# Patient Record
Sex: FEMALE | Race: WHITE | NOT HISPANIC OR LATINO | Employment: OTHER | URBAN - METROPOLITAN AREA
[De-identification: names, ages, dates, MRNs, and addresses within clinical notes are randomized per-mention and may not be internally consistent; named-entity substitution may affect disease eponyms.]

---

## 2017-01-12 ENCOUNTER — ALLSCRIPTS OFFICE VISIT (OUTPATIENT)
Dept: OTHER | Facility: OTHER | Age: 57
End: 2017-01-12

## 2017-02-22 ENCOUNTER — ALLSCRIPTS OFFICE VISIT (OUTPATIENT)
Dept: OTHER | Facility: OTHER | Age: 57
End: 2017-02-22

## 2017-03-05 ENCOUNTER — APPOINTMENT (EMERGENCY)
Dept: RADIOLOGY | Facility: HOSPITAL | Age: 57
DRG: 192 | End: 2017-03-05
Payer: COMMERCIAL

## 2017-03-05 ENCOUNTER — HOSPITAL ENCOUNTER (INPATIENT)
Facility: HOSPITAL | Age: 57
LOS: 1 days | Discharge: HOME | DRG: 192 | End: 2017-03-08
Attending: EMERGENCY MEDICINE | Admitting: INTERNAL MEDICINE
Payer: COMMERCIAL

## 2017-03-05 DIAGNOSIS — R94.39 ABNORMAL STRESS TEST: ICD-10-CM

## 2017-03-05 DIAGNOSIS — R07.9 CHEST PAIN: Primary | ICD-10-CM

## 2017-03-05 PROBLEM — F41.9 ANXIETY: Status: ACTIVE | Noted: 2017-03-05

## 2017-03-05 PROBLEM — M54.9 CHRONIC BACK PAIN: Status: ACTIVE | Noted: 2017-03-05

## 2017-03-05 PROBLEM — G89.29 CHRONIC BACK PAIN: Status: ACTIVE | Noted: 2017-03-05

## 2017-03-05 PROBLEM — J44.1 COPD WITH EXACERBATION (HCC): Status: ACTIVE | Noted: 2017-03-05

## 2017-03-05 PROBLEM — F32.A DEPRESSION: Chronic | Status: ACTIVE | Noted: 2017-03-05

## 2017-03-05 PROBLEM — G62.9 NEUROPATHY: Chronic | Status: ACTIVE | Noted: 2017-03-05

## 2017-03-05 PROBLEM — Z72.0 TOBACCO ABUSE DISORDER: Status: ACTIVE | Noted: 2017-03-05

## 2017-03-05 LAB
ALBUMIN SERPL BCP-MCNC: 3.5 G/DL (ref 3.5–5)
ALP SERPL-CCNC: 101 U/L (ref 46–116)
ALT SERPL W P-5'-P-CCNC: 24 U/L (ref 12–78)
AMYLASE SERPL-CCNC: 38 IU/L (ref 25–115)
ANION GAP SERPL CALCULATED.3IONS-SCNC: 10 MMOL/L (ref 4–13)
AST SERPL W P-5'-P-CCNC: 19 U/L (ref 5–45)
BASOPHILS # BLD AUTO: 0.04 THOUSANDS/ΜL (ref 0–0.1)
BASOPHILS NFR BLD AUTO: 1 % (ref 0–1)
BILIRUB DIRECT SERPL-MCNC: 0.05 MG/DL (ref 0–0.2)
BILIRUB SERPL-MCNC: 0.2 MG/DL (ref 0.2–1)
BUN SERPL-MCNC: 17 MG/DL (ref 5–25)
CALCIUM SERPL-MCNC: 9.2 MG/DL (ref 8.3–10.1)
CHLORIDE SERPL-SCNC: 106 MMOL/L (ref 100–108)
CO2 SERPL-SCNC: 25 MMOL/L (ref 21–32)
CREAT SERPL-MCNC: 0.83 MG/DL (ref 0.6–1.3)
EOSINOPHIL # BLD AUTO: 0.38 THOUSAND/ΜL (ref 0–0.61)
EOSINOPHIL NFR BLD AUTO: 6 % (ref 0–6)
ERYTHROCYTE [DISTWIDTH] IN BLOOD BY AUTOMATED COUNT: 13 % (ref 11.6–15.1)
GFR SERPL CREATININE-BSD FRML MDRD: >60 ML/MIN/1.73SQ M
GLUCOSE SERPL-MCNC: 87 MG/DL (ref 65–140)
HCT VFR BLD AUTO: 40.4 % (ref 34.8–46.1)
HGB BLD-MCNC: 13.7 G/DL (ref 11.5–15.4)
LYMPHOCYTES # BLD AUTO: 1.96 THOUSANDS/ΜL (ref 0.6–4.47)
LYMPHOCYTES NFR BLD AUTO: 33 % (ref 14–44)
MCH RBC QN AUTO: 30.6 PG (ref 26.8–34.3)
MCHC RBC AUTO-ENTMCNC: 33.9 G/DL (ref 31.4–37.4)
MCV RBC AUTO: 90 FL (ref 82–98)
MONOCYTES # BLD AUTO: 0.32 THOUSAND/ΜL (ref 0.17–1.22)
MONOCYTES NFR BLD AUTO: 5 % (ref 4–12)
NEUTROPHILS # BLD AUTO: 3.19 THOUSANDS/ΜL (ref 1.85–7.62)
NEUTS SEG NFR BLD AUTO: 54 % (ref 43–75)
NRBC BLD AUTO-RTO: 0 /100 WBCS
PLATELET # BLD AUTO: 193 THOUSANDS/UL (ref 149–390)
PMV BLD AUTO: 9.4 FL (ref 8.9–12.7)
POTASSIUM SERPL-SCNC: 3.6 MMOL/L (ref 3.5–5.3)
PROT SERPL-MCNC: 7 G/DL (ref 6.4–8.2)
RBC # BLD AUTO: 4.47 MILLION/UL (ref 3.81–5.12)
SODIUM SERPL-SCNC: 141 MMOL/L (ref 136–145)
TROPONIN I SERPL-MCNC: <0.02 NG/ML
WBC # BLD AUTO: 5.91 THOUSAND/UL (ref 4.31–10.16)

## 2017-03-05 PROCEDURE — 80048 BASIC METABOLIC PNL TOTAL CA: CPT

## 2017-03-05 PROCEDURE — 36415 COLL VENOUS BLD VENIPUNCTURE: CPT | Performed by: EMERGENCY MEDICINE

## 2017-03-05 PROCEDURE — 71020 HB CHEST X-RAY 2VW FRONTAL&LATL: CPT

## 2017-03-05 PROCEDURE — 84484 ASSAY OF TROPONIN QUANT: CPT | Performed by: FAMILY MEDICINE

## 2017-03-05 PROCEDURE — 85025 COMPLETE CBC W/AUTO DIFF WBC: CPT

## 2017-03-05 PROCEDURE — 84484 ASSAY OF TROPONIN QUANT: CPT

## 2017-03-05 PROCEDURE — 96375 TX/PRO/DX INJ NEW DRUG ADDON: CPT

## 2017-03-05 PROCEDURE — 93005 ELECTROCARDIOGRAM TRACING: CPT

## 2017-03-05 PROCEDURE — 82150 ASSAY OF AMYLASE: CPT | Performed by: EMERGENCY MEDICINE

## 2017-03-05 PROCEDURE — 80076 HEPATIC FUNCTION PANEL: CPT | Performed by: EMERGENCY MEDICINE

## 2017-03-05 PROCEDURE — 96374 THER/PROPH/DIAG INJ IV PUSH: CPT

## 2017-03-05 PROCEDURE — 93005 ELECTROCARDIOGRAM TRACING: CPT | Performed by: EMERGENCY MEDICINE

## 2017-03-05 RX ORDER — IPRATROPIUM BROMIDE AND ALBUTEROL SULFATE 2.5; .5 MG/3ML; MG/3ML
3 SOLUTION RESPIRATORY (INHALATION)
Status: DISCONTINUED | OUTPATIENT
Start: 2017-03-05 | End: 2017-03-07

## 2017-03-05 RX ORDER — MIRTAZAPINE 15 MG/1
30 TABLET, FILM COATED ORAL
Status: DISCONTINUED | OUTPATIENT
Start: 2017-03-05 | End: 2017-03-08 | Stop reason: HOSPADM

## 2017-03-05 RX ORDER — GABAPENTIN 400 MG/1
800 CAPSULE ORAL 3 TIMES DAILY
Status: DISCONTINUED | OUTPATIENT
Start: 2017-03-05 | End: 2017-03-08 | Stop reason: HOSPADM

## 2017-03-05 RX ORDER — NITROGLYCERIN 0.4 MG/1
0.4 TABLET SUBLINGUAL ONCE
Status: COMPLETED | OUTPATIENT
Start: 2017-03-05 | End: 2017-03-05

## 2017-03-05 RX ORDER — MORPHINE SULFATE 4 MG/ML
4 INJECTION, SOLUTION INTRAMUSCULAR; INTRAVENOUS ONCE
Status: COMPLETED | OUTPATIENT
Start: 2017-03-05 | End: 2017-03-05

## 2017-03-05 RX ORDER — ONDANSETRON 2 MG/ML
4 INJECTION INTRAMUSCULAR; INTRAVENOUS ONCE
Status: COMPLETED | OUTPATIENT
Start: 2017-03-05 | End: 2017-03-05

## 2017-03-05 RX ORDER — CITALOPRAM 20 MG/1
40 TABLET ORAL DAILY
Status: DISCONTINUED | OUTPATIENT
Start: 2017-03-06 | End: 2017-03-08 | Stop reason: HOSPADM

## 2017-03-05 RX ORDER — NITROGLYCERIN 0.4 MG/1
0.4 TABLET SUBLINGUAL
Status: DISCONTINUED | OUTPATIENT
Start: 2017-03-05 | End: 2017-03-08 | Stop reason: HOSPADM

## 2017-03-05 RX ORDER — MORPHINE SULFATE 2 MG/ML
1 INJECTION, SOLUTION INTRAMUSCULAR; INTRAVENOUS EVERY 6 HOURS PRN
Status: DISCONTINUED | OUTPATIENT
Start: 2017-03-05 | End: 2017-03-06

## 2017-03-05 RX ORDER — CITALOPRAM 20 MG/1
20 TABLET ORAL DAILY
Status: DISCONTINUED | OUTPATIENT
Start: 2017-03-06 | End: 2017-03-05 | Stop reason: ALTCHOICE

## 2017-03-05 RX ORDER — METHYLPREDNISOLONE SODIUM SUCCINATE 40 MG/ML
20 INJECTION, POWDER, LYOPHILIZED, FOR SOLUTION INTRAMUSCULAR; INTRAVENOUS 3 TIMES DAILY
Status: DISCONTINUED | OUTPATIENT
Start: 2017-03-05 | End: 2017-03-06

## 2017-03-05 RX ORDER — HEPARIN SODIUM 5000 [USP'U]/ML
5000 INJECTION, SOLUTION INTRAVENOUS; SUBCUTANEOUS EVERY 8 HOURS SCHEDULED
Status: DISCONTINUED | OUTPATIENT
Start: 2017-03-05 | End: 2017-03-08 | Stop reason: HOSPADM

## 2017-03-05 RX ORDER — CLONAZEPAM 0.5 MG/1
0.5 TABLET ORAL 2 TIMES DAILY PRN
COMMUNITY
End: 2017-11-21

## 2017-03-05 RX ADMIN — IPRATROPIUM BROMIDE AND ALBUTEROL SULFATE 3 ML: .5; 3 SOLUTION RESPIRATORY (INHALATION) at 21:33

## 2017-03-05 RX ADMIN — HEPARIN SODIUM 5000 UNITS: 5000 INJECTION, SOLUTION INTRAVENOUS; SUBCUTANEOUS at 21:10

## 2017-03-05 RX ADMIN — MIRTAZAPINE 30 MG: 15 TABLET, FILM COATED ORAL at 21:12

## 2017-03-05 RX ADMIN — NITROGLYCERIN 0.4 MG: 0.4 TABLET SUBLINGUAL at 14:08

## 2017-03-05 RX ADMIN — MORPHINE SULFATE 4 MG: 4 INJECTION, SOLUTION INTRAMUSCULAR; INTRAVENOUS at 14:08

## 2017-03-05 RX ADMIN — GABAPENTIN 800 MG: 400 CAPSULE ORAL at 20:33

## 2017-03-05 RX ADMIN — ONDANSETRON 4 MG: 2 INJECTION INTRAMUSCULAR; INTRAVENOUS at 14:08

## 2017-03-05 RX ADMIN — AZITHROMYCIN MONOHYDRATE 500 MG: 500 INJECTION, POWDER, LYOPHILIZED, FOR SOLUTION INTRAVENOUS at 20:04

## 2017-03-05 RX ADMIN — MORPHINE SULFATE 1 MG: 2 INJECTION, SOLUTION INTRAMUSCULAR; INTRAVENOUS at 19:12

## 2017-03-06 ENCOUNTER — APPOINTMENT (OUTPATIENT)
Dept: NON INVASIVE DIAGNOSTICS | Facility: HOSPITAL | Age: 57
DRG: 192 | End: 2017-03-06
Payer: COMMERCIAL

## 2017-03-06 ENCOUNTER — APPOINTMENT (OUTPATIENT)
Dept: CT IMAGING | Facility: HOSPITAL | Age: 57
DRG: 192 | End: 2017-03-06
Payer: COMMERCIAL

## 2017-03-06 ENCOUNTER — APPOINTMENT (OUTPATIENT)
Dept: NUCLEAR MEDICINE | Facility: HOSPITAL | Age: 57
DRG: 192 | End: 2017-03-06
Payer: COMMERCIAL

## 2017-03-06 LAB
ALBUMIN SERPL BCP-MCNC: 3.3 G/DL (ref 3.5–5)
ALP SERPL-CCNC: 93 U/L (ref 46–116)
ALT SERPL W P-5'-P-CCNC: 23 U/L (ref 12–78)
ANION GAP SERPL CALCULATED.3IONS-SCNC: 9 MMOL/L (ref 4–13)
AST SERPL W P-5'-P-CCNC: 13 U/L (ref 5–45)
ATRIAL RATE: 78 BPM
ATRIAL RATE: 86 BPM
BILIRUB SERPL-MCNC: 0.2 MG/DL (ref 0.2–1)
BUN SERPL-MCNC: 14 MG/DL (ref 5–25)
CALCIUM SERPL-MCNC: 9 MG/DL (ref 8.3–10.1)
CHLORIDE SERPL-SCNC: 104 MMOL/L (ref 100–108)
CO2 SERPL-SCNC: 23 MMOL/L (ref 21–32)
CREAT SERPL-MCNC: 0.68 MG/DL (ref 0.6–1.3)
GFR SERPL CREATININE-BSD FRML MDRD: >60 ML/MIN/1.73SQ M
GLUCOSE SERPL-MCNC: 114 MG/DL (ref 65–140)
P AXIS: 62 DEGREES
P AXIS: 70 DEGREES
POTASSIUM SERPL-SCNC: 4.6 MMOL/L (ref 3.5–5.3)
PR INTERVAL: 104 MS
PR INTERVAL: 108 MS
PROT SERPL-MCNC: 6.7 G/DL (ref 6.4–8.2)
QRS AXIS: 57 DEGREES
QRS AXIS: 65 DEGREES
QRSD INTERVAL: 74 MS
QRSD INTERVAL: 78 MS
QT INTERVAL: 394 MS
QT INTERVAL: 416 MS
QTC INTERVAL: 471 MS
QTC INTERVAL: 474 MS
SODIUM SERPL-SCNC: 136 MMOL/L (ref 136–145)
T WAVE AXIS: 39 DEGREES
T WAVE AXIS: 46 DEGREES
VENTRICULAR RATE: 78 BPM
VENTRICULAR RATE: 86 BPM

## 2017-03-06 PROCEDURE — 99285 EMERGENCY DEPT VISIT HI MDM: CPT

## 2017-03-06 PROCEDURE — 78452 HT MUSCLE IMAGE SPECT MULT: CPT

## 2017-03-06 PROCEDURE — 80053 COMPREHEN METABOLIC PANEL: CPT | Performed by: FAMILY MEDICINE

## 2017-03-06 PROCEDURE — A9502 TC99M TETROFOSMIN: HCPCS

## 2017-03-06 PROCEDURE — 71275 CT ANGIOGRAPHY CHEST: CPT

## 2017-03-06 PROCEDURE — 94760 N-INVAS EAR/PLS OXIMETRY 1: CPT

## 2017-03-06 PROCEDURE — 93017 CV STRESS TEST TRACING ONLY: CPT

## 2017-03-06 PROCEDURE — 94640 AIRWAY INHALATION TREATMENT: CPT

## 2017-03-06 RX ORDER — ATORVASTATIN CALCIUM 40 MG/1
40 TABLET, FILM COATED ORAL
Status: DISCONTINUED | OUTPATIENT
Start: 2017-03-06 | End: 2017-03-08 | Stop reason: HOSPADM

## 2017-03-06 RX ORDER — MORPHINE SULFATE 2 MG/ML
1 INJECTION, SOLUTION INTRAMUSCULAR; INTRAVENOUS
Status: DISCONTINUED | OUTPATIENT
Start: 2017-03-06 | End: 2017-03-08 | Stop reason: HOSPADM

## 2017-03-06 RX ORDER — CLOPIDOGREL BISULFATE 75 MG/1
75 TABLET ORAL DAILY
Status: DISCONTINUED | OUTPATIENT
Start: 2017-03-06 | End: 2017-03-08 | Stop reason: HOSPADM

## 2017-03-06 RX ORDER — METHYLPREDNISOLONE SODIUM SUCCINATE 40 MG/ML
20 INJECTION, POWDER, LYOPHILIZED, FOR SOLUTION INTRAMUSCULAR; INTRAVENOUS EVERY 12 HOURS SCHEDULED
Status: COMPLETED | OUTPATIENT
Start: 2017-03-06 | End: 2017-03-07

## 2017-03-06 RX ADMIN — MORPHINE SULFATE 1 MG: 2 INJECTION, SOLUTION INTRAMUSCULAR; INTRAVENOUS at 03:04

## 2017-03-06 RX ADMIN — METHYLPREDNISOLONE SODIUM SUCCINATE 20 MG: 40 INJECTION, POWDER, FOR SOLUTION INTRAMUSCULAR; INTRAVENOUS at 22:53

## 2017-03-06 RX ADMIN — MIRTAZAPINE 30 MG: 15 TABLET, FILM COATED ORAL at 22:52

## 2017-03-06 RX ADMIN — MORPHINE SULFATE 1 MG: 2 INJECTION, SOLUTION INTRAMUSCULAR; INTRAVENOUS at 18:21

## 2017-03-06 RX ADMIN — GABAPENTIN 800 MG: 400 CAPSULE ORAL at 09:46

## 2017-03-06 RX ADMIN — NICOTINE 1 PATCH: 7 PATCH TRANSDERMAL at 08:28

## 2017-03-06 RX ADMIN — NITROGLYCERIN 0.4 MG: 0.4 TABLET SUBLINGUAL at 14:43

## 2017-03-06 RX ADMIN — CLOPIDOGREL 75 MG: 75 TABLET, FILM COATED ORAL at 17:34

## 2017-03-06 RX ADMIN — METOPROLOL TARTRATE 25 MG: 25 TABLET ORAL at 17:34

## 2017-03-06 RX ADMIN — HEPARIN SODIUM 5000 UNITS: 5000 INJECTION, SOLUTION INTRAVENOUS; SUBCUTANEOUS at 06:58

## 2017-03-06 RX ADMIN — MORPHINE SULFATE 1 MG: 2 INJECTION, SOLUTION INTRAMUSCULAR; INTRAVENOUS at 12:51

## 2017-03-06 RX ADMIN — NITROGLYCERIN 0.4 MG: 0.4 TABLET SUBLINGUAL at 14:38

## 2017-03-06 RX ADMIN — NITROGLYCERIN 0.4 MG: 0.4 TABLET SUBLINGUAL at 08:29

## 2017-03-06 RX ADMIN — MORPHINE SULFATE 1 MG: 2 INJECTION, SOLUTION INTRAMUSCULAR; INTRAVENOUS at 08:46

## 2017-03-06 RX ADMIN — MORPHINE SULFATE 1 MG: 2 INJECTION, SOLUTION INTRAMUSCULAR; INTRAVENOUS at 14:58

## 2017-03-06 RX ADMIN — IPRATROPIUM BROMIDE AND ALBUTEROL SULFATE 3 ML: .5; 3 SOLUTION RESPIRATORY (INHALATION) at 08:28

## 2017-03-06 RX ADMIN — ATORVASTATIN CALCIUM 40 MG: 40 TABLET, FILM COATED ORAL at 17:34

## 2017-03-06 RX ADMIN — IOHEXOL 85 ML: 350 INJECTION, SOLUTION INTRAVENOUS at 15:53

## 2017-03-06 RX ADMIN — METHYLPREDNISOLONE SODIUM SUCCINATE 20 MG: 40 INJECTION, POWDER, FOR SOLUTION INTRAMUSCULAR; INTRAVENOUS at 08:28

## 2017-03-06 RX ADMIN — REGADENOSON 0.4 MG: 0.08 INJECTION, SOLUTION INTRAVENOUS at 14:30

## 2017-03-06 RX ADMIN — METHYLPREDNISOLONE SODIUM SUCCINATE 20 MG: 40 INJECTION, POWDER, FOR SOLUTION INTRAMUSCULAR; INTRAVENOUS at 01:08

## 2017-03-06 RX ADMIN — IPRATROPIUM BROMIDE AND ALBUTEROL SULFATE 3 ML: .5; 3 SOLUTION RESPIRATORY (INHALATION) at 20:06

## 2017-03-06 RX ADMIN — GABAPENTIN 800 MG: 400 CAPSULE ORAL at 22:52

## 2017-03-06 RX ADMIN — CITALOPRAM HYDROBROMIDE 40 MG: 20 TABLET ORAL at 09:46

## 2017-03-06 RX ADMIN — AZITHROMYCIN MONOHYDRATE 500 MG: 500 INJECTION, POWDER, LYOPHILIZED, FOR SOLUTION INTRAVENOUS at 17:47

## 2017-03-06 RX ADMIN — GABAPENTIN 800 MG: 400 CAPSULE ORAL at 16:46

## 2017-03-06 RX ADMIN — HEPARIN SODIUM 5000 UNITS: 5000 INJECTION, SOLUTION INTRAVENOUS; SUBCUTANEOUS at 22:53

## 2017-03-06 RX ADMIN — MORPHINE SULFATE 1 MG: 2 INJECTION, SOLUTION INTRAMUSCULAR; INTRAVENOUS at 23:22

## 2017-03-07 PROBLEM — R94.39 POSITIVE CARDIAC STRESS TEST: Status: ACTIVE | Noted: 2017-03-07

## 2017-03-07 LAB
MAX DIASTOLIC BP: 82 MMHG
MAX HEART RATE: 120 BPM
MAX PREDICTED HEART RATE: 162 BPM
MAX. SYSTOLIC BP: 142 MMHG
PROTOCOL NAME: NORMAL
REASON FOR TERMINATION: NORMAL
TARGET HR FORMULA: NORMAL
TEST INDICATION: NORMAL
TIME IN EXERCISE PHASE: 180 S

## 2017-03-07 PROCEDURE — 94760 N-INVAS EAR/PLS OXIMETRY 1: CPT

## 2017-03-07 PROCEDURE — 94640 AIRWAY INHALATION TREATMENT: CPT

## 2017-03-07 RX ORDER — PREDNISONE 20 MG/1
40 TABLET ORAL DAILY
Status: DISCONTINUED | OUTPATIENT
Start: 2017-03-07 | End: 2017-03-08 | Stop reason: HOSPADM

## 2017-03-07 RX ORDER — IPRATROPIUM BROMIDE AND ALBUTEROL SULFATE 2.5; .5 MG/3ML; MG/3ML
3 SOLUTION RESPIRATORY (INHALATION)
Status: DISCONTINUED | OUTPATIENT
Start: 2017-03-07 | End: 2017-03-08 | Stop reason: HOSPADM

## 2017-03-07 RX ORDER — IPRATROPIUM BROMIDE AND ALBUTEROL SULFATE 2.5; .5 MG/3ML; MG/3ML
SOLUTION RESPIRATORY (INHALATION)
Status: COMPLETED
Start: 2017-03-07 | End: 2017-03-07

## 2017-03-07 RX ADMIN — MORPHINE SULFATE 1 MG: 2 INJECTION, SOLUTION INTRAMUSCULAR; INTRAVENOUS at 18:31

## 2017-03-07 RX ADMIN — AZITHROMYCIN MONOHYDRATE 500 MG: 500 INJECTION, POWDER, LYOPHILIZED, FOR SOLUTION INTRAVENOUS at 20:24

## 2017-03-07 RX ADMIN — IPRATROPIUM BROMIDE AND ALBUTEROL SULFATE 3 ML: .5; 3 SOLUTION RESPIRATORY (INHALATION) at 01:30

## 2017-03-07 RX ADMIN — MORPHINE SULFATE 1 MG: 2 INJECTION, SOLUTION INTRAMUSCULAR; INTRAVENOUS at 12:32

## 2017-03-07 RX ADMIN — IPRATROPIUM BROMIDE AND ALBUTEROL SULFATE 3 ML: .5; 3 SOLUTION RESPIRATORY (INHALATION) at 09:35

## 2017-03-07 RX ADMIN — CITALOPRAM HYDROBROMIDE 40 MG: 20 TABLET ORAL at 08:44

## 2017-03-07 RX ADMIN — CLOPIDOGREL 75 MG: 75 TABLET, FILM COATED ORAL at 08:44

## 2017-03-07 RX ADMIN — HEPARIN SODIUM 5000 UNITS: 5000 INJECTION, SOLUTION INTRAVENOUS; SUBCUTANEOUS at 06:52

## 2017-03-07 RX ADMIN — PREDNISONE 40 MG: 20 TABLET ORAL at 12:48

## 2017-03-07 RX ADMIN — NICOTINE 1 PATCH: 7 PATCH TRANSDERMAL at 08:50

## 2017-03-07 RX ADMIN — GABAPENTIN 800 MG: 400 CAPSULE ORAL at 15:43

## 2017-03-07 RX ADMIN — METOPROLOL TARTRATE 25 MG: 25 TABLET ORAL at 21:54

## 2017-03-07 RX ADMIN — GABAPENTIN 800 MG: 400 CAPSULE ORAL at 21:53

## 2017-03-07 RX ADMIN — METOPROLOL TARTRATE 25 MG: 25 TABLET ORAL at 08:43

## 2017-03-07 RX ADMIN — HEPARIN SODIUM 5000 UNITS: 5000 INJECTION, SOLUTION INTRAVENOUS; SUBCUTANEOUS at 12:34

## 2017-03-07 RX ADMIN — MORPHINE SULFATE 1 MG: 2 INJECTION, SOLUTION INTRAMUSCULAR; INTRAVENOUS at 08:44

## 2017-03-07 RX ADMIN — MORPHINE SULFATE 1 MG: 2 INJECTION, SOLUTION INTRAMUSCULAR; INTRAVENOUS at 03:27

## 2017-03-07 RX ADMIN — IPRATROPIUM BROMIDE AND ALBUTEROL SULFATE 3 ML: .5; 3 SOLUTION RESPIRATORY (INHALATION) at 13:34

## 2017-03-07 RX ADMIN — IPRATROPIUM BROMIDE AND ALBUTEROL SULFATE 3 ML: .5; 3 SOLUTION RESPIRATORY (INHALATION) at 19:21

## 2017-03-07 RX ADMIN — MIRTAZAPINE 30 MG: 15 TABLET, FILM COATED ORAL at 22:16

## 2017-03-07 RX ADMIN — HEPARIN SODIUM 5000 UNITS: 5000 INJECTION, SOLUTION INTRAVENOUS; SUBCUTANEOUS at 22:17

## 2017-03-07 RX ADMIN — METHYLPREDNISOLONE SODIUM SUCCINATE 20 MG: 40 INJECTION, POWDER, FOR SOLUTION INTRAMUSCULAR; INTRAVENOUS at 08:43

## 2017-03-07 RX ADMIN — MORPHINE SULFATE 1 MG: 2 INJECTION, SOLUTION INTRAMUSCULAR; INTRAVENOUS at 22:18

## 2017-03-07 RX ADMIN — GABAPENTIN 800 MG: 400 CAPSULE ORAL at 08:44

## 2017-03-07 RX ADMIN — MORPHINE SULFATE 1 MG: 2 INJECTION, SOLUTION INTRAMUSCULAR; INTRAVENOUS at 15:43

## 2017-03-07 RX ADMIN — ATORVASTATIN CALCIUM 40 MG: 40 TABLET, FILM COATED ORAL at 15:43

## 2017-03-08 ENCOUNTER — APPOINTMENT (INPATIENT)
Dept: INTERVENTIONAL RADIOLOGY/VASCULAR | Facility: HOSPITAL | Age: 57
DRG: 192 | End: 2017-03-08
Attending: INTERNAL MEDICINE
Payer: COMMERCIAL

## 2017-03-08 VITALS
TEMPERATURE: 98.8 F | HEART RATE: 70 BPM | OXYGEN SATURATION: 95 % | DIASTOLIC BLOOD PRESSURE: 70 MMHG | SYSTOLIC BLOOD PRESSURE: 118 MMHG | RESPIRATION RATE: 18 BRPM

## 2017-03-08 PROBLEM — I20.1 CORONARY ARTERY SPASM (HCC): Status: ACTIVE | Noted: 2017-03-08

## 2017-03-08 PROBLEM — J20.9 ACUTE BRONCHITIS: Status: ACTIVE | Noted: 2017-03-08

## 2017-03-08 LAB
ANION GAP SERPL CALCULATED.3IONS-SCNC: 9 MMOL/L (ref 4–13)
APTT PPP: 27 SECONDS (ref 24–36)
BUN SERPL-MCNC: 10 MG/DL (ref 5–25)
CALCIUM SERPL-MCNC: 8.5 MG/DL (ref 8.3–10.1)
CHLORIDE SERPL-SCNC: 109 MMOL/L (ref 100–108)
CO2 SERPL-SCNC: 26 MMOL/L (ref 21–32)
CREAT SERPL-MCNC: 0.57 MG/DL (ref 0.6–1.3)
ERYTHROCYTE [DISTWIDTH] IN BLOOD BY AUTOMATED COUNT: 13 % (ref 11.6–15.1)
GFR SERPL CREATININE-BSD FRML MDRD: >60 ML/MIN/1.73SQ M
GLUCOSE SERPL-MCNC: 89 MG/DL (ref 65–140)
HCT VFR BLD AUTO: 39.7 % (ref 34.8–46.1)
HGB BLD-MCNC: 13.4 G/DL (ref 11.5–15.4)
INR PPP: 1.02 (ref 0.86–1.16)
MCH RBC QN AUTO: 30.7 PG (ref 26.8–34.3)
MCHC RBC AUTO-ENTMCNC: 33.8 G/DL (ref 31.4–37.4)
MCV RBC AUTO: 91 FL (ref 82–98)
PLATELET # BLD AUTO: 181 THOUSANDS/UL (ref 149–390)
PMV BLD AUTO: 10 FL (ref 8.9–12.7)
POTASSIUM SERPL-SCNC: 3.3 MMOL/L (ref 3.5–5.3)
PROTHROMBIN TIME: 13.3 SECONDS (ref 12–14.3)
RBC # BLD AUTO: 4.36 MILLION/UL (ref 3.81–5.12)
SODIUM SERPL-SCNC: 144 MMOL/L (ref 136–145)
WBC # BLD AUTO: 7.9 THOUSAND/UL (ref 4.31–10.16)

## 2017-03-08 PROCEDURE — 99153 MOD SED SAME PHYS/QHP EA: CPT | Performed by: PHYSICIAN ASSISTANT

## 2017-03-08 PROCEDURE — C1894 INTRO/SHEATH, NON-LASER: HCPCS | Performed by: PHYSICIAN ASSISTANT

## 2017-03-08 PROCEDURE — 85730 THROMBOPLASTIN TIME PARTIAL: CPT | Performed by: PHYSICIAN ASSISTANT

## 2017-03-08 PROCEDURE — 93458 L HRT ARTERY/VENTRICLE ANGIO: CPT | Performed by: PHYSICIAN ASSISTANT

## 2017-03-08 PROCEDURE — 85610 PROTHROMBIN TIME: CPT | Performed by: PHYSICIAN ASSISTANT

## 2017-03-08 PROCEDURE — 80048 BASIC METABOLIC PNL TOTAL CA: CPT | Performed by: PHYSICIAN ASSISTANT

## 2017-03-08 PROCEDURE — B2151ZZ FLUOROSCOPY OF LEFT HEART USING LOW OSMOLAR CONTRAST: ICD-10-PCS | Performed by: INTERNAL MEDICINE

## 2017-03-08 PROCEDURE — C1769 GUIDE WIRE: HCPCS | Performed by: PHYSICIAN ASSISTANT

## 2017-03-08 PROCEDURE — 99152 MOD SED SAME PHYS/QHP 5/>YRS: CPT | Performed by: PHYSICIAN ASSISTANT

## 2017-03-08 PROCEDURE — 94640 AIRWAY INHALATION TREATMENT: CPT

## 2017-03-08 PROCEDURE — C1760 CLOSURE DEV, VASC: HCPCS | Performed by: PHYSICIAN ASSISTANT

## 2017-03-08 PROCEDURE — 94760 N-INVAS EAR/PLS OXIMETRY 1: CPT

## 2017-03-08 PROCEDURE — 85027 COMPLETE CBC AUTOMATED: CPT | Performed by: PHYSICIAN ASSISTANT

## 2017-03-08 PROCEDURE — 4A023N7 MEASUREMENT OF CARDIAC SAMPLING AND PRESSURE, LEFT HEART, PERCUTANEOUS APPROACH: ICD-10-PCS | Performed by: INTERNAL MEDICINE

## 2017-03-08 PROCEDURE — B2111ZZ FLUOROSCOPY OF MULTIPLE CORONARY ARTERIES USING LOW OSMOLAR CONTRAST: ICD-10-PCS | Performed by: INTERNAL MEDICINE

## 2017-03-08 RX ORDER — CLOPIDOGREL BISULFATE 75 MG/1
75 TABLET ORAL DAILY
Qty: 30 TABLET | Refills: 0 | Status: SHIPPED | OUTPATIENT
Start: 2017-03-08 | End: 2017-07-25

## 2017-03-08 RX ORDER — ALBUTEROL SULFATE 90 UG/1
2 AEROSOL, METERED RESPIRATORY (INHALATION) EVERY 4 HOURS PRN
Status: DISCONTINUED | OUTPATIENT
Start: 2017-03-08 | End: 2017-03-08 | Stop reason: HOSPADM

## 2017-03-08 RX ORDER — ALBUTEROL SULFATE 90 UG/1
2 AEROSOL, METERED RESPIRATORY (INHALATION) EVERY 4 HOURS PRN
Qty: 1 INHALER | Refills: 0 | Status: SHIPPED | OUTPATIENT
Start: 2017-03-08 | End: 2017-04-07

## 2017-03-08 RX ORDER — BACLOFEN 10 MG/1
10 TABLET ORAL 3 TIMES DAILY
Qty: 21 TABLET | Refills: 0 | Status: SHIPPED | OUTPATIENT
Start: 2017-03-08 | End: 2017-04-12

## 2017-03-08 RX ORDER — AZITHROMYCIN 250 MG/1
250 TABLET, FILM COATED ORAL DAILY
Qty: 3 TABLET | Refills: 0 | Status: SHIPPED | OUTPATIENT
Start: 2017-03-08 | End: 2017-03-11

## 2017-03-08 RX ORDER — MIDAZOLAM HYDROCHLORIDE 1 MG/ML
INJECTION INTRAMUSCULAR; INTRAVENOUS CODE/TRAUMA/SEDATION MEDICATION
Status: COMPLETED | OUTPATIENT
Start: 2017-03-08 | End: 2017-03-08

## 2017-03-08 RX ORDER — FENTANYL CITRATE 50 UG/ML
INJECTION, SOLUTION INTRAMUSCULAR; INTRAVENOUS CODE/TRAUMA/SEDATION MEDICATION
Status: COMPLETED | OUTPATIENT
Start: 2017-03-08 | End: 2017-03-08

## 2017-03-08 RX ORDER — OXYCODONE HYDROCHLORIDE AND ACETAMINOPHEN 5; 325 MG/1; MG/1
1 TABLET ORAL EVERY 4 HOURS PRN
Qty: 30 TABLET | Refills: 0 | Status: SHIPPED | OUTPATIENT
Start: 2017-03-08 | End: 2017-03-18

## 2017-03-08 RX ORDER — ATORVASTATIN CALCIUM 40 MG/1
40 TABLET, FILM COATED ORAL
Qty: 30 TABLET | Refills: 0 | Status: SHIPPED | OUTPATIENT
Start: 2017-03-08 | End: 2017-07-25

## 2017-03-08 RX ORDER — SODIUM CHLORIDE 9 MG/ML
75 INJECTION, SOLUTION INTRAVENOUS CONTINUOUS
Status: DISPENSED | OUTPATIENT
Start: 2017-03-08 | End: 2017-03-08

## 2017-03-08 RX ORDER — NITROGLYCERIN 0.4 MG/1
0.4 TABLET SUBLINGUAL
Qty: 90 TABLET | Refills: 0 | Status: SHIPPED | OUTPATIENT
Start: 2017-03-08 | End: 2017-07-25

## 2017-03-08 RX ORDER — LIDOCAINE HYDROCHLORIDE 10 MG/ML
INJECTION, SOLUTION INFILTRATION; PERINEURAL CODE/TRAUMA/SEDATION MEDICATION
Status: COMPLETED | OUTPATIENT
Start: 2017-03-08 | End: 2017-03-08

## 2017-03-08 RX ORDER — PREDNISONE 20 MG/1
20 TABLET ORAL DAILY
Qty: 5 TABLET | Refills: 0 | Status: SHIPPED | OUTPATIENT
Start: 2017-03-08 | End: 2017-03-13

## 2017-03-08 RX ADMIN — ATORVASTATIN CALCIUM 40 MG: 40 TABLET, FILM COATED ORAL at 15:10

## 2017-03-08 RX ADMIN — LIDOCAINE HYDROCHLORIDE 3 ML: 10 INJECTION, SOLUTION INFILTRATION; PERINEURAL at 09:30

## 2017-03-08 RX ADMIN — METOPROLOL TARTRATE 25 MG: 25 TABLET ORAL at 07:40

## 2017-03-08 RX ADMIN — MORPHINE SULFATE 1 MG: 2 INJECTION, SOLUTION INTRAMUSCULAR; INTRAVENOUS at 12:04

## 2017-03-08 RX ADMIN — FENTANYL CITRATE 25 MCG: 50 INJECTION, SOLUTION INTRAMUSCULAR; INTRAVENOUS at 09:29

## 2017-03-08 RX ADMIN — PREDNISONE 40 MG: 20 TABLET ORAL at 07:42

## 2017-03-08 RX ADMIN — FENTANYL CITRATE 25 MCG: 50 INJECTION, SOLUTION INTRAMUSCULAR; INTRAVENOUS at 09:27

## 2017-03-08 RX ADMIN — FENTANYL CITRATE 50 MCG: 50 INJECTION, SOLUTION INTRAMUSCULAR; INTRAVENOUS at 09:51

## 2017-03-08 RX ADMIN — MORPHINE SULFATE 1 MG: 2 INJECTION, SOLUTION INTRAMUSCULAR; INTRAVENOUS at 04:17

## 2017-03-08 RX ADMIN — MIDAZOLAM HYDROCHLORIDE 1 MG: 1 INJECTION, SOLUTION INTRAMUSCULAR; INTRAVENOUS at 09:27

## 2017-03-08 RX ADMIN — GABAPENTIN 800 MG: 400 CAPSULE ORAL at 07:39

## 2017-03-08 RX ADMIN — IOHEXOL 120 ML: 350 INJECTION, SOLUTION INTRAVENOUS at 09:49

## 2017-03-08 RX ADMIN — FENTANYL CITRATE 25 MCG: 50 INJECTION, SOLUTION INTRAMUSCULAR; INTRAVENOUS at 09:30

## 2017-03-08 RX ADMIN — IPRATROPIUM BROMIDE AND ALBUTEROL SULFATE 3 ML: .5; 3 SOLUTION RESPIRATORY (INHALATION) at 14:39

## 2017-03-08 RX ADMIN — MORPHINE SULFATE 1 MG: 2 INJECTION, SOLUTION INTRAMUSCULAR; INTRAVENOUS at 15:10

## 2017-03-08 RX ADMIN — FENTANYL CITRATE 25 MCG: 50 INJECTION, SOLUTION INTRAMUSCULAR; INTRAVENOUS at 09:35

## 2017-03-08 RX ADMIN — HEPARIN SODIUM 5000 UNITS: 5000 INJECTION, SOLUTION INTRAVENOUS; SUBCUTANEOUS at 11:58

## 2017-03-08 RX ADMIN — GABAPENTIN 800 MG: 400 CAPSULE ORAL at 15:10

## 2017-03-08 RX ADMIN — CITALOPRAM HYDROBROMIDE 40 MG: 20 TABLET ORAL at 07:39

## 2017-03-08 RX ADMIN — MIDAZOLAM HYDROCHLORIDE 1 MG: 1 INJECTION, SOLUTION INTRAMUSCULAR; INTRAVENOUS at 09:30

## 2017-03-08 RX ADMIN — SODIUM CHLORIDE 75 ML/HR: 0.9 INJECTION, SOLUTION INTRAVENOUS at 10:23

## 2017-03-09 ENCOUNTER — GENERIC CONVERSION - ENCOUNTER (OUTPATIENT)
Dept: OTHER | Facility: OTHER | Age: 57
End: 2017-03-09

## 2017-03-23 ENCOUNTER — GENERIC CONVERSION - ENCOUNTER (OUTPATIENT)
Dept: OTHER | Facility: OTHER | Age: 57
End: 2017-03-23

## 2017-04-04 ENCOUNTER — ALLSCRIPTS OFFICE VISIT (OUTPATIENT)
Dept: OTHER | Facility: OTHER | Age: 57
End: 2017-04-04

## 2017-04-04 ENCOUNTER — APPOINTMENT (OUTPATIENT)
Dept: LAB | Facility: CLINIC | Age: 57
End: 2017-04-04
Payer: COMMERCIAL

## 2017-04-04 DIAGNOSIS — I10 ESSENTIAL (PRIMARY) HYPERTENSION: ICD-10-CM

## 2017-04-04 LAB
ANION GAP SERPL CALCULATED.3IONS-SCNC: 8 MMOL/L (ref 4–13)
BUN SERPL-MCNC: 12 MG/DL (ref 5–25)
CALCIUM SERPL-MCNC: 8.6 MG/DL (ref 8.3–10.1)
CHLORIDE SERPL-SCNC: 108 MMOL/L (ref 100–108)
CO2 SERPL-SCNC: 25 MMOL/L (ref 21–32)
CREAT SERPL-MCNC: 0.72 MG/DL (ref 0.6–1.3)
GFR SERPL CREATININE-BSD FRML MDRD: >60 ML/MIN/1.73SQ M
GLUCOSE P FAST SERPL-MCNC: 92 MG/DL (ref 65–99)
POTASSIUM SERPL-SCNC: 4.2 MMOL/L (ref 3.5–5.3)
SODIUM SERPL-SCNC: 141 MMOL/L (ref 136–145)

## 2017-04-04 PROCEDURE — 36415 COLL VENOUS BLD VENIPUNCTURE: CPT

## 2017-04-04 PROCEDURE — 80048 BASIC METABOLIC PNL TOTAL CA: CPT

## 2017-04-12 ENCOUNTER — HOSPITAL ENCOUNTER (EMERGENCY)
Facility: HOSPITAL | Age: 57
Discharge: HOME | End: 2017-04-12
Attending: EMERGENCY MEDICINE
Payer: COMMERCIAL

## 2017-04-12 ENCOUNTER — APPOINTMENT (EMERGENCY)
Dept: CT IMAGING | Facility: HOSPITAL | Age: 57
End: 2017-04-12
Payer: COMMERCIAL

## 2017-04-12 VITALS
HEART RATE: 86 BPM | TEMPERATURE: 97.8 F | BODY MASS INDEX: 24.45 KG/M2 | OXYGEN SATURATION: 98 % | WEIGHT: 138 LBS | RESPIRATION RATE: 18 BRPM | DIASTOLIC BLOOD PRESSURE: 57 MMHG | SYSTOLIC BLOOD PRESSURE: 103 MMHG

## 2017-04-12 DIAGNOSIS — M54.50 ACUTE EXACERBATION OF CHRONIC LOW BACK PAIN: Primary | ICD-10-CM

## 2017-04-12 DIAGNOSIS — G89.29 ACUTE EXACERBATION OF CHRONIC LOW BACK PAIN: Primary | ICD-10-CM

## 2017-04-12 LAB
ANION GAP SERPL CALCULATED.3IONS-SCNC: 9 MMOL/L (ref 4–13)
BASOPHILS # BLD AUTO: 0.05 THOUSANDS/ΜL (ref 0–0.1)
BASOPHILS NFR BLD AUTO: 1 % (ref 0–1)
BUN SERPL-MCNC: 17 MG/DL (ref 5–25)
CALCIUM SERPL-MCNC: 9.3 MG/DL (ref 8.3–10.1)
CHLORIDE SERPL-SCNC: 106 MMOL/L (ref 100–108)
CO2 SERPL-SCNC: 27 MMOL/L (ref 21–32)
CREAT SERPL-MCNC: 0.65 MG/DL (ref 0.6–1.3)
EOSINOPHIL # BLD AUTO: 0.21 THOUSAND/ΜL (ref 0–0.61)
EOSINOPHIL NFR BLD AUTO: 4 % (ref 0–6)
ERYTHROCYTE [DISTWIDTH] IN BLOOD BY AUTOMATED COUNT: 13.5 % (ref 11.6–15.1)
GFR SERPL CREATININE-BSD FRML MDRD: >60 ML/MIN/1.73SQ M
GLUCOSE SERPL-MCNC: 90 MG/DL (ref 65–140)
HCT VFR BLD AUTO: 42.3 % (ref 34.8–46.1)
HGB BLD-MCNC: 14.1 G/DL (ref 11.5–15.4)
LYMPHOCYTES # BLD AUTO: 1.72 THOUSANDS/ΜL (ref 0.6–4.47)
LYMPHOCYTES NFR BLD AUTO: 35 % (ref 14–44)
MCH RBC QN AUTO: 30.8 PG (ref 26.8–34.3)
MCHC RBC AUTO-ENTMCNC: 33.3 G/DL (ref 31.4–37.4)
MCV RBC AUTO: 92 FL (ref 82–98)
MONOCYTES # BLD AUTO: 0.34 THOUSAND/ΜL (ref 0.17–1.22)
MONOCYTES NFR BLD AUTO: 7 % (ref 4–12)
NEUTROPHILS # BLD AUTO: 2.59 THOUSANDS/ΜL (ref 1.85–7.62)
NEUTS SEG NFR BLD AUTO: 52 % (ref 43–75)
NRBC BLD AUTO-RTO: 0 /100 WBCS
PLATELET # BLD AUTO: 171 THOUSANDS/UL (ref 149–390)
PMV BLD AUTO: 10 FL (ref 8.9–12.7)
POTASSIUM SERPL-SCNC: 4.9 MMOL/L (ref 3.5–5.3)
RBC # BLD AUTO: 4.58 MILLION/UL (ref 3.81–5.12)
SODIUM SERPL-SCNC: 142 MMOL/L (ref 136–145)
WBC # BLD AUTO: 4.95 THOUSAND/UL (ref 4.31–10.16)

## 2017-04-12 PROCEDURE — 99284 EMERGENCY DEPT VISIT MOD MDM: CPT

## 2017-04-12 PROCEDURE — 72192 CT PELVIS W/O DYE: CPT

## 2017-04-12 PROCEDURE — 36415 COLL VENOUS BLD VENIPUNCTURE: CPT | Performed by: NURSE PRACTITIONER

## 2017-04-12 PROCEDURE — 80048 BASIC METABOLIC PNL TOTAL CA: CPT | Performed by: NURSE PRACTITIONER

## 2017-04-12 PROCEDURE — 85025 COMPLETE CBC W/AUTO DIFF WBC: CPT | Performed by: NURSE PRACTITIONER

## 2017-04-12 PROCEDURE — 96374 THER/PROPH/DIAG INJ IV PUSH: CPT

## 2017-04-12 PROCEDURE — 72131 CT LUMBAR SPINE W/O DYE: CPT

## 2017-04-12 RX ORDER — METHYLPREDNISOLONE 4 MG/1
TABLET ORAL
Qty: 21 TABLET | Refills: 0 | Status: SHIPPED | OUTPATIENT
Start: 2017-04-12 | End: 2017-07-25

## 2017-04-12 RX ORDER — HYDROCODONE BITARTRATE AND ACETAMINOPHEN 5; 325 MG/1; MG/1
1 TABLET ORAL EVERY 6 HOURS PRN
Qty: 10 TABLET | Refills: 0 | Status: SHIPPED | OUTPATIENT
Start: 2017-04-12 | End: 2017-07-25

## 2017-04-12 RX ORDER — MORPHINE SULFATE 4 MG/ML
4 INJECTION, SOLUTION INTRAMUSCULAR; INTRAVENOUS ONCE
Status: COMPLETED | OUTPATIENT
Start: 2017-04-12 | End: 2017-04-12

## 2017-04-12 RX ADMIN — MORPHINE SULFATE 4 MG: 4 INJECTION, SOLUTION INTRAMUSCULAR; INTRAVENOUS at 15:09

## 2017-04-14 ENCOUNTER — ALLSCRIPTS OFFICE VISIT (OUTPATIENT)
Dept: OTHER | Facility: OTHER | Age: 57
End: 2017-04-14

## 2017-04-30 ENCOUNTER — APPOINTMENT (EMERGENCY)
Dept: CT IMAGING | Facility: HOSPITAL | Age: 57
End: 2017-04-30
Payer: COMMERCIAL

## 2017-04-30 ENCOUNTER — HOSPITAL ENCOUNTER (EMERGENCY)
Facility: HOSPITAL | Age: 57
Discharge: HOME | End: 2017-04-30
Attending: EMERGENCY MEDICINE | Admitting: EMERGENCY MEDICINE
Payer: COMMERCIAL

## 2017-04-30 VITALS
RESPIRATION RATE: 18 BRPM | TEMPERATURE: 97.9 F | HEART RATE: 66 BPM | BODY MASS INDEX: 26.52 KG/M2 | SYSTOLIC BLOOD PRESSURE: 170 MMHG | WEIGHT: 149.69 LBS | OXYGEN SATURATION: 100 % | DIASTOLIC BLOOD PRESSURE: 82 MMHG

## 2017-04-30 DIAGNOSIS — M54.9 ACUTE BACK PAIN: Primary | ICD-10-CM

## 2017-04-30 LAB
ALBUMIN SERPL BCP-MCNC: 3.5 G/DL (ref 3.5–5)
ALP SERPL-CCNC: 103 U/L (ref 46–116)
ALT SERPL W P-5'-P-CCNC: 33 U/L (ref 12–78)
ANION GAP SERPL CALCULATED.3IONS-SCNC: 8 MMOL/L (ref 4–13)
AST SERPL W P-5'-P-CCNC: 26 U/L (ref 5–45)
BACTERIA UR QL AUTO: ABNORMAL /HPF
BASOPHILS # BLD AUTO: 0.04 THOUSANDS/ΜL (ref 0–0.1)
BASOPHILS NFR BLD AUTO: 1 % (ref 0–1)
BILIRUB SERPL-MCNC: 0.3 MG/DL (ref 0.2–1)
BILIRUB UR QL STRIP: NEGATIVE
BUN SERPL-MCNC: 13 MG/DL (ref 5–25)
CALCIUM SERPL-MCNC: 9.3 MG/DL (ref 8.3–10.1)
CHLORIDE SERPL-SCNC: 105 MMOL/L (ref 100–108)
CLARITY UR: CLEAR
CLARITY, POC: CLEAR
CO2 SERPL-SCNC: 27 MMOL/L (ref 21–32)
COLOR UR: YELLOW
COLOR, POC: YELLOW
CREAT SERPL-MCNC: 0.63 MG/DL (ref 0.6–1.3)
EOSINOPHIL # BLD AUTO: 0.38 THOUSAND/ΜL (ref 0–0.61)
EOSINOPHIL NFR BLD AUTO: 5 % (ref 0–6)
ERYTHROCYTE [DISTWIDTH] IN BLOOD BY AUTOMATED COUNT: 12.9 % (ref 11.6–15.1)
EXT BILIRUBIN, UA: NEGATIVE
EXT BLOOD URINE: ABNORMAL
EXT GLUCOSE, UA: NEGATIVE
EXT KETONES: NEGATIVE
EXT NITRITE, UA: NEGATIVE
EXT PH, UA: 6
EXT PROTEIN, UA: NEGATIVE
EXT SPECIFIC GRAVITY, UA: 1.01
EXT UROBILINOGEN: 0.2
GFR SERPL CREATININE-BSD FRML MDRD: >60 ML/MIN/1.73SQ M
GLUCOSE SERPL-MCNC: 92 MG/DL (ref 65–140)
GLUCOSE UR STRIP-MCNC: NEGATIVE MG/DL
HCT VFR BLD AUTO: 44.9 % (ref 34.8–46.1)
HGB BLD-MCNC: 15.3 G/DL (ref 11.5–15.4)
HGB UR QL STRIP.AUTO: ABNORMAL
KETONES UR STRIP-MCNC: NEGATIVE MG/DL
LEUKOCYTE ESTERASE UR QL STRIP: NEGATIVE
LIPASE SERPL-CCNC: 70 U/L (ref 73–393)
LYMPHOCYTES # BLD AUTO: 2.01 THOUSANDS/ΜL (ref 0.6–4.47)
LYMPHOCYTES NFR BLD AUTO: 29 % (ref 14–44)
MCH RBC QN AUTO: 30.6 PG (ref 26.8–34.3)
MCHC RBC AUTO-ENTMCNC: 34.1 G/DL (ref 31.4–37.4)
MCV RBC AUTO: 90 FL (ref 82–98)
MONOCYTES # BLD AUTO: 0.39 THOUSAND/ΜL (ref 0.17–1.22)
MONOCYTES NFR BLD AUTO: 6 % (ref 4–12)
NEUTROPHILS # BLD AUTO: 4.2 THOUSANDS/ΜL (ref 1.85–7.62)
NEUTS SEG NFR BLD AUTO: 60 % (ref 43–75)
NITRITE UR QL STRIP: NEGATIVE
NON-SQ EPI CELLS URNS QL MICRO: ABNORMAL /HPF
NRBC BLD AUTO-RTO: 0 /100 WBCS
PH UR STRIP.AUTO: 6 [PH] (ref 4.5–8)
PLATELET # BLD AUTO: 212 THOUSANDS/UL (ref 149–390)
PMV BLD AUTO: 9.7 FL (ref 8.9–12.7)
POTASSIUM SERPL-SCNC: 4.5 MMOL/L (ref 3.5–5.3)
PROT SERPL-MCNC: 7 G/DL (ref 6.4–8.2)
PROT UR STRIP-MCNC: NEGATIVE MG/DL
RBC # BLD AUTO: 5 MILLION/UL (ref 3.81–5.12)
RBC #/AREA URNS AUTO: ABNORMAL /HPF
SODIUM SERPL-SCNC: 140 MMOL/L (ref 136–145)
SP GR UR STRIP.AUTO: 1.01 (ref 1–1.03)
UROBILINOGEN UR QL STRIP.AUTO: 0.2 E.U./DL
WBC # BLD AUTO: 7.03 THOUSAND/UL (ref 4.31–10.16)
WBC # BLD EST: NEGATIVE 10*3/UL
WBC #/AREA URNS AUTO: ABNORMAL /HPF

## 2017-04-30 PROCEDURE — 99284 EMERGENCY DEPT VISIT MOD MDM: CPT

## 2017-04-30 PROCEDURE — 96376 TX/PRO/DX INJ SAME DRUG ADON: CPT

## 2017-04-30 PROCEDURE — 74176 CT ABD & PELVIS W/O CONTRAST: CPT

## 2017-04-30 PROCEDURE — 85025 COMPLETE CBC W/AUTO DIFF WBC: CPT | Performed by: EMERGENCY MEDICINE

## 2017-04-30 PROCEDURE — 83690 ASSAY OF LIPASE: CPT | Performed by: EMERGENCY MEDICINE

## 2017-04-30 PROCEDURE — 96374 THER/PROPH/DIAG INJ IV PUSH: CPT

## 2017-04-30 PROCEDURE — 80053 COMPREHEN METABOLIC PANEL: CPT | Performed by: EMERGENCY MEDICINE

## 2017-04-30 PROCEDURE — 96361 HYDRATE IV INFUSION ADD-ON: CPT

## 2017-04-30 PROCEDURE — 87086 URINE CULTURE/COLONY COUNT: CPT | Performed by: EMERGENCY MEDICINE

## 2017-04-30 PROCEDURE — 81002 URINALYSIS NONAUTO W/O SCOPE: CPT | Performed by: EMERGENCY MEDICINE

## 2017-04-30 PROCEDURE — 36415 COLL VENOUS BLD VENIPUNCTURE: CPT | Performed by: EMERGENCY MEDICINE

## 2017-04-30 PROCEDURE — 81001 URINALYSIS AUTO W/SCOPE: CPT | Performed by: EMERGENCY MEDICINE

## 2017-04-30 PROCEDURE — 96375 TX/PRO/DX INJ NEW DRUG ADDON: CPT

## 2017-04-30 RX ORDER — DIAZEPAM 5 MG/ML
5 INJECTION, SOLUTION INTRAMUSCULAR; INTRAVENOUS ONCE
Status: COMPLETED | OUTPATIENT
Start: 2017-04-30 | End: 2017-04-30

## 2017-04-30 RX ORDER — DIAZEPAM 5 MG/1
5 TABLET ORAL EVERY 8 HOURS PRN
Qty: 15 TABLET | Refills: 0 | Status: SHIPPED | OUTPATIENT
Start: 2017-04-30 | End: 2017-05-30

## 2017-04-30 RX ADMIN — HYDROMORPHONE HYDROCHLORIDE 0.5 MG: 1 INJECTION, SOLUTION INTRAMUSCULAR; INTRAVENOUS; SUBCUTANEOUS at 07:58

## 2017-04-30 RX ADMIN — SODIUM CHLORIDE 1000 ML: 0.9 INJECTION, SOLUTION INTRAVENOUS at 07:59

## 2017-04-30 RX ADMIN — DIAZEPAM 5 MG: 5 INJECTION, SOLUTION INTRAMUSCULAR; INTRAVENOUS at 09:33

## 2017-04-30 RX ADMIN — HYDROMORPHONE HYDROCHLORIDE 0.5 MG: 1 INJECTION, SOLUTION INTRAMUSCULAR; INTRAVENOUS; SUBCUTANEOUS at 09:33

## 2017-05-02 LAB — BACTERIA UR CULT: NORMAL

## 2017-05-10 ENCOUNTER — APPOINTMENT (EMERGENCY)
Dept: RADIOLOGY | Facility: HOSPITAL | Age: 57
End: 2017-05-10
Payer: COMMERCIAL

## 2017-05-10 ENCOUNTER — APPOINTMENT (EMERGENCY)
Dept: CT IMAGING | Facility: HOSPITAL | Age: 57
End: 2017-05-10
Payer: COMMERCIAL

## 2017-05-10 ENCOUNTER — HOSPITAL ENCOUNTER (EMERGENCY)
Facility: HOSPITAL | Age: 57
Discharge: HOME | End: 2017-05-10
Payer: COMMERCIAL

## 2017-05-10 VITALS
HEART RATE: 72 BPM | OXYGEN SATURATION: 99 % | SYSTOLIC BLOOD PRESSURE: 135 MMHG | TEMPERATURE: 98.3 F | RESPIRATION RATE: 16 BRPM | HEIGHT: 63 IN | WEIGHT: 147.93 LBS | BODY MASS INDEX: 26.21 KG/M2 | DIASTOLIC BLOOD PRESSURE: 67 MMHG

## 2017-05-10 DIAGNOSIS — M54.50 LOW BACK PAIN: Primary | ICD-10-CM

## 2017-05-10 PROCEDURE — 72100 X-RAY EXAM L-S SPINE 2/3 VWS: CPT

## 2017-05-10 PROCEDURE — 96375 TX/PRO/DX INJ NEW DRUG ADDON: CPT

## 2017-05-10 PROCEDURE — 96376 TX/PRO/DX INJ SAME DRUG ADON: CPT

## 2017-05-10 PROCEDURE — 99284 EMERGENCY DEPT VISIT MOD MDM: CPT

## 2017-05-10 PROCEDURE — 96374 THER/PROPH/DIAG INJ IV PUSH: CPT

## 2017-05-10 RX ORDER — DIAZEPAM 5 MG/1
5 TABLET ORAL
Qty: 10 TABLET | Refills: 0 | Status: SHIPPED | OUTPATIENT
Start: 2017-05-10 | End: 2017-05-30

## 2017-05-10 RX ORDER — PREDNISONE 1 MG/1
TABLET ORAL
Qty: 21 TABLET | Refills: 0 | Status: SHIPPED | OUTPATIENT
Start: 2017-05-10 | End: 2017-07-25

## 2017-05-10 RX ORDER — DIPHENHYDRAMINE HYDROCHLORIDE 50 MG/ML
25 INJECTION INTRAMUSCULAR; INTRAVENOUS ONCE
Status: COMPLETED | OUTPATIENT
Start: 2017-05-10 | End: 2017-05-10

## 2017-05-10 RX ADMIN — HYDROMORPHONE HYDROCHLORIDE 0.2 MG: 1 INJECTION, SOLUTION INTRAMUSCULAR; INTRAVENOUS; SUBCUTANEOUS at 16:28

## 2017-05-10 RX ADMIN — DIPHENHYDRAMINE HYDROCHLORIDE 25 MG: 50 INJECTION, SOLUTION INTRAMUSCULAR; INTRAVENOUS at 15:09

## 2017-05-10 RX ADMIN — HYDROMORPHONE HYDROCHLORIDE 0.2 MG: 1 INJECTION, SOLUTION INTRAMUSCULAR; INTRAVENOUS; SUBCUTANEOUS at 15:09

## 2017-05-30 ENCOUNTER — HOSPITAL ENCOUNTER (EMERGENCY)
Facility: HOSPITAL | Age: 57
Discharge: HOME | End: 2017-05-30
Attending: EMERGENCY MEDICINE
Payer: COMMERCIAL

## 2017-05-30 ENCOUNTER — APPOINTMENT (EMERGENCY)
Dept: RADIOLOGY | Facility: HOSPITAL | Age: 57
End: 2017-05-30
Payer: COMMERCIAL

## 2017-05-30 VITALS
DIASTOLIC BLOOD PRESSURE: 72 MMHG | HEART RATE: 65 BPM | SYSTOLIC BLOOD PRESSURE: 127 MMHG | HEIGHT: 63 IN | BODY MASS INDEX: 26.25 KG/M2 | WEIGHT: 148.15 LBS | RESPIRATION RATE: 16 BRPM | TEMPERATURE: 97.5 F | OXYGEN SATURATION: 99 %

## 2017-05-30 DIAGNOSIS — S93.609A FOOT SPRAIN: Primary | ICD-10-CM

## 2017-05-30 PROCEDURE — 73610 X-RAY EXAM OF ANKLE: CPT

## 2017-05-30 PROCEDURE — 73630 X-RAY EXAM OF FOOT: CPT

## 2017-05-30 PROCEDURE — 99283 EMERGENCY DEPT VISIT LOW MDM: CPT

## 2017-05-30 RX ORDER — HYDROCODONE BITARTRATE AND ACETAMINOPHEN 5; 325 MG/1; MG/1
1 TABLET ORAL EVERY 8 HOURS PRN
Qty: 10 TABLET | Refills: 0 | Status: SHIPPED | OUTPATIENT
Start: 2017-05-30 | End: 2017-07-25

## 2017-05-30 RX ORDER — HYDROCODONE BITARTRATE AND ACETAMINOPHEN 5; 325 MG/1; MG/1
1 TABLET ORAL ONCE
Status: COMPLETED | OUTPATIENT
Start: 2017-05-30 | End: 2017-05-30

## 2017-05-30 RX ADMIN — HYDROCODONE BITARTRATE AND ACETAMINOPHEN 1 TABLET: 5; 325 TABLET ORAL at 11:00

## 2017-06-08 ENCOUNTER — HOSPITAL ENCOUNTER (EMERGENCY)
Facility: HOSPITAL | Age: 57
Discharge: HOME | End: 2017-06-08
Attending: EMERGENCY MEDICINE | Admitting: EMERGENCY MEDICINE
Payer: COMMERCIAL

## 2017-06-08 ENCOUNTER — APPOINTMENT (EMERGENCY)
Dept: RADIOLOGY | Facility: HOSPITAL | Age: 57
End: 2017-06-08
Payer: COMMERCIAL

## 2017-06-08 VITALS
SYSTOLIC BLOOD PRESSURE: 130 MMHG | RESPIRATION RATE: 16 BRPM | OXYGEN SATURATION: 93 % | BODY MASS INDEX: 29.21 KG/M2 | TEMPERATURE: 98 F | DIASTOLIC BLOOD PRESSURE: 67 MMHG | HEART RATE: 81 BPM | WEIGHT: 164.9 LBS

## 2017-06-08 DIAGNOSIS — R07.9 CHEST PAIN: ICD-10-CM

## 2017-06-08 DIAGNOSIS — G89.29 CHRONIC PAIN: Primary | ICD-10-CM

## 2017-06-08 LAB
ANION GAP SERPL CALCULATED.3IONS-SCNC: 10 MMOL/L (ref 4–13)
APTT PPP: 27 SECONDS (ref 23–35)
ATRIAL RATE: 93 BPM
BASOPHILS # BLD AUTO: 0.03 THOUSANDS/ΜL (ref 0–0.1)
BASOPHILS NFR BLD AUTO: 1 % (ref 0–1)
BUN SERPL-MCNC: 19 MG/DL (ref 5–25)
CALCIUM SERPL-MCNC: 9.2 MG/DL (ref 8.3–10.1)
CHLORIDE SERPL-SCNC: 105 MMOL/L (ref 100–108)
CO2 SERPL-SCNC: 28 MMOL/L (ref 21–32)
CREAT SERPL-MCNC: 0.73 MG/DL (ref 0.6–1.3)
EOSINOPHIL # BLD AUTO: 0.28 THOUSAND/ΜL (ref 0–0.61)
EOSINOPHIL NFR BLD AUTO: 5 % (ref 0–6)
ERYTHROCYTE [DISTWIDTH] IN BLOOD BY AUTOMATED COUNT: 13 % (ref 11.6–15.1)
GFR SERPL CREATININE-BSD FRML MDRD: >60 ML/MIN/1.73SQ M
GLUCOSE SERPL-MCNC: 93 MG/DL (ref 65–140)
HCT VFR BLD AUTO: 43.4 % (ref 34.8–46.1)
HGB BLD-MCNC: 14.4 G/DL (ref 11.5–15.4)
INR PPP: 0.97 (ref 0.86–1.16)
LYMPHOCYTES # BLD AUTO: 1.46 THOUSANDS/ΜL (ref 0.6–4.47)
LYMPHOCYTES NFR BLD AUTO: 28 % (ref 14–44)
MCH RBC QN AUTO: 30.6 PG (ref 26.8–34.3)
MCHC RBC AUTO-ENTMCNC: 33.2 G/DL (ref 31.4–37.4)
MCV RBC AUTO: 92 FL (ref 82–98)
MONOCYTES # BLD AUTO: 0.18 THOUSAND/ΜL (ref 0.17–1.22)
MONOCYTES NFR BLD AUTO: 4 % (ref 4–12)
NEUTROPHILS # BLD AUTO: 3.19 THOUSANDS/ΜL (ref 1.85–7.62)
NEUTS SEG NFR BLD AUTO: 62 % (ref 43–75)
NRBC BLD AUTO-RTO: 0 /100 WBCS
P AXIS: 68 DEGREES
PLATELET # BLD AUTO: 222 THOUSANDS/UL (ref 149–390)
PMV BLD AUTO: 9.4 FL (ref 8.9–12.7)
POTASSIUM SERPL-SCNC: 3.9 MMOL/L (ref 3.5–5.3)
PR INTERVAL: 106 MS
PROTHROMBIN TIME: 13.1 SECONDS (ref 12.1–14.4)
QRS AXIS: 60 DEGREES
QRSD INTERVAL: 80 MS
QT INTERVAL: 378 MS
QTC INTERVAL: 469 MS
RBC # BLD AUTO: 4.71 MILLION/UL (ref 3.81–5.12)
SODIUM SERPL-SCNC: 143 MMOL/L (ref 136–145)
SPECIMEN SOURCE: NORMAL
T WAVE AXIS: 49 DEGREES
TROPONIN I BLD-MCNC: 0 NG/ML (ref 0–0.08)
TROPONIN I SERPL-MCNC: <0.02 NG/ML
VENTRICULAR RATE: 93 BPM
WBC # BLD AUTO: 5.16 THOUSAND/UL (ref 4.31–10.16)

## 2017-06-08 PROCEDURE — 71020 HB CHEST X-RAY 2VW FRONTAL&LATL: CPT

## 2017-06-08 PROCEDURE — 36415 COLL VENOUS BLD VENIPUNCTURE: CPT

## 2017-06-08 PROCEDURE — 85610 PROTHROMBIN TIME: CPT | Performed by: EMERGENCY MEDICINE

## 2017-06-08 PROCEDURE — 84484 ASSAY OF TROPONIN QUANT: CPT | Performed by: EMERGENCY MEDICINE

## 2017-06-08 PROCEDURE — 84484 ASSAY OF TROPONIN QUANT: CPT

## 2017-06-08 PROCEDURE — 80048 BASIC METABOLIC PNL TOTAL CA: CPT | Performed by: EMERGENCY MEDICINE

## 2017-06-08 PROCEDURE — 99285 EMERGENCY DEPT VISIT HI MDM: CPT

## 2017-06-08 PROCEDURE — 85025 COMPLETE CBC W/AUTO DIFF WBC: CPT | Performed by: EMERGENCY MEDICINE

## 2017-06-08 PROCEDURE — 96372 THER/PROPH/DIAG INJ SC/IM: CPT

## 2017-06-08 PROCEDURE — 85730 THROMBOPLASTIN TIME PARTIAL: CPT | Performed by: EMERGENCY MEDICINE

## 2017-06-08 PROCEDURE — 93005 ELECTROCARDIOGRAM TRACING: CPT | Performed by: EMERGENCY MEDICINE

## 2017-06-08 RX ORDER — OLANZAPINE 10 MG/1
10 INJECTION, POWDER, LYOPHILIZED, FOR SOLUTION INTRAMUSCULAR ONCE
Status: COMPLETED | OUTPATIENT
Start: 2017-06-08 | End: 2017-06-08

## 2017-06-08 RX ORDER — ACETAMINOPHEN 325 MG/1
650 TABLET ORAL ONCE
Status: DISCONTINUED | OUTPATIENT
Start: 2017-06-08 | End: 2017-06-08 | Stop reason: HOSPADM

## 2017-06-08 RX ADMIN — OLANZAPINE 10 MG: 10 INJECTION, POWDER, LYOPHILIZED, FOR SOLUTION INTRAMUSCULAR at 13:51

## 2017-06-08 RX ADMIN — WATER: 1 INJECTION INTRAMUSCULAR; INTRAVENOUS; SUBCUTANEOUS at 13:56

## 2017-07-25 ENCOUNTER — APPOINTMENT (EMERGENCY)
Dept: RADIOLOGY | Facility: HOSPITAL | Age: 57
End: 2017-07-25
Payer: COMMERCIAL

## 2017-07-25 ENCOUNTER — HOSPITAL ENCOUNTER (EMERGENCY)
Facility: HOSPITAL | Age: 57
Discharge: HOME | End: 2017-07-25
Attending: EMERGENCY MEDICINE | Admitting: EMERGENCY MEDICINE
Payer: COMMERCIAL

## 2017-07-25 VITALS
OXYGEN SATURATION: 99 % | WEIGHT: 152.34 LBS | BODY MASS INDEX: 26.99 KG/M2 | HEART RATE: 72 BPM | TEMPERATURE: 97.9 F | RESPIRATION RATE: 18 BRPM | SYSTOLIC BLOOD PRESSURE: 141 MMHG | DIASTOLIC BLOOD PRESSURE: 79 MMHG | HEIGHT: 63 IN

## 2017-07-25 DIAGNOSIS — S93.402A LEFT ANKLE SPRAIN: ICD-10-CM

## 2017-07-25 DIAGNOSIS — S80.02XA CONTUSION OF KNEE, LEFT: ICD-10-CM

## 2017-07-25 DIAGNOSIS — S79.919A HIP INJURY: Primary | ICD-10-CM

## 2017-07-25 PROCEDURE — 90715 TDAP VACCINE 7 YRS/> IM: CPT | Performed by: EMERGENCY MEDICINE

## 2017-07-25 PROCEDURE — 73564 X-RAY EXAM KNEE 4 OR MORE: CPT

## 2017-07-25 PROCEDURE — 99284 EMERGENCY DEPT VISIT MOD MDM: CPT

## 2017-07-25 PROCEDURE — 73610 X-RAY EXAM OF ANKLE: CPT

## 2017-07-25 PROCEDURE — 73502 X-RAY EXAM HIP UNI 2-3 VIEWS: CPT

## 2017-07-25 PROCEDURE — 90471 IMMUNIZATION ADMIN: CPT

## 2017-07-25 RX ORDER — HYDROCODONE BITARTRATE AND ACETAMINOPHEN 5; 325 MG/1; MG/1
1 TABLET ORAL ONCE
Status: COMPLETED | OUTPATIENT
Start: 2017-07-25 | End: 2017-07-25

## 2017-07-25 RX ORDER — HYDROCODONE BITARTRATE AND ACETAMINOPHEN 5; 325 MG/1; MG/1
1 TABLET ORAL EVERY 6 HOURS PRN
Qty: 10 TABLET | Refills: 0 | Status: ON HOLD | OUTPATIENT
Start: 2017-07-25 | End: 2017-10-03

## 2017-07-25 RX ADMIN — HYDROCODONE BITARTRATE AND ACETAMINOPHEN 1 TABLET: 5; 325 TABLET ORAL at 13:49

## 2017-07-25 RX ADMIN — TETANUS TOXOID, REDUCED DIPHTHERIA TOXOID AND ACELLULAR PERTUSSIS VACCINE, ADSORBED 0.5 ML: 5; 2.5; 8; 8; 2.5 SUSPENSION INTRAMUSCULAR at 13:50

## 2017-08-24 ENCOUNTER — ALLSCRIPTS OFFICE VISIT (OUTPATIENT)
Dept: OTHER | Facility: OTHER | Age: 57
End: 2017-08-24

## 2017-08-25 ENCOUNTER — HOSPITAL ENCOUNTER (EMERGENCY)
Facility: HOSPITAL | Age: 57
Discharge: HOME | End: 2017-08-25
Attending: EMERGENCY MEDICINE
Payer: COMMERCIAL

## 2017-08-25 VITALS
WEIGHT: 148 LBS | TEMPERATURE: 98.1 F | HEART RATE: 73 BPM | SYSTOLIC BLOOD PRESSURE: 173 MMHG | RESPIRATION RATE: 20 BRPM | DIASTOLIC BLOOD PRESSURE: 94 MMHG | BODY MASS INDEX: 26.22 KG/M2 | OXYGEN SATURATION: 100 %

## 2017-08-25 DIAGNOSIS — H60.91 EXTERNAL OTITIS OF RIGHT EAR: Primary | ICD-10-CM

## 2017-08-25 PROCEDURE — 99282 EMERGENCY DEPT VISIT SF MDM: CPT

## 2017-08-25 RX ORDER — INDOMETHACIN 25 MG/1
25 CAPSULE ORAL 2 TIMES DAILY WITH MEALS
Qty: 2 CAPSULE | Refills: 0 | Status: SHIPPED | OUTPATIENT
Start: 2017-08-25 | End: 2017-10-03 | Stop reason: HOSPADM

## 2017-08-25 RX ORDER — DICLOFENAC POTASSIUM 25 MG/1
1 CAPSULE, LIQUID FILLED ORAL 3 TIMES DAILY PRN
Qty: 6 CAPSULE | Refills: 0 | Status: SHIPPED | OUTPATIENT
Start: 2017-08-25 | End: 2017-08-25 | Stop reason: RX

## 2017-08-25 RX ORDER — TRAMADOL HYDROCHLORIDE 50 MG/1
50 TABLET ORAL EVERY 6 HOURS PRN
Qty: 6 TABLET | Refills: 0 | Status: SHIPPED | OUTPATIENT
Start: 2017-08-25 | End: 2017-08-25

## 2017-08-25 RX ORDER — CEPHALEXIN 500 MG/1
500 CAPSULE ORAL 3 TIMES DAILY
Qty: 30 CAPSULE | Refills: 0 | Status: SHIPPED | OUTPATIENT
Start: 2017-08-25 | End: 2017-09-04

## 2017-09-25 ENCOUNTER — GENERIC CONVERSION - ENCOUNTER (OUTPATIENT)
Dept: OTHER | Facility: OTHER | Age: 57
End: 2017-09-25

## 2017-09-29 ENCOUNTER — APPOINTMENT (EMERGENCY)
Dept: CT IMAGING | Facility: HOSPITAL | Age: 57
DRG: 248 | End: 2017-09-29
Payer: COMMERCIAL

## 2017-09-29 ENCOUNTER — HOSPITAL ENCOUNTER (INPATIENT)
Facility: HOSPITAL | Age: 57
LOS: 3 days | Discharge: HOME | DRG: 248 | End: 2017-10-03
Attending: EMERGENCY MEDICINE | Admitting: INTERNAL MEDICINE
Payer: COMMERCIAL

## 2017-09-29 DIAGNOSIS — J44.9 COPD (CHRONIC OBSTRUCTIVE PULMONARY DISEASE) (HCC): ICD-10-CM

## 2017-09-29 DIAGNOSIS — R10.9 ABDOMINAL PAIN: Primary | ICD-10-CM

## 2017-09-29 DIAGNOSIS — R79.89 ELEVATED LFTS: ICD-10-CM

## 2017-09-29 DIAGNOSIS — A04.72 C. DIFFICILE DIARRHEA: ICD-10-CM

## 2017-09-29 DIAGNOSIS — R09.02 HYPOXIA: ICD-10-CM

## 2017-09-29 DIAGNOSIS — R19.7 DIARRHEA: ICD-10-CM

## 2017-09-29 LAB
ALBUMIN SERPL BCP-MCNC: 3.2 G/DL (ref 3.5–5)
ALP SERPL-CCNC: 186 U/L (ref 46–116)
ALT SERPL W P-5'-P-CCNC: 109 U/L (ref 12–78)
ANION GAP SERPL CALCULATED.3IONS-SCNC: 12 MMOL/L (ref 4–13)
APTT PPP: 28 SECONDS (ref 23–35)
AST SERPL W P-5'-P-CCNC: 63 U/L (ref 5–45)
BASOPHILS # BLD AUTO: 0.04 THOUSANDS/ΜL (ref 0–0.1)
BASOPHILS NFR BLD AUTO: 1 % (ref 0–1)
BILIRUB SERPL-MCNC: 0.3 MG/DL (ref 0.2–1)
BUN SERPL-MCNC: 12 MG/DL (ref 5–25)
CALCIUM SERPL-MCNC: 9 MG/DL (ref 8.3–10.1)
CHLORIDE SERPL-SCNC: 110 MMOL/L (ref 100–108)
CO2 SERPL-SCNC: 21 MMOL/L (ref 21–32)
CREAT SERPL-MCNC: 0.74 MG/DL (ref 0.6–1.3)
EOSINOPHIL # BLD AUTO: 0.39 THOUSAND/ΜL (ref 0–0.61)
EOSINOPHIL NFR BLD AUTO: 6 % (ref 0–6)
ERYTHROCYTE [DISTWIDTH] IN BLOOD BY AUTOMATED COUNT: 13.8 % (ref 11.6–15.1)
GFR SERPL CREATININE-BSD FRML MDRD: 89 ML/MIN/1.73SQ M
GLUCOSE SERPL-MCNC: 122 MG/DL (ref 65–140)
HCT VFR BLD AUTO: 37.9 % (ref 34.8–46.1)
HGB BLD-MCNC: 12.9 G/DL (ref 11.5–15.4)
INR PPP: 0.94 (ref 0.86–1.16)
LACTATE SERPL-SCNC: 1.1 MMOL/L (ref 0.5–2)
LIPASE SERPL-CCNC: 53 U/L (ref 73–393)
LYMPHOCYTES # BLD AUTO: 1.72 THOUSANDS/ΜL (ref 0.6–4.47)
LYMPHOCYTES NFR BLD AUTO: 27 % (ref 14–44)
MCH RBC QN AUTO: 30.1 PG (ref 26.8–34.3)
MCHC RBC AUTO-ENTMCNC: 34 G/DL (ref 31.4–37.4)
MCV RBC AUTO: 88 FL (ref 82–98)
MONOCYTES # BLD AUTO: 0.35 THOUSAND/ΜL (ref 0.17–1.22)
MONOCYTES NFR BLD AUTO: 6 % (ref 4–12)
NEUTROPHILS # BLD AUTO: 3.77 THOUSANDS/ΜL (ref 1.85–7.62)
NEUTS SEG NFR BLD AUTO: 60 % (ref 43–75)
NRBC BLD AUTO-RTO: 0 /100 WBCS
PLATELET # BLD AUTO: 195 THOUSANDS/UL (ref 149–390)
PMV BLD AUTO: 10.3 FL (ref 8.9–12.7)
POTASSIUM SERPL-SCNC: 3.8 MMOL/L (ref 3.5–5.3)
PROT SERPL-MCNC: 6.7 G/DL (ref 6.4–8.2)
PROTHROMBIN TIME: 12.7 SECONDS (ref 12.1–14.4)
RBC # BLD AUTO: 4.29 MILLION/UL (ref 3.81–5.12)
SODIUM SERPL-SCNC: 143 MMOL/L (ref 136–145)
WBC # BLD AUTO: 6.29 THOUSAND/UL (ref 4.31–10.16)

## 2017-09-29 PROCEDURE — 83690 ASSAY OF LIPASE: CPT | Performed by: EMERGENCY MEDICINE

## 2017-09-29 PROCEDURE — 74177 CT ABD & PELVIS W/CONTRAST: CPT

## 2017-09-29 PROCEDURE — 87493 C DIFF AMPLIFIED PROBE: CPT | Performed by: EMERGENCY MEDICINE

## 2017-09-29 PROCEDURE — 85730 THROMBOPLASTIN TIME PARTIAL: CPT | Performed by: EMERGENCY MEDICINE

## 2017-09-29 PROCEDURE — 36415 COLL VENOUS BLD VENIPUNCTURE: CPT | Performed by: EMERGENCY MEDICINE

## 2017-09-29 PROCEDURE — 87505 NFCT AGENT DETECTION GI: CPT | Performed by: EMERGENCY MEDICINE

## 2017-09-29 PROCEDURE — 85025 COMPLETE CBC W/AUTO DIFF WBC: CPT | Performed by: EMERGENCY MEDICINE

## 2017-09-29 PROCEDURE — 80053 COMPREHEN METABOLIC PANEL: CPT | Performed by: EMERGENCY MEDICINE

## 2017-09-29 PROCEDURE — 99285 EMERGENCY DEPT VISIT HI MDM: CPT

## 2017-09-29 PROCEDURE — 83605 ASSAY OF LACTIC ACID: CPT | Performed by: EMERGENCY MEDICINE

## 2017-09-29 PROCEDURE — 96374 THER/PROPH/DIAG INJ IV PUSH: CPT

## 2017-09-29 PROCEDURE — 85610 PROTHROMBIN TIME: CPT | Performed by: EMERGENCY MEDICINE

## 2017-09-29 RX ORDER — CLONAZEPAM 0.5 MG/1
0.5 TABLET ORAL 2 TIMES DAILY PRN
Status: DISCONTINUED | OUTPATIENT
Start: 2017-09-29 | End: 2017-10-03 | Stop reason: HOSPADM

## 2017-09-29 RX ORDER — SODIUM CHLORIDE 9 MG/ML
125 INJECTION, SOLUTION INTRAVENOUS CONTINUOUS
Status: DISCONTINUED | OUTPATIENT
Start: 2017-09-29 | End: 2017-09-29 | Stop reason: SDUPTHER

## 2017-09-29 RX ORDER — ONDANSETRON 2 MG/ML
4 INJECTION INTRAMUSCULAR; INTRAVENOUS EVERY 6 HOURS PRN
Status: DISCONTINUED | OUTPATIENT
Start: 2017-09-29 | End: 2017-09-30

## 2017-09-29 RX ORDER — GABAPENTIN 400 MG/1
800 CAPSULE ORAL 3 TIMES DAILY
Status: DISCONTINUED | OUTPATIENT
Start: 2017-09-29 | End: 2017-10-03 | Stop reason: HOSPADM

## 2017-09-29 RX ORDER — SODIUM CHLORIDE 9 MG/ML
150 INJECTION, SOLUTION INTRAVENOUS CONTINUOUS
Status: DISCONTINUED | OUTPATIENT
Start: 2017-09-29 | End: 2017-10-01

## 2017-09-29 RX ORDER — ACETAMINOPHEN 325 MG/1
650 TABLET ORAL EVERY 6 HOURS PRN
Status: DISCONTINUED | OUTPATIENT
Start: 2017-09-29 | End: 2017-10-03 | Stop reason: HOSPADM

## 2017-09-29 RX ORDER — MORPHINE SULFATE 2 MG/ML
1 INJECTION, SOLUTION INTRAMUSCULAR; INTRAVENOUS
Status: DISCONTINUED | OUTPATIENT
Start: 2017-09-29 | End: 2017-09-30

## 2017-09-29 RX ORDER — CALCIUM CARBONATE 200(500)MG
1000 TABLET,CHEWABLE ORAL DAILY PRN
Status: DISCONTINUED | OUTPATIENT
Start: 2017-09-29 | End: 2017-10-03 | Stop reason: HOSPADM

## 2017-09-29 RX ORDER — CITALOPRAM 20 MG/1
40 TABLET ORAL DAILY
Status: DISCONTINUED | OUTPATIENT
Start: 2017-09-30 | End: 2017-10-03 | Stop reason: HOSPADM

## 2017-09-29 RX ADMIN — SODIUM CHLORIDE 1000 ML: 0.9 INJECTION, SOLUTION INTRAVENOUS at 18:57

## 2017-09-29 RX ADMIN — SODIUM CHLORIDE 125 ML/HR: 0.9 INJECTION, SOLUTION INTRAVENOUS at 22:25

## 2017-09-29 RX ADMIN — HYDROMORPHONE HYDROCHLORIDE 0.5 MG: 1 INJECTION, SOLUTION INTRAMUSCULAR; INTRAVENOUS; SUBCUTANEOUS at 20:13

## 2017-09-29 RX ADMIN — HYDROMORPHONE HYDROCHLORIDE 1 MG: 1 INJECTION, SOLUTION INTRAMUSCULAR; INTRAVENOUS; SUBCUTANEOUS at 18:36

## 2017-09-29 RX ADMIN — IOHEXOL 100 ML: 350 INJECTION, SOLUTION INTRAVENOUS at 20:44

## 2017-09-29 RX ADMIN — HYDROMORPHONE HYDROCHLORIDE 1 MG: 1 INJECTION, SOLUTION INTRAMUSCULAR; INTRAVENOUS; SUBCUTANEOUS at 22:23

## 2017-09-29 NOTE — ED PROVIDER NOTES
History  Chief Complaint   Patient presents with    Abdominal Pain     Patient brought in by EMS for diarrhea over the past few days  Patient reports sharp abdominal pain and nausea starting today     This is a 49-year-old female who presents emergency department complaining of abdominal pain  The patient has been having diarrhea for the past 3 days  Today with severe abdominal pain  Diffuse in nature  The patient states that last week she has begun a [address] and they opened up her bile duct   It sounds as though she had a endoscopy and possible ERCP  Patient denies any fevers or chills  No nausea vomiting  Denies any black or bloody stool  Pain is diffuse in the abdomen  No known sick contacts  No recent antibiotics per patient  No chest pain or shortness of breath  No radiation of symptoms  No dysuria frequency urgency  Pain is severe in nature  Differential diagnosis includes pancreatitis, cholecystitis, gastritis, bowel obstruction, colitis  Prior to Admission Medications   Prescriptions Last Dose Informant Patient Reported? Taking?    HYDROcodone-acetaminophen (NORCO) 5-325 mg per tablet   No No   Sig: Take 1 tablet by mouth every 6 (six) hours as needed for pain for up to 10 doses Max Daily Amount: 4 tablets   ciprofloxacin-hydrocortisone (CIPRO HC OTIC) otic suspension   No No   Sig: Administer 3 drops to the right ear 2 (two) times a day for 5 days   citalopram (CELEXA) 40 mg tablet  Self Yes No   Sig: Take 40 mg by mouth daily     clonazePAM (KlonoPIN) 0 5 mg tablet   Yes No   Sig: Take 0 5 mg by mouth 2 (two) times a day as needed for seizures   gabapentin (NEURONTIN) 800 mg tablet   Yes No   Sig: Take 800 mg by mouth 3 (three) times a day   indomethacin (INDOCIN) 25 mg capsule   No No   Sig: Take 1 capsule by mouth 2 (two) times a day with meals for 2 days   metoprolol tartrate (LOPRESSOR) 25 mg tablet   Yes No   Sig: Take 25 mg by mouth every 12 (twelve) hours Facility-Administered Medications: None       Past Medical History:   Diagnosis Date    Anxiety     Bipolar 1 disorder     Chronic back pain     Frequent headaches     Glaucoma     Hepatitis B     Hypertension     Psychiatric disorder     bipolar    Rheumatoid arthritis        Past Surgical History:   Procedure Laterality Date    APPENDECTOMY      CATARACT EXTRACTION Left     CHOLECYSTECTOMY      HYSTERECTOMY      INTRAOCULAR LENS INSERTION      AK TEMPORAL ARTERY LIGATN OR BX Right 12/16/2016    Procedure: BIOPSY ARTERY TEMPORAL;  Surgeon: Zehra Roldan MD;  Location: [facility];  Service: General    TONSILLECTOMY         Family History   Problem Relation Age of Onset    Heart disease Mother      I have reviewed and agree with the history as documented  Social History   Substance Use Topics    Smoking status: Current Every Day Smoker     Packs/day: 0 20     Types: Cigarettes    Smokeless tobacco: Never Used    Alcohol use No        Review of Systems   Constitutional: Negative for activity change, appetite change and fever  HENT: Negative for congestion, ear pain, rhinorrhea and sore throat  Eyes: Negative for pain, discharge and redness  Respiratory: Negative for cough, shortness of breath and wheezing  Cardiovascular: Negative for chest pain and palpitations  Gastrointestinal: Positive for abdominal distention, abdominal pain and diarrhea  Negative for nausea and vomiting  Endocrine: Negative for polyuria  Genitourinary: Negative for difficulty urinating, dysuria, frequency and urgency  Musculoskeletal: Negative for arthralgias and myalgias  Skin: Negative for color change and rash  Allergic/Immunologic: Negative for immunocompromised state  Neurological: Negative for dizziness, syncope and light-headedness  Hematological: Does not bruise/bleed easily  Psychiatric/Behavioral: Negative for confusion  All other systems reviewed and are negative        Physical Exam  ED Triage Vitals   Temperature Pulse Respirations Blood Pressure SpO2   09/30/17 0012 09/29/17 1653 09/29/17 1653 09/29/17 1653 09/29/17 1653   98 3 °F (36 8 °C) 97 20 111/68 98 %      Temp Source Heart Rate Source Patient Position - Orthostatic VS BP Location FiO2 (%)   09/30/17 0012 09/29/17 1653 09/29/17 1653 09/29/17 1653 --   Oral Monitor Lying Right arm       Pain Score       09/29/17 1653       Worst Possible Pain           Physical Exam   Constitutional: She is oriented to person, place, and time  She appears well-developed and well-nourished  She appears distressed (Complaining of abdominal pain )  HENT:   Head: Normocephalic and atraumatic  Nose: Nose normal    Eyes: Conjunctivae are normal  No scleral icterus  Neck: Normal range of motion  Neck supple  Cardiovascular: Normal rate, regular rhythm, normal heart sounds and intact distal pulses  Pulmonary/Chest: Effort normal and breath sounds normal  No stridor  No respiratory distress  She has no wheezes  Abdominal: Soft  She exhibits no distension and no mass  There is tenderness  There is rebound and guarding  No hernia  Genitourinary: Rectal exam shows guaiac negative stool (negative)  Musculoskeletal: She exhibits no edema or deformity  Neurological: She is alert and oriented to person, place, and time  Skin: Skin is warm and dry  No rash noted  Psychiatric: She has a normal mood and affect  Thought content normal    Nursing note and vitals reviewed        ED Medications  Medications   citalopram (CeleXA) tablet 40 mg (40 mg Oral Given 9/30/17 1015)   clonazePAM (KlonoPIN) tablet 0 5 mg (not administered)   gabapentin (NEURONTIN) capsule 800 mg (800 mg Oral Given 9/30/17 1010)   metoprolol tartrate (LOPRESSOR) tablet 25 mg (25 mg Oral Given 9/30/17 1010)   sodium chloride 0 9 % infusion (150 mL/hr Intravenous Rate/Dose Change 9/30/17 1011)   calcium carbonate (TUMS) chewable tablet 1,000 mg (not administered)   enoxaparin (LOVENOX) subcutaneous injection 40 mg (40 mg Subcutaneous Given 9/30/17 1009)   acetaminophen (TYLENOL) tablet 650 mg (not administered)   metroNIDAZOLE (FLAGYL) IVPB (premix) 500 mg (500 mg Intravenous New Bag 9/30/17 1009)   vancomycin (VANCOCIN) oral solution 250 mg (250 mg Oral Given 9/30/17 1426)   famotidine (PEPCID) injection 20 mg (20 mg Intravenous Given 9/30/17 1009)   HYDROmorphone (DILAUDID) 1 mg/mL injection 1 mg (1 mg Intravenous Given 9/30/17 1426)   ondansetron (ZOFRAN) injection 4 mg (not administered)   lactobacillus acidophilus-bulgaricus (FLORANEX) packet 1 packet (1 packet Oral Given 9/30/17 1426)   ipratropium-albuterol (DUO-NEB) 0 5-2 5 mg/mL inhalation solution 3 mL (3 mL Nebulization Given 9/30/17 1433)   sodium chloride 0 9 % bolus 1,000 mL (0 mL Intravenous Stopped 9/29/17 1912)   HYDROmorphone (DILAUDID) 1 mg/mL injection 1 mg (1 mg Intravenous Given 9/29/17 1836)   HYDROmorphone (DILAUDID) 1 mg/mL injection 0 5 mg (0 5 mg Intravenous Given 9/29/17 2013)   iohexol (OMNIPAQUE) 350 MG/ML injection (MULTI-DOSE) 100 mL (100 mL Intravenous Given 9/29/17 2044)   HYDROmorphone (DILAUDID) 1 mg/mL injection 1 mg (1 mg Intravenous Given 9/29/17 2223)       Diagnostic Studies  Labs Reviewed   COMPREHENSIVE METABOLIC PANEL - Abnormal        Result Value Ref Range Status    Chloride 110 (*) 100 - 108 mmol/L Final    AST 63 (*) 5 - 45 U/L Final    Comment:   Specimen collection should occur prior to Sulfasalazine administration due to the potential for falsely depressed results   (*) 12 - 78 U/L Final    Comment:   Specimen collection should occur prior to Sulfasalazine administration due to the potential for falsely depressed results       Alkaline Phosphatase 186 (*) 46 - 116 U/L Final    Albumin 3 2 (*) 3 5 - 5 0 g/dL Final    Sodium 143  136 - 145 mmol/L Final    Potassium 3 8  3 5 - 5 3 mmol/L Final    CO2 21  21 - 32 mmol/L Final    Anion Gap 12  4 - 13 mmol/L Final    BUN 12  5 - 25 mg/dL Final    Creatinine 0 74  0 60 - 1 30 mg/dL Final    Comment: Standardized to IDMS reference method    Glucose 122  65 - 140 mg/dL Final    Comment:   If the patient is fasting, the ADA then defines impaired fasting glucose as > 100 mg/dL and diabetes as > or equal to 123 mg/dL  Specimen collection should occur prior to Sulfasalazine administration due to the potential for falsely depressed results  Specimen collection should occur prior to Sulfapyridine administration due to the potential for falsely elevated results  Calcium 9 0  8 3 - 10 1 mg/dL Final    Total Protein 6 7  6 4 - 8 2 g/dL Final    Total Bilirubin 0 30  0 20 - 1 00 mg/dL Final    eGFR 89  ml/min/1 73sq m Final    Narrative:     National Kidney Disease Education Program recommendations are as follows:  GFR calculation is accurate only with a steady state creatinine  Chronic Kidney disease less than 60 ml/min/1 73 sq  meters  Kidney failure less than 15 ml/min/1 73 sq  meters     LIPASE - Abnormal     Lipase 53 (*) 73 - 393 u/L Final   CBC AND DIFFERENTIAL - Normal    WBC 6 29  4 31 - 10 16 Thousand/uL Final    RBC 4 29  3 81 - 5 12 Million/uL Final    Hemoglobin 12 9  11 5 - 15 4 g/dL Final    Hematocrit 37 9  34 8 - 46 1 % Final    MCV 88  82 - 98 fL Final    MCH 30 1  26 8 - 34 3 pg Final    MCHC 34 0  31 4 - 37 4 g/dL Final    RDW 13 8  11 6 - 15 1 % Final    MPV 10 3  8 9 - 12 7 fL Final    Platelets 997  459 - 390 Thousands/uL Final    nRBC 0  /100 WBCs Final    Neutrophils Relative 60  43 - 75 % Final    Lymphocytes Relative 27  14 - 44 % Final    Monocytes Relative 6  4 - 12 % Final    Eosinophils Relative 6  0 - 6 % Final    Basophils Relative 1  0 - 1 % Final    Neutrophils Absolute 3 77  1 85 - 7 62 Thousands/µL Final    Lymphocytes Absolute 1 72  0 60 - 4 47 Thousands/µL Final    Monocytes Absolute 0 35  0 17 - 1 22 Thousand/µL Final    Eosinophils Absolute 0 39  0 00 - 0 61 Thousand/µL Final    Basophils Absolute 0 04  0 00 - 0 10 Thousands/µL Final   PROTIME-INR - Normal    Protime 12 7  12 1 - 14 4 seconds Final    INR 0 94  0 86 - 1 16 Final   APTT - Normal    PTT 28  23 - 35 seconds Final    Narrative: Therapeutic Heparin Range = 60-90 seconds   LACTIC ACID, PLASMA - Normal    LACTIC ACID 1 1  0 5 - 2 0 mmol/L Final    Narrative:     Result may be elevated if tourniquet was used during collection  CT abdomen pelvis with contrast   Final Result      Fluid-filled colon in keeping with the submitted history of diarrhea  No definite areas of bowel wall thickening  No other acute findings  Workstation performed: [id number]             Procedures  Procedures      Phone Contacts  ED Phone Contact    ED Course  ED Course as of Sep 30 1555   Fri Sep 29, 2017   [facility] IV placed by me under ultrasound guidance  20 gauge IV placed with sterile technique  2151 Still c/o severe pain  MDM  CritCare Time    Disposition  Final diagnoses:   Diarrhea   Elevated LFTs     ED Disposition     ED Disposition Condition Comment    Admit  Case was discussed with Kait Rice and the patient's admission status was agreed to be Admission Status: observation status to the service of Dr Mindi Rinne           Follow-up Information    None       Current Discharge Medication List      CONTINUE these medications which have NOT CHANGED    Details   ciprofloxacin-hydrocortisone (CIPRO HC OTIC) otic suspension Administer 3 drops to the right ear 2 (two) times a day for 5 days  Qty: 2 5 mL, Refills: 0      citalopram (CELEXA) 40 mg tablet Take 40 mg by mouth daily        clonazePAM (KlonoPIN) 0 5 mg tablet Take 0 5 mg by mouth 2 (two) times a day as needed for seizures      gabapentin (NEURONTIN) 800 mg tablet Take 800 mg by mouth 3 (three) times a day      HYDROcodone-acetaminophen (NORCO) 5-325 mg per tablet Take 1 tablet by mouth every 6 (six) hours as needed for pain for up to 10 doses Max Daily Amount: 4 tablets  Qty: 10 tablet, Refills: 0      indomethacin (INDOCIN) 25 mg capsule Take 1 capsule by mouth 2 (two) times a day with meals for 2 days  Qty: 2 capsule, Refills: 0      metoprolol tartrate (LOPRESSOR) 25 mg tablet Take 25 mg by mouth every 12 (twelve) hours           No discharge procedures on file      ED Provider  Electronically Signed by       Negro Farris MD  09/30/17 8764

## 2017-09-29 NOTE — ED NOTES
Unable to site IV after 2 attempt via ultrasound  Provider made aware        Jason Sosa RN  09/29/17 4064

## 2017-09-30 PROBLEM — R74.8 ABNORMAL TRANSAMINASES: Status: ACTIVE | Noted: 2017-09-30

## 2017-09-30 PROBLEM — J44.9 COPD (CHRONIC OBSTRUCTIVE PULMONARY DISEASE) (HCC): Status: ACTIVE | Noted: 2017-09-30

## 2017-09-30 PROBLEM — A04.72 C. DIFFICILE DIARRHEA: Status: ACTIVE | Noted: 2017-09-29

## 2017-09-30 LAB
ANION GAP SERPL CALCULATED.3IONS-SCNC: 11 MMOL/L (ref 4–13)
BASE EX.OXY STD BLDV CALC-SCNC: 89.4 % (ref 60–80)
BASE EXCESS BLDV CALC-SCNC: -8.4 MMOL/L
BUN SERPL-MCNC: 10 MG/DL (ref 5–25)
C DIFF TOX GENS STL QL NAA+PROBE: ABNORMAL
CALCIUM SERPL-MCNC: 8.5 MG/DL (ref 8.3–10.1)
CAMPYLOBACTER DNA SPEC NAA+PROBE: NORMAL
CHLORIDE SERPL-SCNC: 109 MMOL/L (ref 100–108)
CO2 SERPL-SCNC: 21 MMOL/L (ref 21–32)
CREAT SERPL-MCNC: 0.62 MG/DL (ref 0.6–1.3)
ERYTHROCYTE [DISTWIDTH] IN BLOOD BY AUTOMATED COUNT: 14 % (ref 11.6–15.1)
GFR SERPL CREATININE-BSD FRML MDRD: 99 ML/MIN/1.73SQ M
GLUCOSE P FAST SERPL-MCNC: 108 MG/DL (ref 65–99)
GLUCOSE SERPL-MCNC: 108 MG/DL (ref 65–140)
HCO3 BLDV-SCNC: 20.1 MMOL/L (ref 24–30)
HCT VFR BLD AUTO: 39.3 % (ref 34.8–46.1)
HGB BLD-MCNC: 12.9 G/DL (ref 11.5–15.4)
MCH RBC QN AUTO: 29.7 PG (ref 26.8–34.3)
MCHC RBC AUTO-ENTMCNC: 32.8 G/DL (ref 31.4–37.4)
MCV RBC AUTO: 90 FL (ref 82–98)
O2 CT BLDV-SCNC: 16.9 ML/DL
PCO2 BLDV: 53.7 MM HG (ref 42–50)
PH BLDV: 7.19 [PH] (ref 7.3–7.4)
PLATELET # BLD AUTO: 175 THOUSANDS/UL (ref 149–390)
PMV BLD AUTO: 10 FL (ref 8.9–12.7)
PO2 BLDV: 67.6 MM HG (ref 35–45)
POTASSIUM SERPL-SCNC: 4.1 MMOL/L (ref 3.5–5.3)
RBC # BLD AUTO: 4.35 MILLION/UL (ref 3.81–5.12)
SALMONELLA DNA SPEC QL NAA+PROBE: NORMAL
SHIGA TOXIN STX GENE SPEC NAA+PROBE: NORMAL
SHIGELLA DNA SPEC QL NAA+PROBE: NORMAL
SODIUM SERPL-SCNC: 141 MMOL/L (ref 136–145)
WBC # BLD AUTO: 6.23 THOUSAND/UL (ref 4.31–10.16)

## 2017-09-30 PROCEDURE — 94762 N-INVAS EAR/PLS OXIMTRY CONT: CPT

## 2017-09-30 PROCEDURE — 80074 ACUTE HEPATITIS PANEL: CPT | Performed by: INTERNAL MEDICINE

## 2017-09-30 PROCEDURE — 94640 AIRWAY INHALATION TREATMENT: CPT

## 2017-09-30 PROCEDURE — 94760 N-INVAS EAR/PLS OXIMETRY 1: CPT

## 2017-09-30 PROCEDURE — 87340 HEPATITIS B SURFACE AG IA: CPT | Performed by: INTERNAL MEDICINE

## 2017-09-30 PROCEDURE — 80048 BASIC METABOLIC PNL TOTAL CA: CPT | Performed by: PHYSICIAN ASSISTANT

## 2017-09-30 PROCEDURE — 85027 COMPLETE CBC AUTOMATED: CPT | Performed by: PHYSICIAN ASSISTANT

## 2017-09-30 PROCEDURE — 86803 HEPATITIS C AB TEST: CPT | Performed by: INTERNAL MEDICINE

## 2017-09-30 PROCEDURE — 86704 HEP B CORE ANTIBODY TOTAL: CPT | Performed by: INTERNAL MEDICINE

## 2017-09-30 PROCEDURE — 82805 BLOOD GASES W/O2 SATURATION: CPT | Performed by: PHYSICIAN ASSISTANT

## 2017-09-30 PROCEDURE — 86705 HEP B CORE ANTIBODY IGM: CPT | Performed by: INTERNAL MEDICINE

## 2017-09-30 RX ORDER — LACTOBACILLUS ACIDOPHILUS / LACTOBACILLUS BULGARICUS 100 MILLION CFU STRENGTH
1 GRANULES ORAL
Status: DISCONTINUED | OUTPATIENT
Start: 2017-09-30 | End: 2017-10-03 | Stop reason: HOSPADM

## 2017-09-30 RX ORDER — ONDANSETRON 2 MG/ML
4 INJECTION INTRAMUSCULAR; INTRAVENOUS EVERY 4 HOURS PRN
Status: DISCONTINUED | OUTPATIENT
Start: 2017-09-30 | End: 2017-10-03 | Stop reason: HOSPADM

## 2017-09-30 RX ORDER — IPRATROPIUM BROMIDE AND ALBUTEROL SULFATE 2.5; .5 MG/3ML; MG/3ML
3 SOLUTION RESPIRATORY (INHALATION)
Status: DISCONTINUED | OUTPATIENT
Start: 2017-09-30 | End: 2017-09-30

## 2017-09-30 RX ORDER — IPRATROPIUM BROMIDE AND ALBUTEROL SULFATE 2.5; .5 MG/3ML; MG/3ML
3 SOLUTION RESPIRATORY (INHALATION) EVERY 2 HOUR PRN
Status: DISCONTINUED | OUTPATIENT
Start: 2017-09-30 | End: 2017-10-03 | Stop reason: HOSPADM

## 2017-09-30 RX ORDER — IPRATROPIUM BROMIDE AND ALBUTEROL SULFATE 2.5; .5 MG/3ML; MG/3ML
3 SOLUTION RESPIRATORY (INHALATION)
Status: DISCONTINUED | OUTPATIENT
Start: 2017-09-30 | End: 2017-10-01

## 2017-09-30 RX ORDER — NALOXONE HYDROCHLORIDE 0.4 MG/ML
0.4 INJECTION, SOLUTION INTRAMUSCULAR; INTRAVENOUS; SUBCUTANEOUS ONCE
Status: DISCONTINUED | OUTPATIENT
Start: 2017-09-30 | End: 2017-10-03 | Stop reason: HOSPADM

## 2017-09-30 RX ADMIN — VANCOMYCIN 250 MG: KIT at 18:21

## 2017-09-30 RX ADMIN — HYDROMORPHONE HYDROCHLORIDE 0.2 MG: 1 INJECTION, SOLUTION INTRAMUSCULAR; INTRAVENOUS; SUBCUTANEOUS at 23:17

## 2017-09-30 RX ADMIN — LACTOBACILLUS ACIDOPHILUS / LACTOBACILLUS BULGARICUS 1 PACKET: 100 MILLION CFU STRENGTH GRANULES at 10:09

## 2017-09-30 RX ADMIN — LACTOBACILLUS ACIDOPHILUS / LACTOBACILLUS BULGARICUS 1 PACKET: 100 MILLION CFU STRENGTH GRANULES at 14:26

## 2017-09-30 RX ADMIN — METRONIDAZOLE 500 MG: 500 INJECTION, SOLUTION INTRAVENOUS at 10:09

## 2017-09-30 RX ADMIN — FAMOTIDINE 20 MG: 10 INJECTION, SOLUTION INTRAVENOUS at 10:09

## 2017-09-30 RX ADMIN — METOPROLOL TARTRATE 25 MG: 25 TABLET, FILM COATED ORAL at 01:00

## 2017-09-30 RX ADMIN — Medication 0.04 MG: at 16:41

## 2017-09-30 RX ADMIN — IPRATROPIUM BROMIDE AND ALBUTEROL SULFATE 3 ML: .5; 3 SOLUTION RESPIRATORY (INHALATION) at 19:29

## 2017-09-30 RX ADMIN — HYDROMORPHONE HYDROCHLORIDE 1 MG: 1 INJECTION, SOLUTION INTRAMUSCULAR; INTRAVENOUS; SUBCUTANEOUS at 10:10

## 2017-09-30 RX ADMIN — MORPHINE SULFATE 1 MG: 2 INJECTION, SOLUTION INTRAMUSCULAR; INTRAVENOUS at 00:00

## 2017-09-30 RX ADMIN — FAMOTIDINE 20 MG: 10 INJECTION, SOLUTION INTRAVENOUS at 18:21

## 2017-09-30 RX ADMIN — SODIUM CHLORIDE 125 ML/HR: 0.9 INJECTION, SOLUTION INTRAVENOUS at 03:24

## 2017-09-30 RX ADMIN — ENOXAPARIN SODIUM 40 MG: 40 INJECTION SUBCUTANEOUS at 10:09

## 2017-09-30 RX ADMIN — CITALOPRAM HYDROBROMIDE 40 MG: 20 TABLET ORAL at 10:15

## 2017-09-30 RX ADMIN — GABAPENTIN 800 MG: 400 CAPSULE ORAL at 01:00

## 2017-09-30 RX ADMIN — MORPHINE SULFATE 1 MG: 2 INJECTION, SOLUTION INTRAMUSCULAR; INTRAVENOUS at 03:21

## 2017-09-30 RX ADMIN — GABAPENTIN 800 MG: 400 CAPSULE ORAL at 10:10

## 2017-09-30 RX ADMIN — MORPHINE SULFATE 1 MG: 2 INJECTION, SOLUTION INTRAMUSCULAR; INTRAVENOUS at 06:16

## 2017-09-30 RX ADMIN — METRONIDAZOLE 500 MG: 500 INJECTION, SOLUTION INTRAVENOUS at 18:21

## 2017-09-30 RX ADMIN — METOPROLOL TARTRATE 25 MG: 25 TABLET, FILM COATED ORAL at 10:10

## 2017-09-30 RX ADMIN — LACTOBACILLUS ACIDOPHILUS / LACTOBACILLUS BULGARICUS 1 PACKET: 100 MILLION CFU STRENGTH GRANULES at 18:21

## 2017-09-30 RX ADMIN — VANCOMYCIN 250 MG: KIT at 14:26

## 2017-09-30 RX ADMIN — GABAPENTIN 800 MG: 400 CAPSULE ORAL at 18:21

## 2017-09-30 RX ADMIN — SODIUM CHLORIDE 125 ML/HR: 0.9 INJECTION, SOLUTION INTRAVENOUS at 06:17

## 2017-09-30 RX ADMIN — IPRATROPIUM BROMIDE AND ALBUTEROL SULFATE 3 ML: .5; 3 SOLUTION RESPIRATORY (INHALATION) at 14:33

## 2017-09-30 RX ADMIN — HYDROMORPHONE HYDROCHLORIDE 1 MG: 1 INJECTION, SOLUTION INTRAMUSCULAR; INTRAVENOUS; SUBCUTANEOUS at 14:26

## 2017-09-30 NOTE — PLAN OF CARE
GASTROINTESTINAL - ADULT     Minimal or absence of nausea and/or vomiting Progressing     Maintains or returns to baseline bowel function Progressing     Maintains adequate nutritional intake Progressing        Potential for Falls     Patient will remain free of falls Progressing

## 2017-09-30 NOTE — CASE MANAGEMENT
[facility] in the [facility] by Sage Tate for 2017  Network Utilization Review Department  Phone: [phone]; Fax [phone]  ATTENTION: The Network Utilization Review Department is now centralized for our 7 Facilities  Make a note that we have a new phone and fax numbers for our Department  Please call with any questions or concerns to [phone] and carefully follow the prompts so that you are directed to the right person  All voicemails are confidential  Fax any determinations, approvals, denials, and requests for initial or continue stay review clinical to [phone]  Due to HIGH CALL volume, it would be easier if you could please send faxed requests to expedite your requests and in part, help us provide discharge notifications faster  Initial Clinical Review    Admission: Date/Time/Statement:      IP order entered  9/30  0858  Admitting Physician Ramon Montoya    Level of Care Med Surg    Estimated length of stay More than 2 Midnights    Certification I certify that inpatient services are medically necessary for this patient for a duration of greater than two midnights  See H&P and MD Progress Notes for additional information about the patient's course of treatment          OBS order 9/29  2203    Orders Placed This Encounter   Procedures    Place in Observation     Standing Status:   Standing     Number of Occurrences:   1     Order Specific Question:   Admitting Physician     Answer:   Hussain Palma [[id number]]     Order Specific Question:   Level of Care     Answer:   Med Surg [16]         ED: Date/Time/Mode of Arrival:   ED Arrival Information     Expected Arrival Acuity Means of Arrival Escorted By Service Admission Type    - 9/29/2017 16:49 [facility] Urgent    Arrival Complaint    ABDOMINAL PAIN          Chief Complaint:   Chief Complaint   Patient presents with    Abdominal Pain Patient brought in by EMS for diarrhea over the past few days  Patient reports sharp abdominal pain and nausea starting today       History of Illness: Yesi Covington is a 59 y o  female who presents with complaints of abdominal pain that begain this morning  Pt states that she has had several bouts of diarrhea as well as nausea and vomiting  Pt denies fever, chills  Pt states that she was recently admitted to [facility] due to similar pain  Pt state sthat she had a gallstone and required surgery  Pt denies chest pain, SOB, fever, chills  Pt states that she does have diarrhea, nausea, vomiting  ED Vital Signs:   ED Triage Vitals   Temperature Pulse Respirations Blood Pressure SpO2   09/30/17 0012 09/29/17 1653 09/29/17 1653 09/29/17 1653 09/29/17 1653   98 3 °F (36 8 °C) 97 20 111/68 98 %      Temp Source Heart Rate Source Patient Position - Orthostatic VS BP Location FiO2 (%)   09/30/17 0012 09/29/17 1653 09/29/17 1653 09/29/17 1653 --   Oral Monitor Lying Right arm       Pain Score       09/29/17 1653       Worst Possible Pain        Wt Readings from Last 1 Encounters:   09/30/17 65 3 kg (144 lb)       Vital Signs (abnormal): wnl    Abnormal Labs/Diagnostic Test Results: cl  110, ast  63, alt  109, alk phos 186, alb  3 2, lipase  53  CT abd-      Fluid-filled colon in keeping with the submitted history of diarrhea   No definite areas of bowel wall thickening            ED Treatment:   Medication Administration from 09/29/2017 1649 to 09/29/2017 2250       Date/Time Order Dose Route Action Action by Comments     09/29/2017 1912 sodium chloride 0 9 % bolus 1,000 mL 0 mL Intravenous Stopped Morelia Rankin RN      09/29/2017 1857 sodium chloride 0 9 % bolus 1,000 mL 1,000 mL Intravenous Yar[address] Titus Suarez RN      09/29/2017 1836 HYDROmorphone (DILAUDID) 1 mg/mL injection 1 mg 1 mg Intravenous Given Chandu Zhao RN      09/29/2017 2013 HYDROmorphone (DILAUDID) 1 mg/mL injection 0 5 mg 0 5 mg Intravenous Given Jama Gutierrez RN      09/29/2017 2044 iohexol (OMNIPAQUE) 350 MG/ML injection (MULTI-DOSE) 100 mL 100 mL Intravenous Given Heidi Schroeder      09/29/2017 2225 sodium chloride 0 9 % infusion 125 mL/hr Intravenous New Bag Morelia Rankin RN      09/29/2017 2223 HYDROmorphone (DILAUDID) 1 mg/mL injection 1 mg 1 mg Intravenous Given Jama Gutierrez RN           Past Medical/Surgical History: Active Ambulatory Problems     Diagnosis Date Noted    Increased severity of headaches 12/16/2016    Chest pain 03/05/2017    Depression 03/05/2017    Anxiety 03/05/2017    Chronic back pain 03/05/2017    Tobacco abuse disorder 03/05/2017    Neuropathy 03/05/2017    Positive cardiac stress test 03/07/2017    Coronary artery spasm 03/08/2017    Acute bronchitis 03/08/2017     Resolved Ambulatory Problems     Diagnosis Date Noted    No Resolved Ambulatory Problems     Past Medical History:   Diagnosis Date    Anxiety     Bipolar 1 disorder     Chronic back pain     Frequent headaches     Glaucoma     Hepatitis B     Hypertension     Psychiatric disorder     Rheumatoid arthritis        Admitting Diagnosis: Diarrhea [R19 7]  Abdominal pain [R10 9]  Elevated LFTs [R94 5]    Age/Sex: 59 y o  female    Assessment/Plan:    Hospital Problem List:      Principal Problem:    Abdominal pain  Active Problems:    Tobacco abuse disorder    Diarrhea   Plan for the Primary Problem(s):  · Abdominal pain  ? Admit  ? GI consult  ? Pain control  ? IV fluids   Plan for Additional Problems:   · Diarrhea - IV fluids, c diff culture pending  · Tobacco abuse - offered nicotine patch and counseling; declind   VTE Prophylaxis: Enoxaparin (Lovenox)  / sequential compression device   Code Status: Full  POLST: There is no POLST form on file for this patient (pre-hospital)     Anticipated Length of Stay:  Patient will be admitted on an Observation basis with an anticipated length of stay of  Less than 2 midnights     Justification for Hospital Stay: IV fluids    Admission Orders:  Scheduled Meds:   citalopram 40 mg Oral Daily   enoxaparin 40 mg Subcutaneous Daily   gabapentin 800 mg Oral TID   metoprolol tartrate 25 mg Oral Q12H [address]     Continuous Infusions:   sodium chloride 125 mL/hr Last Rate: 125 mL/hr (09/30/17 0324)     PRN Meds:   acetaminophen    calcium carbonate    clonazePAM    morphine injection    x3    ondansetron    Clear liq diet adv   To reg   Contact isolation  SCD  GI consult   9/30 cbc,bmp, hepatitis panel   c-diff +

## 2017-09-30 NOTE — PROGRESS NOTES
[facility] Internal Medicine Progress Note  Patient: Eli Sharp 59 y o  female   MRN: [id number]  PCP: Ryna Prasad MD  Unit/Bed#: -01 Encounter: [id number]  Date Of Visit: 09/30/17    Assessment/Plan:      · C diff colitis/diarrhea  Patient has signs and symptoms of peritonitis  She is quite uncomfortable and not been able to keep anything down  While her symptoms get better, I would start her on both oral vancomycin and intravenous Flagyl  When she starts eating better  Would transition her to only oral Flagyl  Would not give her any antidiarrheals at this point  · Dehydration secondary to above  Clinically, she appears very dehydrated  Give her more IV fluids  · COPD with continued tobacco addiction  Not in any significant respiratory distress  Encouraged to quit smoking  · Elevated transaminases  ?  Etiology  Would ask for hepatitis screening panels  Awaiting GI input  · Anxiety/depression  Continue with home medication  · Neuropathy  Patient is on Neurontin  Details are not clear      VTE Pharmacologic Prophylaxis:   Pharmacologic: Enoxaparin (Lovenox)  Mechanical VTE Prophylaxis in Place: Yes    Patient Centered Rounds: I have performed bedside rounds with nursing staff today  Discussions with Specialists or Other Care Team Provider:  Yes    Education and Discussions with Family / Patient:  Yes    Time Spent for Care: 35+ minutes  More than 50% of total time spent on counseling and coordination of care as described above  Current Length of Stay: 0 day(s)    Current Patient Status: Inpatient   Certification Statement: The patient will continue to require additional inpatient hospital stay due to Treatment for her acute C diff colitis/diarrhea/severe dehydration and abdominal pain    Discharge Plan:  Home when stable    Code Status: Level 1 - Full Code      Subjective:   Patient is complaining of severe abdominal pain    She continues to have significant diarrheal bowel movements  She is basically holding her abdomen because of severe pain  As per patient, she never had any pain like this in her life  Also not been able to keep anything down  Denies any chest pain  Denies any shortness of breath  She feels that her mouth/lips are very dry  Patient status post recent surgery for gallstones    Objective:     Vitals:   Temp (24hrs), Av 3 °F (36 8 °C), Min:98 3 °F (36 8 °C), Max:98 3 °F (36 8 °C)    HR:  [75-97] 75  Resp:  [17-20] 18  BP: (109-141)/(56-76) 135/76  SpO2:  [91 %-98 %] 91 %  Body mass index is 25 51 kg/m²  Physical Exam:     Vital signs are reviewed as above  Constitutional:  Patient just came out of the bathroom  She was holding her belly and then basically rolling around in the bed because of the pain  Eyes: EOM grossly intact  Conjunctivae slightly pale  HENT: Oropharynx/lips are very dry   Neck: Neck is supple  Cardiac: I did not hear any rubs or gallop  Patient appears to be in sinus rhythm  Respiratory: Patient not in significant respiratory distress  Air entry in general is poor and she also has few wheezes bilaterally on auscultation  GI: Abdomen is distended to some extent and sore all over  Skin: Skin is warm and dry  Psychiatric:  In quite a bit of distress because of abdominal pain  Musculoskeletal  Patient moving all extremities   Extremities: Patient has no significant cyanosis, clubbing, or lower extremity edema       Additional Data:     Labs:      Results from last 7 days  Lab Units 17  0532 17  1854   WBC Thousand/uL 6 23 6 29   HEMOGLOBIN g/dL 12 9 12 9   HEMATOCRIT % 39 3 37 9   PLATELETS Thousands/uL 175 195   NEUTROS PCT %  --  60   LYMPHS PCT %  --  27   MONOS PCT %  --  6   EOS PCT %  --  6       Results from last 7 days  Lab Units 17  0532 17  1854   SODIUM mmol/L 141 143   POTASSIUM mmol/L 4 1 3 8   CHLORIDE mmol/L 109* 110*   CO2 mmol/L 21 21   BUN mg/dL 10 12   CREATININE mg/dL 0 62 0 74 CALCIUM mg/dL 8 5 9 0   TOTAL PROTEIN g/dL  --  6 7   BILIRUBIN TOTAL mg/dL  --  0 30   ALK PHOS U/L  --  186*   ALT U/L  --  109*   AST U/L  --  63*   GLUCOSE RANDOM mg/dL 108 122       Results from last 7 days  Lab Units 09/29/17  1854   INR  0 94       * I Have Reviewed All Lab Data Listed Above  * Additional Pertinent Lab Tests Reviewed: All Labs Within Last 24 Hours Reviewed    Imaging:    Imaging Reports Reviewed Today Include:  CT scan abdomen/pelvis results    Recent Cultures (last 7 days):       Results from last 7 days  Lab Units 09/29/17  1841   C DIFF TOXIN B  POSITIVE for C difficle toxin by PCR  *       Last 24 Hours Medication List:     citalopram 40 mg Oral Daily   enoxaparin 40 mg Subcutaneous Daily   famotidine 20 mg Intravenous BID   gabapentin 800 mg Oral TID   metoprolol tartrate 25 mg Oral Q12H [address]   metroNIDAZOLE 500 mg Intravenous Q8H   vancomycin 250 mg Oral Q6H [address]        Today, Patient Was Seen By: Vivienne Vincent MD    ** Please Note: Dragon 360 Dictation voice to text software may have been used in the creation of this document   **

## 2017-09-30 NOTE — PROGRESS NOTES
Pt having strong audible wheezes and shortness of breath  Dr Akil Pantoja was notified and a verbal order obtained for duo-neb treatments Q6 hours  Patient was found the have an oxygen sat of 78% on room air before her respiratory treatment  The patient quickly went of to 88-90% with her treatment  After the treatment, the patient was put on 3L oxygen  Dr Akil Pantoja was again notified

## 2017-09-30 NOTE — H&P
History and Physical - [facility] Internal Medicine    Patient Information: Viann August 59 y o  female MRN: [id number]  Unit/Bed#: ED 26 Encounter: [id number]  Admitting Physician: Chad De La Cruz PA-C  PCP: Bonny Hillman MD  Date of Admission:  09/29/17    Assessment/Plan:    Hospital Problem List:     Principal Problem:    Abdominal pain  Active Problems:    Tobacco abuse disorder    Diarrhea      Plan for the Primary Problem(s):  · Abdominal pain  · Admit  · GI consult  · Pain control  · IV fluids    Plan for Additional Problems:   · Diarrhea - IV fluids, c diff culture pending  · Tobacco abuse - offered nicotine patch and counseling; declind    VTE Prophylaxis: Enoxaparin (Lovenox)  / sequential compression device   Code Status: Full  POLST: There is no POLST form on file for this patient (pre-hospital)    Anticipated Length of Stay:  Patient will be admitted on an Observation basis with an anticipated length of stay of  Less than 2 midnights  Justification for Hospital Stay: IV fluids    Total Time for Visit, including Counseling / Coordination of Care: 30 minutes  Greater than 50% of this total time spent on direct patient counseling and coordination of care  Chief Complaint:   Abdominal pain    History of Present Illness:    Viann August is a 59 y o  female who presents with complaints of abdominal pain that begain this morning  Pt states that she has had several bouts of diarrhea as well as nausea and vomiting  Pt denies fever, chills  Pt states that she was recently admitted to [facility] due to similar pain  Pt state sthat she had a gallstone and required surgery  Pt denies chest pain, SOB, fever, chills  Pt states that she does have diarrhea, nausea, vomiting  Review of Systems:    Review of Systems   Gastrointestinal: Positive for abdominal pain  All other systems reviewed and are negative        Past Medical and Surgical History:     Past Medical History:   Diagnosis Date    Anxiety  Bipolar 1 disorder     Chronic back pain     Frequent headaches     Glaucoma     Hepatitis B     Hypertension     Psychiatric disorder     bipolar    Rheumatoid arthritis        Past Surgical History:   Procedure Laterality Date    APPENDECTOMY      CATARACT EXTRACTION Left     CHOLECYSTECTOMY      HYSTERECTOMY      INTRAOCULAR LENS INSERTION      VA TEMPORAL ARTERY LIGATN OR BX Right 12/16/2016    Procedure: BIOPSY ARTERY TEMPORAL;  Surgeon: Adriano Park MD;  Location: [facility];  Service: General    TONSILLECTOMY         Meds/Allergies:    Prior to Admission medications    Medication Sig Start Date End Date Taking? Authorizing Provider   ciprofloxacin-hydrocortisone (CIPRO HC OTIC) otic suspension Administer 3 drops to the right ear 2 (two) times a day for 5 days 8/25/17 8/30/17  Triny Fuller, DO   citalopram (CELEXA) 40 mg tablet Take 40 mg by mouth daily      Historical Provider, MD   clonazePAM (KlonoPIN) 0 5 mg tablet Take 0 5 mg by mouth 2 (two) times a day as needed for seizures    Historical Provider, MD   gabapentin (NEURONTIN) 800 mg tablet Take 800 mg by mouth 3 (three) times a day    Historical Provider, MD   HYDROcodone-acetaminophen (NORCO) 5-325 mg per tablet Take 1 tablet by mouth every 6 (six) hours as needed for pain for up to 10 doses Max Daily Amount: 4 tablets 7/25/17   Urbano Redman MD   indomethacin (INDOCIN) 25 mg capsule Take 1 capsule by mouth 2 (two) times a day with meals for 2 days 8/25/17 8/27/17  Fernando Ricardo DO   metoprolol tartrate (LOPRESSOR) 25 mg tablet Take 25 mg by mouth every 12 (twelve) hours    Historical Provider, MD     I have reviewed home medications with patient personally  Allergies:    Allergies   Allergen Reactions    Aspirin Anaphylaxis    Codeine Other (See Comments)     Dizziness nausea    Robaxin [Methocarbamol]      Seizures per pt       Tramadol Hives       Social History:     Marital Status: /Civil Union Occupation: retired  Patient Pre-hospital Living Situation: lives at home  Patient Pre-hospital Level of Mobility: ambulatory   Patient Pre-hospital Diet Restrictions: none  Substance Use History:   History   Alcohol Use No     History   Smoking Status    Current Every Day Smoker    Packs/day: 0 20    Types: Cigarettes   Smokeless Tobacco    Never Used     History   Drug Use No       Family History:    Family History   Problem Relation Age of Onset    Heart disease Mother        Physical Exam:     Vitals:   Blood Pressure: 109/57 (09/29/17 1900)  Pulse: 96 (09/29/17 1900)  Respirations: 19 (09/29/17 1900)  Height: 5' 3" (160 cm) (09/29/17 1653)  Weight - Scale: 65 3 kg (144 lb) (09/29/17 1653)  SpO2: 96 % (09/29/17 1900)    Physical Exam   Constitutional: She is oriented to person, place, and time  She appears well-developed and well-nourished  HENT:   Head: Normocephalic and atraumatic  Right Ear: External ear normal    Left Ear: External ear normal    Nose: Nose normal    Mouth/Throat: Oropharynx is clear and moist    Eyes: Conjunctivae are normal  Pupils are equal, round, and reactive to light  Neck: Normal range of motion  Cardiovascular: Normal rate, regular rhythm and normal heart sounds  Pulmonary/Chest: Effort normal and breath sounds normal    Abdominal: Soft  Normal appearance and bowel sounds are normal  There is generalized tenderness  There is rebound  Musculoskeletal: Normal range of motion  Neurological: She is alert and oriented to person, place, and time  Skin: Skin is warm and dry  Psychiatric: She has a normal mood and affect  Her behavior is normal  Judgment and thought content normal    Vitals reviewed  Additional Data:     Lab Results: I have personally reviewed pertinent reports          Results from last 7 days  Lab Units 09/29/17  1854   WBC Thousand/uL 6 29   HEMOGLOBIN g/dL 12 9   HEMATOCRIT % 37 9   PLATELETS Thousands/uL 195   NEUTROS PCT % 60   LYMPHS PCT % 27   MONOS PCT % 6   EOS PCT % 6       Results from last 7 days  Lab Units 09/29/17  1854   SODIUM mmol/L 143   POTASSIUM mmol/L 3 8   CHLORIDE mmol/L 110*   CO2 mmol/L 21   BUN mg/dL 12   CREATININE mg/dL 0 74   CALCIUM mg/dL 9 0   TOTAL PROTEIN g/dL 6 7   BILIRUBIN TOTAL mg/dL 0 30   ALK PHOS U/L 186*   ALT U/L 109*   AST U/L 63*   GLUCOSE RANDOM mg/dL 122       Results from last 7 days  Lab Units 09/29/17  1854   INR  0 94       Imaging: I have personally reviewed pertinent reports  Ct Abdomen Pelvis With Contrast    Result Date: 9/29/2017  Narrative: CT ABDOMEN AND PELVIS WITH IV CONTRAST INDICATION:  "Abdominal Pain (Patient brought in by EMS for diarrhea over the past few days  Patient reports sharp abdominal pain and nausea starting today)"" The patient has been having diarrhea for the past 3 days  Today with severe abdominal pain  Diffuse in nature  The patient states that last week she has begun a [facility] and they  opened up her bile duct    It sounds as though she had a endoscopy and possible ERCP  " COMPARISON: CT renal stone study 4/30/2017 TECHNIQUE:  CT examination of the abdomen and pelvis was performed  Reformatted images were created in axial, sagittal, and coronal planes  Radiation dose length product (DLP) for this visit:  588 mGy-cm   This examination, like all CT scans performed in the [facility], was performed utilizing techniques to minimize radiation dose exposure, including the use of iterative reconstruction and automated exposure control  IV Contrast:  100 mL of iohexol (OMNIPAQUE)  350 Enteric Contrast:  Enteric contrast was not administered  FINDINGS: ABDOMEN LOWER CHEST:  No significant abnormalities identified in the lower chest  LIVER/BILIARY TREE:  Minimal intrahepatic biliary dilation may be of a normal degree status post cholecystectomy  GALLBLADDER:  Cholecystectomy  SPLEEN:  Unremarkable  PANCREAS:  Unremarkable   ADRENAL GLANDS:  Stable adenoma the medial limb of the left adrenal gland  KIDNEYS/URETERS:  Unremarkable  No hydronephrosis  STOMACH AND BOWEL:  Fluid-filled colon in keeping with the patient's history of diarrhea  No definite areas of prominent colonic wall thickening  No definite small bowel thickening; enteric contrast is not administered  APPENDIX:  Appendectomy  ABDOMINOPELVIC CAVITY:  No ascites or free intraperitoneal air  No lymphadenopathy  VESSELS:  Atherosclerotic changes are present  No evidence of aneurysm  PELVIS REPRODUCTIVE ORGANS:  Hysterectomy  URINARY BLADDER:  Unremarkable  ABDOMINAL WALL/INGUINAL REGIONS:  Unremarkable  OSSEOUS STRUCTURES:  No acute fracture or destructive osseous lesion  Impression: Fluid-filled colon in keeping with the submitted history of diarrhea  No definite areas of bowel wall thickening  No other acute findings  Workstation performed: [id number]       EKG, Pathology, and Other Studies Reviewed on Admission:   · EKG:     Allscripts / Epic Records Reviewed: Yes     ** Please Note: This note has been constructed using a voice recognition system   **

## 2017-09-30 NOTE — PROGRESS NOTES
[facility] Progress Note  Patient: Isha Garcia 59 y o  female   MRN: [id number]  PCP: Mariaelena Vazquez MD  Unit/Bed#: -01 Encounter: [id number]  Date Of Visit: 09/30/17        Revisit:    Assessment/Plan:          · Patient became more short of breath earlier  She was wheezing  Give her nebulizer treatment  She also received a dose of IV Dilaudid  I saw patient again  She is quite a bit obtunded at this moment  She would walk up with verbal stimuli, however, would go back to sleep  On looking at her, I am not sure she if she has underlying sleep apnea  She is not currently wheezing much  Her oxygen saturation went down to 76% as per staff  She is on about 3 L of oxygen and oxygenating well  Would also put her on scheduled nebulizer treatments  Discussed with critical care team   Critical team is also here to evaluate her in case she would need higher level of care especially to keep an eye on her breathing status  I have cut back on her Dilaudid dose  I have also discussed with staff to give her minimal dose of narcotics  Discussed with patient's family who is here as well  I do not believe that she is fluid overloaded as her lips are still pretty dry    Continue with IV fluids

## 2017-09-30 NOTE — CONSULTS
[address] [place] [address] 59 y o  female MRN: [id number]  Unit/Bed#: -01 Encounter: [id number]    Physician Requesting Consult: Valeria Virk MD    Reason for Consultation / Chief Complaint: Hypoxia    History of Present Illness:  Chani Murphy is a 59 y o  female who presented to the [facility] late yesterday afternoon  Patient had just had a cholecystectomy a [facility]  She came in complaining of abdominal pain  CT scan of the abdomen was unremarkable  She was admitted to medical-surgical floor  She was diagnosis C diff  She has been complaining of significant pain  She had received a dose of Dilaudid at 1426  Approximately 2 hours afterwards, respiratory therapy arrived to give patient breathing treatment  Patient was found to be satting 76% and responsive to voice but will quickly go back to sleep  She was given a breathing treatment  Her saturation improved but patient was still significantly wheezy  On my arrival patient was arousable by voice but quickly would drift back to sleep  She was also using accessory muscles to breathe and was snoring with breathing  She was administered 0 4 mg of Narcan and mental status returned to baseline  Her oxygen saturation after Narcan was 88% so she was increased to 4 L nasal cannula  History obtained from chart review and the patient      Past Medical History:  Past Medical History:   Diagnosis Date    Anxiety     Bipolar 1 disorder     Chronic back pain     Frequent headaches     Glaucoma     Hepatitis B     Hypertension     Psychiatric disorder     bipolar    Rheumatoid arthritis        Past Surgical History:  Past Surgical History:   Procedure Laterality Date    APPENDECTOMY      CATARACT EXTRACTION Left     CHOLECYSTECTOMY      HYSTERECTOMY      INTRAOCULAR LENS INSERTION      SC TEMPORAL ARTERY LIGATN OR BX Right 12/16/2016    Procedure: BIOPSY ARTERY TEMPORAL; Surgeon: Aquilino Mohan MD;  Location: [facility];  Service: General    TONSILLECTOMY         Past Family History:  Family History   Problem Relation Age of Onset    Heart disease Mother        Social History:  History   Smoking Status    Current Every Day Smoker    Packs/day: 0 20    Types: Cigarettes   Smokeless Tobacco    Never Used     History   Alcohol Use No     History   Drug Use No     Marital Status: /Civil Union  Exercise History: Independent ADLs    Home Medications:   Prior to Admission medications    Medication Sig Start Date End Date Taking?  Authorizing Provider   ciprofloxacin-hydrocortisone (CIPRO HC OTIC) otic suspension Administer 3 drops to the right ear 2 (two) times a day for 5 days 8/25/17 8/30/17  Mike Fuller,    citalopram (CELEXA) 40 mg tablet Take 40 mg by mouth daily      Historical Provider, MD   clonazePAM (KlonoPIN) 0 5 mg tablet Take 0 5 mg by mouth 2 (two) times a day as needed for seizures    Historical Provider, MD   gabapentin (NEURONTIN) 800 mg tablet Take 800 mg by mouth 3 (three) times a day    Historical Provider, MD   HYDROcodone-acetaminophen (NORCO) 5-325 mg per tablet Take 1 tablet by mouth every 6 (six) hours as needed for pain for up to 10 doses Max Daily Amount: 4 tablets 7/25/17   Kannan Torres MD   indomethacin (INDOCIN) 25 mg capsule Take 1 capsule by mouth 2 (two) times a day with meals for 2 days 8/25/17 8/27/17  Sanjuanita Diop DO   metoprolol tartrate (LOPRESSOR) 25 mg tablet Take 25 mg by mouth every 12 (twelve) hours    Historical Provider, MD       Inpatient Medications:  Scheduled Meds:  citalopram 40 mg Oral Daily   enoxaparin 40 mg Subcutaneous Daily   famotidine 20 mg Intravenous BID   gabapentin 800 mg Oral TID   ipratropium-albuterol 3 mL Nebulization Q6H   lactobacillus acidophilus-bulgaricus 1 packet Oral TID With Meals   metoprolol tartrate 25 mg Oral Q12H OSCAR   metroNIDAZOLE 500 mg Intravenous Q8H   naloxone 0 4 mg Intravenous Once   vancomycin 250 mg Oral Q6H [address]     Continuous Infusions:  sodium chloride 150 mL/hr Last Rate: 150 mL/hr (17 1011)     PRN Meds:    acetaminophen 650 mg Q6H PRN   calcium carbonate 1,000 mg Daily PRN   clonazePAM 0 5 mg BID PRN   HYDROmorphone 0 2 mg Q3H PRN   ipratropium-albuterol 3 mL Q2H PRN   ondansetron 4 mg Q4H PRN       Allergies: Allergies   Allergen Reactions    Aspirin Anaphylaxis    Codeine Other (See Comments)     Dizziness nausea    Robaxin [Methocarbamol]      Seizures per pt       Tramadol Hives       ROS:   Review of Systems    Vitals:  Vitals:    17 0700 17 1433 17 1500 17 1704   BP: 114/77  107/70    Pulse: 89  87    Resp: 18  18    Temp: 98 3 °F (36 8 °C)  98 2 °F (36 8 °C)    TempSrc: Oral  Oral    SpO2: 93% (!) 76% (!) 89% 95%   Weight:       Height:         Temperature:   Temp (24hrs), Av 3 °F (36 8 °C), Min:98 2 °F (36 8 °C), Max:98 3 °F (36 8 °C)    Current Temperature: 98 2 °F (36 8 °C)    Weights:   IBW: 52 4 kg  Body mass index is 25 51 kg/m²  Hemodynamic Monitoring:  N/A     Non-Invasive/Invasive Ventilation Settings:  Respiratory    Lab Data (Last 4 hours)    None         O2/Vent Data (Last 4 hours)    None              No results found for: PHART, HAH9IDO, PO2ART, KTN2MRO, C1EXPMBW, BEART, SOURCE  SpO2: SpO2: 95 %     Physical Exam:  Physical Exam   Constitutional: She is oriented to person, place, and time  She appears well-developed and well-nourished  Older than stated age   HENT:   Head: Normocephalic and atraumatic  Mouth/Throat: Uvula is midline, oropharynx is clear and moist and mucous membranes are normal    Eyes: EOM are normal  Pupils are equal, round, and reactive to light  Neck: Trachea normal  Neck supple  Cardiovascular: Regular rhythm  Tachycardia present  Exam reveals no gallop  No murmur heard  Pulmonary/Chest: No respiratory distress  She has wheezes (Diffuse)  Abdominal: Normal appearance  She exhibits no distension  Bowel sounds are increased  There is generalized tenderness  Genitourinary:   Genitourinary Comments: Deferred   Musculoskeletal: Normal range of motion  She exhibits no edema  Neurological: She is alert and oriented to person, place, and time  No cranial nerve deficit  Skin: Skin is warm and dry  Nursing note and vitals reviewed  Labs:    Results from last 7 days  Lab Units 09/30/17  0532 09/29/17  1854   WBC Thousand/uL 6 23 6 29   HEMOGLOBIN g/dL 12 9 12 9   HEMATOCRIT % 39 3 37 9   PLATELETS Thousands/uL 175 195   NEUTROS PCT %  --  60   MONOS PCT %  --  6      Results from last 7 days  Lab Units 09/30/17  0532 09/29/17  1854   SODIUM mmol/L 141 143   POTASSIUM mmol/L 4 1 3 8   CHLORIDE mmol/L 109* 110*   CO2 mmol/L 21 21   BUN mg/dL 10 12   CREATININE mg/dL 0 62 0 74   CALCIUM mg/dL 8 5 9 0   TOTAL PROTEIN g/dL  --  6 7   BILIRUBIN TOTAL mg/dL  --  0 30   ALK PHOS U/L  --  186*   ALT U/L  --  109*   AST U/L  --  63*   GLUCOSE RANDOM mg/dL 108 122                Results from last 7 days  Lab Units 09/29/17  1854   INR  0 94   PTT seconds 28       Results from last 7 days  Lab Units 09/29/17  1854   LACTIC ACID mmol/L 1 1       0  Lab Value Date/Time   TROPONINI <0 02 06/08/2017 1247   TROPONINI <0 02 03/05/2017 2231   TROPONINI <0 02 03/05/2017 1923   TROPONINI <0 02 03/05/2017 1304       Imaging:  I have personally reviewed pertinent films in PACS  EKG: None  Micro:  Lab Results   Component Value Date    URINECX 50,000-59,000 cfu/ml Mixed Contaminants X5 04/30/2017       ______________________________________________________________________    Assessment:  Post-narcotic somnolence- resolved with Narcan  Would decrease Dilaudid dose to 0 2 mg or recommend nonnarcotic analgesia  ABG was not able to be performed  VBG ordered follow-up results  I would also avoid Klonopin  COPD without Exacerbation- continue on 4 L nasal cannula for now  Patient on oxygen at home  Will place patient on continuous pulse oximetry  Continue respiratory protocol  Continue nebs  Acute on Chronic Hypoxic Respiratory Failure- likely secondary to hypoventilation from Narcan administration  Will continue on continuous pulse ox  The rest of the care per primary internal medicine team     Thank you for your involvement of the critical care team in the care of this patient  If you have any questions or concerns at any time please call [phone]  Counseling / Coordination of Care  Total Critical Care time spent 34 minutes excluding procedures, teaching and family updates  ______________________________________________________________________    VTE Pharmacologic Prophylaxis: Enoxaparin (Lovenox)  VTE Mechanical Prophylaxis: sequential compression device    Invasive lines and devices: Invasive Devices     Peripheral Intravenous Line            Peripheral IV 09/30/17 Left Foot less than 1 day                Code Status: Level 1 - Full Code  POA:    POLST:      Given critical illness, patient length of stay will require greater than two midnights  Portions of the record may have been created with voice recognition software  Occasional wrong word or "sound a like" substitutions may have occurred due to the inherent limitations of voice recognition software  Read the chart carefully and recognize, using context, where substitutions have occurred        Kelly Weiner PA-C    Consults

## 2017-10-01 LAB
ALBUMIN SERPL BCP-MCNC: 2.5 G/DL (ref 3.5–5)
ALP SERPL-CCNC: 125 U/L (ref 46–116)
ALT SERPL W P-5'-P-CCNC: 53 U/L (ref 12–78)
ANION GAP SERPL CALCULATED.3IONS-SCNC: 10 MMOL/L (ref 4–13)
AST SERPL W P-5'-P-CCNC: 24 U/L (ref 5–45)
BILIRUB SERPL-MCNC: 0.4 MG/DL (ref 0.2–1)
BUN SERPL-MCNC: 5 MG/DL (ref 5–25)
CALCIUM SERPL-MCNC: 7.6 MG/DL (ref 8.3–10.1)
CHLORIDE SERPL-SCNC: 112 MMOL/L (ref 100–108)
CO2 SERPL-SCNC: 22 MMOL/L (ref 21–32)
CREAT SERPL-MCNC: 0.55 MG/DL (ref 0.6–1.3)
ERYTHROCYTE [DISTWIDTH] IN BLOOD BY AUTOMATED COUNT: 13.9 % (ref 11.6–15.1)
GFR SERPL CREATININE-BSD FRML MDRD: 103 ML/MIN/1.73SQ M
GLUCOSE SERPL-MCNC: 112 MG/DL (ref 65–140)
HAV IGM SER QL: NORMAL
HBV CORE AB SER QL: REACTIVE
HBV CORE IGM SER QL: ABNORMAL
HBV CORE IGM SER QL: NORMAL
HBV SURFACE AG SER QL: ABNORMAL
HBV SURFACE AG SER QL: NORMAL
HCT VFR BLD AUTO: 31.7 % (ref 34.8–46.1)
HCV AB SER QL: ABNORMAL
HCV AB SER QL: NORMAL
HGB BLD-MCNC: 10.3 G/DL (ref 11.5–15.4)
MCH RBC QN AUTO: 29.8 PG (ref 26.8–34.3)
MCHC RBC AUTO-ENTMCNC: 32.5 G/DL (ref 31.4–37.4)
MCV RBC AUTO: 92 FL (ref 82–98)
PLATELET # BLD AUTO: 132 THOUSANDS/UL (ref 149–390)
PMV BLD AUTO: 10 FL (ref 8.9–12.7)
POTASSIUM SERPL-SCNC: 3.5 MMOL/L (ref 3.5–5.3)
PROT SERPL-MCNC: 5.3 G/DL (ref 6.4–8.2)
RBC # BLD AUTO: 3.46 MILLION/UL (ref 3.81–5.12)
SODIUM SERPL-SCNC: 144 MMOL/L (ref 136–145)
WBC # BLD AUTO: 4.97 THOUSAND/UL (ref 4.31–10.16)

## 2017-10-01 PROCEDURE — 94760 N-INVAS EAR/PLS OXIMETRY 1: CPT

## 2017-10-01 PROCEDURE — 80053 COMPREHEN METABOLIC PANEL: CPT | Performed by: INTERNAL MEDICINE

## 2017-10-01 PROCEDURE — 94640 AIRWAY INHALATION TREATMENT: CPT

## 2017-10-01 PROCEDURE — 85027 COMPLETE CBC AUTOMATED: CPT | Performed by: INTERNAL MEDICINE

## 2017-10-01 PROCEDURE — 94762 N-INVAS EAR/PLS OXIMTRY CONT: CPT

## 2017-10-01 RX ORDER — IPRATROPIUM BROMIDE AND ALBUTEROL SULFATE 2.5; .5 MG/3ML; MG/3ML
3 SOLUTION RESPIRATORY (INHALATION)
Status: DISCONTINUED | OUTPATIENT
Start: 2017-10-01 | End: 2017-10-03 | Stop reason: HOSPADM

## 2017-10-01 RX ORDER — POTASSIUM CHLORIDE AND SODIUM CHLORIDE 900; 300 MG/100ML; MG/100ML
75 INJECTION, SOLUTION INTRAVENOUS CONTINUOUS
Status: DISCONTINUED | OUTPATIENT
Start: 2017-10-01 | End: 2017-10-03 | Stop reason: HOSPADM

## 2017-10-01 RX ADMIN — LACTOBACILLUS ACIDOPHILUS / LACTOBACILLUS BULGARICUS 1 PACKET: 100 MILLION CFU STRENGTH GRANULES at 09:05

## 2017-10-01 RX ADMIN — HYDROMORPHONE HYDROCHLORIDE 0.2 MG: 1 INJECTION, SOLUTION INTRAMUSCULAR; INTRAVENOUS; SUBCUTANEOUS at 12:01

## 2017-10-01 RX ADMIN — FAMOTIDINE 20 MG: 10 INJECTION, SOLUTION INTRAVENOUS at 09:06

## 2017-10-01 RX ADMIN — LACTOBACILLUS ACIDOPHILUS / LACTOBACILLUS BULGARICUS 1 PACKET: 100 MILLION CFU STRENGTH GRANULES at 17:30

## 2017-10-01 RX ADMIN — LACTOBACILLUS ACIDOPHILUS / LACTOBACILLUS BULGARICUS 1 PACKET: 100 MILLION CFU STRENGTH GRANULES at 12:01

## 2017-10-01 RX ADMIN — GABAPENTIN 800 MG: 400 CAPSULE ORAL at 15:11

## 2017-10-01 RX ADMIN — HYDROMORPHONE HYDROCHLORIDE 0.2 MG: 1 INJECTION, SOLUTION INTRAMUSCULAR; INTRAVENOUS; SUBCUTANEOUS at 02:36

## 2017-10-01 RX ADMIN — METOPROLOL TARTRATE 25 MG: 25 TABLET, FILM COATED ORAL at 21:00

## 2017-10-01 RX ADMIN — METOPROLOL TARTRATE 25 MG: 25 TABLET, FILM COATED ORAL at 09:05

## 2017-10-01 RX ADMIN — HYDROMORPHONE HYDROCHLORIDE 0.2 MG: 1 INJECTION, SOLUTION INTRAMUSCULAR; INTRAVENOUS; SUBCUTANEOUS at 06:14

## 2017-10-01 RX ADMIN — VANCOMYCIN 250 MG: KIT at 12:01

## 2017-10-01 RX ADMIN — IPRATROPIUM BROMIDE AND ALBUTEROL SULFATE 3 ML: .5; 3 SOLUTION RESPIRATORY (INHALATION) at 19:47

## 2017-10-01 RX ADMIN — FAMOTIDINE 20 MG: 10 INJECTION, SOLUTION INTRAVENOUS at 17:30

## 2017-10-01 RX ADMIN — HYDROMORPHONE HYDROCHLORIDE 0.2 MG: 1 INJECTION, SOLUTION INTRAMUSCULAR; INTRAVENOUS; SUBCUTANEOUS at 09:05

## 2017-10-01 RX ADMIN — HYDROMORPHONE HYDROCHLORIDE 0.2 MG: 1 INJECTION, SOLUTION INTRAMUSCULAR; INTRAVENOUS; SUBCUTANEOUS at 21:03

## 2017-10-01 RX ADMIN — METRONIDAZOLE 500 MG: 500 INJECTION, SOLUTION INTRAVENOUS at 01:26

## 2017-10-01 RX ADMIN — IPRATROPIUM BROMIDE AND ALBUTEROL SULFATE 3 ML: .5; 3 SOLUTION RESPIRATORY (INHALATION) at 01:48

## 2017-10-01 RX ADMIN — IPRATROPIUM BROMIDE AND ALBUTEROL SULFATE 3 ML: .5; 3 SOLUTION RESPIRATORY (INHALATION) at 14:11

## 2017-10-01 RX ADMIN — SODIUM CHLORIDE 150 ML/HR: 0.9 INJECTION, SOLUTION INTRAVENOUS at 01:00

## 2017-10-01 RX ADMIN — HYDROMORPHONE HYDROCHLORIDE 0.2 MG: 1 INJECTION, SOLUTION INTRAMUSCULAR; INTRAVENOUS; SUBCUTANEOUS at 15:11

## 2017-10-01 RX ADMIN — GABAPENTIN 800 MG: 400 CAPSULE ORAL at 09:05

## 2017-10-01 RX ADMIN — METRONIDAZOLE 500 MG: 500 INJECTION, SOLUTION INTRAVENOUS at 09:06

## 2017-10-01 RX ADMIN — VANCOMYCIN 250 MG: KIT at 05:14

## 2017-10-01 RX ADMIN — SODIUM CHLORIDE 150 ML/HR: 0.9 INJECTION, SOLUTION INTRAVENOUS at 06:17

## 2017-10-01 RX ADMIN — GABAPENTIN 800 MG: 400 CAPSULE ORAL at 00:00

## 2017-10-01 RX ADMIN — GABAPENTIN 800 MG: 400 CAPSULE ORAL at 21:03

## 2017-10-01 RX ADMIN — CITALOPRAM HYDROBROMIDE 40 MG: 20 TABLET ORAL at 09:05

## 2017-10-01 RX ADMIN — VANCOMYCIN 250 MG: KIT at 01:00

## 2017-10-01 RX ADMIN — IPRATROPIUM BROMIDE AND ALBUTEROL SULFATE 3 ML: .5; 3 SOLUTION RESPIRATORY (INHALATION) at 08:07

## 2017-10-01 RX ADMIN — SODIUM CHLORIDE AND POTASSIUM CHLORIDE 75 ML/HR: 9; 2.98 INJECTION, SOLUTION INTRAVENOUS at 12:00

## 2017-10-01 RX ADMIN — ENOXAPARIN SODIUM 40 MG: 40 INJECTION SUBCUTANEOUS at 09:05

## 2017-10-01 RX ADMIN — VANCOMYCIN 250 MG: KIT at 17:30

## 2017-10-01 NOTE — CONSULTS
Consultation - 126 [facility]  James Lang 59 y o  female MRN: [id number]  Unit/Bed#: -01 Encounter: [id number]        Inpatient consult to gastroenterology  Consult performed by: Sanjuanita Bazan ordered by: Reji Jeronimo          Reason for Consult / Principal Problem:  Diarrhea, nausea, vomiting, abdominal pain, and elevated liver enzymes      ASSESSMENT AND PLAN:      Nausea/Vomiting/Diarrhea/Abd pain: Her stool studies were positive for C diff colitis  We will give antiemetics and IV flagyl because of her symptoms, and start vancomyin 250mg Q6 hours  Once she is able to tolerate the oral vancomycin, her IV flagyl can be stopped  Elevated liver enzymes: We will check her viral and autoimmune serologies and iron studies  Depending on this testing and the trend in her liver enzymes, we may obtain an MRCP to rule out a CBD stone or stricture  ______________________________________________________________________    HPI:  She is a 78-year-old woman it he developed acute onset of generalized abdominal pain, diarrhea, nausea, and vomiting  She has not had any bleeding or weight loss because of the symptoms she came to the emergency room and was admitted  She reports having similar pain recently when she was at [facility]  Here she was diagnosed with C difficile colitis but has been unable to tolerate any oral medication  She was started on intravenous Flagyl and oral vancomycin  She became drowsy due to her narcotic pain medication and received narcan and her symptoms improved  She also has elevated liver enzymes with an , , alkaline phosphatase 186, and total bilirubin 0 3  She reports no prior history of liver disease but does say she was found to have some type of obstruction of her bile duct when she was at [facility]  REVIEW OF SYSTEMS:    CONSTITUTIONAL: Denies any fever, chills, rigors, and weight loss    HEENT: No earache or tinnitus  Denies hearing loss or visual disturbances  CARDIOVASCULAR: No chest pain or palpitations  RESPIRATORY: Denies any cough, hemoptysis, shortness of breath or dyspnea on exertion  GASTROINTESTINAL: As noted in the History of Present Illness  GENITOURINARY: No problems with urination  Denies any hematuria or dysuria  NEUROLOGIC: No dizziness or vertigo, denies headaches  MUSCULOSKELETAL: Denies any muscle or joint pain  SKIN: Denies skin rashes or itching  ENDOCRINE: Denies excessive thirst  Denies intolerance to heat or cold  PSYCHOSOCIAL: Denies depression or anxiety  Denies any recent memory loss         Historical Information   Past Medical History:   Diagnosis Date    Anxiety     Bipolar 1 disorder     Chronic back pain     Frequent headaches     Glaucoma     Hepatitis B     Hypertension     Psychiatric disorder     bipolar    Rheumatoid arthritis      Past Surgical History:   Procedure Laterality Date    APPENDECTOMY      CATARACT EXTRACTION Left     CHOLECYSTECTOMY      HYSTERECTOMY      INTRAOCULAR LENS INSERTION      GA TEMPORAL ARTERY LIGATN OR BX Right 12/16/2016    Procedure: BIOPSY ARTERY TEMPORAL;  Surgeon: Eduardo Arvizu MD;  Location: [facility] OR;  Service: General    TONSILLECTOMY       Social History   History   Alcohol Use No     History   Drug Use No     History   Smoking Status    Current Every Day Smoker    Packs/day: 0 20    Types: Cigarettes   Smokeless Tobacco    Never Used     Family History   Problem Relation Age of Onset    Heart disease Mother        Meds/Allergies     Prescriptions Prior to Admission   Medication    ciprofloxacin-hydrocortisone (CIPRO HC OTIC) otic suspension    citalopram (CELEXA) 40 mg tablet    clonazePAM (KlonoPIN) 0 5 mg tablet    gabapentin (NEURONTIN) 800 mg tablet    HYDROcodone-acetaminophen (NORCO) 5-325 mg per tablet    indomethacin (INDOCIN) 25 mg capsule    metoprolol tartrate (LOPRESSOR) 25 mg tablet     Current Facility-Administered Medications   Medication Dose Route Frequency    acetaminophen (TYLENOL) tablet 650 mg  650 mg Oral Q6H PRN    calcium carbonate (TUMS) chewable tablet 1,000 mg  1,000 mg Oral Daily PRN    citalopram (CeleXA) tablet 40 mg  40 mg Oral Daily    clonazePAM (KlonoPIN) tablet 0 5 mg  0 5 mg Oral BID PRN    enoxaparin (LOVENOX) subcutaneous injection 40 mg  40 mg Subcutaneous Daily    famotidine (PEPCID) injection 20 mg  20 mg Intravenous BID    gabapentin (NEURONTIN) capsule 800 mg  800 mg Oral TID    HYDROmorphone (DILAUDID) 1 mg/mL injection 0 2 mg  0 2 mg Intravenous Q3H PRN    ipratropium-albuterol (DUO-NEB) 0 5-2 5 mg/mL inhalation solution 3 mL  3 mL Nebulization Q2H PRN    ipratropium-albuterol (DUO-NEB) 0 5-2 5 mg/mL inhalation solution 3 mL  3 mL Nebulization Q6H    lactobacillus acidophilus-bulgaricus (FLORANEX) packet 1 packet  1 packet Oral TID With Meals    metoprolol tartrate (LOPRESSOR) tablet 25 mg  25 mg Oral Q12H OSCAR    metroNIDAZOLE (FLAGYL) IVPB (premix) 500 mg  500 mg Intravenous Q8H    naloxone (NARCAN) 0 4 mg/mL injection 0 4 mg  0 4 mg Intravenous Once    ondansetron (ZOFRAN) injection 4 mg  4 mg Intravenous Q4H PRN    sodium chloride 0 9 % infusion  150 mL/hr Intravenous Continuous    vancomycin (VANCOCIN) oral solution 250 mg  250 mg Oral Q6H OSCAR       Allergies   Allergen Reactions    Aspirin Anaphylaxis    Codeine Other (See Comments)     Dizziness nausea    Robaxin [Methocarbamol]      Seizures per pt       Tramadol Hives           Objective     Blood pressure 107/70, pulse 87, temperature 98 2 °F (36 8 °C), temperature source Oral, resp  rate 18, height 5' 3" (1 6 m), weight 65 3 kg (144 lb), SpO2 96 %        Intake/Output Summary (Last 24 hours) at 09/30/17 2306  Last data filed at 09/30/17 1821   Gross per 24 hour   Intake              950 ml   Output                0 ml   Net              950 ml         PHYSICAL EXAM: General Appearance:   Alert, cooperative, in moderate distress   HEENT:   Normocephalic, atraumatic, anicteric      Neck:  Supple, symmetrical, trachea midline   Lungs:   Few scattered wheezes bilaterally; no rales, rhonchi or wheezing; respirations unlabored    Heart[de-identified]   Regular rate and rhythm; no murmur, rub, or gallop     Abdomen:   Soft, mild tenderness and mild distension; normal bowel sounds; no masses, no organomegaly    Genitalia:   Deferred    Rectal:   Deferred    Extremities:  No cyanosis, clubbing or edema    Pulses:  2+ and symmetric all extremities    Skin:  No jaundice, rashes, or lesions    Lymph nodes:  No palpable cervical lymphadenopathy        Lab Results:   Admission on 09/29/2017   Component Date Value    WBC 09/29/2017 6 29     RBC 09/29/2017 4 29     Hemoglobin 09/29/2017 12 9     Hematocrit 09/29/2017 37 9     MCV 09/29/2017 88     MCH 09/29/2017 30 1     MCHC 09/29/2017 34 0     RDW 09/29/2017 13 8     MPV 09/29/2017 10 3     Platelets 41/72/7690 195     nRBC 09/29/2017 0     Neutrophils Relative 09/29/2017 60     Lymphocytes Relative 09/29/2017 27     Monocytes Relative 09/29/2017 6     Eosinophils Relative 09/29/2017 6     Basophils Relative 09/29/2017 1     Neutrophils Absolute 09/29/2017 3 77     Lymphocytes Absolute 09/29/2017 1 72     Monocytes Absolute 09/29/2017 0 35     Eosinophils Absolute 09/29/2017 0 39     Basophils Absolute 09/29/2017 0 04     Protime 09/29/2017 12 7     INR 09/29/2017 0 94     PTT 09/29/2017 28     Sodium 09/29/2017 143     Potassium 09/29/2017 3 8     Chloride 09/29/2017 110*    CO2 09/29/2017 21     Anion Gap 09/29/2017 12     BUN 09/29/2017 12     Creatinine 09/29/2017 0 74     Glucose 09/29/2017 122     Calcium 09/29/2017 9 0     AST 09/29/2017 63*    ALT 09/29/2017 109*    Alkaline Phosphatase 09/29/2017 186*    Total Protein 09/29/2017 6 7     Albumin 09/29/2017 3 2*    Total Bilirubin 09/29/2017 0 30     eGFR 09/29/2017 89     Lipase 09/29/2017 53*    LACTIC ACID 09/29/2017 1 1     Salmonella sp PCR 09/30/2017 None Detected     Shigella sp/Enteroinvasi* 09/30/2017 None Detected     Campylobacter sp (jejuni* 09/30/2017 None Detected     Shiga toxin 1/Shiga dimitris* 09/30/2017 None Detected     C difficile toxin by PCR 09/30/2017 POSITIVE for C difficle toxin by PCR  *    Sodium 09/30/2017 141     Potassium 09/30/2017 4 1     Chloride 09/30/2017 109*    CO2 09/30/2017 21     Anion Gap 09/30/2017 11     BUN 09/30/2017 10     Creatinine 09/30/2017 0 62     Glucose 09/30/2017 108     Glucose, Fasting 09/30/2017 108*    Calcium 09/30/2017 8 5     eGFR 09/30/2017 99     WBC 09/30/2017 6 23     RBC 09/30/2017 4 35     Hemoglobin 09/30/2017 12 9     Hematocrit 09/30/2017 39 3     MCV 09/30/2017 90     MCH 09/30/2017 29 7     MCHC 09/30/2017 32 8     RDW 09/30/2017 14 0     Platelets 59/25/7341 175     MPV 09/30/2017 10 0     pH, Lang 09/30/2017 7 191*    pCO2, Lang 09/30/2017 53 7*    pO2, Lang 09/30/2017 67 6*    HCO3, Lang 09/30/2017 20 1*    Base Excess, Lang 09/30/2017 -8 4     O2 Content, Lang 09/30/2017 16 9     O2 HGB, VENOUS 09/30/2017 89 4*       Imaging Studies: I have personally reviewed pertinent imaging studies

## 2017-10-01 NOTE — PROGRESS NOTES
[facility] Progress Note  Patient: Ewa Castillo 59 y o  female   MRN: [id number]  PCP: Gretchen Weiner MD  Unit/Bed#: [facility]-01 Encounter: [id number]  Date Of Visit: 10/01/17    Assessment/Plan:    Principal Problem:    C  difficile diarrhea  Active Problems:    Tobacco abuse disorder    Abdominal pain    COPD (chronic obstructive pulmonary disease)    Abnormal transaminases    Present on Admission:   Tobacco abuse disorder   Abdominal pain   C  difficile diarrhea   COPD (chronic obstructive pulmonary disease)   Abnormal transaminases      · C diff colitis  Reviewed a BONNIE recommendations  As per GI, patient's IV vancomycin could be discontinued and continue with oral vancomycin  Would do that as she is tolerating oral vancomycin  I think symptomatically she is better and so is clinically  I also think that she has underlying drug-seeking behavior  · Elevated transaminases  Patient tells me today that she had hepatitis B and when she was at [facility] she has been cured of it  Details are not really clear  · Hypoxia-likely secondary to narcotics and patient also has underlying COPD  Continue with current treatment  She received Narcan  Hold any narcotics for sedation  As mentioned above, patient has drug-seeking behavior  Surprisingly, her AST and ALT are normal today  Recently she had cholecystectomy  · Anemia-likely dilutional   Continue to monitor as needed  · Tobacco abuse  Encouraged to quit  · Hypokalemia  Add potassium in her IV fluids  · Poor IV access  Obtained consent for PICC line  This was left with the RN  Consulted intervention Radiology for PICC line placement  · Thrombocytopenia  ? Etiology  Continue to monitor      VTE Pharmacologic Prophylaxis:   Pharmacologic: Enoxaparin (Lovenox)  Mechanical VTE Prophylaxis in Place: Yes    Patient Centered Rounds: I have performed bedside rounds with nursing staff today      Discussions with Specialists or Other Care Team Provider:  Yes    Education and Discussions with Family / Patient:  Yes    Time Spent for Care: 35+ minutes  More than 50% of total time spent on counseling and coordination of care as described above  Current Length of Stay: 1 day(s)    Current Patient Status: Inpatient   Certification Statement: The patient will continue to require additional inpatient hospital stay due to Monitoring/IV fluids    Discharge Plan:  Home when stable    Code Status: Level 1 - Full Code      Subjective: When I entered in patient's room, patient was lying comfortably without being in in any distress  After asking her, she started holding her belly again as she I thing acted yesterday after coming out of the bathroom that she is in lot of pain  She is definitely more awake and alert today as compared to yesterday after she received pain medications  Diarrhea is better  No fever or chills  Objective:     Vitals:   Temp (24hrs), Av 1 °F (37 3 °C), Min:98 1 °F (36 7 °C), Max:101 °F (38 3 °C)    HR:  [] 105  Resp:  [18] 18  BP: ()/(51-70) 127/60  SpO2:  [76 %-98 %] 93 %  Body mass index is 25 51 kg/m²  Input and Output Summary (last 24 hours): Intake/Output Summary (Last 24 hours) at 10/01/17 1155  Last data filed at 17 2300   Gross per 24 hour   Intake             1070 ml   Output              200 ml   Net              870 ml           Physical Exam:     Vital signs are reviewed as above  Constitutional:  Patient in bed  Patient laying comfortably as above  Eyes: EOM grossly intact  Conjunctivae slightly pale  Patient has anicteric sclera  HENT: Oropharynx are better hydrated   Did not notice any significant lesions on the tongue  Head normocephalic  Neck: Neck is supple  I was not able to visualize any JVD at 90°  There is no significant lymphadenopathy  I also did not notice any significant thyromegaly  Cardiac: I did not hear any rubs or gallop   Patient appears to be in sign rhythm  Respiratory:  Poor air entry in general   GI: Abdomen is soft  She has mild soreness lower part of her abdomen  Bowel sounds are adequate  I was not able to appreciate any hepatosplenomegaly  Neurologic:  Patient is awake and alert  Neurological examination is grossly intact  No obvious focal neurological deficit noticed  Skin: Skin is warm and dry  Psychiatric: Mood and affect are pleasant  Musculoskeletal  Patient moving all extremities   Extremities: Patient has no significant cyanosis, clubbing, or lower extremity edema   Patient has an IV in her foot      Additional Data:     Labs:      Results from last 7 days  Lab Units 10/01/17  0522  09/29/17  1854   WBC Thousand/uL 4 97  < > 6 29   HEMOGLOBIN g/dL 10 3*  < > 12 9   HEMATOCRIT % 31 7*  < > 37 9   PLATELETS Thousands/uL 132*  < > 195   NEUTROS PCT %  --   --  60   LYMPHS PCT %  --   --  27   MONOS PCT %  --   --  6   EOS PCT %  --   --  6   < > = values in this interval not displayed  Results from last 7 days  Lab Units 10/01/17  0522   SODIUM mmol/L 144   POTASSIUM mmol/L 3 5   CHLORIDE mmol/L 112*   CO2 mmol/L 22   BUN mg/dL 5   CREATININE mg/dL 0 55*   CALCIUM mg/dL 7 6*   TOTAL PROTEIN g/dL 5 3*   BILIRUBIN TOTAL mg/dL 0 40   ALK PHOS U/L 125*   ALT U/L 53   AST U/L 24   GLUCOSE RANDOM mg/dL 112       Results from last 7 days  Lab Units 09/29/17  1854   INR  0 94       * I Have Reviewed All Lab Data Listed Above  * Additional Pertinent Lab Tests Reviewed: All Labs Within Last 24 Hours Reviewed      Recent Cultures (last 7 days):       Results from last 7 days  Lab Units 09/29/17  1841   C DIFF TOXIN B  POSITIVE for C difficle toxin by PCR  *       Last 24 Hours Medication List:     citalopram 40 mg Oral Daily   enoxaparin 40 mg Subcutaneous Daily   famotidine 20 mg Intravenous BID   gabapentin 800 mg Oral TID   ipratropium-albuterol 3 mL Nebulization Q6H   lactobacillus acidophilus-bulgaricus 1 packet Oral TID With Meals   metoprolol tartrate 25 mg Oral Q12H OSCAR   metroNIDAZOLE 500 mg Intravenous Q8H   naloxone 0 4 mg Intravenous Once   vancomycin 250 mg Oral Q6H [address]        Today, Patient Was Seen By: Sylwia Shahid MD    ** Please Note: Dragon 360 Dictation voice to text software may have been used in the creation of this document   **

## 2017-10-02 ENCOUNTER — GENERIC CONVERSION - ENCOUNTER (OUTPATIENT)
Dept: OTHER | Facility: OTHER | Age: 57
End: 2017-10-02

## 2017-10-02 LAB
ALBUMIN SERPL BCP-MCNC: 2.6 G/DL (ref 3.5–5)
ALP SERPL-CCNC: 120 U/L (ref 46–116)
ALT SERPL W P-5'-P-CCNC: 45 U/L (ref 12–78)
ANION GAP SERPL CALCULATED.3IONS-SCNC: 8 MMOL/L (ref 4–13)
AST SERPL W P-5'-P-CCNC: 20 U/L (ref 5–45)
BILIRUB SERPL-MCNC: 0.3 MG/DL (ref 0.2–1)
BUN SERPL-MCNC: 1 MG/DL (ref 5–25)
CALCIUM SERPL-MCNC: 8.1 MG/DL (ref 8.3–10.1)
CHLORIDE SERPL-SCNC: 106 MMOL/L (ref 100–108)
CO2 SERPL-SCNC: 27 MMOL/L (ref 21–32)
CREAT SERPL-MCNC: 0.54 MG/DL (ref 0.6–1.3)
ERYTHROCYTE [DISTWIDTH] IN BLOOD BY AUTOMATED COUNT: 13.5 % (ref 11.6–15.1)
FERRITIN SERPL-MCNC: 37 NG/ML (ref 8–388)
GFR SERPL CREATININE-BSD FRML MDRD: 104 ML/MIN/1.73SQ M
GLUCOSE SERPL-MCNC: 115 MG/DL (ref 65–140)
HCT VFR BLD AUTO: 30.8 % (ref 34.8–46.1)
HGB BLD-MCNC: 10.5 G/DL (ref 11.5–15.4)
IRON SATN MFR SERPL: 17 %
IRON SERPL-MCNC: 37 UG/DL (ref 50–170)
MCH RBC QN AUTO: 30 PG (ref 26.8–34.3)
MCHC RBC AUTO-ENTMCNC: 34.1 G/DL (ref 31.4–37.4)
MCV RBC AUTO: 88 FL (ref 82–98)
PLATELET # BLD AUTO: 135 THOUSANDS/UL (ref 149–390)
PMV BLD AUTO: 9.8 FL (ref 8.9–12.7)
POTASSIUM SERPL-SCNC: 3.5 MMOL/L (ref 3.5–5.3)
PROT SERPL-MCNC: 5.7 G/DL (ref 6.4–8.2)
RBC # BLD AUTO: 3.5 MILLION/UL (ref 3.81–5.12)
SODIUM SERPL-SCNC: 141 MMOL/L (ref 136–145)
TIBC SERPL-MCNC: 217 UG/DL (ref 250–450)
WBC # BLD AUTO: 4.69 THOUSAND/UL (ref 4.31–10.16)

## 2017-10-02 PROCEDURE — 94760 N-INVAS EAR/PLS OXIMETRY 1: CPT

## 2017-10-02 PROCEDURE — 82728 ASSAY OF FERRITIN: CPT | Performed by: INTERNAL MEDICINE

## 2017-10-02 PROCEDURE — C1751 CATH, INF, PER/CENT/MIDLINE: HCPCS

## 2017-10-02 PROCEDURE — 85027 COMPLETE CBC AUTOMATED: CPT | Performed by: INTERNAL MEDICINE

## 2017-10-02 PROCEDURE — 83550 IRON BINDING TEST: CPT | Performed by: INTERNAL MEDICINE

## 2017-10-02 PROCEDURE — 94640 AIRWAY INHALATION TREATMENT: CPT

## 2017-10-02 PROCEDURE — 86038 ANTINUCLEAR ANTIBODIES: CPT | Performed by: INTERNAL MEDICINE

## 2017-10-02 PROCEDURE — 83540 ASSAY OF IRON: CPT | Performed by: INTERNAL MEDICINE

## 2017-10-02 PROCEDURE — 02HV33Z INSERTION OF INFUSION DEVICE INTO SUPERIOR VENA CAVA, PERCUTANEOUS APPROACH: ICD-10-PCS | Performed by: INTERNAL MEDICINE

## 2017-10-02 PROCEDURE — 80053 COMPREHEN METABOLIC PANEL: CPT | Performed by: INTERNAL MEDICINE

## 2017-10-02 PROCEDURE — 36569 INSJ PICC 5 YR+ W/O IMAGING: CPT

## 2017-10-02 RX ORDER — MORPHINE SULFATE 2 MG/ML
1 INJECTION, SOLUTION INTRAMUSCULAR; INTRAVENOUS
Status: DISCONTINUED | OUTPATIENT
Start: 2017-10-02 | End: 2017-10-03 | Stop reason: HOSPADM

## 2017-10-02 RX ADMIN — IPRATROPIUM BROMIDE AND ALBUTEROL SULFATE 3 ML: .5; 3 SOLUTION RESPIRATORY (INHALATION) at 19:54

## 2017-10-02 RX ADMIN — VANCOMYCIN 250 MG: KIT at 17:18

## 2017-10-02 RX ADMIN — FAMOTIDINE 20 MG: 10 INJECTION, SOLUTION INTRAVENOUS at 08:38

## 2017-10-02 RX ADMIN — MORPHINE SULFATE 1 MG: 2 INJECTION, SOLUTION INTRAMUSCULAR; INTRAVENOUS at 21:43

## 2017-10-02 RX ADMIN — FAMOTIDINE 20 MG: 10 INJECTION, SOLUTION INTRAVENOUS at 17:14

## 2017-10-02 RX ADMIN — GABAPENTIN 800 MG: 400 CAPSULE ORAL at 21:43

## 2017-10-02 RX ADMIN — VANCOMYCIN 250 MG: KIT at 12:41

## 2017-10-02 RX ADMIN — LACTOBACILLUS ACIDOPHILUS / LACTOBACILLUS BULGARICUS 1 PACKET: 100 MILLION CFU STRENGTH GRANULES at 12:41

## 2017-10-02 RX ADMIN — GABAPENTIN 800 MG: 400 CAPSULE ORAL at 17:16

## 2017-10-02 RX ADMIN — HYDROMORPHONE HYDROCHLORIDE 0.2 MG: 1 INJECTION, SOLUTION INTRAMUSCULAR; INTRAVENOUS; SUBCUTANEOUS at 01:46

## 2017-10-02 RX ADMIN — SODIUM CHLORIDE AND POTASSIUM CHLORIDE 75 ML/HR: 9; 2.98 INJECTION, SOLUTION INTRAVENOUS at 03:59

## 2017-10-02 RX ADMIN — GABAPENTIN 800 MG: 400 CAPSULE ORAL at 08:38

## 2017-10-02 RX ADMIN — IPRATROPIUM BROMIDE AND ALBUTEROL SULFATE 3 ML: .5; 3 SOLUTION RESPIRATORY (INHALATION) at 07:42

## 2017-10-02 RX ADMIN — METOPROLOL TARTRATE 25 MG: 25 TABLET, FILM COATED ORAL at 21:43

## 2017-10-02 RX ADMIN — HYDROMORPHONE HYDROCHLORIDE 0.2 MG: 1 INJECTION, SOLUTION INTRAMUSCULAR; INTRAVENOUS; SUBCUTANEOUS at 12:42

## 2017-10-02 RX ADMIN — ENOXAPARIN SODIUM 40 MG: 40 INJECTION SUBCUTANEOUS at 08:38

## 2017-10-02 RX ADMIN — HYDROMORPHONE HYDROCHLORIDE 0.2 MG: 1 INJECTION, SOLUTION INTRAMUSCULAR; INTRAVENOUS; SUBCUTANEOUS at 08:39

## 2017-10-02 RX ADMIN — HYDROMORPHONE HYDROCHLORIDE 0.2 MG: 1 INJECTION, SOLUTION INTRAMUSCULAR; INTRAVENOUS; SUBCUTANEOUS at 17:31

## 2017-10-02 RX ADMIN — VANCOMYCIN 250 MG: KIT at 00:37

## 2017-10-02 RX ADMIN — VANCOMYCIN 250 MG: KIT at 05:15

## 2017-10-02 RX ADMIN — CITALOPRAM HYDROBROMIDE 40 MG: 20 TABLET ORAL at 08:38

## 2017-10-02 RX ADMIN — METOPROLOL TARTRATE 25 MG: 25 TABLET, FILM COATED ORAL at 08:39

## 2017-10-02 RX ADMIN — LACTOBACILLUS ACIDOPHILUS / LACTOBACILLUS BULGARICUS 1 PACKET: 100 MILLION CFU STRENGTH GRANULES at 08:38

## 2017-10-02 RX ADMIN — IPRATROPIUM BROMIDE AND ALBUTEROL SULFATE 3 ML: .5; 3 SOLUTION RESPIRATORY (INHALATION) at 14:23

## 2017-10-02 RX ADMIN — HYDROMORPHONE HYDROCHLORIDE 0.2 MG: 1 INJECTION, SOLUTION INTRAMUSCULAR; INTRAVENOUS; SUBCUTANEOUS at 05:15

## 2017-10-02 RX ADMIN — LACTOBACILLUS ACIDOPHILUS / LACTOBACILLUS BULGARICUS 1 PACKET: 100 MILLION CFU STRENGTH GRANULES at 17:14

## 2017-10-02 NOTE — PROCEDURES
PICC Line Insertion  Date/Time: 10/2/2017 1:46 PM  Performed by: Chloe Taylor by: Becca Laboy     Patient location:  Bedside  Other Assisting Provider: Yes (comment) (Vineet Montes)    Consent:     Consent obtained:  Verbal and written    Consent given by:  Patient    Risks discussed:  Arterial puncture, bleeding, infection, incorrect placement and nerve damage    Alternatives discussed:  No treatment and delayed treatment  Universal protocol:     Procedure explained and questions answered to patient or proxy's satisfaction: yes      Relevant documents present and verified: yes      Site/side marked: yes      Immediately prior to procedure, a time out was called: yes      Patient identity confirmed:  Verbally with patient and arm band  Pre-procedure details:     Hand hygiene: Hand hygiene performed prior to insertion      Sterile barrier technique: All elements of maximal sterile technique followed      Skin preparation:  ChloraPrep    Skin preparation agent: Skin preparation agent completely dried prior to procedure    Indications:     PICC line indications: no peripheral vascular access    Anesthesia (see MAR for exact dosages):      Anesthesia method:  Local infiltration    Local anesthetic:  Lidocaine 1% w/o epi  Procedure details:     Location:  Brachial    Vessel type: vein      Laterality:  Right    Approach: percutaneous technique used      Patient position:  Flat    Procedural supplies:  Double lumen    Catheter size:  5 Fr    Landmarks identified: yes      Ultrasound guidance: yes      Sterile ultrasound techniques: Sterile gel and sterile probe covers were used      Number of attempts:  1    Successful placement: yes      Vessel of catheter tip end:  Sherlock 3CG confirmed    Total catheter length (cm):  35    Catheter out on skin (cm):  0    Max flow rate:  5ML/SEC    Arm circumference:  28  Post-procedure details:     Post-procedure:  Dressing applied and securement device placed Assessment:  Blood return through all ports and free fluid flow    Post-procedure complications: none      Patient tolerance of procedure:   Tolerated well, no immediate complications  Comments:      LOT # [id number]

## 2017-10-02 NOTE — PROGRESS NOTES
[facility] Progress Note  Patient: Adonay Tavarez 59 y o  female   MRN: [id number]  PCP: Trang Sena MD  Unit/Bed#: -01 Encounter: [id number]  Date Of Visit: 10/02/17    Assessment/Plan:          · C diff diarrhea/colitis  Patient's abdominal pain symptoms are out of proportion of the findings on the CT scan and blood counts  She has history of drug-seeking behavior  This was noticed by the staff and myself that patient would stay fine without being in any distress if she does not realize that staff is in the room  Otherwise, she would just start moaning  Recheck her labs  Continue with oral vancomycin as recommended by GI  If continues to have more symptoms, plan for CT scan tomorrow with follow-up labs  If no change in CT scan, per patient could go home in the next day or so  · Elevated LFTs  Patient claims that she was cured for hepatitis B when she was in [facility]  Her LFTs actually got better  ? Need for MRCP  GI following  · COPD with continued tobacco addiction  After receiving pain medications, she became more obtunded and hypoxic  I think she also has sleep apnea  This needs to be worked up on outpatient basis  Would avoid giving her high doses of narcotics whatsoever  Patient was encouraged to quit smoking  · Very poor IV access  Patient have PICC line placed today  I obtained consent yesterday      VTE Pharmacologic Prophylaxis:   Pharmacologic: Enoxaparin (Lovenox)  Mechanical VTE Prophylaxis in Place: Yes    Patient Centered Rounds: I have performed bedside rounds with nursing staff today  Discussions with Specialists or Other Care Team Provider:  Yes    Education and Discussions with Family / Patient:  Yes    Time Spent for Care: 35+ minutes  More than 50% of total time spent on counseling and coordination of care as described above  Current Length of Stay: 2 day(s)    Current Patient Status: Inpatient   Certification Statement:  The patient will continue to require additional inpatient hospital stay due to Monitoring and follow-up labs/abdominal pain    Discharge Plan:  Home when stable-I hope in the next day or so    Code Status: Level 1 - Full Code      Subjective:   As I walked into patient's room, she was actually laying flat in the bed and not being any distress  Staff has noticed this similar situation that when no one is there and she does not notice that someone is there, she is fine and then she starts moaning with pain  She did the same now and holding her belly    Objective:     Vitals:   Temp (24hrs), Av 8 °F (36 6 °C), Min:97 6 °F (36 4 °C), Max:98 °F (36 7 °C)    HR:  [] 111  Resp:  [20] 20  BP: (102-136)/(58-82) 136/82  SpO2:  [92 %-96 %] 96 %  Body mass index is 25 51 kg/m²  Input and Output Summary (last 24 hours): Intake/Output Summary (Last 24 hours) at 10/02/17 1158  Last data filed at 10/01/17 1946   Gross per 24 hour   Intake              240 ml   Output                0 ml   Net              240 ml           Physical Exam:     Vital signs are reviewed as above  Constitutional:  Patient in bed  Patient laying comfortably and not in any distress whatsoever before she noticed that I was in the room  Later on, she was holding her belly and complaining of pain  Eyes: EOM grossly intact  Conjunctivae slightly pale  Patient has anicteric sclera  HENT: Oropharynx are moist  Did not notice any significant lesions on the tongue  Head normocephalic  Neck: Neck is supple  Cardiac: I did not hear any rubs or gallop  Patient appears to be in sinus rhythm  Respiratory: Patient not in significant respiratory distress  Air entry in general is poor  GI: Abdomen is soft  Has very mild soreness on palpation  Bowel sounds are adequate  I was not able to appreciate any hepatosplenomegaly  Neurologic:  Patient is awake and alert  Neurological examination is grossly intact   No obvious focal neurological deficit noticed  Skin: Skin is warm and dry  Psychiatric: Mood and affect are pleasant  Musculoskeletal  Patient moving all extremities   Extremities: Patient has no significant cyanosis, clubbing, or lower extremity edema       Additional Data:     Labs:      Results from last 7 days  Lab Units 10/01/17  0522  09/29/17  1854   WBC Thousand/uL 4 97  < > 6 29   HEMOGLOBIN g/dL 10 3*  < > 12 9   HEMATOCRIT % 31 7*  < > 37 9   PLATELETS Thousands/uL 132*  < > 195   NEUTROS PCT %  --   --  60   LYMPHS PCT %  --   --  27   MONOS PCT %  --   --  6   EOS PCT %  --   --  6   < > = values in this interval not displayed  Results from last 7 days  Lab Units 10/01/17  0522   SODIUM mmol/L 144   POTASSIUM mmol/L 3 5   CHLORIDE mmol/L 112*   CO2 mmol/L 22   BUN mg/dL 5   CREATININE mg/dL 0 55*   CALCIUM mg/dL 7 6*   TOTAL PROTEIN g/dL 5 3*   BILIRUBIN TOTAL mg/dL 0 40   ALK PHOS U/L 125*   ALT U/L 53   AST U/L 24   GLUCOSE RANDOM mg/dL 112       Results from last 7 days  Lab Units 09/29/17  1854   INR  0 94       * I Have Reviewed All Lab Data Listed Above  * Additional Pertinent Lab Tests Reviewed: All Labs Within Last 24 Hours Reviewed      Recent Cultures (last 7 days):       Results from last 7 days  Lab Units 09/29/17  1841   C DIFF TOXIN B  POSITIVE for C difficle toxin by PCR  *       Last 24 Hours Medication List:     citalopram 40 mg Oral Daily   enoxaparin 40 mg Subcutaneous Daily   famotidine 20 mg Intravenous BID   gabapentin 800 mg Oral TID   ipratropium-albuterol 3 mL Nebulization TID   lactobacillus acidophilus-bulgaricus 1 packet Oral TID With Meals   metoprolol tartrate 25 mg Oral Q12H [address]   naloxone 0 4 mg Intravenous Once   vancomycin 250 mg Oral Q6H [address]        Today, Patient Was Seen By: Sylwia Shahid MD    ** Please Note: Dragon 360 Dictation voice to text software may have been used in the creation of this document   **

## 2017-10-02 NOTE — SOCIAL WORK
LOS: 2 CM met with pt at bedside  Pt reports she lives in [place] with her   Pt lives in apartment on first floor with no steps to enter  Pt uses walker to ambulate  Pt denies hx of Rehab and [facility]  Pt has  through [employer]  Pt reports hx of depression and anxiety  Pt has psychiatrist, Dr Tai Langston and therapist, Timbo Matamoros  Pt uses [employer]  Pt denies hx of substance abuse  Pt does not have POA, does not work, and can't drive  Pt's  transports and she uses shared ride  Pt has no needs at this time  CM Department to follow pt through discharge

## 2017-10-02 NOTE — PROGRESS NOTES
Progress Note- Domingo Left 59 y o  female MRN: [id number]    Unit/Bed#: -01 Encounter: [id number]      Assessment and Plan:    Nausea/Vomiting/Diarrhea/Abd pain: Her stool studies were positive for C diff colitis  We will give antiemetics and she is on oral vancomyin 250mg po Q6 hours  Her abdominal pain seems to be out of proportion to what I would expect for C difficile colitis especially given her unremarkable CT scan and normal wbc  If she continues to complain of severe pain or if her wbc increases, consider repeat CT scan      Elevated liver enzymes: Her serologies have been negative to date  I will also check her iron studies and NICKI  Depending on this testing and the trend in her liver enzymes, we may obtain an MRCP to rule out a CBD stone or stricture  ______________________________________________________________________    Subjective:     She said she feels much better than yesterday, but she complains of abdominal pain and wants her pain medications to be given more frequently and in higher doses      Medication Administration - last 24 hours from 09/30/2017 2028 to 10/01/2017 2028       Date/Time Order Dose Route Action Action by     10/01/2017 0905 citalopram (CeleXA) tablet 40 mg 40 mg Oral Given Kirstin Scott RN     10/01/2017 1511 gabapentin (NEURONTIN) capsule 800 mg 800 mg Oral Given Kirstin Scott RN     10/01/2017 3456 gabapentin (NEURONTIN) capsule 800 mg 800 mg Oral Given Kirstin Scott RN     10/01/2017 0000 gabapentin (NEURONTIN) capsule 800 mg 800 mg Oral Given Jennifer Ruiz RN     10/01/2017 7649 metoprolol tartrate (LOPRESSOR) tablet 25 mg 25 mg Oral Given Kirstin Scott RN     09/30/2017 2300 metoprolol tartrate (LOPRESSOR) tablet 25 mg 25 mg Oral Not Given Jennifer Ruiz RN     10/01/2017 1200 sodium chloride 0 9 % infusion 0 mL/hr Intravenous Stopped Kirstin Scott RN     10/01/2017 0617 sodium chloride 0 9 % infusion 150 mL/hr Intravenous New Bag Suly Trevino, OLGA     10/01/2017 0100 sodium chloride 0 9 % infusion 150 mL/hr Intravenous [address] Suly Trevino, [address] [facility]     10/01/2017 5891 enoxaparin (LOVENOX) subcutaneous injection 40 mg 40 mg Subcutaneous Given Gian Mai RN     10/01/2017 4859 metroNIDAZOLE (FLAGYL) IVPB (premix) 500 mg 500 mg Intravenous New [address] [facility] [address] Gian Mai RN     10/01/2017 0126 metroNIDAZOLE (FLAGYL) IVPB (premix) 500 mg 500 mg Intravenous New Bag Suly Trevino RN     10/01/2017 1730 vancomycin (VANCOCIN) oral solution 250 mg 250 mg Oral Given Gian Mai RN     10/01/2017 1201 vancomycin (VANCOCIN) oral solution 250 mg 250 mg Oral Given Gian Mai RN     10/01/2017 0514 vancomycin (VANCOCIN) oral solution 250 mg 250 mg Oral Given Suly Trevino RN     10/01/2017 0100 vancomycin (VANCOCIN) oral solution 250 mg 250 mg Oral Given Suly Trevino RN     10/01/2017 1730 famotidine (PEPCID) injection 20 mg 20 mg Intravenous Given Gian Mai RN     10/01/2017 0906 famotidine (PEPCID) injection 20 mg 20 mg Intravenous Given Gian Mai RN     10/01/2017 1730 lactobacillus acidophilus-bulgaricus [facility]) packet 1 packet 1 packet Oral Given Gian Mai RN     10/01/2017 1201 lactobacillus acidophilus-bulgaricus [facility]) packet 1 packet 1 packet Oral Given Gian Mai RN     10/01/2017 0905 lactobacillus acidophilus-bulgaricus [facility]) packet 1 packet 1 packet Oral Given Gian Mai RN     10/01/2017 1511 HYDROmorphone (DILAUDID) 1 mg/mL injection 0 2 mg 0 2 mg Intravenous Given Gian Mai RN     10/01/2017 1201 HYDROmorphone (DILAUDID) 1 mg/mL injection 0 2 mg 0 2 mg Intravenous Given Gian Mai RN     10/01/2017 0905 HYDROmorphone (DILAUDID) 1 mg/mL injection 0 2 mg 0 2 mg Intravenous Given Gian Mai RN     10/01/2017 3644 HYDROmorphone (DILAUDID) 1 mg/mL injection 0 2 mg 0 2 mg Intravenous Given Suly Trevino RN     10/01/2017 0236 HYDROmorphone (DILAUDID) 1 mg/mL injection 0 2 mg 0 2 mg Intravenous Given Shankar Kingsley, RN     09/30/2017 2317 HYDROmorphone (DILAUDID) 1 mg/mL injection 0 2 mg 0 2 mg Intravenous Given Gabriela French, RN     10/01/2017 1411 ipratropium-albuterol (DUO-NEB) 0 5-2 5 mg/mL inhalation solution 3 mL 3 mL Nebulization Given Rain Lobo, RT     10/01/2017 0807 ipratropium-albuterol (DUO-NEB) 0 5-2 5 mg/mL inhalation solution 3 mL 3 mL Nebulization Given Rain Yamil, RT     10/01/2017 0148 ipratropium-albuterol (DUO-NEB) 0 5-2 5 mg/mL inhalation solution 3 mL 3 mL Nebulization Given Miguel Angel Sheridan, RT     10/01/2017 1200 sodium chloride 0 9 % with KCl 40 mEq/L infusion (premix) 75 mL/hr Intravenous New [address] [facility] Tova Trimble, OLGA     10/01/2017 1947 ipratropium-albuterol (DUO-NEB) 0 5-2 5 mg/mL inhalation solution 3 mL 3 mL Nebulization Given Ward Quach, RT          Objective:     Vitals: Blood pressure 102/60, pulse 77, temperature 97 7 °F (36 5 °C), temperature source Oral, resp  rate 20, height 5' 3" (1 6 m), weight 65 3 kg (144 lb), SpO2 92 %  ,Body mass index is 25 51 kg/m²        Intake/Output Summary (Last 24 hours) at 10/01/17 2028  Last data filed at 10/01/17 0906   Gross per 24 hour   Intake              120 ml   Output              200 ml   Net              -80 ml       Physical Exam:   General Appearance: Awake and alert, in no acute distress  Abdomen: Soft, diffuse tenderness but non-distended; bowel sounds normal; no masses or no organomegaly    Invasive Devices     Peripheral Intravenous Line            Peripheral IV 10/01/17 Right Foot less than 1 day                Lab Results:  Admission on 09/29/2017   Component Date Value    WBC 09/29/2017 6 29     RBC 09/29/2017 4 29     Hemoglobin 09/29/2017 12 9     Hematocrit 09/29/2017 37 9     MCV 09/29/2017 88     MCH 09/29/2017 30 1     MCHC 09/29/2017 34 0     RDW 09/29/2017 13 8     MPV 09/29/2017 10 3     Platelets 95/47/2041 195     nRBC 09/29/2017 0     Neutrophils Relative 09/29/2017 60     Lymphocytes Relative 09/29/2017 27     Monocytes Relative 09/29/2017 6     Eosinophils Relative 09/29/2017 6     Basophils Relative 09/29/2017 1     Neutrophils Absolute 09/29/2017 3 77     Lymphocytes Absolute 09/29/2017 1 72     Monocytes Absolute 09/29/2017 0 35     Eosinophils Absolute 09/29/2017 0 39     Basophils Absolute 09/29/2017 0 04     Protime 09/29/2017 12 7     INR 09/29/2017 0 94     PTT 09/29/2017 28     Sodium 09/29/2017 143     Potassium 09/29/2017 3 8     Chloride 09/29/2017 110*    CO2 09/29/2017 21     Anion Gap 09/29/2017 12     BUN 09/29/2017 12     Creatinine 09/29/2017 0 74     Glucose 09/29/2017 122     Calcium 09/29/2017 9 0     AST 09/29/2017 63*    ALT 09/29/2017 109*    Alkaline Phosphatase 09/29/2017 186*    Total Protein 09/29/2017 6 7     Albumin 09/29/2017 3 2*    Total Bilirubin 09/29/2017 0 30     eGFR 09/29/2017 89     Lipase 09/29/2017 53*    LACTIC ACID 09/29/2017 1 1     Salmonella sp PCR 09/30/2017 None Detected     Shigella sp/Enteroinvasi* 09/30/2017 None Detected     Campylobacter sp (jejuni* 09/30/2017 None Detected     Shiga toxin 1/Shiga dimitris* 09/30/2017 None Detected     C difficile toxin by PCR 09/30/2017 POSITIVE for C difficle toxin by PCR  *    Sodium 09/30/2017 141     Potassium 09/30/2017 4 1     Chloride 09/30/2017 109*    CO2 09/30/2017 21     Anion Gap 09/30/2017 11     BUN 09/30/2017 10     Creatinine 09/30/2017 0 62     Glucose 09/30/2017 108     Glucose, Fasting 09/30/2017 108*    Calcium 09/30/2017 8 5     eGFR 09/30/2017 99     WBC 09/30/2017 6 23     RBC 09/30/2017 4 35     Hemoglobin 09/30/2017 12 9     Hematocrit 09/30/2017 39 3     MCV 09/30/2017 90     MCH 09/30/2017 29 7     MCHC 09/30/2017 32 8     RDW 09/30/2017 14 0     Platelets 01/15/0205 175     MPV 09/30/2017 10 0     Hepatitis B Surface Ag 10/01/2017 Non-reactive     Hepatitis C Ab 10/01/2017 Non-reactive     Hep B C IgM 10/01/2017 Non-reactive     Hep B Core Total Ab 10/01/2017 Reactive*    Hepatitis B Surface Ag 10/01/2017 Non-reactive     Hep A IgM 10/01/2017 Non-reactive     Hepatitis C Ab 10/01/2017 Non-reactive     Hep B C IgM 10/01/2017 Non-reactive     pH, Lang 09/30/2017 7 191*    pCO2, Lang 09/30/2017 53 7*    pO2, Lang 09/30/2017 67 6*    HCO3, Lang 09/30/2017 20 1*    Base Excess, Lang 09/30/2017 -8 4     O2 Content, Lang 09/30/2017 16 9     O2 HGB, VENOUS 09/30/2017 89 4*    WBC 10/01/2017 4 97     RBC 10/01/2017 3 46*    Hemoglobin 10/01/2017 10 3*    Hematocrit 10/01/2017 31 7*    MCV 10/01/2017 92     MCH 10/01/2017 29 8     MCHC 10/01/2017 32 5     RDW 10/01/2017 13 9     Platelets 05/98/2962 132*    MPV 10/01/2017 10 0     Sodium 10/01/2017 144     Potassium 10/01/2017 3 5     Chloride 10/01/2017 112*    CO2 10/01/2017 22     Anion Gap 10/01/2017 10     BUN 10/01/2017 5     Creatinine 10/01/2017 0 55*    Glucose 10/01/2017 112     Calcium 10/01/2017 7 6*    AST 10/01/2017 24     ALT 10/01/2017 53     Alkaline Phosphatase 10/01/2017 125*    Total Protein 10/01/2017 5 3*    Albumin 10/01/2017 2 5*    Total Bilirubin 10/01/2017 0 40     eGFR 10/01/2017 103        Imaging Studies: I have personally reviewed pertinent imaging studies

## 2017-10-02 NOTE — PROGRESS NOTES
GI Progress Note - Eulalia Topete 59 y o  female MRN: [id number]    Unit/Bed#: -01 Encounter: [id number]    Subjective: She reports 6-12 BMs daily despite treatment with vanco  She continue to have abdominal pain that is generalized  No nausea or vomiting  No bloody bowel movements  She reports that she had a biliary obstruction at [facility] recently, treated with antibiotics and possible ERCP? She believes she had Hep B which was treated with antibiotics  Objective:     Vitals: Blood pressure 136/82, pulse (!) 111, temperature 98 °F (36 7 °C), temperature source Oral, resp  rate 20, height 5' 3" (1 6 m), weight 65 3 kg (144 lb), SpO2 96 %  ,Body mass index is 25 51 kg/m²  Intake/Output Summary (Last 24 hours) at 10/02/17 1406  Last data filed at 10/01/17 1946   Gross per 24 hour   Intake              240 ml   Output                0 ml   Net              240 ml       Physical Exam:     General Appearance: Alert, no distress  Lungs: Clear to auscultation bilaterally  Heart: RRR, no murmur  Abdomen: Non-distended, soft, BS active,   Extremities: No cyanosis, edema    Invasive Devices     Peripheral Intravenous Line            Peripheral IV 10/01/17 Right Foot less than 1 day                Lab Results:    Results from last 7 days  Lab Units 10/01/17  0522  09/29/17  1854   WBC Thousand/uL 4 97  < > 6 29   HEMOGLOBIN g/dL 10 3*  < > 12 9   HEMATOCRIT % 31 7*  < > 37 9   PLATELETS Thousands/uL 132*  < > 195   NEUTROS PCT %  --   --  60   LYMPHS PCT %  --   --  27   MONOS PCT %  --   --  6   EOS PCT %  --   --  6   < > = values in this interval not displayed      Results from last 7 days  Lab Units 10/01/17  0522   SODIUM mmol/L 144   POTASSIUM mmol/L 3 5   CHLORIDE mmol/L 112*   CO2 mmol/L 22   BUN mg/dL 5   CREATININE mg/dL 0 55*   CALCIUM mg/dL 7 6*   TOTAL PROTEIN g/dL 5 3*   BILIRUBIN TOTAL mg/dL 0 40   ALK PHOS U/L 125*   ALT U/L 53   AST U/L 24   GLUCOSE RANDOM mg/dL 112       Results from last 7 days  Lab Units 09/29/17  1854   INR  0 94       Results from last 7 days  Lab Units 09/29/17  1854   LIPASE u/L 53*       Imaging Studies: I have personally reviewed pertinent imaging studies  Ct Abdomen Pelvis With Contrast  Result Date: 9/29/2017  Impression: Fluid-filled colon in keeping with the submitted history of diarrhea  No definite areas of bowel wall thickening  No other acute findings  Assessment and Plan:   Principal Problem:    C  difficile diarrhea  Active Problems:    Tobacco abuse disorder    Abdominal pain    COPD (chronic obstructive pulmonary disease)    Abnormal transaminases      Cdiff Diarrhea  - Continue PO vanco 250 mg q6h; her abdominal pain continues to be out of proportion to expected discomfort from Cdiff with no evidence of colitis on CT  - Agree with possible repeat CT scan if her symptoms do not improved  - Continue supportive care  - Diet as tolerated, recommend lactose free due to diarrhea  - Should have colonoscopy as outpatient      Transaminitis  - LFTs improved today compared to yesterday; unclear etiology  - Hepatitis panel with positive core B ab, negative Hep B surface ag; likely false positive or exposure in the past without chronic infection, will check surface ab and viral load to make sure no chronic hep B  - Iron panel without concern for hemochromatosis, NICKI pending  - She is s/p cholecystectomy 2 years ago but reports possible biliary obstruction at [facility] recently; we do not have records from this unfortunately  - Continue to trend LFTs; currently improved so would not pursue MRCP at this time      The patient was seen by Dr Feroz Dao

## 2017-10-02 NOTE — CASE MANAGEMENT
Continued Stay Review    Date:    10/2/2017    Vital Signs: /82   Pulse (!) 111   Temp 98 °F (36 7 °C) (Oral)   Resp 20   Ht 5' 3" (1 6 m)   Wt 65 3 kg (144 lb)   LMP  (LMP Unknown)   SpO2 96%   BMI 25 51 kg/m²     Medications:   Scheduled Meds:   citalopram 40 mg Oral Daily   enoxaparin 40 mg Subcutaneous Daily   famotidine 20 mg Intravenous BID   gabapentin 800 mg Oral TID   ipratropium-albuterol 3 mL Nebulization TID   lactobacillus acidophilus-bulgaricus 1 packet Oral TID With Meals   metoprolol tartrate 25 mg Oral Q12H [address]   naloxone 0 4 mg Intravenous Once   vancomycin 250 mg Oral Q6H [address]     Continuous Infusions:   sodium chloride 0 9 % with KCl 40 mEq/L 75 mL/hr Last Rate: 75 mL/hr (10/02/17 0359)     PRN Meds:   acetaminophen    calcium carbonate    clonazePAM    HYDROmorphone    ipratropium-albuterol    ondansetron    Abnormal Labs/Diagnostic Results:   Labs  10/1:      Alk phos   125  Albumin   2 5  Creat   0 55  Chloride   112  Platelets   277  H/H    10 3/31 7  RBC    3 46    IV  Dilaudid   PRN    X 1  9/30  X 6    10/1  X  4    10/2    Thus far    IV  Mso4  PRN    (  X 4   9/30)    Age/Sex: 59 y o  female     · Assessment/Plan:    C diff diarrhea/colitis  Patient's abdominal pain symptoms are out of proportion of the findings on the CT scan and blood counts  She has history of drug-seeking behavior  This was noticed by the staff and myself that patient would stay fine without being in any distress if she does not realize that staff is in the room  Otherwise, she would just start moaning  Recheck her labs  Continue with oral vancomycin as recommended by GI  If continues to have more symptoms, plan for CT scan tomorrow with follow-up labs  If no change in CT scan, per patient could go home in the next day or so  · Elevated LFTs  Patient claims that she was cured for hepatitis B when she was in [address] [place] [address]  Her LFTs actually got better  ? Need for MRCP    GI following  · COPD with continued tobacco addiction  After receiving pain medications, she became more obtunded and hypoxic  I think she also has sleep apnea  This needs to be worked up on outpatient basis  Would avoid giving her high doses of narcotics whatsoever  Patient was encouraged to quit smoking  · Very poor IV access  Patient have PICC line placed today  I obtained consent yesterday       Discharge Plan:    Home    PER    Critical care  Cons:   9/30;  Post-narcotic somnolence- resolved with Narcan  Would decrease Dilaudid dose to 0 2 mg or recommend nonnarcotic analgesia  ABG was not able to be performed  VBG ordered follow-up results  I would also avoid Klonopin  COPD without Exacerbation- continue on 4 L nasal cannula for now  Patient on oxygen at home  Will place patient on continuous pulse oximetry  Continue respiratory protocol  Continue nebs  Acute on Chronic Hypoxic Respiratory Failure- likely secondary to hypoventilation from Narcan administration  Will continue on continuous pulse ox      The rest of the care per primary internal medicine team   Given critical illness, patient length of stay will require greater than two midnights  Per  GI CONS:   9/30  Nausea/Vomiting/Diarrhea/Abd pain: Her stool studies were positive for C diff colitis  We will give antiemetics and IV flagyl because of her symptoms, and start vancomyin 250mg Q6 hours  Once she is able to tolerate the oral vancomycin, her IV flagyl can be stopped      Elevated liver enzymes: We will check her viral and autoimmune serologies and iron studies  Depending on this testing and the trend in her liver enzymes, we may obtain an MRCP to rule out a CBD stone or stricture  PROGRESS  NOTE  10/1  ·  C diff colitis  Reviewed a BONNIE recommendations  As per GI, patient's IV vancomycin could be discontinued and continue with oral vancomycin  Would do that as she is tolerating oral vancomycin    I think symptomatically she is better and so is clinically  I also think that she has underlying drug-seeking behavior  · Elevated transaminases  Patient tells me today that she had hepatitis B and when she was at [facility] she has been cured of it  Details are not really clear  · Hypoxia-likely secondary to narcotics and patient also has underlying COPD  Continue with current treatment  She received Narcan  Hold any narcotics for sedation  As mentioned above, patient has drug-seeking behavior  Surprisingly, her AST and ALT are normal today  Recently she had cholecystectomy  · Anemia-likely dilutional   Continue to monitor as needed  · Tobacco abuse  Encouraged to quit  · Hypokalemia  Add potassium in her IV fluids  · Poor IV access  Obtained consent for PICC line  This was left with the RN  Consulted intervention Radiology for PICC line placement  Thrombocytopenia  ? Etiology  Continue to monitor  The patient will continue to require additional inpatient hospital stay due to Monitoring/IV fluids    [address] [place] [address] in the [employer] by Sage Tate for 2017  Network Utilization Review Department  Phone: [phone]; Fax [phone]  ATTENTION: The Network Utilization Review Department is now centralized for our 7 Facilities  Make a note that we have a new phone and fax numbers for our Department  Please call with any questions or concerns to [phone] and carefully follow the prompts so that you are directed to the right person  All voicemails are confidential  Fax any determinations, approvals, denials, and requests for initial or continue stay review clinical to [phone]   Due to HIGH CALL volume, it would be easier if you could please send faxed requests to expedite your requests and in part, help us provide discharge notifications faster

## 2017-10-03 VITALS
HEIGHT: 63 IN | SYSTOLIC BLOOD PRESSURE: 136 MMHG | WEIGHT: 144 LBS | DIASTOLIC BLOOD PRESSURE: 94 MMHG | HEART RATE: 84 BPM | OXYGEN SATURATION: 97 % | RESPIRATION RATE: 20 BRPM | BODY MASS INDEX: 25.52 KG/M2 | TEMPERATURE: 98.6 F

## 2017-10-03 LAB
ALBUMIN SERPL BCP-MCNC: 2.8 G/DL (ref 3.5–5)
ALP SERPL-CCNC: 124 U/L (ref 46–116)
ALT SERPL W P-5'-P-CCNC: 40 U/L (ref 12–78)
ANION GAP SERPL CALCULATED.3IONS-SCNC: 7 MMOL/L (ref 4–13)
AST SERPL W P-5'-P-CCNC: 18 U/L (ref 5–45)
BILIRUB SERPL-MCNC: 0.4 MG/DL (ref 0.2–1)
BUN SERPL-MCNC: 2 MG/DL (ref 5–25)
CALCIUM SERPL-MCNC: 8.4 MG/DL (ref 8.3–10.1)
CHLORIDE SERPL-SCNC: 106 MMOL/L (ref 100–108)
CO2 SERPL-SCNC: 29 MMOL/L (ref 21–32)
CREAT SERPL-MCNC: 0.55 MG/DL (ref 0.6–1.3)
ERYTHROCYTE [DISTWIDTH] IN BLOOD BY AUTOMATED COUNT: 13.4 % (ref 11.6–15.1)
GFR SERPL CREATININE-BSD FRML MDRD: 103 ML/MIN/1.73SQ M
GLUCOSE SERPL-MCNC: 105 MG/DL (ref 65–140)
HBV SURFACE AB SER-ACNC: 38.96 MIU/ML
HCT VFR BLD AUTO: 34.9 % (ref 34.8–46.1)
HGB BLD-MCNC: 11.9 G/DL (ref 11.5–15.4)
MCH RBC QN AUTO: 29.9 PG (ref 26.8–34.3)
MCHC RBC AUTO-ENTMCNC: 34.1 G/DL (ref 31.4–37.4)
MCV RBC AUTO: 88 FL (ref 82–98)
PLATELET # BLD AUTO: 162 THOUSANDS/UL (ref 149–390)
PMV BLD AUTO: 10.2 FL (ref 8.9–12.7)
POTASSIUM SERPL-SCNC: 4.1 MMOL/L (ref 3.5–5.3)
PROT SERPL-MCNC: 6.2 G/DL (ref 6.4–8.2)
RBC # BLD AUTO: 3.98 MILLION/UL (ref 3.81–5.12)
SODIUM SERPL-SCNC: 142 MMOL/L (ref 136–145)
WBC # BLD AUTO: 5.13 THOUSAND/UL (ref 4.31–10.16)

## 2017-10-03 PROCEDURE — 87517 HEPATITIS B DNA QUANT: CPT | Performed by: PHYSICIAN ASSISTANT

## 2017-10-03 PROCEDURE — 94760 N-INVAS EAR/PLS OXIMETRY 1: CPT

## 2017-10-03 PROCEDURE — 86706 HEP B SURFACE ANTIBODY: CPT | Performed by: PHYSICIAN ASSISTANT

## 2017-10-03 PROCEDURE — 94640 AIRWAY INHALATION TREATMENT: CPT

## 2017-10-03 PROCEDURE — 85027 COMPLETE CBC AUTOMATED: CPT | Performed by: INTERNAL MEDICINE

## 2017-10-03 PROCEDURE — 80053 COMPREHEN METABOLIC PANEL: CPT | Performed by: INTERNAL MEDICINE

## 2017-10-03 RX ORDER — DICYCLOMINE HYDROCHLORIDE 10 MG/1
10 CAPSULE ORAL 3 TIMES DAILY PRN
Qty: 15 CAPSULE | Refills: 0 | Status: SHIPPED | OUTPATIENT
Start: 2017-10-03 | End: 2017-10-26

## 2017-10-03 RX ORDER — HYDROCODONE BITARTRATE AND ACETAMINOPHEN 5; 325 MG/1; MG/1
1 TABLET ORAL EVERY 8 HOURS PRN
Qty: 10 TABLET | Refills: 0 | Status: SHIPPED | OUTPATIENT
Start: 2017-10-03 | End: 2017-10-26

## 2017-10-03 RX ORDER — LACTOBACILLUS ACIDOPHILUS / LACTOBACILLUS BULGARICUS 100 MILLION CFU STRENGTH
1 GRANULES ORAL
Qty: 15 EACH | Refills: 0 | Status: SHIPPED | OUTPATIENT
Start: 2017-10-03 | End: 2017-10-26

## 2017-10-03 RX ADMIN — MORPHINE SULFATE 1 MG: 2 INJECTION, SOLUTION INTRAMUSCULAR; INTRAVENOUS at 08:34

## 2017-10-03 RX ADMIN — MORPHINE SULFATE 1 MG: 2 INJECTION, SOLUTION INTRAMUSCULAR; INTRAVENOUS at 11:50

## 2017-10-03 RX ADMIN — ENOXAPARIN SODIUM 40 MG: 40 INJECTION SUBCUTANEOUS at 08:34

## 2017-10-03 RX ADMIN — IPRATROPIUM BROMIDE AND ALBUTEROL SULFATE 3 ML: .5; 3 SOLUTION RESPIRATORY (INHALATION) at 13:57

## 2017-10-03 RX ADMIN — VANCOMYCIN 250 MG: KIT at 00:03

## 2017-10-03 RX ADMIN — VANCOMYCIN 250 MG: KIT at 05:27

## 2017-10-03 RX ADMIN — VANCOMYCIN 250 MG: KIT at 11:57

## 2017-10-03 RX ADMIN — LACTOBACILLUS ACIDOPHILUS / LACTOBACILLUS BULGARICUS 1 PACKET: 100 MILLION CFU STRENGTH GRANULES at 11:54

## 2017-10-03 RX ADMIN — GABAPENTIN 800 MG: 400 CAPSULE ORAL at 08:34

## 2017-10-03 RX ADMIN — GABAPENTIN 800 MG: 400 CAPSULE ORAL at 15:12

## 2017-10-03 RX ADMIN — IPRATROPIUM BROMIDE AND ALBUTEROL SULFATE 3 ML: .5; 3 SOLUTION RESPIRATORY (INHALATION) at 09:05

## 2017-10-03 RX ADMIN — MORPHINE SULFATE 1 MG: 2 INJECTION, SOLUTION INTRAMUSCULAR; INTRAVENOUS at 05:32

## 2017-10-03 RX ADMIN — LACTOBACILLUS ACIDOPHILUS / LACTOBACILLUS BULGARICUS 1 PACKET: 100 MILLION CFU STRENGTH GRANULES at 08:33

## 2017-10-03 RX ADMIN — SODIUM CHLORIDE AND POTASSIUM CHLORIDE 75 ML/HR: 9; 2.98 INJECTION, SOLUTION INTRAVENOUS at 05:32

## 2017-10-03 RX ADMIN — MORPHINE SULFATE 1 MG: 2 INJECTION, SOLUTION INTRAMUSCULAR; INTRAVENOUS at 15:12

## 2017-10-03 RX ADMIN — FAMOTIDINE 20 MG: 10 INJECTION, SOLUTION INTRAVENOUS at 08:34

## 2017-10-03 RX ADMIN — CITALOPRAM HYDROBROMIDE 40 MG: 20 TABLET ORAL at 08:34

## 2017-10-03 RX ADMIN — METOPROLOL TARTRATE 25 MG: 25 TABLET, FILM COATED ORAL at 08:34

## 2017-10-03 NOTE — DISCHARGE SUMMARY
Discharge Summary - [facility] Internal Medicine    Patient Information: Carla Gannon 59 y o  female MRN: [id number]  Unit/Bed#: -01 Encounter: [id number]    Discharging Physician / Practitioner: Mary Grace Douglas MD  PCP: Ten Guzman MD  Admission Date: 9/29/2017  Discharge Date: 10/03/17    Reason for Admission:  C diff colitis    Discharge Diagnoses:     Principal Problem:    C  difficile diarrhea  Active Problems:    Tobacco abuse disorder    Abdominal pain    COPD (chronic obstructive pulmonary disease)    Abnormal transaminases  Resolved Problems:    * No resolved hospital problems  *      Consultations During Hospital Stay:  Gastroenterology    Hospital Course:     Carla Gannon is a 59 y o  female patient who originally presented to the hospital on 9/29/2017 due to abdominal pain, as per previous notes looks like the pain has been out of proportion to the findings and the setting of C diff colitis,  Gastroenterology consultation was requested, initially the patient was on IV Flagyl and switched to oral vancomycin, as per GI note yesterday the day prior she had 5 bowel movements, as per nursing information also as per the patient she has not had a bowel movement since yesterday, continues to complain of pain, which in part could be related to post infectious IBS, will start Bentyl at the time of discharge p r farrah Antonio Also noted is a 5 the patient likely also has reactive airway disease will give her a prescription for rescue inhaler, outpatient follow-up, discussed this with her  Will complete a total of 2 week course of antibiotics for C diff, will follow with GI after that  Transaminitis, the setting recent hospitalization at [facility] for possible obstructive jaundice, electrolytes are acceptable hypokalemia has resolved in the setting of no active diarrhea currently will not be potassium supplementation oral intake has also improved    Hepatitis-B core antibody is positive likely a representation of an old infection, surface antibody was sent, also viral load  I have discussed this with the patient again and she will follow with GI regarding that outpatient  Will discharge this afternoon if she is able to tolerate a diet,She has been eating since yesterday       Discharge Day Visit / Exam:     Subjective:  No new complaints, discussed the plan in detail with her  Vitals: Blood Pressure: 136/94 (10/03/17 0700)  Pulse: 84 (10/03/17 0700)  Temperature: 98 6 °F (37 °C) (10/03/17 0700)  Temp Source: Oral (10/03/17 0700)  Respirations: 20 (10/03/17 0700)  Height: 5' 3" (160 cm) (09/30/17 0012)  Weight - Scale: 65 3 kg (144 lb) (09/30/17 0012)  SpO2: 95 % (10/03/17 0909)  Exam:   Physical Exam   Constitutional: She is oriented to person, place, and time  No distress  HENT:   Head: Normocephalic and atraumatic  Eyes: Pupils are equal, round, and reactive to light  Neck: No tracheal deviation present  Cardiovascular: Normal rate  No murmur heard  Pulmonary/Chest: No respiratory distress  She has no wheezes  Abdominal: She exhibits no distension  There is no tenderness  There is no rebound  Musculoskeletal: She exhibits no edema  Neurological: She is alert and oriented to person, place, and time  Skin: She is not diaphoretic  Psychiatric: She has a normal mood and affect  Discharge instructions/Information to patient and family:   See after visit summary for information provided to patient and family  Provisions for Follow-Up Care:  See after visit summary for information related to follow-up care and any pertinent home health orders  Disposition:     Home      Planned Readmission: no     Discharge Statement:  I spent 32 minutes discharging the patient  This time was spent on the day of discharge  I had direct contact with the patient on the day of discharge   Greater than 50% of the total time was spent examining patient, answering all patient questions, arranging and discussing plan of care with patient as well as directly providing post-discharge instructions  Additional time then spent on discharge activities  Discharge Medications:  See after visit summary for reconciled discharge medications provided to patient and family        ** Please Note: This note has been constructed using a voice recognition system **

## 2017-10-03 NOTE — PLAN OF CARE
DISCHARGE PLANNING - CARE MANAGEMENT     Discharge to post-acute care or home with appropriate resources Progressing        GASTROINTESTINAL - ADULT     Minimal or absence of nausea and/or vomiting Progressing     Maintains or returns to baseline bowel function Progressing     Maintains adequate nutritional intake Progressing        Potential for Falls     Patient will remain free of falls Progressing

## 2017-10-04 LAB — RYE IGE QN: NEGATIVE

## 2017-10-05 ENCOUNTER — GENERIC CONVERSION - ENCOUNTER (OUTPATIENT)
Dept: OTHER | Facility: OTHER | Age: 57
End: 2017-10-05

## 2017-10-05 ENCOUNTER — ALLSCRIPTS OFFICE VISIT (OUTPATIENT)
Dept: OTHER | Facility: OTHER | Age: 57
End: 2017-10-05

## 2017-10-05 LAB
HBV DNA SERPL NAA+PROBE-ACNC: NORMAL IU/ML
HBV DNA SERPL NAA+PROBE-LOG IU: NORMAL LOG10IU/ML
REF LAB TEST REF RANGE: NORMAL

## 2017-10-08 ENCOUNTER — GENERIC CONVERSION - ENCOUNTER (OUTPATIENT)
Dept: OTHER | Facility: OTHER | Age: 57
End: 2017-10-08

## 2017-10-26 ENCOUNTER — HOSPITAL ENCOUNTER (INPATIENT)
Facility: HOSPITAL | Age: 57
LOS: 5 days | Discharge: HOME | DRG: 248 | End: 2017-10-31
Attending: EMERGENCY MEDICINE | Admitting: FAMILY MEDICINE
Payer: COMMERCIAL

## 2017-10-26 ENCOUNTER — APPOINTMENT (EMERGENCY)
Dept: CT IMAGING | Facility: HOSPITAL | Age: 57
DRG: 248 | End: 2017-10-26
Payer: COMMERCIAL

## 2017-10-26 DIAGNOSIS — R10.9 ABDOMINAL PAIN: Primary | ICD-10-CM

## 2017-10-26 DIAGNOSIS — R74.01 TRANSAMINITIS: ICD-10-CM

## 2017-10-26 DIAGNOSIS — K52.9 ENTERITIS: ICD-10-CM

## 2017-10-26 LAB
ALBUMIN SERPL BCP-MCNC: 3.5 G/DL (ref 3.5–5)
ALP SERPL-CCNC: 220 U/L (ref 46–116)
ALT SERPL W P-5'-P-CCNC: 360 U/L (ref 12–78)
ANION GAP SERPL CALCULATED.3IONS-SCNC: 10 MMOL/L (ref 4–13)
AST SERPL W P-5'-P-CCNC: 285 U/L (ref 5–45)
BASOPHILS # BLD AUTO: 0.03 THOUSANDS/ΜL (ref 0–0.1)
BASOPHILS NFR BLD AUTO: 1 % (ref 0–1)
BILIRUB SERPL-MCNC: 0.3 MG/DL (ref 0.2–1)
BUN SERPL-MCNC: 20 MG/DL (ref 5–25)
CALCIUM SERPL-MCNC: 8.9 MG/DL (ref 8.3–10.1)
CHLORIDE SERPL-SCNC: 105 MMOL/L (ref 100–108)
CO2 SERPL-SCNC: 26 MMOL/L (ref 21–32)
CREAT SERPL-MCNC: 0.93 MG/DL (ref 0.6–1.3)
EOSINOPHIL # BLD AUTO: 0.22 THOUSAND/ΜL (ref 0–0.61)
EOSINOPHIL NFR BLD AUTO: 4 % (ref 0–6)
ERYTHROCYTE [DISTWIDTH] IN BLOOD BY AUTOMATED COUNT: 13.4 % (ref 11.6–15.1)
GFR SERPL CREATININE-BSD FRML MDRD: 67 ML/MIN/1.73SQ M
GLUCOSE SERPL-MCNC: 110 MG/DL (ref 65–140)
HCT VFR BLD AUTO: 40.4 % (ref 34.8–46.1)
HGB BLD-MCNC: 13.5 G/DL (ref 11.5–15.4)
LACTATE SERPL-SCNC: 1.3 MMOL/L (ref 0.5–2)
LIPASE SERPL-CCNC: 65 U/L (ref 73–393)
LYMPHOCYTES # BLD AUTO: 1.48 THOUSANDS/ΜL (ref 0.6–4.47)
LYMPHOCYTES NFR BLD AUTO: 27 % (ref 14–44)
MCH RBC QN AUTO: 29.6 PG (ref 26.8–34.3)
MCHC RBC AUTO-ENTMCNC: 33.4 G/DL (ref 31.4–37.4)
MCV RBC AUTO: 89 FL (ref 82–98)
MONOCYTES # BLD AUTO: 0.33 THOUSAND/ΜL (ref 0.17–1.22)
MONOCYTES NFR BLD AUTO: 6 % (ref 4–12)
NEUTROPHILS # BLD AUTO: 3.48 THOUSANDS/ΜL (ref 1.85–7.62)
NEUTS SEG NFR BLD AUTO: 63 % (ref 43–75)
NRBC BLD AUTO-RTO: 0 /100 WBCS
PLATELET # BLD AUTO: 177 THOUSANDS/UL (ref 149–390)
PMV BLD AUTO: 9.9 FL (ref 8.9–12.7)
POTASSIUM SERPL-SCNC: 3.9 MMOL/L (ref 3.5–5.3)
PROT SERPL-MCNC: 7 G/DL (ref 6.4–8.2)
RBC # BLD AUTO: 4.56 MILLION/UL (ref 3.81–5.12)
SODIUM SERPL-SCNC: 141 MMOL/L (ref 136–145)
TROPONIN I SERPL-MCNC: <0.02 NG/ML
WBC # BLD AUTO: 5.55 THOUSAND/UL (ref 4.31–10.16)

## 2017-10-26 PROCEDURE — 83690 ASSAY OF LIPASE: CPT | Performed by: EMERGENCY MEDICINE

## 2017-10-26 PROCEDURE — 96374 THER/PROPH/DIAG INJ IV PUSH: CPT

## 2017-10-26 PROCEDURE — 99285 EMERGENCY DEPT VISIT HI MDM: CPT

## 2017-10-26 PROCEDURE — 87493 C DIFF AMPLIFIED PROBE: CPT | Performed by: EMERGENCY MEDICINE

## 2017-10-26 PROCEDURE — 36415 COLL VENOUS BLD VENIPUNCTURE: CPT | Performed by: EMERGENCY MEDICINE

## 2017-10-26 PROCEDURE — 96361 HYDRATE IV INFUSION ADD-ON: CPT

## 2017-10-26 PROCEDURE — 83605 ASSAY OF LACTIC ACID: CPT | Performed by: EMERGENCY MEDICINE

## 2017-10-26 PROCEDURE — 74177 CT ABD & PELVIS W/CONTRAST: CPT

## 2017-10-26 PROCEDURE — 84484 ASSAY OF TROPONIN QUANT: CPT | Performed by: EMERGENCY MEDICINE

## 2017-10-26 PROCEDURE — 85025 COMPLETE CBC W/AUTO DIFF WBC: CPT | Performed by: EMERGENCY MEDICINE

## 2017-10-26 PROCEDURE — 93005 ELECTROCARDIOGRAM TRACING: CPT | Performed by: EMERGENCY MEDICINE

## 2017-10-26 PROCEDURE — 80053 COMPREHEN METABOLIC PANEL: CPT | Performed by: EMERGENCY MEDICINE

## 2017-10-26 PROCEDURE — 96375 TX/PRO/DX INJ NEW DRUG ADDON: CPT

## 2017-10-26 RX ORDER — ACETAMINOPHEN 325 MG/1
975 TABLET ORAL ONCE
Status: COMPLETED | OUTPATIENT
Start: 2017-10-26 | End: 2017-10-26

## 2017-10-26 RX ORDER — GABAPENTIN 800 MG/1
TABLET ORAL
COMMUNITY
End: 2017-10-26

## 2017-10-26 RX ORDER — ONDANSETRON 2 MG/ML
4 INJECTION INTRAMUSCULAR; INTRAVENOUS ONCE
Status: DISCONTINUED | OUTPATIENT
Start: 2017-10-26 | End: 2017-10-26

## 2017-10-26 RX ORDER — MORPHINE SULFATE 4 MG/ML
4 INJECTION, SOLUTION INTRAMUSCULAR; INTRAVENOUS ONCE
Status: COMPLETED | OUTPATIENT
Start: 2017-10-26 | End: 2017-10-26

## 2017-10-26 RX ORDER — MORPHINE SULFATE 10 MG/ML
6 INJECTION, SOLUTION INTRAMUSCULAR; INTRAVENOUS ONCE
Status: COMPLETED | OUTPATIENT
Start: 2017-10-26 | End: 2017-10-26

## 2017-10-26 RX ORDER — ONDANSETRON 2 MG/ML
4 INJECTION INTRAMUSCULAR; INTRAVENOUS ONCE
Status: COMPLETED | OUTPATIENT
Start: 2017-10-26 | End: 2017-10-26

## 2017-10-26 RX ADMIN — ACETAMINOPHEN 975 MG: 325 TABLET, FILM COATED ORAL at 20:23

## 2017-10-26 RX ADMIN — ONDANSETRON 4 MG: 2 INJECTION INTRAMUSCULAR; INTRAVENOUS at 20:23

## 2017-10-26 RX ADMIN — IOHEXOL 100 ML: 350 INJECTION, SOLUTION INTRAVENOUS at 21:23

## 2017-10-26 RX ADMIN — MORPHINE SULFATE 6 MG: 10 INJECTION, SOLUTION INTRAMUSCULAR; INTRAVENOUS at 23:01

## 2017-10-26 RX ADMIN — SODIUM CHLORIDE 1000 ML: 0.9 INJECTION, SOLUTION INTRAVENOUS at 21:10

## 2017-10-26 RX ADMIN — MORPHINE SULFATE 4 MG: 4 INJECTION, SOLUTION INTRAMUSCULAR; INTRAVENOUS at 20:22

## 2017-10-26 NOTE — ED PROVIDER NOTES
History  Chief Complaint   Patient presents with    Abdominal Pain     pt states to have severe upper right abdominal pain that started a few hours ago  Pt states to have had diarrhea but denies N/V  pt has hx of CDIFF and states to feel as though its the "same feeling'      Patient is a 68-year-old female with a history of C diff, COPD who reports to the emergency department with that abdominal pain, epigastric, right upper quadrant, severe, started today  No chest pain or shortness of breath  No lightheadedness or dizziness  She says the pain is sharp and stabbing in nature  No tearing pain that radiates to the back  No pulsatile abdominal mass  No focal neurological defects or abnormal pulses  Normal heart and lung exam   She is in mild distress from her pain but otherwise well-appearing  She denies dysuria, hematuria, vaginal discharge, vaginal itching or burning  Medical decision makin-year-old female who presents to the emergency department with severe abdominal pain, differential includes diverticulitis, gallbladder pathology, liver pathology, cardiac, will check imaging  PER PRIOR NOTES----------------------------------  Discharge Summaries          Hide copied text  Hover for attribution information     Discharge Summary - [facility]     Patient Information: Eduardo Harris 59 y o  female MRN: [id number]  Unit/Bed#: -01 Encounter: [id number]     Discharging Physician / Practitioner: Carrol Carrillo MD  PCP: Yoselin Shane MD  Admission Date: 2017  Discharge Date: 10/03/17     Reason for Admission:  C diff colitis     Discharge Diagnoses:      Principal Problem:    C  difficile diarrhea  Active Problems:    Tobacco abuse disorder    Abdominal pain    COPD (chronic obstructive pulmonary disease)    Abnormal transaminases  Resolved Problems:    * No resolved hospital problems   *        Consultations During Hospital Stay:  Gastroenterology     Hospital Course:    Adonay Tavarez is a 59 y o  female patient who originally presented to the hospital on 9/29/2017 due to abdominal pain, as per previous notes looks like the pain has been out of proportion to the findings and the setting of C diff colitis,  Gastroenterology consultation was requested, initially the patient was on IV Flagyl and switched to oral vancomycin, as per GI note yesterday the day prior she had 5 bowel movements, as per nursing information also as per the patient she has not had a bowel movement since yesterday, continues to complain of pain, which in part could be related to post infectious IBS, will start Bentyl at the time of discharge p r n  Antonio Smart Also noted is a 5 the patient likely also has reactive airway disease will give her a prescription for rescue inhaler, outpatient follow-up, discussed this with her  Will complete a total of 2 week course of antibiotics for C diff, will follow with GI after that  Transaminitis, the setting recent hospitalization at [facility] for possible obstructive jaundice, electrolytes are acceptable hypokalemia has resolved in the setting of no active diarrhea currently will not be potassium supplementation oral intake has also improved  Hepatitis-B core antibody is positive likely a representation of an old infection, surface antibody was sent, also viral load  I have discussed this with the patient again and she will follow with GI regarding that outpatient    Will discharge this afternoon if she is able to tolerate a diet,She has been eating since yesterday        Discharge Day Visit / Exam:      Subjective:  No new complaints, discussed the plan in detail with her  Vitals: Blood Pressure: 136/94 (10/03/17 0700)  Pulse: 84 (10/03/17 0700)  Temperature: 98 6 °F (37 °C) (10/03/17 0700)  Temp Source: Oral (10/03/17 0700)  Respirations: 20 (10/03/17 0700)  Height: 5' 3" (160 cm) (09/30/17 0012)  Weight - Scale: 65 3 kg (144 lb) (09/30/17 0012)  SpO2: 95 % (10/03/17 0005)  Exam:   Physical Exam   Constitutional: She is oriented to person, place, and time  No distress  HENT:   Head: Normocephalic and atraumatic  Eyes: Pupils are equal, round, and reactive to light  Neck: No tracheal deviation present  Cardiovascular: Normal rate  No murmur heard  Pulmonary/Chest: No respiratory distress  She has no wheezes  Abdominal: She exhibits no distension  There is no tenderness  There is no rebound  Musculoskeletal: She exhibits no edema  Neurological: She is alert and oriented to person, place, and time  Skin: She is not diaphoretic  Psychiatric: She has a normal mood and affect       Discharge instructions/Information to patient and family:   See after visit summary for information provided to patient and family  Provisions for Follow-Up Care:See after visit summary for information related to follow-up care and any pertinent home health orders          --------------------------------------------------------            Prior to Admission Medications   Prescriptions Last Dose Informant Patient Reported?  Taking?   citalopram (CELEXA) 40 mg tablet 10/26/2017 at Unknown time Self Yes Yes   Sig: Take 40 mg by mouth daily     clonazePAM (KlonoPIN) 0 5 mg tablet 10/26/2017 at Unknown time  Yes Yes   Sig: Take 0 5 mg by mouth 2 (two) times a day as needed for seizures   gabapentin (NEURONTIN) 800 mg tablet 10/26/2017 at Unknown time  Yes Yes   Sig: Take 800 mg by mouth 3 (three) times a day   metoprolol tartrate (LOPRESSOR) 25 mg tablet 10/26/2017 at Unknown time  Yes Yes   Sig: Take 25 mg by mouth every 12 (twelve) hours   mirtazapine (REMERON) 30 mg tablet 10/25/2017 at Unknown time Self Yes Yes   Sig: Take 15 mg by mouth daily at bedtime      Facility-Administered Medications: None       Past Medical History:   Diagnosis Date    Anxiety     Bipolar 1 disorder (HCC)     Chronic back pain     Frequent headaches     Glaucoma     Hepatitis B     Hypertension     Psychiatric disorder     bipolar    Rheumatoid arthritis ([facility] [address] )        Past Surgical History:   Procedure Laterality Date    APPENDECTOMY      CATARACT EXTRACTION Left     CHOLECYSTECTOMY      HYSTERECTOMY      INTRAOCULAR LENS INSERTION      NM TEMPORAL ARTERY LIGATN OR BX Right 12/16/2016    Procedure: BIOPSY ARTERY TEMPORAL;  Surgeon: Helio Sneed MD;  Location: [facility];  Service: General    TONSILLECTOMY         Family History   Problem Relation Age of Onset    Heart disease Mother      I have reviewed and agree with the history as documented  Social History   Substance Use Topics    Smoking status: Current Every Day Smoker     Packs/day: 0 20     Years: 46 00     Types: Cigarettes    Smokeless tobacco: Never Used    Alcohol use No        Review of Systems   Constitutional: Negative for chills and fever  HENT: Negative for ear discharge and facial swelling  Respiratory: Negative for chest tightness, shortness of breath and wheezing  Cardiovascular: Negative for chest pain and palpitations  Gastrointestinal: Positive for abdominal distention  Negative for abdominal pain, diarrhea and vomiting  Genitourinary: Negative for dysuria and hematuria  Musculoskeletal: Negative for arthralgias and neck stiffness  Skin: Negative for color change and rash  Allergic/Immunologic: Negative  Neurological: Negative for tremors, seizures and syncope  Psychiatric/Behavioral: Negative for hallucinations and suicidal ideas  All other systems reviewed and are negative        Physical Exam  ED Triage Vitals [10/26/17 1909]   Temperature Pulse Respirations Blood Pressure SpO2   98 2 °F (36 8 °C) 95 20 127/60 97 %      Temp Source Heart Rate Source Patient Position - Orthostatic VS BP Location FiO2 (%)   Oral Monitor Sitting Right arm --      Pain Score       Worst Possible Pain           Orthostatic Vital Signs  Vitals:    10/26/17 1909 10/26/17 2302 10/27/17 0000 10/27/17 0700   BP: 127/60 128/67 139/70 121/65   Pulse: 95 90 77 78   Patient Position - Orthostatic VS: Sitting Lying Lying Lying       Physical Exam   Constitutional: She is oriented to person, place, and time  She appears well-developed and well-nourished  HENT:   Head: Normocephalic and atraumatic  Right Ear: External ear normal    Left Ear: External ear normal    Eyes: Conjunctivae and EOM are normal    Neck: Normal range of motion  Neck supple  No JVD present  No tracheal deviation present  Cardiovascular: Normal rate, regular rhythm and normal heart sounds  Pulmonary/Chest: Effort normal  No respiratory distress  She has no wheezes  She has no rales  Abdominal: Soft  Bowel sounds are normal  There is tenderness  There is guarding  There is no rebound  Musculoskeletal: She exhibits no edema or tenderness  Neurological: She is alert and oriented to person, place, and time  Skin: Skin is warm and dry  No rash noted  No erythema  Psychiatric: She has a normal mood and affect  Thought content normal    Nursing note and vitals reviewed        ED Medications  Medications   clonazePAM (KlonoPIN) tablet 0 5 mg (not administered)   gabapentin (NEURONTIN) capsule 800 mg (800 mg Oral Given 10/27/17 0852)   citalopram (CeleXA) tablet 40 mg (40 mg Oral Given 10/27/17 0852)   metoprolol tartrate (LOPRESSOR) tablet 25 mg (25 mg Oral Given 10/27/17 0851)   mirtazapine (REMERON) tablet 15 mg (15 mg Oral Given 10/27/17 0126)   sodium chloride 0 9 % infusion (50 mL/hr Intravenous New Bag 10/27/17 0131)   ondansetron (ZOFRAN) injection 4 mg (not administered)   calcium carbonate (TUMS) chewable tablet 1,000 mg (not administered)   enoxaparin (LOVENOX) subcutaneous injection 40 mg (40 mg Subcutaneous Given 10/27/17 0852)   acetaminophen (TYLENOL) tablet 650 mg (not administered)   albuterol inhalation solution 2 5 mg (not administered)   morphine injection 2 mg (2 mg Intravenous Given 10/27/17 0847)   enalaprilat (VASOTEC) injection 0 625 mg (not administered)   metroNIDAZOLE (FLAGYL) IVPB (premix) 500 mg (500 mg Intravenous New Bag 10/27/17 0850)   sodium chloride 0 9 % bolus 1,000 mL (0 mL Intravenous Stopped 10/26/17 2319)   acetaminophen (TYLENOL) tablet 975 mg (975 mg Oral Given 10/26/17 2023)   ondansetron (ZOFRAN) injection 4 mg (4 mg Intravenous Given 10/26/17 2023)   morphine (PF) 4 mg/mL injection 4 mg (4 mg Intravenous Given 10/26/17 2022)   iohexol (OMNIPAQUE) 350 MG/ML injection (MULTI-DOSE) 100 mL (100 mL Intravenous Given 10/26/17 2123)   morphine (PF) 10 mg/mL injection 6 mg (6 mg Intravenous Given 10/26/17 2301)       Diagnostic Studies  Results Reviewed     Procedure Component Value Units Date/Time    UA w Reflex to Microscopic w Reflex to Culture [[id number]]  (Abnormal) Collected:  10/27/17 0458    Lab Status:  Final result Specimen:  Urine from Urine, Clean Catch Updated:  10/27/17 0505     Color, UA Yellow     Clarity, UA Clear     Specific Gravity, UA 1 010     pH, UA 6 0     Leukocytes, UA Negative     Nitrite, UA Negative     Protein, UA Negative mg/dl      Glucose, UA Negative mg/dl      Ketones, UA Negative mg/dl      Urobilinogen, UA 0 2 E U /dl      Bilirubin, UA Negative     Blood, UA Trace-Intact (A)    Clostridium difficile toxin by PCR [[id number]] Collected:  10/26/17 2200    Lab Status:   In process Specimen:  Stool from Per Rectum Updated:  10/26/17 2205    Lipase [[id number]]  (Abnormal) Collected:  10/26/17 2028    Lab Status:  Final result Specimen:  Blood from Arm, Left Updated:  10/26/17 2100     Lipase 65 (L) u/L     Comprehensive metabolic panel [[id number]]  (Abnormal) Collected:  10/26/17 2028    Lab Status:  Final result Specimen:  Blood from Arm, Left Updated:  10/26/17 2100     Sodium 141 mmol/L      Potassium 3 9 mmol/L      Chloride 105 mmol/L      CO2 26 mmol/L      Anion Gap 10 mmol/L      BUN 20 mg/dL      Creatinine 0 93 mg/dL      Glucose 110 mg/dL      Calcium 8 9 mg/dL      AST 285 (H) U/L       (H) U/L      Alkaline Phosphatase 220 (H) U/L      Total Protein 7 0 g/dL      Albumin 3 5 g/dL      Total Bilirubin 0 30 mg/dL      eGFR 67 ml/min/1 73sq m     Narrative:         National Kidney Disease Education Program recommendations are as follows:  GFR calculation is accurate only with a steady state creatinine  Chronic Kidney disease less than 60 ml/min/1 73 sq  meters  Kidney failure less than 15 ml/min/1 73 sq  meters  Lactic acid, plasma [[id number]]  (Normal) Collected:  10/26/17 2028    Lab Status:  Final result Specimen:  Blood from Arm, Left Updated:  10/26/17 2054     LACTIC ACID 1 3 mmol/L     Narrative:         Result may be elevated if tourniquet was used during collection  Troponin I [[id number]]  (Normal) Collected:  10/26/17 2028    Lab Status:  Final result Specimen:  Blood from Arm, Left Updated:  10/26/17 2054     Troponin I <0 02 ng/mL     Narrative:         [employer] Chemistry analyzer 99% cutoff is > 0 04 ng/mL in network labs    o cTnI 99% cutoff is useful only when applied to patients in the clinical setting of myocardial ischemia  o cTnI 99% cutoff should be interpreted in the context of clinical history, ECG findings and possibly cardiac imaging to establish correct diagnosis  o cTnI 99% cutoff may be suggestive but clearly not indicative of a coronary event without the clinical setting of myocardial ischemia      CBC and differential [[id number]]  (Normal) Collected:  10/26/17 2028    Lab Status:  Final result Specimen:  Blood from Arm, Left Updated:  10/26/17 2035     WBC 5 55 Thousand/uL      RBC 4 56 Million/uL      Hemoglobin 13 5 g/dL      Hematocrit 40 4 %      MCV 89 fL      MCH 29 6 pg      MCHC 33 4 g/dL      RDW 13 4 %      MPV 9 9 fL      Platelets 846 Thousands/uL      nRBC 0 /100 WBCs      Neutrophils Relative 63 %      Lymphocytes Relative 27 %      Monocytes Relative 6 %      Eosinophils Relative 4 %      Basophils Relative 1 %      Neutrophils Absolute 3 48 Thousands/µL      Lymphocytes Absolute 1 48 Thousands/µL      Monocytes Absolute 0 33 Thousand/µL      Eosinophils Absolute 0 22 Thousand/µL      Basophils Absolute 0 03 Thousands/µL                  CT abdomen pelvis with contrast   Final Result by Shon Buerger, MD (10/26 2142)      Fluid visible within stomach and small bowel and most of the colon suggesting enteritis/diarrhea illness  No obstruction or perforation  Stable small left adrenal nodule         Workstation performed: [id number]                    Procedures  Procedures       Phone Contacts  ED Phone Contact    ED Course  ED Course                                MDM  CritCare Time    Disposition  Final diagnoses:   Abdominal pain     Time reflects when diagnosis was documented in both MDM as applicable and the Disposition within this note     Time User Action Codes Description Comment    10/26/2017 10:35 PM Tammy Breed [R10 9] Abdominal pain     10/27/2017 12:57 AM Horace Ridjose antonio Add [K52 9] Enteritis     10/27/2017 12:57 AM Naelna Ridjose antonio Add [R74 0] Transaminitis       ED Disposition     ED Disposition Condition Comment    Admit  Case was discussed with Dr Ingrid Hong and the patient's admission status was agreed to be 2 midnights to the service of Dr Ingrid Hong  Follow-up Information    None       Current Discharge Medication List      CONTINUE these medications which have NOT CHANGED    Details   citalopram (CELEXA) 40 mg tablet Take 40 mg by mouth daily        clonazePAM (KlonoPIN) 0 5 mg tablet Take 0 5 mg by mouth 2 (two) times a day as needed for seizures      gabapentin (NEURONTIN) 800 mg tablet Take 800 mg by mouth 3 (three) times a day      metoprolol tartrate (LOPRESSOR) 25 mg tablet Take 25 mg by mouth every 12 (twelve) hours      mirtazapine (REMERON) 30 mg tablet Take 15 mg by mouth daily at bedtime           No discharge procedures on file      ED Provider  Electronically Signed by           Vito Napoles MD  10/27/17 1008

## 2017-10-27 ENCOUNTER — APPOINTMENT (INPATIENT)
Dept: ULTRASOUND IMAGING | Facility: HOSPITAL | Age: 57
DRG: 248 | End: 2017-10-27
Payer: COMMERCIAL

## 2017-10-27 PROBLEM — R74.01 TRANSAMINITIS: Status: ACTIVE | Noted: 2017-09-30

## 2017-10-27 PROBLEM — K52.9 ENTERITIS: Status: ACTIVE | Noted: 2017-10-27

## 2017-10-27 PROBLEM — I10 ESSENTIAL HYPERTENSION: Status: ACTIVE | Noted: 2017-10-27

## 2017-10-27 LAB
ALBUMIN SERPL BCP-MCNC: 3.2 G/DL (ref 3.5–5)
ALP SERPL-CCNC: 218 U/L (ref 46–116)
ALT SERPL W P-5'-P-CCNC: 289 U/L (ref 12–78)
ANION GAP SERPL CALCULATED.3IONS-SCNC: 8 MMOL/L (ref 4–13)
APAP SERPL-MCNC: <2 UG/ML (ref 10–30)
APTT PPP: 26 SECONDS (ref 23–35)
AST SERPL W P-5'-P-CCNC: 135 U/L (ref 5–45)
ATRIAL RATE: 87 BPM
BACTERIA UR QL AUTO: ABNORMAL /HPF
BILIRUB DIRECT SERPL-MCNC: 0.04 MG/DL (ref 0–0.2)
BILIRUB SERPL-MCNC: 0.2 MG/DL (ref 0.2–1)
BILIRUB UR QL STRIP: NEGATIVE
BUN SERPL-MCNC: 15 MG/DL (ref 5–25)
C DIFF TOX GENS STL QL NAA+PROBE: NORMAL
CALCIUM SERPL-MCNC: 8.6 MG/DL (ref 8.3–10.1)
CHLORIDE SERPL-SCNC: 109 MMOL/L (ref 100–108)
CLARITY UR: CLEAR
CO2 SERPL-SCNC: 24 MMOL/L (ref 21–32)
COLOR UR: YELLOW
CREAT SERPL-MCNC: 0.72 MG/DL (ref 0.6–1.3)
ERYTHROCYTE [DISTWIDTH] IN BLOOD BY AUTOMATED COUNT: 13.6 % (ref 11.6–15.1)
GFR SERPL CREATININE-BSD FRML MDRD: 92 ML/MIN/1.73SQ M
GLUCOSE SERPL-MCNC: 109 MG/DL (ref 65–140)
GLUCOSE UR STRIP-MCNC: NEGATIVE MG/DL
HCT VFR BLD AUTO: 48.8 % (ref 34.8–46.1)
HGB BLD-MCNC: 15.5 G/DL (ref 11.5–15.4)
HGB UR QL STRIP.AUTO: ABNORMAL
INR PPP: 0.92 (ref 0.86–1.16)
KETONES UR STRIP-MCNC: NEGATIVE MG/DL
LEUKOCYTE ESTERASE UR QL STRIP: NEGATIVE
MCH RBC QN AUTO: 29.3 PG (ref 26.8–34.3)
MCHC RBC AUTO-ENTMCNC: 31.8 G/DL (ref 31.4–37.4)
MCV RBC AUTO: 92 FL (ref 82–98)
NITRITE UR QL STRIP: NEGATIVE
NON-SQ EPI CELLS URNS QL MICRO: ABNORMAL /HPF
P AXIS: 82 DEGREES
PH UR STRIP.AUTO: 6 [PH] (ref 4.5–8)
PLATELET # BLD AUTO: 156 THOUSANDS/UL (ref 149–390)
PMV BLD AUTO: 9.7 FL (ref 8.9–12.7)
POTASSIUM SERPL-SCNC: 4.4 MMOL/L (ref 3.5–5.3)
PR INTERVAL: 102 MS
PROT SERPL-MCNC: 6.8 G/DL (ref 6.4–8.2)
PROT UR STRIP-MCNC: NEGATIVE MG/DL
PROTHROMBIN TIME: 12.5 SECONDS (ref 12.1–14.4)
QRS AXIS: 66 DEGREES
QRSD INTERVAL: 72 MS
QT INTERVAL: 396 MS
QTC INTERVAL: 476 MS
RBC # BLD AUTO: 5.29 MILLION/UL (ref 3.81–5.12)
RBC #/AREA URNS AUTO: ABNORMAL /HPF
SODIUM SERPL-SCNC: 141 MMOL/L (ref 136–145)
SP GR UR STRIP.AUTO: 1.01 (ref 1–1.03)
T WAVE AXIS: 33 DEGREES
UROBILINOGEN UR QL STRIP.AUTO: 0.2 E.U./DL
VENTRICULAR RATE: 87 BPM
WBC # BLD AUTO: 5.39 THOUSAND/UL (ref 4.31–10.16)
WBC #/AREA URNS AUTO: ABNORMAL /HPF

## 2017-10-27 PROCEDURE — 81001 URINALYSIS AUTO W/SCOPE: CPT | Performed by: EMERGENCY MEDICINE

## 2017-10-27 PROCEDURE — 85610 PROTHROMBIN TIME: CPT | Performed by: PHYSICIAN ASSISTANT

## 2017-10-27 PROCEDURE — 85730 THROMBOPLASTIN TIME PARTIAL: CPT | Performed by: PHYSICIAN ASSISTANT

## 2017-10-27 PROCEDURE — 80076 HEPATIC FUNCTION PANEL: CPT | Performed by: PHYSICIAN ASSISTANT

## 2017-10-27 PROCEDURE — 80329 ANALGESICS NON-OPIOID 1 OR 2: CPT | Performed by: PHYSICIAN ASSISTANT

## 2017-10-27 PROCEDURE — 76705 ECHO EXAM OF ABDOMEN: CPT

## 2017-10-27 PROCEDURE — 85027 COMPLETE CBC AUTOMATED: CPT | Performed by: PHYSICIAN ASSISTANT

## 2017-10-27 PROCEDURE — 80048 BASIC METABOLIC PNL TOTAL CA: CPT | Performed by: PHYSICIAN ASSISTANT

## 2017-10-27 RX ORDER — CALCIUM CARBONATE 200(500)MG
1000 TABLET,CHEWABLE ORAL DAILY PRN
Status: DISCONTINUED | OUTPATIENT
Start: 2017-10-27 | End: 2017-10-31 | Stop reason: HOSPADM

## 2017-10-27 RX ORDER — ONDANSETRON 2 MG/ML
4 INJECTION INTRAMUSCULAR; INTRAVENOUS EVERY 6 HOURS PRN
Status: DISCONTINUED | OUTPATIENT
Start: 2017-10-27 | End: 2017-10-31 | Stop reason: HOSPADM

## 2017-10-27 RX ORDER — ACETAMINOPHEN 325 MG/1
650 TABLET ORAL EVERY 6 HOURS PRN
Status: DISCONTINUED | OUTPATIENT
Start: 2017-10-27 | End: 2017-10-31 | Stop reason: HOSPADM

## 2017-10-27 RX ORDER — ALBUTEROL SULFATE 2.5 MG/3ML
2.5 SOLUTION RESPIRATORY (INHALATION) EVERY 6 HOURS PRN
Status: DISCONTINUED | OUTPATIENT
Start: 2017-10-27 | End: 2017-10-31 | Stop reason: HOSPADM

## 2017-10-27 RX ORDER — MORPHINE SULFATE 2 MG/ML
2 INJECTION, SOLUTION INTRAMUSCULAR; INTRAVENOUS
Status: DISCONTINUED | OUTPATIENT
Start: 2017-10-27 | End: 2017-10-28

## 2017-10-27 RX ORDER — ENALAPRILAT 2.5 MG/2ML
0.62 INJECTION INTRAVENOUS EVERY 6 HOURS PRN
Status: DISCONTINUED | OUTPATIENT
Start: 2017-10-27 | End: 2017-10-31 | Stop reason: HOSPADM

## 2017-10-27 RX ORDER — DICYCLOMINE HYDROCHLORIDE 10 MG/1
20 CAPSULE ORAL
Status: DISCONTINUED | OUTPATIENT
Start: 2017-10-27 | End: 2017-10-31 | Stop reason: HOSPADM

## 2017-10-27 RX ORDER — CITALOPRAM 20 MG/1
40 TABLET ORAL DAILY
Status: DISCONTINUED | OUTPATIENT
Start: 2017-10-27 | End: 2017-10-31 | Stop reason: HOSPADM

## 2017-10-27 RX ORDER — FAMOTIDINE 20 MG/1
20 TABLET, FILM COATED ORAL 2 TIMES DAILY
Status: DISCONTINUED | OUTPATIENT
Start: 2017-10-27 | End: 2017-10-31 | Stop reason: HOSPADM

## 2017-10-27 RX ORDER — MORPHINE SULFATE 2 MG/ML
2 INJECTION, SOLUTION INTRAMUSCULAR; INTRAVENOUS EVERY 4 HOURS PRN
Status: DISCONTINUED | OUTPATIENT
Start: 2017-10-27 | End: 2017-10-27

## 2017-10-27 RX ORDER — CLONAZEPAM 0.5 MG/1
0.5 TABLET ORAL 2 TIMES DAILY PRN
Status: DISCONTINUED | OUTPATIENT
Start: 2017-10-27 | End: 2017-10-28

## 2017-10-27 RX ORDER — MIRTAZAPINE 15 MG/1
15 TABLET, FILM COATED ORAL
Status: DISCONTINUED | OUTPATIENT
Start: 2017-10-27 | End: 2017-10-31 | Stop reason: HOSPADM

## 2017-10-27 RX ORDER — SACCHAROMYCES BOULARDII 250 MG
250 CAPSULE ORAL 2 TIMES DAILY
Status: DISCONTINUED | OUTPATIENT
Start: 2017-10-27 | End: 2017-10-31 | Stop reason: HOSPADM

## 2017-10-27 RX ORDER — GABAPENTIN 400 MG/1
800 CAPSULE ORAL 3 TIMES DAILY
Status: DISCONTINUED | OUTPATIENT
Start: 2017-10-27 | End: 2017-10-31 | Stop reason: HOSPADM

## 2017-10-27 RX ORDER — MIRTAZAPINE 30 MG/1
15 TABLET, FILM COATED ORAL
COMMUNITY
End: 2017-11-21

## 2017-10-27 RX ORDER — SODIUM CHLORIDE 9 MG/ML
50 INJECTION, SOLUTION INTRAVENOUS CONTINUOUS
Status: DISCONTINUED | OUTPATIENT
Start: 2017-10-27 | End: 2017-10-31 | Stop reason: HOSPADM

## 2017-10-27 RX ADMIN — SODIUM CHLORIDE 50 ML/HR: 0.9 INJECTION, SOLUTION INTRAVENOUS at 22:43

## 2017-10-27 RX ADMIN — CLONAZEPAM 0.5 MG: 0.5 TABLET ORAL at 22:04

## 2017-10-27 RX ADMIN — MORPHINE SULFATE 2 MG: 2 INJECTION, SOLUTION INTRAMUSCULAR; INTRAVENOUS at 21:59

## 2017-10-27 RX ADMIN — DICYCLOMINE HYDROCHLORIDE 20 MG: 10 CAPSULE ORAL at 16:50

## 2017-10-27 RX ADMIN — METRONIDAZOLE 500 MG: 500 INJECTION, SOLUTION INTRAVENOUS at 01:34

## 2017-10-27 RX ADMIN — MIRTAZAPINE 15 MG: 15 TABLET, FILM COATED ORAL at 21:53

## 2017-10-27 RX ADMIN — MORPHINE SULFATE 2 MG: 2 INJECTION, SOLUTION INTRAMUSCULAR; INTRAVENOUS at 12:48

## 2017-10-27 RX ADMIN — MIRTAZAPINE 15 MG: 15 TABLET, FILM COATED ORAL at 01:26

## 2017-10-27 RX ADMIN — SODIUM CHLORIDE 50 ML/HR: 0.9 INJECTION, SOLUTION INTRAVENOUS at 01:31

## 2017-10-27 RX ADMIN — METOPROLOL TARTRATE 25 MG: 25 TABLET ORAL at 01:26

## 2017-10-27 RX ADMIN — DICYCLOMINE HYDROCHLORIDE 20 MG: 10 CAPSULE ORAL at 11:45

## 2017-10-27 RX ADMIN — DICYCLOMINE HYDROCHLORIDE 20 MG: 10 CAPSULE ORAL at 21:53

## 2017-10-27 RX ADMIN — GABAPENTIN 800 MG: 400 CAPSULE ORAL at 08:52

## 2017-10-27 RX ADMIN — MORPHINE SULFATE 2 MG: 2 INJECTION, SOLUTION INTRAMUSCULAR; INTRAVENOUS at 15:47

## 2017-10-27 RX ADMIN — METOPROLOL TARTRATE 25 MG: 25 TABLET ORAL at 08:51

## 2017-10-27 RX ADMIN — MORPHINE SULFATE 2 MG: 2 INJECTION, SOLUTION INTRAMUSCULAR; INTRAVENOUS at 04:36

## 2017-10-27 RX ADMIN — CITALOPRAM HYDROBROMIDE 40 MG: 20 TABLET ORAL at 08:52

## 2017-10-27 RX ADMIN — FAMOTIDINE 20 MG: 20 TABLET, FILM COATED ORAL at 16:50

## 2017-10-27 RX ADMIN — GABAPENTIN 800 MG: 400 CAPSULE ORAL at 16:49

## 2017-10-27 RX ADMIN — METRONIDAZOLE 500 MG: 500 INJECTION, SOLUTION INTRAVENOUS at 08:50

## 2017-10-27 RX ADMIN — METOPROLOL TARTRATE 25 MG: 25 TABLET ORAL at 21:58

## 2017-10-27 RX ADMIN — ENOXAPARIN SODIUM 40 MG: 40 INJECTION SUBCUTANEOUS at 08:52

## 2017-10-27 RX ADMIN — Medication 250 MG: at 16:50

## 2017-10-27 RX ADMIN — MORPHINE SULFATE 2 MG: 2 INJECTION, SOLUTION INTRAMUSCULAR; INTRAVENOUS at 08:47

## 2017-10-27 RX ADMIN — GABAPENTIN 800 MG: 400 CAPSULE ORAL at 21:53

## 2017-10-27 RX ADMIN — Medication 250 MG: at 11:45

## 2017-10-27 NOTE — ED RE-EVALUATION NOTE
Discussed with the hospitalist service, agreed to admission  Stool collected for C diff  CT consistent with enteritis

## 2017-10-27 NOTE — ED RE-EVALUATION NOTE
Discussed with patient, still has severe pain, CT consistent with enteritis, patient has a history of C diff  Patient requested admission, will discussed with the hospital service

## 2017-10-27 NOTE — PLAN OF CARE
INFECTION - ADULT     Absence or prevention of progression during hospitalization Progressing        Knowledge Deficit     Patient/family/caregiver demonstrates understanding of disease process, treatment plan, medications, and discharge instructions Progressing        PAIN - ADULT     Verbalizes/displays adequate comfort level or baseline comfort level Progressing        Potential for Falls     Patient will remain free of falls Progressing

## 2017-10-27 NOTE — CASE MANAGEMENT
Initial Clinical Review    Admission: Date/Time/Statement: 10/26/17 @ 2250     Orders Placed This Encounter   Procedures    Inpatient Admission (expected length of stay for this patient is greater than two midnights)     Standing Status:   Standing     Number of Occurrences:   1     Order Specific Question:   Admitting Physician     Answer:   Nataly Keenan [[id number]]     Order Specific Question:   Level of Care     Answer:   Med Surg [[id number]]     Order Specific Question:   Estimated length of stay     Answer:   More than 2 Midnights     Order Specific Question:   Certification     Answer:   I certify that inpatient services are medically necessary for this patient for a duration of greater than two midnights  See H&P and MD Progress Notes for additional information about the patient's course of treatment  ED: Date/Time/Mode of Arrival:   ED Arrival Information     Expected Arrival Acuity Means of Arrival Escorted By Service Admission Type    10/26/2017 19:05 10/26/2017 19:05 Urgent Ambulance [facility] General Medicine Urgent    Arrival Complaint    ABDOMINAL PAIN          Chief Complaint:   Chief Complaint   Patient presents with    Abdominal Pain     pt states to have severe upper right abdominal pain that started a few hours ago  Pt states to have had diarrhea but denies N/V  pt has hx of CDIFF and states to feel as though its the "same feeling'        History of Illness: Ewa Castillo is a 59 y o  female who presents with past medical history of anxiety, bipolar, chronic back pain, hepatitis-B, hypertension, rheumatoid arthritis presenting with 2 week history of generalized abdominal pain, diarrhea  She was recently discharged on October 3rd with C diff  She was sent home on p o  vancomycin which ended on Monday  She presented today because the pain has been consistent and the diarrhea has not stopped  She denies any other systemic symptoms at this time      ED Vital Signs:   ED Triage Vitals [10/26/17 1909]   Temperature Pulse Respirations Blood Pressure SpO2   98 2 °F (36 8 °C) 95 20 127/60 97 %      Temp Source Heart Rate Source Patient Position - Orthostatic VS BP Location FiO2 (%)   Oral Monitor Sitting Right arm --      Pain Score       Worst Possible Pain        Wt Readings from Last 1 Encounters:   10/27/17 66 7 kg (147 lb 0 8 oz)       Vital Signs (abnormal): wnl    Abnormal Labs/Diagnostic Test Results: ua tr bld , ast  285, alt  360, alk phos  220  Ct abd-    Fluid visible within stomach and small bowel and most of the colon suggesting enteritis/diarrhea illness  No obstruction or perforation  Stable small left adrenal nodule      EKG- NSR     ED Treatment:   Medication Administration from 10/26/2017 1905 to 10/26/2017 2329       Date/Time Order Dose Route Action Action by Comments     10/26/2017 2319 sodium chloride 0 9 % bolus 1,000 mL 0 mL Intravenous Stopped Evan Ulrich RN      10/26/2017 2110 sodium chloride 0 9 % bolus 1,000 mL 1,000 mL Intravenous New Bag Evan Ulrich RN      10/26/2017 2023 acetaminophen (TYLENOL) tablet 975 mg 975 mg Oral Given Evan Ulrich RN      10/26/2017 2023 ondansetron (ZOFRAN) injection 4 mg 4 mg Intravenous Given Evan Ulrich RN      10/26/2017 2022 morphine (PF) 4 mg/mL injection 4 mg 4 mg Intravenous Given Evan Ulrich RN      10/26/2017 2123 iohexol (OMNIPAQUE) 350 MG/ML injection (MULTI-DOSE) 100 mL 100 mL Intravenous Given Stephanie Trammell      10/26/2017 2301 morphine (PF) 10 mg/mL injection 6 mg 6 mg Intravenous Given Evan Ulrich RN           Past Medical/Surgical History:    Active Ambulatory Problems     Diagnosis Date Noted    Increased severity of headaches 12/16/2016    Chest pain 03/05/2017    Depression 03/05/2017    Anxiety 03/05/2017    Chronic back pain 03/05/2017    Tobacco abuse disorder 03/05/2017    Neuropathy 03/05/2017    Positive cardiac stress test 03/07/2017    Coronary artery spasm (HCC) 03/08/2017    Acute bronchitis 03/08/2017    Abdominal pain 09/29/2017    C  difficile diarrhea 09/29/2017    COPD (chronic obstructive pulmonary disease) ([facility] ) 09/30/2017    Transaminitis 09/30/2017     Resolved Ambulatory Problems     Diagnosis Date Noted    No Resolved Ambulatory Problems     Past Medical History:   Diagnosis Date    Anxiety     Bipolar 1 disorder (HCC)     Chronic back pain     Frequent headaches     Glaucoma     Hepatitis B     Hypertension     Psychiatric disorder     Rheumatoid arthritis ([facility] )        Admitting Diagnosis: Abdominal pain [R10 9]    Age/Sex: 59 y o  female    Assessment/Plan: Hospital Problem List:    Principal Problem:    Enteritis  Active Problems:    Tobacco abuse disorder    Transaminitis    Essential hypertension   Plan for the Primary Problem(s):  · Enteritis  ? Reviewed CT of the abdomen and pelvis, still awaiting C diff PCR-placed on IV flagyl can d/c if c diff comes back negative, will place patient on contact precautions for prophylaxis  ? Patient recently discharged here on October 3rd for C diff  She was given IV Flagyl inpatient and was sent home on p o  vancomycin which ended Friday  ? IV pain control, GI consult, IVF   Plan for Additional Problems:   · Tobacco use  ? Cessation counseling, denied Nicoderm patch  · Transaminitis  ? History of hepatitis-B, hepatitis-B core antibiotic is positive likely representation of an old infection  On last admission viral load was sent, GI to follow up  · Hypertension  ? P r n  vasotec, monitor   VTE Prophylaxis: Enoxaparin (Lovenox)  / sequential compression device   Code Status: full  POLST: POLST form is not discussed and not completed at this time    Anticipated Length of Stay:  Patient will be admitted on an Inpatient basis with an anticipated length of stay of  > 2 midnights     Justification for Hospital Stay: IVF and IV pain control, awaiting c diff, if positive IV abx       Admission Orders:  Scheduled Meds:   citalopram 40 mg Oral Daily   dicyclomine 20 mg Oral 4x Daily (AC & HS)   enoxaparin 40 mg Subcutaneous Daily   gabapentin 800 mg Oral TID   metoprolol tartrate 25 mg Oral Q12H OSCAR   metroNIDAZOLE 500 mg Intravenous Q8H   mirtazapine 15 mg Oral HS   saccharomyces boulardii 250 mg Oral BID     Continuous Infusions:   sodium chloride 50 mL/hr Last Rate: 50 mL/hr (10/27/17 0131)     PRN Meds:   acetaminophen    albuterol    calcium carbonate    clonazePAM    enalaprilat    morphine injection  x2    ondansetron    Act as irving   BRP   GI diet   RUQ  US   10/27  Anti smooth muscle antibody , IGG, antimitochondrial antibody , plt ct , cbc , tylenol level , hepatic function panel   GI consult   SCD  c-diff- pending

## 2017-10-27 NOTE — H&P
History and Physical - [facility] Medicine    Patient Information: Pat Mason 59 y o  female MRN: [id number]  Unit/Bed#: -01 Encounter: [id number]  Admitting Physician: Олег Choi PA-C  PCP: Rashmi Henderson MD  Date of Admission:  10/27/17    Assessment/Plan:    Hospital Problem List:     Principal Problem:    Enteritis  Active Problems:    Tobacco abuse disorder    Transaminitis    Essential hypertension    Plan for the Primary Problem(s):  · Enteritis  · Reviewed CT of the abdomen and pelvis, still awaiting C diff PCR-placed on IV flagyl can d/c if c diff comes back negative, will place patient on contact precautions for prophylaxis  · Patient recently discharged here on October 3rd for C diff  She was given IV Flagyl inpatient and was sent home on p o  vancomycin which ended Friday  · IV pain control, GI consult, IVF    Plan for Additional Problems:   · Tobacco use  · Cessation counseling, denied Nicoderm patch  · Transaminitis  · History of hepatitis-B, hepatitis-B core antibiotic is positive likely representation of an old infection  On last admission viral load was sent, GI to follow up  · Hypertension  · P r n  vasotec, monitor    VTE Prophylaxis: Enoxaparin (Lovenox)  / sequential compression device   Code Status: full  POLST: POLST form is not discussed and not completed at this time  Anticipated Length of Stay:  Patient will be admitted on an Inpatient basis with an anticipated length of stay of  > 2 midnights  Justification for Hospital Stay: IVF and IV pain control, awaiting c diff, if positive IV abx    Total Time for Visit, including Counseling / Coordination of Care: 1 hour  Greater than 50% of this total time spent on direct patient counseling and coordination of care      Chief Complaint:   Abdominal pain    History of Present Illness:    Pat Mason is a 59 y o  female who presents with past medical history of anxiety, bipolar, chronic back pain, hepatitis-B, hypertension, rheumatoid arthritis presenting with 2 week history of generalized abdominal pain, diarrhea  She was recently discharged on October 3rd with C diff  She was sent home on p o  vancomycin which ended on Monday  She presented today because the pain has been consistent and the diarrhea has not stopped  She denies any other systemic symptoms at this time  Review of Systems:    Review of Systems   Constitutional: Negative for chills and fever  HENT: Negative for congestion  Eyes: Negative for visual disturbance  Respiratory: Negative for cough, shortness of breath and wheezing  Cardiovascular: Negative for chest pain, palpitations and leg swelling  Gastrointestinal: Positive for abdominal pain and diarrhea  Negative for abdominal distention, constipation, nausea and vomiting  Genitourinary: Negative for hematuria  Musculoskeletal: Negative for gait problem  Skin: Negative for color change  Neurological: Negative for dizziness, tremors, syncope, weakness, numbness and headaches  Psychiatric/Behavioral: Negative for confusion  Past Medical and Surgical History:     Past Medical History:   Diagnosis Date    Anxiety     [facility]     Chronic back pain     Frequent headaches     Glaucoma     Hepatitis B     Hypertension     Psychiatric disorder     bipolar    [facility]oid arthritis ([facility] )        Past Surgical History:   Procedure Laterality Date    APPENDECTOMY      CATARACT EXTRACTION Left     CHOLECYSTECTOMY      HYSTERECTOMY      INTRAOCULAR LENS INSERTION      [place] TEMPORAL ARTERY LIGATN OR BX Right 12/16/2016    Procedure: BIOPSY ARTERY TEMPORAL;  Surgeon: Wyline Schilder, MD;  Location: [facility];  Service: General    TONSILLECTOMY         Meds/Allergies:    Prior to Admission medications    Medication Sig Start Date End Date Taking?  Authorizing Provider   citalopram (CELEXA) 40 mg tablet Take 40 mg by mouth daily     Yes Historical Provider, MD clonazePAM (KlonoPIN) 0 5 mg tablet Take 0 5 mg by mouth 2 (two) times a day as needed for seizures   Yes Historical Provider, MD   gabapentin (NEURONTIN) 800 mg tablet Take 800 mg by mouth 3 (three) times a day   Yes Historical Provider, MD   metoprolol tartrate (LOPRESSOR) 25 mg tablet Take 25 mg by mouth every 12 (twelve) hours   Yes Historical Provider, MD   mirtazapine (REMERON) 30 mg tablet Take 15 mg by mouth daily at bedtime   Yes Historical Provider, MD     nurse med rec    Allergies: Allergies   Allergen Reactions    Aspirin Anaphylaxis    Robaxin [Methocarbamol] Anaphylaxis     Seizures per pt       Tramadol Hives and Shortness Of Breath    Codeine Other (See Comments)     Dizziness nausea       Social History:     Marital Status: /Civil Union   Occupation: unknown  Patient Pre-hospital Living Situation: unknown  Patient Pre-hospital Level of Mobility: full  Patient Pre-hospital Diet Restrictions: none  Substance Use History:   History   Alcohol Use No     History   Smoking Status    Current Every Day Smoker    Packs/day: 0 20    Years: 46 00    Types: Cigarettes   Smokeless Tobacco    Never Used     History   Drug Use No       Family History:    Family History   Problem Relation Age of Onset    Heart disease Mother        Physical Exam:     Vitals:   Blood Pressure: 139/70 (10/27/17 0000)  Pulse: 77 (10/27/17 0000)  Temperature: 97 6 °F (36 4 °C) (10/27/17 0000)  Temp Source: Oral (10/27/17 0000)  Respirations: 18 (10/27/17 0000)  Height: 5' 3" (160 cm) (10/27/17 0000)  Weight - Scale: 66 7 kg (147 lb 0 8 oz) (10/27/17 0000)  SpO2: 97 % (10/27/17 0000)    Physical Exam   Constitutional: She is oriented to person, place, and time  She appears well-developed and well-nourished  No distress  HENT:   Head: Normocephalic and atraumatic  Mouth/Throat: Oropharynx is clear and moist  No oropharyngeal exudate  Eyes: Conjunctivae are normal  Right eye exhibits no discharge   Left eye exhibits no discharge  No scleral icterus  Neck: Neck supple  Cardiovascular: Normal rate, regular rhythm, normal heart sounds and intact distal pulses  Exam reveals no gallop and no friction rub  No murmur heard  Pulmonary/Chest: Effort normal  No respiratory distress  She has wheezes  She has no rales  She exhibits no tenderness  Abdominal: Soft  Bowel sounds are normal  She exhibits no distension and no mass  There is tenderness  There is no rebound and no guarding  Musculoskeletal: Normal range of motion  She exhibits no edema, tenderness or deformity  Neurological: She is alert and oriented to person, place, and time  No cranial nerve deficit  Coordination normal    Skin: Skin is warm and dry  No rash noted  She is not diaphoretic  No erythema  No pallor  Psychiatric: She has a normal mood and affect  Her behavior is normal    Nursing note and vitals reviewed  Additional Data:     Lab Results: I have personally reviewed pertinent reports  Results from last 7 days  Lab Units 10/26/17  2028   WBC Thousand/uL 5 55   HEMOGLOBIN g/dL 13 5   HEMATOCRIT % 40 4   PLATELETS Thousands/uL 177   NEUTROS PCT % 63   LYMPHS PCT % 27   MONOS PCT % 6   EOS PCT % 4       Results from last 7 days  Lab Units 10/26/17  2028   SODIUM mmol/L 141   POTASSIUM mmol/L 3 9   CHLORIDE mmol/L 105   CO2 mmol/L 26   BUN mg/dL 20   CREATININE mg/dL 0 93   CALCIUM mg/dL 8 9   TOTAL PROTEIN g/dL 7 0   BILIRUBIN TOTAL mg/dL 0 30   ALK PHOS U/L 220*   ALT U/L 360*   AST U/L 285*   GLUCOSE RANDOM mg/dL 110           Imaging: I have personally reviewed pertinent reports  Ct Abdomen Pelvis With Contrast    Result Date: 10/26/2017  Narrative: CT ABDOMEN AND PELVIS WITH IV CONTRAST INDICATION:  Mid to upper abdominal pain and nausea and vomiting  History of C  difficile colitis  COMPARISON: 9/29/2017 TECHNIQUE:  CT examination of the abdomen and pelvis was performed       Reformatted images were created in axial, sagittal, and coronal planes  Radiation dose length product (DLP) for this visit:  588 2 mGy-cm   This examination, like all CT scans performed in the [facility], was performed utilizing techniques to minimize radiation dose exposure, including the use of iterative reconstruction and automated exposure control  IV Contrast:  100 mL of iohexol (OMNIPAQUE)     Enteric Contrast:  Enteric contrast was not administered  FINDINGS: ABDOMEN LOWER CHEST:  No significant abnormalities identified in the lower chest  LIVER/BILIARY TREE:  Within normal limits after cholecystectomy  Stable minimal biliary prominence  GALLBLADDER:  Gallbladder is surgically absent  SPLEEN:  Unremarkable  PANCREAS:  Unremarkable  ADRENAL GLANDS:  Stable small left adrenal nodule  No acute findings KIDNEYS/URETERS:  Unremarkable  No hydronephrosis  STOMACH AND BOWEL:  There is fluid visible in much of the colon suggesting possible diarrheal illness  There is no bowel wall thickening  There is no obstruction  Small bowel loops and stomach also contain fluid  APPENDIX:  No findings to suggest appendicitis  ABDOMINOPELVIC CAVITY:  No ascites or free intraperitoneal air  No lymphadenopathy  VESSELS:  Atherosclerotic changes are present  No evidence of aneurysm  PELVIS REPRODUCTIVE ORGANS:  Patient is status post hysterectomy  URINARY BLADDER:  Unremarkable  ABDOMINAL WALL/INGUINAL REGIONS:  Unremarkable  OSSEOUS STRUCTURES:  No acute fracture or destructive osseous lesion  Impression: Fluid visible within stomach and small bowel and most of the colon suggesting enteritis/diarrhea illness  No obstruction or perforation  Stable small left adrenal nodule Workstation performed: [id number]     Ct Abdomen Pelvis With Contrast    Result Date: 9/29/2017  Narrative: CT ABDOMEN AND PELVIS WITH IV CONTRAST INDICATION:  "Abdominal Pain (Patient brought in by EMS for diarrhea over the past few days   Patient reports sharp abdominal pain and nausea starting today)"" The patient has been having diarrhea for the past 3 days  Today with severe abdominal pain  Diffuse in nature  The patient states that last week she has begun a [address] and they  opened up her bile duct    It sounds as though she had a endoscopy and possible ERCP  " COMPARISON: CT renal stone study 4/30/2017 TECHNIQUE:  CT examination of the abdomen and pelvis was performed  Reformatted images were created in axial, sagittal, and coronal planes  Radiation dose length product (DLP) for this visit:  588 mGy-cm   This examination, like all CT scans performed in the [facility], was performed utilizing techniques to minimize radiation dose exposure, including the use of iterative reconstruction and automated exposure control  IV Contrast:  100 mL of iohexol (OMNIPAQUE)  350 Enteric Contrast:  Enteric contrast was not administered  FINDINGS: ABDOMEN LOWER CHEST:  No significant abnormalities identified in the lower chest  LIVER/BILIARY TREE:  Minimal intrahepatic biliary dilation may be of a normal degree status post cholecystectomy  GALLBLADDER:  Cholecystectomy  SPLEEN:  Unremarkable  PANCREAS:  Unremarkable  ADRENAL GLANDS:  Stable adenoma the medial limb of the left adrenal gland  KIDNEYS/URETERS:  Unremarkable  No hydronephrosis  STOMACH AND BOWEL:  Fluid-filled colon in keeping with the patient's history of diarrhea  No definite areas of prominent colonic wall thickening  No definite small bowel thickening; enteric contrast is not administered  APPENDIX:  Appendectomy  ABDOMINOPELVIC CAVITY:  No ascites or free intraperitoneal air  No lymphadenopathy  VESSELS:  Atherosclerotic changes are present  No evidence of aneurysm  PELVIS REPRODUCTIVE ORGANS:  Hysterectomy  URINARY BLADDER:  Unremarkable  ABDOMINAL WALL/INGUINAL REGIONS:  Unremarkable  OSSEOUS STRUCTURES:  No acute fracture or destructive osseous lesion       Impression: Fluid-filled colon in keeping with the submitted history of diarrhea  No definite areas of bowel wall thickening  No other acute findings  Workstation performed: [id number]       EKG, Pathology, and Other Studies Reviewed on Admission:   · CT abdomen/pelvis, CBC, trop, lactic, CMP    Allscripts / Epic Records Reviewed: Yes     ** Please Note: This note has been constructed using a voice recognition system   **

## 2017-10-27 NOTE — CONSULTS
Consultation - [facility]  Eli Sharp 59 y o  female MRN: [id number]  Unit/Bed#: MS Eason5Corey Encounter: [id number]        Consults    Reason for Consult / Principal Problem: Abdominal Pain, Transaminitis    HPI: Mr Renaldo Russ is a 58 yo F with a PMH of bipolar disorder, RA, HTN, anxiety, presenting with 2 weeks of RUQ/epigastric abdominal pain and persistent diarrhea  She was recently hospitalized for Cdiff and discharged at the beginning of October with vancomycin course  She felt well for a few days after completing the course and then started again with the above symptoms  She denies any melena but is occasionally having BRBPR when wiping, not mixed in with the stool  She denies any nausea or vomiting and has been able to keep food down at home  She has noted an increase in her peptic symptoms recently and she does not recall when her last endoscopy was  She believes she has a history of hepatitis B but her lab work has not shown any evidence of chronic Hep B infection  She is s/p cholecystectomy in December 2016 and she believes she had gallstones stuck in the bile ducts but isn't sure if she required ERCP at [facility]  She denies significant alcohol use or recent tylenol ingestion  She is fixated on her pain medications throughout our discussion and asking for more frequent dosing       REVIEW OF SYSTEMS: Negative except for as stated above    Historical Information   Past Medical History:   Diagnosis Date    Anxiety     Bipolar 1 disorder ([facility] )     Chronic back pain     Frequent headaches     Glaucoma     Hepatitis B     Hypertension     Psychiatric disorder     bipolar    Rheumatoid arthritis ([facility] )      Past Surgical History:   Procedure Laterality Date    APPENDECTOMY      CATARACT EXTRACTION Left     CHOLECYSTECTOMY      HYSTERECTOMY      INTRAOCULAR LENS INSERTION      [place] TEMPORAL ARTERY LIGATN OR BX Right 12/16/2016    Procedure: BIOPSY ARTERY TEMPORAL;  Surgeon: Ramandeep Garg Julia Chiu MD;  Location: [facility];  Service: General    TONSILLECTOMY       Social History   History   Alcohol Use No     History   Drug Use No     History   Smoking Status    Current Every Day Smoker    Packs/day: 0 20    Years: 46 00    Types: Cigarettes   Smokeless Tobacco    Never Used     Family History   Problem Relation Age of Onset    Heart disease Mother        Meds/Allergies     Prescriptions Prior to Admission   Medication    citalopram (CELEXA) 40 mg tablet    clonazePAM (KlonoPIN) 0 5 mg tablet    gabapentin (NEURONTIN) 800 mg tablet    metoprolol tartrate (LOPRESSOR) 25 mg tablet    mirtazapine (REMERON) 30 mg tablet     Current Facility-Administered Medications   Medication Dose Route Frequency    acetaminophen (TYLENOL) tablet 650 mg  650 mg Oral Q6H PRN    albuterol inhalation solution 2 5 mg  2 5 mg Nebulization Q6H PRN    calcium carbonate (TUMS) chewable tablet 1,000 mg  1,000 mg Oral Daily PRN    citalopram (CeleXA) tablet 40 mg  40 mg Oral Daily    clonazePAM (KlonoPIN) tablet 0 5 mg  0 5 mg Oral BID PRN    dicyclomine (BENTYL) capsule 20 mg  20 mg Oral 4x Daily (AC & HS)    enalaprilat (VASOTEC) injection 0 625 mg  0 625 mg Intravenous Q6H PRN    enoxaparin (LOVENOX) subcutaneous injection 40 mg  40 mg Subcutaneous Daily    gabapentin (NEURONTIN) capsule 800 mg  800 mg Oral TID    metoprolol tartrate (LOPRESSOR) tablet 25 mg  25 mg Oral Q12H OSCAR    metroNIDAZOLE (FLAGYL) IVPB (premix) 500 mg  500 mg Intravenous Q8H    mirtazapine (REMERON) tablet 15 mg  15 mg Oral HS    morphine injection 2 mg  2 mg Intravenous Q4H PRN    ondansetron (ZOFRAN) injection 4 mg  4 mg Intravenous Q6H PRN    saccharomyces boulardii (FLORASTOR) capsule 250 mg  250 mg Oral BID    sodium chloride 0 9 % infusion  50 mL/hr Intravenous Continuous       Allergies   Allergen Reactions    Aspirin Anaphylaxis    Robaxin [Methocarbamol] Anaphylaxis     Seizures per pt       Tramadol Hives and Shortness Of Breath    Codeine Other (See Comments)     Dizziness nausea           Objective     Blood pressure 121/65, pulse 78, temperature 98 4 °F (36 9 °C), temperature source Oral, resp  rate 18, height 5' 3" (1 6 m), weight 66 7 kg (147 lb 0 8 oz), SpO2 92 %  Intake/Output Summary (Last 24 hours) at 10/27/17 1144  Last data filed at 10/27/17 1100   Gross per 24 hour   Intake             1250 ml   Output              330 ml   Net              920 ml         PHYSICAL EXAM:      General Appearance:   Alert, cooperative, no distress   HEENT:   Normocephalic, atraumatic, anicteric    Neck:  Supple, symmetrical, trachea midline   Lungs:   Clear to auscultation bilaterally, no respiratory distress   Heart[de-identified]   RRR, no murmur   Abdomen:   Non-distended, soft, BS active, pt exhibits pain out of proportion to exam, she has no guarding or rigidity on palpation   Rectal:   Deferred    Extremities:  No edema or cyanosis   Skin:  No jaundice or pallor     Lab Results:     Results from last 7 days  Lab Units 10/27/17  0638 10/26/17  2028   WBC Thousand/uL 5 39 5 55   HEMOGLOBIN g/dL 15 5* 13 5   HEMATOCRIT % 48 8* 40 4   PLATELETS Thousands/uL 156 177   NEUTROS PCT %  --  63   LYMPHS PCT %  --  27   MONOS PCT %  --  6   EOS PCT %  --  4       Results from last 7 days  Lab Units 10/27/17  0528   SODIUM mmol/L 141   POTASSIUM mmol/L 4 4   CHLORIDE mmol/L 109*   CO2 mmol/L 24   BUN mg/dL 15   CREATININE mg/dL 0 72   CALCIUM mg/dL 8 6   TOTAL PROTEIN g/dL 6 8   BILIRUBIN TOTAL mg/dL 0 20   ALK PHOS U/L 218*   ALT U/L 289*   AST U/L 135*   GLUCOSE RANDOM mg/dL 109       Results from last 7 days  Lab Units 10/27/17  0528   INR  0 92       Results from last 7 days  Lab Units 10/26/17  2028   LIPASE u/L 65*       Imaging Studies: I have personally reviewed pertinent imaging studies        Ct Abdomen Pelvis With Contrast  Result Date: 10/26/2017  Impression: Fluid visible within stomach and small bowel and most of the colon suggesting enteritis/diarrhea illness  No obstruction or perforation  Stable small left adrenal nodule  ASSESSMENT and PLAN:    Principal Problem:    Enteritis  Active Problems:    Tobacco abuse disorder    Transaminitis    Essential hypertension    Transaminitis  - CT on admission without any noteable abnormalities of the liver  - Check RUQ US to evaluate biliary ducts for radio-opaque stones due to her potential history of choledocholithiasis earlier this year; may need to consider MRI/MRCP  - Hepatitis panel in the past with negative Hep A, Hep C ab  - She has a reactive Hep B core total ab with negative surface antigen and no detectable HBV DNA indicating possible prior exposure to Hep B with no chronic infection or false positive  - NICKI negative, iron panel last month not concerning for hemochromatosis  - Check AMA, anti-smooth muscle ab to evaluate for autoimmune hepatitis v PBC  - Tylenol level neg  - Continue to trend LFTs; she has a normal INR and platelets      Epigastric/RUQ Pain  Diarrhea  - Possible viral gastroenteritis v IBS diarrhea v biliary etiology   - Cdiff negative  - D/C flagyl and observe  - She does seem to have a component of drug seeking as she is fixated on morphine dosing and her pain is out of proportion to her exam  - Add bentyl 20 mg q6, probiotic  - Consider adding cholestyramine for the diarrhea  - Diet as tolerated  - Add pepcid for intermittent reflux symptoms      Patient will be seen and examined by Dr Jaguar Hernandez  All ascencio medical decisions were made by Dr Jaguar Hernandez  Thank you for allowing us to participate in the care of this patient  We will follow with you closely

## 2017-10-28 PROCEDURE — 86256 FLUORESCENT ANTIBODY TITER: CPT | Performed by: PHYSICIAN ASSISTANT

## 2017-10-28 PROCEDURE — 94760 N-INVAS EAR/PLS OXIMETRY 1: CPT

## 2017-10-28 PROCEDURE — 86235 NUCLEAR ANTIGEN ANTIBODY: CPT | Performed by: PHYSICIAN ASSISTANT

## 2017-10-28 PROCEDURE — 94664 DEMO&/EVAL PT USE INHALER: CPT

## 2017-10-28 RX ORDER — MORPHINE SULFATE 2 MG/ML
1 INJECTION, SOLUTION INTRAMUSCULAR; INTRAVENOUS EVERY 2 HOUR PRN
Status: DISCONTINUED | OUTPATIENT
Start: 2017-10-28 | End: 2017-10-29

## 2017-10-28 RX ORDER — CLONAZEPAM 0.5 MG/1
0.5 TABLET ORAL 2 TIMES DAILY PRN
Status: DISCONTINUED | OUTPATIENT
Start: 2017-10-28 | End: 2017-10-31 | Stop reason: HOSPADM

## 2017-10-28 RX ORDER — HYDROXYZINE HYDROCHLORIDE 25 MG/1
25 TABLET, FILM COATED ORAL EVERY 6 HOURS PRN
Status: DISCONTINUED | OUTPATIENT
Start: 2017-10-28 | End: 2017-10-31 | Stop reason: HOSPADM

## 2017-10-28 RX ADMIN — HYDROXYZINE HYDROCHLORIDE 25 MG: 25 TABLET ORAL at 17:44

## 2017-10-28 RX ADMIN — Medication 250 MG: at 15:46

## 2017-10-28 RX ADMIN — CLONAZEPAM 0.5 MG: 0.5 TABLET ORAL at 22:25

## 2017-10-28 RX ADMIN — DICYCLOMINE HYDROCHLORIDE 20 MG: 10 CAPSULE ORAL at 15:44

## 2017-10-28 RX ADMIN — ACETAMINOPHEN 650 MG: 325 TABLET ORAL at 22:26

## 2017-10-28 RX ADMIN — DICYCLOMINE HYDROCHLORIDE 20 MG: 10 CAPSULE ORAL at 05:37

## 2017-10-28 RX ADMIN — GABAPENTIN 800 MG: 400 CAPSULE ORAL at 22:25

## 2017-10-28 RX ADMIN — METOPROLOL TARTRATE 25 MG: 25 TABLET ORAL at 22:26

## 2017-10-28 RX ADMIN — METOPROLOL TARTRATE 25 MG: 25 TABLET ORAL at 10:28

## 2017-10-28 RX ADMIN — MORPHINE SULFATE 1 MG: 2 INJECTION, SOLUTION INTRAMUSCULAR; INTRAVENOUS at 23:45

## 2017-10-28 RX ADMIN — GABAPENTIN 800 MG: 400 CAPSULE ORAL at 15:44

## 2017-10-28 RX ADMIN — ENOXAPARIN SODIUM 40 MG: 40 INJECTION SUBCUTANEOUS at 10:29

## 2017-10-28 RX ADMIN — DICYCLOMINE HYDROCHLORIDE 20 MG: 10 CAPSULE ORAL at 10:30

## 2017-10-28 RX ADMIN — CITALOPRAM HYDROBROMIDE 40 MG: 20 TABLET ORAL at 10:29

## 2017-10-28 RX ADMIN — GABAPENTIN 800 MG: 400 CAPSULE ORAL at 10:29

## 2017-10-28 RX ADMIN — MIRTAZAPINE 15 MG: 15 TABLET, FILM COATED ORAL at 22:26

## 2017-10-28 RX ADMIN — FAMOTIDINE 20 MG: 20 TABLET, FILM COATED ORAL at 15:45

## 2017-10-28 RX ADMIN — MORPHINE SULFATE 2 MG: 2 INJECTION, SOLUTION INTRAMUSCULAR; INTRAVENOUS at 10:30

## 2017-10-28 RX ADMIN — Medication 250 MG: at 10:29

## 2017-10-28 RX ADMIN — FAMOTIDINE 20 MG: 20 TABLET, FILM COATED ORAL at 10:30

## 2017-10-28 RX ADMIN — DICYCLOMINE HYDROCHLORIDE 20 MG: 10 CAPSULE ORAL at 22:26

## 2017-10-28 RX ADMIN — MORPHINE SULFATE 2 MG: 2 INJECTION, SOLUTION INTRAMUSCULAR; INTRAVENOUS at 03:05

## 2017-10-28 RX ADMIN — MORPHINE SULFATE 2 MG: 2 INJECTION, SOLUTION INTRAMUSCULAR; INTRAVENOUS at 06:43

## 2017-10-28 NOTE — PROGRESS NOTES
Patient hit her foot on the IV pole as she was ambulating and has a small abrasion to right 5th toe  Area cleaned and dressed with gauze and cling

## 2017-10-29 LAB
ALBUMIN SERPL BCP-MCNC: 2.8 G/DL (ref 3.5–5)
ALP SERPL-CCNC: 207 U/L (ref 46–116)
ALT SERPL W P-5'-P-CCNC: 165 U/L (ref 12–78)
ANION GAP SERPL CALCULATED.3IONS-SCNC: 8 MMOL/L (ref 4–13)
AST SERPL W P-5'-P-CCNC: 53 U/L (ref 5–45)
BILIRUB SERPL-MCNC: 0.3 MG/DL (ref 0.2–1)
BUN SERPL-MCNC: 5 MG/DL (ref 5–25)
CALCIUM SERPL-MCNC: 8.7 MG/DL (ref 8.3–10.1)
CHLORIDE SERPL-SCNC: 108 MMOL/L (ref 100–108)
CO2 SERPL-SCNC: 26 MMOL/L (ref 21–32)
CREAT SERPL-MCNC: 0.63 MG/DL (ref 0.6–1.3)
GFR SERPL CREATININE-BSD FRML MDRD: 98 ML/MIN/1.73SQ M
GLUCOSE SERPL-MCNC: 86 MG/DL (ref 65–140)
POTASSIUM SERPL-SCNC: 3.9 MMOL/L (ref 3.5–5.3)
PROT SERPL-MCNC: 6.1 G/DL (ref 6.4–8.2)
SODIUM SERPL-SCNC: 142 MMOL/L (ref 136–145)

## 2017-10-29 PROCEDURE — 80053 COMPREHEN METABOLIC PANEL: CPT | Performed by: INTERNAL MEDICINE

## 2017-10-29 RX ORDER — MORPHINE SULFATE 2 MG/ML
0.5 INJECTION, SOLUTION INTRAMUSCULAR; INTRAVENOUS EVERY 2 HOUR PRN
Status: DISCONTINUED | OUTPATIENT
Start: 2017-10-29 | End: 2017-10-30

## 2017-10-29 RX ADMIN — ENOXAPARIN SODIUM 40 MG: 40 INJECTION SUBCUTANEOUS at 09:06

## 2017-10-29 RX ADMIN — GABAPENTIN 800 MG: 400 CAPSULE ORAL at 09:06

## 2017-10-29 RX ADMIN — MORPHINE SULFATE 0.5 MG: 2 INJECTION, SOLUTION INTRAMUSCULAR; INTRAVENOUS at 14:52

## 2017-10-29 RX ADMIN — FAMOTIDINE 20 MG: 20 TABLET, FILM COATED ORAL at 09:06

## 2017-10-29 RX ADMIN — MIRTAZAPINE 15 MG: 15 TABLET, FILM COATED ORAL at 20:56

## 2017-10-29 RX ADMIN — CLONAZEPAM 0.5 MG: 0.5 TABLET ORAL at 20:55

## 2017-10-29 RX ADMIN — CLONAZEPAM 0.5 MG: 0.5 TABLET ORAL at 09:06

## 2017-10-29 RX ADMIN — METOPROLOL TARTRATE 25 MG: 25 TABLET ORAL at 20:56

## 2017-10-29 RX ADMIN — MORPHINE SULFATE 0.5 MG: 2 INJECTION, SOLUTION INTRAMUSCULAR; INTRAVENOUS at 09:07

## 2017-10-29 RX ADMIN — DICYCLOMINE HYDROCHLORIDE 20 MG: 10 CAPSULE ORAL at 20:55

## 2017-10-29 RX ADMIN — METOPROLOL TARTRATE 25 MG: 25 TABLET ORAL at 09:06

## 2017-10-29 RX ADMIN — ACETAMINOPHEN 650 MG: 325 TABLET ORAL at 09:06

## 2017-10-29 RX ADMIN — MORPHINE SULFATE 1 MG: 2 INJECTION, SOLUTION INTRAMUSCULAR; INTRAVENOUS at 02:46

## 2017-10-29 RX ADMIN — CLONAZEPAM 0.5 MG: 0.5 TABLET ORAL at 20:52

## 2017-10-29 RX ADMIN — GABAPENTIN 800 MG: 400 CAPSULE ORAL at 14:52

## 2017-10-29 RX ADMIN — GABAPENTIN 800 MG: 400 CAPSULE ORAL at 20:55

## 2017-10-29 RX ADMIN — DICYCLOMINE HYDROCHLORIDE 20 MG: 10 CAPSULE ORAL at 06:05

## 2017-10-29 RX ADMIN — CITALOPRAM HYDROBROMIDE 40 MG: 20 TABLET ORAL at 09:06

## 2017-10-29 RX ADMIN — MORPHINE SULFATE 0.5 MG: 2 INJECTION, SOLUTION INTRAMUSCULAR; INTRAVENOUS at 18:24

## 2017-10-29 RX ADMIN — Medication 250 MG: at 09:06

## 2017-10-29 RX ADMIN — FAMOTIDINE 20 MG: 20 TABLET, FILM COATED ORAL at 14:53

## 2017-10-29 RX ADMIN — MORPHINE SULFATE 1 MG: 2 INJECTION, SOLUTION INTRAMUSCULAR; INTRAVENOUS at 06:05

## 2017-10-29 RX ADMIN — DICYCLOMINE HYDROCHLORIDE 20 MG: 10 CAPSULE ORAL at 12:22

## 2017-10-29 RX ADMIN — ACETAMINOPHEN 650 MG: 325 TABLET ORAL at 20:51

## 2017-10-29 RX ADMIN — DICYCLOMINE HYDROCHLORIDE 20 MG: 10 CAPSULE ORAL at 14:54

## 2017-10-29 RX ADMIN — Medication 250 MG: at 14:53

## 2017-10-29 RX ADMIN — MORPHINE SULFATE 0.5 MG: 2 INJECTION, SOLUTION INTRAMUSCULAR; INTRAVENOUS at 12:20

## 2017-10-30 LAB
ACTIN IGG SERPL-ACNC: 4 UNITS (ref 0–19)
MITOCHONDRIA M2 IGG SER-ACNC: 10.6 UNITS (ref 0–20)

## 2017-10-30 RX ORDER — MORPHINE SULFATE 2 MG/ML
0.5 INJECTION, SOLUTION INTRAMUSCULAR; INTRAVENOUS EVERY 4 HOURS PRN
Status: DISCONTINUED | OUTPATIENT
Start: 2017-10-30 | End: 2017-10-31

## 2017-10-30 RX ADMIN — MORPHINE SULFATE 0.5 MG: 2 INJECTION, SOLUTION INTRAMUSCULAR; INTRAVENOUS at 15:21

## 2017-10-30 RX ADMIN — FAMOTIDINE 20 MG: 20 TABLET, FILM COATED ORAL at 09:31

## 2017-10-30 RX ADMIN — MORPHINE SULFATE 0.5 MG: 2 INJECTION, SOLUTION INTRAMUSCULAR; INTRAVENOUS at 23:12

## 2017-10-30 RX ADMIN — DICYCLOMINE HYDROCHLORIDE 20 MG: 10 CAPSULE ORAL at 20:52

## 2017-10-30 RX ADMIN — GABAPENTIN 800 MG: 400 CAPSULE ORAL at 20:50

## 2017-10-30 RX ADMIN — DICYCLOMINE HYDROCHLORIDE 20 MG: 10 CAPSULE ORAL at 04:59

## 2017-10-30 RX ADMIN — MORPHINE SULFATE 0.5 MG: 2 INJECTION, SOLUTION INTRAMUSCULAR; INTRAVENOUS at 07:52

## 2017-10-30 RX ADMIN — MIRTAZAPINE 15 MG: 15 TABLET, FILM COATED ORAL at 20:52

## 2017-10-30 RX ADMIN — MORPHINE SULFATE 0.5 MG: 2 INJECTION, SOLUTION INTRAMUSCULAR; INTRAVENOUS at 10:31

## 2017-10-30 RX ADMIN — GABAPENTIN 800 MG: 400 CAPSULE ORAL at 15:21

## 2017-10-30 RX ADMIN — DICYCLOMINE HYDROCHLORIDE 20 MG: 10 CAPSULE ORAL at 12:10

## 2017-10-30 RX ADMIN — Medication 250 MG: at 17:18

## 2017-10-30 RX ADMIN — METOPROLOL TARTRATE 25 MG: 25 TABLET ORAL at 20:50

## 2017-10-30 RX ADMIN — MORPHINE SULFATE 0.5 MG: 2 INJECTION, SOLUTION INTRAMUSCULAR; INTRAVENOUS at 17:55

## 2017-10-30 RX ADMIN — GABAPENTIN 800 MG: 400 CAPSULE ORAL at 09:34

## 2017-10-30 RX ADMIN — Medication 250 MG: at 09:33

## 2017-10-30 RX ADMIN — MORPHINE SULFATE 0.5 MG: 2 INJECTION, SOLUTION INTRAMUSCULAR; INTRAVENOUS at 03:04

## 2017-10-30 RX ADMIN — METOPROLOL TARTRATE 25 MG: 25 TABLET ORAL at 09:31

## 2017-10-30 RX ADMIN — MORPHINE SULFATE 0.5 MG: 2 INJECTION, SOLUTION INTRAMUSCULAR; INTRAVENOUS at 12:31

## 2017-10-30 RX ADMIN — CITALOPRAM HYDROBROMIDE 40 MG: 20 TABLET ORAL at 09:33

## 2017-10-30 RX ADMIN — DICYCLOMINE HYDROCHLORIDE 20 MG: 10 CAPSULE ORAL at 15:20

## 2017-10-30 RX ADMIN — ENOXAPARIN SODIUM 40 MG: 40 INJECTION SUBCUTANEOUS at 09:35

## 2017-10-30 RX ADMIN — FAMOTIDINE 20 MG: 20 TABLET, FILM COATED ORAL at 17:17

## 2017-10-30 RX ADMIN — MORPHINE SULFATE 0.5 MG: 2 INJECTION, SOLUTION INTRAMUSCULAR; INTRAVENOUS at 05:01

## 2017-10-30 NOTE — PROGRESS NOTES
[facility] Progress Note  Patient: James Lang 59 y o  female   MRN: [id number]  PCP: Cheryl Dumont MD  Unit/Bed#: -01 Encounter: [id number]  Date Of Visit: 10/28/17    Assessment:    Principal Problem:    Enteritis  Active Problems:    Tobacco abuse disorder    Transaminitis    Essential hypertension      Plan:    · Enteritis:  Patient continues ask for pain medication despite not appearing to be an excessive amount of pain  States she still having stabbing abdominal pain  States stools have improved slightly and therefore we have elected to advance diet  Titrating opiates down   Patient aware  · Depression/Bipolar disease : continue currents med, therapeutic listening enlisted  Patient with complex grief related to death of son  She has an appointment for counseling in one week  · Tobacco Abuse: counseled against  · Transaminitis: improving  Serial labs check again in am   · Periperal Neuropathy: continue neurontin      VTE Pharmacologic Prophylaxis:   Pharmacologic: Enoxaparin (Lovenox)  Mechanical VTE Prophylaxis in Place: Yes    Patient Centered Rounds: I have evaluated patient without nursing staff present due to and discussed with patient's nurse    Discussions with Specialists or Other Care Team Provider:     Education and Discussions with Family / Patient: spent time disucssing patient's excessive pain needs in the context of her exam findings  Agreeable to titrae pain meds down  Time Spent for Care: 45 minutes  More than 50% of total time spent on counseling and coordination of care as described above  Current Length of Stay: 4 day(s)    Current Patient Status: Inpatient   Certification Statement: The patient will continue to require additional inpatient hospital stay due to working on uptitrating diet, and down titrating pain meds   Following serial labs    Discharge Plan / Estimated Discharge Date: hope for 48 hours    Code Status: Level 1 - Full Code      Subjective: Patient continues to describe stabbing pain in the abdomen  No N,V  Soft stooling persists    Objective:     Vitals:   Temp (24hrs), Av 2 °F (36 8 °C), Min:98 °F (36 7 °C), Max:98 4 °F (36 9 °C)    HR:  [60-78] 64  Resp:  [18] 18  BP: (103-140)/(57-72) 140/66  SpO2:  [95 %-96 %] 96 %  Body mass index is 26 05 kg/m²  Input and Output Summary (last 24 hours): Intake/Output Summary   Gross per 24 hour   Intake              490   Output                0 ml   Net              INc       Physical Exam:     Physical Exam    General: Mild distress secondary to perceived pain  Tearful about son's death two years ago  PERRL, EOMI, anicteric, MMM  Chest: decreased but clear  CVS: RRR, S1, S2,   Abd: soft, not tender on palpation, NO grimace  Ext: no clubbing cyanosis or edema  Neuro: tearful, but oriented CN intact      Additional Data:     Labs:      Results from last 7 days  Lab Units 10/27/17  0638 10/26/17  2028   WBC Thousand/uL 5 39 5 55   HEMOGLOBIN g/dL 15 5* 13 5   HEMATOCRIT % 48 8* 40 4   PLATELETS Thousands/uL 156 177   NEUTROS PCT %  --  63   LYMPHS PCT %  --  27   MONOS PCT %  --  6   EOS PCT %  --  4       Results from last 7 days  Lab Units 10/29/17  0508   SODIUM mmol/L 142   POTASSIUM mmol/L 3 9   CHLORIDE mmol/L 108   CO2 mmol/L 26   BUN mg/dL 5   CREATININE mg/dL 0 63   CALCIUM mg/dL 8 7   TOTAL PROTEIN g/dL 6 1*   BILIRUBIN TOTAL mg/dL 0 30   ALK PHOS U/L 207*   ALT U/L 165*   AST U/L 53*   GLUCOSE RANDOM mg/dL 86       Results from last 7 days  Lab Units 10/27/17  0528   INR  0 92       * I Have Reviewed All Lab Data Listed Above  * Additional Pertinent Lab Tests Reviewed: All Labs Within Last 24 Hours Reviewed    Imaging:    Imaging Reports Reviewed Today Include: none  Imaging Personally Reviewed by Myself Includes:  none    Recent Cultures (last 7 days):       Results from last 7 days  Lab Units 10/26/17  2200   C DIFF TOXIN B  NEGATIVE for C difficle toxin by PCR          Last 24 Hours Medication List:     citalopram 40 mg Oral Daily   dicyclomine 20 mg Oral 4x Daily (AC & HS)   enoxaparin 40 mg Subcutaneous Daily   famotidine 20 mg Oral BID   gabapentin 800 mg Oral TID   metoprolol tartrate 25 mg Oral Q12H OSCAR   mirtazapine 15 mg Oral HS   saccharomyces boulardii 250 mg Oral BID        Today, Patient Was Seen By: Jenifer Mathis MD Pager : [phone]

## 2017-10-30 NOTE — PROGRESS NOTES
[facility] Progress Note  Patient: Serenity Santillan 59 y o  female   MRN: [id number]  PCP: Nick Blizzard, MD  Unit/Bed#: -01 Encounter: [id number]  Date Of Visit: 10/29/17    Assessment:    Principal Problem:    Enteritis  Active Problems:    Tobacco abuse disorder    Transaminitis    Essential hypertension      Plan:    · Enteritis:  Patient still complaining of abdominal discomfort however this is mixed with multiple mood outbursts I suspect that there is a psych psychiatric component to this symptom  She does not have any further diarrhea so have advanced her diet and working with her bipolar issues  Continue with symptomatic support using Bentyl and Pepcid  I have decreased the patient's morphine and explained her that we are going to do that again likely in the a [facility] Continue probiotics  Questran was started because diarrhea improved  · Bipolar disorder:  Patient is not open to allowing me to have Psychiatry review her medications she does appear to be poorly controlled with her mood disturbance  Will continue with hydroxyzine and I added in addition to her Remeron and Celexa  · Transaminitis:  Trending back down to normal unclear etiology  Felt to have possible transient obstruction  Numbers are trending downward so we continue to monitor she already had a hepatitis workup continue to monitor value  · Tobacco abuse continue Nicoderm      VTE Pharmacologic Prophylaxis:   Pharmacologic: Enoxaparin (Lovenox)  Mechanical VTE Prophylaxis in Place: Yes    Patient Centered Rounds: I have evaluated patient without nursing staff present due to discussed with nursing outside the room    Discussions with Specialists or Other Care Team Provider: none    Education and Discussions with Family / Patient: Updated patient on plan of care, titrating pain meds further down    Time Spent for Care: 30 minutes    More than 50% of total time spent on counseling and coordination of care as described above     Current Length of Stay: 4 day(s)    Current Patient Status: Inpatient   Certification Statement: The patient will continue to require additional inpatient hospital stay due to titrating pain meds , diet now fully advanced , following upon transamintis    Discharge Plan / Estimated Discharge Date: TBD    Code Status: Level 1 - Full Code      Subjective:   Patient reports loose stool again present, fixated on pain but permits me to exam and suggest decrease in opiate medications  Patient not open to psych eval which was offered  Objective:     Vitals:   Temp (24hrs), Av 2 °F (36 8 °C), Min:98 °F (36 7 °C), Max:98 4 °F (36 9 °C)    HR:  [60-78] 64  Resp:  [18] 18  BP: (103-140)/(57-72) 140/66  SpO2:  [95 %-96 %] 96 %  Body mass index is 26 05 kg/m²  Input and Output Summary (last 24 hours): Intake/Output Summary (Last 24 hours)   Last data filed at 10/29/17 2130   Gross per 24 hour   Intake              240 ml   Output                0 ml   Net              240 ml   Patient using restroom independent    Physical Exam:     Physical Exam General : no acute distress  Nc, At, PERRL, Eomi, anicteric, mmm  Chest : CTA  CVS: RRR, S1, S2 no murmur rub or gallop  Abd: soft, no guarding or rebound   Patient reports generalized lower abdomen tenderness but no grimace on distracted exam  Ext: no clubbing cyanosis or edema  Neuro: periods of hypomania pacing , cn intact         Additional Data:     Labs:      Results from last 7 days  Lab Units 10/27/17  0638 10/26/17  2028   WBC Thousand/uL 5 39 5 55   HEMOGLOBIN g/dL 15 5* 13 5   HEMATOCRIT % 48 8* 40 4   PLATELETS Thousands/uL 156 177   NEUTROS PCT %  --  63   LYMPHS PCT %  --  27   MONOS PCT %  --  6   EOS PCT %  --  4       Results from last 7 days  Lab Units 10/29/17  0508   SODIUM mmol/L 142   POTASSIUM mmol/L 3 9   CHLORIDE mmol/L 108   CO2 mmol/L 26   BUN mg/dL 5   CREATININE mg/dL 0 63   CALCIUM mg/dL 8 7   TOTAL PROTEIN g/dL 6 1*   BILIRUBIN TOTAL mg/dL 0 30   ALK PHOS U/L 207*   ALT U/L 165*   AST U/L 53*   GLUCOSE RANDOM mg/dL 86       Results from last 7 days  Lab Units 10/27/17  0528   INR  0 92       * I Have Reviewed All Lab Data Listed Above  * Additional Pertinent Lab Tests Reviewed: All Labs Within Last 24 Hours Reviewed    Imaging:    Imaging Reports Reviewed Today Include: none  Imaging Personally Reviewed by Myself Includes:  none    Recent Cultures (last 7 days):       Results from last 7 days  Lab Units 10/26/17  2200   C DIFF TOXIN B  NEGATIVE for C difficle toxin by PCR          Last 24 Hours Medication List:     citalopram 40 mg Oral Daily   dicyclomine 20 mg Oral 4x Daily (AC & HS)   enoxaparin 40 mg Subcutaneous Daily   famotidine 20 mg Oral BID   gabapentin 800 mg Oral TID   metoprolol tartrate 25 mg Oral Q12H OSCAR   mirtazapine 15 mg Oral HS   saccharomyces boulardii 250 mg Oral BID        Today, Patient Was Seen By: Bud Anaya MD Pager : [phone]

## 2017-10-31 VITALS
RESPIRATION RATE: 18 BRPM | HEART RATE: 82 BPM | HEIGHT: 63 IN | WEIGHT: 147.05 LBS | BODY MASS INDEX: 26.05 KG/M2 | SYSTOLIC BLOOD PRESSURE: 121 MMHG | OXYGEN SATURATION: 96 % | TEMPERATURE: 98.3 F | DIASTOLIC BLOOD PRESSURE: 81 MMHG

## 2017-10-31 PROBLEM — K52.9 ENTERITIS: Status: RESOLVED | Noted: 2017-10-27 | Resolved: 2017-10-31

## 2017-10-31 LAB
ALBUMIN SERPL BCP-MCNC: 3.3 G/DL (ref 3.5–5)
ALP SERPL-CCNC: 216 U/L (ref 46–116)
ALT SERPL W P-5'-P-CCNC: 109 U/L (ref 12–78)
ANION GAP SERPL CALCULATED.3IONS-SCNC: 11 MMOL/L (ref 4–13)
AST SERPL W P-5'-P-CCNC: 23 U/L (ref 5–45)
BILIRUB SERPL-MCNC: 0.3 MG/DL (ref 0.2–1)
BUN SERPL-MCNC: 9 MG/DL (ref 5–25)
CALCIUM SERPL-MCNC: 8.9 MG/DL (ref 8.3–10.1)
CHLORIDE SERPL-SCNC: 106 MMOL/L (ref 100–108)
CO2 SERPL-SCNC: 25 MMOL/L (ref 21–32)
CREAT SERPL-MCNC: 0.84 MG/DL (ref 0.6–1.3)
GFR SERPL CREATININE-BSD FRML MDRD: 76 ML/MIN/1.73SQ M
GLUCOSE SERPL-MCNC: 150 MG/DL (ref 65–140)
POTASSIUM SERPL-SCNC: 4 MMOL/L (ref 3.5–5.3)
PROT SERPL-MCNC: 7.2 G/DL (ref 6.4–8.2)
SODIUM SERPL-SCNC: 142 MMOL/L (ref 136–145)

## 2017-10-31 PROCEDURE — 80053 COMPREHEN METABOLIC PANEL: CPT | Performed by: INTERNAL MEDICINE

## 2017-10-31 RX ORDER — DICYCLOMINE HYDROCHLORIDE 10 MG/1
10 CAPSULE ORAL EVERY 6 HOURS PRN
Qty: 40 CAPSULE | Refills: 0 | Status: SHIPPED | OUTPATIENT
Start: 2017-10-31 | End: 2017-11-21

## 2017-10-31 RX ORDER — SACCHAROMYCES BOULARDII 250 MG
250 CAPSULE ORAL 2 TIMES DAILY
Refills: 0 | COMMUNITY
Start: 2017-10-31 | End: 2017-11-21

## 2017-10-31 RX ORDER — DICYCLOMINE HYDROCHLORIDE 10 MG/1
10 CAPSULE ORAL
Qty: 40 CAPSULE | Refills: 0 | Status: SHIPPED | OUTPATIENT
Start: 2017-10-31 | End: 2017-10-31

## 2017-10-31 RX ADMIN — MORPHINE SULFATE 0.5 MG: 2 INJECTION, SOLUTION INTRAMUSCULAR; INTRAVENOUS at 02:05

## 2017-10-31 RX ADMIN — DICYCLOMINE HYDROCHLORIDE 20 MG: 10 CAPSULE ORAL at 05:29

## 2017-10-31 RX ADMIN — CITALOPRAM HYDROBROMIDE 40 MG: 20 TABLET ORAL at 08:49

## 2017-10-31 RX ADMIN — FAMOTIDINE 20 MG: 20 TABLET, FILM COATED ORAL at 08:49

## 2017-10-31 RX ADMIN — MORPHINE SULFATE 0.5 MG: 2 INJECTION, SOLUTION INTRAMUSCULAR; INTRAVENOUS at 07:01

## 2017-10-31 RX ADMIN — METOPROLOL TARTRATE 25 MG: 25 TABLET ORAL at 08:49

## 2017-10-31 RX ADMIN — ENOXAPARIN SODIUM 40 MG: 40 INJECTION SUBCUTANEOUS at 08:48

## 2017-10-31 RX ADMIN — GABAPENTIN 800 MG: 400 CAPSULE ORAL at 08:49

## 2017-10-31 RX ADMIN — Medication 250 MG: at 08:49

## 2017-10-31 NOTE — DISCHARGE SUMMARY
Discharge Summary - [facility] Internal Medicine    Patient Information: Declan Aguiar 59 y o  female MRN: [id number]  Unit/Bed#: -01 Encounter: [id number]    Discharging Physician / Practitioner: Gem Lo MD  PCP: Santy Bradford MD  Admission Date: 10/26/2017  Discharge Date: 10/31/17    Reason for Admission:  Abdominal pain    Discharge Diagnoses:     Principal Problem (Resolved):    Enteritis  Active Problems:    Tobacco abuse disorder    Transaminitis    Essential hypertension      Consultations During Hospital Stay:  · Gastroenterology    Procedures Performed:     · Right upper quadrant ultrasound which was unremarkable  · CT scan: This showed a stable small left adrenal nodule as well as no obstruction or perforation  There was some fluid visible within the stomach and small bowel and most of the colon suggesting enteritis/diarrhea    Significant Findings / Test Results:     · Transaminitis    Incidental Findings:   · None     Test Results Pending at Discharge (will require follow up): · None     Outpatient Tests Requested:  · CMP in 3-5 days    Complications:  None    Hospital Course:     Declan Aguiar is a 59 y o  female patient who originally presented to the hospital on 10/26/2017 due to abdominal pain  History of presenting Casper Elif is a 59 y o  female who presents with past medical history of anxiety, bipolar, chronic back pain, hepatitis-B, hypertension, rheumatoid arthritis presenting with 2 week history of generalized abdominal pain, diarrhea  She was recently discharged on October 3rd with C diff  She was sent home on p o  vancomycin which ended on Monday  She presented today because the pain has been consistent and the diarrhea has not stopped  She denies any other systemic symptoms at this time    Hospital course:  Patient was admitted for the above reasons  The patient does have a history of C diff colitis  She stopped her antibiotics    Her C diff here came back negative  She was seen in consultation by Gastroenterology and a CT scan was done  They felt he was more post C diff enteritis  There was nothing further to be done except for supportive treatment at this time  The patient did have some transient transaminitis which is improving  Her AST and ALT today are 23 and 109  They were 53 and 165 days ago  Patient could just be monitored as an outpatient  She is otherwise doing okay and medically stable for discharge    Condition at Discharge: good     Discharge Day Visit / Exam:     Subjective:  Patient seen examined  She has no complaints at this time and states her abdominal pain has improved  Vitals: Blood Pressure: 121/81 (10/31/17 0701)  Pulse: 82 (10/31/17 0701)  Temperature: 98 3 °F (36 8 °C) (10/31/17 0701)  Temp Source: Oral (10/31/17 0701)  Respirations: 18 (10/31/17 0701)  Height: 5' 3" (160 cm) (10/27/17 0000)  Weight - Scale: 66 7 kg (147 lb 0 8 oz) (10/27/17 0000)  SpO2: 96 % (10/31/17 0701)  Exam:   Physical Exam  (   General Appearance:    Alert, cooperative, no distress, appears stated age                               Lungs:     Clear to auscultation bilaterally, respirations unlabored       Heart:    Regular rate and rhythm, S1 and S2 normal, no murmur, rub    or gallop   Abdomen:     Soft, non-tender, bowel sounds active all four quadrants,     no masses, no organomegaly           Extremities:   Extremities normal, atraumatic, no cyanosis or edema   Discussion with Family:  Patient    Discharge instructions/Information to patient and family:   See after visit summary for information provided to patient and family  Provisions for Follow-Up Care:  See after visit summary for information related to follow-up care and any pertinent home health orders        Disposition:     Home    For Discharges to [facility]:   · Not Applicable to this Patient - Not Applicable to this Patient    Planned Readmission:  None anticipated     Discharge Statement:  I spent 25 minutes discharging the patient  This time was spent on the day of discharge  I had direct contact with the patient on the day of discharge  Greater than 50% of the total time was spent examining patient, answering all patient questions, arranging and discussing plan of care with patient as well as directly providing post-discharge instructions  Additional time then spent on discharge activities  Discharge Medications:  See after visit summary for reconciled discharge medications provided to patient and family        ** Please Note: This note has been constructed using a voice recognition system **

## 2017-10-31 NOTE — PLAN OF CARE

## 2017-10-31 NOTE — PROGRESS NOTES
[facility] Progress Note  Patient: Ewa Castillo 59 y o  female   MRN: [id number]  PCP: Gretchen Weiner MD  Unit/Bed#: -[facility] Encounter: [id number]  Date Of Visit: 10/31/17    Assessment:    Principal Problem:    Enteritis  Active Problems:    Tobacco abuse disorder    Transaminitis    Essential hypertension      Plan:    · Enteritis:  Seemingly resolving although patient did report 1 soft stool this a norma Valencia She still complains of lower abdominal pain but in speaking with her and monitoring her for extensive period of time she really does not display any discomfort  She describes the pain as stabbing in nature  Again there may be an element of psychosomatic pain is the patient has significant psychiatric illness  We talked about the fact that her stress and sadness over her son's death may exhibit them cells as bodily pain  She does have a counseling appointment next week and is open to this  We are agreeable to decrease her pain medication overnight with consideration for sending her home without any opiates  Continue Bentyl, Pepcid, Florastor  She will need an outpatient GI follow-up for functional bowel disease  · bipolar disorder with depression continue Remeron  P r n  hydroxyzine for agitation and anxiety  · Peripheral neuropathy continue Neurontin  · Transaminitis:  Felt to be possible transient obstruction with sludge  Patient does have a history of hepatitis  Recommendation was to continue to follow levels  · Recent C diff infection:  Testing is negative  Continue to monitor continue for store  ·   If patient has persistent diarrhea was to can add Questran  She had resolved and so I did not add this  VTE Pharmacologic Prophylaxis:   Pharmacologic: Enoxaparin (Lovenox)  Mechanical VTE Prophylaxis in Place: Yes    Patient Centered Rounds: I have performed bedside rounds with nursing staff today      Discussions with Specialists or Other Care Team Provider: None    Education and Discussions with Family / Patient:  Updated patient and spoke with her about her current psychological stress and she does have a therapy appointment next week which will help her in dealing with some of these issues  Time Spent for Care: 45 minutes  More than 50% of total time spent on counseling and coordination of care as described above  Current Length of Stay: 5 day(s)    Current Patient Status: Inpatient   Certification Statement: The patient will continue to require additional inpatient hospital stay due to continued soft stool in abdominal pain    Discharge Plan / Estimated Discharge Date:   [place] for next 24 hours l/ Estimated Discharge Date:    Code Status: Level 1 - Full Code      Subjective:  Patient less tearful she did use hydroxyzine indeed get some sleep  She does report continued pain and discontinued ask for pain medication we talked a long time about pain in the impact of psychosocial stressors  I informed her that I would be decreasing her morphine overnight and discontinuing in in a m  5    Objective:     Vitals:   Temp (24hrs), Av 2 °F (36 8 °C), Min:98 °F (36 7 °C), Max:98 4 °F (36 9 °C)    HR:  [58-70] 58  Resp:  [18] 18  BP: (137-181)/(57-82) 137/57  SpO2:  [94 %-96 %] 96 %  Body mass index is 26 05 kg/m²  Input and Output Summary (last 24 hours): Intake/Output Summary (Last 24 hours) at 10/31/17 0737  Last data filed at 10/30/17 2050   Gross per 24 hour   Intake             1850 ml   Output              400 ml   Net             1450 ml       Physical Exam:     Physical Exam    General well-developed reasonably nourished white female no acute distress she is not as tearful this evening  Throughout our 45 minutes conversation she did not want scribe her abdomen or indicate any pain  But she does verbalize that she is still having pain in her abdomen    She is otherwise normocephalic atraumatic pupils equal round and reactive to light extraocular muscles intact mucous membranes are moist neck is supple there is no JVD no lymph nodes no carotid bruits chest is decreased but clear to auscultation is no rhonchi rales or wheezes  Cardiovascular regular rate rhythm positive S1-S2 no S3-S4 murmur or gallop  Abdomen is soft she indicates tenderness but there is no guarding or rebound  Extremities no clubbing cyanosis edema    Additional Data:     Labs:      Results from last 7 days  Lab Units 10/27/17  0638 10/26/17  2028   WBC Thousand/uL 5 39 5 55   HEMOGLOBIN g/dL 15 5* 13 5   HEMATOCRIT % 48 8* 40 4   PLATELETS Thousands/uL 156 177   NEUTROS PCT %  --  63   LYMPHS PCT %  --  27   MONOS PCT %  --  6   EOS PCT %  --  4       Results from last 7 days  Lab Units 10/29/17  0508   SODIUM mmol/L 142   POTASSIUM mmol/L 3 9   CHLORIDE mmol/L 108   CO2 mmol/L 26   BUN mg/dL 5   CREATININE mg/dL 0 63   CALCIUM mg/dL 8 7   TOTAL PROTEIN g/dL 6 1*   BILIRUBIN TOTAL mg/dL 0 30   ALK PHOS U/L 207*   ALT U/L 165*   AST U/L 53*   GLUCOSE RANDOM mg/dL 86       Results from last 7 days  Lab Units 10/27/17  0528   INR  0 92       * I Have Reviewed All Lab Data Listed Above  * Additional Pertinent Lab Tests Reviewed: [facility] Admission Reviewed    Imaging:    Imaging Reports Reviewed Today Include:  None  Imaging Personally Reviewed by Myself Includes:  None    Recent Cultures (last 7 days):       Results from last 7 days  Lab Units 10/26/17  2200   C DIFF TOXIN B  NEGATIVE for C difficle toxin by PCR          Last 24 Hours Medication List:     citalopram 40 mg Oral Daily   dicyclomine 20 mg Oral 4x Daily (AC & HS)   enoxaparin 40 mg Subcutaneous Daily   famotidine 20 mg Oral BID   gabapentin 800 mg Oral TID   metoprolol tartrate 25 mg Oral Q12H OSCAR   mirtazapine 15 mg Oral HS   saccharomyces boulardii 250 mg Oral BID        Today, Patient Was Seen By: Panchito Downs MD Pager : [phone]

## 2017-11-01 ENCOUNTER — GENERIC CONVERSION - ENCOUNTER (OUTPATIENT)
Dept: OTHER | Facility: OTHER | Age: 57
End: 2017-11-01

## 2017-11-02 ENCOUNTER — GENERIC CONVERSION - ENCOUNTER (OUTPATIENT)
Dept: OTHER | Facility: OTHER | Age: 57
End: 2017-11-02

## 2017-11-02 DIAGNOSIS — R74.8 ABNORMAL LEVELS OF OTHER SERUM ENZYMES: ICD-10-CM

## 2017-11-02 DIAGNOSIS — M48.061 SPINAL STENOSIS OF LUMBAR REGION WITHOUT NEUROGENIC CLAUDICATION: ICD-10-CM

## 2017-11-10 ENCOUNTER — ALLSCRIPTS OFFICE VISIT (OUTPATIENT)
Dept: OTHER | Facility: OTHER | Age: 57
End: 2017-11-10

## 2017-11-11 NOTE — CONSULTS
Assessment   Assessed    1  Disc degeneration, lumbar (722 52) (M51 36)    Plan   Lumbar spinal stenosis    · * MRI LUMBAR SPINE WO CONTRAST; Status:Need Information - Financial    Authorization; Requested for:2017;    Perform:[facility]; [id number]; Ordered; For:Lumbar spinal stenosis; Ordered By:Leann Cazares [place];   · *1 - [facility]  *  Status: Active  Requested for: 19RUQ7049   Ordered; For: Lumbar spinal stenosis; Ordered By: Brissa Trammell Performed:  Due: 03STP9896  Care Summary provided  : Yes    Discussion/Summary      This is a 49-year-old female who presents today for initial consultation for management of chronic low back pain and left leg pain which is neuropathic in nature  Her pain symptoms seem to be multifocal in nature likely secondary to degenerative disc disease and spinal stenosis  I informed patient that we need to proceed with multimodal approach to better manage her pain  At this time, that she continue with ibuprofen 600 mg by mouth 3 times a day, gabapentin 800 mg by mouth 3 times a day, and home exercise regimen  I will also send her for physical therapy as her last physical therapy session was over 3 years ago  I will obtain an MRI of the lumbosacral spine without contrast to further determine etiology of pain  She will make an office visit appointment in 6 weeks for review of MRI results and determine the meniscus of action  Patient verbalizes understanding  The patient has the current Goals: Initiate physical therapy  The patent has the current Barriers: None  Patient is able to Self-Care  Possible side effects of new medications were reviewed with the patient/guardian today  The treatment plan was reviewed with the patient/guardian   The patient/guardian understands and agrees with the treatment plan    The patient was counseled regarding instructions for management,-- risk factor reductions,-- prognosis,-- patient and family education,-- impressions,-- risks and benefits of treatment options-- and-- importance of compliance with treatment  Chief Complaint   Chief Complaints    1  Pain    History of Present Illness   Mrs Camacho Johnson is a 59 y o female who presents today for initial consultation for management of chronic low back pain and left-sided leg pain which is neuropathic in nature  Of note, the patient reports a history of rheumatoid arthritis  Patient states that she has had ongoing pain for over 12 years  She states that her pain is moderate rated 8 out of 10 on a pain scale  Her pain is constant, with no typical pattern  The patient further describes pain as shooting, with pins and needle sensation down the left leg  The pain is aggravated with prolonged standing, bending, walking and exercise  Patient reports doing physical therapy in aquatic therapy without significant relief  Physical therapy was last performed over 3 years ago  She is currently on gabapentin 800 mg by mouth 3 times a day, ibuprofen 600 mg by mouth 3 times a day with minimal relief  Referring physician is  Dr Martinez Hard presents with complaints of gradual onset of constant episodes of bilateral lower back, left buttock, left hip, left thigh, left knee, left lower leg, left ankle and left foot pain, described as sharp and burning  On a scale of 1 to 10, the patient rates the pain as 9  Review of Systems        Constitutional: no fever,-- no recent weight gain-- and-- no recent weight loss  Eyes: no double vision-- and-- no blurry vision  Cardiovascular: no chest pain,-- no palpitations-- and-- no lower extremity edema  Respiratory: no complaints of shortness of breath-- and-- no wheezing  Musculoskeletal: no difficulty walking,-- no muscle weakness,-- no joint stiffness,-- no joint swelling,-- no limb swelling,-- no pain in extremity-- and-- no decreased range of motion        Neurological: no dizziness,-- no difficulty swallowing,-- no memory loss,-- no loss of consciousness-- and-- no seizures  Gastrointestinal: no nausea,-- no vomiting,-- no constipation-- and-- no diarrhea  Genitourinary: no difficulty initiating urine stream,-- no genital pain-- and-- no frequent urination  Integumentary: no complaints of skin rash  Psychiatric: no depression  Endocrine: no excessive thirst,-- no adrenal disease,-- no hypothyroidism-- and-- no hyperthyroidism  Hematologic/Lymphatic: no tendency for easy bruising-- and-- no tendency for easy bleeding  ROS reviewed  Active Problems   Problems    1  Abdominal pain, generalized (789 07) (R10 84)   2  Acute diarrhea (787 91) (R19 7)   3  Acute otitis externa (380 10) (H60 509)   4  Acute otitis media (382 9) (H66 90)   5  Acute pain of left shoulder (719 41) (M25 512)   6  Atypical chest pain (786 59) (R07 89)   7  Bee sting allergy (V15 06) (Z91 038)   8  Benign hypertension (401 1) (I10)   9  Bilateral calf pain (729 5) (M79 661,M79 662)   10  Bipolar disorder (296 80) (F31 9)   11  Bunion (727 1) (M21 619)   12  C  difficile diarrhea (008 45) (A04 72)   13  Centrilobular emphysema (492 8) (J43 2)   14  Colonic polyp (211 3) (K63 5)   15  Common bile duct (CBD) obstruction (576 2) (K83 1)   16  Coronary artery spasm (413 9) (I20 1)   17  Disc degeneration, lumbar (722 52) (M51 36)   18  Elevated liver enzymes (790 5) (R74 8)   19  Fatigue (780 79) (R53 83)   20  Fecal incontinence (787 60) (R15 9)   21  Fecal urgency (787 63) (R15 2)   22  GERD without esophagitis (530 81) (K21 9)   23  Hyperlipidemia (272 4) (E78 5)   24  Loose stools (787 7) (R19 5)   25  Lumbar disc herniation (722 10) (M51 26)   26  Lumbar spinal stenosis (724 02) (M48 061)   27  New daily persistent headache (339 42) (G44 52)   28  Postoperative state (V45 89) (Z98 890)   29  Postprandial epigastric pain (789 06) (R10 13)   30  Rash (782 1) (R21)   31   Rheumatoid arthritis (714 0) (M06 9)   32  Temporal headache (784 0) (R51)    Past Medical History   Problems    1  History of CAD (coronary artery disease) (414 00) (I25 10)   2  History of ovarian cancer (V10 43) (Z85 43)   3  Denied: History of substance abuse   4  History of Loose stools (787 7) (R19 5)   5  Denied: History of Mental problems   6  History of Uterine carcinoma (179) (C55)     The active problems and past medical history were reviewed and updated today  Surgical History   Problems    1  History of Anterior Sclera Fistulization For Glaucoma   2  History of Appendectomy   3  History of Biopsy Temporal Artery   4  History of Cholecystectomy   5  History of Remove Cataract, Corneo-Scleral Section Left Eye   6  History of Tonsillectomy With Adenoidectomy   7  History of Total Abdominal Hysterectomy With Bilateral Salpingo-Oophorectomy    Family History   Mother    1  Family history of Coronary artery disease   2  Family history of gangrene (V19 4) (Z84 0)   3  Family history of hypercholesterolemia (V18 19) (Z83 42)   4  Family history of malignant neoplasm of stomach (V16 0) (Z80 0)   5  Denied: Family history of mental disorder   6  Family history of myocardial infarction (V17 3) (Z82 49)   7  Family history of Peripheral arterial disease  Father    6  Family history of gangrene (V19 4) (Z84 0)   9  Family history of hypercholesterolemia (V18 19) (Z83 42)   8  Family history of malignant neoplasm of stomach (V16 0) (Z80 0)   11  Family history of myocardial infarction (V17 3) (Z82 49)   12  Denied: Family history of substance abuse   13  Family history of Peripheral arterial disease  Brother    15  Family history of malignant neoplasm of stomach (V16 0) (Z80 0)   15  Family history of myocardial infarction (V17 3) (Z82 49)  Maternal Uncle    12  Family history of malignant neoplasm of stomach (V16 0) (Z80 0)     The family history was reviewed and updated today         Social History   Problems    · Current every day smoker (305  1) (F17 200)   · Disabled   ·    · No alcohol use   · No drug use  The social history was reviewed and updated today  Current Meds    1  Cholestyramine 4 GM/DOSE Oral Powder; MIX 1 SCOOP IN LIQUID AND DRINK TWICE     DAILY; Therapy: 39VOH1559 to ([id number])  Requested for: 05UFH2546; Last     Rx:05Oct2017 Ordered   2  Citalopram Hydrobromide 40 MG Oral Tablet; TAKE 1 TABLET DAILY; Therapy: 33RIF8952 to (Evaluate:22Jun2017); Last Rx:22Feb2017 Ordered   3  ClonazePAM 0 5 MG Oral Tablet; take 1 tablet twice a day; Last Rx:22Feb2017 Ordered   4  Famotidine 40 MG Oral Tablet; TAKE 1 TABLET TWICE DAILY; Therapy: 47NHR0261 to (Evaluate:02Mar2018)  Requested for: 79GBA7851; Last     Rx:02Nov2017 Ordered   5  Gabapentin 600 MG Oral Tablet; TAKE 800 MG 3 times daily Recorded   6  Hyoscyamine Sulfate 0 125 MG Sublingual Tablet Sublingual; PLACE 1 TABLET UNDER     THE TONGUE 4 TIMES DAILY AS NEEDED FOR FREQUENCYOR DIARRHEA; Therapy: 34WRZ8701 to (Evaluate:17Nov2017)  Requested for: 99OMC2067; Last     Rx:02Nov2017 Ordered   7  Ibuprofen 600 MG Oral Tablet; TAKE 1 TABLET 3 TIMES DAILY WITH FOOD AS NEEDED; Therapy: 73QZJ9630 to (Evaluate:04Mar2017)  Requested for: 12Zwo5835; Last     Rx:22Feb2017 Ordered   8  Metoprolol Tartrate 25 MG Oral Tablet; Take 1 tablet twice daily; Therapy: 14IGI2018 to (Last Rx:04Apr2017)  Requested for: 04Apr2017 Ordered   9  Mirtazapine 30 MG Oral Tablet; TAKE 1 TABLET AT BEDTIME; Last Rx:22Feb2017     Ordered   10  Neomycin-Polymyxin-HC 3 [id number] Otic Suspension; INSTILL 3 DROPS IN RIGHT      EAR 3-4 TIMES DAILY; Therapy: 47Oti5990 to (Last Jaysonfarrah Lopes)  Requested for: 24Bpq3261 Ordered   11  Neurontin 800 MG Oral Tablet; TAKE 1 TABLET 3 TIMES DAILY; Last Rx:22Feb2017      Ordered   12  Nitroglycerin 0 4 MG Sublingual Tablet Sublingual; DISSOLVE 1 TABLET UNDER THE      TONGUE AS NEEDED FOR CHEST PAIN Recorded   13   Oxycodone-Acetaminophen 5-325 MG Oral Tablet; TAKE 1 TABLET every 4 hours as      needed for pain Recorded   14  Triamcinolone Acetonide 0 5 % External Cream; APPLY 2-3 TIMES DAILY TO AFFECTED      AREA(S); Therapy: 33PYV0554 to (Last Rx:05Oct2017)  Requested for: 05Oct2017 Ordered     The medication list was reviewed and updated today  Allergies   Medication    1  Aspirin TABS   2  Robaxin TABS   3  Tramadol    Vitals   Vital Signs    Recorded: 02XRD4211 01:57PM   Temperature 98 8 F   Heart Rate 88   Systolic 945   Diastolic 84   Pain Scale 9     Physical Exam        Constitutional      General appearance: Well developed, well nourished, alert, in no distress, non-toxic and no overt pain behavior  Eyes      Sclera: anicteric      HEENT      Hearing grossly intact  Neck      Neck: Supple, symmetric, trachea midline, no masses  Pulmonary      Respiratory effort: Even and unlabored  Cardiovascular      Examination of extremities: No edema or pitting edema present  Skin      Skin and subcutaneous tissue: Normal without rashes or lesions, well hydrated  Psychiatric      Mood and affect: Mood and affect appropriate  Neurologic      Cranial nerves: Cranial nerves II-XII grossly intact  Musculoskeletal      Gait and station: Normal        Lumbar/Sacral Spine examination demonstrates Lumbosacral Spine:      Appearance: Normal  Spinal alignment exhibits normal lordosis  Tenderness: at lumbar spine,-- at the sacral spine-- and-- right paraspinal  Palpatory Findings include left-sided muscle spasms  Lumbosacral Spine Sensory: intact to light touch and pinprick in the lower extremities  ROM Lumbosacral Spine: Full  -- Lumbar spine flexion and extension causes pain  Flexion was restricted-- and-- was painful  Extension was restricted-- and-- was painful  Left lateral flexion was restricted-- and-- was painful  Right lateral flexion was restricted-- and-- was painful   Rotation to the left was restricted-- and-- was painful  ROM Hips: Full      Foot and ankle strength was normal bilaterally  Right Foot Plantar Flexion: + 4/5  Left Foot Plantar Flexion: + 2/5  Right Foot Dorsiflexion: + 4/5  Left Foot Dorsiflexion: + 2/5  Right Ankle Inversion: + 4/5  Left Ankle Inversion: + 2/5  Right Ankle Eversion: + 4/5  Left Ankle Eversion: + 2/5  Knee strength was normal bilaterally  Hip strength was normal bilaterally  Evaluation of Muscle Stretch Reflexes on the right side demonstrates muscle stretch reflexes equal and symmetric right lower limbs  Evaluation of Muscle Stretch Reflexes on the left side demonstrates muscle stretch reflexes equal and symmetric right lower limbs  Special Tests: Negative except as noted positive Straight Leg Raise on left        Future Appointments      Date/Time Provider Specialty Site   12/07/2017 11:00 AM Sharyn Parikh, [address]  Gastroenterology [facility]     Signatures    Electronically signed by : Ne Prakash MD; Nov 10 2017  2:27PM EST                       (Author)

## 2017-11-21 ENCOUNTER — APPOINTMENT (EMERGENCY)
Dept: CT IMAGING | Facility: HOSPITAL | Age: 57
End: 2017-11-21
Payer: COMMERCIAL

## 2017-11-21 ENCOUNTER — HOSPITAL ENCOUNTER (EMERGENCY)
Facility: HOSPITAL | Age: 57
Discharge: HOME | End: 2017-11-21
Attending: EMERGENCY MEDICINE | Admitting: EMERGENCY MEDICINE
Payer: COMMERCIAL

## 2017-11-21 VITALS
RESPIRATION RATE: 16 BRPM | HEART RATE: 86 BPM | HEIGHT: 63 IN | TEMPERATURE: 98 F | WEIGHT: 145 LBS | SYSTOLIC BLOOD PRESSURE: 131 MMHG | DIASTOLIC BLOOD PRESSURE: 61 MMHG | OXYGEN SATURATION: 99 % | BODY MASS INDEX: 25.69 KG/M2

## 2017-11-21 DIAGNOSIS — S30.0XXA CONTUSION OF LOWER BACK, INITIAL ENCOUNTER: Primary | ICD-10-CM

## 2017-11-21 LAB
ANION GAP SERPL CALCULATED.3IONS-SCNC: 14 MMOL/L (ref 4–13)
BASOPHILS # BLD AUTO: 0.05 THOUSANDS/ΜL (ref 0–0.1)
BASOPHILS NFR BLD AUTO: 1 % (ref 0–1)
BUN SERPL-MCNC: 10 MG/DL (ref 5–25)
CALCIUM SERPL-MCNC: 9.1 MG/DL (ref 8.3–10.1)
CHLORIDE SERPL-SCNC: 106 MMOL/L (ref 100–108)
CO2 SERPL-SCNC: 23 MMOL/L (ref 21–32)
CREAT SERPL-MCNC: 0.56 MG/DL (ref 0.6–1.3)
EOSINOPHIL # BLD AUTO: 0.19 THOUSAND/ΜL (ref 0–0.61)
EOSINOPHIL NFR BLD AUTO: 3 % (ref 0–6)
ERYTHROCYTE [DISTWIDTH] IN BLOOD BY AUTOMATED COUNT: 13.2 % (ref 11.6–15.1)
GFR SERPL CREATININE-BSD FRML MDRD: 102 ML/MIN/1.73SQ M
GLUCOSE SERPL-MCNC: 93 MG/DL (ref 65–140)
HCT VFR BLD AUTO: 43.4 % (ref 34.8–46.1)
HGB BLD-MCNC: 14.3 G/DL (ref 11.5–15.4)
LYMPHOCYTES # BLD AUTO: 2.08 THOUSANDS/ΜL (ref 0.6–4.47)
LYMPHOCYTES NFR BLD AUTO: 34 % (ref 14–44)
MCH RBC QN AUTO: 29 PG (ref 26.8–34.3)
MCHC RBC AUTO-ENTMCNC: 32.9 G/DL (ref 31.4–37.4)
MCV RBC AUTO: 88 FL (ref 82–98)
MONOCYTES # BLD AUTO: 0.32 THOUSAND/ΜL (ref 0.17–1.22)
MONOCYTES NFR BLD AUTO: 5 % (ref 4–12)
NEUTROPHILS # BLD AUTO: 3.54 THOUSANDS/ΜL (ref 1.85–7.62)
NEUTS SEG NFR BLD AUTO: 57 % (ref 43–75)
NRBC BLD AUTO-RTO: 0 /100 WBCS
PLATELET # BLD AUTO: 186 THOUSANDS/UL (ref 149–390)
PMV BLD AUTO: 9.8 FL (ref 8.9–12.7)
POTASSIUM SERPL-SCNC: 3.9 MMOL/L (ref 3.5–5.3)
RBC # BLD AUTO: 4.93 MILLION/UL (ref 3.81–5.12)
SODIUM SERPL-SCNC: 143 MMOL/L (ref 136–145)
WBC # BLD AUTO: 6.2 THOUSAND/UL (ref 4.31–10.16)

## 2017-11-21 PROCEDURE — 96361 HYDRATE IV INFUSION ADD-ON: CPT

## 2017-11-21 PROCEDURE — 96374 THER/PROPH/DIAG INJ IV PUSH: CPT

## 2017-11-21 PROCEDURE — 80048 BASIC METABOLIC PNL TOTAL CA: CPT | Performed by: EMERGENCY MEDICINE

## 2017-11-21 PROCEDURE — 72131 CT LUMBAR SPINE W/O DYE: CPT

## 2017-11-21 PROCEDURE — 99284 EMERGENCY DEPT VISIT MOD MDM: CPT

## 2017-11-21 PROCEDURE — 96376 TX/PRO/DX INJ SAME DRUG ADON: CPT

## 2017-11-21 PROCEDURE — 96375 TX/PRO/DX INJ NEW DRUG ADDON: CPT

## 2017-11-21 PROCEDURE — 36415 COLL VENOUS BLD VENIPUNCTURE: CPT | Performed by: EMERGENCY MEDICINE

## 2017-11-21 PROCEDURE — 85025 COMPLETE CBC W/AUTO DIFF WBC: CPT | Performed by: EMERGENCY MEDICINE

## 2017-11-21 RX ORDER — ONDANSETRON HYDROCHLORIDE 4 MG/5ML
4 SOLUTION ORAL ONCE
Status: DISCONTINUED | OUTPATIENT
Start: 2017-11-21 | End: 2017-11-21

## 2017-11-21 RX ORDER — METOPROLOL TARTRATE 50 MG/1
25 TABLET, FILM COATED ORAL EVERY 12 HOURS SCHEDULED
COMMUNITY
End: 2018-05-18 | Stop reason: SDUPTHER

## 2017-11-21 RX ORDER — HYDROCODONE BITARTRATE AND ACETAMINOPHEN 5; 325 MG/1; MG/1
1 TABLET ORAL EVERY 6 HOURS PRN
Qty: 20 TABLET | Refills: 0 | Status: SHIPPED | OUTPATIENT
Start: 2017-11-21 | End: 2018-06-05

## 2017-11-21 RX ORDER — HYDROCODONE BITARTRATE AND ACETAMINOPHEN 5; 325 MG/1; MG/1
1 TABLET ORAL ONCE
Status: COMPLETED | OUTPATIENT
Start: 2017-11-21 | End: 2017-11-21

## 2017-11-21 RX ORDER — ONDANSETRON 2 MG/ML
4 INJECTION INTRAMUSCULAR; INTRAVENOUS ONCE
Status: COMPLETED | OUTPATIENT
Start: 2017-11-21 | End: 2017-11-21

## 2017-11-21 RX ADMIN — ONDANSETRON 4 MG: 2 INJECTION INTRAMUSCULAR; INTRAVENOUS at 15:59

## 2017-11-21 RX ADMIN — HYDROCODONE BITARTRATE AND ACETAMINOPHEN 1 TABLET: 5; 325 TABLET ORAL at 17:34

## 2017-11-21 RX ADMIN — SODIUM CHLORIDE 1000 ML: 0.9 INJECTION, SOLUTION INTRAVENOUS at 15:59

## 2017-11-21 RX ADMIN — HYDROMORPHONE HYDROCHLORIDE 1 MG: 1 INJECTION, SOLUTION INTRAMUSCULAR; INTRAVENOUS; SUBCUTANEOUS at 15:59

## 2017-11-21 RX ADMIN — HYDROMORPHONE HYDROCHLORIDE 0.5 MG: 1 INJECTION, SOLUTION INTRAMUSCULAR; INTRAVENOUS; SUBCUTANEOUS at 17:36

## 2017-11-21 NOTE — ED NOTES
Patient transported to [facility] , RN  11/21/17 9101

## 2017-11-21 NOTE — ED PROVIDER NOTES
History  Chief Complaint   Patient presents with    Fall     pt slipped coming out of the bathtub and hit her low back - pt having trouble walkign due to the pain    Back Pain     HPI patient is a 17-year-old female history of severe back pain, spinal stenosis, chronic back pain with follow up with a pain specialist   She reports getting out of the tub falling backwards and hitting her low back  Patient reports pain with walking and trying to stand  Patient arrives fully clothed  She reports that her  closed her  Patient arrives by EMS  She denies any numbness  She reports that her legs feel weak and she has difficulty with standing  She denies any loss of consciousness  There is no head or neck injury  She denies any chest pain or difficulty breathing  Past medical history of chronic back pain comma frequent headaches comma bipolar disease, patient is flagged in our narcotic database  Family history noncontributory  Social history, patient is a smoker, denies drug abuse    Prior to Admission Medications   Prescriptions Last Dose Informant Patient Reported?  Taking?   citalopram (CELEXA) 40 mg tablet  Self Yes No   Sig: Take 40 mg by mouth daily     gabapentin (NEURONTIN) 800 mg tablet   Yes No   Sig: Take 800 mg by mouth 3 (three) times a day   metoprolol tartrate (LOPRESSOR) 50 mg tablet   Yes Yes   Sig: Take 25 mg by mouth every 12 (twelve) hours      Facility-Administered Medications: None       Past Medical History:   Diagnosis Date    Anxiety     Bipolar 1 disorder (HCC)     Chronic back pain     Frequent headaches     Glaucoma     Hypertension     Psychiatric disorder     bipolar    Rheumatoid arthritis ([facility] [address] )        Past Surgical History:   Procedure Laterality Date    APPENDECTOMY      CATARACT EXTRACTION Left     CHOLECYSTECTOMY      HYSTERECTOMY      INTRAOCULAR LENS INSERTION      AK TEMPORAL ARTERY LIGATN OR BX Right 12/16/2016    Procedure: BIOPSY ARTERY TEMPORAL; Surgeon: Cierra Pang MD;  Location: [facility];  Service: General    TONSILLECTOMY         Family History   Problem Relation Age of Onset    Heart disease Mother      I have reviewed and agree with the history as documented  Social History   Substance Use Topics    Smoking status: Current Every Day Smoker     Packs/day: 0 20     Years: 46 00     Types: Cigarettes    Smokeless tobacco: Never Used    Alcohol use No        Review of Systems   Constitutional: Negative for diaphoresis, fatigue and fever  HENT: Negative for congestion, ear pain, nosebleeds and sore throat  Eyes: Negative for photophobia, pain, discharge and visual disturbance  Respiratory: Negative for cough, choking, chest tightness, shortness of breath and wheezing  Cardiovascular: Negative for chest pain and palpitations  Gastrointestinal: Negative for abdominal distention, abdominal pain, diarrhea and vomiting  Genitourinary: Negative for dysuria, flank pain and frequency  Musculoskeletal: Positive for back pain  Negative for gait problem and joint swelling  Skin: Negative for color change and rash  Neurological: Negative for dizziness, syncope and headaches  Psychiatric/Behavioral: Negative for behavioral problems and confusion  The patient is not nervous/anxious  All other systems reviewed and are negative  Physical Exam  ED Triage Vitals [11/21/17 1536]   Temperature Pulse Respirations Blood Pressure SpO2   98 °F (36 7 °C) 91 18 134/60 98 %      Temp Source Heart Rate Source Patient Position - Orthostatic VS BP Location FiO2 (%)   Oral Monitor Lying Left arm --      Pain Score       Worst Possible Pain           Orthostatic Vital Signs  Vitals:    11/21/17 1536 11/21/17 1820   BP: 134/60 131/61   Pulse: 91 86   Patient Position - Orthostatic VS: Lying        Physical Exam   Constitutional: She is oriented to person, place, and time  She appears well-developed and well-nourished     Patient has pain with any motion, pain with transport off the EMS stretcher  HENT:   Head: Normocephalic  Right Ear: External ear normal    Left Ear: External ear normal    Nose: Nose normal    Mouth/Throat: Oropharynx is clear and moist    Eyes: EOM and lids are normal  Pupils are equal, round, and reactive to light  Neck: Normal range of motion  Neck supple  Clear by nexus criteria   Cardiovascular: Normal rate, regular rhythm, normal heart sounds and intact distal pulses  Pulmonary/Chest: Effort normal and breath sounds normal  No respiratory distress  Abdominal: Soft  Musculoskeletal: Normal range of motion  She exhibits no deformity  Neurological: She is alert and oriented to person, place, and time  She has normal strength  No cranial nerve deficit or sensory deficit  GCS eye subscore is 4  GCS verbal subscore is 5  GCS motor subscore is 6  Skin: Skin is warm and dry  Psychiatric: She has a normal mood and affect  Nursing note and vitals reviewed        ED Medications  Medications   sodium chloride 0 9 % bolus 1,000 mL (1,000 mL Intravenous New Bag 11/21/17 1559)   HYDROmorphone (DILAUDID) 1 mg/mL injection 1 mg (1 mg Intravenous Given 11/21/17 1559)   ondansetron (ZOFRAN) injection 4 mg (4 mg Intravenous Given 11/21/17 1559)   HYDROmorphone (DILAUDID) 1 mg/mL injection 0 5 mg (0 5 mg Intravenous Given 11/21/17 1736)   HYDROcodone-acetaminophen (NORCO) 5-325 mg per tablet 1 tablet (1 tablet Oral Given 11/21/17 1734)       Diagnostic Studies  Results Reviewed     Procedure Component Value Units Date/Time    Basic metabolic panel [[id number]]  (Abnormal) Collected:  11/21/17 1601    Lab Status:  Final result Specimen:  Blood Updated:  11/21/17 1615     Sodium 143 mmol/L      Potassium 3 9 mmol/L      Chloride 106 mmol/L      CO2 23 mmol/L      Anion Gap 14 (H) mmol/L      BUN 10 mg/dL      Creatinine 0 56 (L) mg/dL      Glucose 93 mg/dL      Calcium 9 1 mg/dL      eGFR 102 ml/min/1 73sq m     Narrative: National Kidney Disease Education Program recommendations are as follows:  GFR calculation is accurate only with a steady state creatinine  Chronic Kidney disease less than 60 ml/min/1 73 sq  meters  Kidney failure less than 15 ml/min/1 73 sq  meters  CBC and differential [[id number]]  (Normal) Collected:  11/21/17 1601    Lab Status:  Final result Specimen:  Blood Updated:  11/21/17 1601     WBC 6 20 Thousand/uL      RBC 4 93 Million/uL      Hemoglobin 14 3 g/dL      Hematocrit 43 4 %      MCV 88 fL      MCH 29 0 pg      MCHC 32 9 g/dL      RDW 13 2 %      MPV 9 8 fL      Platelets 877 Thousands/uL      nRBC 0 /100 WBCs      Neutrophils Relative 57 %      Lymphocytes Relative 34 %      Monocytes Relative 5 %      Eosinophils Relative 3 %      Basophils Relative 1 %      Neutrophils Absolute 3 54 Thousands/µL      Lymphocytes Absolute 2 08 Thousands/µL      Monocytes Absolute 0 32 Thousand/µL      Eosinophils Absolute 0 19 Thousand/µL      Basophils Absolute 0 05 Thousands/µL                  CT spine lumbar without contrast   Final Result by Rosemary Bradford MD (11/21 1658)      No acute lumbar spine fracture or subluxation  Multilevel spondylitic degenerative changes of the lower lumbar spine, especially at L4/L5, where there is a diffuse disc bulge with bilateral facet arthropathy and ligamentum flavum hypertrophy causing moderate to severe spinal canal stenosis and mild    to moderate left neural foraminal narrowing  Workstation performed: [id number]                    Procedures  Procedures       Phone Contacts  ED Phone Contact    ED Course  ED Course       electrolytes show no acute abnormality  White count was normal at 6 2 no sign of inflammation, hemoglobin was normal at 14 3 no sign of anemia  CT scan lumbar spine showed no acute fracture, there was multiple levels of disc bulging, reviewed with patient gave her copy of the report  Patient was able to ambulate and sit in a chair  We discussed home medications  We discussed indications to return  We discussed follow-up  MDM medical decision making 68-year-old female, history of a severe low back problem, apparently has an MRI scheduled early next month, apparently sees a pain specialist, fell and hit her back on the tub this morning when she slipped  No loss of consciousness  No rib injury, no chest or abdominal injury  Neck was cleared by nexus criteria  CT of the lumbar spine showed no fracture  We discussed analgesics  Patient was improved with analgesics here we discussed indications to return and follow-up  CritCare Time    Disposition  Final diagnoses:   Contusion of lower back, initial encounter     Time reflects when diagnosis was documented in both MDM as applicable and the Disposition within this note     Time User Action Codes Description Comment    11/21/2017  5:26 PM Ira Shaw Add [S30  0XXA] Contusion of lower back, initial encounter       ED Disposition     ED Disposition Condition Comment    Discharge  Alpha Erp discharge to home/self care  Condition at discharge: Good        Follow-up Information     Follow up With Specialties Details Why Contact Info    Cass Lucas MD Internal Medicine   [address]          Patient's Medications   Discharge Prescriptions    HYDROCODONE-ACETAMINOPHEN (NORCO) 5-325 MG PER TABLET    Take 1 tablet by mouth every 6 (six) hours as needed for pain for up to 20 doses Max Daily Amount: 4 tablets       Start Date: 11/21/2017End Date: --       Order Dose: 1 tablet       Quantity: 20 tablet    Refills: 0     No discharge procedures on file      ED Provider  Electronically Signed by           Nando Castro MD  11/21/17 8815

## 2017-11-21 NOTE — DISCHARGE INSTRUCTIONS
Contusion in Adults   WHAT YOU NEED TO KNOW:   A contusion is a bruise that appears on your skin after an injury  A bruise happens when small blood vessels tear but skin does not  When blood vessels tear, blood leaks into nearby tissue, such as soft tissue or muscle  DISCHARGE INSTRUCTIONS:   Return to the emergency department if:   · You have new trouble moving the injured area  · You have tingling or numbness in or near the injured area  · Your hand or foot below the bruise gets cold or turns pale  Contact your healthcare provider if:   · You find a new lump in the injured area  · Your symptoms do not improve with treatment after 4 to 5 days  · You have questions or concerns about your condition or care  Medicines: You may need any of the following:  · NSAIDs  help decrease swelling and pain or fever  This medicine is available with or without a doctor's order  NSAIDs can cause stomach bleeding or kidney problems in certain people  If you take blood thinner medicine, always ask your healthcare provider if NSAIDs are safe for you  Always read the medicine label and follow directions  · Prescription pain medicine  may be given  Do not wait until the pain is severe before you take your medicine  · Take your medicine as directed  Contact your healthcare provider if you think your medicine is not helping or if you have side effects  Tell him of her if you are allergic to any medicine  Keep a list of the medicines, vitamins, and herbs you take  Include the amounts, and when and why you take them  Bring the list or the pill bottles to follow-up visits  Carry your medicine list with you in case of an emergency  Follow up with your healthcare provider as directed: You may need to return within a week to check your injury again  Write down your questions so you remember to ask them during your visits    Help a contusion heal:   · Rest the injured area  or use it less than usual  If you bruised your leg or foot, you may need crutches or a cane to help you walk  This will help you keep weight off your injured body part  · Apply ice  to decrease swelling and pain  Ice may also help prevent tissue damage  Use an ice pack, or put crushed ice in a plastic bag  Cover it with a towel and place it on your bruise for 15 to 20 minutes every hour or as directed  · Use compression  to support the area and decrease swelling  Wrap an elastic bandage around the area over the bruised muscle  Make sure the bandage is not too tight  You should be able to fit 1 finger between the bandage and your skin  · Elevate (raise) your injured body part  above the level of your heart to help decrease pain and swelling  Use pillows, blankets, or rolled towels to elevate the area as often as you can  · Do not drink alcohol  as directed  Alcohol may slow healing  · Do not stretch injured muscles  right after your injury  Ask your healthcare provider when and how you may safely stretch after your injury  Gentle stretches can help increase your flexibility  · Do not massage the area or put heating pads  on the bruise right after your injury  Heat and massage may slow healing  Your healthcare provider may tell you to apply heat after several days  At that time, heat will start to help the injury heal   Prevent another contusion:   · Stretch and warm up before you play sports or exercise  · Wear protective gear when you play sports  Examples are shin guards and padding  · If you begin a new physical activity, start slowly to give your body a chance to adjust   © 2017 [employer] Information is for End User's use only and may not be sold, redistributed or otherwise used for commercial purposes  All illustrations and images included in CareNotes® are the copyrighted property of [employer]  or [employer]  The above information is an  only   It is not intended as medical advice for individual conditions or treatments  Talk to your doctor, nurse or pharmacist before following any medical regimen to see if it is safe and effective for you  Contusion in Adults   WHAT YOU NEED TO KNOW:   A contusion is a bruise that appears on your skin after an injury  A bruise happens when small blood vessels tear but skin does not  When blood vessels tear, blood leaks into nearby tissue, such as soft tissue or muscle  DISCHARGE INSTRUCTIONS:   Return to the emergency department if:   · You have new trouble moving the injured area  · You have tingling or numbness in or near the injured area  · Your hand or foot below the bruise gets cold or turns pale  Contact your healthcare provider if:   · You find a new lump in the injured area  · Your symptoms do not improve with treatment after 4 to 5 days  · You have questions or concerns about your condition or care  Medicines: You may need any of the following:  · NSAIDs  help decrease swelling and pain or fever  This medicine is available with or without a doctor's order  NSAIDs can cause stomach bleeding or kidney problems in certain people  If you take blood thinner medicine, always ask your healthcare provider if NSAIDs are safe for you  Always read the medicine label and follow directions  · Prescription pain medicine  may be given  Do not wait until the pain is severe before you take your medicine  · Take your medicine as directed  Contact your healthcare provider if you think your medicine is not helping or if you have side effects  Tell him of her if you are allergic to any medicine  Keep a list of the medicines, vitamins, and herbs you take  Include the amounts, and when and why you take them  Bring the list or the pill bottles to follow-up visits  Carry your medicine list with you in case of an emergency  Follow up with your healthcare provider as directed: You may need to return within a week to check your injury again   Write down your questions so you remember to ask them during your visits  Help a contusion heal:   · Rest the injured area  or use it less than usual  If you bruised your leg or foot, you may need crutches or a cane to help you walk  This will help you keep weight off your injured body part  · Apply ice  to decrease swelling and pain  Ice may also help prevent tissue damage  Use an ice pack, or put crushed ice in a plastic bag  Cover it with a towel and place it on your bruise for 15 to 20 minutes every hour or as directed  · Use compression  to support the area and decrease swelling  Wrap an elastic bandage around the area over the bruised muscle  Make sure the bandage is not too tight  You should be able to fit 1 finger between the bandage and your skin  · Elevate (raise) your injured body part  above the level of your heart to help decrease pain and swelling  Use pillows, blankets, or rolled towels to elevate the area as often as you can  · Do not drink alcohol  as directed  Alcohol may slow healing  · Do not stretch injured muscles  right after your injury  Ask your healthcare provider when and how you may safely stretch after your injury  Gentle stretches can help increase your flexibility  · Do not massage the area or put heating pads  on the bruise right after your injury  Heat and massage may slow healing  Your healthcare provider may tell you to apply heat after several days  At that time, heat will start to help the injury heal   Prevent another contusion:   · Stretch and warm up before you play sports or exercise  · Wear protective gear when you play sports  Examples are shin guards and padding  · If you begin a new physical activity, start slowly to give your body a chance to adjust   © 2017 [employer] Information is for End User's use only and may not be sold, redistributed or otherwise used for commercial purposes   All illustrations and images included in CareNotes® are the copyrighted property of [employer]  or [employer]  The above information is an  only  It is not intended as medical advice for individual conditions or treatments  Talk to your doctor, nurse or pharmacist before following any medical regimen to see if it is safe and effective for you  Low Back Strain   WHAT YOU NEED TO KNOW:   Low back strain is an injury to your lower back muscles or tendons  Tendons are strong tissues that connect muscles to bones  The lower back supports most of your body weight and helps you move, twist, and bend  DISCHARGE INSTRUCTIONS:   Return to the emergency department if:   · You hear or feel a pop in your lower back  · You have increased swelling or pain in your lower back  · You have trouble moving your legs  · Your legs are numb  Contact your healthcare provider if:   · You have a fever  · Your pain does not go away, even after treatment  · You have questions or concerns about your condition or care  Medicines: The following medicines may be ordered by your healthcare provider:  · Acetaminophen decreases pain and fever  It is available without a doctor's order  Ask how much to take and how often to take it  Follow directions  Acetaminophen can cause liver damage if not taken correctly  · NSAIDs , such as ibuprofen, help decrease swelling, pain, and fever  This medicine is available with or without a doctor's order  NSAIDs can cause stomach bleeding or kidney problems in certain people  If you take blood thinner medicine, always ask your healthcare provider if NSAIDs are safe for you  Always read the medicine label and follow directions  · Muscle relaxers  help decrease pain and muscle spasms  · Prescription pain medicine  may be given  Ask how to take this medicine safely  · Take your medicine as directed    Contact your healthcare provider if you think your medicine is not helping or if you have side effects  Tell him or her if you are allergic to any medicine  Keep a list of the medicines, vitamins, and herbs you take  Include the amounts, and when and why you take them  Bring the list or the pill bottles to follow-up visits  Carry your medicine list with you in case of an emergency  Self-care:   · Rest  as directed  You may need to rest in bed for a period of time after your injury  Do not lift heavy objects  · Apply ice  on your back for 15 to 20 minutes every hour or as directed  Use an ice pack, or put crushed ice in a plastic bag  Cover it with a towel  Ice helps prevent tissue damage and decreases swelling and pain  · Apply heat  on your lower back for 20 to 30 minutes every 2 hours for as many days as directed  Heat helps decrease pain and muscle spasms  · Slowly start to increase your activity  as the pain decreases, or as directed  Prevent another low back strain:   · Use correct body movements  ¨ Bend at the hips and knees when you  objects  Do not bend from the waist  Use your leg muscles as you lift the load  Do not use your back  Keep the object close to your chest as you lift it  Try not to twist or lift anything above your waist     ¨ Change your position often when you stand for long periods of time  Rest one foot on a small box or footrest, and then switch to the other foot often  ¨ Try not to sit for long periods of time  When you do, sit in a straight-backed chair with your feet flat on the floor  ¨ Never reach, pull, or push while you are sitting  · Warm up before you exercise  Do exercises that strengthen your back muscles  Ask your healthcare provider about the best exercise plan for you  · Maintain a healthy weight  Ask your healthcare provider how much you should weigh  Ask him to help you create a weight loss plan if you are overweight    © 2017 [address] [employer] Information is for End User's use only and may not be sold, redistributed or otherwise used for commercial purposes  All illustrations and images included in CareNotes® are the copyrighted property of [employer]  or [employer]  The above information is an  only  It is not intended as medical advice for individual conditions or treatments  Talk to your doctor, nurse or pharmacist before following any medical regimen to see if it is safe and effective for you

## 2017-11-26 ENCOUNTER — APPOINTMENT (EMERGENCY)
Dept: CT IMAGING | Facility: HOSPITAL | Age: 57
End: 2017-11-26
Payer: COMMERCIAL

## 2017-11-26 ENCOUNTER — HOSPITAL ENCOUNTER (EMERGENCY)
Facility: HOSPITAL | Age: 57
Discharge: HOME | End: 2017-11-27
Attending: EMERGENCY MEDICINE
Payer: COMMERCIAL

## 2017-11-26 ENCOUNTER — APPOINTMENT (EMERGENCY)
Dept: RADIOLOGY | Facility: HOSPITAL | Age: 57
End: 2017-11-26
Payer: COMMERCIAL

## 2017-11-26 VITALS
SYSTOLIC BLOOD PRESSURE: 143 MMHG | OXYGEN SATURATION: 97 % | DIASTOLIC BLOOD PRESSURE: 80 MMHG | BODY MASS INDEX: 26.17 KG/M2 | RESPIRATION RATE: 20 BRPM | HEART RATE: 76 BPM | HEIGHT: 63 IN | WEIGHT: 147.71 LBS | TEMPERATURE: 98.3 F

## 2017-11-26 DIAGNOSIS — I10 HTN (HYPERTENSION): ICD-10-CM

## 2017-11-26 DIAGNOSIS — K52.9 ENTERITIS: ICD-10-CM

## 2017-11-26 DIAGNOSIS — R10.9 ABDOMINAL PAIN: Primary | ICD-10-CM

## 2017-11-26 LAB
ALBUMIN SERPL BCP-MCNC: 3.1 G/DL (ref 3.5–5)
ALP SERPL-CCNC: 154 U/L (ref 46–116)
ALT SERPL W P-5'-P-CCNC: 42 U/L (ref 12–78)
AMYLASE SERPL-CCNC: 31 IU/L (ref 25–115)
ANION GAP SERPL CALCULATED.3IONS-SCNC: 10 MMOL/L (ref 4–13)
APTT PPP: 18 SECONDS (ref 23–35)
AST SERPL W P-5'-P-CCNC: 23 U/L (ref 5–45)
BACTERIA UR QL AUTO: ABNORMAL /HPF
BASOPHILS # BLD AUTO: 0.04 THOUSANDS/ΜL (ref 0–0.1)
BASOPHILS NFR BLD AUTO: 1 % (ref 0–1)
BILIRUB SERPL-MCNC: 0.3 MG/DL (ref 0.2–1)
BILIRUB UR QL STRIP: NEGATIVE
BUN SERPL-MCNC: 9 MG/DL (ref 5–25)
CALCIUM SERPL-MCNC: 8.9 MG/DL (ref 8.3–10.1)
CHLORIDE SERPL-SCNC: 106 MMOL/L (ref 100–108)
CLARITY UR: CLEAR
CO2 SERPL-SCNC: 28 MMOL/L (ref 21–32)
COLOR UR: YELLOW
CREAT SERPL-MCNC: 0.68 MG/DL (ref 0.6–1.3)
EOSINOPHIL # BLD AUTO: 0.21 THOUSAND/ΜL (ref 0–0.61)
EOSINOPHIL NFR BLD AUTO: 4 % (ref 0–6)
ERYTHROCYTE [DISTWIDTH] IN BLOOD BY AUTOMATED COUNT: 13.3 % (ref 11.6–15.1)
ETHANOL SERPL-MCNC: <3 MG/DL (ref 0–3)
GFR SERPL CREATININE-BSD FRML MDRD: 96 ML/MIN/1.73SQ M
GLUCOSE SERPL-MCNC: 89 MG/DL (ref 65–140)
GLUCOSE UR STRIP-MCNC: NEGATIVE MG/DL
HCT VFR BLD AUTO: 40.9 % (ref 34.8–46.1)
HGB BLD-MCNC: 13.4 G/DL (ref 11.5–15.4)
HGB UR QL STRIP.AUTO: ABNORMAL
INR PPP: 0.85 (ref 0.86–1.16)
KETONES UR STRIP-MCNC: NEGATIVE MG/DL
LEUKOCYTE ESTERASE UR QL STRIP: NEGATIVE
LIPASE SERPL-CCNC: 56 U/L (ref 73–393)
LYMPHOCYTES # BLD AUTO: 1.49 THOUSANDS/ΜL (ref 0.6–4.47)
LYMPHOCYTES NFR BLD AUTO: 31 % (ref 14–44)
MAGNESIUM SERPL-MCNC: 2 MG/DL (ref 1.6–2.6)
MCH RBC QN AUTO: 28.9 PG (ref 26.8–34.3)
MCHC RBC AUTO-ENTMCNC: 32.8 G/DL (ref 31.4–37.4)
MCV RBC AUTO: 88 FL (ref 82–98)
MONOCYTES # BLD AUTO: 0.23 THOUSAND/ΜL (ref 0.17–1.22)
MONOCYTES NFR BLD AUTO: 5 % (ref 4–12)
NEUTROPHILS # BLD AUTO: 2.79 THOUSANDS/ΜL (ref 1.85–7.62)
NEUTS SEG NFR BLD AUTO: 58 % (ref 43–75)
NITRITE UR QL STRIP: NEGATIVE
NON-SQ EPI CELLS URNS QL MICRO: ABNORMAL /HPF
NRBC BLD AUTO-RTO: 0 /100 WBCS
PH UR STRIP.AUTO: 6 [PH] (ref 4.5–8)
PLATELET # BLD AUTO: 174 THOUSANDS/UL (ref 149–390)
PMV BLD AUTO: 10.2 FL (ref 8.9–12.7)
POTASSIUM SERPL-SCNC: 3.9 MMOL/L (ref 3.5–5.3)
PROT SERPL-MCNC: 7.1 G/DL (ref 6.4–8.2)
PROT UR STRIP-MCNC: NEGATIVE MG/DL
PROTHROMBIN TIME: 11.8 SECONDS (ref 12.1–14.4)
RBC # BLD AUTO: 4.63 MILLION/UL (ref 3.81–5.12)
RBC #/AREA URNS AUTO: ABNORMAL /HPF
SODIUM SERPL-SCNC: 144 MMOL/L (ref 136–145)
SP GR UR STRIP.AUTO: 1.02 (ref 1–1.03)
TROPONIN I SERPL-MCNC: <0.02 NG/ML
UROBILINOGEN UR QL STRIP.AUTO: 0.2 E.U./DL
WBC # BLD AUTO: 4.78 THOUSAND/UL (ref 4.31–10.16)
WBC #/AREA URNS AUTO: ABNORMAL /HPF

## 2017-11-26 PROCEDURE — 36415 COLL VENOUS BLD VENIPUNCTURE: CPT | Performed by: EMERGENCY MEDICINE

## 2017-11-26 PROCEDURE — 85025 COMPLETE CBC W/AUTO DIFF WBC: CPT | Performed by: EMERGENCY MEDICINE

## 2017-11-26 PROCEDURE — 83735 ASSAY OF MAGNESIUM: CPT | Performed by: EMERGENCY MEDICINE

## 2017-11-26 PROCEDURE — 74176 CT ABD & PELVIS W/O CONTRAST: CPT

## 2017-11-26 PROCEDURE — 81001 URINALYSIS AUTO W/SCOPE: CPT | Performed by: EMERGENCY MEDICINE

## 2017-11-26 PROCEDURE — 83690 ASSAY OF LIPASE: CPT | Performed by: EMERGENCY MEDICINE

## 2017-11-26 PROCEDURE — 96374 THER/PROPH/DIAG INJ IV PUSH: CPT

## 2017-11-26 PROCEDURE — 85730 THROMBOPLASTIN TIME PARTIAL: CPT | Performed by: EMERGENCY MEDICINE

## 2017-11-26 PROCEDURE — 96375 TX/PRO/DX INJ NEW DRUG ADDON: CPT

## 2017-11-26 PROCEDURE — 93005 ELECTROCARDIOGRAM TRACING: CPT | Performed by: EMERGENCY MEDICINE

## 2017-11-26 PROCEDURE — 96372 THER/PROPH/DIAG INJ SC/IM: CPT

## 2017-11-26 PROCEDURE — 71020 HB CHEST X-RAY 2VW FRONTAL&LATL: CPT

## 2017-11-26 PROCEDURE — 96361 HYDRATE IV INFUSION ADD-ON: CPT

## 2017-11-26 PROCEDURE — 96376 TX/PRO/DX INJ SAME DRUG ADON: CPT

## 2017-11-26 PROCEDURE — 82150 ASSAY OF AMYLASE: CPT | Performed by: EMERGENCY MEDICINE

## 2017-11-26 PROCEDURE — 80320 DRUG SCREEN QUANTALCOHOLS: CPT | Performed by: EMERGENCY MEDICINE

## 2017-11-26 PROCEDURE — 80053 COMPREHEN METABOLIC PANEL: CPT | Performed by: EMERGENCY MEDICINE

## 2017-11-26 PROCEDURE — 84484 ASSAY OF TROPONIN QUANT: CPT | Performed by: EMERGENCY MEDICINE

## 2017-11-26 PROCEDURE — 85610 PROTHROMBIN TIME: CPT | Performed by: EMERGENCY MEDICINE

## 2017-11-26 RX ORDER — FENTANYL CITRATE 50 UG/ML
50 INJECTION, SOLUTION INTRAMUSCULAR; INTRAVENOUS ONCE
Status: COMPLETED | OUTPATIENT
Start: 2017-11-26 | End: 2017-11-26

## 2017-11-26 RX ORDER — MORPHINE SULFATE 4 MG/ML
4 INJECTION, SOLUTION INTRAMUSCULAR; INTRAVENOUS ONCE
Status: COMPLETED | OUTPATIENT
Start: 2017-11-26 | End: 2017-11-26

## 2017-11-26 RX ORDER — ONDANSETRON 2 MG/ML
4 INJECTION INTRAMUSCULAR; INTRAVENOUS ONCE
Status: COMPLETED | OUTPATIENT
Start: 2017-11-26 | End: 2017-11-26

## 2017-11-26 RX ADMIN — SODIUM CHLORIDE 1000 ML: 0.9 INJECTION, SOLUTION INTRAVENOUS at 20:00

## 2017-11-26 RX ADMIN — FENTANYL CITRATE 50 MCG: 50 INJECTION INTRAMUSCULAR; INTRAVENOUS at 20:33

## 2017-11-26 RX ADMIN — ONDANSETRON 4 MG: 2 INJECTION INTRAMUSCULAR; INTRAVENOUS at 19:45

## 2017-11-26 RX ADMIN — MORPHINE SULFATE 4 MG: 4 INJECTION, SOLUTION INTRAMUSCULAR; INTRAVENOUS at 22:19

## 2017-11-26 RX ADMIN — FENTANYL CITRATE 50 MCG: 50 INJECTION INTRAMUSCULAR; INTRAVENOUS at 19:45

## 2017-11-26 NOTE — ED PROVIDER NOTES
History  Chief Complaint   Patient presents with    Abdominal Pain     Pt presents to the ED with upper right quad abd pain that started approx 3 hrs ago     Patient is a 49-year-old female with a history of hypertension, chronic back pain, bipolar disorder, depression, rheumatoid arthritis, status post appendectomy and cholecystectomy as well as hysterectomy presents with onset of abdominal pain approximately 3-4 hours ago  Patient states she had this abdominal pain while sitting watching TV  She states her last meal was this morning which was an egg sandwich  She was able to keep this down and did not have any difficulty eating this or having any abdominal pain afterwards  She denies any melena, bright red blood per rectum, fevers, chills, hemoptysis, hematemesis  Patient states she typically has loose watery stools which have remained unchanged and normal this morning for her around 10:00 a m  Georgette Ormond She has no trauma to the abdomen  Patient has had colonoscopies and EGDs in the past which were "normal"  Patient did not take anything for her pain and she figured this would make her pain worse  Patient denies any back, urinary retention  No cough           History provided by:  Patient   used: No    Abdominal Pain   Pain location:  Epigastric  Pain quality: cramping, fullness, pressure, sharp and shooting    Pain radiates to:  Does not radiate  Pain severity:  Moderate  Onset quality:  Gradual  Duration:  3 hours  Timing:  Constant  Progression:  Unchanged  Chronicity:  New  Context: not alcohol use, not awakening from sleep, not diet changes, not eating, not laxative use, not medication withdrawal, not previous surgeries, not recent illness, not recent sexual activity, not recent travel, not retching, not sick contacts, not suspicious food intake and not trauma    Relieved by:  None tried  Worsened by:  Nothing  Ineffective treatments:  None tried  Associated symptoms: diarrhea, nausea and vomiting    Associated symptoms: no anorexia, no belching, no chest pain, no chills, no constipation, no cough, no dysuria, no fatigue, no fever, no flatus, no hematemesis, no hematochezia, no hematuria, no melena, no shortness of breath and no sore throat    Risk factors: being elderly and multiple surgeries    Risk factors: no alcohol abuse, no aspirin use, no NSAID use, not obese, not pregnant and no recent hospitalization        Prior to Admission Medications   Prescriptions Last Dose Informant Patient Reported? Taking? HYDROcodone-acetaminophen (NORCO) 5-325 mg per tablet   No No   Sig: Take 1 tablet by mouth every 6 (six) hours as needed for pain for up to 20 doses Max Daily Amount: 4 tablets   citalopram (CELEXA) 40 mg tablet  Self Yes No   Sig: Take 40 mg by mouth daily     gabapentin (NEURONTIN) 800 mg tablet   Yes No   Sig: Take 800 mg by mouth 3 (three) times a day   metoprolol tartrate (LOPRESSOR) 50 mg tablet   Yes No   Sig: Take 25 mg by mouth every 12 (twelve) hours      Facility-Administered Medications: None       Past Medical History:   Diagnosis Date    Anxiety     Bipolar 1 disorder (HCC)     Chronic back pain     Frequent headaches     Glaucoma     Hypertension     Psychiatric disorder     bipolar    Rheumatoid arthritis ([facility] [address] )        Past Surgical History:   Procedure Laterality Date    APPENDECTOMY      CATARACT EXTRACTION Left     CHOLECYSTECTOMY      HYSTERECTOMY      INTRAOCULAR LENS INSERTION      WI TEMPORAL ARTERY LIGATN OR BX Right 12/16/2016    Procedure: BIOPSY ARTERY TEMPORAL;  Surgeon: Aquilino Mohan MD;  Location: [facility];  Service: General    TONSILLECTOMY         Family History   Problem Relation Age of Onset    Heart disease Mother      I have reviewed and agree with the history as documented      Social History   Substance Use Topics    Smoking status: Current Every Day Smoker     Packs/day: 0 20     Years: 46 00     Types: Cigarettes    Smokeless tobacco: Never Used    Alcohol use No        Review of Systems   Constitutional: Negative for chills, fatigue and fever  HENT: Negative for sore throat  Respiratory: Negative for cough and shortness of breath  Cardiovascular: Negative for chest pain  Gastrointestinal: Positive for abdominal distention, abdominal pain, diarrhea, nausea and vomiting  Negative for anal bleeding, anorexia, blood in stool, constipation, flatus, hematemesis, hematochezia, melena and rectal pain  Genitourinary: Negative for dysuria and hematuria  Physical Exam  ED Triage Vitals [11/26/17 1832]   Temperature Pulse Respirations Blood Pressure SpO2   98 3 °F (36 8 °C) 90 20 154/92 98 %      Temp src Heart Rate Source Patient Position - Orthostatic VS BP Location FiO2 (%)   -- Monitor -- -- --      Pain Score       Worst Possible Pain           Orthostatic Vital Signs  Vitals:    11/26/17 1832   BP: 154/92   Pulse: 90       Physical Exam   Constitutional: She is oriented to person, place, and time  She appears well-developed and well-nourished  She appears distressed  HENT:   Head: Normocephalic and atraumatic  Mouth/Throat: Oropharynx is clear and moist    Eyes: Conjunctivae and EOM are normal  Pupils are equal, round, and reactive to light  Neck: Normal range of motion  Neck supple  Cardiovascular: Normal rate, regular rhythm, normal heart sounds and intact distal pulses  No murmur heard  Pulmonary/Chest: Effort normal and breath sounds normal  No stridor  No respiratory distress  Abdominal: Soft  Bowel sounds are normal  She exhibits no distension  There is tenderness in the epigastric area  There is no rigidity, no rebound, no guarding and no CVA tenderness  Musculoskeletal: Normal range of motion  She exhibits no edema  Neurological: She is alert and oriented to person, place, and time  No cranial nerve deficit  Skin: Skin is warm  Capillary refill takes less than 2 seconds  She is not diaphoretic  Nursing note and vitals reviewed  ED Medications  Medications   sodium chloride 0 9 % bolus 1,000 mL (0 mL Intravenous Stopped 11/26/17 2121)   ondansetron (ZOFRAN) injection 4 mg (4 mg Intravenous Given 11/26/17 1945)   fentanyl citrate (PF) 100 MCG/2ML 50 mcg (50 mcg Intravenous Given 11/26/17 1945)   fentanyl citrate (PF) 100 MCG/2ML 50 mcg (50 mcg Intravenous Given 11/26/17 2033)       Diagnostic Studies  Results Reviewed     Procedure Component Value Units Date/Time    Protime-INR [[id number]]  (Abnormal) Collected:  11/26/17 2146    Lab Status:  Final result Specimen:  Blood from Arm, Left Updated:  11/26/17 2205     Protime 11 8 (L) seconds      INR 0 85 (L)    CBC and differential [[id number]]  (Normal) Collected:  11/26/17 2146    Lab Status:  Final result Specimen:  Blood from Arm, Left Updated:  11/26/17 2157     WBC 4 78 Thousand/uL      RBC 4 63 Million/uL      Hemoglobin 13 4 g/dL      Hematocrit 40 9 %      MCV 88 fL      MCH 28 9 pg      MCHC 32 8 g/dL      RDW 13 3 %      MPV 10 2 fL      Platelets 437 Thousands/uL      nRBC 0 /100 WBCs      Neutrophils Relative 58 %      Lymphocytes Relative 31 %      Monocytes Relative 5 %      Eosinophils Relative 4 %      Basophils Relative 1 %      Neutrophils Absolute 2 79 Thousands/µL      Lymphocytes Absolute 1 49 Thousands/µL      Monocytes Absolute 0 23 Thousand/µL      Eosinophils Absolute 0 21 Thousand/µL      Basophils Absolute 0 04 Thousands/µL     APTT [[id number]] Collected:  11/26/17 2146    Lab Status:   In process Specimen:  Blood from Arm, Left Updated:  11/26/17 2149    Urine Microscopic [[id number]]  (Abnormal) Collected:  11/26/17 2126    Lab Status:  Final result Specimen:  Urine from Urine, Clean Catch Updated:  11/26/17 2145     RBC, UA 2-4 (A) /hpf      WBC, UA 0-1 (A) /hpf      Epithelial Cells Occasional /hpf      Bacteria, UA Occasional /hpf     UA w Reflex to Microscopic [[id number]]  (Abnormal) Collected:  11/26/17 2126    Lab Status: Final result Specimen:  Urine from Urine, Clean Catch Updated:  11/26/17 2134     Color, UA Yellow     Clarity, UA Clear     Specific Melvin, UA 1 020     pH, UA 6 0     Leukocytes, UA Negative     Nitrite, UA Negative     Protein, UA Negative mg/dl      Glucose, UA Negative mg/dl      Ketones, UA Negative mg/dl      Urobilinogen, UA 0 2 E U /dl      Bilirubin, UA Negative     Blood, UA Trace-Intact (A)    Ethanol [[id number]]  (Normal) Collected:  11/26/17 1942    Lab Status:  Final result Specimen:  Blood from Arm, Right Updated:  11/26/17 2028     Ethanol Lvl <3 mg/dL     Troponin I [[id number]]  (Normal) Collected:  11/26/17 1942    Lab Status:  Final result Specimen:  Blood from Arm, Right Updated:  11/26/17 2015     Troponin I <0 02 ng/mL     Narrative:         [employer] Chemistry analyzer 99% cutoff is > 0 04 ng/mL in network labs    o cTnI 99% cutoff is useful only when applied to patients in the clinical setting of myocardial ischemia  o cTnI 99% cutoff should be interpreted in the context of clinical history, ECG findings and possibly cardiac imaging to establish correct diagnosis  o cTnI 99% cutoff may be suggestive but clearly not indicative of a coronary event without the clinical setting of myocardial ischemia      Comprehensive metabolic panel [[id number]]  (Abnormal) Collected:  11/26/17 1942    Lab Status:  Final result Specimen:  Blood from Arm, Right Updated:  11/26/17 2011     Sodium 144 mmol/L      Potassium 3 9 mmol/L      Chloride 106 mmol/L      CO2 28 mmol/L      Anion Gap 10 mmol/L      BUN 9 mg/dL      Creatinine 0 68 mg/dL      Glucose 89 mg/dL      Calcium 8 9 mg/dL      AST 23 U/L      ALT 42 U/L      Alkaline Phosphatase 154 (H) U/L      Total Protein 7 1 g/dL      Albumin 3 1 (L) g/dL      Total Bilirubin 0 30 mg/dL      eGFR 96 ml/min/1 73sq m     Narrative:         National Kidney Disease Education Program recommendations are as follows:  GFR calculation is accurate only with a steady state creatinine  Chronic Kidney disease less than 60 ml/min/1 73 sq  meters  Kidney failure less than 15 ml/min/1 73 sq  meters  Magnesium [[id number]]  (Normal) Collected:  11/26/17 1942    Lab Status:  Final result Specimen:  Blood from Arm, Right Updated:  11/26/17 2011     Magnesium 2 0 mg/dL     Lipase [[id number]]  (Abnormal) Collected:  11/26/17 1942    Lab Status:  Final result Specimen:  Blood from Arm, Right Updated:  11/26/17 2011     Lipase 56 (L) u/L     Amylase [[id number]]  (Normal) Collected:  11/26/17 1942    Lab Status:  Final result Specimen:  Blood from Arm, Right Updated:  11/26/17 2011     Amylase 31 IU/L                  XR chest 2 views    (Results Pending)   CT abdomen pelvis with contrast    (Results Pending)   CT abdomen pelvis wo contrast    (Results Pending)              Procedures  Procedures       Phone Contacts  ED Phone Contact    ED Course  ED Course                                MDM  Number of Diagnoses or Management Options  Abdominal pain: new and requires workup  Enteritis:   HTN (hypertension):   Diagnosis management comments:   Upon initial exam patient is a 57-year-old female with new onset abdominal pain  At this time patient has a history of appendectomy and cholecystectomy  Will workup for rule out AAA,  pneumonia, pancreatitis, retained stones, mesenteric ischemia, bowel obstruction, urinary tract infection, new masses or lesions, I to light abnormalities given patient's complaints of persistent diarrhea  Rosana,    9:59 PM  Labs stable for patient  No IV access  Will change CT to without contrast   Patient asking for more pain medicine  Patient states that she has had morphine before without any issues  Will give IM  Patient updated on labs as well as CT without  Patient afebrile and hemodynamically stable  Labs show no leukocytosis  No electrolyte abnormalities  LipaseWas low    Will continue CT for rule out but not limited to:  pancreatitis, obstruction, intra-abdominal mass, AAA, Lower lobe pneumonia  Patient has had cholecystectomy and appendectomy  12:27 AM  Patient afebrile hemodynamically stable  Patient has no leukocytosis and labs are stable otherwise with no lactic acidosis  Cat scan without shows multiple non dilated, fluid containing large and small bowel loops nonspecific  There is no evidence of obstruction  Patient updated on this  The patient asking for narcotics for home  I discussed with patient at this time narcotics are not indicated for enteritis  It is noted patient did receive a prescription for Percocet on 11/21 2017 from this ER as well  I discussed this with her and told her that she will not be receiving narcotics  Patient states that her diarrhea has been there for several months and this has not changed  She has had no vomiting or diarrhea while in the ER  Patient instructed to call PCP as well as gastroenterologist in the morning for close follow-up  Will trial p o  at this time  Patient states she has Bentyl at home I discussed with her that she needs to continue with this as well as a brat diet  EKG INTERPRETATION at 7:52 p m    RHYTHM:  Normal sinus rhythm at 80 beats per minute  AXIS:  Normal axis  INTERVALS:  Normal  QRS COMPLEX:  Within normal limits  ST SEGMENT:  Nonspecific ST segment changes  QT INTERVAL:  QTC measured at 464 millisecond  COMPARED WITH PRIOR compared to EKG on October 26, 2017 no acute change  Interpretation by Regina Kiser, DO          Amount and/or Complexity of Data Reviewed  Clinical lab tests: ordered and reviewed  Tests in the radiology section of CPT®: ordered and reviewed  Tests in the medicine section of CPT®: ordered and reviewed  Independent visualization of images, tracings, or specimens: yes      CritCare Time    Disposition  Final diagnoses:   Abdominal pain     Time reflects when diagnosis was documented in both MDM as applicable and the Disposition within this note     Time User Action Codes Description Comment    11/26/2017  6:56 PM Verenice Golden Add [R10 9] Abdominal pain       ED Disposition     None      Follow-up Information    None       Patient's Medications   Discharge Prescriptions    No medications on file     No discharge procedures on file      ED Provider  Electronically Signed by           Catracho Pritchett DO  11/27/17 0783

## 2017-11-27 LAB
ATRIAL RATE: 81 BPM
P AXIS: 54 DEGREES
PR INTERVAL: 104 MS
QRS AXIS: 58 DEGREES
QRSD INTERVAL: 80 MS
QT INTERVAL: 400 MS
QTC INTERVAL: 464 MS
T WAVE AXIS: 31 DEGREES
VENTRICULAR RATE: 81 BPM

## 2017-11-27 PROCEDURE — 99285 EMERGENCY DEPT VISIT HI MDM: CPT

## 2017-11-27 RX ORDER — DICYCLOMINE HCL 20 MG
20 TABLET ORAL ONCE
Status: DISCONTINUED | OUTPATIENT
Start: 2017-11-27 | End: 2017-11-27 | Stop reason: HOSPADM

## 2017-11-27 RX ORDER — OXYCODONE HYDROCHLORIDE AND ACETAMINOPHEN 5; 325 MG/1; MG/1
1 TABLET ORAL ONCE
Status: DISCONTINUED | OUTPATIENT
Start: 2017-11-27 | End: 2017-11-27 | Stop reason: HOSPADM

## 2017-11-27 NOTE — ED NOTES
Entered patient room to administer medications as ordered  Patient has already left room without medication and discharge instructions       Isaac Yepez RN  11/27/17 7655

## 2017-11-27 NOTE — DISCHARGE INSTRUCTIONS
B  R  A  T  DIET Your doctor has recommended the B R A T  diet for you or your child until his or her condition improves  This is often used to help control diarrhea and vomiting symptoms  If you or your child can tolerate clear liquids, you may offer: · Bananas · Rice · Applesauce · Toast (and other simple starches such as crackers, potatoes, noodles) Be sure to avoid dairy products, meats, and fatty foods until your child's symptoms are completely better  Acute Abdominal Pain   WHAT YOU NEED TO KNOW:   The cause of your abdominal pain may not be found  If a cause is found, treatment will depend on what the cause is  DISCHARGE INSTRUCTIONS:   Seek care immediately if:   · You vomit blood or cannot stop vomiting  · You have blood in your bowel movement or it looks like tar  · You have bleeding from your rectum  · Your abdomen is larger than usual, more painful, and hard  · You have severe pain in your abdomen  · You stop passing gas and having bowel movements  · You feel weak, dizzy, or faint  Contact your healthcare provider if:   · You have a fever  · You have new signs and symptoms  · Your symptoms do not get better with treatment  · You have questions or concerns about your condition or care  Medicines  may be given to decrease pain, treat an infection, and manage your symptoms  Take your medicine as directed  Call your healthcare provider if you think your medicine is not helping or if you have side effects  Tell him if you are allergic to any medicine  Keep a list of the medicines, vitamins, and herbs you take  Include the amounts, and when and why you take them  Bring the list or the pill bottles to follow-up visits  Carry your medicine list with you in case of an emergency  Manage your symptoms:   · Apply heat  on your abdomen for 20 to 30 minutes every 2 hours for as many days as directed  Heat helps decrease pain and muscle spasms  · Manage your stress    Stress may cause abdominal pain  Your healthcare provider may recommend relaxation techniques and deep breathing exercises to help decrease your stress  Your healthcare provider may recommend you talk to someone about your stress or anxiety, such as a counselor or a trusted friend  Get plenty of sleep and exercise regularly  · Limit or do not drink alcohol  Alcohol can make your abdominal pain worse  Ask your healthcare provider if it is safe for you to drink alcohol  Also ask how much is safe for you to drink  · Do not smoke  Nicotine and other chemicals in cigarettes can damage your esophagus and stomach  Ask your healthcare provider for information if you currently smoke and need help to quit  E-cigarettes or smokeless tobacco still contain nicotine  Talk to your healthcare provider before you use these products  Make changes to the food you eat as directed:  Do not eat foods that cause abdominal pain or other symptoms  Eat small meals more often  · Eat more high-fiber foods if you are constipated  High-fiber foods include fruits, vegetables, whole-grain foods, and legumes  · Do not eat foods that cause gas if you have bloating  Examples include broccoli, cabbage, and cauliflower  Do not drink soda or carbonated drinks, because these may also cause gas  · Do not eat foods or drinks that contain sorbitol or fructose if you have diarrhea and bloating  Some examples are fruit juices, candy, jelly, and sugar-free gum  · Do not eat high-fat foods, such as fried foods, cheeseburgers, hot dogs, and desserts  · Limit or do not drink caffeine  Caffeine may make symptoms, such as heart burn or nausea, worse  · Drink plenty of liquids to prevent dehydration from diarrhea or vomiting  Ask your healthcare provider how much liquid to drink each day and which liquids are best for you    Follow up with your healthcare provider as directed:  Write down your questions so you remember to ask them during your visits  © 2017 [facility] Information is for End User's use only and may not be sold, redistributed or otherwise used for commercial purposes  All illustrations and images included in CareNotes® are the copyrighted property of [employer]  or [employer]  The above information is an  only  It is not intended as medical advice for individual conditions or treatments  Talk to your doctor, nurse or pharmacist before following any medical regimen to see if it is safe and effective for you

## 2017-11-27 NOTE — ED NOTES
Patient transported to [address] [place] [address] [address] [facility]  11/26/17 9171

## 2018-01-11 NOTE — MISCELLANEOUS
Assessment    1  Rash (782 1) (R21)   2  Benign hypertension (401 1) (I10)   3  Bipolar disorder (296 80) (F31 9)   4  C  difficile diarrhea (008 45) (A04 72)   5  Centrilobular emphysema (492 8) (J43 2)   6  Common bile duct (CBD) obstruction (576 2) (K83 1)   7  Hyperlipidemia (272 4) (E78 5)   8  Lumbar spinal stenosis (724 02) (M48 061)   9  Fecal incontinence (787 60) (R15 9)    Plan  Rash    · Triamcinolone Acetonide 0 5 % External Cream; APPLY 2-3 TIMES DAILY TO  AFFECTED AREA(S)   Rx By: Alda To; Dispense: 0 Days ; #:1 X 15 GM Tube; Refill: 3; For: Rash; CAROL = N; Verified Transmission to [address] [place] [address]; Last Updated By: [employer]; 10/5/2017 2:00:58 PM    Discussion/Summary  Discussion Summary:   Reviewed all of her hospital data she will see GI  A topical steroid for the itchy rash  Chief Complaint  Chief Complaint Free Text Note Form: Hospital follow-up      History of Present Illness  TCM Communication [facility]: [facility] records were reviewed  She was hospitalized at [place] [address]  The date of admission: 9/29/17, date of discharge: 10/3/17  Diagnosis: C diff colitis  She was discharged to home  Medications reviewed and updated today  She scheduled a follow up appointment  Follow-up appointments with other specialists: GI    Communication performed and completed by   HPI: Discharge from the hospital 2 days ago because of diarrhea and persistent upper abdominal pain going on for several months  Recently hospitalized with right upper quadrant pain dilated common duct and ERCP and sphincterotomy  She has had persistent abdominal pain she developed diarrhea and was admitted with C  difficile colitis  Sent home 3 days ago on oral vancomycin and Bentyl  She continues with 5 liquid stools a day and constant right upper quadrant abdominal pain she is eating well no blood in the stool  She thinks her stools looked black sometimes   She has an itchy rash around both ankles for the last 2 weeks   Rash (Brief): The patient is being seen for an initial evaluation of this episode of a rash  Symptoms: localized rash and pruritus, but no fatigue  Back Pain (Brief): The patient is being seen for a routine clinic follow-up of back pain  Symptoms:  back pain  COPD (Follow-Up): The patient states she has been doing well with her COPD control since the last visit  She has no comorbid illnesses  She has no significant interval events  Symptoms: The patient is currently asymptomatic  Hyperlipidemia (Follow-Up): The patient states her hyperlipidemia has been under good control since the last visit  Comorbid Illnesses: hypertension  She has no significant interval events  Symptoms:   Hypertension (Follow-Up): The patient presents for follow-up of essential hypertension  The patient states she has been doing well with her blood pressure control since the last visit  She has no comorbid illnesses  She has no significant interval events  Symptoms: The patient is currently asymptomatic  Review of Systems  Complete-Female:   Constitutional: No fever, no chills, feels well, no tiredness, no recent weight gain or weight loss  Eyes: No complaints of eye pain, no red eyes, no eyesight problems, no discharge, no dry eyes, no itching of eyes  ENT: no complaints of earache, no loss of hearing, no nose bleeds, no nasal discharge, no sore throat, no hoarseness  Cardiovascular: No complaints of slow heart rate, no fast heart rate, no chest pain, no palpitations, no leg claudication, no lower extremity edema  Respiratory: as noted in HPI  Gastrointestinal: as noted in HPI  Genitourinary: No complaints of dysuria, no incontinence, no pelvic pain, no dysmenorrhea, no vaginal discharge or bleeding  Musculoskeletal: as noted in HPI  Integumentary: No complaints of skin rash or lesions, no itching, no skin wounds, no breast pain or lump     Neurological: No complaints of headache, no confusion, no convulsions, no numbness, no dizziness or fainting, no tingling, no limb weakness, no difficulty walking  Psychiatric: Not suicidal, no sleep disturbance, no anxiety or depression, no change in personality, no emotional problems  Endocrine: No complaints of proptosis, no hot flashes, no muscle weakness, no deepening of the voice, no feelings of weakness  Hematologic/Lymphatic: No complaints of swollen glands, no swollen glands in the neck, does not bleed easily, does not bruise easily  ROS Reviewed:   ROS reviewed  Active Problems    1  Acute otitis externa (380 10) (H60 509)   2  Acute otitis media (382 9) (H66 90)   3  Acute pain of left shoulder (719 41) (M25 512)   4  Atypical chest pain (786 59) (R07 89)   5  Bee sting allergy (V15 06) (Z91 038)   6  Benign hypertension (401 1) (I10)   7  Bilateral calf pain (729 5) (M79 661,M79 662)   8  Bipolar disorder (296 80) (F31 9)   9  Bunion (727 1) (M21 619)   10  Centrilobular emphysema (492 8) (J43 2)   11  Colonic polyp (211 3) (K63 5)   12  Common bile duct (CBD) obstruction (576 2) (K83 1)   13  Coronary artery spasm (413 9) (I20 1)   14  Disc degeneration, lumbar (722 52) (M51 36)   15  Fatigue (780 79) (R53 83)   16  Fecal incontinence (787 60) (R15 9)   17  Fecal urgency (787 63) (R15 2)   18  Hyperlipidemia (272 4) (E78 5)   19  Loose stools (787 7) (R19 5)   20  Lumbar disc herniation (722 10) (M51 26)   21  Lumbar spinal stenosis (724 02) (M48 061)   22  New daily persistent headache (339 42) (G44 52)   23  Postoperative state (V45 89) (Z98 890)   24  Postprandial epigastric pain (789 06) (R10 13)   25  Rheumatoid arthritis (714 0) (M06 9)   26  Temporal headache (784 0) (R51)    Past Medical History    1  History of CAD (coronary artery disease) (414 00) (I25 10)   2  History of ovarian cancer (V10 43) (Z85 43)   3  Denied: History of substance abuse   4  Denied: History of Mental problems   5   History of Uterine carcinoma (179) (C55)    Surgical History    1  History of Anterior Sclera Fistulization For Glaucoma   2  History of Appendectomy   3  History of Biopsy Temporal Artery   4  History of Cholecystectomy   5  History of Remove Cataract, Corneo-Scleral Section Left Eye   6  History of Tonsillectomy With Adenoidectomy   7  History of Total Abdominal Hysterectomy With Bilateral Salpingo-Oophorectomy  Surgical History Reviewed: The surgical history was reviewed and updated today  Family History  Mother    1  Family history of Coronary artery disease   2  Family history of gangrene (V19 4) (Z84 0)   3  Family history of hypercholesterolemia (V18 19) (Z83 42)   4  Family history of malignant neoplasm of stomach (V16 0) (Z80 0)   5  Denied: Family history of mental disorder   6  Family history of myocardial infarction (V17 3) (Z82 49)   7  Family history of Peripheral arterial disease  Father    6  Family history of gangrene (V19 4) (Z84 0)   9  Family history of hypercholesterolemia (V18 19) (Z83 42)   8  Family history of malignant neoplasm of stomach (V16 0) (Z80 0)   11  Family history of myocardial infarction (V17 3) (Z82 49)   12  Denied: Family history of substance abuse   13  Family history of Peripheral arterial disease  Brother    15  Family history of malignant neoplasm of stomach (V16 0) (Z80 0)   15  Family history of myocardial infarction (V17 3) (Z82 49)  Maternal Uncle    12  Family history of malignant neoplasm of stomach (V16 0) (Z80 0)  Family History Reviewed: The family history was reviewed and updated today  Social History    · Current every day smoker (305 1) (F17 200)   · Disabled   ·    · No alcohol use   · No drug use  Social History Reviewed: The social history was reviewed and updated today  Current Meds   1  Citalopram Hydrobromide 40 MG Oral Tablet; TAKE 1 TABLET DAILY; Therapy: 32WGQ9184 to (Evaluate:22Jun2017); Last Rx:59Gci0644 Ordered   2   ClonazePAM 0 5 MG Oral Tablet; take 1 tablet twice a day; Last Rx:14Oyg0301 Ordered   3  Gabapentin 600 MG Oral Tablet; TAKE 800 MG 3 times daily Recorded   4  Ibuprofen 600 MG Oral Tablet; TAKE 1 TABLET 3 TIMES DAILY WITH FOOD AS NEEDED; Therapy: 24TWW0118 to (Evaluate:04Mar2017)  Requested for: 22Feb2017; Last   Rx:22Feb2017 Ordered   5  Metoprolol Tartrate 25 MG Oral Tablet; Take 1 tablet twice daily; Therapy: 53QSS8088 to (Last Rx:04Apr2017)  Requested for: 04Apr2017 Ordered   6  Mirtazapine 30 MG Oral Tablet; TAKE 1 TABLET AT BEDTIME; Last Rx:22Feb2017   Ordered   7  Neomycin-Polymyxin-HC 3 5-04160-6 Otic Suspension; INSTILL 3 DROPS IN RIGHT   EAR 3-4 TIMES DAILY; Therapy: 11Pxo4846 to (Last Collie Jastephanie)  Requested for: 24Aug2017 Ordered   8  Neurontin 800 MG Oral Tablet; TAKE 1 TABLET 3 TIMES DAILY; Last Rx:22Feb2017   Ordered   9  Nitroglycerin 0 4 MG Sublingual Tablet Sublingual; DISSOLVE 1 TABLET UNDER THE   TONGUE AS NEEDED FOR CHEST PAIN Recorded   10  Oxycodone-Acetaminophen 5-325 MG Oral Tablet; TAKE 1 TABLET every 4 hours as    needed for pain Recorded  Medication List Reviewed: The medication list was reviewed and updated today  Allergies    1  Aspirin TABS   2  Robaxin TABS   3  Tramadol    Vitals  Signs   Recorded: 94UZH4201 12:26PM   Temperature: 98 4 F  Heart Rate: 98  Systolic: 441  Diastolic: 72  Height: 5 ft 4 in  Weight: 145 lb 6 oz  BMI Calculated: 24 95  BSA Calculated: 1 71  O2 Saturation: 99    Physical Exam    Constitutional   General appearance: No acute distress, well appearing and well nourished  Eyes   Conjunctiva and lids: No swelling, erythema or discharge  Pupils and irises: Equal, round and reactive to light  Ears, Nose, Mouth, and Throat   External inspection of ears and nose: Normal     Otoscopic examination: Tympanic membranes translucent with normal light reflex  Canals patent without erythema  Nasal mucosa, septum, and turbinates: Normal without edema or erythema      Oropharynx: Normal with no erythema, edema, exudate or lesions  Pulmonary   Respiratory effort: No increased work of breathing or signs of respiratory distress  Auscultation of lungs: Abnormal   Auscultation of the lungs revealed decreased breath sounds diffusely  wheezing over both bases  Cardiovascular   Palpation of heart: Normal PMI, no thrills  Auscultation of heart: Normal rate and rhythm, normal S1 and S2, without murmurs  Examination of extremities for edema and/or varicosities: Normal     Carotid pulses: Normal     Abdomen   Abdomen: Abnormal   The abdomen was flat  Bowel sounds were normal  There was mild tenderness in the right upper quadrant  Liver and spleen: No hepatomegaly or splenomegaly  Lymphatic   Palpation of lymph nodes in neck: No lymphadenopathy  Musculoskeletal   Gait and station: Normal     Digits and nails: Normal without clubbing or cyanosis  Inspection/palpation of joints, bones, and muscles: Normal     Skin   Skin and subcutaneous tissue: Abnormal   Excoriations around both ankle regions no detectable rash  Neurologic   Cranial nerves: Cranial nerves 2-12 intact  Reflexes: 2+ and symmetric  Sensation: No sensory loss  Psychiatric   Orientation to person, place, and time: Normal     Mood and affect: Normal          Health Management  Colonic polyp   COLONOSCOPY; every 5 years; Last 53WWX6423; Next Due: 34UPP9924;  Active    Future Appointments    Date/Time Provider Specialty Site   11/02/2017 11:30 AM Bella Parikh, [address] [facility]     Signatures   Electronically signed by : Panda Harris; Oct  5 2017  3:15PM EST                       (Author)    Electronically signed by : AYAN Amezcua ; Oct  5 2017  3:48PM EST

## 2018-01-11 NOTE — RESULT NOTES
PFT Results v2:   Diagnosis/Reason For Study: Emphysema   Referring Provider: Dr Galindo Simpson   Spirometry: Forced vital capacity: 3 13L and 96% Predicted Values  Forced expiratory volume in one second: 2 15L and 85% Predicted Value  FEV1/FVC ratio is 87% Predicted Values  F EF 25-75 percent, 1 14 L, 59% predicted    Post Bronchodilator Spirometry: Forced vital capacity : 2 84L and 87% Predicted Values  Forced expiratory volume in one second : 1 99L and 79% Predicted Value  FEV1/FVC ratio is 89% Predicted Values  F EF 25-75 percent, 0 90 L, 38% predicted    Lung Volumes: Total lung capacity : 5 39L and 110% Predicted Values  RV: 126% Predicted Values  RV/T% Predicted Values  DLCO:   DLCO 76% Predicted Values  PFT Interpretation:   Patient had a full lung function testing with spirometry lung volumes and DLCO  Patient gave a good effort  Results meet the ATS standards for acceptability and repeat ability  The flow volume curve is normal   There is moderate small airway obstructive ventilatory limitation with no appreciable response to the bronchodilator  The lung volumes are increased  The DLCO is mildly decreased  Findings are consistent with COPD emphysema  Clinical correlation is required  Future Appointments    Date/Time Provider Specialty Site   2016 03:15 PM AYAN Child   Internal Medicine [facility]   2017 12:45 PM Reginald Borjas MD Neurology NEUROLOGY [facility]ENE SOW      Electronically signed by : AYAN Perez ; Oct 11 2016  7:26PM EST                       (Author)

## 2018-01-11 NOTE — MISCELLANEOUS
History of Present Illness  TCM Communication [facility]: The patient is being contacted for follow-up after hospitalization  Hospital records were reviewed  She was hospitalized at [place] [address]  The date of admission: 3/5/17, date of discharge: 3/8/17  She was discharged to home  Medications reviewed and updated today  The patient is currently asymptomatic  Communication performed and completed by      Active Problems    1  Acute pain of left shoulder (719 41) (M25 512)   2  Atypical chest pain (786 59) (R07 89)   3  Bee sting allergy (V15 06) (Z91 038)   4  Bipolar disorder (296 80) (F31 9)   5  Centrilobular emphysema (492 8) (J43 2)   6  Colonic polyp (211 3) (K63 5)   7  Disc degeneration, lumbar (722 52) (M51 36)   8  Fatigue (780 79) (R53 83)   9  Fecal incontinence (787 60) (R15 9)   10  Fecal urgency (787 63) (R15 2)   11  Loose stools (787 7) (R19 5)   12  Lumbar disc herniation (722 10) (M51 26)   13  Lumbar spinal stenosis (724 02) (M48 06)   14  New daily persistent headache (339 42) (G44 52)   15  Postoperative state (V45 89) (Z98 890)   16  Postprandial epigastric pain (789 06) (R10 13)   17  Rheumatoid arthritis (714 0) (M06 9)   18  Temporal headache (784 0) (R51)    Past Medical History    1  History of ovarian cancer (V10 43) (Z85 43)   2  History of Uterine carcinoma (179) (C55)    Surgical History    1  History of Anterior Sclera Fistulization For Glaucoma   2  History of Appendectomy   3  History of Biopsy Temporal Artery   4  History of Cholecystectomy   5  History of Remove Cataract, Corneo-Scleral Section Left Eye   6  History of Tonsillectomy With Adenoidectomy   7  History of Total Abdominal Hysterectomy With Bilateral Salpingo-Oophorectomy    Family History  Mother    1  Family history of Coronary artery disease   2  Family history of gangrene (V19 4) (Z84 0)   3  Family history of hypercholesterolemia (V18 19) (Z83 42)   4  Family history of malignant neoplasm of stomach (V16 0) (Z80 0)   5  Family history of myocardial infarction (V17 3) (Z82 49)   6  Family history of Peripheral arterial disease  Father    7  Family history of gangrene (V19 4) (Z84 0)   8  Family history of hypercholesterolemia (V18 19) (Z83 42)   9  Family history of malignant neoplasm of stomach (V16 0) (Z80 0)   10  Family history of myocardial infarction (V17 3) (Z82 49)   11  Family history of Peripheral arterial disease  Brother    15  Family history of malignant neoplasm of stomach (V16 0) (Z80 0)   13  Family history of myocardial infarction (V17 3) (Z82 49)  Maternal Uncle    15  Family history of malignant neoplasm of stomach (V16 0) (Z80 0)    Social History    · Current every day smoker (305 1) (F17 200)   · Disabled   ·    · No alcohol use   · No drug use    Current Meds   1  Citalopram Hydrobromide 40 MG Oral Tablet; TAKE 1 TABLET DAILY; Therapy: 05LHE9512 to (Evaluate:22Jun2017); Last Rx:34Sjk7820 Ordered   2  ClonazePAM 0 5 MG Oral Tablet; take 1 tablet twice a day; Last Rx:12Ovy3056 Ordered   3  Ibuprofen 600 MG Oral Tablet; TAKE 1 TABLET 3 TIMES DAILY WITH FOOD AS NEEDED; Therapy: 59IFA8273 to (Evaluate:04Mar2017)  Requested for: 28Hux4544; Last   Rx:03Ros4690 Ordered   4  Mirtazapine 30 MG Oral Tablet; TAKE 1 TABLET AT BEDTIME; Last Rx:52Uta3665   Ordered   5  Neurontin 800 MG Oral Tablet; TAKE 1 TABLET 3 TIMES DAILY; Last Rx:22Zwq2354   Ordered    Allergies    1  Aspirin TABS   2  Robaxin TABS   3  Tramadol    Future Appointments    Date/Time Provider Specialty Site   04/04/2017 11:15 AM Syeda Castaneda [facility] Internal Medicine [facility]     Signatures   Electronically signed by : Lesa Johansen [facility];  Apr 4 2017  9:53AM EST                       (Author)    Electronically signed by : AYAN Harrison ; Apr 4 2017  1:46PM EST

## 2018-01-11 NOTE — RESULT NOTES
Verified Results  (1) HBV DNA, PCR, QUANTITATIVE 72JDT2953 05:24AM Eula Scott     Test Name Result Flag Reference   HBV LOG10 COPIES/mL COMMENT uwt00MI/mL     Unable to calculate result since non-numeric result obtained for  component test    HBV LOG10 IU/mL      HBV DNA not detected IU/mL   HBV TEST INFO Comment     The reportable range for this assay is 10 IU/mL to 1 billion IU/mL    Performed at:  [address]  [id number]  : Yumiko Mustafa MD, Phone:  [phone]

## 2018-01-12 VITALS
BODY MASS INDEX: 22.88 KG/M2 | WEIGHT: 134 LBS | RESPIRATION RATE: 14 BRPM | HEART RATE: 80 BPM | DIASTOLIC BLOOD PRESSURE: 82 MMHG | HEIGHT: 64 IN | TEMPERATURE: 98.2 F | SYSTOLIC BLOOD PRESSURE: 146 MMHG

## 2018-01-12 NOTE — MISCELLANEOUS
Assessment    1  Atypical chest pain (786 59) (U17 30)    Discussion/Summary  Discussion Summary:   1) no evidence for cardiac etiology although there is extensive family hx stress test and enzymes negative  stop smoking check lipids  Counseling Documentation With Imm: The patient, patient's family was counseled regarding diagnostic results, instructions for management, risk factor reductions, impressions  Chief Complaint  Chief Complaint Free Text Note Form: chest pain      History of Present Illness  [facility]CM Communication [facility]: The patient is being contacted for follow-up after hospitalization and has new pt appt 9/21  She was hospitalized at [facility]  The date of discharge: 9/14/16  Diagnosis: chest pain, s/p cardiac eval  Medications reviewed and updated today  Counseling was provided to the patient  doing ok  Communication performed and completed by phoebe rosales      Review of Systems  Complete-Female:   Constitutional: No fever, no chills, feels well, no tiredness, no recent weight gain or weight loss  Eyes: No complaints of eye pain, no red eyes, no eyesight problems, no discharge, no dry eyes, no itching of eyes  ENT: no complaints of earache, no loss of hearing, no nose bleeds, no nasal discharge, no sore throat, no hoarseness  Cardiovascular: No complaints of slow heart rate, no fast heart rate, no chest pain, no palpitations, no leg claudication, no lower extremity edema  Respiratory: No complaints of shortness of breath, no wheezing, no cough, no SOB on exertion, no orthopnea, no PND  Gastrointestinal: No complaints of abdominal pain, no constipation, no nausea or vomiting, no diarrhea, no bloody stools  Genitourinary: No complaints of dysuria, no incontinence, no pelvic pain, no dysmenorrhea, no vaginal discharge or bleeding  Musculoskeletal: No complaints of arthralgias, no myalgias, no joint swelling or stiffness, no limb pain or swelling     Integumentary: No complaints of skin rash or lesions, no itching, no skin wounds, no breast pain or lump  Neurological: as noted in HPI  Psychiatric: Not suicidal, no sleep disturbance, no anxiety or depression, no change in personality, no emotional problems  Endocrine: No complaints of proptosis, no hot flashes, no muscle weakness, no deepening of the voice, no feelings of weakness  Hematologic/Lymphatic: No complaints of swollen glands, no swollen glands in the neck, does not bleed easily, does not bruise easily  ROS Reviewed:   ROS reviewed  Surgical History  Surgical History Reviewed: The surgical history was reviewed and updated today  Family History  Family History Reviewed: The family history was reviewed and updated today  Social History  Social History Reviewed: The social history was reviewed and updated today  Current Meds   1  Brimonidine Tartrate SOLN;   Therapy: (Recorded:21Sep2016) to Recorded   2  CeleXA 10 MG/5ML SOLN (Citalopram Hydrobromide); Therapy: (Recorded:21Sep2016) to Recorded   3  ClonazePAM 0 5 MG Oral Tablet; Therapy: (Eugenie Fillers) to Recorded   4  Dorzolamide HCl - 2 % Ophthalmic Solution; Therapy: (Recorded:21Sep2016) to Recorded   5  EPINEPHrine 0 3 MG/0 3ML Injection Solution Auto-injector; use as directed; Therapy: 36ASB0411 to (Last Benitez Gum)  Requested for: 21Sep2016 Ordered   6  Mirtazapine 30 MG Oral Tablet; Therapy: (Eugenie Fillers) to Recorded   7  Neurontin 800 MG Oral Tablet (Gabapentin); Therapy: (Recorded:21Sep2016) to Recorded  Medication List Reviewed: The medication list was reviewed and updated today  Physical Exam    Constitutional   General appearance: No acute distress, well appearing and well nourished  Eyes   Conjunctiva and lids: No swelling, erythema or discharge  Pupils and irises: Equal, round and reactive to light      Ears, Nose, Mouth, and Throat   External inspection of ears and nose: Normal     Otoscopic examination: Tympanic membranes translucent with normal light reflex  Canals patent without erythema  Nasal mucosa, septum, and turbinates: Normal without edema or erythema  Oropharynx: Normal with no erythema, edema, exudate or lesions  Pulmonary   Respiratory effort: No increased work of breathing or signs of respiratory distress  Auscultation of lungs: Clear to auscultation  Cardiovascular   Palpation of heart: Normal PMI, no thrills  Auscultation of heart: Normal rate and rhythm, normal S1 and S2, without murmurs  Examination of extremities for edema and/or varicosities: Normal     Carotid pulses: Normal     Abdomen   Abdomen: Non-tender, no masses  Liver and spleen: No hepatomegaly or splenomegaly  Lymphatic   Palpation of lymph nodes in neck: No lymphadenopathy  Musculoskeletal   Gait and station: Normal     Digits and nails: Normal without clubbing or cyanosis  Inspection/palpation of joints, bones, and muscles: Abnormal   ulnar deviation of the hands  Skin   Skin and subcutaneous tissue: Normal without rashes or lesions  Neurologic   Cranial nerves: Cranial nerves 2-12 intact  Reflexes: 2+ and symmetric  Sensation: No sensory loss  Psychiatric   Orientation to person, place, and time: Normal     Mood and affect: Normal          Future Appointments    Date/Time Provider Specialty Site   11/02/2016 03:15 PM AYAN Crook   Internal Medicine [facility]     Signatures   Electronically signed by : AYAN Chin ; Sep 21 2016  4:45PM EST                       (Author)

## 2018-01-12 NOTE — PROGRESS NOTES
History of Present Illness  Care Coordination Encounter Information:   Type of Encounter: Telephonic    Spoke to Patient  Care Coordination  Nurse [address] [place] [address] [address]:   The reason for call is to discuss outreach for follow up/needed services and coordination of meeting care plan treatment goals  Patient s/p discharge on 3/8/17 with diagnosis of chest pain and SOB  Patient alert and in good spirits  Denied pain or discomfort  Stated she has been doing a lot better since discharge  Tolerating medications  Patient did not make FRANCISCO appointment  Educated patient in the importance to attend follow up appointment and patient verbalized understanding  Patient stated she will call Monday to make appointment when she figures out her schedule  Patient had no questions or concerns at the moment  Contact information given to patient in case questions or concerns may arise  Active Problems    1  Acute pain of left shoulder (719 41) (M25 512)   2  Atypical chest pain (786 59) (R07 89)   3  Bee sting allergy (V15 06) (Z91 038)   4  Bipolar disorder (296 80) (F31 9)   5  CAD (coronary artery disease) (414 00) (I25 10)   6  Centrilobular emphysema (492 8) (J43 2)   7  Colonic polyp (211 3) (K63 5)   8  Disc degeneration, lumbar (722 52) (M51 36)   9  Fatigue (780 79) (R53 83)   10  Fecal incontinence (787 60) (R15 9)   11  Fecal urgency (787 63) (R15 2)   12  Loose stools (787 7) (R19 5)   13  Lumbar disc herniation (722 10) (M51 26)   14  Lumbar spinal stenosis (724 02) (M48 06)   15  New daily persistent headache (339 42) (G44 52)   16  Postoperative state (V45 89) (Z98 890)   17  Postprandial epigastric pain (789 06) (R10 13)   18  Rheumatoid arthritis (714 0) (M06 9)   19  Temporal headache (784 0) (R51)    Past Medical History    1  History of ovarian cancer (V10 43) (Z85 43)   2  History of Uterine carcinoma (179) (C55)    Surgical History    1  History of Anterior Sclera Fistulization For Glaucoma   2   History of Appendectomy   3  History of Biopsy Temporal Artery   4  History of Cholecystectomy   5  History of Remove Cataract, Corneo-Scleral Section Left Eye   6  History of Tonsillectomy With Adenoidectomy   7  History of Total Abdominal Hysterectomy With Bilateral Salpingo-Oophorectomy    Family History  Mother    1  Family history of Coronary artery disease   2  Family history of gangrene (V19 4) (Z84 0)   3  Family history of hypercholesterolemia (V18 19) (Z83 42)   4  Family history of malignant neoplasm of stomach (V16 0) (Z80 0)   5  Family history of myocardial infarction (V17 3) (Z82 49)   6  Family history of Peripheral arterial disease  Father    7  Family history of gangrene (V19 4) (Z84 0)   8  Family history of hypercholesterolemia (V18 19) (Z83 42)   9  Family history of malignant neoplasm of stomach (V16 0) (Z80 0)   10  Family history of myocardial infarction (V17 3) (Z82 49)   11  Family history of Peripheral arterial disease  Brother    15  Family history of malignant neoplasm of stomach (V16 0) (Z80 0)   13  Family history of myocardial infarction (V17 3) (Z82 49)  Maternal Uncle    15  Family history of malignant neoplasm of stomach (V16 0) (Z80 0)    Social History    · Current every day smoker (305 1) (F17 200)   · Disabled   ·    · No alcohol use   · No drug use    Current Meds    1  Ibuprofen 600 MG Oral Tablet; TAKE 1 TABLET 3 TIMES DAILY WITH FOOD AS NEEDED; Therapy: 46GWS8842 to (Kayla Middleton)  Requested for: 22Feb2017; Last   Rx:22Feb2017 Ordered    2  Citalopram Hydrobromide 40 MG Oral Tablet; TAKE 1 TABLET DAILY; Therapy: 20VQM9290 to (Evaluate:22Jun2017); Last Rx:34Ihd9557 Ordered   3  ClonazePAM 0 5 MG Oral Tablet; take 1 tablet twice a day; Last Rx:22Feb2017 Ordered   4  Mirtazapine 30 MG Oral Tablet; TAKE 1 TABLET AT BEDTIME; Last Rx:22Feb2017   Ordered    5  Neurontin 800 MG Oral Tablet (Gabapentin); TAKE 1 TABLET 3 TIMES DAILY;  Last   Rx:22Feb2017 Ordered    Allergies 1  Aspirin TABS   2  Robaxin TABS   3  Tramadol    Health Management   COLONOSCOPY; every 5 years; Last 57UZV5830; Next Due: 16RDS5825; Active    End of Encounter Meds    1  Ibuprofen 600 MG Oral Tablet; TAKE 1 TABLET 3 TIMES DAILY WITH FOOD AS NEEDED; Therapy: 03XQG8815 to (Juvenal Benavidez)  Requested for: 22Feb2017; Last   Rx:22Feb2017 Ordered    2  Citalopram Hydrobromide 40 MG Oral Tablet; TAKE 1 TABLET DAILY; Therapy: 91CMY0645 to (Evaluate:22Jun2017); Last Rx:22Feb2017 Ordered   3  ClonazePAM 0 5 MG Oral Tablet; take 1 tablet twice a day; Last Rx:22Feb2017 Ordered   4  Mirtazapine 30 MG Oral Tablet; TAKE 1 TABLET AT BEDTIME; Last Rx:22Feb2017   Ordered    5  Neurontin 800 MG Oral Tablet (Gabapentin); TAKE 1 TABLET 3 TIMES DAILY;  Last   Rx:22Feb2017 Ordered    Patient Care Team    Care Team Member Role Specialty Office Number   Olga Sims MD Specialist Neurology [phone]     Signatures   Electronically signed by : Jesus Alberto Cheng RN; Mar 23 2017  2:15PM EST                       (Author)

## 2018-01-13 VITALS
OXYGEN SATURATION: 99 % | BODY MASS INDEX: 24.82 KG/M2 | WEIGHT: 145.38 LBS | TEMPERATURE: 98.4 F | HEART RATE: 98 BPM | HEIGHT: 64 IN | DIASTOLIC BLOOD PRESSURE: 72 MMHG | SYSTOLIC BLOOD PRESSURE: 130 MMHG

## 2018-01-13 VITALS
HEART RATE: 100 BPM | SYSTOLIC BLOOD PRESSURE: 120 MMHG | DIASTOLIC BLOOD PRESSURE: 86 MMHG | BODY MASS INDEX: 25.35 KG/M2 | HEIGHT: 64 IN | TEMPERATURE: 97.8 F | WEIGHT: 148.5 LBS

## 2018-01-13 VITALS
WEIGHT: 138 LBS | HEIGHT: 64 IN | BODY MASS INDEX: 23.56 KG/M2 | HEART RATE: 90 BPM | SYSTOLIC BLOOD PRESSURE: 140 MMHG | TEMPERATURE: 97.7 F | DIASTOLIC BLOOD PRESSURE: 84 MMHG | OXYGEN SATURATION: 99 %

## 2018-01-13 VITALS
DIASTOLIC BLOOD PRESSURE: 78 MMHG | HEART RATE: 82 BPM | WEIGHT: 143.13 LBS | HEIGHT: 64 IN | OXYGEN SATURATION: 99 % | TEMPERATURE: 98.2 F | BODY MASS INDEX: 24.43 KG/M2 | SYSTOLIC BLOOD PRESSURE: 132 MMHG

## 2018-01-14 VITALS — TEMPERATURE: 98.8 F | SYSTOLIC BLOOD PRESSURE: 136 MMHG | HEART RATE: 88 BPM | DIASTOLIC BLOOD PRESSURE: 84 MMHG

## 2018-01-14 VITALS
HEART RATE: 87 BPM | OXYGEN SATURATION: 97 % | HEIGHT: 64 IN | WEIGHT: 142.13 LBS | DIASTOLIC BLOOD PRESSURE: 80 MMHG | SYSTOLIC BLOOD PRESSURE: 114 MMHG | BODY MASS INDEX: 24.27 KG/M2

## 2018-01-14 NOTE — PROCEDURES
Procedures by Kayli Lange RN at  10/2/2017  1:45 PM      Author:  Kayli Lange RN Service:  Hospitalist Author Type:  Registered Nurse    Filed:  10/2/2017  2:07 PM Date of Service:  10/2/2017  1:45 PM Status:  Attested    :  Kayli Lange RN (Registered Nurse)  Cosigner:  Fabian Landin MD at 10/2/2017  3:49 PM      Procedure Orders:       1  Insert PICC line [id number] ordered by Sadie Phan RN at 10/02/17 1346                 Post-procedure Diagnoses:       1  C  difficile diarrhea [A04 72]              Attestation signed by Fabian Landin MD at 10/2/2017  3:49 PM      I only obtained the consent as per policy  PICC Line Insertion  Date/Time: 10/2/2017 1:46 PM  Performed by: Rashad Kline by: Nicole Wright     Patient location:  Bedside  Other Assisting Provider:  Yes (comment) (Kayli Lange)    Consent:     Consent obtained:  Verbal and written    Consent given by:  Patient    Risks discussed:  Arterial puncture, bleeding, infection, incorrect placement and nerve damage    Alternatives discussed:  No treatment and delayed treatment  Universal protocol:     Procedure explained and questions answered to patient or proxy's satisfaction: yes      Relevant documents present and verified: yes      Site/side marked: yes      Immediately prior to procedure,  a time out was called: yes      Patient identity confirmed:  Verbally with patient and arm band  Pre-procedure details:     Hand hygiene: Hand hygiene performed prior to insertion      Sterile barrier technique: All elements of maximal sterile technique followed      Skin preparation:  ChloraPrep    Skin preparation agent: Skin preparation agent completely dried prior to procedure    Indications:     PICC line indications: no peripheral vascular access    Anesthesia (see MAR for exact dosages):      Anesthesia method:  Local infiltration    Local anesthetic:  Lidocaine 1% w/o epi  Procedure details: Location:  Brachial    Vessel type: vein      Laterality:  Right    Approach: percutaneous technique used      Patient position:  Flat    Procedural supplies:  Double lumen    Catheter size:  5 Fr    Landmarks identified: yes      Ultrasound guidance: yes      Sterile ultrasound techniques: Sterile gel and sterile probe covers were used      Number of attempts:  1    Successful placement: yes      Vessel of catheter tip end:  Sherlock 3CG confirmed    Total catheter length (cm):  35    Catheter out on skin (cm):  0    Max flow rate:  5ML/SEC    Arm circumference:  28  Post-procedure details:     Post-procedure:  Dressing applied and securement device placed    Assessment:  Blood return through all ports and free fluid flow    Post-procedure complications: none      Patient tolerance of procedure:   Tolerated well, no immediate complications  Comments:      LOT # [id number]                       Received for:Provider  EPIC   Oct  2 2017  3:49PM [facility] Standard Time

## 2018-01-17 NOTE — MISCELLANEOUS
Message  Patient called this morning stating she needed a temporal artery biopsy, but that her head was really hurting her  She also stated her face was turning red  I asked her if she felt like her blood pressure might be high  She stated she wasn't sure, given her pain and flushing I advised her to go to the ED  Active Problems    1  Atypical chest pain (786 59) (R07 89)   2  Bee sting allergy (V15 06) (Z91 038)   3  Bipolar disorder (296 80) (F31 9)   4  Centrilobular emphysema (492 8) (J43 2)   5  Colonic polyp (211 3) (K63 5)   6  Disc degeneration, lumbar (722 52) (M51 36)   7  Fatigue (780 79) (R53 83)   8  Fecal incontinence (787 60) (R15 9)   9  Fecal urgency (787 63) (R15 2)   10  Loose stools (787 7) (R19 5)   11  Lumbar disc herniation (722 10) (M51 26)   12  Lumbar spinal stenosis (724 02) (M48 06)   13  New daily persistent headache (339 42) (G44 52)   14  Postprandial epigastric pain (789 06) (R10 13)   15  Rheumatoid arthritis (714 0) (M06 9)    Current Meds   1  Brimonidine Tartrate SOLN;   Therapy: (Recorded:21Sep2016) to Recorded   2  CeleXA 10 MG/5ML SOLN (Citalopram Hydrobromide); Therapy: (Recorded:01Vkd0391) to Recorded   3  ClonazePAM 0 5 MG Oral Tablet; Therapy: (Oneda Duty) to Recorded   4  Dorzolamide HCl - 2 % Ophthalmic Solution; Therapy: (Recorded:95Kne7014) to Recorded   5  Dulcolax 5 MG Oral Tablet Delayed Release; 4 TABLETS 2 HOURS PRIOR TO   STARTING MIRALAX PREP DAY BEFORE COLONOSCOPY; Therapy: 03YGC4940 to (Last Canda Keeling)  Requested for: 51MWY3256 Ordered   6  EPINEPHrine 0 3 MG/0 3ML Injection Solution Auto-injector; use as directed; Therapy: 90JCV6335 to (Last Canda Keeling)  Requested for: 85Rnp1995 Ordered   7  Mirtazapine 30 MG Oral Tablet; Therapy: (Oneda Duty) to Recorded   8  Neurontin 800 MG Oral Tablet (Gabapentin); Therapy: (Oneda Duty) to Recorded   9   Omeprazole 40 MG Oral Capsule Delayed Release; TAKE 1 CAPSULE TWICE DAILY; Therapy: 41WWW1115 to (51-30-20-57)  Requested for: 13IYD7076; Last   Rx:31Scf9845 Ordered   10  Polyethylene Glycol 3350 Oral Powder (MiraLax); mix entire bottle into 64oz liquid, drink    8 oz q 30 minutes until finished prior to colonoscopy; Therapy: 69RVP4635 to (Last Rx:65Zot2285)  Requested for: 02Bcl7142 Ordered    Allergies    1  Aspirin TABS   2   Tramadol    Signatures   Electronically signed by : Yancy Thomas, ; Dec 12 2016  9:18AM EST                       (Author)

## 2018-01-22 VITALS
BODY MASS INDEX: 24.24 KG/M2 | WEIGHT: 142 LBS | SYSTOLIC BLOOD PRESSURE: 130 MMHG | BODY MASS INDEX: 24.75 KG/M2 | WEIGHT: 145 LBS | HEIGHT: 64 IN | SYSTOLIC BLOOD PRESSURE: 130 MMHG | HEIGHT: 64 IN | DIASTOLIC BLOOD PRESSURE: 72 MMHG | DIASTOLIC BLOOD PRESSURE: 80 MMHG

## 2018-01-22 VITALS
TEMPERATURE: 98.1 F | HEART RATE: 76 BPM | DIASTOLIC BLOOD PRESSURE: 70 MMHG | HEIGHT: 64 IN | BODY MASS INDEX: 24.65 KG/M2 | SYSTOLIC BLOOD PRESSURE: 120 MMHG | WEIGHT: 144.38 LBS | OXYGEN SATURATION: 98 %

## 2018-02-13 ENCOUNTER — HOSPITAL ENCOUNTER (EMERGENCY)
Facility: HOSPITAL | Age: 58
Discharge: HOME | End: 2018-02-13
Attending: EMERGENCY MEDICINE | Admitting: EMERGENCY MEDICINE
Payer: COMMERCIAL

## 2018-02-13 ENCOUNTER — APPOINTMENT (EMERGENCY)
Dept: CT IMAGING | Facility: HOSPITAL | Age: 58
End: 2018-02-13
Payer: COMMERCIAL

## 2018-02-13 VITALS
SYSTOLIC BLOOD PRESSURE: 137 MMHG | WEIGHT: 156.31 LBS | BODY MASS INDEX: 27.7 KG/M2 | RESPIRATION RATE: 17 BRPM | DIASTOLIC BLOOD PRESSURE: 90 MMHG | OXYGEN SATURATION: 97 % | HEIGHT: 63 IN | TEMPERATURE: 98.2 F | HEART RATE: 71 BPM

## 2018-02-13 DIAGNOSIS — R10.9 NONSPECIFIC ABDOMINAL PAIN: Primary | ICD-10-CM

## 2018-02-13 LAB
ALBUMIN SERPL BCP-MCNC: 3.2 G/DL (ref 3.5–5)
ALP SERPL-CCNC: 142 U/L (ref 46–116)
ALT SERPL W P-5'-P-CCNC: 76 U/L (ref 12–78)
ANION GAP SERPL CALCULATED.3IONS-SCNC: 9 MMOL/L (ref 4–13)
AST SERPL W P-5'-P-CCNC: 38 U/L (ref 5–45)
ATRIAL RATE: 85 BPM
BASOPHILS # BLD AUTO: 0.05 THOUSANDS/ΜL (ref 0–0.1)
BASOPHILS NFR BLD AUTO: 1 % (ref 0–1)
BILIRUB DIRECT SERPL-MCNC: 0.07 MG/DL (ref 0–0.2)
BILIRUB SERPL-MCNC: 0.2 MG/DL (ref 0.2–1)
BUN SERPL-MCNC: 12 MG/DL (ref 5–25)
CALCIUM SERPL-MCNC: 8.6 MG/DL (ref 8.3–10.1)
CHLORIDE SERPL-SCNC: 106 MMOL/L (ref 100–108)
CO2 SERPL-SCNC: 28 MMOL/L (ref 21–32)
CREAT SERPL-MCNC: 0.76 MG/DL (ref 0.6–1.3)
EOSINOPHIL # BLD AUTO: 0.29 THOUSAND/ΜL (ref 0–0.61)
EOSINOPHIL NFR BLD AUTO: 5 % (ref 0–6)
ERYTHROCYTE [DISTWIDTH] IN BLOOD BY AUTOMATED COUNT: 14.1 % (ref 11.6–15.1)
GFR SERPL CREATININE-BSD FRML MDRD: 86 ML/MIN/1.73SQ M
GLUCOSE SERPL-MCNC: 93 MG/DL (ref 65–140)
HCT VFR BLD AUTO: 40.2 % (ref 34.8–46.1)
HGB BLD-MCNC: 13.6 G/DL (ref 11.5–15.4)
LIPASE SERPL-CCNC: 48 U/L (ref 73–393)
LYMPHOCYTES # BLD AUTO: 2.21 THOUSANDS/ΜL (ref 0.6–4.47)
LYMPHOCYTES NFR BLD AUTO: 39 % (ref 14–44)
MCH RBC QN AUTO: 29.9 PG (ref 26.8–34.3)
MCHC RBC AUTO-ENTMCNC: 33.8 G/DL (ref 31.4–37.4)
MCV RBC AUTO: 88 FL (ref 82–98)
MONOCYTES # BLD AUTO: 0.36 THOUSAND/ΜL (ref 0.17–1.22)
MONOCYTES NFR BLD AUTO: 6 % (ref 4–12)
NEUTROPHILS # BLD AUTO: 2.77 THOUSANDS/ΜL (ref 1.85–7.62)
NEUTS SEG NFR BLD AUTO: 49 % (ref 43–75)
NRBC BLD AUTO-RTO: 0 /100 WBCS
P AXIS: 67 DEGREES
PLATELET # BLD AUTO: 204 THOUSANDS/UL (ref 149–390)
PMV BLD AUTO: 9.8 FL (ref 8.9–12.7)
POTASSIUM SERPL-SCNC: 3.9 MMOL/L (ref 3.5–5.3)
PR INTERVAL: 102 MS
PROT SERPL-MCNC: 6.1 G/DL (ref 6.4–8.2)
QRS AXIS: 64 DEGREES
QRSD INTERVAL: 74 MS
QT INTERVAL: 396 MS
QTC INTERVAL: 471 MS
RBC # BLD AUTO: 4.55 MILLION/UL (ref 3.81–5.12)
SODIUM SERPL-SCNC: 143 MMOL/L (ref 136–145)
T WAVE AXIS: 54 DEGREES
TROPONIN I SERPL-MCNC: <0.02 NG/ML
VENTRICULAR RATE: 85 BPM
WBC # BLD AUTO: 5.69 THOUSAND/UL (ref 4.31–10.16)

## 2018-02-13 PROCEDURE — 96376 TX/PRO/DX INJ SAME DRUG ADON: CPT

## 2018-02-13 PROCEDURE — 99285 EMERGENCY DEPT VISIT HI MDM: CPT

## 2018-02-13 PROCEDURE — 74177 CT ABD & PELVIS W/CONTRAST: CPT

## 2018-02-13 PROCEDURE — 93010 ELECTROCARDIOGRAM REPORT: CPT | Performed by: INTERNAL MEDICINE

## 2018-02-13 PROCEDURE — 96374 THER/PROPH/DIAG INJ IV PUSH: CPT

## 2018-02-13 PROCEDURE — 96375 TX/PRO/DX INJ NEW DRUG ADDON: CPT

## 2018-02-13 PROCEDURE — 84484 ASSAY OF TROPONIN QUANT: CPT | Performed by: EMERGENCY MEDICINE

## 2018-02-13 PROCEDURE — 80048 BASIC METABOLIC PNL TOTAL CA: CPT | Performed by: EMERGENCY MEDICINE

## 2018-02-13 PROCEDURE — 85025 COMPLETE CBC W/AUTO DIFF WBC: CPT | Performed by: EMERGENCY MEDICINE

## 2018-02-13 PROCEDURE — 83690 ASSAY OF LIPASE: CPT | Performed by: EMERGENCY MEDICINE

## 2018-02-13 PROCEDURE — 93005 ELECTROCARDIOGRAM TRACING: CPT

## 2018-02-13 PROCEDURE — 36415 COLL VENOUS BLD VENIPUNCTURE: CPT | Performed by: EMERGENCY MEDICINE

## 2018-02-13 PROCEDURE — 80076 HEPATIC FUNCTION PANEL: CPT | Performed by: EMERGENCY MEDICINE

## 2018-02-13 RX ORDER — ONDANSETRON 2 MG/ML
4 INJECTION INTRAMUSCULAR; INTRAVENOUS ONCE
Status: COMPLETED | OUTPATIENT
Start: 2018-02-13 | End: 2018-02-13

## 2018-02-13 RX ADMIN — HYDROMORPHONE HYDROCHLORIDE 0.5 MG: 1 INJECTION, SOLUTION INTRAMUSCULAR; INTRAVENOUS; SUBCUTANEOUS at 22:55

## 2018-02-13 RX ADMIN — ONDANSETRON 4 MG: 2 INJECTION INTRAMUSCULAR; INTRAVENOUS at 18:55

## 2018-02-13 RX ADMIN — IOHEXOL 100 ML: 350 INJECTION, SOLUTION INTRAVENOUS at 22:08

## 2018-02-13 RX ADMIN — HYDROMORPHONE HYDROCHLORIDE 1 MG: 1 INJECTION, SOLUTION INTRAMUSCULAR; INTRAVENOUS; SUBCUTANEOUS at 18:55

## 2018-02-13 NOTE — ED PROVIDER NOTES
History  Chief Complaint   Patient presents with    Abdominal Pain     Patient states she woke this morning with RUQ abdominal pain and nausea  Denies vomiting  HPI patient is a 68-year-old female she complains of right upper quadrant abdominal pain that woke her up this morning, describes a crampy uncomfortable right-sided pain worse with palpation and with movement  She denies any shortness of breath  There is no chest pain  She denies any loss of consciousness  She denies any previous episodes of similar pain  Patient has a history of chronic back pain but denies radiation from the back radiation to the back  She denies any trauma  She describes a crampy right upper quadrant pain  Past medical history of appendectomy cholecystectomy, chronic back pain, bipolar disease, psychiatric disorder, rheumatoid arthritis  Family history noncontributory  Social history, smoker, denies drug abuse    Prior to Admission Medications   Prescriptions Last Dose Informant Patient Reported? Taking?    HYDROcodone-acetaminophen (NORCO) 5-325 mg per tablet   No No   Sig: Take 1 tablet by mouth every 6 (six) hours as needed for pain for up to 20 doses Max Daily Amount: 4 tablets   citalopram (CELEXA) 40 mg tablet  Self Yes No   Sig: Take 40 mg by mouth daily     gabapentin (NEURONTIN) 800 mg tablet   Yes No   Sig: Take 800 mg by mouth 3 (three) times a day   metoprolol tartrate (LOPRESSOR) 50 mg tablet   Yes No   Sig: Take 25 mg by mouth every 12 (twelve) hours      Facility-Administered Medications: None       Past Medical History:   Diagnosis Date    Anxiety     Bipolar 1 disorder (HCC)     Chronic back pain     Frequent headaches     Glaucoma     Hypertension     Psychiatric disorder     bipolar    Rheumatoid arthritis ([address] )        Past Surgical History:   Procedure Laterality Date    APPENDECTOMY      CATARACT EXTRACTION Left     CHOLECYSTECTOMY      HYSTERECTOMY      INTRAOCULAR LENS INSERTION      DE TEMPORAL ARTERY LIGATN OR BX Right 12/16/2016    Procedure: BIOPSY ARTERY TEMPORAL;  Surgeon: Allen Lopez MD;  Location: [facility];  Service: General    TONSILLECTOMY         Family History   Problem Relation Age of Onset    Heart disease Mother      I have reviewed and agree with the history as documented  Social History   Substance Use Topics    Smoking status: Current Every Day Smoker     Packs/day: 0 20     Years: 46 00     Types: Cigarettes    Smokeless tobacco: Never Used    Alcohol use No        Review of Systems   Constitutional: Negative for diaphoresis, fatigue and fever  HENT: Negative for congestion, ear pain, nosebleeds and sore throat  Eyes: Negative for photophobia, pain, discharge and visual disturbance  Respiratory: Negative for cough, choking, chest tightness, shortness of breath and wheezing  Cardiovascular: Negative for chest pain and palpitations  Gastrointestinal: Positive for abdominal pain  Negative for abdominal distention, diarrhea and vomiting  Genitourinary: Negative for dysuria, flank pain and frequency  Musculoskeletal: Negative for back pain, gait problem and joint swelling  Skin: Negative for color change and rash  Neurological: Negative for dizziness, syncope and headaches  Psychiatric/Behavioral: Negative for behavioral problems and confusion  The patient is not nervous/anxious  All other systems reviewed and are negative        Physical Exam  ED Triage Vitals [02/13/18 1833]   Temperature Pulse Respirations Blood Pressure SpO2   98 2 °F (36 8 °C) 85 18 135/78 97 %      Temp Source Heart Rate Source Patient Position - Orthostatic VS BP Location FiO2 (%)   Oral Monitor Lying Right arm --      Pain Score       9           Orthostatic Vital Signs  Vitals:    02/13/18 1833 02/13/18 2119 02/13/18 2218   BP: 135/78 90/65 137/90   Pulse: 85 71    Patient Position - Orthostatic VS: Lying Lying Lying       Physical Exam   Constitutional: She is oriented to person, place, and time  She appears well-developed and well-nourished  HENT:   Head: Normocephalic  Right Ear: External ear normal    Left Ear: External ear normal    Nose: Nose normal    Mouth/Throat: Oropharynx is clear and moist    Eyes: EOM and lids are normal  Pupils are equal, round, and reactive to light  Neck: Normal range of motion  Neck supple  Cardiovascular: Normal rate, regular rhythm, normal heart sounds and intact distal pulses  Pulmonary/Chest: Effort normal and breath sounds normal  No respiratory distress  Abdominal: Soft  Bowel sounds are normal  There is tenderness  There is mild right upper quadrant tenderness without rebound or guarding   Musculoskeletal: Normal range of motion  She exhibits no deformity  Neurological: She is alert and oriented to person, place, and time  Skin: Skin is warm and dry  Psychiatric: She has a normal mood and affect  Nursing note and vitals reviewed     Pulse oximetry 97% on room air adequate oxygenation, there is no hypoxia    ED Medications  Medications   ondansetron (ZOFRAN) injection 4 mg (4 mg Intravenous Given 2/13/18 1855)   HYDROmorphone (DILAUDID) injection 1 mg (1 mg Intravenous Given 2/13/18 1855)   iohexol (OMNIPAQUE) 350 MG/ML injection (MULTI-DOSE) 100 mL (100 mL Intravenous Given 2/13/18 2208)   HYDROmorphone (DILAUDID) injection 0 5 mg (0 5 mg Intravenous Given 2/13/18 2255)       Diagnostic Studies  Results Reviewed     Procedure Component Value Units Date/Time    Basic metabolic panel [[id number]] Collected:  02/13/18 1939    Lab Status:  Final result Specimen:  Blood from Arm, Right Updated:  02/13/18 2005     Sodium 143 mmol/L      Potassium 3 9 mmol/L      Chloride 106 mmol/L      CO2 28 mmol/L      Anion Gap 9 mmol/L      BUN 12 mg/dL      Creatinine 0 76 mg/dL      Glucose 93 mg/dL      Calcium 8 6 mg/dL      eGFR 86 ml/min/1 73sq m     Narrative:         National Kidney Disease Education Program recommendations are as follows:  GFR calculation is accurate only with a steady state creatinine  Chronic Kidney disease less than 60 ml/min/1 73 sq  meters  Kidney failure less than 15 ml/min/1 73 sq  meters  Hepatic function panel [[id number]]  (Abnormal) Collected:  02/13/18 1939    Lab Status:  Final result Specimen:  Blood from Arm, Right Updated:  02/13/18 2005     Total Bilirubin 0 20 mg/dL      Bilirubin, Direct 0 07 mg/dL      Alkaline Phosphatase 142 (H) U/L      AST 38 U/L      ALT 76 U/L      Total Protein 6 1 (L) g/dL      Albumin 3 2 (L) g/dL     Lipase [[id number]]  (Abnormal) Collected:  02/13/18 1939    Lab Status:  Final result Specimen:  Blood from Arm, Right Updated:  02/13/18 2005     Lipase 48 (L) u/L     Troponin I [[id number]]  (Normal) Collected:  02/13/18 1856    Lab Status:  Final result Specimen:  Blood from Arm, Right Updated:  02/13/18 1923     Troponin I <0 02 ng/mL     Narrative:         [employer] Chemistry analyzer 99% cutoff is > 0 04 ng/mL in network labs    o cTnI 99% cutoff is useful only when applied to patients in the clinical setting of myocardial ischemia  o cTnI 99% cutoff should be interpreted in the context of clinical history, ECG findings and possibly cardiac imaging to establish correct diagnosis  o cTnI 99% cutoff may be suggestive but clearly not indicative of a coronary event without the clinical setting of myocardial ischemia      CBC and differential [[id number]]  (Normal) Collected:  02/13/18 1856    Lab Status:  Final result Specimen:  Blood from Arm, Right Updated:  02/13/18 1904     WBC 5 69 Thousand/uL      RBC 4 55 Million/uL      Hemoglobin 13 6 g/dL      Hematocrit 40 2 %      MCV 88 fL      MCH 29 9 pg      MCHC 33 8 g/dL      RDW 14 1 %      MPV 9 8 fL      Platelets 078 Thousands/uL      nRBC 0 /100 WBCs      Neutrophils Relative 49 %      Lymphocytes Relative 39 %      Monocytes Relative 6 %      Eosinophils Relative 5 %      Basophils Relative 1 %      Neutrophils Absolute 2 77 Thousands/µL      Lymphocytes Absolute 2 21 Thousands/µL      Monocytes Absolute 0 36 Thousand/µL      Eosinophils Absolute 0 29 Thousand/µL      Basophils Absolute 0 05 Thousands/µL                  CT abdomen pelvis with contrast   Final Result by Jazmine Adler MD (02/13 2229)      Fluid-filled distended colon with air-fluid levels could relate to a mild colitis  Colonic diverticulosis  Right basilar dependent consolidation could represent atelectasis and/or pneumonia  This area appears slightly increased in size of the consolidation when compared to the prior exams  Correlate clinically if there is suspicion for pneumonia  Other chronic findings as above  Workstation performed: [id number]                    Procedures  ECG 12 Lead Documentation  Date/Time: 2/13/2018 6:56 PM  Performed by: Abigail Cline by: Oleg Houston     Indications / Diagnosis:  Abdominal pain  ECG reviewed by me, the ED Provider: yes    Patient location:  ED  Previous ECG:     Previous ECG:  Compared to current    Comparison ECG info:  November 26, 2017    Similarity:  No change    Comparison to cardiac monitor: Yes    Interpretation:     Interpretation: non-specific    Quality:     Tracing quality:  Limited by artifact  Rate:     ECG rate:  85    ECG rate assessment: normal    Rhythm:     Rhythm: sinus rhythm    Ectopy:     Ectopy: none    ST segments:     ST segments:  Non-specific           Phone Contacts  ED Phone Contact    ED Course  ED Course         diagnostic testing showed normal electrolytes, normal liver functions, lipase was normal no sign of pancreatitis, patient had normal cardiac troponin no sign of cardiac ischemia, woman present with atypical symptoms from the heart so a troponin was done was negative  patient's white count was normal at 5 6 no sign of inflammation, hemoglobin normal at 13 6 no sign of anemia       CT scan abdomen pelvis showed mild colitis, does not correlate with the patient's symptoms as she has no diarrhea, was a questionable atelectasis but the patient has no respiratory symptoms, not hypoxic  MDM Medical decision-making: based on my review of the history, physical exam, laboratory testing and diagnostic CT scan; I believe the patient has Nonspecific abdominal pain at this time  No indication for further evaluation  No indication for hospitalization  The patient will follow-up as an outpatient  CritCare Time    Disposition  Final diagnoses:   Nonspecific abdominal pain     Time reflects when diagnosis was documented in both MDM as applicable and the Disposition within this note     Time User Action Codes Description Comment    2/13/2018 10:52 PM Guillermina Sykes Add [R10 9] Nonspecific abdominal pain       ED Disposition     ED Disposition Condition Comment    Discharge  Sharon Lobo discharge to home/self care  Condition at discharge: Good        Follow-up Information     Follow up With Specialties Details Why Contact Info    Yolis Peng MD Internal Medicine   [address] [place] [address] [phone]          Discharge Medication List as of 2/13/2018 10:53 PM      CONTINUE these medications which have NOT CHANGED    Details   citalopram (CELEXA) 40 mg tablet Take 40 mg by mouth daily  , Until Discontinued, Historical Med      gabapentin (NEURONTIN) 800 mg tablet Take 800 mg by mouth 3 (three) times a day, Until Discontinued, Historical Med      HYDROcodone-acetaminophen ([facility]) 5-325 mg per tablet Take 1 tablet by mouth every 6 (six) hours as needed for pain for up to 20 doses Max Daily Amount: 4 tablets, Starting Tue 11/21/2017, Print      metoprolol tartrate (LOPRESSOR) 50 mg tablet Take 25 mg by mouth every 12 (twelve) hours, Historical Med           No discharge procedures on file      ED Provider  Electronically Signed by           Sara Reyez MD  02/13/18 7630

## 2018-02-14 NOTE — ED NOTES
Per observation, pt appears to be resting in bed comfortably  Call bell within reach        Vanessa Arzate RN  02/13/18 2003

## 2018-02-14 NOTE — ED NOTES
Patient transported to [address] [place] [address], [address] [place] [address]  02/13/18 2854

## 2018-02-14 NOTE — ED NOTES
Pt resting in bed, appears comfortable, no s/s of acute distress       Eden Melo, OLGA  02/13/18 6801

## 2018-02-14 NOTE — DISCHARGE INSTRUCTIONS
[place] diet  Rest  Follow up with your physician for further evaluation  Abdominal Pain   WHAT YOU NEED TO KNOW:   Abdominal pain can be dull, achy, or sharp  You may have pain in one area of your abdomen, or in your entire abdomen  Your pain may be caused by a condition such as constipation, food sensitivity or poisoning, infection, or a blockage  Abdominal pain can also be from a hernia, appendicitis, or an ulcer  Liver, gallbladder, or kidney conditions can also cause abdominal pain  The cause of your abdominal pain may be unknown  DISCHARGE INSTRUCTIONS:   Return to the emergency department if:   · You have new chest pain or shortness of breath  · You have pulsing pain in your upper abdomen or lower back that suddenly becomes constant  · Your pain is in the right lower abdominal area and worsens with movement  · You have a fever over 100 4°F (38°C) or shaking chills  · You are vomiting and cannot keep food or liquids down  · Your pain does not improve or gets worse over the next 8 to 12 hours  · You see blood in your vomit or bowel movements, or they look black and tarry  · Your skin or the whites of your eyes turn yellow  · You are a woman and have a large amount of vaginal bleeding that is not your monthly period  Contact your healthcare provider if:   · You have pain in your lower back  · You are a man and have pain in your testicles  · You have pain when you urinate  · You have questions or concerns about your condition or care  Follow up with your healthcare provider within 24 hours or as directed:  Write down your questions so you remember to ask them during your visits  Medicines:   · Medicines  may be given to calm your stomach and prevent vomiting or to decrease pain  Ask how to take pain medicine safely  · Take your medicine as directed  Contact your healthcare provider if you think your medicine is not helping or if you have side effects   Tell him of her if you are allergic to any medicine  Keep a list of the medicines, vitamins, and herbs you take  Include the amounts, and when and why you take them  Bring the list or the pill bottles to follow-up visits  Carry your medicine list with you in case of an emergency  © 2017 [address] [employer] Information is for End User's use only and may not be sold, redistributed or otherwise used for commercial purposes  All illustrations and images included in CareNotes® are the copyrighted property of [employer]  or [address]  The above information is an  only  It is not intended as medical advice for individual conditions or treatments  Talk to your doctor, nurse or pharmacist before following any medical regimen to see if it is safe and effective for you

## 2018-02-23 ENCOUNTER — APPOINTMENT (EMERGENCY)
Dept: CT IMAGING | Facility: HOSPITAL | Age: 58
End: 2018-02-23
Payer: COMMERCIAL

## 2018-02-23 ENCOUNTER — HOSPITAL ENCOUNTER (EMERGENCY)
Facility: HOSPITAL | Age: 58
Discharge: HOME | End: 2018-02-24
Attending: EMERGENCY MEDICINE
Payer: COMMERCIAL

## 2018-02-23 DIAGNOSIS — R10.9 ACUTE ABDOMINAL PAIN: Primary | ICD-10-CM

## 2018-02-23 LAB
ALBUMIN SERPL BCP-MCNC: 3 G/DL (ref 3.5–5)
ALP SERPL-CCNC: 126 U/L (ref 46–116)
ALT SERPL W P-5'-P-CCNC: 100 U/L (ref 12–78)
ANION GAP SERPL CALCULATED.3IONS-SCNC: 9 MMOL/L (ref 4–13)
AST SERPL W P-5'-P-CCNC: 75 U/L (ref 5–45)
BACTERIA UR QL AUTO: ABNORMAL /HPF
BASOPHILS # BLD AUTO: 0.04 THOUSANDS/ΜL (ref 0–0.1)
BASOPHILS NFR BLD AUTO: 1 % (ref 0–1)
BILIRUB SERPL-MCNC: 0.1 MG/DL (ref 0.2–1)
BILIRUB UR QL STRIP: NEGATIVE
BUN SERPL-MCNC: 18 MG/DL (ref 5–25)
CALCIUM SERPL-MCNC: 8.9 MG/DL (ref 8.3–10.1)
CHLORIDE SERPL-SCNC: 108 MMOL/L (ref 100–108)
CLARITY UR: CLEAR
CO2 SERPL-SCNC: 27 MMOL/L (ref 21–32)
COLOR UR: YELLOW
CREAT SERPL-MCNC: 0.85 MG/DL (ref 0.6–1.3)
EOSINOPHIL # BLD AUTO: 0.3 THOUSAND/ΜL (ref 0–0.61)
EOSINOPHIL NFR BLD AUTO: 6 % (ref 0–6)
ERYTHROCYTE [DISTWIDTH] IN BLOOD BY AUTOMATED COUNT: 14.2 % (ref 11.6–15.1)
GFR SERPL CREATININE-BSD FRML MDRD: 75 ML/MIN/1.73SQ M
GLUCOSE SERPL-MCNC: 105 MG/DL (ref 65–140)
GLUCOSE UR STRIP-MCNC: NEGATIVE MG/DL
HCT VFR BLD AUTO: 37.2 % (ref 34.8–46.1)
HGB BLD-MCNC: 12.5 G/DL (ref 11.5–15.4)
HGB UR QL STRIP.AUTO: ABNORMAL
KETONES UR STRIP-MCNC: NEGATIVE MG/DL
LACTATE SERPL-SCNC: 1.1 MMOL/L (ref 0.5–2)
LEUKOCYTE ESTERASE UR QL STRIP: NEGATIVE
LIPASE SERPL-CCNC: 75 U/L (ref 73–393)
LYMPHOCYTES # BLD AUTO: 1.98 THOUSANDS/ΜL (ref 0.6–4.47)
LYMPHOCYTES NFR BLD AUTO: 36 % (ref 14–44)
MCH RBC QN AUTO: 30.1 PG (ref 26.8–34.3)
MCHC RBC AUTO-ENTMCNC: 33.6 G/DL (ref 31.4–37.4)
MCV RBC AUTO: 90 FL (ref 82–98)
MONOCYTES # BLD AUTO: 0.44 THOUSAND/ΜL (ref 0.17–1.22)
MONOCYTES NFR BLD AUTO: 8 % (ref 4–12)
NEUTROPHILS # BLD AUTO: 2.72 THOUSANDS/ΜL (ref 1.85–7.62)
NEUTS SEG NFR BLD AUTO: 49 % (ref 43–75)
NITRITE UR QL STRIP: NEGATIVE
NON-SQ EPI CELLS URNS QL MICRO: ABNORMAL /HPF
NRBC BLD AUTO-RTO: 0 /100 WBCS
PH UR STRIP.AUTO: 6 [PH] (ref 4.5–8)
PLATELET # BLD AUTO: 180 THOUSANDS/UL (ref 149–390)
PMV BLD AUTO: 9.7 FL (ref 8.9–12.7)
POTASSIUM SERPL-SCNC: 4.3 MMOL/L (ref 3.5–5.3)
PROT SERPL-MCNC: 6.5 G/DL (ref 6.4–8.2)
PROT UR STRIP-MCNC: NEGATIVE MG/DL
RBC # BLD AUTO: 4.15 MILLION/UL (ref 3.81–5.12)
RBC #/AREA URNS AUTO: ABNORMAL /HPF
SODIUM SERPL-SCNC: 144 MMOL/L (ref 136–145)
SP GR UR STRIP.AUTO: 1.01 (ref 1–1.03)
UROBILINOGEN UR QL STRIP.AUTO: 0.2 E.U./DL
WBC # BLD AUTO: 5.5 THOUSAND/UL (ref 4.31–10.16)
WBC #/AREA URNS AUTO: ABNORMAL /HPF

## 2018-02-23 PROCEDURE — 83690 ASSAY OF LIPASE: CPT | Performed by: EMERGENCY MEDICINE

## 2018-02-23 PROCEDURE — 36415 COLL VENOUS BLD VENIPUNCTURE: CPT | Performed by: EMERGENCY MEDICINE

## 2018-02-23 PROCEDURE — 96374 THER/PROPH/DIAG INJ IV PUSH: CPT

## 2018-02-23 PROCEDURE — 96375 TX/PRO/DX INJ NEW DRUG ADDON: CPT

## 2018-02-23 PROCEDURE — 74177 CT ABD & PELVIS W/CONTRAST: CPT

## 2018-02-23 PROCEDURE — 83605 ASSAY OF LACTIC ACID: CPT | Performed by: EMERGENCY MEDICINE

## 2018-02-23 PROCEDURE — 85025 COMPLETE CBC W/AUTO DIFF WBC: CPT | Performed by: EMERGENCY MEDICINE

## 2018-02-23 PROCEDURE — 80053 COMPREHEN METABOLIC PANEL: CPT | Performed by: EMERGENCY MEDICINE

## 2018-02-23 PROCEDURE — 96361 HYDRATE IV INFUSION ADD-ON: CPT

## 2018-02-23 PROCEDURE — 81001 URINALYSIS AUTO W/SCOPE: CPT | Performed by: EMERGENCY MEDICINE

## 2018-02-23 RX ORDER — DICYCLOMINE HCL 20 MG
20 TABLET ORAL ONCE
Status: COMPLETED | OUTPATIENT
Start: 2018-02-23 | End: 2018-02-23

## 2018-02-23 RX ORDER — MAGNESIUM HYDROXIDE/ALUMINUM HYDROXICE/SIMETHICONE 120; 1200; 1200 MG/30ML; MG/30ML; MG/30ML
30 SUSPENSION ORAL ONCE
Status: COMPLETED | OUTPATIENT
Start: 2018-02-23 | End: 2018-02-23

## 2018-02-23 RX ORDER — ONDANSETRON 2 MG/ML
4 INJECTION INTRAMUSCULAR; INTRAVENOUS ONCE
Status: COMPLETED | OUTPATIENT
Start: 2018-02-23 | End: 2018-02-23

## 2018-02-23 RX ADMIN — DICYCLOMINE HYDROCHLORIDE 20 MG: 20 TABLET ORAL at 22:19

## 2018-02-23 RX ADMIN — LIDOCAINE HYDROCHLORIDE 10 ML: 20 SOLUTION ORAL; TOPICAL at 23:13

## 2018-02-23 RX ADMIN — ONDANSETRON 4 MG: 2 INJECTION INTRAMUSCULAR; INTRAVENOUS at 22:15

## 2018-02-23 RX ADMIN — ALUMINUM HYDROXIDE, MAGNESIUM HYDROXIDE, AND SIMETHICONE 30 ML: 200; 200; 20 SUSPENSION ORAL at 23:13

## 2018-02-23 RX ADMIN — SODIUM CHLORIDE 1000 ML: 0.9 INJECTION, SOLUTION INTRAVENOUS at 22:12

## 2018-02-23 RX ADMIN — IOHEXOL 100 ML: 350 INJECTION, SOLUTION INTRAVENOUS at 22:51

## 2018-02-23 RX ADMIN — HYDROMORPHONE HYDROCHLORIDE 1 MG: 1 INJECTION, SOLUTION INTRAMUSCULAR; INTRAVENOUS; SUBCUTANEOUS at 22:18

## 2018-02-24 VITALS
OXYGEN SATURATION: 93 % | TEMPERATURE: 97.8 F | RESPIRATION RATE: 16 BRPM | BODY MASS INDEX: 26.95 KG/M2 | WEIGHT: 152.12 LBS | HEART RATE: 72 BPM | HEIGHT: 63 IN | DIASTOLIC BLOOD PRESSURE: 63 MMHG | SYSTOLIC BLOOD PRESSURE: 142 MMHG

## 2018-02-24 PROCEDURE — 96376 TX/PRO/DX INJ SAME DRUG ADON: CPT

## 2018-02-24 PROCEDURE — 99284 EMERGENCY DEPT VISIT MOD MDM: CPT

## 2018-02-24 RX ADMIN — HYDROMORPHONE HYDROCHLORIDE 1 MG: 1 INJECTION, SOLUTION INTRAMUSCULAR; INTRAVENOUS; SUBCUTANEOUS at 00:58

## 2018-02-24 NOTE — DISCHARGE INSTRUCTIONS

## 2018-02-24 NOTE — ED PROVIDER NOTES
History  Chief Complaint   Patient presents with    Abdominal Pain     Pt reports severe RUQ abd pain that began 3 hours ago  Pt states she had gallbladder removed three years ago  denies injuy to area  C/o nausea without vomitting  Patient is a 59year old female with past medical and surgical history of bipolar disorder, anxiety, chronic back pain, hypertension, rheumatoid arthritis, cholecystectomy, prior ovarian and endometrial cancer status post total hysterectomy in 1992, presents to the emergency department complaining of severe right upper quadrant abdominal pain and nausea that started 3 hours ago  Patient states pain came on quite suddenly and has been sharp and burning in nature  Pain is constant and nonradiating  She has not taken anything for pain as of yet  She has not eaten since the pain started she cannot comment on whether it is worsened by food intake  According to medical records, patient presented similarly on 02/13 with pain in the right upper abdomen and nausea  She was found to have colitis and possible right lower lobe infiltrate at that time  She states the pain is much more severe than it was when she presented that time  She states she did get some relief from the time she was discharged on 02/13 till now but the pain is returned  She denies any episodes of vomiting  Denies any associated fever, chills, headache, dizziness or near syncope, URI symptoms, cough, chest pain, palpitations, shortness of breath, vomiting, diarrhea, constipation, blood per rectum or melena, dysuria, frequency, hematuria, flank pain, skin rash or color change, extremity weakness or paresthesia or other focal neurologic deficits            History provided by:  Patient and medical records   used: No    Abdominal Pain   Associated symptoms: nausea    Associated symptoms: no chest pain, no chills, no constipation, no cough, no diarrhea, no dysuria, no fatigue, no fever, no hematuria, no shortness of breath, no sore throat and no vomiting        Prior to Admission Medications   Prescriptions Last Dose Informant Patient Reported? Taking? HYDROcodone-acetaminophen (NORCO) 5-325 mg per tablet   No No   Sig: Take 1 tablet by mouth every 6 (six) hours as needed for pain for up to 20 doses Max Daily Amount: 4 tablets   citalopram (CELEXA) 40 mg tablet  Self Yes No   Sig: Take 40 mg by mouth daily     gabapentin (NEURONTIN) 800 mg tablet   Yes No   Sig: Take 800 mg by mouth 3 (three) times a day   metoprolol tartrate (LOPRESSOR) 50 mg tablet   Yes No   Sig: Take 25 mg by mouth every 12 (twelve) hours      Facility-Administered Medications: None       Past Medical History:   Diagnosis Date    Anxiety     Bipolar 1 disorder (HCC)     Chronic back pain     Frequent headaches     Glaucoma     Hypertension     Psychiatric disorder     bipolar    Rheumatoid arthritis ([facility] [address] )        Past Surgical History:   Procedure Laterality Date    APPENDECTOMY      CATARACT EXTRACTION Left     CHOLECYSTECTOMY      HYSTERECTOMY      INTRAOCULAR LENS INSERTION      IN TEMPORAL ARTERY LIGATN OR BX Right 12/16/2016    Procedure: BIOPSY ARTERY TEMPORAL;  Surgeon: Jill Cullen MD;  Location: [facility];  Service: General    TONSILLECTOMY         Family History   Problem Relation Age of Onset    Heart disease Mother      I have reviewed and agree with the history as documented  Social History   Substance Use Topics    Smoking status: Current Every Day Smoker     Packs/day: 0 20     Years: 46 00     Types: Cigarettes    Smokeless tobacco: Never Used    Alcohol use No        Review of Systems   Constitutional: Negative for appetite change, chills, diaphoresis, fatigue and fever  HENT: Negative for congestion, ear pain, rhinorrhea and sore throat  Eyes: Negative for pain and visual disturbance  Respiratory: Negative for cough, chest tightness, shortness of breath and wheezing  Cardiovascular: Negative for chest pain and palpitations  Gastrointestinal: Positive for abdominal pain and nausea  Negative for abdominal distention, blood in stool, constipation, diarrhea and vomiting  Genitourinary: Negative for dysuria, flank pain, frequency and hematuria  Musculoskeletal: Negative for back pain, neck pain and neck stiffness  Skin: Negative for color change, pallor and rash  Allergic/Immunologic: Negative for immunocompromised state  Neurological: Negative for dizziness, syncope, weakness, light-headedness, numbness and headaches  Hematological: Negative for adenopathy  Psychiatric/Behavioral: Negative for confusion and decreased concentration  All other systems reviewed and are negative  Physical Exam  ED Triage Vitals [02/23/18 2050]   Temperature Pulse Respirations Blood Pressure SpO2   97 8 °F (36 6 °C) 101 17 133/77 100 %      Temp Source Heart Rate Source Patient Position - Orthostatic VS BP Location FiO2 (%)   Oral Monitor Lying Right arm --      Pain Score       Worst Possible Pain           Vitals:    02/23/18 2050 02/24/18 0100 02/24/18 0130   BP: 133/77 115/70 142/63   BP Location: Right arm     Pulse: 101 82 72   Resp: 17 16 16   Temp: 97 8 °F (36 6 °C)     TempSrc: Oral     SpO2: 100% 98% 93%   Weight: 69 kg (152 lb 1 9 oz)     Height: 5' 3" (1 6 m)       Physical Exam   Constitutional: She is oriented to person, place, and time  She appears well-developed and well-nourished  She appears distressed  Patient appears to be in mild distress secondary to pain  HENT:   Head: Normocephalic and atraumatic  Mouth/Throat: Oropharynx is clear and moist    Eyes: Conjunctivae and EOM are normal  Pupils are equal, round, and reactive to light  Neck: Normal range of motion  Neck supple  No JVD present  Cardiovascular: Normal rate, regular rhythm, normal heart sounds and intact distal pulses  Exam reveals no gallop and no friction rub      No murmur heard   Pulmonary/Chest: Effort normal and breath sounds normal  No respiratory distress  She has no wheezes  She has no rales  She exhibits no tenderness  Abdominal: Soft  Bowel sounds are normal  She exhibits no distension  There is tenderness  There is no rebound and no guarding  Tenderness noted in the right upper quadrant, epigastrium and periumbilical region  Musculoskeletal: Normal range of motion  She exhibits no edema or tenderness  Neurological: She is alert and oriented to person, place, and time  No gross motor or sensory deficits  Skin: Skin is warm and dry  No rash noted  She is not diaphoretic  No erythema  No pallor  Psychiatric: She has a normal mood and affect  Her behavior is normal    Nursing note and vitals reviewed        ED Medications  Medications   sodium chloride 0 9 % bolus 1,000 mL (0 mL Intravenous Stopped 2/23/18 2300)   HYDROmorphone (DILAUDID) injection 1 mg (1 mg Intravenous Given 2/23/18 2218)   ondansetron (ZOFRAN) injection 4 mg (4 mg Intravenous Given 2/23/18 2215)   dicyclomine (BENTYL) tablet 20 mg (20 mg Oral Given 2/23/18 2219)   aluminum-magnesium hydroxide-simethicone (MYLANTA) 200-200-20 mg/5 mL oral suspension 30 mL (30 mL Oral Given 2/23/18 2313)   lidocaine viscous (XYLOCAINE) 2 % mucosal solution 10 mL (10 mL Swish & Swallow Given 2/23/18 2313)   iohexol (OMNIPAQUE) 350 MG/ML injection (MULTI-DOSE) 100 mL (100 mL Intravenous Given 2/23/18 2251)   HYDROmorphone (DILAUDID) injection 1 mg (1 mg Intravenous Given 2/24/18 0058)       Diagnostic Studies  Results Reviewed     Procedure Component Value Units Date/Time    Urine Microscopic [[id number]]  (Abnormal) Collected:  02/23/18 2314    Lab Status:  Final result Specimen:  Urine from Urine, Clean Catch Updated:  02/23/18 2329     RBC, UA 0-1 (A) /hpf      WBC, UA 1-2 (A) /hpf      Epithelial Cells Occasional /hpf      Bacteria, UA Occasional /hpf     UA w Reflex to Microscopic [[id number]]  (Abnormal) Collected: 02/23/18 2314    Lab Status:  Final result Specimen:  Urine from Urine, Clean Catch Updated:  02/23/18 2320     Color, UA Yellow     Clarity, UA Clear     Specific Gravity, UA 1 010     pH, UA 6 0     Leukocytes, UA Negative     Nitrite, UA Negative     Protein, UA Negative mg/dl      Glucose, UA Negative mg/dl      Ketones, UA Negative mg/dl      Urobilinogen, UA 0 2 E U /dl      Bilirubin, UA Negative     Blood, UA Trace-lysed (A)    Lactic acid, plasma [[id number]]  (Normal) Collected:  02/23/18 2211    Lab Status:  Final result Specimen:  Blood from Arm, Left Updated:  02/23/18 2239     LACTIC ACID 1 1 mmol/L     Narrative:         Result may be elevated if tourniquet was used during collection  Comprehensive metabolic panel [[id number]]  (Abnormal) Collected:  02/23/18 2211    Lab Status:  Final result Specimen:  Blood from Arm, Left Updated:  02/23/18 2234     Sodium 144 mmol/L      Potassium 4 3 mmol/L      Chloride 108 mmol/L      CO2 27 mmol/L      Anion Gap 9 mmol/L      BUN 18 mg/dL      Creatinine 0 85 mg/dL      Glucose 105 mg/dL      Calcium 8 9 mg/dL      AST 75 (H) U/L       (H) U/L      Alkaline Phosphatase 126 (H) U/L      Total Protein 6 5 g/dL      Albumin 3 0 (L) g/dL      Total Bilirubin 0 10 (L) mg/dL      eGFR 75 ml/min/1 73sq m     Narrative:         National Kidney Disease Education Program recommendations are as follows:  GFR calculation is accurate only with a steady state creatinine  Chronic Kidney disease less than 60 ml/min/1 73 sq  meters  Kidney failure less than 15 ml/min/1 73 sq  meters      Lipase [[id number]]  (Normal) Collected:  02/23/18 2211    Lab Status:  Final result Specimen:  Blood from Arm, Left Updated:  02/23/18 2234     Lipase 75 u/L     CBC and differential [[id number]]  (Normal) Collected:  02/23/18 2211    Lab Status:  Final result Specimen:  Blood from Arm, Left Updated:  02/23/18 2217     WBC 5 50 Thousand/uL      RBC 4 15 Million/uL      Hemoglobin 12 5 g/dL Hematocrit 37 2 %      MCV 90 fL      MCH 30 1 pg      MCHC 33 6 g/dL      RDW 14 2 %      MPV 9 7 fL      Platelets 322 Thousands/uL      nRBC 0 /100 WBCs      Neutrophils Relative 49 %      Lymphocytes Relative 36 %      Monocytes Relative 8 %      Eosinophils Relative 6 %      Basophils Relative 1 %      Neutrophils Absolute 2 72 Thousands/µL      Lymphocytes Absolute 1 98 Thousands/µL      Monocytes Absolute 0 44 Thousand/µL      Eosinophils Absolute 0 30 Thousand/µL      Basophils Absolute 0 04 Thousands/µL                  CT abdomen pelvis with contrast   ED Interpretation by Liban Sahni DO (02/24 0019)   [facility] report: " IMPRESSION:   No evidence of bowel obstruction  Apparent mild wall thickening in the left upper quadrant jejunal small bowel loops, which could be due   to luminal underdistention, although cannot exclude an enteritis  Clinical correlation recommended  Colonic diverticulosis, without evidence for acute diverticulitis  Indeterminate left adrenal nodule  Biliary ductal prominence, which may relate to postcholecystectomy state  Suggest correlation with   clinical markers of cholestasis "      Final Result by Arya Bergman DO (02/24 0685)   Colonic diverticulosis  No acute abdominal pelvic pathology  Findings are consistent with the preliminary report from [facility] which was provided shortly after completion of the exam          Workstation performed: [id number]                    Procedures  Procedures       Phone Contacts  ED Phone Contact    ED Course  ED Course                                MDM  Number of Diagnoses or Management Options  Diagnosis management comments: 17-year-old female with history of recent visit for abdominal pain, presents for acute onset right upper quadrant abdominal pain and nausea for the past 3 hours  Patient is status post cholecystectomy    Differential includes acute gastritis, gastroenteritis or colitis, peptic ulcer disease, retained CBD stone, mesenteric ischemia, renal colic however less likely, drug-seeking behavior  According to [place] PDMP, patient has had multiple scripts for narcotics within the past 1 year by 8 different providers  Will workup with abdominal labs, lactic acid, and CT scan of the abdomen and pelvis  Patient does seem to be in acute distress so will give Dilaudid for pain control as well as GI cocktail, Zofran and Bentyl  Given her history of multiple narcotic scripts, unless there is an acute significant abnormality seen on CT scan, I do not recommend sending home with narcotics  Amount and/or Complexity of Data Reviewed  Clinical lab tests: ordered and reviewed  Tests in the radiology section of CPT®: ordered and reviewed  Independent visualization of images, tracings, or specimens: yes      CritCare Time    Disposition  Final diagnoses:   Acute abdominal pain     Time reflects when diagnosis was documented in both MDM as applicable and the Disposition within this note     Time User Action Codes Description Comment    2/24/2018  1:33 AM Rizwana Goldman Add [R10 9] Acute abdominal pain       ED Disposition     ED Disposition Condition Comment    Discharge  Leslie Channel discharge to home/self care  Condition at discharge: Good        Follow-up Information     Follow up With Specialties Details Why Contact Info    Dianne Briscoe MD Gastroenterology  Please call to arrange for follow up with GI  If you have new or worsening symptoms please call your doctor or return to the emergency department   [address] [place] [address]          Discharge Medication List as of 2/24/2018  1:36 AM      CONTINUE these medications which have NOT CHANGED    Details   citalopram (CELEXA) 40 mg tablet Take 40 mg by mouth daily  , Until Discontinued, Historical Med      gabapentin (NEURONTIN) 800 mg tablet Take 800 mg by mouth 3 (three) times a day, Until Discontinued, Historical Med HYDROcodone-acetaminophen (NORCO) 5-325 mg per tablet Take 1 tablet by mouth every 6 (six) hours as needed for pain for up to 20 doses Max Daily Amount: 4 tablets, Starting Tue 11/21/2017, Print      metoprolol tartrate (LOPRESSOR) 50 mg tablet Take 25 mg by mouth every 12 (twelve) hours, Historical Med           No discharge procedures on file      ED Provider  Electronically Signed by           Michelle Mark DO  02/24/18 1871

## 2018-03-09 ENCOUNTER — APPOINTMENT (EMERGENCY)
Dept: CT IMAGING | Facility: HOSPITAL | Age: 58
End: 2018-03-09
Payer: COMMERCIAL

## 2018-03-09 ENCOUNTER — HOSPITAL ENCOUNTER (EMERGENCY)
Facility: HOSPITAL | Age: 58
Discharge: HOME | End: 2018-03-09
Admitting: EMERGENCY MEDICINE
Payer: COMMERCIAL

## 2018-03-09 VITALS
SYSTOLIC BLOOD PRESSURE: 120 MMHG | TEMPERATURE: 97.7 F | BODY MASS INDEX: 26.21 KG/M2 | RESPIRATION RATE: 20 BRPM | HEART RATE: 63 BPM | OXYGEN SATURATION: 100 % | DIASTOLIC BLOOD PRESSURE: 62 MMHG | WEIGHT: 147.93 LBS | HEIGHT: 63 IN

## 2018-03-09 DIAGNOSIS — R10.9 ABDOMINAL PAIN: Primary | ICD-10-CM

## 2018-03-09 LAB
ALBUMIN SERPL BCP-MCNC: 3.4 G/DL (ref 3.5–5)
ALP SERPL-CCNC: 139 U/L (ref 46–116)
ALT SERPL W P-5'-P-CCNC: 145 U/L (ref 12–78)
ANION GAP SERPL CALCULATED.3IONS-SCNC: 12 MMOL/L (ref 4–13)
AST SERPL W P-5'-P-CCNC: 111 U/L (ref 5–45)
BASOPHILS # BLD AUTO: 0.04 THOUSANDS/ΜL (ref 0–0.1)
BASOPHILS NFR BLD AUTO: 1 % (ref 0–1)
BILIRUB SERPL-MCNC: 0.2 MG/DL (ref 0.2–1)
BUN SERPL-MCNC: 17 MG/DL (ref 5–25)
CALCIUM SERPL-MCNC: 8.9 MG/DL (ref 8.3–10.1)
CHLORIDE SERPL-SCNC: 106 MMOL/L (ref 100–108)
CO2 SERPL-SCNC: 24 MMOL/L (ref 21–32)
CREAT SERPL-MCNC: 0.77 MG/DL (ref 0.6–1.3)
EOSINOPHIL # BLD AUTO: 0.25 THOUSAND/ΜL (ref 0–0.61)
EOSINOPHIL NFR BLD AUTO: 3 % (ref 0–6)
ERYTHROCYTE [DISTWIDTH] IN BLOOD BY AUTOMATED COUNT: 14.2 % (ref 11.6–15.1)
GFR SERPL CREATININE-BSD FRML MDRD: 85 ML/MIN/1.73SQ M
GLUCOSE SERPL-MCNC: 91 MG/DL (ref 65–140)
HCT VFR BLD AUTO: 42.5 % (ref 34.8–46.1)
HGB BLD-MCNC: 14 G/DL (ref 11.5–15.4)
LIPASE SERPL-CCNC: 62 U/L (ref 73–393)
LYMPHOCYTES # BLD AUTO: 2.1 THOUSANDS/ΜL (ref 0.6–4.47)
LYMPHOCYTES NFR BLD AUTO: 28 % (ref 14–44)
MCH RBC QN AUTO: 30 PG (ref 26.8–34.3)
MCHC RBC AUTO-ENTMCNC: 32.9 G/DL (ref 31.4–37.4)
MCV RBC AUTO: 91 FL (ref 82–98)
MONOCYTES # BLD AUTO: 0.4 THOUSAND/ΜL (ref 0.17–1.22)
MONOCYTES NFR BLD AUTO: 5 % (ref 4–12)
NEUTROPHILS # BLD AUTO: 4.64 THOUSANDS/ΜL (ref 1.85–7.62)
NEUTS SEG NFR BLD AUTO: 62 % (ref 43–75)
NRBC BLD AUTO-RTO: 0 /100 WBCS
PLATELET # BLD AUTO: 178 THOUSANDS/UL (ref 149–390)
PMV BLD AUTO: 9.9 FL (ref 8.9–12.7)
POTASSIUM SERPL-SCNC: 4.2 MMOL/L (ref 3.5–5.3)
PROT SERPL-MCNC: 7 G/DL (ref 6.4–8.2)
RBC # BLD AUTO: 4.66 MILLION/UL (ref 3.81–5.12)
SODIUM SERPL-SCNC: 142 MMOL/L (ref 136–145)
TROPONIN I SERPL-MCNC: <0.02 NG/ML
WBC # BLD AUTO: 7.46 THOUSAND/UL (ref 4.31–10.16)

## 2018-03-09 PROCEDURE — 93005 ELECTROCARDIOGRAM TRACING: CPT

## 2018-03-09 PROCEDURE — 84484 ASSAY OF TROPONIN QUANT: CPT | Performed by: PHYSICIAN ASSISTANT

## 2018-03-09 PROCEDURE — 36415 COLL VENOUS BLD VENIPUNCTURE: CPT | Performed by: PHYSICIAN ASSISTANT

## 2018-03-09 PROCEDURE — 74176 CT ABD & PELVIS W/O CONTRAST: CPT

## 2018-03-09 PROCEDURE — 80053 COMPREHEN METABOLIC PANEL: CPT | Performed by: PHYSICIAN ASSISTANT

## 2018-03-09 PROCEDURE — 99284 EMERGENCY DEPT VISIT MOD MDM: CPT

## 2018-03-09 PROCEDURE — 85025 COMPLETE CBC W/AUTO DIFF WBC: CPT | Performed by: PHYSICIAN ASSISTANT

## 2018-03-09 PROCEDURE — 83690 ASSAY OF LIPASE: CPT | Performed by: PHYSICIAN ASSISTANT

## 2018-03-09 RX ORDER — MAGNESIUM HYDROXIDE/ALUMINUM HYDROXICE/SIMETHICONE 120; 1200; 1200 MG/30ML; MG/30ML; MG/30ML
20 SUSPENSION ORAL ONCE
Status: COMPLETED | OUTPATIENT
Start: 2018-03-09 | End: 2018-03-09

## 2018-03-09 RX ORDER — ONDANSETRON HYDROCHLORIDE 4 MG/5ML
4 SOLUTION ORAL ONCE
Status: COMPLETED | OUTPATIENT
Start: 2018-03-09 | End: 2018-03-09

## 2018-03-09 RX ADMIN — IOHEXOL 100 ML: 350 INJECTION, SOLUTION INTRAVENOUS at 17:32

## 2018-03-09 RX ADMIN — ALUMINUM HYDROXIDE, MAGNESIUM HYDROXIDE, AND SIMETHICONE 20 ML: 200; 200; 20 SUSPENSION ORAL at 18:31

## 2018-03-09 RX ADMIN — LIDOCAINE HYDROCHLORIDE 10 ML: 20 SOLUTION ORAL; TOPICAL at 18:30

## 2018-03-09 RX ADMIN — ONDANSETRON 4 MG: 4 SOLUTION ORAL at 18:31

## 2018-03-09 NOTE — ED PROVIDER NOTES
History  Chief Complaint   Patient presents with    Abdominal Pain     presents via EMS for epigastric pain that began 2 hours ago +nausea      Kaylah Taamyo is a 59 y o  female w PMH HTN, bipolar, psychiatric disease who presents for evaluation of abdominal pain  Pt w 2h of epigastric pain  Bores through to back  Nausea / no vomiting  GB has been surgically removed  No f/c  Prior to Admission Medications   Prescriptions Last Dose Informant Patient Reported? Taking? HYDROcodone-acetaminophen (NORCO) 5-325 mg per tablet   No No   Sig: Take 1 tablet by mouth every 6 (six) hours as needed for pain for up to 20 doses Max Daily Amount: 4 tablets   citalopram (CELEXA) 40 mg tablet  Self Yes No   Sig: Take 40 mg by mouth daily     gabapentin (NEURONTIN) 800 mg tablet   Yes No   Sig: Take 800 mg by mouth 3 (three) times a day   metoprolol tartrate (LOPRESSOR) 50 mg tablet   Yes No   Sig: Take 25 mg by mouth every 12 (twelve) hours      Facility-Administered Medications: None       Past Medical History:   Diagnosis Date    Anxiety     Bipolar 1 disorder (HCC)     Chronic back pain     Frequent headaches     Glaucoma     Hypertension     Psychiatric disorder     bipolar    [facility]umatoid arthritis ([facility] [address] )        Past Surgical History:   Procedure Laterality Date    APPENDECTOMY      CATARACT EXTRACTION Left     CHOLECYSTECTOMY      HYSTERECTOMY      INTRAOCULAR LENS INSERTION      IL TEMPORAL ARTERY LIGATN OR BX Right 12/16/2016    Procedure: BIOPSY ARTERY TEMPORAL;  Surgeon: Tianna Ocampo MD;  Location: [facility];  Service: General    TONSILLECTOMY         Family History   Problem Relation Age of Onset    Heart disease Mother      I have reviewed and agree with the history as documented      Social History   Substance Use Topics    Smoking status: Current Every Day Smoker     Packs/day: 0 20     Years: 46 00     Types: Cigarettes    Smokeless tobacco: Never Used    Alcohol use No Review of Systems   Constitutional: Negative for chills, diaphoresis and fever  HENT: Negative for congestion and sore throat  Eyes: Negative for visual disturbance  Respiratory: Negative for cough, chest tightness, shortness of breath and wheezing  Cardiovascular: Negative for chest pain and leg swelling  Gastrointestinal: Positive for abdominal pain  Negative for constipation, diarrhea, nausea and vomiting  Genitourinary: Negative for difficulty urinating, dysuria, frequency, hematuria, urgency, vaginal bleeding, vaginal discharge and vaginal pain  Musculoskeletal: Negative for arthralgias and myalgias  Neurological: Negative for dizziness, weakness, light-headedness, numbness and headaches  Psychiatric/Behavioral: The patient is not nervous/anxious  Physical Exam  ED Triage Vitals [03/09/18 1527]   Temperature Pulse Respirations Blood Pressure SpO2   97 7 °F (36 5 °C) 79 20 136/82 99 %      Temp Source Heart Rate Source Patient Position - Orthostatic VS BP Location FiO2 (%)   Oral Monitor Lying Right arm --      Pain Score       Worst Possible Pain           Orthostatic Vital Signs  Vitals:    03/09/18 1527 03/09/18 1530 03/09/18 1835   BP: 136/82 123/97 120/62   Pulse: 79 74 63   Patient Position - Orthostatic VS: Lying  Lying       Physical Exam   Constitutional: She is oriented to person, place, and time  She appears well-developed and well-nourished  No distress  Looks uncomfortable   HENT:   Head: Normocephalic and atraumatic  Right Ear: External ear normal    Left Ear: External ear normal    Eyes: Pupils are equal, round, and reactive to light  Neck: Normal range of motion  Neck supple  No tracheal deviation present  Cardiovascular: Normal rate, regular rhythm, normal heart sounds and intact distal pulses  Exam reveals no gallop and no friction rub  No murmur heard  Pulmonary/Chest: Effort normal and breath sounds normal  No respiratory distress  She has no wheezes  She has no rales  Abdominal: Soft  Bowel sounds are normal  She exhibits no distension and no mass  There is tenderness  There is no guarding  Epigastric pain to palpation   Musculoskeletal: She exhibits no edema or deformity  Neurological: She is alert and oriented to person, place, and time  Skin: Skin is warm and dry  She is not diaphoretic  Psychiatric: She has a normal mood and affect  Her behavior is normal    Nursing note and vitals reviewed  ED Medications  Medications   iohexol (OMNIPAQUE) 350 MG/ML injection (MULTI-DOSE) 100 mL (100 mL Intravenous Given 3/9/18 1732)   aluminum-magnesium hydroxide-simethicone (MYLANTA) 200-200-20 mg/5 mL oral suspension 20 mL (20 mL Oral Given 3/9/18 1831)   lidocaine viscous (XYLOCAINE) 2 % mucosal solution 10 mL (10 mL Swish & Swallow Given 3/9/18 1830)   ondansetron (ZOFRAN) oral solution 4 mg (4 mg Oral Given 3/9/18 1831)       Diagnostic Studies  Results Reviewed     Procedure Component Value Units Date/Time    Troponin I [[id number]]  (Normal) Collected:  03/09/18 1640    Lab Status:  Final result Specimen:  Blood from Arm, Right Updated:  03/09/18 1714     Troponin I <0 02 ng/mL     Narrative:         [employer] Chemistry analyzer 99% cutoff is > 0 04 ng/mL in network labs    o cTnI 99% cutoff is useful only when applied to patients in the clinical setting of myocardial ischemia  o cTnI 99% cutoff should be interpreted in the context of clinical history, ECG findings and possibly cardiac imaging to establish correct diagnosis  o cTnI 99% cutoff may be suggestive but clearly not indicative of a coronary event without the clinical setting of myocardial ischemia      Comprehensive metabolic panel [[id number]]  (Abnormal) Collected:  03/09/18 1640    Lab Status:  Final result Specimen:  Blood from Arm, Right Updated:  03/09/18 1710     Sodium 142 mmol/L      Potassium 4 2 mmol/L      Chloride 106 mmol/L      CO2 24 mmol/L      Anion Gap 12 mmol/L      BUN 17 mg/dL      Creatinine 0 77 mg/dL      Glucose 91 mg/dL      Calcium 8 9 mg/dL       (H) U/L       (H) U/L      Alkaline Phosphatase 139 (H) U/L      Total Protein 7 0 g/dL      Albumin 3 4 (L) g/dL      Total Bilirubin 0 20 mg/dL      eGFR 85 ml/min/1 73sq m     Narrative:         National Kidney Disease Education Program recommendations are as follows:  GFR calculation is accurate only with a steady state creatinine  Chronic Kidney disease less than 60 ml/min/1 73 sq  meters  Kidney failure less than 15 ml/min/1 73 sq  meters  Lipase [[id number]]  (Abnormal) Collected:  03/09/18 1640    Lab Status:  Final result Specimen:  Blood from Arm, Right Updated:  03/09/18 1710     Lipase 62 (L) u/L     CBC and differential [[id number]]  (Normal) Collected:  03/09/18 1640    Lab Status:  Final result Specimen:  Blood from Arm, Right Updated:  03/09/18 1654     WBC 7 46 Thousand/uL      RBC 4 66 Million/uL      Hemoglobin 14 0 g/dL      Hematocrit 42 5 %      MCV 91 fL      MCH 30 0 pg      MCHC 32 9 g/dL      RDW 14 2 %      MPV 9 9 fL      Platelets 503 Thousands/uL      nRBC 0 /100 WBCs      Neutrophils Relative 62 %      Lymphocytes Relative 28 %      Monocytes Relative 5 %      Eosinophils Relative 3 %      Basophils Relative 1 %      Neutrophils Absolute 4 64 Thousands/µL      Lymphocytes Absolute 2 10 Thousands/µL      Monocytes Absolute 0 40 Thousand/µL      Eosinophils Absolute 0 25 Thousand/µL      Basophils Absolute 0 04 Thousands/µL                  CT abdomen pelvis wo contrast   Final Result by Renetta Jernigan MD (03/09 1754)         1  Punctate nonobstructing calculus within the midpole of the left kidney  2   Somewhat increased fluid within large bowel with air-fluid levels without wall thickening  Findings may indicate diarrheal illness  Clinical correlation recommended  3   Diverticulosis without active diverticulitis                 Workstation performed: [id number] Procedures  Procedures       Phone Contacts  ED Phone Contact    ED Course  ED Course as of Mar 10 1713   Fri Mar 09, 2018   1835 EKG Interpretation    Rate: 64 BPM  Rhythm: NSR  Axis: normal  Intervals: Normal, no blocks, QTc 470ms  Q waves: no  T waves: flattened in III, aVF  ST segments: no depression / elevation    Impression: normal EKG  EKG for comparison: no significant change from 2/13/18  EKG interpreted by me  MDM  Number of Diagnoses or Management Options  Abdominal pain:   Diagnosis management comments: DDX includes but not ltd to:   Epigastric pain, concerning for pancreatitis, gastritis, colitis  Unlikely referred cardiac pain     Plan is to obtain:  CBC as marker of infectivity, hemoconcentration for hydration status  CMP to check for electrolytes, renal function, liver function   Lipase to check for pancreatitis  EKG/trop to check for ischemic changes   CXR to check for congestive changes, active pulmonary disease   CT a/p to check for acute intra-abdominal pathology to explain symptomatology     Based on results:  CT wo any acute findings  There are concerns on prior visits for DSB  Here she is exhibiting this behavior as well  She makes multiple requests for pain meds while here and screams in pain when anyone walks into the room but when the provider leaves the room the patient is comfortably resting and even napping throughout her stay  Return parameters discussed  Pt requires f/u as an outpt  Pt expresses understanding w above treatment plan  All questions answered prior to d/c        CritCare Time    Disposition  Final diagnoses:   Abdominal pain     Time reflects when diagnosis was documented in both MDM as applicable and the Disposition within this note     Time User Action Codes Description Comment    3/9/2018  6:16 PM Mani Santiago Add [R10 9] Abdominal pain       ED Disposition     ED Disposition Condition Comment    Discharge  Josue Kearneyo discharge to home/self care  Condition at discharge: Good        Follow-up Information     Follow up With Specialties Details Why [facility] Emergency Department Emergency Medicine  If symptoms worsen [address] [facility] [id number]  [phone] [place] [facility], [address] [place], [place], [address], [place] Internal Medicine  As needed [address] [place] [address]  [phone]           Discharge Medication List as of 3/9/2018  6:19 PM      CONTINUE these medications which have NOT CHANGED    Details   citalopram (CELEXA) 40 mg tablet Take 40 mg by mouth daily  , Until Discontinued, Historical Med      gabapentin (NEURONTIN) 800 mg tablet Take 800 mg by mouth 3 (three) times a day, Until Discontinued, Historical Med      HYDROcodone-acetaminophen ([facility]) 5-325 mg per tablet Take 1 tablet by mouth every 6 (six) hours as needed for pain for up to 20 doses Max Daily Amount: 4 tablets, Starting Tue 11/21/2017, Print      metoprolol tartrate (LOPRESSOR) 50 mg tablet Take 25 mg by mouth every 12 (twelve) hours, Historical Med           No discharge procedures on file      ED Provider  Electronically Signed by           Stephanie Keith PA-C  03/10/18 5893

## 2018-03-09 NOTE — DISCHARGE INSTRUCTIONS

## 2018-03-12 LAB
ATRIAL RATE: 64 BPM
P AXIS: 64 DEGREES
PR INTERVAL: 108 MS
QRS AXIS: 54 DEGREES
QRSD INTERVAL: 74 MS
QT INTERVAL: 456 MS
QTC INTERVAL: 470 MS
T WAVE AXIS: 44 DEGREES
VENTRICULAR RATE: 64 BPM

## 2018-03-12 PROCEDURE — 93010 ELECTROCARDIOGRAM REPORT: CPT | Performed by: INTERNAL MEDICINE

## 2018-03-25 ENCOUNTER — APPOINTMENT (EMERGENCY)
Dept: RADIOLOGY | Facility: HOSPITAL | Age: 58
End: 2018-03-25
Payer: COMMERCIAL

## 2018-03-25 ENCOUNTER — APPOINTMENT (EMERGENCY)
Dept: CT IMAGING | Facility: HOSPITAL | Age: 58
End: 2018-03-25
Payer: COMMERCIAL

## 2018-03-25 ENCOUNTER — HOSPITAL ENCOUNTER (EMERGENCY)
Facility: HOSPITAL | Age: 58
Discharge: LEFT BEFORE DISCHARGE | End: 2018-03-25
Attending: EMERGENCY MEDICINE | Admitting: EMERGENCY MEDICINE
Payer: COMMERCIAL

## 2018-03-25 VITALS
SYSTOLIC BLOOD PRESSURE: 152 MMHG | WEIGHT: 156.09 LBS | RESPIRATION RATE: 20 BRPM | TEMPERATURE: 98 F | HEART RATE: 75 BPM | DIASTOLIC BLOOD PRESSURE: 85 MMHG | OXYGEN SATURATION: 97 % | BODY MASS INDEX: 27.65 KG/M2

## 2018-03-25 DIAGNOSIS — R07.9 CHEST PAIN: Primary | ICD-10-CM

## 2018-03-25 DIAGNOSIS — K29.70 GASTRITIS: ICD-10-CM

## 2018-03-25 DIAGNOSIS — K44.9 HIATAL HERNIA: ICD-10-CM

## 2018-03-25 LAB
ALBUMIN SERPL BCP-MCNC: 3.3 G/DL (ref 3.5–5)
ALP SERPL-CCNC: 158 U/L (ref 46–116)
ALT SERPL W P-5'-P-CCNC: 210 U/L (ref 12–78)
ANION GAP SERPL CALCULATED.3IONS-SCNC: 10 MMOL/L (ref 4–13)
AST SERPL W P-5'-P-CCNC: 112 U/L (ref 5–45)
BACTERIA UR QL AUTO: ABNORMAL /HPF
BASOPHILS # BLD AUTO: 0.05 THOUSANDS/ΜL (ref 0–0.1)
BASOPHILS NFR BLD AUTO: 1 % (ref 0–1)
BILIRUB SERPL-MCNC: 0.3 MG/DL (ref 0.2–1)
BILIRUB UR QL STRIP: NEGATIVE
BUN SERPL-MCNC: 11 MG/DL (ref 5–25)
CALCIUM SERPL-MCNC: 8.3 MG/DL (ref 8.3–10.1)
CHLORIDE SERPL-SCNC: 108 MMOL/L (ref 100–108)
CLARITY UR: CLEAR
CO2 SERPL-SCNC: 24 MMOL/L (ref 21–32)
COLOR UR: YELLOW
CREAT SERPL-MCNC: 0.53 MG/DL (ref 0.6–1.3)
EOSINOPHIL # BLD AUTO: 0.24 THOUSAND/ΜL (ref 0–0.61)
EOSINOPHIL NFR BLD AUTO: 4 % (ref 0–6)
ERYTHROCYTE [DISTWIDTH] IN BLOOD BY AUTOMATED COUNT: 13.7 % (ref 11.6–15.1)
ETHANOL SERPL-MCNC: 92 MG/DL (ref 0–3)
GFR SERPL CREATININE-BSD FRML MDRD: 104 ML/MIN/1.73SQ M
GLUCOSE SERPL-MCNC: 84 MG/DL (ref 65–140)
GLUCOSE UR STRIP-MCNC: NEGATIVE MG/DL
HCT VFR BLD AUTO: 41.6 % (ref 34.8–46.1)
HGB BLD-MCNC: 14.1 G/DL (ref 11.5–15.4)
HGB UR QL STRIP.AUTO: ABNORMAL
KETONES UR STRIP-MCNC: NEGATIVE MG/DL
LEUKOCYTE ESTERASE UR QL STRIP: NEGATIVE
LYMPHOCYTES # BLD AUTO: 1.98 THOUSANDS/ΜL (ref 0.6–4.47)
LYMPHOCYTES NFR BLD AUTO: 36 % (ref 14–44)
MCH RBC QN AUTO: 30.2 PG (ref 26.8–34.3)
MCHC RBC AUTO-ENTMCNC: 33.9 G/DL (ref 31.4–37.4)
MCV RBC AUTO: 89 FL (ref 82–98)
MONOCYTES # BLD AUTO: 0.32 THOUSAND/ΜL (ref 0.17–1.22)
MONOCYTES NFR BLD AUTO: 6 % (ref 4–12)
NEUTROPHILS # BLD AUTO: 2.84 THOUSANDS/ΜL (ref 1.85–7.62)
NEUTS SEG NFR BLD AUTO: 52 % (ref 43–75)
NITRITE UR QL STRIP: NEGATIVE
NON-SQ EPI CELLS URNS QL MICRO: ABNORMAL /HPF
NRBC BLD AUTO-RTO: 0 /100 WBCS
PH UR STRIP.AUTO: 6 [PH] (ref 4.5–8)
PLATELET # BLD AUTO: 163 THOUSANDS/UL (ref 149–390)
PMV BLD AUTO: 9.1 FL (ref 8.9–12.7)
POTASSIUM SERPL-SCNC: 3.7 MMOL/L (ref 3.5–5.3)
PROT SERPL-MCNC: 7.1 G/DL (ref 6.4–8.2)
PROT UR STRIP-MCNC: NEGATIVE MG/DL
RBC # BLD AUTO: 4.67 MILLION/UL (ref 3.81–5.12)
RBC #/AREA URNS AUTO: ABNORMAL /HPF
SODIUM SERPL-SCNC: 142 MMOL/L (ref 136–145)
SP GR UR STRIP.AUTO: <=1.005 (ref 1–1.03)
TROPONIN I SERPL-MCNC: <0.02 NG/ML
TROPONIN I SERPL-MCNC: <0.02 NG/ML
UROBILINOGEN UR QL STRIP.AUTO: 0.2 E.U./DL
WBC # BLD AUTO: 5.45 THOUSAND/UL (ref 4.31–10.16)
WBC #/AREA URNS AUTO: ABNORMAL /HPF

## 2018-03-25 PROCEDURE — 96361 HYDRATE IV INFUSION ADD-ON: CPT

## 2018-03-25 PROCEDURE — 74175 CTA ABDOMEN W/CONTRAST: CPT

## 2018-03-25 PROCEDURE — 81001 URINALYSIS AUTO W/SCOPE: CPT | Performed by: EMERGENCY MEDICINE

## 2018-03-25 PROCEDURE — 84484 ASSAY OF TROPONIN QUANT: CPT | Performed by: EMERGENCY MEDICINE

## 2018-03-25 PROCEDURE — 36415 COLL VENOUS BLD VENIPUNCTURE: CPT | Performed by: EMERGENCY MEDICINE

## 2018-03-25 PROCEDURE — 96374 THER/PROPH/DIAG INJ IV PUSH: CPT

## 2018-03-25 PROCEDURE — 80053 COMPREHEN METABOLIC PANEL: CPT | Performed by: EMERGENCY MEDICINE

## 2018-03-25 PROCEDURE — 93005 ELECTROCARDIOGRAM TRACING: CPT

## 2018-03-25 PROCEDURE — 99285 EMERGENCY DEPT VISIT HI MDM: CPT

## 2018-03-25 PROCEDURE — 71275 CT ANGIOGRAPHY CHEST: CPT

## 2018-03-25 PROCEDURE — 85025 COMPLETE CBC W/AUTO DIFF WBC: CPT | Performed by: EMERGENCY MEDICINE

## 2018-03-25 PROCEDURE — 80320 DRUG SCREEN QUANTALCOHOLS: CPT | Performed by: EMERGENCY MEDICINE

## 2018-03-25 RX ORDER — PANTOPRAZOLE SODIUM 40 MG/1
40 INJECTION, POWDER, FOR SOLUTION INTRAVENOUS ONCE
Status: DISCONTINUED | OUTPATIENT
Start: 2018-03-25 | End: 2018-03-25 | Stop reason: HOSPADM

## 2018-03-25 RX ORDER — FENTANYL CITRATE 50 UG/ML
50 INJECTION, SOLUTION INTRAMUSCULAR; INTRAVENOUS ONCE
Status: COMPLETED | OUTPATIENT
Start: 2018-03-25 | End: 2018-03-25

## 2018-03-25 RX ORDER — MAGNESIUM HYDROXIDE/ALUMINUM HYDROXICE/SIMETHICONE 120; 1200; 1200 MG/30ML; MG/30ML; MG/30ML
20 SUSPENSION ORAL ONCE
Status: DISCONTINUED | OUTPATIENT
Start: 2018-03-25 | End: 2018-03-25 | Stop reason: HOSPADM

## 2018-03-25 RX ADMIN — IOHEXOL 100 ML: 350 INJECTION, SOLUTION INTRAVENOUS at 17:24

## 2018-03-25 RX ADMIN — SODIUM CHLORIDE 1000 ML: 0.9 INJECTION, SOLUTION INTRAVENOUS at 15:31

## 2018-03-25 RX ADMIN — FENTANYL CITRATE 50 MCG: 50 INJECTION, SOLUTION INTRAMUSCULAR; INTRAVENOUS at 16:26

## 2018-03-25 RX ADMIN — FENTANYL CITRATE 50 MCG: 50 INJECTION, SOLUTION INTRAMUSCULAR; INTRAVENOUS at 17:18

## 2018-03-25 NOTE — ED NOTES
CT scan reports IV is infiltrated, requesting new IV to be placed       Kennedy Palacio RN  03/25/18 [id number]

## 2018-03-25 NOTE — ED NOTES
Pt states, "I just want to go home " When asked if she would wait to speak with the provider or to at least have her medication, pt responded,"no, I just want to leave "      Geneva Justin, OLGA  03/25/18 1924

## 2018-03-25 NOTE — ED PROVIDER NOTES
History  Chief Complaint   Patient presents with    Chest Pain     Pt brought via EMS for evaluation of chest pain radiating between pt's shoulder blades  Pt reports onset 45 mins prior to arrival, while walking  [de-identified] year female presents today complaining chest pain which started approximately 45 prior to arrival   States it started while she was out for a walk  States the pain did not get better with rest   States she has not had anything like this before  Did not take anything for her pain  States she has had two small heart attacks previously  History provided by:  Patient  Chest Pain   Pain location:  Epigastric and substernal area  Pain quality: burning and pressure    Pain radiates to:  L shoulder  Pain radiates to the back: no    Pain severity:  Severe  Onset quality:  Sudden  Duration:  45 minutes  Timing:  Constant  Progression:  Unchanged  Chronicity:  New  Context comment:  While walking  Relieved by:  None tried  Worsened by:  Nothing tried  Ineffective treatments:  None tried  Associated symptoms: nausea    Associated symptoms: no abdominal pain, no AICD problem, no altered mental status, no anorexia, no anxiety, no back pain, no claudication, no cough, no diaphoresis, no dizziness, no dysphagia, no fatigue, no fever, no headache, no heartburn, no lower extremity edema, no near-syncope, no numbness, no orthopnea, no palpitations, no PND, no shortness of breath, no syncope, not vomiting and no weakness    Risk factors: coronary artery disease (States she has had 2 prior heart attacks, but I can't find this documented in her medical history), hypertension, smoking and surgery    Risk factors: no aortic disease, no birth control, no diabetes mellitus, no high cholesterol, not obese and no prior DVT/PE        Prior to Admission Medications   Prescriptions Last Dose Informant Patient Reported? Taking?    HYDROcodone-acetaminophen (NORCO) 5-325 mg per tablet   No No   Sig: Take 1 tablet by mouth every 6 (six) hours as needed for pain for up to 20 doses Max Daily Amount: 4 tablets   citalopram (CELEXA) 40 mg tablet  Self Yes No   Sig: Take 40 mg by mouth daily     gabapentin (NEURONTIN) 800 mg tablet   Yes No   Sig: Take 800 mg by mouth 3 (three) times a day   metoprolol tartrate (LOPRESSOR) 50 mg tablet   Yes No   Sig: Take 25 mg by mouth every 12 (twelve) hours      Facility-Administered Medications: None       Past Medical History:   Diagnosis Date    Anxiety     Bipolar 1 disorder (HCC)     Chronic back pain     Frequent headaches     Glaucoma     Hypertension     Psychiatric disorder     bipolar    [facility] ([facility] [address] )        Past Surgical History:   Procedure Laterality Date    APPENDECTOMY      CATARACT EXTRACTION Left     CHOLECYSTECTOMY      HYSTERECTOMY      INTRAOCULAR LENS INSERTION      [place] TEMPORAL ARTERY LIGATN OR BX Right 12/16/2016    Procedure: BIOPSY ARTERY TEMPORAL;  Surgeon: Mandy Bentley MD;  Location: [facility];  Service: General    TONSILLECTOMY         Family History   Problem Relation Age of Onset    Heart disease Mother      I have reviewed and agree with the history as documented  Social History   Substance Use Topics    Smoking status: Current Every Day Smoker     Packs/day: 0 20     Years: 46 00     Types: Cigarettes    Smokeless tobacco: Never Used    Alcohol use Yes      Comment: occasional        Review of Systems   Constitutional: Negative for diaphoresis, fatigue and fever  HENT: Negative for congestion and trouble swallowing  Eyes: Negative for visual disturbance  Respiratory: Negative for cough and shortness of breath  Cardiovascular: Positive for chest pain  Negative for palpitations, orthopnea, claudication, syncope, PND and near-syncope  Gastrointestinal: Positive for nausea  Negative for abdominal pain, anorexia, heartburn and vomiting  Genitourinary: Negative for difficulty urinating     Musculoskeletal: Negative for back pain  Skin: Negative for pallor, rash and wound  Allergic/Immunologic: Negative for immunocompromised state  Neurological: Negative for dizziness, weakness, numbness and headaches  Psychiatric/Behavioral: Negative for confusion  Physical Exam  ED Triage Vitals   Temperature Pulse Respirations Blood Pressure SpO2   03/25/18 1500 03/25/18 1457 03/25/18 1457 03/25/18 1457 03/25/18 1457   98 °F (36 7 °C) 78 (!) 23 99/66 98 %      Temp Source Heart Rate Source Patient Position - Orthostatic VS BP Location FiO2 (%)   03/25/18 1500 03/25/18 1457 03/25/18 1457 03/25/18 1457 --   Oral Monitor Lying Right arm       Pain Score       03/25/18 1457       Worst Possible Pain           Orthostatic Vital Signs  Vitals:    03/25/18 1457 03/25/18 1515 03/25/18 1719 03/25/18 1800   BP: 99/66 137/78 161/77 152/85   Pulse: 78 79 77 75   Patient Position - Orthostatic VS: Lying Lying Lying Lying       Physical Exam   Constitutional: She is oriented to person, place, and time  She appears well-developed and well-nourished  She appears distressed (crying)  Odor of ETOH on breath   HENT:   Head: Normocephalic and atraumatic  Mouth/Throat: Uvula is midline, oropharynx is clear and moist and mucous membranes are normal  No tonsillar exudate  Eyes: Pupils are equal, round, and reactive to light  Neck: Normal range of motion  Neck supple  Cardiovascular: Normal rate and regular rhythm  Pulmonary/Chest: Effort normal and breath sounds normal    Abdominal: Soft  Bowel sounds are normal  There is no tenderness  There is no rebound and no guarding  Musculoskeletal: Normal range of motion  Neurological: She is alert and oriented to person, place, and time  Patient moving all extremities equally, no focal neuro deficits noted  Skin: Skin is warm and dry  Psychiatric: She has a normal mood and affect  Nursing note and vitals reviewed        ED Medications  Medications   aluminum-magnesium hydroxide-simethicone (MYLANTA) 200-200-20 mg/5 mL oral suspension 20 mL (not administered)   lidocaine viscous (XYLOCAINE) 2 % mucosal solution 15 mL (not administered)   pantoprazole (PROTONIX) injection 40 mg (not administered)   sodium chloride 0 9 % bolus 1,000 mL (0 mL Intravenous Stopped 3/25/18 1830)   fentanyl citrate (PF) 100 MCG/2ML 50 mcg (50 mcg Intravenous Given 3/25/18 1626)   fentanyl citrate (PF) 100 MCG/2ML 50 mcg (50 mcg Intravenous Given 3/25/18 1718)   iohexol (OMNIPAQUE) 350 MG/ML injection (MULTI-DOSE) 100 mL (100 mL Intravenous Given 3/25/18 1724)       Diagnostic Studies  Results Reviewed     Procedure Component Value Units Date/Time    Troponin I [[id number]]  (Normal) Collected:  03/25/18 1845    Lab Status:  Final result Specimen:  Blood from Hand, Left Updated:  03/25/18 1912     Troponin I <0 02 ng/mL     Narrative:         [employer] Chemistry analyzer 99% cutoff is > 0 04 ng/mL in network labs    o cTnI 99% cutoff is useful only when applied to patients in the clinical setting of myocardial ischemia  o cTnI 99% cutoff should be interpreted in the context of clinical history, ECG findings and possibly cardiac imaging to establish correct diagnosis  o cTnI 99% cutoff may be suggestive but clearly not indicative of a coronary event without the clinical setting of myocardial ischemia      Ethanol [[id number]]  (Abnormal) Collected:  03/25/18 1519    Lab Status:  Final result Specimen:  Blood from Arm, Right Updated:  03/25/18 1741     Ethanol Lvl 92 (H) mg/dL     Troponin I [[id number]]  (Normal) Collected:  03/25/18 1519    Lab Status:  Final result Specimen:  Blood from Arm, Right Updated:  03/25/18 1551     Troponin I <0 02 ng/mL     Narrative:         [employer] Chemistry analyzer 99% cutoff is > 0 04 ng/mL in network labs    o cTnI 99% cutoff is useful only when applied to patients in the clinical setting of myocardial ischemia  o cTnI 99% cutoff should be interpreted in the context of clinical history, ECG findings and possibly cardiac imaging to establish correct diagnosis  o cTnI 99% cutoff may be suggestive but clearly not indicative of a coronary event without the clinical setting of myocardial ischemia  Comprehensive metabolic panel [[id number]]  (Abnormal) Collected:  03/25/18 1519    Lab Status:  Final result Specimen:  Blood from Arm, Right Updated:  03/25/18 1547     Sodium 142 mmol/L      Potassium 3 7 mmol/L      Chloride 108 mmol/L      CO2 24 mmol/L      Anion Gap 10 mmol/L      BUN 11 mg/dL      Creatinine 0 53 (L) mg/dL      Glucose 84 mg/dL      Calcium 8 3 mg/dL       (H) U/L       (H) U/L      Alkaline Phosphatase 158 (H) U/L      Total Protein 7 1 g/dL      Albumin 3 3 (L) g/dL      Total Bilirubin 0 30 mg/dL      eGFR 104 ml/min/1 73sq m     Narrative:         National Kidney Disease Education Program recommendations are as follows:  GFR calculation is accurate only with a steady state creatinine  Chronic Kidney disease less than 60 ml/min/1 73 sq  meters  Kidney failure less than 15 ml/min/1 73 sq  meters      Urine Microscopic [[id number]]  (Abnormal) Collected:  03/25/18 1529    Lab Status:  Final result Specimen:  Urine from Urine, Clean Catch Updated:  03/25/18 1545     RBC, UA 0-1 (A) /hpf      WBC, UA None Seen /hpf      Epithelial Cells None Seen /hpf      Bacteria, UA Occasional /hpf     UA w Reflex to Microscopic [[id number]]  (Abnormal) Collected:  03/25/18 1529    Lab Status:  Final result Specimen:  Urine from Urine, Clean Catch Updated:  03/25/18 1536     Color, UA Yellow     Clarity, UA Clear     Specific Gravity, UA <=1 005     pH, UA 6 0     Leukocytes, UA Negative     Nitrite, UA Negative     Protein, UA Negative mg/dl      Glucose, UA Negative mg/dl      Ketones, UA Negative mg/dl      Urobilinogen, UA 0 2 E U /dl      Bilirubin, UA Negative     Blood, UA Trace-lysed (A)    CBC and differential [[id number]]  (Normal) Collected:  03/25/18 1519    Lab Status: Final result Specimen:  Blood from Arm, Right Updated:  03/25/18 1527     WBC 5 45 Thousand/uL      RBC 4 67 Million/uL      Hemoglobin 14 1 g/dL      Hematocrit 41 6 %      MCV 89 fL      MCH 30 2 pg      MCHC 33 9 g/dL      RDW 13 7 %      MPV 9 1 fL      Platelets 844 Thousands/uL      nRBC 0 /100 WBCs      Neutrophils Relative 52 %      Lymphocytes Relative 36 %      Monocytes Relative 6 %      Eosinophils Relative 4 %      Basophils Relative 1 %      Neutrophils Absolute 2 84 Thousands/µL      Lymphocytes Absolute 1 98 Thousands/µL      Monocytes Absolute 0 32 Thousand/µL      Eosinophils Absolute 0 24 Thousand/µL      Basophils Absolute 0 05 Thousands/µL                  CTA dissection protocol chest and abdomen   Final Result by Sofia Thomas MD (03/25 1346)      No evidence of thoracic aortic dissection   No evidence of foot pulmonary embolism   Small hiatal hernia with thickening of the distal esophagus   Emphysema   Incidentally detected nodule in the right upper lobe is stable since the previous study of March 6, 2017 for period of one year       Consider evaluation for eligibility for lung cancer screening program     The study was marked in EPIC for immediate notification  Workstation performed: [id number]                    Procedures  ECG 12 Lead Documentation  Date/Time: 3/25/2018 3:45 PM  Performed by: Radha Shipman  Authorized by: Radha Shipman     Indications / Diagnosis:  Chest pain  ECG reviewed by me, the ED Provider: yes    Patient location:  ED  Previous ECG:     Previous ECG:  Compared to current    Similarity:  No change  Quality:     Tracing quality:  Limited by artifact  Rate:     ECG rate:  76    ECG rate assessment: normal    Rhythm:     Rhythm: sinus rhythm    Ectopy:     Ectopy: none    QRS:     QRS axis:  Normal    QRS intervals:  Normal  ST segments:     ST segments:  Non-specific  Comments:      76 beats per minute normal axis normal intervals    Baseline artifact presents interpretation of ST T wave abnormalities, however there is no gross evidence of ischemia present  Phone Contacts  ED Phone Contact    ED Course  ED Course                                MDM  Number of Diagnoses or Management Options  Chest pain: new and requires workup  Gastritis: new and requires workup  Hiatal hernia: new and requires workup  Diagnosis management comments: 3:25 PM  Multiple attempts made to see and evaluate patient since arrival   Still have not seen an EKG  Patient currently in the bathroom  6:27 PM  CT negative for dissection  Shows hiatal hernia with gastritis  This is likely the cause of the patient's pain  Will treat with GI cocktail and Protonix  Patient continues to ask for analgesics more frequently  Discussed with patient regarding need for follow up with their primary care provider and specialist if needed  Patient was found to have multiple visits to the ER for similar complaints and does not follow up as an outpatient  Will provide medications and care based on their history and physical, which may not require pain medications  7:11 PM  Went back to check on patient and was informed by her nurse that she left  She did not get the protonix or the GI cocktail of maalox and lidocaine  She did not have a repeat troponin drawn  I did not get to discuss leaving ama with the patient prior to her elopement from the department  Serum alcohol was elevated at 330pm when it was drawn, however this being 4 hours ago, at this time she is likely metabolize to normal alcohol and I am not concerned that she is intoxicated         Amount and/or Complexity of Data Reviewed  Clinical lab tests: ordered and reviewed  Tests in the radiology section of CPT®: ordered and reviewed  Tests in the medicine section of CPT®: ordered and reviewed  Decide to obtain previous medical records or to obtain history from someone other than the patient: yes  Review and summarize past medical records: yes  Independent visualization of images, tracings, or specimens: yes    Risk of Complications, Morbidity, and/or Mortality  Presenting problems: high  Diagnostic procedures: high  Management options: high    Patient Progress  Patient progress: stable    CritCare Time    Disposition  Final diagnoses:   Chest pain   Hiatal hernia   Gastritis     Time reflects when diagnosis was documented in both MDM as applicable and the Disposition within this note     Time User Action Codes Description Comment    3/25/2018  7:11 PM Marcell Avila Add [R07 9] Chest pain     3/25/2018  7:11 PM Wyniki Stefania [K44 9] Hiatal hernia     3/25/2018  7:11 PM Kelly Beauchamp Add [K29 70] Gastritis       ED Disposition     ED Disposition Condition Comment    Eloped        Follow-up Information    None       Discharge Medication List as of 3/25/2018  7:06 PM      CONTINUE these medications which have NOT CHANGED    Details   citalopram (CELEXA) 40 mg tablet Take 40 mg by mouth daily  , Until Discontinued, Historical Med      gabapentin (NEURONTIN) 800 mg tablet Take 800 mg by mouth 3 (three) times a day, Until Discontinued, Historical Med      HYDROcodone-acetaminophen ([facility]) 5-325 mg per tablet Take 1 tablet by mouth every 6 (six) hours as needed for pain for up to 20 doses Max Daily Amount: 4 tablets, Starting Tue 11/21/2017, Print      metoprolol tartrate (LOPRESSOR) 50 mg tablet Take 25 mg by mouth every 12 (twelve) hours, Historical Med           No discharge procedures on file      ED Provider  Electronically Signed by           Kedar Caro DO  03/25/18 5424

## 2018-03-25 NOTE — ED NOTES
Pt ambulated to restroom to provide sample, with assistance x1        Margareth Jama RN  03/25/18 2958

## 2018-03-25 NOTE — ED NOTES
Pt ambulated back to room from restroom, pt became unsteady on feet and had poor gait  Pt reports feeling dizzy, lightheaded, and weak  Pt brought back to stretcher, new vital signs obtained, and provider to be made aware       Ricarda Munguia RN  03/25/18 3936

## 2018-03-25 NOTE — ED NOTES
Patient transported to [address] [place] [address] [place], RN  03/25/18 5804

## 2018-03-25 NOTE — ED NOTES
1 IV attempt made w/out success  Joelle Rain RN to attempt with ultrasound guidance  Pt vomiting at bedside, appears to be tearful at this time d/t the pain  Provider made aware, awaiting first evaluation of pt        Lucy Guillory RN  03/25/18 8694

## 2018-03-26 LAB
ATRIAL RATE: 76 BPM
P AXIS: 79 DEGREES
PR INTERVAL: 114 MS
QRS AXIS: 59 DEGREES
QRSD INTERVAL: 74 MS
QT INTERVAL: 424 MS
QTC INTERVAL: 477 MS
T WAVE AXIS: 72 DEGREES
VENTRICULAR RATE: 76 BPM

## 2018-03-26 PROCEDURE — 93010 ELECTROCARDIOGRAM REPORT: CPT | Performed by: INTERNAL MEDICINE

## 2018-04-06 ENCOUNTER — HOSPITAL ENCOUNTER (EMERGENCY)
Facility: HOSPITAL | Age: 58
Discharge: LEFT BEFORE DISCHARGE | End: 2018-04-06
Attending: EMERGENCY MEDICINE
Payer: COMMERCIAL

## 2018-04-06 ENCOUNTER — APPOINTMENT (EMERGENCY)
Dept: CT IMAGING | Facility: HOSPITAL | Age: 58
End: 2018-04-06
Payer: COMMERCIAL

## 2018-04-06 VITALS
RESPIRATION RATE: 20 BRPM | TEMPERATURE: 98.1 F | DIASTOLIC BLOOD PRESSURE: 79 MMHG | HEART RATE: 70 BPM | SYSTOLIC BLOOD PRESSURE: 152 MMHG | OXYGEN SATURATION: 100 %

## 2018-04-06 DIAGNOSIS — R07.9 CHEST PAIN: ICD-10-CM

## 2018-04-06 DIAGNOSIS — R10.9 ABDOMINAL PAIN: Primary | ICD-10-CM

## 2018-04-06 LAB
ALBUMIN SERPL BCP-MCNC: 3.4 G/DL (ref 3.5–5)
ALP SERPL-CCNC: 140 U/L (ref 46–116)
ALT SERPL W P-5'-P-CCNC: 51 U/L (ref 12–78)
ANION GAP SERPL CALCULATED.3IONS-SCNC: 11 MMOL/L (ref 4–13)
AST SERPL W P-5'-P-CCNC: 24 U/L (ref 5–45)
ATRIAL RATE: 72 BPM
BASOPHILS # BLD AUTO: 0.03 THOUSANDS/ΜL (ref 0–0.1)
BASOPHILS NFR BLD AUTO: 1 % (ref 0–1)
BILIRUB SERPL-MCNC: 0.3 MG/DL (ref 0.2–1)
BUN SERPL-MCNC: 15 MG/DL (ref 5–25)
CALCIUM SERPL-MCNC: 9.1 MG/DL (ref 8.3–10.1)
CHLORIDE SERPL-SCNC: 105 MMOL/L (ref 100–108)
CLARITY, POC: CLEAR
CO2 SERPL-SCNC: 24 MMOL/L (ref 21–32)
COLOR, POC: YELLOW
CREAT SERPL-MCNC: 0.6 MG/DL (ref 0.6–1.3)
EOSINOPHIL # BLD AUTO: 0.32 THOUSAND/ΜL (ref 0–0.61)
EOSINOPHIL NFR BLD AUTO: 6 % (ref 0–6)
ERYTHROCYTE [DISTWIDTH] IN BLOOD BY AUTOMATED COUNT: 13.2 % (ref 11.6–15.1)
EXT BILIRUBIN, UA: NEGATIVE
EXT BLOOD URINE: ABNORMAL
EXT GLUCOSE, UA: NEGATIVE
EXT KETONES: NEGATIVE
EXT NITRITE, UA: NEGATIVE
EXT PH, UA: 5
EXT PROTEIN, UA: NEGATIVE
EXT SPECIFIC GRAVITY, UA: 1.05
EXT UROBILINOGEN: 0.2
GFR SERPL CREATININE-BSD FRML MDRD: 100 ML/MIN/1.73SQ M
GLUCOSE SERPL-MCNC: 89 MG/DL (ref 65–140)
HCT VFR BLD AUTO: 40.3 % (ref 34.8–46.1)
HGB BLD-MCNC: 13.6 G/DL (ref 11.5–15.4)
LIPASE SERPL-CCNC: 89 U/L (ref 73–393)
LYMPHOCYTES # BLD AUTO: 1.81 THOUSANDS/ΜL (ref 0.6–4.47)
LYMPHOCYTES NFR BLD AUTO: 33 % (ref 14–44)
MCH RBC QN AUTO: 29.9 PG (ref 26.8–34.3)
MCHC RBC AUTO-ENTMCNC: 33.7 G/DL (ref 31.4–37.4)
MCV RBC AUTO: 89 FL (ref 82–98)
MONOCYTES # BLD AUTO: 0.35 THOUSAND/ΜL (ref 0.17–1.22)
MONOCYTES NFR BLD AUTO: 6 % (ref 4–12)
NEUTROPHILS # BLD AUTO: 2.92 THOUSANDS/ΜL (ref 1.85–7.62)
NEUTS SEG NFR BLD AUTO: 54 % (ref 43–75)
NRBC BLD AUTO-RTO: 0 /100 WBCS
P AXIS: 56 DEGREES
PLATELET # BLD AUTO: 174 THOUSANDS/UL (ref 149–390)
PMV BLD AUTO: 9.6 FL (ref 8.9–12.7)
POTASSIUM SERPL-SCNC: 4.1 MMOL/L (ref 3.5–5.3)
PR INTERVAL: 104 MS
PROT SERPL-MCNC: 7 G/DL (ref 6.4–8.2)
QRS AXIS: 64 DEGREES
QRSD INTERVAL: 76 MS
QT INTERVAL: 422 MS
QTC INTERVAL: 462 MS
RBC # BLD AUTO: 4.55 MILLION/UL (ref 3.81–5.12)
SODIUM SERPL-SCNC: 140 MMOL/L (ref 136–145)
T WAVE AXIS: 52 DEGREES
TROPONIN I SERPL-MCNC: <0.02 NG/ML
VENTRICULAR RATE: 72 BPM
WBC # BLD AUTO: 5.44 THOUSAND/UL (ref 4.31–10.16)
WBC # BLD EST: NEGATIVE 10*3/UL

## 2018-04-06 PROCEDURE — 81002 URINALYSIS NONAUTO W/O SCOPE: CPT | Performed by: EMERGENCY MEDICINE

## 2018-04-06 PROCEDURE — 83690 ASSAY OF LIPASE: CPT | Performed by: EMERGENCY MEDICINE

## 2018-04-06 PROCEDURE — 96375 TX/PRO/DX INJ NEW DRUG ADDON: CPT

## 2018-04-06 PROCEDURE — 74177 CT ABD & PELVIS W/CONTRAST: CPT

## 2018-04-06 PROCEDURE — 93010 ELECTROCARDIOGRAM REPORT: CPT | Performed by: INTERNAL MEDICINE

## 2018-04-06 PROCEDURE — 96374 THER/PROPH/DIAG INJ IV PUSH: CPT

## 2018-04-06 PROCEDURE — 96361 HYDRATE IV INFUSION ADD-ON: CPT

## 2018-04-06 PROCEDURE — 85025 COMPLETE CBC W/AUTO DIFF WBC: CPT | Performed by: EMERGENCY MEDICINE

## 2018-04-06 PROCEDURE — 36415 COLL VENOUS BLD VENIPUNCTURE: CPT | Performed by: EMERGENCY MEDICINE

## 2018-04-06 PROCEDURE — C9113 INJ PANTOPRAZOLE SODIUM, VIA: HCPCS | Performed by: EMERGENCY MEDICINE

## 2018-04-06 PROCEDURE — 84484 ASSAY OF TROPONIN QUANT: CPT | Performed by: EMERGENCY MEDICINE

## 2018-04-06 PROCEDURE — 99285 EMERGENCY DEPT VISIT HI MDM: CPT

## 2018-04-06 PROCEDURE — 93005 ELECTROCARDIOGRAM TRACING: CPT

## 2018-04-06 PROCEDURE — 80053 COMPREHEN METABOLIC PANEL: CPT | Performed by: EMERGENCY MEDICINE

## 2018-04-06 RX ORDER — MAGNESIUM HYDROXIDE/ALUMINUM HYDROXICE/SIMETHICONE 120; 1200; 1200 MG/30ML; MG/30ML; MG/30ML
30 SUSPENSION ORAL ONCE
Status: COMPLETED | OUTPATIENT
Start: 2018-04-06 | End: 2018-04-06

## 2018-04-06 RX ORDER — PANTOPRAZOLE SODIUM 40 MG/1
40 INJECTION, POWDER, FOR SOLUTION INTRAVENOUS ONCE
Status: COMPLETED | OUTPATIENT
Start: 2018-04-06 | End: 2018-04-06

## 2018-04-06 RX ORDER — MIRTAZAPINE 30 MG/1
30 TABLET, FILM COATED ORAL
COMMUNITY

## 2018-04-06 RX ORDER — ONDANSETRON 2 MG/ML
4 INJECTION INTRAMUSCULAR; INTRAVENOUS ONCE
Status: COMPLETED | OUTPATIENT
Start: 2018-04-06 | End: 2018-04-06

## 2018-04-06 RX ADMIN — ALUMINUM HYDROXIDE, MAGNESIUM HYDROXIDE, AND SIMETHICONE 30 ML: 200; 200; 20 SUSPENSION ORAL at 11:37

## 2018-04-06 RX ADMIN — PANTOPRAZOLE SODIUM 40 MG: 40 INJECTION, POWDER, FOR SOLUTION INTRAVENOUS at 11:43

## 2018-04-06 RX ADMIN — SODIUM CHLORIDE 1000 ML: 0.9 INJECTION, SOLUTION INTRAVENOUS at 11:43

## 2018-04-06 RX ADMIN — IOHEXOL 100 ML: 350 INJECTION, SOLUTION INTRAVENOUS at 12:52

## 2018-04-06 RX ADMIN — LIDOCAINE HYDROCHLORIDE 15 ML: 20 SOLUTION ORAL; TOPICAL at 11:37

## 2018-04-06 RX ADMIN — ONDANSETRON 4 MG: 2 INJECTION INTRAMUSCULAR; INTRAVENOUS at 11:43

## 2018-04-06 NOTE — ED PROVIDER NOTES
History  Chief Complaint   Patient presents with    Abdominal Pain     Pt stated that she started to have severe abdominal pain in her RUQ that radiates into her back  c/o nausea, and constipation     58 y/o female, hx of bipolar disorder, anxiety, htn, and prior cholecystectomy, presents to the ED for RUQ abd pain since last night  Patient states that pain is sharp, constant- has been worsening since onset  States that pain radiates to her chest and back  Has a hx of gastritis- has been here multiple times for similar abd and chest pain  Has had multiple CT scans including a dissection study done on 3/25, which was negative and showed gastritis  She states that symptoms are similar to past  Denies any sob, f/c, cough, vomiting, diarrhea, or urinary symptoms  Suppose to followup with GI but has not since  She denies any other symptoms or complaints  Has not taken anything for the pain or symptoms  +tobacco smoker        History provided by:  Patient  Abdominal Pain   Pain location:  RUQ and epigastric  Pain quality: sharp    Pain radiates to:  Chest and back  Pain severity:  Moderate  Onset quality:  Sudden  Timing:  Constant  Progression:  Worsening  Chronicity:  Recurrent  Context: previous surgery    Context: not alcohol use    Relieved by:  None tried  Worsened by:  Nothing  Ineffective treatments:  None tried  Associated symptoms: chest pain and nausea    Associated symptoms: no chills, no cough, no diarrhea, no dysuria, no fever, no hematuria, no shortness of breath, no sore throat and no vomiting    Risk factors: multiple surgeries    Risk factors: no alcohol abuse        Prior to Admission Medications   Prescriptions Last Dose Informant Patient Reported? Taking?    HYDROcodone-acetaminophen (NORCO) 5-325 mg per tablet   No No   Sig: Take 1 tablet by mouth every 6 (six) hours as needed for pain for up to 20 doses Max Daily Amount: 4 tablets   citalopram (CELEXA) 40 mg tablet  Self Yes No   Sig: Take 40 mg by mouth daily     gabapentin (NEURONTIN) 800 mg tablet   Yes No   Sig: Take 800 mg by mouth 3 (three) times a day   metoprolol tartrate (LOPRESSOR) 50 mg tablet   Yes No   Sig: Take 25 mg by mouth every 12 (twelve) hours   mirtazapine (REMERON) 30 mg tablet   Yes No   Sig: Take 1 tablet by mouth      Facility-Administered Medications: None       Past Medical History:   Diagnosis Date    Anxiety     Bipolar 1 disorder (HCC)     Chronic back pain     Frequent headaches     Glaucoma     Hypertension     Psychiatric disorder     bipolar    Rheumatoid arthritis ([facility] [address] )        Past Surgical History:   Procedure Laterality Date    APPENDECTOMY      CATARACT EXTRACTION Left     CHOLECYSTECTOMY      HYSTERECTOMY      INTRAOCULAR LENS INSERTION      SC TEMPORAL ARTERY LIGATN OR BX Right 12/16/2016    Procedure: BIOPSY ARTERY TEMPORAL;  Surgeon: Patricia Pierson MD;  Location: [facility];  Service: General    TONSILLECTOMY         Family History   Problem Relation Age of Onset    Heart disease Mother      I have reviewed and agree with the history as documented  Social History   Substance Use Topics    Smoking status: Current Every Day Smoker     Packs/day: 0 20     Years: 46 00     Types: Cigarettes    Smokeless tobacco: Never Used    Alcohol use Yes      Comment: occasional        Review of Systems   Constitutional: Negative for chills and fever  HENT: Negative for congestion, ear pain and sore throat  Eyes: Negative for pain and visual disturbance  Respiratory: Negative for cough, shortness of breath and wheezing  Cardiovascular: Positive for chest pain  Negative for leg swelling  Gastrointestinal: Positive for abdominal pain and nausea  Negative for diarrhea and vomiting  Genitourinary: Negative for dysuria, frequency, hematuria and urgency  Musculoskeletal: Negative for neck pain and neck stiffness  Skin: Negative for rash and wound     Neurological: Negative for weakness, numbness and headaches  Psychiatric/Behavioral: Negative for agitation and confusion  All other systems reviewed and are negative  Physical Exam  ED Triage Vitals [04/06/18 1118]   Temperature Pulse Respirations Blood Pressure SpO2   98 1 °F (36 7 °C) 75 22 (!) 191/102 100 %      Temp Source Heart Rate Source Patient Position - Orthostatic VS BP Location FiO2 (%)   Oral Monitor Lying Right arm --      Pain Score       --           Orthostatic Vital Signs  Vitals:    04/06/18 1118 04/06/18 1245 04/06/18 1315   BP: (!) 191/102 152/79 152/79   Pulse: 75 69 70   Patient Position - Orthostatic VS: Lying         Physical Exam   Constitutional: She is oriented to person, place, and time  She appears well-developed and well-nourished  She appears distressed  HENT:   Head: Normocephalic and atraumatic  Mouth/Throat: Oropharynx is clear and moist    Eyes: EOM are normal  Pupils are equal, round, and reactive to light  Neck: Normal range of motion  Neck supple  Cardiovascular: Normal rate and regular rhythm  Pulmonary/Chest: Effort normal and breath sounds normal  No respiratory distress  She has no wheezes  She has no rales  She exhibits no tenderness  Abdominal: Soft  Bowel sounds are normal  She exhibits no distension  There is tenderness in the right upper quadrant and epigastric area  There is no rigidity, no rebound, no guarding, no CVA tenderness, no tenderness at McBurney's point and negative Arnold's sign  Musculoskeletal: Normal range of motion  Neurological: She is alert and oriented to person, place, and time  No focal deficits   Skin: Skin is warm and dry  She is not diaphoretic  Nursing note and vitals reviewed        ED Medications  Medications   sodium chloride 0 9 % bolus 1,000 mL (0 mL Intravenous Stopped 4/6/18 1449)   ondansetron (ZOFRAN) injection 4 mg (4 mg Intravenous Given 4/6/18 1143)   aluminum-magnesium hydroxide-simethicone (MYLANTA) 200-200-20 mg/5 mL oral suspension 30 mL (30 mL Oral Given 4/6/18 1137)   lidocaine viscous (XYLOCAINE) 2 % mucosal solution 15 mL (15 mL Swish & Swallow Given 4/6/18 1137)   pantoprazole (PROTONIX) injection 40 mg (40 mg Intravenous Given 4/6/18 1143)   iohexol (OMNIPAQUE) 350 MG/ML injection (MULTI-DOSE) 100 mL (100 mL Intravenous Given 4/6/18 1252)       Diagnostic Studies  Results Reviewed     Procedure Component Value Units Date/Time    POCT urinalysis dipstick [[id number]]  (Abnormal) Resulted:  04/06/18 1230    Lab Status:  Final result Specimen:  Urine Updated:  04/06/18 1231     Color, UA Yellow     Clarity, UA Clear     EXT Glucose, UA (Ref: Negative) Negative     EXT Bilirubin, UA (Ref: Negative) Negative     EXT Ketones, UA (Ref: Negative) Negative     EXT Spec Grav, UA 1 050     EXT Blood, UA (Ref: Negative) Hemolyzed Trace     EXT pH, UA 5 0     EXT Protein, UA (Ref: Negative) Negative     EXT Urobilinogen, UA (Ref: 0 2- 1 0) 0 2     EXT Leukocytes, UA (Ref: Negative) Negative     EXT Nitrite, UA (Ref: Negative) Negative    Troponin I [[id number]]  (Normal) Collected:  04/06/18 1143    Lab Status:  Final result Specimen:  Blood from Arm, Left Updated:  04/06/18 1211     Troponin I <0 02 ng/mL     Narrative:         [employer] Chemistry analyzer 99% cutoff is > 0 04 ng/mL in network labs    o cTnI 99% cutoff is useful only when applied to patients in the clinical setting of myocardial ischemia  o cTnI 99% cutoff should be interpreted in the context of clinical history, ECG findings and possibly cardiac imaging to establish correct diagnosis  o cTnI 99% cutoff may be suggestive but clearly not indicative of a coronary event without the clinical setting of myocardial ischemia      Comprehensive metabolic panel [[id number]]  (Abnormal) Collected:  04/06/18 1143    Lab Status:  Final result Specimen:  Blood from Arm, Left Updated:  04/06/18 1208     Sodium 140 mmol/L      Potassium 4 1 mmol/L      Chloride 105 mmol/L      CO2 24 mmol/L      Anion Gap 11 mmol/L BUN 15 mg/dL      Creatinine 0 60 mg/dL      Glucose 89 mg/dL      Calcium 9 1 mg/dL      AST 24 U/L      ALT 51 U/L      Alkaline Phosphatase 140 (H) U/L      Total Protein 7 0 g/dL      Albumin 3 4 (L) g/dL      Total Bilirubin 0 30 mg/dL      eGFR 100 ml/min/1 73sq m     Narrative:         National Kidney Disease Education Program recommendations are as follows:  GFR calculation is accurate only with a steady state creatinine  Chronic Kidney disease less than 60 ml/min/1 73 sq  meters  Kidney failure less than 15 ml/min/1 73 sq  meters  Lipase [[id number]]  (Normal) Collected:  04/06/18 1143    Lab Status:  Final result Specimen:  Blood from Arm, Left Updated:  04/06/18 1208     Lipase 89 u/L     CBC and differential [[id number]]  (Normal) Collected:  04/06/18 1143    Lab Status:  Final result Specimen:  Blood from Arm, Left Updated:  04/06/18 1152     WBC 5 44 Thousand/uL      RBC 4 55 Million/uL      Hemoglobin 13 6 g/dL      Hematocrit 40 3 %      MCV 89 fL      MCH 29 9 pg      MCHC 33 7 g/dL      RDW 13 2 %      MPV 9 6 fL      Platelets 821 Thousands/uL      nRBC 0 /100 WBCs      Neutrophils Relative 54 %      Lymphocytes Relative 33 %      Monocytes Relative 6 %      Eosinophils Relative 6 %      Basophils Relative 1 %      Neutrophils Absolute 2 92 Thousands/µL      Lymphocytes Absolute 1 81 Thousands/µL      Monocytes Absolute 0 35 Thousand/µL      Eosinophils Absolute 0 32 Thousand/µL      Basophils Absolute 0 03 Thousands/µL                  CT abdomen pelvis with contrast   Final Result by Dell Duron MD (04/06 1989)      No acute abnormality within the abdomen or pelvis              Workstation performed: [id number]                    Procedures  ECG 12 Lead Documentation  Date/Time: 4/6/2018 11:38 AM  Performed by: Vida Oden  Authorized by: Vida Oden     Indications / Diagnosis:  Abd pain that radiates to chest   Patient location:  ED  Previous ECG:     Previous ECG: Compared to current    Comparison ECG info:  3/25/18    Similarity:  No change  Interpretation:     Interpretation: normal    Rate:     ECG rate:  72    ECG rate assessment: normal    Rhythm:     Rhythm: sinus rhythm    Ectopy:     Ectopy: none    QRS:     QRS axis:  Normal    QRS intervals:  Normal  ST segments:     ST segments:  Normal  T waves:     T waves: normal             Phone Contacts  ED Phone Contact    ED Course  ED Course as of Apr 14 1641 Fri Apr 06, 2018   1440 Patient re-evaluated  Made aware of normal workup including CT scan  Patient continuously requesting narcotics for pain management- I explained at length that she does not have any acute pathology that warrants narcotics and that this would be the wrong management for what she likely has- which is gastritis  Patient's records were reviewed at length and she has been here multiple times for the same thing, including 1 week ago- at which time she had a dissection study, which was negative for acute pathology and showed gastritis  I offered patient more non-narcotic pain medications for her symptoms but she does not want this  I told her that she needs to follow up with a GI doctor for further workup and management  Patient was still not happy about this  I explained that I needed to repeat a trop and EKG to complete her workup but she does not want to stay and states that she wants to sign out AMA              HEART Risk Score    Flowsheet Row Most Recent Value   History  0 Filed at: 04/06/2018 1155   ECG  0 Filed at: 04/06/2018 1155   Age  1 Filed at: 04/06/2018 1155   Risk Factors  1 Filed at: 04/06/2018 1155   Troponin  0 Filed at: 04/06/2018 1155   Heart Score Risk Calculator   History  0 Filed at: 04/06/2018 1155   ECG  0 Filed at: 04/06/2018 1155   Age  1 Filed at: 04/06/2018 1155   Risk Factors  1 Filed at: 04/06/2018 1155   Troponin  0 Filed at: 04/06/2018 1155   HEART Score  2 Filed at: 04/06/2018 1155   HEART Score  2 Filed at: 04/06/2018 1155                            [facility]  Number of Diagnoses or Management Options  Abdominal pain: new and requires workup  Chest pain: new and requires workup  Diagnosis management comments: Patient with ruq abd pain that radiates to chest and back- has been here multiple times in the past including 1 week ago at which time she had a dissection study, which was negative and showed gastritis  Symptoms similar today  Will get labs, delta EKG/trop to check for ischemic changes, and ct abd/p to r/o acute intraabdominal pathology  Will give gi cocktail, protonix, and zofran for symptom relief  Patient re-evaluated  Made aware of normal workup including CT scan  Patient continuously requesting narcotics for pain management- I explained at length that she does not have any acute pathology that warrants narcotics and that this would be the wrong management for what she likely has- which is gastritis  Patient's records were reviewed at length and she has been here multiple times for the same thing, including 1 week ago- at which time she had a dissection study, which was negative for acute pathology and showed gastritis  I offered patient more non-narcotic pain medications for her symptoms but she does not want this  I told her that she needs to follow up with a GI doctor for further workup and management  Patient was still not happy about this  I explained that I needed to repeat a trop and EKG to complete her workup to r/o cardiac etiology but she does not want to stay and states that she wants to sign out AMA  The patient is clinically sober and appears free from distracting injury  The patient appears to have intact insight, judgment, and reason  In my opinion, this patient has the capacity to make decisions  Patient left emergency department in no acute distress  Instructed to return if symptoms worsen or if she changes her mind on completing workup              Amount and/or Complexity of Data Reviewed  Clinical lab tests: ordered and reviewed  Tests in the radiology section of CPT®: ordered and reviewed  Tests in the medicine section of CPT®: ordered and reviewed  Discussion of test results with the performing providers: yes  Decide to obtain previous medical records or to obtain history from someone other than the patient: yes  Obtain history from someone other than the patient: yes  Review and summarize past medical records: yes  Discuss the patient with other providers: yes  Independent visualization of images, tracings, or specimens: yes    Patient Progress  Patient progress: improved    CritCare Time    Disposition  Final diagnoses:   Abdominal pain   Chest pain     Time reflects when diagnosis was documented in both MDM as applicable and the Disposition within this note     Time User Action Codes Description Comment    4/6/2018  2:28 PM Saleem Mkceon Add [R10 9] Abdominal pain     4/6/2018  2:28 PM Kamila Kidd [place] Add [R07 9] Chest pain       ED Disposition     ED Disposition Condition Comment    AMA  Date: 4/6/2018  Patient: Eli Sharp  Admitted: 4/6/2018 11:15 AM  Attending Provider: Ingrid Gibson DO    Eli Sharp or her authorized caregiver has made the decision for the patient to leave the emergency department against the advi ce of her attending physician  She or her authorized caregiver has been informed and understands the inherent risks, including death  She or her authorized caregiver has decided to accept the responsibility for this decision  Eli Sharp and all [facility] parties have been advised that she may return for further evaluation or treatment  Her condition at time of discharge was stable    Eli Sharp had current vital signs as follows:  /79   Pulse 70   Temp 98 1 °F (36 7 °C) (Oral)   R sony 20   LMP  (LMP Unknown)         Follow-up Information     Follow up With Specialties Details Why Contact Info Additional Information    Alicja Steven MD Internal Medicine Call in 1 day For follow-up within 2-3 days  [address] [facility] [address] [place] Daniella GILLESPIE MD Gastroenterology Call For follow-up as soon as possible  [address] [facility]       [address] [facility] Emergency Department Emergency Medicine Go to Immediately for any new or worsening symptoms  [address] [facility] [address] ED, [address] [facility], [facility], [address] [facility], [address]        Discharge Medication List as of 4/6/2018  2:34 PM      CONTINUE these medications which have NOT CHANGED    Details   citalopram (CELEXA) 40 mg tablet Take 40 mg by mouth daily  , Until Discontinued, Historical Med      gabapentin (NEURONTIN) 800 mg tablet Take 800 mg by mouth 3 (three) times a day, Until Discontinued, Historical Med      HYDROcodone-acetaminophen ([facility]) 5-325 mg per tablet Take 1 tablet by mouth every 6 (six) hours as needed for pain for up to 20 doses Max Daily Amount: 4 tablets, Starting Tue 11/21/2017, Print      metoprolol tartrate (LOPRESSOR) 50 mg tablet Take 25 mg by mouth every 12 (twelve) hours, Historical Med      mirtazapine (REMERON) 30 mg tablet Take 1 tablet by mouth, Historical Med           No discharge procedures on file      ED Provider  Electronically Signed by           Woo Ludwig DO  04/14/18 4333

## 2018-04-06 NOTE — ED NOTES
Patient is noted to be comfortable until a staff member walks into the room to assess her  Upon a staff member being in the room, patient starts to scream and hold her stomach in the bed  Pt demanded to leave AMA due to not receiving narcotics  Pt removed her own IV and stated "my mom was a nurse"  During this episode the patient was not screaming and able to stand fully erect with no pain, which she was unable to do previously  Pt then signed AMA paperwork completely asymptomatic, and told the nurses to have a good day as she exited the ED with ease       Jomar Amador, OLGA  04/06/18 1247

## 2018-04-06 NOTE — DISCHARGE INSTRUCTIONS
Abdominal Pain   WHAT YOU NEED TO KNOW:   Abdominal pain can be dull, achy, or sharp  You may have pain in one area of your abdomen, or in your entire abdomen  Your pain may be caused by a condition such as constipation, food sensitivity or poisoning, infection, or a blockage  Abdominal pain can also be from a hernia, appendicitis, or an ulcer  Liver, gallbladder, or kidney conditions can also cause abdominal pain  The cause of your abdominal pain may be unknown  DISCHARGE INSTRUCTIONS:   Return to the emergency department if:   · You have new chest pain or shortness of breath  · You have pulsing pain in your upper abdomen or lower back that suddenly becomes constant  · Your pain is in the right lower abdominal area and worsens with movement  · You have a fever over 100 4°F (38°C) or shaking chills  · You are vomiting and cannot keep food or liquids down  · Your pain does not improve or gets worse over the next 8 to 12 hours  · You see blood in your vomit or bowel movements, or they look black and tarry  · Your skin or the whites of your eyes turn yellow  · You are a woman and have a large amount of vaginal bleeding that is not your monthly period  Contact your healthcare provider if:   · You have pain in your lower back  · You are a man and have pain in your testicles  · You have pain when you urinate  · You have questions or concerns about your condition or care  Follow up with your healthcare provider within 24 hours or as directed:  Write down your questions so you remember to ask them during your visits  Medicines:   · Medicines  may be given to calm your stomach and prevent vomiting or to decrease pain  Ask how to take pain medicine safely  · Take your medicine as directed  Contact your healthcare provider if you think your medicine is not helping or if you have side effects  Tell him of her if you are allergic to any medicine   Keep a list of the medicines, vitamins, and herbs you take  Include the amounts, and when and why you take them  Bring the list or the pill bottles to follow-up visits  Carry your medicine list with you in case of an emergency  © 2017 [address] Information is for End User's use only and may not be sold, redistributed or otherwise used for commercial purposes  All illustrations and images included in CareNotes® are the copyrighted property of A D [employer] M [employer]  or Sage [employer]  The above information is an  only  It is not intended as medical advice for individual conditions or treatments  Talk to your doctor, nurse or pharmacist before following any medical regimen to see if it is safe and effective for you  Chest Pain   WHAT YOU NEED TO KNOW:   Chest pain can be caused by a range of conditions, from not serious to life-threatening  Chest pain can be a symptom of a digestive problem, such as acid reflux or a stomach ulcer  An anxiety attack or a strong emotion, such as anger, can also cause chest pain  Infection, inflammation, or a fracture in the bones or cartilage in your chest can cause pain or discomfort  Sometimes chest pain or pressure is caused by poor blood flow to your heart (angina)  Chest pain may also be caused by life-threatening conditions such as a heart attack or blood clot in your lungs  DISCHARGE INSTRUCTIONS:   Call 911 if:   · You have any of the following signs of a heart attack:      ¨ Squeezing, pressure, or pain in your chest that lasts longer than 5 minutes or returns    ¨ Discomfort or pain in your back, neck, jaw, stomach, or arm     ¨ Trouble breathing    ¨ Nausea or vomiting    ¨ Lightheadedness or a sudden cold sweat, especially with chest pain or trouble breathing    Return to the emergency department if:   · You have chest discomfort that gets worse, even with medicine  · You cough or vomit blood  · Your bowel movements are black or bloody       · You cannot stop vomiting, or it hurts to swallow  Contact your healthcare provider if:   · You have questions or concerns about your condition or care  Medicines:   · Medicines  may be given to treat the cause of your chest pain  Examples include pain medicine, anxiety medicine, or medicines to increase blood flow to your heart  · Do not take certain medicines without asking your healthcare provider first   These include NSAIDs, herbal or vitamin supplements, or hormones (estrogen or progestin)  · Take your medicine as directed  Contact your healthcare provider if you think your medicine is not helping or if you have side effects  Tell him or her if you are allergic to any medicine  Keep a list of the medicines, vitamins, and herbs you take  Include the amounts, and when and why you take them  Bring the list or the pill bottles to follow-up visits  Carry your medicine list with you in case of an emergency  Follow up with your healthcare provider within 72 hours, or as directed: You may need to return for more tests to find the cause of your chest pain  You may be referred to a specialist, such as a cardiologist or gastroenterologist  Write down your questions so you remember to ask them during your visits  Healthy living tips: The following are general healthy guidelines  If your chest pain is caused by a heart problem, your healthcare provider will give you specific guidelines to follow  · Do not smoke  Nicotine and other chemicals in cigarettes and cigars can cause lung and heart damage  Ask your healthcare provider for information if you currently smoke and need help to quit  E-cigarettes or smokeless tobacco still contain nicotine  Talk to your healthcare provider before you use these products  · Eat a variety of healthy, low-fat foods  Healthy foods include fruits, vegetables, whole-grain breads, low-fat dairy products, beans, lean meats, and fish   Ask for more information about a heart healthy diet  · Ask about activity  Your healthcare provider will tell you which activities to limit or avoid  Ask when you can drive, return to work, and have sex  Ask about the best exercise plan for you  · Maintain a healthy weight  Ask your healthcare provider how much you should weigh  Ask him or her to help you create a weight loss plan if you are overweight  · Get the flu and pneumonia vaccines  All adults should get the influenza (flu) vaccine  Get it every year as soon as it becomes available  The pneumococcal vaccine is given to adults aged 72 years or older  The vaccine is given every 5 years to prevent pneumococcal disease, such as pneumonia  © 2017 [address] Information is for End User's use only and may not be sold, redistributed or otherwise used for commercial purposes  All illustrations and images included in CareNotes® are the copyrighted property of A [employer] M [employer]  or Sage Tate  The above information is an  only  It is not intended as medical advice for individual conditions or treatments  Talk to your doctor, nurse or pharmacist before following any medical regimen to see if it is safe and effective for you  Gastritis   WHAT YOU NEED TO KNOW:   Gastritis is inflammation or irritation of the lining of your stomach  DISCHARGE INSTRUCTIONS:   Call 911 for any of the following:   · You develop chest pain or shortness of breath  Return to the emergency department if:   · You vomit blood  · You have black or bloody bowel movements  · You have severe stomach or back pain  Contact your healthcare provider if:   · You have a fever  · You have new or worsening symptoms, even after treatment  · You have questions or concerns about your condition or care  Medicines:   · Medicines  may be given to help treat a bacterial infection or decrease stomach acid  · Take your medicine as directed    Contact your healthcare provider if you think your medicine is not helping or if you have side effects  Tell him or her if you are allergic to any medicine  Keep a list of the medicines, vitamins, and herbs you take  Include the amounts, and when and why you take them  Bring the list or the pill bottles to follow-up visits  Carry your medicine list with you in case of an emergency  Manage or prevent gastritis:   · Do not smoke  Nicotine and other chemicals in cigarettes and cigars can make your symptoms worse and cause lung damage  Ask your healthcare provider for information if you currently smoke and need help to quit  E-cigarettes or smokeless tobacco still contain nicotine  Talk to your healthcare provider before you use these products  · Do not drink alcohol  Alcohol can prevent healing and make your gastritis worse  Talk to your healthcare provider if you need help to stop drinking  · Do not take NSAIDs or aspirin unless directed  These and similar medicines can cause irritation  If your healthcare provider says it is okay to take NSAIDs, take them with food  · Do not eat foods that cause irritation  Foods such as oranges and salsa can cause burning or pain  Eat a variety of healthy foods  Examples include fruits (not citrus), vegetables, low-fat dairy products, beans, whole-grain breads, and lean meats and fish  Try to eat small meals, and drink water with your meals  Do not eat for at least 3 hours before you go to bed  · Find ways to relax and decrease stress  Stress can increase stomach acid and make gastritis worse  Activities such as yoga, meditation, or listening to music can help you relax  Spend time with friends, or do things you enjoy  Follow up with your healthcare provider as directed: You may need ongoing tests or treatment, or referral to a gastroenterologist  Write down your questions so you remember to ask them during your visits    © 2017 [employer] Information is for End User's use only and may not be sold, redistributed or otherwise used for commercial purposes  All illustrations and images included in CareNotes® are the copyrighted property of A [employer] M [employer] Inc  or [employer]age [employer]  The above information is an  only  It is not intended as medical advice for individual conditions or treatments  Talk to your doctor, nurse or pharmacist before following any medical regimen to see if it is safe and effective for you

## 2018-04-21 ENCOUNTER — HOSPITAL ENCOUNTER (EMERGENCY)
Facility: HOSPITAL | Age: 58
Discharge: HOME | End: 2018-04-21
Attending: EMERGENCY MEDICINE | Admitting: EMERGENCY MEDICINE
Payer: COMMERCIAL

## 2018-04-21 VITALS
TEMPERATURE: 97.8 F | OXYGEN SATURATION: 97 % | RESPIRATION RATE: 14 BRPM | BODY MASS INDEX: 28.11 KG/M2 | WEIGHT: 158.7 LBS | HEART RATE: 79 BPM | SYSTOLIC BLOOD PRESSURE: 147 MMHG | DIASTOLIC BLOOD PRESSURE: 87 MMHG

## 2018-04-21 DIAGNOSIS — G89.29 ACUTE EXACERBATION OF CHRONIC LOW BACK PAIN: Primary | ICD-10-CM

## 2018-04-21 DIAGNOSIS — M54.50 ACUTE EXACERBATION OF CHRONIC LOW BACK PAIN: Primary | ICD-10-CM

## 2018-04-21 LAB
ATRIAL RATE: 80 BPM
P AXIS: 83 DEGREES
PR INTERVAL: 98 MS
QRS AXIS: 76 DEGREES
QRSD INTERVAL: 76 MS
QT INTERVAL: 402 MS
QTC INTERVAL: 463 MS
T WAVE AXIS: 62 DEGREES
VENTRICULAR RATE: 80 BPM

## 2018-04-21 PROCEDURE — 93010 ELECTROCARDIOGRAM REPORT: CPT | Performed by: INTERNAL MEDICINE

## 2018-04-21 PROCEDURE — 93005 ELECTROCARDIOGRAM TRACING: CPT

## 2018-04-21 PROCEDURE — 99284 EMERGENCY DEPT VISIT MOD MDM: CPT

## 2018-04-21 RX ORDER — CYCLOBENZAPRINE HCL 10 MG
10 TABLET ORAL 3 TIMES DAILY PRN
Qty: 21 TABLET | Refills: 0 | Status: SHIPPED | OUTPATIENT
Start: 2018-04-21 | End: 2018-06-05

## 2018-04-21 RX ORDER — CYCLOBENZAPRINE HCL 10 MG
10 TABLET ORAL ONCE
Status: COMPLETED | OUTPATIENT
Start: 2018-04-21 | End: 2018-04-21

## 2018-04-21 RX ORDER — LIDOCAINE 50 MG/G
1 PATCH TOPICAL DAILY PRN
Qty: 30 PATCH | Refills: 0 | Status: SHIPPED | OUTPATIENT
Start: 2018-04-21 | End: 2018-06-05

## 2018-04-21 RX ORDER — LIDOCAINE 50 MG/G
1 PATCH TOPICAL ONCE
Status: DISCONTINUED | OUTPATIENT
Start: 2018-04-21 | End: 2018-04-21 | Stop reason: HOSPADM

## 2018-04-21 RX ORDER — IBUPROFEN 600 MG/1
600 TABLET ORAL ONCE
Status: COMPLETED | OUTPATIENT
Start: 2018-04-21 | End: 2018-04-21

## 2018-04-21 RX ORDER — ACETAMINOPHEN 325 MG/1
650 TABLET ORAL ONCE
Status: COMPLETED | OUTPATIENT
Start: 2018-04-21 | End: 2018-04-21

## 2018-04-21 RX ADMIN — IBUPROFEN 600 MG: 600 TABLET ORAL at 17:02

## 2018-04-21 RX ADMIN — LIDOCAINE 1 PATCH: 50 PATCH TOPICAL at 17:02

## 2018-04-21 RX ADMIN — ACETAMINOPHEN 650 MG: 325 TABLET, FILM COATED ORAL at 17:02

## 2018-04-21 RX ADMIN — CYCLOBENZAPRINE HYDROCHLORIDE 10 MG: 10 TABLET, FILM COATED ORAL at 17:02

## 2018-04-21 NOTE — ED PROVIDER NOTES
History  Chief Complaint   Patient presents with    Back Pain     pt reports cooking dinner and "twisting wrong" pt reports not able to move due to the pain     HPI    Prior to Admission Medications   Prescriptions Last Dose Informant Patient Reported? Taking? HYDROcodone-acetaminophen (NORCO) 5-325 mg per tablet   No No   Sig: Take 1 tablet by mouth every 6 (six) hours as needed for pain for up to 20 doses Max Daily Amount: 4 tablets   citalopram (CELEXA) 40 mg tablet  Self Yes No   Sig: Take 40 mg by mouth daily     gabapentin (NEURONTIN) 800 mg tablet   Yes No   Sig: Take 800 mg by mouth 3 (three) times a day   metoprolol tartrate (LOPRESSOR) 50 mg tablet   Yes No   Sig: Take 25 mg by mouth every 12 (twelve) hours   mirtazapine (REMERON) 30 mg tablet   Yes No   Sig: Take 1 tablet by mouth      Facility-Administered Medications: None       Past Medical History:   Diagnosis Date    Anxiety     Bipolar 1 disorder (HCC)     Chronic back pain     Frequent headaches     Glaucoma     Hypertension     Psychiatric disorder     bipolar    Rheumatoid arthritis ([facility] [address] )        Past Surgical History:   Procedure Laterality Date    APPENDECTOMY      CATARACT EXTRACTION Left     CHOLECYSTECTOMY      HYSTERECTOMY      INTRAOCULAR LENS INSERTION      WI TEMPORAL ARTERY LIGATN OR BX Right 12/16/2016    Procedure: BIOPSY ARTERY TEMPORAL;  Surgeon: Rachna Hidalgo MD;  Location: [facility];  Service: General    TONSILLECTOMY         Family History   Problem Relation Age of Onset    Heart disease Mother      I have reviewed and agree with the history as documented      Social History   Substance Use Topics    Smoking status: Current Every Day Smoker     Packs/day: 0 20     Years: 46 00     Types: Cigarettes    Smokeless tobacco: Never Used    Alcohol use Yes      Comment: occasional        Review of Systems    Physical Exam  ED Triage Vitals [04/21/18 1613]   Temperature Pulse Respirations Blood Pressure SpO2 97 8 °F (36 6 °C) 79 14 147/87 97 %      Temp src Heart Rate Source Patient Position - Orthostatic VS BP Location FiO2 (%)   -- Monitor Lying Right arm --      Pain Score       Worst Possible Pain           Orthostatic Vital Signs  Vitals:    04/21/18 1613   BP: 147/87   Pulse: 79   Patient Position - Orthostatic VS: Lying       Physical Exam   Constitutional: She is oriented to person, place, and time  She appears well-developed and well-nourished  No distress  HENT:   Head: Normocephalic and atraumatic  Eyes: Conjunctivae are normal  Pupils are equal, round, and reactive to light  Neck: Normal range of motion  No tracheal deviation present  Cardiovascular: Normal rate, regular rhythm and intact distal pulses  Pulmonary/Chest: Effort normal  No respiratory distress  Abdominal: Soft  She exhibits no distension  There is no tenderness  Musculoskeletal:        Left hip: She exhibits decreased range of motion (mild, causes worsening pain in low back) and tenderness  She exhibits normal strength  Lumbar back: She exhibits decreased range of motion (due to pain), tenderness and spasm  She exhibits no bony tenderness  Back:         Legs:  Neurovascularly intact distally   Neurological: She is alert and oriented to person, place, and time  GCS eye subscore is 4  GCS verbal subscore is 5  GCS motor subscore is 6  Reflex Scores:       Patellar reflexes are 1+ on the right side and 1+ on the left side  No saddle anesthesia  Normal strength and sensation bilateral lower legs   Skin: Skin is warm and dry  Psychiatric: She has a normal mood and affect  Her behavior is normal    Nursing note and vitals reviewed        ED Medications  Medications   lidocaine (LIDODERM) 5 % patch 1 patch (1 patch Transdermal Medication Applied 4/21/18 1702)   cyclobenzaprine (FLEXERIL) tablet 10 mg (10 mg Oral Given 4/21/18 1702)   ibuprofen (MOTRIN) tablet 600 mg (600 mg Oral Given 4/21/18 1702)   acetaminophen (TYLENOL) tablet 650 mg (650 mg Oral Given 4/21/18 6845)       Diagnostic Studies  Results Reviewed     None                 No orders to display              Procedures  Procedures       Phone Contacts  ED Phone Contact    ED Course  ED Course                                MDM  Number of Diagnoses or Management Options  Acute exacerbation of chronic low back pain: new and does not require workup  Diagnosis management comments: This is a 61-year-old female who presents here today with low back and left hip pain  She states last night she was taking a roast out of the oven when she thinks she twisted wrong and began having severe pain in her lower back  She states it is a sharp pain that occasionally radiates through her hip into her upper thigh  She states that sometimes she gets tingling only in her foot, but denies it anywhere else in her leg  She denies any actual weakness  She denies any bowel or bladder incontinence, abdominal pain, changes in her urine, falls or blunt trauma to her back  She has had extensive history of chronic low back pain  She states she is not currently taking anything for this, and slide pain management doctor who ordered an MRI, which she was not able to get because of insurance issues  She has not followed up with this pain management doctor in a while  She says she use heat last night and took 600 milligrams of ibuprofen today, and has not taken or done anything else for the pain  She is still having significant pain with moving around  ROS: Otherwise negative, unless stated as above  She is well-appearing, in no acute distress, but does appear uncomfortable  She is also quite anxious  He does have tenderness diffusely to her left lower back  She endorses pain with movement of the left leg is makes her lower back hurt, has no weakness and numbness  She is neurovascularly intact distally    This is her 22nd visit to this ER alone for various pain related complaints since this hospital opened in October of 2016  She has also had visits to [facility] as well according to EMS  She has exhibited drug-seeking behaviors previously  She is not having any symptoms currently suggestive of cauda equina syndrome, spinal epidural abscess or hematoma, or other acute spinal compressive syndrome  This is most likely exacerbation of chronic low back pain verses possible drug-seeking behavior  I do not feel that narcotics are appropriate treatment option for her current pain  As there is no blunt trauma I do not feel that she needs any imaging or other workup at this point in time  We will treat her pain here  I discussed with her treatment at home, follow-up, and indications for return, and she expresses understanding with this plan  Amount and/or Complexity of Data Reviewed  Decide to obtain previous medical records or to obtain history from someone other than the patient: yes  Review and summarize past medical records: yes      CritCare Time    Disposition  Final diagnoses:   Acute exacerbation of chronic low back pain     Time reflects when diagnosis was documented in both MDM as applicable and the Disposition within this note     Time User Action Codes Description Comment    4/21/2018  5:27 PM Carlos Livingston Add [M54 5,  G89 29] Acute exacerbation of chronic low back pain       ED Disposition     ED Disposition Condition Comment    Discharge  Fede Negrete discharge to home/self care      Condition at discharge: Good        Follow-up Information     Follow up With Specialties Details Why Contact Info    Tawanda Jarquin MD Internal Medicine Schedule an appointment as soon as possible for a visit to follow up on your pain [address]  [phone]      your pain doctor  Schedule an appointment as soon as possible for a visit to follow up on your pain         Patient's Medications   Discharge Prescriptions CYCLOBENZAPRINE (FLEXERIL) 10 MG TABLET    Take 1 tablet (10 mg total) by mouth 3 (three) times a day as needed for muscle spasms (back/hip pain)       Start Date: 4/21/2018 End Date: --       Order Dose: 10 mg       Quantity: 21 tablet    Refills: 0    LIDOCAINE (LIDODERM) 5 %    Place 1 patch on the skin daily as needed (back pain) Remove & Discard patch within 12 hours       Start Date: 4/21/2018 End Date: --       Order Dose: 1 patch       Quantity: 30 patch    Refills: 0     No discharge procedures on file      ED Provider  Electronically Signed by           Irene Ham MD  04/21/18 Evgeny White

## 2018-04-21 NOTE — DISCHARGE INSTRUCTIONS
Take ibuprofen (Motrin, Advil) or acetaminophen (Tylenol) as needed for pain, as per the instructions  Use ice or heat as it helps  Use topical muscle rubs, such as Jhony-Murphy, or icy Hot to the area, to help with the pain  Take the prescribed medications as needed  Follow up with your primary care doctor and pain management specialist for further evaluation of your symptoms  Acute Low Back Pain, Ambulatory Care   GENERAL INFORMATION:   Acute low back pain  is discomfort in your lower back area that lasts for less than 12 weeks  The word acute is used to describe pain that starts suddenly, worsens quickly, and lasts for a short time  Common symptoms include the following:   · Back stiffness or spasms    · Pain down the back or side of one leg    · Holding yourself in an unusual position or posture to decrease your back pain    · Not being able to find a sitting position that is comfortable    · Slow increase in your pain for 24 to 48 hours after you stress your back    · Tenderness on your lower back or severe pain when you move your back  Seek immediate care for the following symptoms:   · Severe pain    · Sudden stiffness and heaviness in both buttocks down to both legs    · Numbness or weakness in one leg, or pain in both legs    · Numbness in your genital area or across your lower back    · Unable to control your urine or bowel movements  Treatment for acute low back pain  may include any of the following:  · Medicines:      ¨ NSAIDs  help decrease swelling and pain or fever  This medicine is available with or without a doctor's order  NSAIDs can cause stomach bleeding or kidney problems in certain people  If you take blood thinner medicine, always ask your healthcare provider if NSAIDs are safe for you  Always read the medicine label and follow directions  ¨ Muscle relaxers  help decrease muscle spasms pain  ¨ Prescription pain medicine  may be given   Ask how to take this medicine safely  · Surgery  may be needed if your pain is severe and other treatments do not work  Surgery may be needed for conditions of the lumbar spine, such as herniated disc or spinal stenosis  Manage your symptoms:   · Sleep on a firm mattress  If you do not have a firm mattress, have someone move your mattress to the floor for a few days  A piece of plywood under your mattress can also help make it firmer  · Apply ice  on your lower back for 15 to 20 minutes every hour or as directed  Use an ice pack, or put crushed ice in a plastic bag  Cover it with a towel  Ice helps prevent tissue damage and decreases swelling and pain  You can alternate ice and heat  · Apply heat  on your lower back for 20 to 30 minutes every 2 hours for as many days as directed  Heat helps decrease pain and muscle spasms  · Go to physical therapy  A physical therapist teaches you exercises to help improve movement and strength, and to decrease pain  Prevent acute low back pain:   · Use proper body mechanics  ¨ Bend at the hips and knees when you  objects  Do not bend from the waist  Use your leg muscles as you lift the load  Do not use your back  Keep the object close to your chest as you lift it  Try not to twist or lift anything above your waist     ¨ Change your position often when you stand for long periods of time  Rest one foot on a small box or footrest, and then switch to the other foot often  ¨ Try not to sit for long periods of time  When you do, sit in a straight-backed chair with your feet flat on the floor  Never reach, pull, or push while you are sitting  · Exercise regularly  Warm up before you exercise  Do exercises that strengthen your back muscles  Ask about the best exercise plan for you  · Maintain a healthy weight  Ask your healthcare provider how much you should weigh  Ask him to help you create a weight loss plan if you are overweight    Follow up with your healthcare provider as directed:  Return for a follow-up visit if you still have pain after 1 to 3 weeks of treatment  You may need to visit an orthopedist if your back pain lasts more than 6 to 12 weeks  Write down your questions so you remember to ask them during your visits  CARE AGREEMENT:   You have the right to help plan your care  Learn about your health condition and how it may be treated  Discuss treatment options with your caregivers to decide what care you want to receive  You always have the right to refuse treatment  The above information is an  only  It is not intended as medical advice for individual conditions or treatments  Talk to your doctor, nurse or pharmacist before following any medical regimen to see if it is safe and effective for you  © 2014 [address] is for End User's use only and may not be sold, redistributed or otherwise used for commercial purposes  All illustrations and images included in CareNotes® are the copyrighted property of [employer]  or [employer]age Tate

## 2018-05-10 ENCOUNTER — TRANSITIONAL CARE MANAGEMENT (OUTPATIENT)
Dept: INTERNAL MEDICINE CLINIC | Facility: CLINIC | Age: 58
End: 2018-05-10

## 2018-05-18 ENCOUNTER — OFFICE VISIT (OUTPATIENT)
Dept: INTERNAL MEDICINE CLINIC | Facility: CLINIC | Age: 58
End: 2018-05-18
Payer: COMMERCIAL

## 2018-05-18 VITALS
DIASTOLIC BLOOD PRESSURE: 88 MMHG | HEART RATE: 90 BPM | BODY MASS INDEX: 26.36 KG/M2 | SYSTOLIC BLOOD PRESSURE: 140 MMHG | TEMPERATURE: 98.1 F | OXYGEN SATURATION: 99 % | WEIGHT: 148.8 LBS

## 2018-05-18 DIAGNOSIS — E78.5 HYPERLIPIDEMIA, UNSPECIFIED HYPERLIPIDEMIA TYPE: ICD-10-CM

## 2018-05-18 DIAGNOSIS — F41.9 ANXIETY: ICD-10-CM

## 2018-05-18 DIAGNOSIS — M25.552 HIP PAIN, ACUTE, LEFT: Primary | ICD-10-CM

## 2018-05-18 DIAGNOSIS — I10 ESSENTIAL HYPERTENSION: ICD-10-CM

## 2018-05-18 PROCEDURE — 99495 TRANSJ CARE MGMT MOD F2F 14D: CPT | Performed by: PHYSICIAN ASSISTANT

## 2018-05-18 RX ORDER — ATORVASTATIN CALCIUM 10 MG/1
10 TABLET, FILM COATED ORAL
COMMUNITY
Start: 2018-01-27 | End: 2018-05-18 | Stop reason: SDUPTHER

## 2018-05-18 RX ORDER — ATORVASTATIN CALCIUM 10 MG/1
10 TABLET, FILM COATED ORAL DAILY
Qty: 90 TABLET | Refills: 3 | Status: SHIPPED | OUTPATIENT
Start: 2018-05-18 | End: 2018-06-14 | Stop reason: SDUPTHER

## 2018-05-18 RX ORDER — METOPROLOL TARTRATE 50 MG/1
25 TABLET, FILM COATED ORAL EVERY 12 HOURS SCHEDULED
Qty: 90 TABLET | Refills: 3 | Status: SHIPPED | OUTPATIENT
Start: 2018-05-18 | End: 2018-06-14 | Stop reason: SDUPTHER

## 2018-05-18 RX ORDER — CLONAZEPAM 1 MG/1
1 TABLET ORAL 2 TIMES DAILY PRN
Refills: 0 | COMMUNITY
Start: 2018-04-20

## 2018-05-18 NOTE — PROGRESS NOTES
Assessment/Plan:  I reviewed all of her hospital records  She is ambulatory her pain is well controlled she will follow up with Orthopedic and Physical therapy  Follow-up here in 6 months    No problem-specific Assessment & Plan notes found for this encounter  Diagnoses and all orders for this visit:    Hip pain, acute, left  -     Ambulatory referral to Orthopedic Surgery; Future    Essential hypertension  -     metoprolol tartrate (LOPRESSOR) 50 mg tablet; Take 0 5 tablets (25 mg total) by mouth every 12 (twelve) hours for 90 days  -     atorvastatin (LIPITOR) 10 mg tablet; Take 1 tablet (10 mg total) by mouth daily for 90 days    Anxiety  -     Lipid panel; Future  -     TSH, 3rd generation; Future    Hyperlipidemia, unspecified hyperlipidemia type  -     Lipid panel; Future  -     TSH, 3rd generation; Future    Other orders  -     Discontinue: atorvastatin (LIPITOR) 10 mg tablet; Take 10 mg by mouth  -     clonazePAM (KlonoPIN) 1 mg tablet;   -     Hm Colonoscopy        Subjective:     Patient ID: Yesi Covington is a 59 y o  female  She accidentally fell and injured her left hip  She was hospitalized for 2 days last week  She was felt to have had fractured hip but further workup did not show this she has persistent pain in the left groin when she flexes or extends her left hip some pain with walking  No changes made in her medication except for the addition of Flexeril and pain pill  History of significant COPD which has been stable        Review of Systems   Constitutional: Negative for activity change, appetite change, chills, diaphoresis, fatigue, fever and unexpected weight change  HENT: Negative for congestion, dental problem, drooling, ear discharge, ear pain, facial swelling, hearing loss, nosebleeds, postnasal drip, rhinorrhea, sinus pain, sinus pressure, sneezing, sore throat, tinnitus, trouble swallowing and voice change      Eyes: Negative for photophobia, pain, discharge, redness, itching and visual disturbance  Respiratory: Negative for apnea, cough, choking, chest tightness, shortness of breath, wheezing and stridor  Cardiovascular: Negative for chest pain, palpitations and leg swelling  Gastrointestinal: Negative for abdominal distention, abdominal pain, anal bleeding, blood in stool, constipation, diarrhea, nausea, rectal pain and vomiting  Endocrine: Negative for cold intolerance, heat intolerance, polydipsia, polyphagia and polyuria  Genitourinary: Negative for decreased urine volume, difficulty urinating, dysuria, enuresis, flank pain, frequency, genital sores, hematuria and urgency  Musculoskeletal: Positive for arthralgias  Negative for back pain, gait problem, joint swelling, myalgias, neck pain and neck stiffness  Skin: Negative for color change, pallor, rash and wound  Neurological: Negative for dizziness, tremors, seizures, syncope, facial asymmetry, speech difficulty, weakness, light-headedness, numbness and headaches  Hematological: Negative for adenopathy  Does not bruise/bleed easily  Psychiatric/Behavioral: Negative for agitation, behavioral problems, confusion, decreased concentration, dysphoric mood, hallucinations, self-injury, sleep disturbance and suicidal ideas  The patient is not nervous/anxious and is not hyperactive  Objective:     Physical Exam   Constitutional: She is oriented to person, place, and time  She appears well-developed  HENT:   Head: Normocephalic  Right Ear: External ear normal    Left Ear: External ear normal    Mouth/Throat: Oropharynx is clear and moist    Eyes: Conjunctivae are normal  Pupils are equal, round, and reactive to light  Neck: Neck supple  No thyromegaly present  Cardiovascular: Normal rate, regular rhythm, normal heart sounds and intact distal pulses  Pulmonary/Chest: Effort normal and breath sounds normal  Respiratory distress: Decreased breath sounds  Abdominal: Soft   Bowel sounds are normal    Musculoskeletal: Normal range of motion  Tenderness: Walking with a slight limp  Pain left groin with flexion and extension left hip  Abduction and rotation are not painful  Neurological: She is alert and oriented to person, place, and time  She has normal reflexes  Skin: Skin is warm and dry  Vitals:    05/18/18 1058   BP: 140/88   Pulse: 90   Temp: 98 1 °F (36 7 °C)   TempSrc: Tympanic   SpO2: 99%   Weight: 67 5 kg (148 lb 12 8 oz)       Transitional Care Management Review:  Ewa Castillo is a 59 y o  female here for TCM follow up       During the TCM phone call patient stated:    Date and time hospital follow up call was made:  5/11/2018  1:27 [facility] care reviewed:  Records reviewed  Patient was hopsitalized at:  [facility]  Date of admission:  5/7/18  Date of discharge:  5/9/18  Diagnosis:  Fracture part of (L) neck femur  Were the patients medicaitons reviewed and updated:  Yes  Current symptoms:  (Comment: tired)  Scheduled for follow up?:  Yes  Did you obtain your prescribed medications:  Yes  Do you need help managing your perscriptions or medications:  Yes  What type of assistance do you need:  husbands helps  Is transportation to your appointments needed:  Yes (Comment: uses shareride)  Specify why:  use shareride  I have advised the patient to call PCP with any new or worsening symptoms (please type in name along with any credentials):  Kalyani Toro  Living Arrangements:  Spouse or Significiant other  Are you recieving outpatient services:  No  Are you recieving home care services:  No  Interperter language line required?:  No  Counseling:  Patient  Counseling topics:  Importance of RX compliance  Comments:  05/10/2018- 1st attempt LMTCB, 05/11/2018- pt notified, appt  scheduled for 05/18/2018 w/ KEN at [facility] PA-C

## 2018-06-05 ENCOUNTER — OFFICE VISIT (OUTPATIENT)
Dept: OBGYN CLINIC | Facility: CLINIC | Age: 58
End: 2018-06-05
Payer: COMMERCIAL

## 2018-06-05 ENCOUNTER — APPOINTMENT (OUTPATIENT)
Dept: RADIOLOGY | Facility: CLINIC | Age: 58
End: 2018-06-05
Payer: COMMERCIAL

## 2018-06-05 VITALS — SYSTOLIC BLOOD PRESSURE: 141 MMHG | DIASTOLIC BLOOD PRESSURE: 82 MMHG | HEART RATE: 94 BPM

## 2018-06-05 DIAGNOSIS — M54.17 LUMBOSACRAL RADICULOPATHY AT L4: Primary | ICD-10-CM

## 2018-06-05 DIAGNOSIS — M25.552 HIP PAIN, ACUTE, LEFT: ICD-10-CM

## 2018-06-05 DIAGNOSIS — M25.552 LEFT HIP PAIN: ICD-10-CM

## 2018-06-05 PROCEDURE — 73502 X-RAY EXAM HIP UNI 2-3 VIEWS: CPT

## 2018-06-05 PROCEDURE — 99203 OFFICE O/P NEW LOW 30 MIN: CPT | Performed by: ORTHOPAEDIC SURGERY

## 2018-06-05 RX ORDER — HYDROCODONE BITARTRATE AND ACETAMINOPHEN 5; 325 MG/1; MG/1
1 TABLET ORAL EVERY 6 HOURS PRN
Qty: 12 TABLET | Refills: 0 | Status: SHIPPED | OUTPATIENT
Start: 2018-06-05 | End: 2018-06-14 | Stop reason: HOSPADM

## 2018-06-05 RX ORDER — PREDNISONE 20 MG/1
20 TABLET ORAL DAILY
Qty: 12 TABLET | Refills: 0 | Status: SHIPPED | OUTPATIENT
Start: 2018-06-05 | End: 2018-06-14 | Stop reason: ALTCHOICE

## 2018-06-05 NOTE — PROGRESS NOTES
_____________________________________________________  CHIEF COMPLAINT:  Chief Complaint   Patient presents with    Left Hip - Pain         SUBJECTIVE:  Eduardo Harris is a 59y o  year old female who presents left hip pain  She states has been going on for about a month  She denies any treatments  She denies any trauma or injuries to the area  She has taking Norco with some relief  She describes the pain as sharp and radiates down her thigh  She states that the pain continues to progressively get worse  She denies any numbness or tingling    She denies any constitutional signs or symptoms     PAST MEDICAL HISTORY:  Past Medical History:   Diagnosis Date    Anxiety     Bipolar 1 disorder ([place] [address] )     Mental Health problems listed as Denied in Allscripts, cant't entire under pertinent negatives due to this diagnosis    CAD (coronary artery disease)     last assessed - 14Apr2017    Chronic back pain     Frequent headaches     Glaucoma     Hypertension     Ovarian cancer ([place] [address] )     last assessed - 21Sep2016    Psychiatric disorder     bipolar    Rheumatoid arthritis ([facility] )     Uterine carcinoma ([place] [address] )     last assessed - 21Sep2016       PAST SURGICAL HISTORY:  Past Surgical History:   Procedure Laterality Date    ADENOIDECTOMY      Tonsillectomy with Adenoidectomy - last assessed - 21Sep2016    APPENDECTOMY      last assessed - 21Sep2016    CATARACT EXTRACTION Left     Remove cataract, corneo-scleral section of left eye; last assessed - 21Sep2016    CHOLECYSTECTOMY      last assessed - [id number]   [facility] EYE SURGERY      Anterior sclera fistulization for glaucoma; last assessed - [id number]    HYSTERECTOMY      KRYSTA-BSO; last assessed - [id number]    INTRAOCULAR LENS INSERTION      MS TEMPORAL ARTERY LIGATN OR BX Right 12/16/2016    Procedure: BIOPSY ARTERY TEMPORAL;  Surgeon: Reji Ferrer MD;  Location: [facility];  Service: General    TONSILLECTOMY      Tonsillectomy with Adenoidectomy - last assessed - [id number]       FAMILY HISTORY:  Family History   Problem Relation Age of Onset    Heart disease Mother     Coronary artery disease Mother     Other Mother      1) Gangrene; 2) Peripheral arterial disease    Hyperlipidemia Mother      Hypercholesterolemia    Stomach cancer Mother     Heart attack Mother      Myocardial Infarction    Other Father      1) Gangrene; 2) Peripheral arterial disease    Hyperlipidemia Father      Hypercholesterolemia    Stomach cancer Father     Heart attack Father      Myocardial Infarction    Stomach cancer Brother     Heart attack Brother      Myocardial Infarction    Stomach cancer Maternal Uncle        SOCIAL HISTORY:  Social History   Substance Use Topics    Smoking status: Current Every Day Smoker     Packs/day: 0 20     Years: 46 00     Types: Cigarettes    Smokeless tobacco: Never Used    Alcohol use Yes      Comment: occasional; (No alcohol use per Allscripts)       MEDICATIONS:    Current Outpatient Prescriptions:     atorvastatin (LIPITOR) 10 mg tablet, Take 1 tablet (10 mg total) by mouth daily for 90 days, Disp: 90 tablet, Rfl: 3    citalopram (CELEXA) 40 mg tablet, Take 40 mg by mouth daily  , Disp: , Rfl:     clonazePAM (KlonoPIN) 1 mg tablet, , Disp: , Rfl: 0    gabapentin (NEURONTIN) 800 mg tablet, Take 800 mg by mouth 3 (three) times a day, Disp: , Rfl:     metoprolol tartrate (LOPRESSOR) 50 mg tablet, Take 0 5 tablets (25 mg total) by mouth every 12 (twelve) hours for 90 days, Disp: 90 tablet, Rfl: 3    mirtazapine (REMERON) 30 mg tablet, Take 1 tablet by mouth, Disp: , Rfl:     HYDROcodone-acetaminophen (NORCO) 5-325 mg per tablet, Take 1 tablet by mouth every 6 (six) hours as needed for pain Max Daily Amount: 4 tablets, Disp: 12 tablet, Rfl: 0    predniSONE 20 mg tablet, Take 1 tablet (20 mg total) by mouth daily Take 3 Tablets in the morning for 2 days Take 2 tablets in the morning for 2 days Take 1 tablet in the morning for 2 days, Disp: 12 tablet, Rfl: 0    ALLERGIES:  Allergies   Allergen Reactions    Aspirin Anaphylaxis and Other (See Comments)    Bee Venom Anaphylaxis    Robaxin [Methocarbamol] Anaphylaxis and Seizures     Seizures per pt       Tramadol Hives, Shortness Of Breath and Other (See Comments)    Ketorolac     Omeprazole GI Intolerance    Codeine Other (See Comments) and Rash     Dizziness nausea       REVIEW OF SYSTEMS:  General: no fever, no chills  HEENT:  No loss of hearing or eyesight problems  Eyes:  No red eyes  Respiratory:  No coughing, shortness of breath or wheezing  Cardiovascular:  No chest pain, no palpitations  GI:  Abdomen soft nontender, denies nausea  Endocrine:  No muscle weakness, no frequent urination, no excessive thirst  Urinary:  No dysuria, no incontinence  Musculoskeletal: see HPI and PE  SKIN:  No skin rash, no dry skin  Neurological:  No headaches, no confusion  Psychiatric:  No suicide thoughts, no anxiety, no depression  Review of all other systems is negative    LABS:  HgA1c: No results found for: HGBA1C  BMP:   Lab Results   Component Value Date    GLUCOSE 89 04/06/2018    CALCIUM 9 1 04/06/2018     04/06/2018    K 4 1 04/06/2018    CO2 24 04/06/2018     04/06/2018    BUN 15 04/06/2018    CREATININE 0 60 04/06/2018       _____________________________________________________  PHYSICAL EXAMINATION:  General: well developed and well nourished, alert, oriented times 3 and appears comfortable  Psychiatric: Normal  HEENT: Trachea Midline, No torticollis  Cardiovascular: No discernable arrhythmia  Pulmonary: No wheezing or stridor  Skin: No masses, erthema, lacerations, fluctation, ulcerations  Neurovascular:  L1-S1 motor and sensory intact, capillary refill less than 3 seconds, pulses were compared bilaterally and were equal     MUSCULOSKELETAL EXAMINATION:  Left hip:  No erythema edema or ecchymosis noted   Patient is tender to palpation over the lateral aspect of the hip and down the front of her thigh  Range of motion at the hip is within normal limits  She does have some pain with flexion of her knee that radiates down anterior aspect of her thigh  Negative log roll test, negative straight leg test, negative Alvaro sign, patient has increased pain with testing of the L4 nerve root     _____________________________________________________  STUDIES REVIEWED:  X-rays demonstrated minimal osteoarthritis of the left hip  No evidence of acute fractures      ASSESSMENT/PLAN:    Diagnoses and all orders for this visit:    Lumbosacral radiculopathy at L4  -     HYDROcodone-acetaminophen (NORCO) 5-325 mg per tablet; Take 1 tablet by mouth every 6 (six) hours as needed for pain Max Daily Amount: 4 tablets  -     predniSONE 20 mg tablet; Take 1 tablet (20 mg total) by mouth daily Take 3 Tablets in the morning for 2 days  Take 2 tablets in the morning for 2 days  Take 1 tablet in the morning for 2 days    Left hip pain  -     XR hip/pelv 2-3 vws left if performed; Future    Hip pain, acute, left  -     Ambulatory referral to Orthopedic Surgery        Assessment:   Left hip pain  Plan: Will get the patient started on a course of prednisone to help decrease inflammation  We also gave her some Norco to help with her pain  We will have her follow up in 2 weeks to reassess her pain and to see if the prednisone helped with this  If it did not help then we will order a MRI of her lumbar spine  Follow Up:   Two weeks    PROCEDURES PERFORMED:  None

## 2018-06-05 NOTE — PATIENT INSTRUCTIONS
The patient is advised to apply ice or cold packs intermittently as needed to relieve pain  The patient was advised that NSAID-type medications have two very important potential side effects: gastrointestinal irritation including hemorrhage and renal injuries  She was asked to take the medication with food and to stop if she experiences any GI upset  I asked her to call for vomiting, abdominal pain or black/bloody stools  The patient expresses understanding of these issues and questions were answered

## 2018-06-14 ENCOUNTER — OFFICE VISIT (OUTPATIENT)
Dept: CARDIOLOGY CLINIC | Facility: CLINIC | Age: 58
End: 2018-06-14
Payer: COMMERCIAL

## 2018-06-14 VITALS
BODY MASS INDEX: 26.61 KG/M2 | WEIGHT: 150.2 LBS | SYSTOLIC BLOOD PRESSURE: 140 MMHG | DIASTOLIC BLOOD PRESSURE: 100 MMHG | HEART RATE: 93 BPM | HEIGHT: 63 IN | OXYGEN SATURATION: 97 %

## 2018-06-14 DIAGNOSIS — I20.9 ANGINA, CLASS III (HCC): ICD-10-CM

## 2018-06-14 DIAGNOSIS — I20.1 CORONARY ARTERY SPASM (HCC): ICD-10-CM

## 2018-06-14 DIAGNOSIS — I10 ESSENTIAL HYPERTENSION: Primary | ICD-10-CM

## 2018-06-14 DIAGNOSIS — I25.10 CORONARY ARTERY DISEASE INVOLVING NATIVE CORONARY ARTERY OF NATIVE HEART WITHOUT ANGINA PECTORIS: ICD-10-CM

## 2018-06-14 DIAGNOSIS — E78.2 MIXED HYPERLIPIDEMIA: ICD-10-CM

## 2018-06-14 PROCEDURE — 99214 OFFICE O/P EST MOD 30 MIN: CPT | Performed by: INTERNAL MEDICINE

## 2018-06-14 RX ORDER — METOPROLOL TARTRATE 50 MG/1
50 TABLET, FILM COATED ORAL EVERY 12 HOURS SCHEDULED
Qty: 60 TABLET | Refills: 11 | Status: SHIPPED | OUTPATIENT
Start: 2018-06-14 | End: 2019-05-25 | Stop reason: SDUPTHER

## 2018-06-14 RX ORDER — ATORVASTATIN CALCIUM 40 MG/1
40 TABLET, FILM COATED ORAL DAILY
Qty: 90 TABLET | Refills: 3 | Status: SHIPPED | OUTPATIENT
Start: 2018-06-14 | End: 2019-05-25 | Stop reason: SDUPTHER

## 2018-06-14 RX ORDER — AMLODIPINE BESYLATE 5 MG/1
5 TABLET ORAL DAILY
Qty: 30 TABLET | Refills: 11 | Status: SHIPPED | OUTPATIENT
Start: 2018-06-14 | End: 2018-12-31

## 2018-06-14 RX ORDER — ATORVASTATIN CALCIUM 10 MG/1
10 TABLET, FILM COATED ORAL DAILY
Qty: 90 TABLET | Refills: 3 | Status: SHIPPED | OUTPATIENT
Start: 2018-06-14 | End: 2018-06-14 | Stop reason: SDUPTHER

## 2018-06-14 NOTE — PROGRESS NOTES
Cardiology Consultation     Carla Gannon  [id number]  1960  [address] [facility] [address]    C/c:FOLLOW-UP OF CORONARY ARTERY DISEASE, HYPERTENSION HYPERLIPIDEMIA    HPI:  61-year-old female patient with past medical history of coronary disease, moderate diffuse  Triple-vessel disease, hypertension, hyperlipidemia is here for follow-up  Patient gets occasional chest tightness on exertion which is relieved with rest   It has worsened over last 1 year  She gets chest pressure episode at least 4 times a week  The chest pain lasts for few minutes before it resolves spontaneously  It is associated with shortness of breath  She denies other cardiac symptoms including palpitations, dizziness or syncope  CORONARY CIRCULATION:  Left main: Normal   Proximal LAD: Angiography showed mild atherosclerosis  Mid LAD: Angiography showed moderate atherosclerosis  Distal LAD: Angiography showed moderate atherosclerosis  1st diagonal: The vessel was medium sized  Angiography showed mild atherosclerosis  Circumflex: The vessel was normal sized  Angiography showed minor luminal irregularities  RCA: The vessel was normal sized  Angiography showed minor luminal irregularities  Right PDA: The vessel was small sized  Angiography showed minor luminal irregularities  Right posterolateral segment: The vessel was medium sized  Angiography showed mild atherosclerosis      HEMODYNAMICS:  Hemodynamic assessment demonstrated mildly elevated LVEDP      Patient Active Problem List   Diagnosis    Increased severity of headaches    Chest pain    Depression    Anxiety    Chronic back pain    Tobacco abuse disorder    Neuropathy    Positive cardiac stress test    Coronary artery spasm (HCC)    Acute bronchitis    Abdominal pain    C  difficile diarrhea    COPD (chronic obstructive pulmonary disease) ([place] [address] )    Transaminitis    Essential hypertension    Lumbosacral radiculopathy at L4     Past Medical History:   Diagnosis Date    Anxiety     Bipolar 1 disorder ([place] [address] )     Mental Health problems listed as Denied in Allscripts, cant't entire under pertinent negatives due to this diagnosis    CAD (coronary artery disease)     last assessed - 14Apr2017    Chronic back pain     Frequent headaches     Glaucoma     Hyperlipidemia     Hypertension     Ovarian cancer ([place] [address] )     last assessed - [id number]    Psychiatric disorder     bipolar    Rheumatoid arthritis ([place] [address] )     Uterine carcinoma ([place] [address] )     last assessed - [id number]     Social History     Social History    Marital status: /Civil Union     Spouse name: N/A    Number of children: N/A    Years of education: N/A     Occupational History    Disabled      Social History Main Topics    Smoking status: Current Every Day Smoker     Packs/day: 0 20     Years: 46 00     Types: Cigarettes    Smokeless tobacco: Never Used    Alcohol use Yes      Comment: occasional; (No alcohol use per Allscripts)    Drug use: No    Sexual activity: Not on file     Other Topics Concern    Not on file     Social History Narrative    No narrative on file      Family History   Problem Relation Age of Onset    Heart disease Mother     Coronary artery disease Mother     Other Mother         1) Gangrene; 2) Peripheral arterial disease    Hyperlipidemia Mother         Hypercholesterolemia    Stomach cancer Mother     Heart attack Mother         Myocardial Infarction    Other Father         1) Gangrene; 2) Peripheral arterial disease    Hyperlipidemia Father         Hypercholesterolemia    Stomach cancer Father     Heart attack Father         Myocardial Infarction    Stomach cancer Brother     Heart attack Brother         Myocardial Infarction    Stomach cancer Maternal Uncle      Past Surgical History:   Procedure Laterality Date    ADENOIDECTOMY Tonsillectomy with Adenoidectomy - last assessed - 21Sep2016    APPENDECTOMY      last assessed - 21Sep2016    CATARACT EXTRACTION Left     Remove cataract, corneo-scleral section of left eye; last assessed - 21Sep2016    CHOLECYSTECTOMY      last assessed - 29Sep2016   [facility] EYE SURGERY      Anterior sclera fistulization for glaucoma; last assessed - 21Sep2016    HYSTERECTOMY      KRYSTA-BSO; last assessed - 21Sep2016    INTRAOCULAR LENS INSERTION      [place] TEMPORAL ARTERY LIGATN OR BX Right 12/16/2016    Procedure: BIOPSY ARTERY TEMPORAL;  Surgeon: Araceli Manzo MD;  Location: [facility];  Service: General    TONSILLECTOMY      Tonsillectomy with Adenoidectomy - last assessed - 21Sep2016       Current Outpatient Prescriptions:     atorvastatin (LIPITOR) 10 mg tablet, Take 1 tablet (10 mg total) by mouth daily for 90 days, Disp: 90 tablet, Rfl: 3    citalopram (CELEXA) 40 mg tablet, Take 40 mg by mouth daily  , Disp: , Rfl:     clonazePAM (KlonoPIN) 1 mg tablet, , Disp: , Rfl: 0    gabapentin (NEURONTIN) 800 mg tablet, Take 800 mg by mouth 3 (three) times a day, Disp: , Rfl:     metoprolol tartrate (LOPRESSOR) 25 mg tablet, Take 1 tablet twice daily, Disp: 60 tablet, Rfl: 5    mirtazapine (REMERON) 30 mg tablet, Take 1 tablet by mouth, Disp: , Rfl:   Allergies   Allergen Reactions    Aspirin Anaphylaxis and Other (See Comments)    Bee Venom Anaphylaxis    Robaxin [Methocarbamol] Anaphylaxis and Seizures     Seizures per pt       Tramadol Hives, Shortness Of Breath and Other (See Comments)    Ketorolac     Omeprazole GI Intolerance    Codeine Other (See Comments) and Rash     Dizziness nausea     Vitals:    06/14/18 1307   BP: 140/100   Pulse: 93   SpO2: 97%   Weight: 68 1 kg (150 lb 3 2 oz)   Height: 5' 3" (1 6 m)       Labs:  Admission on 04/21/2018, Discharged on 04/21/2018   Component Date Value    Ventricular Rate 04/21/2018 80     Atrial Rate 04/21/2018 80     AZ Interval 04/21/2018 98     QRSD Interval 04/21/2018 76     QT Interval 04/21/2018 402     QTC Interval 04/21/2018 463     P Axis 04/21/2018 83     QRS Axis 04/21/2018 76     T Wave Axis 04/21/2018 62      Imaging: Xr Hip/pelv 2-3 Vws Left If Performed    Result Date: 6/6/2018  Narrative: LEFT HIP INDICATION:   [id number]: Pain in left hip  COMPARISON:  CT abdomen/pelvis dated April 6, 2018  VIEWS:  XR HIP/PELV 2-3 VWS LEFT  W PELVIS IF PERFORMED FINDINGS: There is no acute fracture or dislocation  There is mild left hip joint space narrowing  No lytic or blastic osseous lesions  There are atherosclerotic calcifications  Soft tissues are otherwise unremarkable  The visualized lumbar spine is unremarkable  Impression: No acute osseous abnormality  Workstation performed: [id number]       Review of Systems:  Review of Systems   Constitutional: Negative for diaphoresis and fatigue  HENT: Negative for congestion and facial swelling  Eyes: Negative for photophobia and visual disturbance  Respiratory: Positive for shortness of breath  Negative for chest tightness  Cardiovascular: Positive for chest pain  Negative for palpitations and leg swelling  Gastrointestinal: Negative for abdominal pain and nausea  Endocrine: Negative for cold intolerance and heat intolerance  Musculoskeletal: Negative for arthralgias and myalgias  Skin: Negative for pallor and rash  Neurological: Negative for dizziness and tremors  Psychiatric/Behavioral: Negative for sleep disturbance  The patient is not nervous/anxious  Physical Exam:  Physical Exam   Constitutional: She is oriented to person, place, and time  She appears well-developed and well-nourished  HENT:   Head: Normocephalic and atraumatic  Eyes: Conjunctivae and EOM are normal  Pupils are equal, round, and reactive to light  Neck: Normal range of motion  Neck supple  No JVD present  No thyromegaly present     Cardiovascular: Normal rate, regular rhythm, S1 normal, S2 normal, normal heart sounds and intact distal pulses  Exam reveals no gallop and no friction rub  No murmur heard  Pulses:       Carotid pulses are 2+ on the right side, and 2+ on the left side  Pulmonary/Chest: Effort normal and breath sounds normal  No respiratory distress  She has no wheezes  She has no rales  Abdominal: Soft  Bowel sounds are normal  She exhibits no distension  There is no tenderness  There is no rebound and no guarding  Musculoskeletal: Normal range of motion  She exhibits edema  Neurological: She is alert and oriented to person, place, and time  She has normal reflexes  No cranial nerve deficit  Skin: Skin is warm and dry  Psychiatric: She has a normal mood and affect  Discussion/Summary:    1  CCS class 3 angina, coronary artery disease:   patient has typical class 3 angina on exertion   I will get nuclear  Stress test to evaluate for extent of ischemia   echocardiogram to evaluate LV systolic function other structural heart disease   add Norvasc for angina relief   increase dose of beta-blocker   increase dose of statins     #3  Hypertension:    add Norvasc and increase dose of beta-blocker     3   Hyperlipidemia, check lipid profile, on statins     follow-up with me in 2 months

## 2018-06-18 ENCOUNTER — TRANSITIONAL CARE MANAGEMENT (OUTPATIENT)
Dept: INTERNAL MEDICINE CLINIC | Facility: CLINIC | Age: 58
End: 2018-06-18

## 2018-06-18 ENCOUNTER — TELEPHONE (OUTPATIENT)
Dept: CARDIOLOGY CLINIC | Facility: CLINIC | Age: 58
End: 2018-06-18

## 2018-06-18 NOTE — TELEPHONE ENCOUNTER
RIGOBERTO CALLED AND SAID THE MYOCARDIAL PERFUSION IMAGING IS IN PEER TO PEER STATUS   CALL BACK [phone]

## 2018-06-19 DIAGNOSIS — I10 ESSENTIAL HYPERTENSION: Primary | ICD-10-CM

## 2018-06-19 NOTE — TELEPHONE ENCOUNTER
Fax from [phone] test has been completely denied  They are advising a stress echo, please review and advise   Thanks

## 2018-06-25 NOTE — PROGRESS NOTES
Patient called back , claims she dose not needs to see her PCP she was in the hospital because of a whyte related matter  Antonio Smart

## 2018-07-06 ENCOUNTER — APPOINTMENT (EMERGENCY)
Dept: RADIOLOGY | Facility: HOSPITAL | Age: 58
DRG: 142 | End: 2018-07-06
Payer: COMMERCIAL

## 2018-07-06 ENCOUNTER — HOSPITAL ENCOUNTER (INPATIENT)
Facility: HOSPITAL | Age: 58
LOS: 3 days | Discharge: HOME | DRG: 142 | End: 2018-07-09
Attending: EMERGENCY MEDICINE | Admitting: INTERNAL MEDICINE
Payer: COMMERCIAL

## 2018-07-06 DIAGNOSIS — M06.9 RHEUMATOID ARTHRITIS (HCC): ICD-10-CM

## 2018-07-06 DIAGNOSIS — I25.110 CORONARY ARTERY DISEASE INVOLVING NATIVE HEART WITH UNSTABLE ANGINA PECTORIS, UNSPECIFIED VESSEL OR LESION TYPE (HCC): ICD-10-CM

## 2018-07-06 DIAGNOSIS — I20.9 ANGINA, CLASS III (HCC): ICD-10-CM

## 2018-07-06 DIAGNOSIS — K21.9 GASTROESOPHAGEAL REFLUX DISEASE WITHOUT ESOPHAGITIS: ICD-10-CM

## 2018-07-06 DIAGNOSIS — R07.9 CHEST PAIN: Primary | ICD-10-CM

## 2018-07-06 DIAGNOSIS — J44.9 CHRONIC OBSTRUCTIVE PULMONARY DISEASE, UNSPECIFIED COPD TYPE (HCC): ICD-10-CM

## 2018-07-06 DIAGNOSIS — F17.200 SMOKER: ICD-10-CM

## 2018-07-06 DIAGNOSIS — E78.2 MIXED HYPERLIPIDEMIA: ICD-10-CM

## 2018-07-06 DIAGNOSIS — R91.8 LUNG NODULES: ICD-10-CM

## 2018-07-06 LAB
ABO GROUP BLD: NORMAL
ALBUMIN SERPL BCP-MCNC: 3.5 G/DL (ref 3.5–5)
ALP SERPL-CCNC: 141 U/L (ref 46–116)
ALT SERPL W P-5'-P-CCNC: 58 U/L (ref 12–78)
ANION GAP SERPL CALCULATED.3IONS-SCNC: 14 MMOL/L (ref 4–13)
APTT PPP: 27 SECONDS (ref 24–36)
AST SERPL W P-5'-P-CCNC: 31 U/L (ref 5–45)
BASOPHILS # BLD AUTO: 0.05 THOUSANDS/ΜL (ref 0–0.1)
BASOPHILS NFR BLD AUTO: 1 % (ref 0–1)
BILIRUB SERPL-MCNC: 0.2 MG/DL (ref 0.2–1)
BLD GP AB SCN SERPL QL: NEGATIVE
BUN SERPL-MCNC: 13 MG/DL (ref 5–25)
CALCIUM SERPL-MCNC: 8.9 MG/DL (ref 8.3–10.1)
CHLORIDE SERPL-SCNC: 106 MMOL/L (ref 100–108)
CK SERPL-CCNC: 113 U/L (ref 26–192)
CO2 SERPL-SCNC: 18 MMOL/L (ref 21–32)
CREAT SERPL-MCNC: 0.71 MG/DL (ref 0.6–1.3)
EOSINOPHIL # BLD AUTO: 0.35 THOUSAND/ΜL (ref 0–0.61)
EOSINOPHIL NFR BLD AUTO: 5 % (ref 0–6)
ERYTHROCYTE [DISTWIDTH] IN BLOOD BY AUTOMATED COUNT: 13.7 % (ref 11.6–15.1)
GFR SERPL CREATININE-BSD FRML MDRD: 94 ML/MIN/1.73SQ M
GLUCOSE SERPL-MCNC: 116 MG/DL (ref 65–140)
HCT VFR BLD AUTO: 40.2 % (ref 34.8–46.1)
HGB BLD-MCNC: 13.9 G/DL (ref 11.5–15.4)
IMM GRANULOCYTES # BLD AUTO: 0.02 THOUSAND/UL (ref 0–0.2)
IMM GRANULOCYTES NFR BLD AUTO: 0 % (ref 0–2)
INR PPP: 0.98 (ref 0.86–1.17)
LIPASE SERPL-CCNC: 87 U/L (ref 73–393)
LYMPHOCYTES # BLD AUTO: 2.21 THOUSANDS/ΜL (ref 0.6–4.47)
LYMPHOCYTES NFR BLD AUTO: 32 % (ref 14–44)
MCH RBC QN AUTO: 30 PG (ref 26.8–34.3)
MCHC RBC AUTO-ENTMCNC: 34.6 G/DL (ref 31.4–37.4)
MCV RBC AUTO: 87 FL (ref 82–98)
MONOCYTES # BLD AUTO: 0.52 THOUSAND/ΜL (ref 0.17–1.22)
MONOCYTES NFR BLD AUTO: 8 % (ref 4–12)
NEUTROPHILS # BLD AUTO: 3.72 THOUSANDS/ΜL (ref 1.85–7.62)
NEUTS SEG NFR BLD AUTO: 54 % (ref 43–75)
NRBC BLD AUTO-RTO: 0 /100 WBCS
PLATELET # BLD AUTO: 215 THOUSANDS/UL (ref 149–390)
PMV BLD AUTO: 9.4 FL (ref 8.9–12.7)
POTASSIUM SERPL-SCNC: 3.4 MMOL/L (ref 3.5–5.3)
PROT SERPL-MCNC: 7.3 G/DL (ref 6.4–8.2)
PROTHROMBIN TIME: 12.9 SECONDS (ref 11.8–14.2)
RBC # BLD AUTO: 4.63 MILLION/UL (ref 3.81–5.12)
RH BLD: NEGATIVE
SODIUM SERPL-SCNC: 138 MMOL/L (ref 136–145)
SPECIMEN EXPIRATION DATE: NORMAL
TROPONIN I SERPL-MCNC: <0.02 NG/ML
TROPONIN I SERPL-MCNC: <0.02 NG/ML
WBC # BLD AUTO: 6.87 THOUSAND/UL (ref 4.31–10.16)

## 2018-07-06 PROCEDURE — 82550 ASSAY OF CK (CPK): CPT | Performed by: EMERGENCY MEDICINE

## 2018-07-06 PROCEDURE — 85610 PROTHROMBIN TIME: CPT | Performed by: EMERGENCY MEDICINE

## 2018-07-06 PROCEDURE — 84484 ASSAY OF TROPONIN QUANT: CPT | Performed by: INTERNAL MEDICINE

## 2018-07-06 PROCEDURE — 83690 ASSAY OF LIPASE: CPT | Performed by: EMERGENCY MEDICINE

## 2018-07-06 PROCEDURE — 96374 THER/PROPH/DIAG INJ IV PUSH: CPT

## 2018-07-06 PROCEDURE — 36415 COLL VENOUS BLD VENIPUNCTURE: CPT | Performed by: EMERGENCY MEDICINE

## 2018-07-06 PROCEDURE — 86900 BLOOD TYPING SEROLOGIC ABO: CPT | Performed by: EMERGENCY MEDICINE

## 2018-07-06 PROCEDURE — 80053 COMPREHEN METABOLIC PANEL: CPT | Performed by: EMERGENCY MEDICINE

## 2018-07-06 PROCEDURE — 71045 X-RAY EXAM CHEST 1 VIEW: CPT

## 2018-07-06 PROCEDURE — 93005 ELECTROCARDIOGRAM TRACING: CPT

## 2018-07-06 PROCEDURE — 96375 TX/PRO/DX INJ NEW DRUG ADDON: CPT

## 2018-07-06 PROCEDURE — 99285 EMERGENCY DEPT VISIT HI MDM: CPT

## 2018-07-06 PROCEDURE — 86850 RBC ANTIBODY SCREEN: CPT | Performed by: EMERGENCY MEDICINE

## 2018-07-06 PROCEDURE — 85025 COMPLETE CBC W/AUTO DIFF WBC: CPT | Performed by: EMERGENCY MEDICINE

## 2018-07-06 PROCEDURE — 85730 THROMBOPLASTIN TIME PARTIAL: CPT | Performed by: EMERGENCY MEDICINE

## 2018-07-06 PROCEDURE — 86901 BLOOD TYPING SEROLOGIC RH(D): CPT | Performed by: EMERGENCY MEDICINE

## 2018-07-06 PROCEDURE — 99222 1ST HOSP IP/OBS MODERATE 55: CPT | Performed by: INTERNAL MEDICINE

## 2018-07-06 PROCEDURE — 84484 ASSAY OF TROPONIN QUANT: CPT | Performed by: EMERGENCY MEDICINE

## 2018-07-06 RX ORDER — HEPARIN SODIUM 1000 [USP'U]/ML
3600 INJECTION, SOLUTION INTRAVENOUS; SUBCUTANEOUS ONCE
Status: COMPLETED | OUTPATIENT
Start: 2018-07-06 | End: 2018-07-06

## 2018-07-06 RX ORDER — POTASSIUM CHLORIDE 20 MEQ/1
40 TABLET, EXTENDED RELEASE ORAL ONCE
Status: COMPLETED | OUTPATIENT
Start: 2018-07-06 | End: 2018-07-06

## 2018-07-06 RX ORDER — FENTANYL CITRATE 50 UG/ML
100 INJECTION, SOLUTION INTRAMUSCULAR; INTRAVENOUS ONCE
Status: COMPLETED | OUTPATIENT
Start: 2018-07-06 | End: 2018-07-06

## 2018-07-06 RX ORDER — MORPHINE SULFATE 2 MG/ML
1 INJECTION, SOLUTION INTRAMUSCULAR; INTRAVENOUS
Status: DISCONTINUED | OUTPATIENT
Start: 2018-07-07 | End: 2018-07-07

## 2018-07-06 RX ORDER — NITROGLYCERIN 2.5 MG/D
0.1 PATCH TRANSDERMAL DAILY
Status: DISCONTINUED | OUTPATIENT
Start: 2018-07-06 | End: 2018-07-07

## 2018-07-06 RX ORDER — HEPARIN SODIUM 1000 [USP'U]/ML
1800 INJECTION, SOLUTION INTRAVENOUS; SUBCUTANEOUS AS NEEDED
Status: DISCONTINUED | OUTPATIENT
Start: 2018-07-06 | End: 2018-07-09

## 2018-07-06 RX ORDER — CLONAZEPAM 1 MG/1
1 TABLET ORAL 2 TIMES DAILY PRN
Status: DISCONTINUED | OUTPATIENT
Start: 2018-07-06 | End: 2018-07-09 | Stop reason: HOSPADM

## 2018-07-06 RX ORDER — AMLODIPINE BESYLATE 5 MG/1
5 TABLET ORAL DAILY
Status: DISCONTINUED | OUTPATIENT
Start: 2018-07-07 | End: 2018-07-09 | Stop reason: HOSPADM

## 2018-07-06 RX ORDER — HEPARIN SODIUM 10000 [USP'U]/100ML
3-20 INJECTION, SOLUTION INTRAVENOUS
Status: DISCONTINUED | OUTPATIENT
Start: 2018-07-06 | End: 2018-07-09

## 2018-07-06 RX ORDER — ATORVASTATIN CALCIUM 40 MG/1
40 TABLET, FILM COATED ORAL DAILY
Status: DISCONTINUED | OUTPATIENT
Start: 2018-07-07 | End: 2018-07-09 | Stop reason: HOSPADM

## 2018-07-06 RX ORDER — CITALOPRAM 20 MG/1
40 TABLET ORAL DAILY
Status: DISCONTINUED | OUTPATIENT
Start: 2018-07-07 | End: 2018-07-09 | Stop reason: HOSPADM

## 2018-07-06 RX ORDER — GABAPENTIN 400 MG/1
800 CAPSULE ORAL 3 TIMES DAILY
Status: DISCONTINUED | OUTPATIENT
Start: 2018-07-06 | End: 2018-07-09 | Stop reason: HOSPADM

## 2018-07-06 RX ORDER — MORPHINE SULFATE 2 MG/ML
1 INJECTION, SOLUTION INTRAMUSCULAR; INTRAVENOUS EVERY 4 HOURS PRN
Status: DISCONTINUED | OUTPATIENT
Start: 2018-07-06 | End: 2018-07-06

## 2018-07-06 RX ORDER — MIRTAZAPINE 15 MG/1
30 TABLET, FILM COATED ORAL
Status: DISCONTINUED | OUTPATIENT
Start: 2018-07-06 | End: 2018-07-09 | Stop reason: HOSPADM

## 2018-07-06 RX ORDER — NICOTINE 21 MG/24HR
1 PATCH, TRANSDERMAL 24 HOURS TRANSDERMAL DAILY
Status: DISCONTINUED | OUTPATIENT
Start: 2018-07-07 | End: 2018-07-09 | Stop reason: HOSPADM

## 2018-07-06 RX ORDER — ONDANSETRON 2 MG/ML
4 INJECTION INTRAMUSCULAR; INTRAVENOUS EVERY 6 HOURS PRN
Status: DISCONTINUED | OUTPATIENT
Start: 2018-07-06 | End: 2018-07-09 | Stop reason: HOSPADM

## 2018-07-06 RX ORDER — ACETAMINOPHEN 325 MG/1
650 TABLET ORAL EVERY 6 HOURS PRN
Status: DISCONTINUED | OUTPATIENT
Start: 2018-07-06 | End: 2018-07-09 | Stop reason: HOSPADM

## 2018-07-06 RX ORDER — HEPARIN SODIUM 1000 [USP'U]/ML
3600 INJECTION, SOLUTION INTRAVENOUS; SUBCUTANEOUS AS NEEDED
Status: DISCONTINUED | OUTPATIENT
Start: 2018-07-06 | End: 2018-07-09

## 2018-07-06 RX ORDER — METOPROLOL TARTRATE 50 MG/1
50 TABLET, FILM COATED ORAL EVERY 12 HOURS SCHEDULED
Status: DISCONTINUED | OUTPATIENT
Start: 2018-07-06 | End: 2018-07-09 | Stop reason: HOSPADM

## 2018-07-06 RX ADMIN — HEPARIN SODIUM 3600 UNITS: 1000 INJECTION, SOLUTION INTRAVENOUS; SUBCUTANEOUS at 19:04

## 2018-07-06 RX ADMIN — SODIUM CHLORIDE 500 ML: 0.9 INJECTION, SOLUTION INTRAVENOUS at 20:41

## 2018-07-06 RX ADMIN — CLONAZEPAM 1 MG: 1 TABLET ORAL at 22:01

## 2018-07-06 RX ADMIN — HEPARIN SODIUM AND DEXTROSE 12 UNITS/KG/HR: 10000; 5 INJECTION INTRAVENOUS at 19:03

## 2018-07-06 RX ADMIN — MORPHINE SULFATE 1 MG: 2 INJECTION, SOLUTION INTRAMUSCULAR; INTRAVENOUS at 21:12

## 2018-07-06 RX ADMIN — GABAPENTIN 800 MG: 400 CAPSULE ORAL at 22:01

## 2018-07-06 RX ADMIN — NITROGLYCERIN 0.1 MG: 0.1 PATCH TRANSDERMAL at 21:03

## 2018-07-06 RX ADMIN — MIRTAZAPINE 30 MG: 15 TABLET, FILM COATED ORAL at 22:01

## 2018-07-06 RX ADMIN — FENTANYL CITRATE 100 MCG: 50 INJECTION, SOLUTION INTRAMUSCULAR; INTRAVENOUS at 19:18

## 2018-07-06 RX ADMIN — SODIUM CHLORIDE 500 ML: 0.9 INJECTION, SOLUTION INTRAVENOUS at 19:22

## 2018-07-06 RX ADMIN — ONDANSETRON 4 MG: 2 INJECTION, SOLUTION INTRAMUSCULAR; INTRAVENOUS at 22:02

## 2018-07-06 RX ADMIN — POTASSIUM CHLORIDE 40 MEQ: 1500 TABLET, EXTENDED RELEASE ORAL at 20:47

## 2018-07-06 RX ADMIN — FENTANYL CITRATE 100 MCG: 50 INJECTION, SOLUTION INTRAMUSCULAR; INTRAVENOUS at 18:35

## 2018-07-06 NOTE — ED PROVIDER NOTES
History  Chief Complaint   Patient presents with    Chest Pain     pt with c/o chest pain starting half hour prior to arrival  Pt states its like someones sitting on her chest, pt with positive cardiac history     HPI    Prior to Admission Medications   Prescriptions Last Dose Informant Patient Reported? Taking?    amLODIPine (NORVASC) 5 mg tablet  Self No No   Sig: Take 1 tablet (5 mg total) by mouth daily   atorvastatin (LIPITOR) 40 mg tablet  Self No No   Sig: Take 1 tablet (40 mg total) by mouth daily for 90 days   citalopram (CELEXA) 40 mg tablet  Self Yes No   Sig: Take 40 mg by mouth daily     clonazePAM (KlonoPIN) 1 mg tablet  Self Yes No   gabapentin (NEURONTIN) 800 mg tablet  Self Yes No   Sig: Take 800 mg by mouth 3 (three) times a day   metoprolol tartrate (LOPRESSOR) 50 mg tablet  Self No No   Sig: Take 1 tablet (50 mg total) by mouth every 12 (twelve) hours   mirtazapine (REMERON) 30 mg tablet  Self Yes No   Sig: Take 1 tablet by mouth      Facility-Administered Medications: None       Past Medical History:   Diagnosis Date    Anxiety     Bipolar 1 disorder ([place] [address] )     Mental Health problems listed as Denied in Allscripts, cant't entire under pertinent negatives due to this diagnosis    CAD (coronary artery disease)     last assessed - 14Apr2017    Chronic back pain     Frequent headaches     Glaucoma     Hyperlipidemia     Hypertension     Ovarian cancer ([place] [address] )     last assessed - 21Sep2016    Psychiatric disorder     bipolar    Rheumatoid arthritis ([facility] )     Uterine carcinoma ([place] [address] )     last assessed - [id number]       Past Surgical History:   Procedure Laterality Date    ADENOIDECTOMY      Tonsillectomy with Adenoidectomy - last assessed - [id number]    APPENDECTOMY      last assessed - [id number]    CATARACT EXTRACTION Left     Remove cataract, corneo-scleral section of left eye; last assessed - [id number]    CHOLECYSTECTOMY      last assessed - [id number]   [facility] [place] E[facility] SURGERY      Anterior sclera fistulization for glaucoma; last assessed - 21Sep2016    HYSTERECTOMY      KRYSTA-BSO; last assessed - 21Sep2016    INTRAOCULAR LENS INSERTION      MD TEMPORAL ARTERY LIGATN OR BX Right 12/16/2016    Procedure: BIOPSY ARTERY TEMPORAL;  Surgeon: Daljit Perez MD;  Location: [facility];  Service: General    TONSILLECTOMY      Tonsillectomy with Adenoidectomy - last assessed - 21Sep2016       Family History   Problem Relation Age of Onset    Heart disease Mother     Coronary artery disease Mother     Other Mother         1) Gangrene; 2) Peripheral arterial disease    Hyperlipidemia Mother         Hypercholesterolemia    Stomach cancer Mother     Heart attack Mother         Myocardial Infarction    Other Father         1) Gangrene; 2) Peripheral arterial disease    Hyperlipidemia Father         Hypercholesterolemia    Stomach cancer Father     Heart attack Father         Myocardial Infarction    Stomach cancer Brother     Heart attack Brother         Myocardial Infarction    Stomach cancer Maternal Uncle      I have reviewed and agree with the history as documented      Social History   Substance Use Topics    Smoking status: Current Every Day Smoker     Packs/day: 0 20     Years: 46 00     Types: Cigarettes    Smokeless tobacco: Never Used    Alcohol use Yes      Comment: occasional; (No alcohol use per Allscripts)        Review of Systems    Physical Exam  Physical Exam    Vital Signs  ED Triage Vitals   Temp Pulse Respirations Blood Pressure SpO2   -- 07/06/18 1841 07/06/18 1841 07/06/18 1841 07/06/18 1841    97 20 106/77 99 %      Temp src Heart Rate Source Patient Position - Orthostatic VS BP Location FiO2 (%)   -- 07/06/18 1841 -- 07/06/18 1841 --    Monitor  Right arm       Pain Score       07/06/18 1918       Worst Possible Pain           Vitals:    07/06/18 1841   BP: 106/77   Pulse: 97       Visual Acuity      ED Medications  Medications   heparin (porcine) 25,000 units in 250 mL infusion (premix) (12 Units/kg/hr × 60 kg (Order-Specific) Intravenous New Bag 7/6/18 1903)   heparin (porcine) injection 3,600 Units (not administered)   heparin (porcine) injection 1,800 Units (not administered)   sodium chloride 0 9 % bolus 500 mL (not administered)   fentanyl citrate (PF) 100 MCG/2ML 100 mcg (100 mcg Intravenous Given 7/6/18 1835)   heparin (porcine) injection 3,600 Units (3,600 Units Intravenous Given 7/6/18 1904)   fentanyl citrate (PF) 100 MCG/2ML 100 mcg (100 mcg Intravenous Given 7/6/18 1918)       Diagnostic Studies  Results Reviewed     Procedure Component Value Units Date/Time    Troponin I [[id number]]  (Normal) Collected:  07/06/18 1835    Lab Status:  Final result Specimen:  Blood from Arm, Right Updated:  07/06/18 1914     Troponin I <0 02 ng/mL     Comprehensive metabolic panel [[id number]]  (Abnormal) Collected:  07/06/18 1835    Lab Status:  Final result Specimen:  Blood from Arm, Right Updated:  07/06/18 1908     Sodium 138 mmol/L      Potassium 3 4 (L) mmol/L      Chloride 106 mmol/L      CO2 18 (L) mmol/L      Anion Gap 14 (H) mmol/L      BUN 13 mg/dL      Creatinine 0 71 mg/dL      Glucose 116 mg/dL      Calcium 8 9 mg/dL      AST 31 U/L      ALT 58 U/L      Alkaline Phosphatase 141 (H) U/L      Total Protein 7 3 g/dL      Albumin 3 5 g/dL      Total Bilirubin 0 20 mg/dL      eGFR 94 ml/min/1 73sq m     Narrative:         National Kidney Disease Education Program recommendations are as follows:  GFR calculation is accurate only with a steady state creatinine  Chronic Kidney disease less than 60 ml/min/1 73 sq  meters  Kidney failure less than 15 ml/min/1 73 sq  meters      CK Total with Reflex CKMB [[id number]]  (Normal) Collected:  07/06/18 1835    Lab Status:  Final result Specimen:  Blood from Arm, Right Updated:  07/06/18 1908     Total  U/L     Lipase [[id number]]  (Normal) Collected:  07/06/18 1835    Lab Status:  Final result Specimen:  Blood from Arm, Right Updated: 07/06/18 1908     Lipase 87 u/L     Protime-INR [[id number]]  (Normal) Collected:  07/06/18 1835    Lab Status:  Final result Specimen:  Blood from Arm, Right Updated:  07/06/18 1854     Protime 12 9 seconds      INR 0 98    APTT [[id number]]  (Normal) Collected:  07/06/18 1835    Lab Status:  Final result Specimen:  Blood from Arm, Right Updated:  07/06/18 1854     PTT 27 seconds     CBC and differential [[id number]] Collected:  07/06/18 1835    Lab Status:  Final result Specimen:  Blood from Arm, Right Updated:  07/06/18 1844     WBC 6 87 Thousand/uL      RBC 4 63 Million/uL      Hemoglobin 13 9 g/dL      Hematocrit 40 2 %      MCV 87 fL      MCH 30 0 pg      MCHC 34 6 g/dL      RDW 13 7 %      MPV 9 4 fL      Platelets 588 Thousands/uL      nRBC 0 /100 WBCs      Neutrophils Relative 54 %      Immat GRANS % 0 %      Lymphocytes Relative 32 %      Monocytes Relative 8 %      Eosinophils Relative 5 %      Basophils Relative 1 %      Neutrophils Absolute 3 72 Thousands/µL      Immature Grans Absolute 0 02 Thousand/uL      Lymphocytes Absolute 2 21 Thousands/µL      Monocytes Absolute 0 52 Thousand/µL      Eosinophils Absolute 0 35 Thousand/µL      Basophils Absolute 0 05 Thousands/µL     APTT six (6) hours after Heparin bolus/drip initiation or dosing change [[id number]]     Lab Status:  No result Specimen:  Blood     CBC [[id number]]     Lab Status:  No result Specimen:  Blood     Protime-INR [[id number]]     Lab Status:  No result Specimen:  Blood                  XR chest 1 view portable    (Results Pending)              Procedures  ECG 12 Lead Documentation  Date/Time: 7/6/2018 7:16 PM  Performed by: Evgeny Ramírez  Authorized by: Karin AGUERO     Indications / Diagnosis:  Chest pain  ECG reviewed by me, the ED Provider: yes    Patient location:  ED  Rate:     ECG rate:  103  Rhythm:     Rhythm: sinus tachycardia    Comments:      Inferior st depression, no marquis, changed from prior study   Impression - inferior ischemia without STEMI  Repeat ekg at 1915 for continued chest pain, interpreted by me  Findings SR 95, improved inferior ST changes, no PILLO  Impression - improved EKG as compared to prior            Phone Contacts  ED Phone Contact    ED Course  ED Course as of Jul 06 1922 Fri Jul 06, 2018 1907 7:07 PM reexamined, discussed plan w pt and  based on d/w Dr Isabelle Kline  Plan at this time - admit, heparin gtt for suspected ACS, cards consult    1911 7:11 PM pt reports incomplete resolution of pain after fentanyl; sbp 100  Plan - ns 500cc, repeat fentanyl, repeat ekg                                MDM  Number of Diagnoses or Management Options     Amount and/or Complexity of Data Reviewed  Clinical lab tests: reviewed and ordered  Tests in the radiology section of CPT®: ordered and reviewed  Tests in the medicine section of CPT®: ordered and reviewed  Obtain history from someone other than the patient: yes  Review and summarize past medical records: yes  Discuss the patient with other providers: yes  Independent visualization of images, tracings, or specimens: yes    Risk of Complications, Morbidity, and/or Mortality  Presenting problems: high  Diagnostic procedures: high  Management options: high  General comments: High-risk CP, suspect ischemia  No PILLO on EKG  D/w cardiologist who agrees w plan to heparin gtt, admit, pain control  The patient presented with a condition in which there was a high probability of imminent or life-threatening deterioration, and critical care services (excluding separately billable procedures) totalled 30-74 minutes          Disposition  Final diagnoses:   Chest pain   Angina, class III ([facility] )   Mixed hyperlipidemia   Smoker   Chronic obstructive pulmonary disease, unspecified COPD type ([facility] )   Coronary artery disease involving native heart with unstable angina pectoris, unspecified vessel or lesion type [facility])     Time reflects when diagnosis was documented in both MDM as applicable and the Disposition within this note     Time User Action Codes Description Comment    7/6/2018  7:20 PM Nicholette Kinsley Add [R07 9] Chest pain     7/6/2018  7:20 PM Oquendo, Viveca Dustman Add [I20 9] Angina, class III ([facility] )     7/6/2018  7:20 PM Nicholette Kinsley Add [E78 2] Mixed hyperlipidemia     7/6/2018  7:20 PM Nicholette Kinsley Add [F17 200] Smoker     7/6/2018  7:20 PM Oquendo, Viveca Dustman Add [J44 9] Chronic obstructive pulmonary disease, unspecified COPD type ([facility] )     7/6/2018  7:20 PM Oquendo, Viveca Dustman Add [I25 110] Coronary artery disease involving native heart with unstable angina pectoris, unspecified vessel or lesion type [facility])       ED Disposition     ED Disposition Condition Comment    Admit  Case was discussed with Tahir Singleton and the patient's admission status was agreed to be Admission Status: inpatient status to the service of Dr Tahir Singleton   Follow-up Information    None         Patient's Medications   Discharge Prescriptions    No medications on file     No discharge procedures on file      ED Provider  Electronically Signed by           Genesis Rodriguez MD  07/06/18 Wayne Esqueda MD  07/06/18 Lex Lovell

## 2018-07-07 ENCOUNTER — APPOINTMENT (INPATIENT)
Dept: NON INVASIVE DIAGNOSTICS | Facility: HOSPITAL | Age: 58
DRG: 142 | End: 2018-07-07
Payer: COMMERCIAL

## 2018-07-07 ENCOUNTER — APPOINTMENT (INPATIENT)
Dept: CT IMAGING | Facility: HOSPITAL | Age: 58
DRG: 142 | End: 2018-07-07
Payer: COMMERCIAL

## 2018-07-07 PROBLEM — R91.8 LUNG NODULES: Status: ACTIVE | Noted: 2018-07-07

## 2018-07-07 LAB
ANION GAP SERPL CALCULATED.3IONS-SCNC: 6 MMOL/L (ref 4–13)
APTT PPP: 34 SECONDS (ref 24–36)
APTT PPP: 37 SECONDS (ref 24–36)
APTT PPP: 69 SECONDS (ref 24–36)
APTT PPP: 85 SECONDS (ref 24–36)
ATRIAL RATE: 103 BPM
ATRIAL RATE: 95 BPM
BUN SERPL-MCNC: 16 MG/DL (ref 5–25)
CALCIUM SERPL-MCNC: 8.4 MG/DL (ref 8.3–10.1)
CHLORIDE SERPL-SCNC: 111 MMOL/L (ref 100–108)
CHOLEST SERPL-MCNC: 181 MG/DL (ref 50–200)
CO2 SERPL-SCNC: 23 MMOL/L (ref 21–32)
CREAT SERPL-MCNC: 0.68 MG/DL (ref 0.6–1.3)
CRP SERPL QL: 9 MG/L
DEPRECATED D DIMER PPP: 1755 NG/ML (FEU) (ref 0–424)
ERYTHROCYTE [DISTWIDTH] IN BLOOD BY AUTOMATED COUNT: 13.9 % (ref 11.6–15.1)
ERYTHROCYTE [DISTWIDTH] IN BLOOD BY AUTOMATED COUNT: 14.2 % (ref 11.6–15.1)
ERYTHROCYTE [SEDIMENTATION RATE] IN BLOOD: 18 MM/HOUR (ref 0–20)
GFR SERPL CREATININE-BSD FRML MDRD: 96 ML/MIN/1.73SQ M
GLUCOSE SERPL-MCNC: 93 MG/DL (ref 65–140)
HCT VFR BLD AUTO: 35.9 % (ref 34.8–46.1)
HCT VFR BLD AUTO: 37.6 % (ref 34.8–46.1)
HDLC SERPL-MCNC: 46 MG/DL (ref 40–60)
HGB BLD-MCNC: 12.3 G/DL (ref 11.5–15.4)
HGB BLD-MCNC: 12.6 G/DL (ref 11.5–15.4)
LDLC SERPL CALC-MCNC: 116 MG/DL (ref 0–100)
MAGNESIUM SERPL-MCNC: 2 MG/DL (ref 1.6–2.6)
MCH RBC QN AUTO: 30.1 PG (ref 26.8–34.3)
MCH RBC QN AUTO: 30.7 PG (ref 26.8–34.3)
MCHC RBC AUTO-ENTMCNC: 33.5 G/DL (ref 31.4–37.4)
MCHC RBC AUTO-ENTMCNC: 34.3 G/DL (ref 31.4–37.4)
MCV RBC AUTO: 90 FL (ref 82–98)
MCV RBC AUTO: 90 FL (ref 82–98)
NONHDLC SERPL-MCNC: 135 MG/DL
P AXIS: 73 DEGREES
P AXIS: 78 DEGREES
PLATELET # BLD AUTO: 157 THOUSANDS/UL (ref 149–390)
PLATELET # BLD AUTO: 189 THOUSANDS/UL (ref 149–390)
PMV BLD AUTO: 9.4 FL (ref 8.9–12.7)
PMV BLD AUTO: 9.6 FL (ref 8.9–12.7)
POTASSIUM SERPL-SCNC: 4.8 MMOL/L (ref 3.5–5.3)
PR INTERVAL: 114 MS
PR INTERVAL: 128 MS
QRS AXIS: 61 DEGREES
QRS AXIS: 62 DEGREES
QRSD INTERVAL: 80 MS
QRSD INTERVAL: 86 MS
QT INTERVAL: 352 MS
QT INTERVAL: 382 MS
QTC INTERVAL: 461 MS
QTC INTERVAL: 480 MS
RBC # BLD AUTO: 4.01 MILLION/UL (ref 3.81–5.12)
RBC # BLD AUTO: 4.18 MILLION/UL (ref 3.81–5.12)
SODIUM SERPL-SCNC: 140 MMOL/L (ref 136–145)
T WAVE AXIS: 104 DEGREES
T WAVE AXIS: 72 DEGREES
TRIGL SERPL-MCNC: 95 MG/DL
TROPONIN I SERPL-MCNC: <0.02 NG/ML
TROPONIN I SERPL-MCNC: <0.02 NG/ML
VENTRICULAR RATE: 103 BPM
VENTRICULAR RATE: 95 BPM
WBC # BLD AUTO: 4.75 THOUSAND/UL (ref 4.31–10.16)
WBC # BLD AUTO: 5.03 THOUSAND/UL (ref 4.31–10.16)

## 2018-07-07 PROCEDURE — 85027 COMPLETE CBC AUTOMATED: CPT | Performed by: EMERGENCY MEDICINE

## 2018-07-07 PROCEDURE — 80061 LIPID PANEL: CPT | Performed by: INTERNAL MEDICINE

## 2018-07-07 PROCEDURE — C9113 INJ PANTOPRAZOLE SODIUM, VIA: HCPCS | Performed by: INTERNAL MEDICINE

## 2018-07-07 PROCEDURE — 85027 COMPLETE CBC AUTOMATED: CPT | Performed by: INTERNAL MEDICINE

## 2018-07-07 PROCEDURE — 85379 FIBRIN DEGRADATION QUANT: CPT | Performed by: INTERNAL MEDICINE

## 2018-07-07 PROCEDURE — 84484 ASSAY OF TROPONIN QUANT: CPT | Performed by: INTERNAL MEDICINE

## 2018-07-07 PROCEDURE — 83735 ASSAY OF MAGNESIUM: CPT | Performed by: INTERNAL MEDICINE

## 2018-07-07 PROCEDURE — 93308 TTE F-UP OR LMTD: CPT

## 2018-07-07 PROCEDURE — 93005 ELECTROCARDIOGRAM TRACING: CPT

## 2018-07-07 PROCEDURE — 99232 SBSQ HOSP IP/OBS MODERATE 35: CPT | Performed by: INTERNAL MEDICINE

## 2018-07-07 PROCEDURE — 93308 TTE F-UP OR LMTD: CPT | Performed by: INTERNAL MEDICINE

## 2018-07-07 PROCEDURE — 86140 C-REACTIVE PROTEIN: CPT | Performed by: INTERNAL MEDICINE

## 2018-07-07 PROCEDURE — 85652 RBC SED RATE AUTOMATED: CPT | Performed by: INTERNAL MEDICINE

## 2018-07-07 PROCEDURE — 85730 THROMBOPLASTIN TIME PARTIAL: CPT | Performed by: INTERNAL MEDICINE

## 2018-07-07 PROCEDURE — 80048 BASIC METABOLIC PNL TOTAL CA: CPT | Performed by: INTERNAL MEDICINE

## 2018-07-07 PROCEDURE — 93321 DOPPLER ECHO F-UP/LMTD STD: CPT | Performed by: INTERNAL MEDICINE

## 2018-07-07 PROCEDURE — 99254 IP/OBS CNSLTJ NEW/EST MOD 60: CPT | Performed by: INTERNAL MEDICINE

## 2018-07-07 PROCEDURE — 93325 DOPPLER ECHO COLOR FLOW MAPG: CPT | Performed by: INTERNAL MEDICINE

## 2018-07-07 PROCEDURE — 93010 ELECTROCARDIOGRAM REPORT: CPT | Performed by: INTERNAL MEDICINE

## 2018-07-07 PROCEDURE — 71275 CT ANGIOGRAPHY CHEST: CPT

## 2018-07-07 RX ORDER — CLOPIDOGREL BISULFATE 75 MG/1
600 TABLET ORAL ONCE
Status: COMPLETED | OUTPATIENT
Start: 2018-07-07 | End: 2018-07-07

## 2018-07-07 RX ORDER — PANTOPRAZOLE SODIUM 40 MG/1
40 INJECTION, POWDER, FOR SOLUTION INTRAVENOUS
Status: DISCONTINUED | OUTPATIENT
Start: 2018-07-07 | End: 2018-07-08

## 2018-07-07 RX ORDER — CALCIUM CARBONATE 200(500)MG
500 TABLET,CHEWABLE ORAL 3 TIMES DAILY PRN
Status: DISCONTINUED | OUTPATIENT
Start: 2018-07-07 | End: 2018-07-09 | Stop reason: HOSPADM

## 2018-07-07 RX ORDER — NITROGLYCERIN 0.4 MG/1
0.4 TABLET SUBLINGUAL
Status: DISCONTINUED | OUTPATIENT
Start: 2018-07-07 | End: 2018-07-09 | Stop reason: HOSPADM

## 2018-07-07 RX ADMIN — HYDROMORPHONE HYDROCHLORIDE 1 MG: 1 INJECTION, SOLUTION INTRAMUSCULAR; INTRAVENOUS; SUBCUTANEOUS at 21:49

## 2018-07-07 RX ADMIN — CITALOPRAM HYDROBROMIDE 40 MG: 20 TABLET ORAL at 09:35

## 2018-07-07 RX ADMIN — HYDROMORPHONE HYDROCHLORIDE 1 MG: 1 INJECTION, SOLUTION INTRAMUSCULAR; INTRAVENOUS; SUBCUTANEOUS at 18:47

## 2018-07-07 RX ADMIN — HEPARIN SODIUM 3600 UNITS: 1000 INJECTION, SOLUTION INTRAVENOUS; SUBCUTANEOUS at 00:42

## 2018-07-07 RX ADMIN — GABAPENTIN 800 MG: 400 CAPSULE ORAL at 09:36

## 2018-07-07 RX ADMIN — HEPARIN SODIUM 3600 UNITS: 1000 INJECTION, SOLUTION INTRAVENOUS; SUBCUTANEOUS at 19:32

## 2018-07-07 RX ADMIN — NITROGLYCERIN 0.4 MG: 0.4 TABLET SUBLINGUAL at 11:58

## 2018-07-07 RX ADMIN — MORPHINE SULFATE 1 MG: 2 INJECTION, SOLUTION INTRAMUSCULAR; INTRAVENOUS at 06:49

## 2018-07-07 RX ADMIN — MORPHINE SULFATE 1 MG: 2 INJECTION, SOLUTION INTRAMUSCULAR; INTRAVENOUS at 03:39

## 2018-07-07 RX ADMIN — HYDROMORPHONE HYDROCHLORIDE 1 MG: 1 INJECTION, SOLUTION INTRAMUSCULAR; INTRAVENOUS; SUBCUTANEOUS at 15:46

## 2018-07-07 RX ADMIN — PANTOPRAZOLE SODIUM 40 MG: 40 INJECTION, POWDER, FOR SOLUTION INTRAVENOUS at 10:22

## 2018-07-07 RX ADMIN — ATORVASTATIN CALCIUM 40 MG: 40 TABLET, FILM COATED ORAL at 09:36

## 2018-07-07 RX ADMIN — GABAPENTIN 800 MG: 400 CAPSULE ORAL at 15:15

## 2018-07-07 RX ADMIN — MIRTAZAPINE 30 MG: 15 TABLET, FILM COATED ORAL at 21:49

## 2018-07-07 RX ADMIN — MORPHINE SULFATE 1 MG: 2 INJECTION, SOLUTION INTRAMUSCULAR; INTRAVENOUS at 00:02

## 2018-07-07 RX ADMIN — IOHEXOL 85 ML: 350 INJECTION, SOLUTION INTRAVENOUS at 12:31

## 2018-07-07 RX ADMIN — CLOPIDOGREL BISULFATE 600 MG: 75 TABLET ORAL at 11:38

## 2018-07-07 RX ADMIN — HEPARIN SODIUM AND DEXTROSE 20 UNITS/KG/HR: 10000; 5 INJECTION INTRAVENOUS at 22:19

## 2018-07-07 RX ADMIN — MORPHINE SULFATE 1 MG: 2 INJECTION, SOLUTION INTRAMUSCULAR; INTRAVENOUS at 01:00

## 2018-07-07 RX ADMIN — GABAPENTIN 800 MG: 400 CAPSULE ORAL at 21:49

## 2018-07-07 RX ADMIN — HYDROMORPHONE HYDROCHLORIDE 1 MG: 1 INJECTION, SOLUTION INTRAMUSCULAR; INTRAVENOUS; SUBCUTANEOUS at 12:45

## 2018-07-07 NOTE — H&P
History and Physical - [facility] Internal Medicine    Patient Information: Princess Felton 59 y o  female MRN: [id number]  Unit/Bed#: ED 27 Encounter: [id number]  Admitting Physician: Vineet Cordero DO  PCP: Sly Gonzales MD  Date of Admission:  07/06/18    Assessment/Plan:    Hospital Problem List:     Principal Problem:    Angina, class III ([address] )      Plan for the Primary Problem(s): # Angina  - Discussed case with cardiology on-call Dr Mary Grace Pedersen (patient is well known to him) given prior hx, risk factors and convincing presenting symptoms recommended to regardless start on heparin gtt  - Will place on nitropaste as well  - Unfortunately asa allergy resulting in angioedema  - cont bb, statin  - Serial troponin  - Cardiology consulted     Plan for Additional Problems:    # HTN  - BP on low side may likely be secondary to the 2 doses of IV fentanyl she received in the ED  Will give IV NS bolus 500cc x 1 now  Holding parameters placed on meds    # Psych hx - anxiety/bipolar - cont meds    Disp: Plan of care extensively discussed with patient and her   VTE Prophylaxis: Heparin Drip  / sequential compression device   Code Status: Full code  POLST: There is no POLST form on file for this patient (pre-hospital)    Anticipated Length of Stay:  Patient will be admitted on an Inpatient basis with an anticipated length of stay of  > 2 midnights  Justification for Hospital Stay: Angina    Total Time for Visit, including Counseling / Coordination of Care: 1 hour  Greater than 50% of this total time spent on direct patient counseling and coordination of care  Chief Complaint:   Chest pain x1 day    History of Present Illness:    Princess Felton is a 59 y o  female medical history significant for coronary artery disease on amendable to PCI, hypertension, tobacco use, anxiety, bipolar disorder    Patient states that she was in her usual state of health, her  and actually taken her out to [facility] for her upcoming birthday  She reports after dinner she had chest pressure on left sternal border, no radiation, intermittent shortness of breath  She reports that minutes after she had approximately 5 episodes of nonbloody nonbilious vomiting  She was immediately brought to the ER per EMS  She follows with Dr Aylin Wild as outpatient and plan was for patient to have a stress echocardiogram this upcoming Monday  Had a cardiac cath in 3/2017 secondary to abnormal stress test which showed diffuse disease and medical therapy was recommended  She admits that she still smokes though she is down to 2 cigarettes per day    Upon presentation to the ER, she was given IV fentanyl she has reports of some improvement of pain though still persist   At this time she denies shortness of breath  Cardiology was also contacted from the ED  Troponin is negative x1    Review of Systems:    Review of Systems   Constitutional: Negative  HENT: Negative  Eyes: Negative  Respiratory: Negative  Cardiovascular: Positive for chest pain  Gastrointestinal: Positive for nausea and vomiting  Endocrine: Negative  Genitourinary: Negative  Musculoskeletal: Negative  Allergic/Immunologic: Negative  Neurological: Negative  Hematological: Negative  Psychiatric/Behavioral: Negative          Past Medical and Surgical History:     Past Medical History:   Diagnosis Date    Anxiety     Bipolar 1 disorder ([facility] )     Mental Health problems listed as Denied in Allscripts, cant't entire under pertinent negatives due to this diagnosis    CAD (coronary artery disease)     last assessed - [id number]    Chronic back pain     Frequent headaches     Glaucoma     Hyperlipidemia     Hypertension     Ovarian cancer ([facility] )     last assessed - [id number]    Psychiatric disorder     bipolar    [facility]     Uterine carcinoma ([facility] )     last assessed - [id number]       Past Surgical History:   Procedure Laterality Date  ADENOIDECTOMY      Tonsillectomy with Adenoidectomy - last assessed - 21Sep2016    APPENDECTOMY      last assessed - 21Sep2016    CATARACT EXTRACTION Left     Remove cataract, corneo-scleral section of left eye; last assessed - 21Sep2016    CHOLECYSTECTOMY      last assessed - 29Sep2016   [facility] E[facility] SURGERY      Anterior sclera fistulization for glaucoma; last assessed - 21Sep2016    HYSTERECTOMY      KRYSTA-BSO; last assessed - 21Sep2016    INTRAOCULAR LENS INSERTION      CA TEMPORAL ARTERY LIGATN OR BX Right 12/16/2016    Procedure: BIOPSY ARTERY TEMPORAL;  Surgeon: Marysol Dao MD;  Location: [facility];  Service: General    TONSILLECTOMY      Tonsillectomy with Adenoidectomy - last assessed - 21Sep2016       Meds/Allergies:    Prior to Admission medications    Medication Sig Start Date End Date Taking? Authorizing Provider   amLODIPine (NORVASC) 5 mg tablet Take 1 tablet (5 mg total) by mouth daily 6/14/18   Ilda Montano MD   atorvastatin (LIPITOR) 40 mg tablet Take 1 tablet (40 mg total) by mouth daily for 90 days 6/14/18 9/12/18  Ilda Montano MD   citalopram (CELEXA) 40 mg tablet Take 40 mg by mouth daily      Historical Provider, MD   clonazePAM (KlonoPIN) 1 mg tablet 1 mg 2 (two) times a day as needed for anxiety   4/20/18   Historical Provider, MD   gabapentin (NEURONTIN) 800 mg tablet Take 800 mg by mouth 3 (three) times a day    Historical Provider, MD   metoprolol tartrate (LOPRESSOR) 50 mg tablet Take 1 tablet (50 mg total) by mouth every 12 (twelve) hours 6/14/18   Ilda Montano MD   mirtazapine (REMERON) 30 mg tablet Take 30 mg by mouth daily at bedtime      Historical Provider, MD     I have reviewed home medications with patient personally  Allergies:    Allergies   Allergen Reactions    Aspirin Anaphylaxis and Other (See Comments)    Bee Venom Anaphylaxis    Robaxin [Methocarbamol] Anaphylaxis and Seizures     Seizures per pt       Tramadol Hives, Shortness Of Breath and Other (See Comments)    Ketorolac     Omeprazole GI Intolerance    Codeine Other (See Comments) and Rash     Dizziness nausea       Social History:     Marital Status: /Civil Union   Occupation:   Patient Pre-hospital Living Situation:  Resides with  independent of ADL  Patient Pre-hospital Level of Mobility:  Ambulates without assistive devices  Patient Pre-hospital Diet Restrictions:  None  Substance Use History:   History   Alcohol Use    Yes     Comment: occasional; (No alcohol use per Allscripts)     History   Smoking Status    Current Every Day Smoker    Packs/day: 0 20    Years: 46 00    Types: Cigarettes   Smokeless Tobacco    Never Used     History   Drug Use No       Family History:    non-contributory    Physical Exam:     Vitals:   Blood Pressure: (!) 86/51 (07/06/18 2000)  Pulse: 92 (07/06/18 2000)  Respirations: 22 (07/06/18 2000)  SpO2: 99 % (07/06/18 2000)    General Appearance:  Alert, cooperative, no distress, appears stated age   Head:  Normocephalic, without obvious abnormality, atraumatic   Neck: Supple   Lungs:   Clear to auscultation bilaterally, respirations unlabored   Chest Wall:  No tenderness or deformity    Heart:  Regular rate and rhythm, S1 and S2 normal, no murmur, rub or gallop   Abdomen:   Soft, non-tender, bowel sounds active all four quadrants,  no masses, no organomegaly   Extremities: Extremities normal, atraumatic, no cyanosis or edema   Pulses: 2+ and symmetric all extremities   Skin: Skin color, texture, turgor normal, no rashes or lesions   Lymph nodes:    Neurologic: CNII-XII intact, speech fluent, comprehensible, no facial asymmetry; normal strength 5/5 in all major muscle groups, sensation and reflexes throughout       Additional Data:     Lab Results: I have personally reviewed pertinent reports          Results from last 7 days  Lab Units 07/06/18  1835   WBC Thousand/uL 6 87   HEMOGLOBIN g/dL 13 9   HEMATOCRIT % 40 2   PLATELETS Thousands/uL 215 NEUTROS PCT % 54   LYMPHS PCT % 32   MONOS PCT % 8   EOS PCT % 5       Results from last 7 days  Lab Units 07/06/18  1835   SODIUM mmol/L 138   POTASSIUM mmol/L 3 4*   CHLORIDE mmol/L 106   CO2 mmol/L 18*   BUN mg/dL 13   CREATININE mg/dL 0 71   CALCIUM mg/dL 8 9   TOTAL PROTEIN g/dL 7 3   BILIRUBIN TOTAL mg/dL 0 20   ALK PHOS U/L 141*   ALT U/L 58   AST U/L 31   GLUCOSE RANDOM mg/dL 116       Results from last 7 days  Lab Units 07/06/18  1835   INR  0 98       Imaging: Pending official chest x-ray results    No results found  EKG, Pathology, and Other Studies Reviewed on Admission:   · EKG:  Sinus tachycardia at 103 beats per minute, normal axis, poor rw progression TW flattening in lead III    Allscripts Records Reviewed: Yes     ** Please Note: Dragon 360 Dictation voice to text software may have been used in the creation of this document   **

## 2018-07-07 NOTE — PROGRESS NOTES
[facility] Internal Medicine Progress Note  Patient: Yamilka Head 59 y o  female   MRN: [id number]  PCP: Samira Márquez MD  Unit/Bed#: -01 Encounter: [id number]  Date Of Visit: 18    Assessment:    Principal Problem:    Angina, class III ([facility] )  Active Problems:    Depression    Chronic back pain    Neuropathy    COPD (chronic obstructive pulmonary disease) ([facility] )    Essential hypertension      Plan:    · 1  Chest pain- cardiac enzymes negative  Patient on heparin drip  Cardiology to evaluate  · 2  HTN- on norvasc and metoprolol  · 3  Chronic back pain- will continue pain control  · 4  Peripheral neuropathy- on gabapentin  · 5  Depression- on celexa       VTE Pharmacologic Prophylaxis:   Pharmacologic: Heparin Drip  Mechanical VTE Prophylaxis in Place: Yes    Patient Centered Rounds: I have performed bedside rounds with nursing staff today  Discussions with Specialists or Other Care Team Provider:     Education and Discussions with Family / Patient:     Time Spent for Care: 20 minutes  More than 50% of total time spent on counseling and coordination of care as described above  Current Length of Stay: 1 day(s)    Current Patient Status: Inpatient   Certification Statement: The patient will continue to require additional inpatient hospital stay due to Chest pain    Discharge Plan / Estimated Discharge Date: once chest pain improves    Code Status: Level 1 - Full Code      Subjective:   Patient seen and examined at bedside  Patient still complaints of chest pain  Objective:     Vitals:   Temp (24hrs), Av 1 °F (36 7 °C), Min:98 °F (36 7 °C), Max:98 2 °F (36 8 °C)    HR:  [79-97] 79  Resp:  [18-32] 18  BP: ()/(51-84) 125/79  SpO2:  [95 %-100 %] 98 %  Body mass index is 26 57 kg/m²  Input and Output Summary (last 24 hours):        Intake/Output Summary (Last 24 hours) at 18 1038  Last data filed at 18 0908   Gross per 24 hour   Intake              740 ml   Output 350 ml   Net              390 ml       Physical Exam:     Physical Exam   Constitutional: She is oriented to person, place, and time  She appears well-developed and well-nourished  HENT:   Head: Normocephalic and atraumatic  Eyes: Conjunctivae and EOM are normal  Pupils are equal, round, and reactive to light  Neck: Normal range of motion  Neck supple  No JVD present  No tracheal deviation present  No thyromegaly present  Cardiovascular: Normal rate, regular rhythm and normal heart sounds  Exam reveals no gallop and no friction rub  No murmur heard  Pulmonary/Chest: Effort normal and breath sounds normal  No respiratory distress  She has no wheezes  She has no rales  Abdominal: Soft  Bowel sounds are normal  She exhibits no distension  There is no tenderness  There is no rebound  Musculoskeletal: Normal range of motion  She exhibits no edema  Neurological: She is alert and oriented to person, place, and time  Vitals reviewed  Additional Data:     Labs:      Results from last 7 days  Lab Units 07/07/18 0628 07/06/18  1835   WBC Thousand/uL 4 75  < > 6 87   HEMOGLOBIN g/dL 12 6  < > 13 9   HEMATOCRIT % 37 6  < > 40 2   PLATELETS Thousands/uL 157  < > 215   NEUTROS PCT %  --   --  54   LYMPHS PCT %  --   --  32   MONOS PCT %  --   --  8   EOS PCT %  --   --  5   < > = values in this interval not displayed  Results from last 7 days  Lab Units 07/07/18 0628 07/06/18  1835   SODIUM mmol/L 140 138   POTASSIUM mmol/L 4 8 3 4*   CHLORIDE mmol/L 111* 106   CO2 mmol/L 23 18*   BUN mg/dL 16 13   CREATININE mg/dL 0 68 0 71   CALCIUM mg/dL 8 4 8 9   TOTAL PROTEIN g/dL  --  7 3   BILIRUBIN TOTAL mg/dL  --  0 20   ALK PHOS U/L  --  141*   ALT U/L  --  58   AST U/L  --  31   GLUCOSE RANDOM mg/dL 93 116       Results from last 7 days  Lab Units 07/06/18  1835   INR  0 98       * I Have Reviewed All Lab Data Listed Above  * Additional Pertinent Lab Tests Reviewed:  All Labs For Current Hospital Admission Reviewed    Imaging:    Imaging Reports Reviewed Today Include:   Imaging Personally Reviewed by Myself Includes:      Recent Cultures (last 7 days):           Last 24 Hours Medication List:     Current Facility-Administered Medications:  acetaminophen 650 mg Oral Q6H PRN Peri Tracey PA-C    amLODIPine 5 mg Oral Daily Tammy [place], DO    atorvastatin 40 mg Oral Daily Tammy [place], DO    calcium carbonate 500 mg Oral TID PRN Ramonita Dc MD    citalopram 40 mg Oral Daily Tammy Deidre, DO    clonazePAM 1 mg Oral BID PRN Tammy [place], DO    clopidogrel 600 mg Oral Once Ramonita Dc MD    gabapentin 800 mg Oral TID Tammy [place], DO    heparin (porcine) 3-20 Units/kg/hr (Order-Specific) Intravenous Titrated Gillian Riojas MD Last Rate: 16 Units/kg/hr (07/07/18 0047)   heparin (porcine) 1,800 Units Intravenous PRN Gillian Riojas MD    heparin (porcine) 3,600 Units Intravenous PRN Gillian Riojas MD    metoprolol tartrate 50 mg Oral Q12H [address] Tammy [place], DO    mirtazapine 30 mg Oral HS Tammy Deidre, DO    morphine injection 1 mg Intravenous Q3H PRN Lynn Reyez PA-C    nicotine 1 patch Transdermal Daily Tammy [place], DO    nitroglycerin 0 1 mg Transdermal Daily Tammy [place], DO    ondansetron 4 mg Intravenous Q6H PRN Tammy Deidre, DO    pantoprazole 40 mg Intravenous Q24H [address] Ramonita Dc MD         Today, Patient Was Seen By: Janet Roach MD    ** Please Note: This note has been constructed using a voice recognition system   **

## 2018-07-07 NOTE — CONSULTS
Consultation - Cardiology Team One  Nino Felix 59 y o  female MRN: [id number]  Unit/Bed#: -01 Encounter: [id number]    Inpatient consult to Cardiology  Consult performed by: Dariusz Agustin ordered by: Cony Jackman          Physician Requesting Consult: Nurys Rg MD  Reason for Consult / Principal Problem: Chest pain    HPI: Cardiologist Dr Honeycutt Jessica is a 59y o  year old female who has a history of nonobstructive CAD, HTN, HLD, tobacco use, anxiety presenting with chest pain  Patient describes chest pain as L sided pressure associated with nausea, all starting after episode of vomiting  Symptoms accompanied by intermittent SOB, multiple episodes of vomiting  Now, patient complains of persistent chest pressure  REVIEW OF SYSTEMS:  Constitutional:  Denies fever or chills   Eyes:  Denies change in visual acuity   HENT:  Denies nasal congestion or sore throat   Respiratory:  +SOB  Cardiovascular:  +chest pain  Denies edema   GI:  +nausea  +vomiting   Denies abdominal pain, bloody stools or diarrhea   :  Denies dysuria, frequency, difficulty in micturition and nocturia  Musculoskeletal:  Denies back pain or joint pain   Neurologic:  Denies headache, focal weakness or sensory changes   Endocrine:  Denies polyuria or polydipsia   Lymphatic:  Denies swollen glands   Psychiatric:  Denies depression or anxiety     Historical Information   Past Medical History:   Diagnosis Date    Anxiety     Bipolar 1 disorder ([facility] )     Mental Health problems listed as Denied in [employer], cant't entire under pertinent negatives due to this diagnosis    CAD (coronary artery disease)     last assessed - [id number]    Chronic back pain     Frequent headaches     Glaucoma     Hyperlipidemia     Hypertension     Ovarian cancer ([facility] )     last assessed - [id number]    Psychiatric disorder     bipolar    Rheumatoid arthritis ([facility] )     Uterine carcinoma ([facility] )     last assessed - [id number]     Past Surgical History:   Procedure Laterality Date    ADENOIDECTOMY      Tonsillectomy with Adenoidectomy - last assessed - 21Sep2016    APPENDECTOMY      last assessed - 21Sep2016    CATARACT EXTRACTION Left     Remove cataract, corneo-scleral section of left eye; last assessed - 21Sep2016    CHOLECYSTECTOMY      last assessed - 29Sep2016   [facility] EYE SURGERY      Anterior sclera fistulization for glaucoma; last assessed - 21Sep2016    HYSTERECTOMY      KRYSTA-BSO; last assessed - 21Sep2016    INTRAOCULAR LENS INSERTION      RI TEMPORAL ARTERY LIGATN OR BX Right 12/16/2016    Procedure: BIOPSY ARTERY TEMPORAL;  Surgeon: Hemal Escobar MD;  Location: [facility];  Service: General    TONSILLECTOMY      Tonsillectomy with Adenoidectomy - last assessed - 21Sep2016     History   Alcohol Use    Yes     Comment: occasional; (No alcohol use per [employer])     History   Drug Use No     History   Smoking Status    Current Every Day Smoker    Packs/day: 0 20    Years: 46 00    Types: Cigarettes   Smokeless Tobacco    Never Used       Family History:   Family History   Problem Relation Age of Onset    Heart disease Mother     Coronary artery disease Mother     Other Mother         1) Gangrene; 2) Peripheral arterial disease    Hyperlipidemia Mother         Hypercholesterolemia    Stomach cancer Mother     Heart attack Mother         Myocardial Infarction    Other Father         1) Gangrene; 2) Peripheral arterial disease    Hyperlipidemia Father         Hypercholesterolemia    Stomach cancer Father     Heart attack Father         Myocardial Infarction    Stomach cancer Brother     Heart attack Brother         Myocardial Infarction    Stomach cancer Maternal Uncle        MEDS & ALLERGIES:  all current active meds have been reviewed and current meds: Current Facility-Administered Medications   Medication Dose Route Frequency    acetaminophen (TYLENOL) tablet 650 mg  650 mg Oral Q6H PRN    amLODIPine (NORVASC) tablet 5 mg  5 mg Oral Daily    atorvastatin (LIPITOR) tablet 40 mg  40 mg Oral Daily    citalopram (CeleXA) tablet 40 mg  40 mg Oral Daily    clonazePAM (KlonoPIN) tablet 1 mg  1 mg Oral BID PRN    gabapentin (NEURONTIN) capsule 800 mg  800 mg Oral TID    heparin (porcine) 25,000 units in 250 mL infusion (premix)  3-20 Units/kg/hr (Order-Specific) Intravenous Titrated    heparin (porcine) injection 1,800 Units  1,800 Units Intravenous PRN    heparin (porcine) injection 3,600 Units  3,600 Units Intravenous PRN    metoprolol tartrate (LOPRESSOR) tablet 50 mg  50 mg Oral Q12H [facility]    mirtazapine (REMERON) tablet 30 mg  30 mg Oral HS    morphine injection 1 mg  1 mg Intravenous Q3H PRN    nicotine (NICODERM CQ) 14 mg/24hr TD 24 hr patch 1 patch  1 patch Transdermal Daily    nitroglycerin (NITRODUR) 0 1 mg/hr TD 24 hr patch  0 1 mg Transdermal Daily    ondansetron (ZOFRAN) injection 4 mg  4 mg Intravenous Q6H PRN       heparin (porcine) 3-20 Units/kg/hr (Order-Specific) Last Rate: 16 Units/kg/hr (07/07/18 0047)     Allergies   Allergen Reactions    Aspirin Anaphylaxis and Other (See Comments)    Bee Venom Anaphylaxis    Robaxin [Methocarbamol] Anaphylaxis and Seizures     Seizures per pt       Tramadol Hives, Shortness Of Breath and Other (See Comments)    Ketorolac     Omeprazole GI Intolerance    Codeine Other (See Comments) and Rash     Dizziness nausea       OBJECTIVE:  Vitals:   Vitals:    07/07/18 0700   BP: 125/79   Pulse: 79   Resp: 18   Temp: 98 °F (36 7 °C)   SpO2: 98%     Body mass index is 26 57 kg/m²      Systolic (77RBG), JYI:149 , Min:86 , PMM:832     Diastolic (00EDJ), SLZ:90, Min:51, Max:84      Intake/Output Summary (Last 24 hours) at 07/07/18 1010  Last data filed at 07/07/18 0908   Gross per 24 hour   Intake              740 ml   Output              350 ml   Net              390 ml     Weight (last 2 days)     Date/Time   Weight    07/07/18 0339  68 (150) Invasive Devices     Peripheral Intravenous Line            Peripheral IV 07/06/18 Left Forearm less than 1 day    Peripheral IV 07/06/18 Right Forearm less than 1 day                PHYSICAL EXAMS:  General:  +uncomfortable  Patient is not in acute distress, laying in the bed comfortably, awake, alert responding to commands  Head: Normocephalic, Atraumatic  HEENT: White sclera, pink conjunctiva,  PERRLA,pharynx benign  Neck:  Supple, no neck vein distention, carotids+2/+2 no bruits, thyromegaly, adenopathy  Respiratory: clear to P/A  Cardiovascular:  PMI normal, S1-S2 normal, No  Murmurs, thrills, gallops, rubs   Regular rhythm  GI:  Abdomen soft nontender   No hepatosplenomegaly, adenopathy, ascites,or rebound tenderness  Extremities: No edema, normal pulses, no calf tenderness, no joint deformities, no venous disease   Integument:  No skin rashes or ulceration  Lymphatic:  No cervical or inguinal lymphadenopathy  Neurologic:  Patient is awake alert, responding to command, well-oriented to time and place and person moving all extremities    LABORATORY RESULTS:    Results from last 7 days  Lab Units 07/07/18  0322 07/07/18  0007 07/06/18  2126 07/06/18  1835   CK TOTAL U/L  --   --   --  113   TROPONIN I ng/mL <0 02 <0 02 <0 02 <0 02     CBC with diff:   Results from last 7 days  Lab Units 07/07/18  0628 07/07/18  0007 07/06/18  1835   WBC Thousand/uL 4 75 5 03 6 87   HEMOGLOBIN g/dL 12 6 12 3 13 9   HEMATOCRIT % 37 6 35 9 40 2   MCV fL 90 90 87   PLATELETS Thousands/uL 157 189 215   MCH pg 30 1 30 7 30 0   MCHC g/dL 33 5 34 3 34 6   RDW % 14 2 13 9 13 7   MPV fL 9 4 9 6 9 4   NRBC AUTO /100 WBCs  --   --  0       CMP:  Results from last 7 days  Lab Units 07/07/18  0628 07/06/18  1835   SODIUM mmol/L 140 138   POTASSIUM mmol/L 4 8 3 4*   CHLORIDE mmol/L 111* 106   CO2 mmol/L 23 18*   ANION GAP mmol/L 6 14*   BUN mg/dL 16 13   CREATININE mg/dL 0 68 0 71   GLUCOSE RANDOM mg/dL 93 116   CALCIUM mg/dL 8 4 8 9 AST U/L  --  31   ALT U/L  --  58   ALK PHOS U/L  --  141*   TOTAL PROTEIN g/dL  --  7 3   BILIRUBIN TOTAL mg/dL  --  0 20   EGFR ml/min/1 73sq m 96 94       BMP:  Results from last 7 days  Lab Units 07/07/18  0628 07/06/18  1835   SODIUM mmol/L 140 138   POTASSIUM mmol/L 4 8 3 4*   CHLORIDE mmol/L 111* 106   CO2 mmol/L 23 18*   BUN mg/dL 16 13   CREATININE mg/dL 0 68 0 71   GLUCOSE RANDOM mg/dL 93 116   CALCIUM mg/dL 8 4 8 9              Results from last 7 days  Lab Units 07/07/18  0628   MAGNESIUM mg/dL 2 0               Results from last 7 days  Lab Units 07/06/18  1835   INR  0 98       Lipid Profile:   Lab Results   Component Value Date    CHOL 181 07/07/2018     Lab Results   Component Value Date    HDL 46 07/07/2018     Lab Results   Component Value Date    LDLCALC 116 (H) 07/07/2018     Lab Results   Component Value Date    TRIG 95 07/07/2018       Cardiac testing:   No results found for this or any previous visit  No results found for this or any previous visit  No procedure found  No results found for this or any previous visit  Imaging:   I have personally reviewed pertinent reports  EKG reviewed personally:  Unremarkable    Assessment/Plan:  1  Chest pain, nonobstructive CAD:  -STAT EKG unremarkable for ischemia  -Serial troponins negative x4  -will order stat CTA chest, D-dimer  -will administer IV pantoprazole, SL nitro, plavix loading dose  -Cardiac catheterization March 2017 showed mild atherosclerosis of proximal LAD, D1, and RPLB, moderate atherosclerosis of mid LAD and distal LAD  -ECHO ordered, will assess EF and regional wall motion abnormalities  -Will continue to observe  Will consider GI etiology  2   Hypertension:  Stable, continue present regimen  3   Hyperlipidemia:  Continue statin    4  Tobacco use:  Smoking cessation counseling  Code Status: Level 1 - Full Code    Counseling / Coordination of Care  Total floor / unit time spent today 35 minutes    Greater than 50% of total time was spent with the patient and / or family counseling and / or coordination of care  A description of the counseling / coordination of care: Review of history, current assessment, development of a plan      Duane Upton PA-C  7/7/2018,10:10 AM

## 2018-07-07 NOTE — CASE MANAGEMENT
Initial Clinical Review    Admission: Date/Time/Statement: 7/6/18 @ 1907     Orders Placed This Encounter   Procedures    Inpatient Admission (expected length of stay for this patient is greater than two midnights)     Standing Status:   Standing     Number of Occurrences:   1     Order Specific Question:   Admitting Physician     Answer:   Jordy Doran [[id number]]     Order Specific Question:   Level of Care     Answer:   Med Surg [[id number]]     Order Specific Question:   Estimated length of stay     Answer:   More than 2 Midnights     Order Specific Question:   Certification     Answer:   I certify that inpatient services are medically necessary for this patient for a duration of greater than two midnights  See H&P and MD Progress Notes for additional information about the patient's course of treatment  ED: Date/Time/Mode of Arrival:   ED Arrival Information     Expected Arrival Acuity Means of Arrival Escorted By Service Admission Type    - 7/6/2018 18:25 [facility]) General Medicine Emergency    Arrival Complaint    -          Chief Complaint:   Chief Complaint   Patient presents with    Chest Pain     pt with c/o chest pain starting half hour prior to arrival  Pt states its like someones sitting on her chest, pt with positive cardiac history       History of Illness: Itzel Beyer is a 59 y o  female medical history significant for coronary artery disease on amendable to PCI, hypertension, tobacco use, anxiety, bipolar disorder  Patient states that she was in her usual state of health, her  and actually taken her out to [facility] for her upcoming birthday  She reports after dinner she had chest pressure on left sternal border, no radiation, intermittent shortness of breath  She reports that minutes after she had approximately 5 episodes of nonbloody nonbilious vomiting  She was immediately brought to the ER per EMS    She follows with Dr Kimberlee Wright as outpatient and plan was for patient to have a stress echocardiogram this upcoming Monday  Had a cardiac cath in 3/2017 secondary to abnormal stress test which showed diffuse disease and medical therapy was recommended         ED Vital Signs:   ED Triage Vitals   Temperature Pulse Respirations Blood Pressure SpO2   07/06/18 2151 07/06/18 1841 07/06/18 1841 07/06/18 1841 07/06/18 1841   98 °F (36 7 °C) 97 20 106/77 99 %      Temp Source Heart Rate Source Patient Position - Orthostatic VS BP Location FiO2 (%)   07/06/18 2151 07/06/18 1841 07/06/18 1925 07/06/18 1841 --   Oral Monitor Sitting Right arm       Pain Score       07/06/18 1918       Worst Possible Pain        Wt Readings from Last 1 Encounters:   07/07/18 68 kg (150 lb)     Vital Signs (abnormal):   07/06/18 2000  --  92  22   86/51  99 %  None (Room air)  Sitting   07/06/18 1945  --  92  20  95/52  99 %  None (Room air)  Sitting     Abnormal Labs/Diagnostic Test Results: K   3 4, co2 18, an gap 14, alk phos 141  CXR -COPD EKG- NSR   And ST     ED Treatment:   Medication Administration from 07/06/2018 1825 to 07/06/2018 2138       Date/Time Order Dose Route Action Action by Comments     07/06/2018 1835 fentanyl citrate (PF) 100 MCG/2ML 100 mcg 100 mcg Intravenous Given Iliana Banerjee RN      07/06/2018 1904 heparin (porcine) injection 3,600 Units 3,600 Units Intravenous Given Iliana Banerjee RN      07/06/2018 1903 heparin (porcine) 25,000 units in 250 mL infusion (premix) 12 Units/kg/hr Intravenous 1001 Holcomb OLGA Chiang      07/06/2018 2022 sodium chloride 0 9 % bolus 500 mL 0 mL Intravenous Stopped Rebecca Fulton RN      07/06/2018 1922 sodium chloride 0 9 % bolus 500 mL 500 mL Intravenous New Bag Rebecca Fulton RN      07/06/2018 1918 fentanyl citrate (PF) 100 MCG/2ML 100 mcg 100 mcg Intravenous Given Nelly Hannah RN      07/06/2018 2103 nitroglycerin (NITRODUR) 0 1 mg/hr TD 24 hr patch 0 1 mg Transdermal Medication Applied Rebecca OLGA Fulton L chest wall     07/06/2018 2047 potassium chloride (K-DUR,KLOR-CON) CR tablet 40 mEq 40 mEq Oral Given Rebecca Fulton RN      07/06/2018 2041 sodium chloride 0 9 % bolus 500 mL 500 mL Intravenous Saul Adams RN      07/06/2018 2112 morphine injection 1 mg 1 mg Intravenous Given Lauren Interiano RN           Past Medical/Surgical History:    Active Ambulatory Problems     Diagnosis Date Noted    Increased severity of headaches 12/16/2016    Chest pain 03/05/2017    Depression 03/05/2017    Anxiety 03/05/2017    Chronic back pain 03/05/2017    Tobacco abuse disorder 03/05/2017    Neuropathy 03/05/2017    Positive cardiac stress test 03/07/2017    Coronary artery spasm (HCC) 03/08/2017    Acute bronchitis 03/08/2017    Abdominal pain 09/29/2017    C  difficile diarrhea 09/29/2017    COPD (chronic obstructive pulmonary disease) ([facility] ) 09/30/2017    Transaminitis 09/30/2017    Essential hypertension 10/27/2017    Lumbosacral radiculopathy at L4 06/05/2018    Coronary artery disease involving native coronary artery of native heart without angina pectoris 06/14/2018    Angina, class III ([facility] ) 06/14/2018    Mixed hyperlipidemia 06/14/2018     Resolved Ambulatory Problems     Diagnosis Date Noted    Enteritis 10/27/2017     Past Medical History:   Diagnosis Date    Anxiety     Bipolar 1 disorder ([facility] )     CAD (coronary artery disease)     Chronic back pain     Frequent headaches     Glaucoma     Hyperlipidemia     Hypertension     Ovarian cancer ([facility] )     Psychiatric disorder     Rheumatoid arthritis ([facility] )     Uterine carcinoma ([facility] )        Admitting Diagnosis: Mixed hyperlipidemia [E78 2]  Angina, class III ([address] ) [I20 9]  Chest pain [R07 9]  Smoker [F17 200]  Chronic obstructive pulmonary disease, unspecified COPD type ([facility] ) [J44 9]  Coronary artery disease involving native heart with unstable angina pectoris, unspecified vessel or lesion type ([facility] ) [I25 110]    Age/Sex: 59 y o  female    Assessment/Plan: Hospital Problem List:      Principal Problem:    Ang[facility], class III ([facility] )   Plan for the Primary Problem(s):   # Angina  - Discussed case with cardiology on-call Dr Shree Zarate (patient is well known to him) given prior hx, risk factors and convincing presenting symptoms recommended to regardless start on heparin gtt  - Will place on nitropaste as well  - Unfortunately asa allergy resulting in angioedema  - cont bb, statin  - Serial troponin  - Cardiology consulted    Plan for Additional Problems:    # HTN  - BP on low side may likely be secondary to the 2 doses of IV fentanyl she received in the ED  Will give IV NS bolus 500cc x 1 now  Holding parameters placed on meds   # Psych hx - anxiety/bipolar - cont meds   Disp: Plan of care extensively discussed with patient and her husbandVTE Prophylaxis: Heparin Drip  / sequential compression device   Code Status: Full code  POLST: There is no POLST form on file for this patient (pre-hospital)     Anticipated Length of Stay:  Patient will be admitted on an Inpatient basis with an anticipated length of stay of  > 2 midnights     Justification for Hospital Stay: Angina    Admission Orders:  Scheduled Meds:   Current Facility-Administered Medications:  acetaminophen 650 mg Oral Q6H PRN Ismael Sena PA-C    amLODIPine 5 mg Oral Daily [facility], DO    atorvastatin 40 mg Oral Daily [facility], DO    calcium carbonate 500 mg Oral TID PRN Rosa Chan MD    citalopram 40 mg Oral Daily [facility], DO    clonazePAM 1 mg Oral BID PRN [facility], DO    gabapentin 800 mg Oral TID [facility], DO    heparin (porcine) 3-20 Units/kg/hr (Order-Specific) Intravenous Titrated Lucero Bliss MD Last Rate: 16 Units/kg/hr (07/07/18 0047)   heparin (porcine) 1,800 Units Intravenous PRN Lucero Bliss MD    heparin (porcine) 3,600 Units Intravenous PRN Lucero Bliss MD    metoprolol tartrate 50 mg Oral Q12H [facility] Hari Arnold, DO    mirtazapine 30 mg Oral HS Hari Arnold, DO    morphine injection 1 mg Intravenous Q3H PRN Low Mendoza PA-C    nicotine 1 patch Transdermal Daily Cavendish Clayton, DO    nitroglycerin 0 4 mg Sublingual Q5 Min PRN Kamilla Marin MD    ondansetron 4 mg Intravenous Q6H PRN Cavendish Clayton, DO    pantoprazole 40 mg Intravenous Q24H [facility] Kamilla Marin MD      Continuous Infusions:   heparin (porcine) 3-20 Units/kg/hr (Order-Specific) Last Rate: 16 Units/kg/hr (07/07/18 0047)     PRN Meds:   acetaminophen    calcium carbonate    clonazePAM    heparin (porcine)    morphine injection   q3hr prn x4    Nitroglycerin  x1    ondansetron     Cardiac diet   I&O   Tele   Cardiology consult   EKG   [facility]  7/7 d-dimer, c-reactive protein , sed rate , ptt, bmp, cbc   Echo  Serial trop - all wnl     IM note  7/7  Assessment:   Principal Problem:    Angina, class III ([facility])  Active Problems:    Depression    Chronic back pain    Neuropathy    COPD (chronic obstructive pulmonary disease) ([facility])    Essential hypertension   Plan:   · 1  Chest pain- cardiac enzymes negative  Patient on heparin drip  Cardiology to evaluate  · 2  HTN- on norvasc and metoprolol  · 3  Chronic back pain- will continue pain control  · 4  Peripheral neuropathy- on gabapentin  · 5  Depression- on celexa   VTE Pharmacologic Prophylaxis:   Pharmacologic: Heparin Drip  Mechanical VTE Prophylaxis in Place: Yes   Patient Centered Rounds: I have performed bedside rounds with nursing staff today    Discussions with Specialists or Other Care Team Provider:    Education and Discussions with Family / Patient:    Time Spent for Care: 20 minutes    More than 50% of total time spent on counseling and coordination of care as described above    Current Length of Stay: 1 day(s)   Current Patient Status: Inpatient   Certification Statement: The patient will continue to require additional inpatient hospital stay due to Chest pain  [address] / Estimated Discharge Date: once chest pain improves

## 2018-07-08 LAB
ANION GAP SERPL CALCULATED.3IONS-SCNC: 6 MMOL/L (ref 4–13)
APTT PPP: 142 SECONDS (ref 24–36)
APTT PPP: 49 SECONDS (ref 24–36)
APTT PPP: 67 SECONDS (ref 24–36)
APTT PPP: 80 SECONDS (ref 24–36)
ATRIAL RATE: 86 BPM
BASOPHILS # BLD AUTO: 0.02 THOUSANDS/ΜL (ref 0–0.1)
BASOPHILS NFR BLD AUTO: 1 % (ref 0–1)
BUN SERPL-MCNC: 11 MG/DL (ref 5–25)
CALCIUM SERPL-MCNC: 8.8 MG/DL (ref 8.3–10.1)
CHLORIDE SERPL-SCNC: 108 MMOL/L (ref 100–108)
CO2 SERPL-SCNC: 26 MMOL/L (ref 21–32)
CREAT SERPL-MCNC: 0.82 MG/DL (ref 0.6–1.3)
EOSINOPHIL # BLD AUTO: 0.24 THOUSAND/ΜL (ref 0–0.61)
EOSINOPHIL NFR BLD AUTO: 5 % (ref 0–6)
ERYTHROCYTE [DISTWIDTH] IN BLOOD BY AUTOMATED COUNT: 14.1 % (ref 11.6–15.1)
GFR SERPL CREATININE-BSD FRML MDRD: 79 ML/MIN/1.73SQ M
GLUCOSE SERPL-MCNC: 121 MG/DL (ref 65–140)
HCT VFR BLD AUTO: 37 % (ref 34.8–46.1)
HGB BLD-MCNC: 12.2 G/DL (ref 11.5–15.4)
IMM GRANULOCYTES # BLD AUTO: 0.01 THOUSAND/UL (ref 0–0.2)
IMM GRANULOCYTES NFR BLD AUTO: 0 % (ref 0–2)
LYMPHOCYTES # BLD AUTO: 1.11 THOUSANDS/ΜL (ref 0.6–4.47)
LYMPHOCYTES NFR BLD AUTO: 25 % (ref 14–44)
MCH RBC QN AUTO: 30.2 PG (ref 26.8–34.3)
MCHC RBC AUTO-ENTMCNC: 33 G/DL (ref 31.4–37.4)
MCV RBC AUTO: 92 FL (ref 82–98)
MONOCYTES # BLD AUTO: 0.25 THOUSAND/ΜL (ref 0.17–1.22)
MONOCYTES NFR BLD AUTO: 6 % (ref 4–12)
NEUTROPHILS # BLD AUTO: 2.81 THOUSANDS/ΜL (ref 1.85–7.62)
NEUTS SEG NFR BLD AUTO: 63 % (ref 43–75)
NRBC BLD AUTO-RTO: 0 /100 WBCS
P AXIS: 73 DEGREES
PLATELET # BLD AUTO: 140 THOUSANDS/UL (ref 149–390)
PMV BLD AUTO: 9.2 FL (ref 8.9–12.7)
POTASSIUM SERPL-SCNC: 4.6 MMOL/L (ref 3.5–5.3)
PR INTERVAL: 102 MS
QRS AXIS: 49 DEGREES
QRSD INTERVAL: 82 MS
QT INTERVAL: 376 MS
QTC INTERVAL: 449 MS
RBC # BLD AUTO: 4.04 MILLION/UL (ref 3.81–5.12)
SODIUM SERPL-SCNC: 140 MMOL/L (ref 136–145)
T WAVE AXIS: 50 DEGREES
VENTRICULAR RATE: 86 BPM
WBC # BLD AUTO: 4.44 THOUSAND/UL (ref 4.31–10.16)

## 2018-07-08 PROCEDURE — 85730 THROMBOPLASTIN TIME PARTIAL: CPT | Performed by: INTERNAL MEDICINE

## 2018-07-08 PROCEDURE — C9113 INJ PANTOPRAZOLE SODIUM, VIA: HCPCS | Performed by: INTERNAL MEDICINE

## 2018-07-08 PROCEDURE — 85025 COMPLETE CBC W/AUTO DIFF WBC: CPT | Performed by: INTERNAL MEDICINE

## 2018-07-08 PROCEDURE — 80048 BASIC METABOLIC PNL TOTAL CA: CPT | Performed by: INTERNAL MEDICINE

## 2018-07-08 PROCEDURE — 93010 ELECTROCARDIOGRAM REPORT: CPT | Performed by: INTERNAL MEDICINE

## 2018-07-08 PROCEDURE — 99232 SBSQ HOSP IP/OBS MODERATE 35: CPT | Performed by: INTERNAL MEDICINE

## 2018-07-08 RX ORDER — METHYLPREDNISOLONE SODIUM SUCCINATE 40 MG/ML
40 INJECTION, POWDER, LYOPHILIZED, FOR SOLUTION INTRAMUSCULAR; INTRAVENOUS EVERY 8 HOURS SCHEDULED
Status: DISCONTINUED | OUTPATIENT
Start: 2018-07-08 | End: 2018-07-09

## 2018-07-08 RX ORDER — PANTOPRAZOLE SODIUM 40 MG/1
40 TABLET, DELAYED RELEASE ORAL
Status: DISCONTINUED | OUTPATIENT
Start: 2018-07-09 | End: 2018-07-09 | Stop reason: HOSPADM

## 2018-07-08 RX ADMIN — HYDROMORPHONE HYDROCHLORIDE 1 MG: 1 INJECTION, SOLUTION INTRAMUSCULAR; INTRAVENOUS; SUBCUTANEOUS at 11:56

## 2018-07-08 RX ADMIN — HYDROMORPHONE HYDROCHLORIDE 1 MG: 1 INJECTION, SOLUTION INTRAMUSCULAR; INTRAVENOUS; SUBCUTANEOUS at 18:15

## 2018-07-08 RX ADMIN — GABAPENTIN 800 MG: 400 CAPSULE ORAL at 08:23

## 2018-07-08 RX ADMIN — PANTOPRAZOLE SODIUM 40 MG: 40 INJECTION, POWDER, FOR SOLUTION INTRAVENOUS at 08:24

## 2018-07-08 RX ADMIN — MIRTAZAPINE 30 MG: 15 TABLET, FILM COATED ORAL at 21:05

## 2018-07-08 RX ADMIN — CLONAZEPAM 1 MG: 1 TABLET ORAL at 08:37

## 2018-07-08 RX ADMIN — HEPARIN SODIUM AND DEXTROSE 19 UNITS/KG/HR: 10000; 5 INJECTION INTRAVENOUS at 21:05

## 2018-07-08 RX ADMIN — METHYLPREDNISOLONE SODIUM SUCCINATE 40 MG: 40 INJECTION, POWDER, FOR SOLUTION INTRAMUSCULAR; INTRAVENOUS at 23:27

## 2018-07-08 RX ADMIN — CITALOPRAM HYDROBROMIDE 40 MG: 20 TABLET ORAL at 08:23

## 2018-07-08 RX ADMIN — HYDROMORPHONE HYDROCHLORIDE 1 MG: 1 INJECTION, SOLUTION INTRAMUSCULAR; INTRAVENOUS; SUBCUTANEOUS at 02:43

## 2018-07-08 RX ADMIN — METHYLPREDNISOLONE SODIUM SUCCINATE 40 MG: 40 INJECTION, POWDER, FOR SOLUTION INTRAMUSCULAR; INTRAVENOUS at 13:00

## 2018-07-08 RX ADMIN — HYDROMORPHONE HYDROCHLORIDE 1 MG: 1 INJECTION, SOLUTION INTRAMUSCULAR; INTRAVENOUS; SUBCUTANEOUS at 15:23

## 2018-07-08 RX ADMIN — METHYLPREDNISOLONE SODIUM SUCCINATE 40 MG: 40 INJECTION, POWDER, FOR SOLUTION INTRAMUSCULAR; INTRAVENOUS at 16:16

## 2018-07-08 RX ADMIN — GABAPENTIN 800 MG: 400 CAPSULE ORAL at 21:05

## 2018-07-08 RX ADMIN — METOPROLOL TARTRATE 50 MG: 50 TABLET ORAL at 21:05

## 2018-07-08 RX ADMIN — ATORVASTATIN CALCIUM 40 MG: 40 TABLET, FILM COATED ORAL at 08:24

## 2018-07-08 RX ADMIN — METOPROLOL TARTRATE 50 MG: 50 TABLET ORAL at 08:23

## 2018-07-08 RX ADMIN — HEPARIN SODIUM 1800 UNITS: 1000 INJECTION, SOLUTION INTRAVENOUS; SUBCUTANEOUS at 17:21

## 2018-07-08 RX ADMIN — GABAPENTIN 800 MG: 400 CAPSULE ORAL at 16:16

## 2018-07-08 RX ADMIN — AMLODIPINE BESYLATE 5 MG: 5 TABLET ORAL at 08:24

## 2018-07-08 RX ADMIN — CLONAZEPAM 1 MG: 1 TABLET ORAL at 23:32

## 2018-07-08 RX ADMIN — HYDROMORPHONE HYDROCHLORIDE 1 MG: 1 INJECTION, SOLUTION INTRAMUSCULAR; INTRAVENOUS; SUBCUTANEOUS at 06:44

## 2018-07-08 RX ADMIN — HYDROMORPHONE HYDROCHLORIDE 1 MG: 1 INJECTION, SOLUTION INTRAMUSCULAR; INTRAVENOUS; SUBCUTANEOUS at 21:06

## 2018-07-08 NOTE — PLAN OF CARE
CARDIOVASCULAR - ADULT     Maintains optimal cardiac output and hemodynamic stability Progressing     Absence of cardiac dysrhythmias or at baseline rhythm Progressing        DISCHARGE PLANNING     Discharge to home or other facility with appropriate resources Progressing        INFECTION - ADULT     Absence or prevention of progression during hospitalization Progressing     Absence of fever/infection during neutropenic period Progressing        Knowledge Deficit     Patient/family/caregiver demonstrates understanding of disease process, treatment plan, medications, and discharge instructions Progressing        PAIN - ADULT     Verbalizes/displays adequate comfort level or baseline comfort level Progressing        Potential for Falls     Patient will remain free of falls Progressing        RESPIRATORY - ADULT     Achieves optimal ventilation and oxygenation Progressing        SAFETY ADULT     Patient will remain free of falls Progressing     Maintain or return to baseline ADL function Progressing     Maintain or return mobility status to optimal level Progressing

## 2018-07-08 NOTE — PROGRESS NOTES
[facility] Internal Medicine Progress Note  Patient: Taylor Mercado 59 y o  female   MRN: [id number]  PCP: Patricia Hamilton MD  Unit/Bed#: -01 Encounter: [id number]  Date Of Visit: 18    Assessment:    Principal Problem:    Angina, class III ([facility] )  Active Problems:    Depression    Chronic back pain    Neuropathy    COPD (chronic obstructive pulmonary disease) ([facility] )    Essential hypertension    Lung nodules      Plan:    · 1  Chest pain- cardiac enzymes negative  Patient on heparin drip  Cardiology dose not believe that her pain is cardiac in nature  CT chest showed new small lung nodules that can be an inflammatory process  It is believed that her symptoms can be contributed to her nontreated progressive RA  CRP elevated  Patient placed on IV steroids for now  Pulmonary was also consulted  · 2  HTN- on norvasc and metoprolol  · 3  Chronic back pain- will continue pain control  · 4  Peripheral neuropathy- on gabapentin  · 5  Depression- on celexa       VTE Pharmacologic Prophylaxis:   Pharmacologic: Heparin Drip  Mechanical VTE Prophylaxis in Place: Yes    Patient Centered Rounds: I have performed bedside rounds with nursing staff today  Discussions with Specialists or Other Care Team Provider:     Education and Discussions with Family / Patient:     Time Spent for Care: 20 minutes  More than 50% of total time spent on counseling and coordination of care as described above  Current Length of Stay: 2 day(s)    Current Patient Status: Inpatient   Certification Statement: The patient will continue to require additional inpatient hospital stay due to Chest pain    Discharge Plan / Estimated Discharge Date: once chest pain improves    Code Status: Level 1 - Full Code      Subjective:   Patient seen and examined at bedside  Patient still complaints of chest pain      Objective:     Vitals:   Temp (24hrs), Av 2 °F (36 8 °C), Min:97 9 °F (36 6 °C), Max:98 9 °F (37 2 °C)    HR:  [80-96] 87  Resp:  [18-20] 18  BP: ()/(60-74) 152/71  SpO2:  [91 %-100 %] 100 %  Body mass index is 26 57 kg/m²  Input and Output Summary (last 24 hours): Intake/Output Summary (Last 24 hours) at 07/08/18 1116  Last data filed at 07/08/18 0900   Gross per 24 hour   Intake              600 ml   Output             1025 ml   Net             -425 ml       Physical Exam:     Physical Exam   Constitutional: She is oriented to person, place, and time  She appears well-developed and well-nourished  HENT:   Head: Normocephalic and atraumatic  Eyes: Conjunctivae and EOM are normal  Pupils are equal, round, and reactive to light  Neck: Normal range of motion  Neck supple  No JVD present  No tracheal deviation present  No thyromegaly present  Cardiovascular: Normal rate, regular rhythm and normal heart sounds  Exam reveals no gallop and no friction rub  No murmur heard  Pulmonary/Chest: Effort normal and breath sounds normal  No respiratory distress  She has no wheezes  She has no rales  Abdominal: Soft  Bowel sounds are normal  She exhibits no distension  There is no tenderness  There is no rebound  Musculoskeletal: Normal range of motion  She exhibits no edema  Neurological: She is alert and oriented to person, place, and time  Vitals reviewed            Additional Data:     Labs:      Results from last 7 days  Lab Units 07/08/18  0241   WBC Thousand/uL 4 44   HEMOGLOBIN g/dL 12 2   HEMATOCRIT % 37 0   PLATELETS Thousands/uL 140*   NEUTROS PCT % 63   LYMPHS PCT % 25   MONOS PCT % 6   EOS PCT % 5       Results from last 7 days  Lab Units 07/08/18  0241  07/06/18  1835   SODIUM mmol/L 140  < > 138   POTASSIUM mmol/L 4 6  < > 3 4*   CHLORIDE mmol/L 108  < > 106   CO2 mmol/L 26  < > 18*   BUN mg/dL 11  < > 13   CREATININE mg/dL 0 82  < > 0 71   CALCIUM mg/dL 8 8  < > 8 9   TOTAL PROTEIN g/dL  --   --  7 3   BILIRUBIN TOTAL mg/dL  --   --  0 20   ALK PHOS U/L  --   --  141*   ALT U/L  --   --  58   AST U/L  --   --  31   GLUCOSE RANDOM mg/dL 121  < > 116   < > = values in this interval not displayed  Results from last 7 days  Lab Units 07/06/18  1835   INR  0 98       * I Have Reviewed All Lab Data Listed Above  * Additional Pertinent Lab Tests Reviewed: [facility] Admission Reviewed    Imaging:    Imaging Reports Reviewed Today Include:   Imaging Personally Reviewed by Myself Includes:      Recent Cultures (last 7 days):           Last 24 Hours Medication List:     Current Facility-Administered Medications:  acetaminophen 650 mg Oral Q6H PRN Joe Tran PA-C    amLODIPine 5 mg Oral Daily Hopkins Ahle, DO    atorvastatin 40 mg Oral Daily Hopkins Ahle, DO    calcium carbonate 500 mg Oral TID PRN Hebert Mireles MD    citalopram 40 mg Oral Daily Sandeep Mancillale, DO    clonazePAM 1 mg Oral BID PRN Sandeep M[place], DO    gabapentin 800 mg Oral TID Hopkins Ahle, DO    heparin (porcine) 3-20 Units/kg/hr (Order-Specific) Intravenous Titrated Simone Tay MD Last Rate: 17 Units/kg/hr (07/08/18 0423)   heparin (porcine) 1,800 Units Intravenous PRN Simone Tay MD    heparin (porcine) 3,600 Units Intravenous PRN Simone Tay MD    HYDROmorphone 1 mg Intravenous Q3H PRN Kye Fernandez MD    methylPREDNISolone sodium succinate 40 mg Intravenous [facility] Kye Fernandez MD    metoprolol tartrate 50 mg Oral Q12H [facility] Hopkinsalin Andrews, DO    mirtazapine 30 mg Oral HS Sandeep Andrews, DO    nicotine 1 patch Transdermal Daily Sandeep Andrews, DO    nitroglycerin 0 4 mg Sublingual Q5 Min PRN Hebert Mireles MD    ondansetron 4 mg Intravenous Q6H PRN Sandeep Andrews, DO    pantoprazole 40 mg Intravenous Q24H Regina Cruz MD         Today, Patient Was Seen By: Kye Fernandez MD    ** Please Note: This note has been constructed using a voice recognition system   **

## 2018-07-08 NOTE — PROGRESS NOTES
General Cardiology   Progress Note   Tano Oconnell 59 y o  female MRN: [id number]  Unit/Bed#: -01 Encounter: [id number]        Subjective:   No significant events since the last encounter  Pulmonology consult pending  Presentation of chest pain does not appear to be likely cardiac  Chest pain persistent  Objective:   Vitals:  Vitals:    07/08/18 0700   BP: 152/71   Pulse: 87   Resp: 18   Temp: 98 9 °F (37 2 °C)   SpO2: 100%       Body mass index is 26 57 kg/m²  Systolic (34VSE), AMARJIT:571 , Min:95 , BTM:186     Diastolic (45VFY), XKT:86, Min:60, Max:74      Intake/Output Summary (Last 24 hours) at 07/08/18 0940  Last data filed at 07/08/18 0900   Gross per 24 hour   Intake              600 ml   Output             1025 ml   Net             -425 ml     Weight (last 2 days)     Date/Time   Weight    07/07/18 0339  68 (150)              PHYSICAL EXAMS:  General:  Patient is not in acute distress, laying in the bed comfortably, awake, alert responding to commands  Head: Normocephalic, Atraumatic  HEENT: White sclera, pink conjunctiva,  PERRLA,pharynx benign  Neck:  Supple, no neck vein distention, carotids+2/+2 no bruits, thyromegaly, adenopathy  Respiratory: clear to P/A  Cardiovascular:  PMI normal, S1-S2 normal, No  Murmurs, thrills, gallops, rubs   Regular rhythm  GI:  Abdomen soft nontender   No hepatosplenomegaly, adenopathy, ascites,or rebound tenderness  Extremities: No edema, normal pulses, no calf tenderness, no joint deformities, no venous disease   Integument:  No skin rashes or ulceration  Lymphatic:  No cervical or inguinal lymphadenopathy  Neurologic:  Patient is awake alert, responding to command, well-oriented to time and place and person moving all extremities      LABORATORY RESULTS:    Results from last 7 days  Lab Units 07/07/18  0322 07/07/18  0007 07/06/18  2126 07/06/18  1835   CK TOTAL U/L  --   --   --  113   TROPONIN I ng/mL <0 02 <0 02 <0 02 <0 02     CBC with diff:   Results from last 7 days  Lab Units 07/08/18  0241 07/07/18  0628 07/07/18  0007 07/06/18  1835   WBC Thousand/uL 4 44 4 75 5 03 6 87   HEMOGLOBIN g/dL 12 2 12 6 12 3 13 9   HEMATOCRIT % 37 0 37 6 35 9 40 2   MCV fL 92 90 90 87   PLATELETS Thousands/uL 140* 157 189 215   MCH pg 30 2 30 1 30 7 30 0   MCHC g/dL 33 0 33 5 34 3 34 6   RDW % 14 1 14 2 13 9 13 7   MPV fL 9 2 9 4 9 6 9 4   NRBC AUTO /100 WBCs 0  --   --  0       CMP:  Results from last 7 days  Lab Units 07/08/18  0241 07/07/18 0628 07/06/18  1835   SODIUM mmol/L 140 140 138   POTASSIUM mmol/L 4 6 4 8 3 4*   CHLORIDE mmol/L 108 111* 106   CO2 mmol/L 26 23 18*   ANION GAP mmol/L 6 6 14*   BUN mg/dL 11 16 13   CREATININE mg/dL 0 82 0 68 0 71   GLUCOSE RANDOM mg/dL 121 93 116   CALCIUM mg/dL 8 8 8 4 8 9   AST U/L  --   --  31   ALT U/L  --   --  58   ALK PHOS U/L  --   --  141*   TOTAL PROTEIN g/dL  --   --  7 3   BILIRUBIN TOTAL mg/dL  --   --  0 20   EGFR ml/min/1 73sq m 79 96 94       BMP:  Results from last 7 days  Lab Units 07/08/18  0241 07/07/18 0628 07/06/18  1835   SODIUM mmol/L 140 140 138   POTASSIUM mmol/L 4 6 4 8 3 4*   CHLORIDE mmol/L 108 111* 106   CO2 mmol/L 26 23 18*   BUN mg/dL 11 16 13   CREATININE mg/dL 0 82 0 68 0 71   GLUCOSE RANDOM mg/dL 121 93 116   CALCIUM mg/dL 8 8 8 4 8 9                Results from last 7 days  Lab Units 07/07/18  0628   MAGNESIUM mg/dL 2 0               Results from last 7 days  Lab Units 07/06/18  1835   INR  0 98       Lipid Profile:   Lab Results   Component Value Date    CHOL 181 07/07/2018     Lab Results   Component Value Date    HDL 46 07/07/2018     Lab Results   Component Value Date    LDLCALC 116 (H) 07/07/2018     Lab Results   Component Value Date    TRIG 95 07/07/2018       Cardiac testing:   No results found for this or any previous visit  No results found for this or any previous visit  No results found for this or any previous visit  No procedure found    No results found for this or any previous visit     Meds/Allergies   all current active meds have been reviewed and current meds:   Current Facility-Administered Medications   Medication Dose Route Frequency    acetaminophen (TYLENOL) tablet 650 mg  650 mg Oral Q6H PRN    amLODIPine (NORVASC) tablet 5 mg  5 mg Oral Daily    atorvastatin (LIPITOR) tablet 40 mg  40 mg Oral Daily    calcium carbonate (TUMS) chewable tablet 500 mg  500 mg Oral TID PRN    citalopram (CeleXA) tablet 40 mg  40 mg Oral Daily    clonazePAM (KlonoPIN) tablet 1 mg  1 mg Oral BID PRN    gabapentin (NEURONTIN) capsule 800 mg  800 mg Oral TID    heparin (porcine) 25,000 units in 250 mL infusion (premix)  3-20 Units/kg/hr (Order-Specific) Intravenous Titrated    heparin (porcine) injection 1,800 Units  1,800 Units Intravenous PRN    heparin (porcine) injection 3,600 Units  3,600 Units Intravenous PRN    HYDROmorphone (DILAUDID) injection 1 mg  1 mg Intravenous Q3H PRN    methylPREDNISolone sodium succinate (Solu-MEDROL) injection 40 mg  40 mg Intravenous Q8H [address]    metoprolol tartrate (LOPRESSOR) tablet 50 mg  50 mg Oral Q12H [facility]    mirtazapine (REMERON) tablet 30 mg  30 mg Oral HS    nicotine (NICODERM CQ) 14 mg/24hr TD 24 hr patch 1 patch  1 patch Transdermal Daily    nitroglycerin (NITROSTAT) SL tablet 0 4 mg  0 4 mg Sublingual Q5 Min PRN    ondansetron (ZOFRAN) injection 4 mg  4 mg Intravenous Q6H PRN    pantoprazole (PROTONIX) injection 40 mg  40 mg Intravenous Q24H [address]     Prescriptions Prior to Admission   Medication    amLODIPine (NORVASC) 5 mg tablet    atorvastatin (LIPITOR) 40 mg tablet    citalopram (CELEXA) 40 mg tablet    clonazePAM (KlonoPIN) 1 mg tablet    gabapentin (NEURONTIN) 800 mg tablet    metoprolol tartrate (LOPRESSOR) 50 mg tablet    mirtazapine (REMERON) 30 mg tablet         heparin (porcine) 3-20 Units/kg/hr (Order-Specific) Last Rate: 17 Units/kg/hr (07/08/18 0423)       Assessment/Plan:  1    Chest pain, nonobstructive CAD:  -EKG unremarkable for ischemia  -Serial troponins negative x4  -CTA chest shows lung nodules, possibly inflammatory  D-dimer 1755  -Cardiac catheterization March 2017 showed mild atherosclerosis of proximal LAD, D1, and RPLB, moderate atherosclerosis of mid LAD and distal LAD  -ECHO shows EF 55-65%, no regional wall motion abnormalities  -Pulmonology consult pending   -Will consider further ischemic workup when other etiologies are ruled out      2  Hypertension:  Stable, continue present regimen      3  Hyperlipidemia:  Continue statin     4  Tobacco use:  Smoking cessation counseling  ** Please Note: Dragon 360 Dictation voice to text software may have been used in the creation of this document   **

## 2018-07-09 VITALS
HEART RATE: 79 BPM | WEIGHT: 150 LBS | OXYGEN SATURATION: 93 % | DIASTOLIC BLOOD PRESSURE: 63 MMHG | SYSTOLIC BLOOD PRESSURE: 122 MMHG | RESPIRATION RATE: 18 BRPM | TEMPERATURE: 97.9 F | HEIGHT: 63 IN | BODY MASS INDEX: 26.58 KG/M2

## 2018-07-09 PROBLEM — M06.9 RHEUMATOID ARTHRITIS INVOLVING MULTIPLE SITES (HCC): Status: ACTIVE | Noted: 2018-07-09

## 2018-07-09 LAB
ANION GAP SERPL CALCULATED.3IONS-SCNC: 11 MMOL/L (ref 4–13)
APTT PPP: 86 SECONDS (ref 24–36)
BASOPHILS # BLD AUTO: 0.01 THOUSANDS/ΜL (ref 0–0.1)
BASOPHILS NFR BLD AUTO: 0 % (ref 0–1)
BUN SERPL-MCNC: 11 MG/DL (ref 5–25)
CALCIUM SERPL-MCNC: 9.4 MG/DL (ref 8.3–10.1)
CHLORIDE SERPL-SCNC: 104 MMOL/L (ref 100–108)
CO2 SERPL-SCNC: 21 MMOL/L (ref 21–32)
CREAT SERPL-MCNC: 0.7 MG/DL (ref 0.6–1.3)
EOSINOPHIL # BLD AUTO: 0 THOUSAND/ΜL (ref 0–0.61)
EOSINOPHIL NFR BLD AUTO: 0 % (ref 0–6)
ERYTHROCYTE [DISTWIDTH] IN BLOOD BY AUTOMATED COUNT: 13.3 % (ref 11.6–15.1)
GFR SERPL CREATININE-BSD FRML MDRD: 95 ML/MIN/1.73SQ M
GLUCOSE SERPL-MCNC: 180 MG/DL (ref 65–140)
HCT VFR BLD AUTO: 38 % (ref 34.8–46.1)
HGB BLD-MCNC: 12.9 G/DL (ref 11.5–15.4)
IMM GRANULOCYTES # BLD AUTO: 0.1 THOUSAND/UL (ref 0–0.2)
IMM GRANULOCYTES NFR BLD AUTO: 1 % (ref 0–2)
LYMPHOCYTES # BLD AUTO: 0.95 THOUSANDS/ΜL (ref 0.6–4.47)
LYMPHOCYTES NFR BLD AUTO: 14 % (ref 14–44)
MCH RBC QN AUTO: 30.1 PG (ref 26.8–34.3)
MCHC RBC AUTO-ENTMCNC: 33.9 G/DL (ref 31.4–37.4)
MCV RBC AUTO: 89 FL (ref 82–98)
MONOCYTES # BLD AUTO: 0.07 THOUSAND/ΜL (ref 0.17–1.22)
MONOCYTES NFR BLD AUTO: 1 % (ref 4–12)
NEUTROPHILS # BLD AUTO: 5.79 THOUSANDS/ΜL (ref 1.85–7.62)
NEUTS SEG NFR BLD AUTO: 84 % (ref 43–75)
NRBC BLD AUTO-RTO: 0 /100 WBCS
PLATELET # BLD AUTO: 167 THOUSANDS/UL (ref 149–390)
PMV BLD AUTO: 9.7 FL (ref 8.9–12.7)
POTASSIUM SERPL-SCNC: 4.5 MMOL/L (ref 3.5–5.3)
RBC # BLD AUTO: 4.29 MILLION/UL (ref 3.81–5.12)
SODIUM SERPL-SCNC: 136 MMOL/L (ref 136–145)
WBC # BLD AUTO: 6.92 THOUSAND/UL (ref 4.31–10.16)

## 2018-07-09 PROCEDURE — 99232 SBSQ HOSP IP/OBS MODERATE 35: CPT | Performed by: INTERNAL MEDICINE

## 2018-07-09 PROCEDURE — 99239 HOSP IP/OBS DSCHRG MGMT >30: CPT | Performed by: INTERNAL MEDICINE

## 2018-07-09 PROCEDURE — 85025 COMPLETE CBC W/AUTO DIFF WBC: CPT | Performed by: INTERNAL MEDICINE

## 2018-07-09 PROCEDURE — 80048 BASIC METABOLIC PNL TOTAL CA: CPT | Performed by: INTERNAL MEDICINE

## 2018-07-09 PROCEDURE — 99255 IP/OBS CONSLTJ NEW/EST HI 80: CPT | Performed by: INTERNAL MEDICINE

## 2018-07-09 PROCEDURE — 85730 THROMBOPLASTIN TIME PARTIAL: CPT | Performed by: INTERNAL MEDICINE

## 2018-07-09 RX ORDER — OXYCODONE HYDROCHLORIDE 5 MG/1
5 TABLET ORAL EVERY 4 HOURS PRN
Qty: 30 TABLET | Refills: 0 | Status: SHIPPED | OUTPATIENT
Start: 2018-07-09 | End: 2018-07-19

## 2018-07-09 RX ORDER — IBUPROFEN 800 MG/1
800 TABLET ORAL EVERY 6 HOURS PRN
Qty: 30 TABLET | Refills: 0 | Status: SHIPPED | OUTPATIENT
Start: 2018-07-09 | End: 2018-12-31

## 2018-07-09 RX ORDER — OXYCODONE HYDROCHLORIDE 5 MG/1
5 TABLET ORAL EVERY 4 HOURS PRN
Status: CANCELLED | OUTPATIENT
Start: 2018-07-09

## 2018-07-09 RX ORDER — PREDNISONE 10 MG/1
10 TABLET ORAL DAILY
Qty: 50 TABLET | Refills: 0 | Status: SHIPPED | OUTPATIENT
Start: 2018-07-09 | End: 2018-12-31

## 2018-07-09 RX ORDER — PANTOPRAZOLE SODIUM 40 MG/1
40 TABLET, DELAYED RELEASE ORAL
Qty: 30 TABLET | Refills: 0 | Status: SHIPPED | OUTPATIENT
Start: 2018-07-10 | End: 2018-08-02 | Stop reason: SDUPTHER

## 2018-07-09 RX ADMIN — METHYLPREDNISOLONE SODIUM SUCCINATE 40 MG: 40 INJECTION, POWDER, FOR SOLUTION INTRAMUSCULAR; INTRAVENOUS at 05:23

## 2018-07-09 RX ADMIN — GABAPENTIN 800 MG: 400 CAPSULE ORAL at 08:59

## 2018-07-09 RX ADMIN — AMLODIPINE BESYLATE 5 MG: 5 TABLET ORAL at 08:59

## 2018-07-09 RX ADMIN — PANTOPRAZOLE SODIUM 40 MG: 40 TABLET, DELAYED RELEASE ORAL at 05:23

## 2018-07-09 RX ADMIN — HYDROMORPHONE HYDROCHLORIDE 1 MG: 1 INJECTION, SOLUTION INTRAMUSCULAR; INTRAVENOUS; SUBCUTANEOUS at 06:00

## 2018-07-09 RX ADMIN — HYDROMORPHONE HYDROCHLORIDE 1 MG: 1 INJECTION, SOLUTION INTRAMUSCULAR; INTRAVENOUS; SUBCUTANEOUS at 09:01

## 2018-07-09 RX ADMIN — HYDROMORPHONE HYDROCHLORIDE 1 MG: 1 INJECTION, SOLUTION INTRAMUSCULAR; INTRAVENOUS; SUBCUTANEOUS at 00:02

## 2018-07-09 RX ADMIN — HYDROMORPHONE HYDROCHLORIDE 1 MG: 1 INJECTION, SOLUTION INTRAMUSCULAR; INTRAVENOUS; SUBCUTANEOUS at 12:01

## 2018-07-09 RX ADMIN — CLONAZEPAM 1 MG: 1 TABLET ORAL at 09:01

## 2018-07-09 RX ADMIN — CITALOPRAM HYDROBROMIDE 40 MG: 20 TABLET ORAL at 08:59

## 2018-07-09 RX ADMIN — HYDROMORPHONE HYDROCHLORIDE 1 MG: 1 INJECTION, SOLUTION INTRAMUSCULAR; INTRAVENOUS; SUBCUTANEOUS at 03:03

## 2018-07-09 RX ADMIN — METOPROLOL TARTRATE 50 MG: 50 TABLET ORAL at 08:59

## 2018-07-09 RX ADMIN — ATORVASTATIN CALCIUM 40 MG: 40 TABLET, FILM COATED ORAL at 08:59

## 2018-07-09 NOTE — PROGRESS NOTES
The pantoprazole has / have been converted to Oral per [facility]PO [facility] for Adults as approved by the Pharmacy and Therapeutics Committee  The patient met all eligible criteria:  3 Age = 25years old   2) Received at least one dose of the IV form   3) Receiving at least one other scheduled oral/enteral medication   4) Tolerating an oral/enteral diet   and did not have any exclusions:   1) Critical care patient   2) Active GI bleed (IF assessing H2RAs or PPIs)   3) Continuous tube feeding (IF assessing cipro, doxycycline, levofloxacin, minocycline, rifampin, or voriconazole)   4) Receiving PO vancomycin (IF assessing metronidazole)   5) Persistent nausea and/or vomiting   6) Ileus or gastrointestinal obstruction   7) Maddy/nasogastric tube set for continuous suction   8) Specific order not to automatically convert to PO (in the order's comments or if discussed in the most recent Infectious Disease or primary team's progress notes)

## 2018-07-09 NOTE — DISCHARGE SUMMARY
Discharge Summary - [facility] Internal Medicine    Patient Information: Princess Felton 59 y o  female MRN: [id number]  Unit/Bed#: -01 Encounter: [id number]    Discharging Physician / Practitioner: Sahara Venegas MD  PCP: Sly Gonzales MD  Admission Date: 7/6/2018  Discharge Date: 07/09/18    Disposition:     Home     Reason for Admission: Chest pain    Discharge Diagnoses:     Principal Problem:    Angina, class III ([facility] )  Active Problems:    Depression    Chronic back pain    Neuropathy    COPD (chronic obstructive pulmonary disease) ([facility] )    Essential hypertension    Lung nodules  Resolved Problems:    * No resolved hospital problems  *      Consultations During Hospital Stay:  · Cardiology, pulmonary    Procedures Performed:     · none    Significant Findings / Test Results:     · CT chest with IV contrast  Impression:       1   No acute pulmonary embolism  2   Numerous small lung nodules measuring less than 6 mm including both solid and groundglass nodules   As these were not present on the most recent previous study from 3/25/2018, these are likely inflammatory though metastasis is possible if this   patient has a history of primary malignancy   Follow-up CT in 3 months is recommended to ensure resolution  3   Stable small left adrenal adenoma  ·     Incidental Findings:   ·      Test Results Pending at Discharge (will require follow up):   ·      Outpatient Tests Requested:  ·     Complications:  None    History of Present Illness:     Princess Felton is a 59 y o  female medical history significant for coronary artery disease on amendable to PCI, hypertension, tobacco use, anxiety, bipolar disorder  Patient states that she was in her usual state of health, her  and actually taken her out to [facility] for her upcoming birthday  She reports after dinner she had chest pressure on left sternal border, no radiation, intermittent shortness of breath    She reports that minutes after she had approximately 5 episodes of nonbloody nonbilious vomiting  She was immediately brought to the ER per EMS  She follows with Dr Hart Runner as outpatient and plan was for patient to have a stress echocardiogram this upcoming Monday  Had a cardiac cath in 3/2017 secondary to abnormal stress test which showed diffuse disease and medical therapy was recommended  She admits that she still smokes though she is down to 2 cigarettes per day    Hospital Course:     Mesha Ontiveros is a 59 y o  female patient who originally presented to the hospital on 7/6/2018 due to complaints of increased chest pain  Cardiology was consulted  Patient's cardiac enzymes were negative and it was believed that patients chest pain was not cardiac in origin and mostlikley secondary to her uncontrolled rheumatoid arthritis  Patient was placed on IV steroids and pain control  Pulmonary was consulted for new pulmonary nodules on CT chest   It was thought to be inflammatory secondary to her rheumatoid  Patient pain is currently better controlled  Patient is hemodynamically stable and will be discharged home on steroid taper, NSAID and pain control  Patient to followup with rheumatology and pulmonary as outpatient  Condition at Discharge: stable     Discharge Day Visit / Exam:     * Please refer to separate progress note for these details *    Discussion with Family:       Discharge instructions/Information to patient and family:   See after visit summary for information provided to patient and family  Provisions for Follow-Up Care:  See after visit summary for information related to follow-up care and any pertinent home health orders  Planned Readmission: none    Discharge Statement:  I spent 50 minutes discharging the patient  This time was spent on the day of discharge  I had direct contact with the patient on the day of discharge   Greater than 50% of the total time was spent examining patient, answering all patient questions, arranging and discussing plan of care with patient as well as directly providing post-discharge instructions  Additional time then spent on discharge activities  Discharge Medications:  See after visit summary for reconciled discharge medications provided to patient and family  ** Please Note: This note has been constructed using a voice recognition system   **

## 2018-07-09 NOTE — PROGRESS NOTES
[facility] Internal Medicine Progress Note  Patient: Sharon Lobo 59 y o  female   MRN: [id number]  PCP: Yolis Peng MD  Unit/Bed#: -01 Encounter: [id number]  Date Of Visit: 18    Assessment:    Principal Problem:    Angina, class III ([facility] )  Active Problems:    Depression    Chronic back pain    Neuropathy    COPD (chronic obstructive pulmonary disease) ([facility] )    Essential hypertension    Lung nodules      Plan:    · 1  Chest pain- cardiac enzymes negative  Patient on heparin drip  Cardiology dose not believe that her pain is cardiac in nature  CT chest showed new small lung nodules that can be an inflammatory process  It is believed that her symptoms can be contributed to her nontreated progressive RA  CRP elevated  Patient on IV steroids for now  Pulmonary consulted  Will wait for recommendations  Patient still has chest pain  · 2  HTN- on norvasc and metoprolol  · 3  Chronic back pain- will continue pain control  · 4  Peripheral neuropathy- on gabapentin  · 5  Depression- on celexa       VTE Pharmacologic Prophylaxis:   Pharmacologic: Heparin Drip  Mechanical VTE Prophylaxis in Place: Yes    Patient Centered Rounds: I have performed bedside rounds with nursing staff today  Discussions with Specialists or Other Care Team Provider:     Education and Discussions with Family / Patient:     Time Spent for Care: 20 minutes  More than 50% of total time spent on counseling and coordination of care as described above  Current Length of Stay: 3 day(s)    Current Patient Status: Inpatient   Certification Statement: The patient will continue to require additional inpatient hospital stay due to Chest pain    Discharge Plan / Estimated Discharge Date: once chest pain improves    Code Status: Level 1 - Full Code      Subjective:   Patient seen and examined at bedside  Patient still complains of chest pain      Objective:     Vitals:   Temp (24hrs), Av 1 °F (36 7 °C), Min:97 9 °F (36 6 °C), Max:98 4 °F (36 9 °C)    HR:  [73-88] 73  Resp:  [16-18] 18  BP: (109-123)/(61-67) 118/61  SpO2:  [91 %-96 %] 91 %  Body mass index is 26 57 kg/m²  Input and Output Summary (last 24 hours): Intake/Output Summary (Last 24 hours) at 07/09/18 1125  Last data filed at 07/09/18 1012   Gross per 24 hour   Intake             1320 ml   Output             2950 ml   Net            -1630 ml       Physical Exam:     Physical Exam   Constitutional: She is oriented to person, place, and time  She appears well-developed and well-nourished  HENT:   Head: Normocephalic and atraumatic  Eyes: Conjunctivae and EOM are normal  Pupils are equal, round, and reactive to light  Neck: Normal range of motion  Neck supple  No JVD present  No tracheal deviation present  No thyromegaly present  Cardiovascular: Normal rate, regular rhythm and normal heart sounds  Exam reveals no gallop and no friction rub  No murmur heard  Pulmonary/Chest: Effort normal and breath sounds normal  No respiratory distress  She has no wheezes  She has no rales  Abdominal: Soft  Bowel sounds are normal  She exhibits no distension  There is no tenderness  There is no rebound  Musculoskeletal: Normal range of motion  She exhibits no edema  Neurological: She is alert and oriented to person, place, and time  Vitals reviewed            Additional Data:     Labs:      Results from last 7 days  Lab Units 07/09/18  0547   WBC Thousand/uL 6 92   HEMOGLOBIN g/dL 12 9   HEMATOCRIT % 38 0   PLATELETS Thousands/uL 167   NEUTROS PCT % 84*   LYMPHS PCT % 14   MONOS PCT % 1*   EOS PCT % 0       Results from last 7 days  Lab Units 07/09/18  0547  07/06/18  1835   SODIUM mmol/L 136  < > 138   POTASSIUM mmol/L 4 5  < > 3 4*   CHLORIDE mmol/L 104  < > 106   CO2 mmol/L 21  < > 18*   BUN mg/dL 11  < > 13   CREATININE mg/dL 0 70  < > 0 71   CALCIUM mg/dL 9 4  < > 8 9   TOTAL PROTEIN g/dL  --   --  7 3   BILIRUBIN TOTAL mg/dL  --   --  0 20   ALK PHOS U/L --   --  141*   ALT U/L  --   --  58   AST U/L  --   --  31   GLUCOSE RANDOM mg/dL 180*  < > 116   < > = values in this interval not displayed  Results from last 7 days  Lab Units 07/06/18  1835   INR  0 98       * I Have Reviewed All Lab Data Listed Above  * Additional Pertinent Lab Tests Reviewed: [facility] Admission Reviewed    Imaging:    Imaging Reports Reviewed Today Include:   Imaging Personally Reviewed by Myself Includes:      Recent Cultures (last 7 days):           Last 24 Hours Medication List:     Current Facility-Administered Medications:  acetaminophen 650 mg Oral Q6H PRN Shakir Argueta PA-C   amLODIPine 5 mg Oral Daily Julane Flatter, DO   atorvastatin 40 mg Oral Daily Julane Flatter, DO   calcium carbonate 500 mg Oral TID PRN Caridad Beltran MD   citalopram 40 mg Oral Daily Julane Flatter, DO   clonazePAM 1 mg Oral BID PRN Julane Flatter, DO   gabapentin 800 mg Oral TID Julane Flatter, DO   HYDROmorphone 1 mg Intravenous Q3H PRN Awais Agarwal MD   methylPREDNISolone sodium succinate 40 mg Intravenous [facility] Awais Agarwal MD   metoprolol tartrate 50 mg Oral Q12H [address] Julane Flatter, DO   mirtazapine 30 mg Oral HS Julane Flatter, DO   nicotine 1 patch Transdermal Daily Julane Flatter, DO   nitroglycerin 0 4 mg Sublingual Q5 Min PRN Caridad Beltran MD   ondansetron 4 mg Intravenous Q6H PRN Julane Flatter, DO   pantoprazole 40 mg Oral Early Morning Awais Agarwal MD        Today, Patient Was Seen By: Awais Agarwal MD    ** Please Note: This note has been constructed using a voice recognition system   **

## 2018-07-09 NOTE — PROGRESS NOTES
General Cardiology   Progress Note   American Pour 59 y o  female MRN: [id number]  Unit/Bed#: -01 Encounter: [id number]      SUBJECTIVE:   No significant events overnight  Pulmonary consult pending for lung nodules  Still has constant L sided chest pressure radiating to the back, better with pain medications  Does admit to exertional component  OBJECTIVE:   Vitals:  Vitals:    07/09/18 0700   BP: 118/61   Pulse: 73   Resp: 18   Temp: 98 4 °F (36 9 °C)   SpO2: 91%     Body mass index is 26 57 kg/m²  Systolic (08GKN), GIX:807 , Min:106 , NWR:903     Diastolic (15HNG), SJB:40, Min:56, Max:67      Intake/Output Summary (Last 24 hours) at 07/09/18 1004  Last data filed at 07/09/18 0838   Gross per 24 hour   Intake             1320 ml   Output             2650 ml   Net            -1330 ml     Weight (last 2 days)     Date/Time   Weight    07/07/18 0339  68 (150)              PHYSICAL EXAMS:  General:  Patient is not in acute distress, laying in the bed comfortably, awake, alert responding to commands  Head: Normocephalic, Atraumatic  HEENT:  Both pupils normal-size atraumatic, normocephalic, nonicteric  Neck:  JVP not raised  Trachea central  Respiratory:  Bronchovascular breathing all over the chest without any accompaniment  Cardiovascular:  RRR  No murmurs, rubs, gallops  GI:  Abdomen soft nontender   Liver and spleen normal size  Lymphatic:  No cervical or inguinal lymphadenopathy  Neurologic:  Patient is awake alert, responding to command, well-oriented to time and place and person moving     LABORATORY RESULTS:    Results from last 7 days  Lab Units 07/07/18  0322 07/07/18  0007 07/06/18  2126 07/06/18  1835   CK TOTAL U/L  --   --   --  113   TROPONIN I ng/mL <0 02 <0 02 <0 02 <0 02       CBC with diff:   Results from last 7 days  Lab Units 07/09/18  0547 07/08/18  0241 07/07/18  0628  07/06/18  1835   WBC Thousand/uL 6 92 4 44 4 75  < > 6 87   HEMOGLOBIN g/dL 12 9 12 2 12 6  < > 13 9 HEMATOCRIT % 38 0 37 0 37 6  < > 40 2   MCV fL 89 92 90  < > 87   PLATELETS Thousands/uL 167 140* 157  < > 215   MCH pg 30 1 30 2 30 1  < > 30 0   MCHC g/dL 33 9 33 0 33 5  < > 34 6   RDW % 13 3 14 1 14 2  < > 13 7   MPV fL 9 7 9 2 9 4  < > 9 4   NRBC AUTO /100 WBCs 0 0  --   --  0   < > = values in this interval not displayed  CMP:  Results from last 7 days  Lab Units 07/09/18  0547 07/08/18  0241 07/07/18  0628 07/06/18  1835   SODIUM mmol/L 136 140 140 138   POTASSIUM mmol/L 4 5 4 6 4 8 3 4*   CHLORIDE mmol/L 104 108 111* 106   CO2 mmol/L 21 26 23 18*   ANION GAP mmol/L 11 6 6 14*   BUN mg/dL 11 11 16 13   CREATININE mg/dL 0 70 0 82 0 68 0 71   GLUCOSE RANDOM mg/dL 180* 121 93 116   CALCIUM mg/dL 9 4 8 8 8 4 8 9   AST U/L  --   --   --  31   ALT U/L  --   --   --  58   ALK PHOS U/L  --   --   --  141*   TOTAL PROTEIN g/dL  --   --   --  7 3   BILIRUBIN TOTAL mg/dL  --   --   --  0 20   EGFR ml/min/1 73sq m 95 79 96 94       BMP:  Results from last 7 days  Lab Units 07/09/18  0547 07/08/18  0241 07/07/18  0628   SODIUM mmol/L 136 140 140   POTASSIUM mmol/L 4 5 4 6 4 8   CHLORIDE mmol/L 104 108 111*   CO2 mmol/L 21 26 23   BUN mg/dL 11 11 16   CREATININE mg/dL 0 70 0 82 0 68   GLUCOSE RANDOM mg/dL 180* 121 93   CALCIUM mg/dL 9 4 8 8 8 4              Results from last 7 days  Lab Units 07/07/18  0628   MAGNESIUM mg/dL 2 0               Results from last 7 days  Lab Units 07/06/18  1835   INR  0 98       Lipid Profile:   Lab Results   Component Value Date    CHOL 181 07/07/2018     Lab Results   Component Value Date    HDL 46 07/07/2018     Lab Results   Component Value Date    LDLCALC 116 (H) 07/07/2018     Lab Results   Component Value Date    TRIG 95 07/07/2018       Cardiac testing:  No results found for this or any previous visit  No results found for this or any previous visit  No results found for this or any previous visit  No procedure found    No results found for this or any previous visit     Meds/Allergies   all current active meds have been reviewed and current meds:   Current Facility-Administered Medications   Medication Dose Route Frequency    acetaminophen (TYLENOL) tablet 650 mg  650 mg Oral Q6H PRN    amLODIPine (NORVASC) tablet 5 mg  5 mg Oral Daily    atorvastatin (LIPITOR) tablet 40 mg  40 mg Oral Daily    calcium carbonate (TUMS) chewable tablet 500 mg  500 mg Oral TID PRN    citalopram (CeleXA) tablet 40 mg  40 mg Oral Daily    clonazePAM (KlonoPIN) tablet 1 mg  1 mg Oral BID PRN    gabapentin (NEURONTIN) capsule 800 mg  800 mg Oral TID    HYDROmorphone (DILAUDID) injection 1 mg  1 mg Intravenous Q3H PRN    methylPREDNISolone sodium succinate (Solu-MEDROL) injection 40 mg  40 mg Intravenous Q8H [address]    metoprolol tartrate (LOPRESSOR) tablet 50 mg  50 mg Oral Q12H OSCAR    mirtazapine (REMERON) tablet 30 mg  30 mg Oral HS    nicotine (NICODERM CQ) 14 mg/24hr TD 24 hr patch 1 patch  1 patch Transdermal Daily    nitroglycerin (NITROSTAT) SL tablet 0 4 mg  0 4 mg Sublingual Q5 Min PRN    ondansetron (ZOFRAN) injection 4 mg  4 mg Intravenous Q6H PRN    pantoprazole (PROTONIX) EC tablet 40 mg  40 mg Oral Early Morning     Prescriptions Prior to Admission   Medication    amLODIPine (NORVASC) 5 mg tablet    atorvastatin (LIPITOR) 40 mg tablet    citalopram (CELEXA) 40 mg tablet    clonazePAM (KlonoPIN) 1 mg tablet    gabapentin (NEURONTIN) 800 mg tablet    metoprolol tartrate (LOPRESSOR) 50 mg tablet    mirtazapine (REMERON) 30 mg tablet       heparin (porcine) 3-20 Units/kg/hr (Order-Specific) Last Rate: 19 Units/kg/hr (07/08/18 2105)       ASSESSMENT & PLAN   1   Chest pain, nonobstructive CAD:  -EKG unremarkable for ischemia  -Serial troponins negative x4  -CTA chest shows lung nodules, possibly inflammatory   D-dimer 1755  -Cardiac catheterization March 2017 showed mild atherosclerosis of proximal LAD, D1, and RPLB, moderate atherosclerosis of mid LAD and distal LAD  -ECHO shows EF 55-65%, no regional wall motion abnormalities  -Pulmonology consult pending   -Will consider further ischemic workup when other etiologies are ruled out   -Will discontinue heparin drip      2   Hypertension:  Stable, continue present regimen      3   Hyperlipidemia:  Continue statin     4   Tobacco use:  Smoking cessation counseling  Mk Peralta PA-C  7/9/2018,10:04 AM    Portions of the record may have been created with voice recognition software   Occasional wrong word or "sound a like" substitutions may have occurred due to the inherent limitations of voice recognition software   Read the chart carefully and recognize, using context, where substitutions have occurred

## 2018-07-09 NOTE — CONSULTS
Consultation - Pulmonary Medicine   Yamilka Head 59 y o  female MRN: [id number]  Unit/Bed#: -01 Encounter: [id number]      Assessment:  1  Chest pain  2  Lung nodules  3  Tobacco abuse  4  Rheumatoid arthritis    Plan:   1  Chest pain with lung nodules, underlying RA  - CTA shows no PE, shows numerous small lung nodules less than 6 mm both solid and ground glass - new from 3/25/18 - likely inflammatory  - Sats are mid 90s on room air  - She does have underlying RA, has not been treated given her history of Hepatitis B, does have morning stiffness as well as deformities in both hands  Possible that her RA is the cause of her current symptoms and CT findings  She does not currently follow with a rheumatologist  - D-dimer is elevated as is CRP Findings all look to be inflammatory in nature, would treat with course of prednisone as well as ibuprofen for anti-inflammatory effects  Can start prednisone at 40 mg, decrease by 10 mg ever 5 days as well as script for ibuprofen  Explained to patient that it can take some time for inflammation and pain to improve  - She can follow up with us in 1 week  - Evaluated by cardiology - do not think her pain is cardiac in origin  2  Tobacco abuse  - Smoking cessation discussed, she had been cutting down significantly on her own  - Would need outpatient follow up for PFTs to assess underlying lung function  There is emphysema on CT scan      History of Present Illness   Physician Requesting Consult: Moises Sexton MD  Reason for Consult / Principal Problem: Chest pain  Hx and PE limited by: None  HPI: Yamilka Head is a 59y o  year old female current smoker with PMH of CAD, RA, HTN, Bipolar who presents with complaint of left sided chest pain that began about 5 days ago  She has difficulty taking a deep breath and pain increased with exertion  No fever or chills, no cough   She has had this happen in the past  She has RA but is not on any treatment as she had Hepatitis B, she does not currently follow with a rheumatologist     She had a history of asthma as a child, never hospitalized  She has never been told she had COPD  She is not currently on any inhalers  Inpatient consult to Pulmonology  Consult performed by: Dominic Tamayo ordered by: Tiffany Homans          Review of Systems   Constitutional: Negative  HENT: Negative  Respiratory: Positive for shortness of breath  Cardiovascular: Positive for chest pain  Gastrointestinal: Negative  Genitourinary: Negative  Musculoskeletal: Positive for arthralgias and joint swelling  Skin: Negative  Allergic/Immunologic: Negative  Neurological: Negative  Psychiatric/Behavioral: Negative          Historical Information   Past Medical History:   Diagnosis Date    Anxiety     Bipolar 1 disorder ([place] [address] )     Mental Health problems listed as Denied in [employer], cant't entire under pertinent negatives due to this diagnosis    CAD (coronary artery disease)     last assessed - 14Apr2017    Chronic back pain     Frequent headaches     Glaucoma     Hyperlipidemia     Hypertension     Ovarian cancer ([place] [address] )     last assessed - 21Sep2016    Psychiatric disorder     bipolar    Rheumatoid arthritis ([place] [address] )     Uterine carcinoma ([place] [address] )     last assessed - 21Sep2016     Past Surgical History:   Procedure Laterality Date    ADENOIDECTOMY      Tonsillectomy with Adenoidectomy - last assessed - 21Sep2016    APPENDECTOMY      last assessed - 21Sep2016    CATARACT EXTRACTION Left     Remove cataract, corneo-scleral section of left eye; last assessed - 21Sep2016    CHOLECYSTECTOMY      last assessed - 29Sep2016   Joelle Julio Cesar EYE SURGERY      Anterior sclera fistulization for glaucoma; last assessed - 21Sep2016    HYSTERECTOMY      KRYSTA-BSO; last assessed - 21Sep2016    INTRAOCULAR LENS INSERTION      PA TEMPORAL ARTERY LIGATN OR BX Right 12/16/2016    Procedure: BIOPSY ARTERY TEMPORAL;  Surgeon: Brody Ghotra MD; Location: [facility];  Service: General    TONSILLECTOMY      Tonsillectomy with Adenoidectomy - last assessed - 21Sep2016     Social History   History   Alcohol Use    Yes     Comment: occasional; (No alcohol use per [employer])     History   Drug Use No     History   Smoking Status    Current Every Day Smoker    Packs/day: 0 20    Years: 46 00    Types: Cigarettes   Smokeless Tobacco    Never Used     Occupational History:     Family History: non-contributory    Meds/Allergies   all current active meds have been reviewed, pertinent pulmonary meds have been reviewed and current meds:   Current Facility-Administered Medications   Medication Dose Route Frequency    acetaminophen (TYLENOL) tablet 650 mg  650 mg Oral Q6H PRN    amLODIPine (NORVASC) tablet 5 mg  5 mg Oral Daily    atorvastatin (LIPITOR) tablet 40 mg  40 mg Oral Daily    calcium carbonate (TUMS) chewable tablet 500 mg  500 mg Oral TID PRN    citalopram (CeleXA) tablet 40 mg  40 mg Oral Daily    clonazePAM (KlonoPIN) tablet 1 mg  1 mg Oral BID PRN    gabapentin (NEURONTIN) capsule 800 mg  800 mg Oral TID    HYDROmorphone (DILAUDID) injection 1 mg  1 mg Intravenous Q3H PRN    methylPREDNISolone sodium succinate (Solu-MEDROL) injection 40 mg  40 mg Intravenous Q8H [address]    metoprolol tartrate (LOPRESSOR) tablet 50 mg  50 mg Oral Q12H [address]    mirtazapine (REMERON) tablet 30 mg  30 mg Oral HS    nicotine (NICODERM CQ) 14 mg/24hr TD 24 hr patch 1 patch  1 patch Transdermal Daily    nitroglycerin (NITROSTAT) SL tablet 0 4 mg  0 4 mg Sublingual Q5 Min PRN    ondansetron (ZOFRAN) injection 4 mg  4 mg Intravenous Q6H PRN    pantoprazole (PROTONIX) EC tablet 40 mg  40 mg Oral Early Morning       Allergies   Allergen Reactions    Aspirin Anaphylaxis and Other (See Comments)    Bee Venom Anaphylaxis    Robaxin [Methocarbamol] Anaphylaxis and Seizures     Seizures per pt       Tramadol Hives, Shortness Of Breath and Other (See Comments)     Pt received morphine IV 7-6-18    Ketorolac     Omeprazole GI Intolerance    Codeine Rash and Other (See Comments)     Dizziness nausea; Pt received morphine IV 7-6-18       Objective   Vitals: Blood pressure 122/63, pulse 79, temperature 97 9 °F (36 6 °C), temperature source Oral, resp  rate 18, height 5' 3" (1 6 m), weight 68 kg (150 lb), SpO2 93 %  ,Body mass index is 26 57 kg/m²  Intake/Output Summary (Last 24 hours) at 07/09/18 1222  Last data filed at 07/09/18 1012   Gross per 24 hour   Intake             1320 ml   Output             2950 ml   Net            -1630 ml     Invasive Devices     Peripheral Intravenous Line            Peripheral IV 07/06/18 Right Forearm 2 days    Peripheral IV 07/08/18 Left Forearm less than 1 day                Physical Exam   Constitutional: She is oriented to person, place, and time  She appears well-developed and well-nourished  No distress  HENT:   Mouth/Throat: Oropharynx is clear and moist    Eyes: Pupils are equal, round, and reactive to light  Cardiovascular: Normal rate, regular rhythm and normal heart sounds  No murmur heard  Pulmonary/Chest: Effort normal and breath sounds normal  No accessory muscle usage  No respiratory distress  She has no decreased breath sounds  She has no wheezes  She has no rhonchi  She has no rales  She exhibits no tenderness  Abdominal: Soft  There is no tenderness  Musculoskeletal: Normal range of motion  She exhibits deformity (bilateral hand deformities due to RA)  Neurological: She is alert and oriented to person, place, and time  Skin: Skin is warm and dry  No rash noted  Psychiatric: She has a normal mood and affect  Lab Results:   I have personally reviewed pertinent lab results  , CBC:   Lab Results   Component Value Date    WBC 6 92 07/09/2018    HGB 12 9 07/09/2018    HCT 38 0 07/09/2018    MCV 89 07/09/2018     07/09/2018    MCH 30 1 07/09/2018    MCHC 33 9 07/09/2018    RDW 13 3 07/09/2018    MPV 9 7 07/09/2018    NRBC 0 07/09/2018   , CMP:   Lab Results   Component Value Date     07/09/2018    K 4 5 07/09/2018     07/09/2018    CO2 21 07/09/2018    ANIONGAP 11 07/09/2018    BUN 11 07/09/2018    CREATININE 0 70 07/09/2018    GLUCOSE 180 (H) 07/09/2018    CALCIUM 9 4 07/09/2018    EGFR 95 07/09/2018     Imaging Studies: I have personally reviewed pertinent reports  and I have personally reviewed pertinent films in PACS  EKG, Pathology, and Other Studies: I have personally reviewed pertinent reports      VTE Prophylaxis: Sequential compression device Sam Olivares     Code Status: Level 1 - Full Code  Advance Directive and Living Will:      Power of :    POLST:

## 2018-07-09 NOTE — CASE MANAGEMENT
Rozanna Xin, RN Registered Nurse Signed   Case Management Date of Service: 7/7/2018 12:16 PM         []Hide copied text  Initial Clinical Review     Admission: Date/Time/Statement: 7/6/18 @ 1907            Orders Placed This Encounter   Procedures    Inpatient Admission (expected length of stay for this patient is greater than two midnights)       Standing Status:   Standing       Number of Occurrences:   1       Order Specific Question:   Admitting Physician       Answer:   Tomy Lagunas [[id number]]       Order Specific Question:   Level of Care       Answer:   Med Surg [16]       Order Specific Question:   Estimated length of stay       Answer:   More than 2 Midnights       Order Specific Question:   Certification       Answer:   I certify that inpatient services are medically necessary for this patient for a duration of greater than two midnights   See H&P and MD Progress Notes for additional information about the patient's course of treatment             ED: Date/Time/Mode of Arrival:             ED Arrival Information      Expected Arrival Acuity Means of Arrival Escorted By Service Admission Type     - 7/6/2018 18:25 [facility] General Medicine Emergency     Arrival Complaint     -             Chief Complaint:        Chief Complaint   Patient presents with    Chest Pain       pt with c/o chest pain starting half hour prior to arrival  Pt states its like someones sitting on her chest, pt with positive cardiac history         History of Illness: Simi Fought a 59 y o  female medical history significant for coronary artery disease on amendable to PCI, hypertension, tobacco use, anxiety, bipolar disorder   Patient states that she was in her usual state of health, her  and actually taken her out to [employer] [place] for her upcoming birthday  Hattie Gabi reports after dinner she had chest pressure on left sternal border, no radiation, intermittent shortness of breath   She reports that minutes after she had approximately 5 episodes of nonbloody nonbilious vomiting   She was immediately brought to the ER per EMS  Denisha Funez follows with Dr Rowan Bell as outpatient and plan was for patient to have a stress echocardiogram this upcoming Monday  Ella Ramírez a cardiac cath in 3/2017 secondary to abnormal stress test which showed diffuse disease and medical therapy was recommended          ED Vital Signs:            ED Triage Vitals   Temperature Pulse Respirations Blood Pressure SpO2   07/06/18 2151 07/06/18 1841 07/06/18 1841 07/06/18 1841 07/06/18 1841   98 °F (36 7 °C) 97 20 106/77 99 %       Temp Source Heart Rate Source Patient Position - Orthostatic VS BP Location FiO2 (%)   07/06/18 2151 07/06/18 1841 07/06/18 1925 07/06/18 1841 --   Oral Monitor Sitting Right arm         Pain Score           07/06/18 1918           Worst Possible Pain            Wt Readings from Last 1 Encounters:   07/07/18 68 kg (150 lb)      Vital Signs (abnormal):   07/06/18 2000   --   92   22    86/51   99 %   None (Room air)   Sitting   07/06/18 1945   --   92   20   95/52   99 %   None (Room air)   Sitting      Abnormal Labs/Diagnostic Test Results: K   3 4, co2 18, an gap 14, alk phos 141  CXR -COPD EKG- NSR   And ST      ED Treatment:              Medication Administration from 07/06/2018 1825 to 07/06/2018 2138        Date/Time Order Dose Route Action Action by Comments       07/06/2018 1835 fentanyl citrate (PF) 100 MCG/2ML 100 mcg 100 mcg Intravenous Given Jamil Hyde RN         07/06/2018 1904 heparin (porcine) injection 3,600 Units 3,600 Units Intravenous Given Jamil Hyde RN         07/06/2018 1903 heparin (porcine) 25,000 units in 250 mL infusion (premix) 12 Units/kg/hr Intravenous New Bag Jamil Hyde RN         07/06/2018 2022 sodium chloride 0 9 % bolus 500 mL 0 mL Intravenous Stopped Kena Gaitan RN         07/06/2018 1922 sodium chloride 0 9 % bolus 500 mL 500 mL Intravenous New Bag Rebecca Kirstin, OLGA         07/06/2018 1918 fentanyl citrate (PF) 100 MCG/2ML 100 mcg 100 mcg Intravenous Given Forest Lopez RN         07/06/2018 2103 nitroglycerin (NITRODUR) 0 1 mg/hr TD 24 hr patch 0 1 mg Transdermal Medication Applied Forest Lopez RN L chest wall       07/06/2018 2047 potassium chloride (K-DUR,KLOR-CON) CR tablet 40 mEq 40 mEq Oral Given Forest Lopez RN         07/06/2018 2041 sodium chloride 0 9 % bolus 500 mL 500 mL Intravenous New Bag Forest Lopez RN         07/06/2018 2112 morphine injection 1 mg 1 mg Intravenous Given Forest Lopez RN               Past Medical/Surgical History:         Active Ambulatory Problems     Diagnosis Date Noted    Increased severity of headaches 12/16/2016    Chest pain 03/05/2017    Depression 03/05/2017    Anxiety 03/05/2017    Chronic back pain 03/05/2017    Tobacco abuse disorder 03/05/2017    Neuropathy 03/05/2017    Positive cardiac stress test 03/07/2017    Coronary artery spasm (HCC) 03/08/2017    Acute bronchitis 03/08/2017    Abdominal pain 09/29/2017    C  difficile diarrhea 09/29/2017    COPD (chronic obstructive pulmonary disease) ([facility] [address] ) 09/30/2017    Transaminitis 09/30/2017    Essential hypertension 10/27/2017    Lumbosacral radiculopathy at L4 06/05/2018    Coronary artery disease involving native coronary artery of native heart without angina pectoris 06/14/2018    Angina, class III ([facility] [address] ) 06/14/2018    Mixed hyperlipidemia 06/14/2018           Resolved Ambulatory Problems     Diagnosis Date Noted    Enteritis 10/27/2017           Past Medical History:   Diagnosis Date    Anxiety      Bipolar 1 disorder (HCC)      CAD (coronary artery disease)      Chronic back pain      Frequent headaches      Glaucoma      Hyperlipidemia      Hypertension      Ovarian cancer ([facility] [address] )      Psychiatric disorder      Rheumatoid arthritis ([facility] [address] )      Uterine carcinoma ([facility] [address] )           Admitting Diagnosis: Mixed hyperlipidemia [E78 2]  Angina, class III ([facility] [address] ) [I20 9]  Chest pain [R07 9]  Smoker [F17 200]  Chronic obstructive pulmonary disease, unspecified COPD type (HCC) [J44 9]  Coronary artery disease involving native heart with unstable angina pectoris, unspecified vessel or lesion type ([facility] [address] ) [I25 110]     Age/Sex: 59 y o  female     Assessment/Plan: Hospital Problem List:      Principal Problem:    Angina, class III ([facility] [address] )   Plan for the Primary Problem(s):   # Angina  - Discussed case with cardiology on-call Dr Cesar Wilson (patient is well known to him) given prior hx, risk factors and convincing presenting symptoms recommended to regardless start on heparin gtt  - Will place on nitropaste as well  - Unfortunately asa allergy resulting in angioedema  - cont bb, statin  - Serial troponin  - Cardiology consulted    Plan for Additional Problems:    # HTN  - BP on low side may likely be secondary to the 2 doses of IV fentanyl she received in the ED   Will give IV NS bolus 500cc x 1 now   Holding parameters placed on meds   # Psych hx - anxiety/bipolar - cont meds   Disp: Plan of care extensively discussed with patient and her husbandVTE Prophylaxis: Heparin Drip  / sequential compression device   Code Status: Full code  POLST: There is no POLST form on file for this patient (pre-hospital)     Anticipated Length of Stay: Calleen Failing will be admitted on an Inpatient basis with an anticipated length of stay of  > 2 midnights    Justification for Hospital Stay: Angina     Admission Orders:  Scheduled Meds:   Current Facility-Administered Medications:  acetaminophen 650 mg Oral Q6H PRN Rosendo Burton PA-C     amLODIPine 5 mg Oral Daily Arland Pin, DO     atorvastatin 40 mg Oral Daily Arland Pin, DO     calcium carbonate 500 mg Oral TID PRN Car Lowe MD     citalopram 40 mg Oral Daily Arland Pin, DO     clonazePAM 1 mg Oral BID PRN Sarah Beck, DO     gabapentin 800 mg Oral TID Debi Samaniego Coleen Hampton, DO     heparin (porcine) 3-20 Units/kg/hr (Order-Specific) Intravenous Titrated Seble Waite MD Last Rate: 16 Units/kg/hr (07/07/18 0047)   heparin (porcine) 1,800 Units Intravenous PRN Seble Waite MD     heparin (porcine) 3,600 Units Intravenous PRN Seble Waite MD     metoprolol tartrate 50 mg Oral Q12H [facility] [place], DO     mirtazapine 30 mg Oral HS [place], DO     morphine injection 1 mg Intravenous Q3H PRN Low Mendoza PA-C     nicotine 1 patch Transdermal Daily [place], DO     nitroglycerin 0 4 mg Sublingual Q5 Min PRN Ivania Jose MD     ondansetron 4 mg Intravenous Q6H PRN [place], DO     pantoprazole 40 mg Intravenous Q24H [facility] Morales Paiz MD        Continuous Infusions:   heparin (porcine) 3-20 Units/kg/hr (Order-Specific) Last Rate: 16 Units/kg/hr (07/07/18 0047)      PRN Meds:   acetaminophen    calcium carbonate    clonazePAM    heparin (porcine)    morphine injection   q3hr prn x4    Nitroglycerin  x1    ondansetron      Cardiac diet   I&O   Tele   Cardiology consult   EKG   [facility] [place]  7/7 d-dimer, c-reactive protein , sed rate , ptt, bmp, cbc   Echo  Serial trop - all wnl      IM note  7/7  Assessment:   Principal Problem:    Angina, class III ([facility])  Active Problems:    Depression    Chronic back pain    Neuropathy    COPD (chronic obstructive pulmonary disease) ([facility])    Essential hypertension   Plan:   · 1   Chest pain- cardiac enzymes negative   Patient on heparin drip   Cardiology to evaluate  · 2    HTN- on norvasc and metoprolol  · 3   Chronic back pain- will continue pain control  · 4   Peripheral neuropathy- on gabapentin  · 5   Depression- on celexa   VTE Pharmacologic Prophylaxis:   Pharmacologic: Heparin Drip  Mechanical VTE Prophylaxis in Place: Yes   Patient Centered Rounds: I have performed bedside rounds with nursing staff today    Discussions with Specialists or Other Care Team Provider:    Education and Discussions with Family / Patient:    Time Spent for Care: 20 minutes   More than 50% of total time spent on counseling and coordination of care as described above    Current Length of Stay: 1 day(s)   Current Patient Status: Inpatient   Certification Statement: The patient will continue to require additional inpatient hospital stay due to Chest pain  [address] / Estimated Discharge Date: once chest pain improves

## 2018-07-09 NOTE — PLAN OF CARE
CARDIOVASCULAR - ADULT     Maintains optimal cardiac output and hemodynamic stability Adequate for Discharge     Absence of cardiac dysrhythmias or at baseline rhythm Adequate for Discharge        DISCHARGE PLANNING     Discharge to home or other facility with appropriate resources Adequate for Discharge        INFECTION - ADULT     Absence or prevention of progression during hospitalization Adequate for Discharge     Absence of fever/infection during neutropenic period Adequate for Discharge        Knowledge Deficit     Patient/family/caregiver demonstrates understanding of disease process, treatment plan, medications, and discharge instructions Adequate for Discharge        PAIN - ADULT     Verbalizes/displays adequate comfort level or baseline comfort level Adequate for Discharge        Potential for Falls     Patient will remain free of falls Adequate for Discharge        RESPIRATORY - ADULT     Achieves optimal ventilation and oxygenation Adequate for Discharge        SAFETY ADULT     Patient will remain free of falls Adequate for Discharge     Maintain or return to baseline ADL function Adequate for Discharge     Maintain or return mobility status to optimal level Adequate for Discharge

## 2018-07-10 ENCOUNTER — TRANSITIONAL CARE MANAGEMENT (OUTPATIENT)
Dept: INTERNAL MEDICINE CLINIC | Facility: CLINIC | Age: 58
End: 2018-07-10

## 2018-07-10 NOTE — PROGRESS NOTES
1st attempt-LVM asking pt to return call to assess progress and to schedule TCM appt        By Julius Sanchez

## 2018-07-11 NOTE — PROGRESS NOTES
2nd attempt-LVM asking pt to return call for progress assessment and to schedule TCM appt    Task forwarded to [facility] clerical and [facility] clinical       By Vero Bess

## 2018-07-27 ENCOUNTER — TELEPHONE (OUTPATIENT)
Dept: OTHER | Facility: HOSPITAL | Age: 58
End: 2018-07-27

## 2018-08-02 DIAGNOSIS — K21.9 GASTROESOPHAGEAL REFLUX DISEASE WITHOUT ESOPHAGITIS: ICD-10-CM

## 2018-08-02 RX ORDER — PANTOPRAZOLE SODIUM 40 MG/1
40 TABLET, DELAYED RELEASE ORAL
Qty: 30 TABLET | Refills: 0 | Status: SHIPPED | OUTPATIENT
Start: 2018-08-02 | End: 2018-08-23 | Stop reason: SDUPTHER

## 2018-08-23 ENCOUNTER — TELEPHONE (OUTPATIENT)
Dept: INTERNAL MEDICINE CLINIC | Facility: CLINIC | Age: 58
End: 2018-08-23

## 2018-08-23 DIAGNOSIS — K21.9 GASTROESOPHAGEAL REFLUX DISEASE WITHOUT ESOPHAGITIS: ICD-10-CM

## 2018-08-23 NOTE — TELEPHONE ENCOUNTER
Pt cld stating that Aaliyah Renteria put her on PANTOPRAZOLE and she took it for 2 weeks and it made her very very ill  Her stomach dr gave her PEPSID and she stated that worked for her  Please call pt and advise [phone]

## 2018-08-24 DIAGNOSIS — K21.9 CHRONIC GERD: Primary | ICD-10-CM

## 2018-08-24 RX ORDER — PANTOPRAZOLE SODIUM 40 MG/1
TABLET, DELAYED RELEASE ORAL
Qty: 30 TABLET | Refills: 0 | Status: SHIPPED | OUTPATIENT
Start: 2018-08-24 | End: 2018-09-21 | Stop reason: SDUPTHER

## 2018-08-24 RX ORDER — FAMOTIDINE 40 MG/1
40 TABLET, FILM COATED ORAL DAILY
Qty: 90 TABLET | Refills: 3 | Status: SHIPPED | OUTPATIENT
Start: 2018-08-24 | End: 2019-07-15 | Stop reason: SDUPTHER

## 2018-09-01 ENCOUNTER — APPOINTMENT (EMERGENCY)
Dept: RADIOLOGY | Facility: HOSPITAL | Age: 58
End: 2018-09-01
Payer: COMMERCIAL

## 2018-09-01 ENCOUNTER — HOSPITAL ENCOUNTER (EMERGENCY)
Facility: HOSPITAL | Age: 58
Discharge: HOME | End: 2018-09-01
Attending: EMERGENCY MEDICINE | Admitting: EMERGENCY MEDICINE
Payer: COMMERCIAL

## 2018-09-01 VITALS
HEART RATE: 84 BPM | BODY MASS INDEX: 26.56 KG/M2 | TEMPERATURE: 98.4 F | RESPIRATION RATE: 18 BRPM | SYSTOLIC BLOOD PRESSURE: 119 MMHG | OXYGEN SATURATION: 99 % | WEIGHT: 149.91 LBS | DIASTOLIC BLOOD PRESSURE: 87 MMHG | HEIGHT: 63 IN

## 2018-09-01 DIAGNOSIS — M54.9 CHRONIC BACK PAIN: ICD-10-CM

## 2018-09-01 DIAGNOSIS — M25.552 PAIN OF LEFT HIP JOINT: Primary | ICD-10-CM

## 2018-09-01 DIAGNOSIS — F17.200 SMOKER: ICD-10-CM

## 2018-09-01 DIAGNOSIS — G89.29 CHRONIC BACK PAIN: ICD-10-CM

## 2018-09-01 PROCEDURE — 99284 EMERGENCY DEPT VISIT MOD MDM: CPT

## 2018-09-01 PROCEDURE — 73502 X-RAY EXAM HIP UNI 2-3 VIEWS: CPT

## 2018-09-01 RX ORDER — OXYCODONE HYDROCHLORIDE AND ACETAMINOPHEN 5; 325 MG/1; MG/1
1 TABLET ORAL EVERY 8 HOURS PRN
Qty: 5 TABLET | Refills: 0 | Status: SHIPPED | OUTPATIENT
Start: 2018-09-01 | End: 2018-09-11

## 2018-09-01 RX ORDER — OXYCODONE HYDROCHLORIDE AND ACETAMINOPHEN 5; 325 MG/1; MG/1
1 TABLET ORAL ONCE
Status: COMPLETED | OUTPATIENT
Start: 2018-09-01 | End: 2018-09-01

## 2018-09-01 RX ADMIN — OXYCODONE HYDROCHLORIDE AND ACETAMINOPHEN 1 TABLET: 5; 325 TABLET ORAL at 16:20

## 2018-09-01 NOTE — DISCHARGE INSTRUCTIONS
Hip Pain   WHAT YOU NEED TO KNOW:   Hip pain can be caused by a number of conditions, such as bursitis, arthritis, or muscle or tendon strain  X-rays do not show broken bones  You may have swelling in the fluid-filled sacs that protect your muscles and tendons  Hip pain can also be caused by a lower back problem  Hip pain may be caused by trauma, playing sports, or running  Your pain may start in your hip and go to your thigh, buttock, or groin  DISCHARGE INSTRUCTIONS:   Medicines:   · NSAIDs , such as ibuprofen, help decrease swelling, pain, and fever  This medicine is available with or without a doctor's order  NSAIDs can cause stomach bleeding or kidney problems in certain people  If you take blood thinner medicine, always ask your healthcare provider if NSAIDs are safe for you  Always read the medicine label and follow directions  · Take your medicine as directed  Contact your healthcare provider if you think your medicine is not helping or if you have side effects  Tell him of her if you are allergic to any medicine  Keep a list of the medicines, vitamins, and herbs you take  Include the amounts, and when and why you take them  Bring the list or the pill bottles to follow-up visits  Carry your medicine list with you in case of an emergency  Return to the emergency department if:   · Your pain gets worse  · You have numbness in your leg or toes  · You cannot put any weight on or move your hip  Contact your healthcare provider if:   · You have a fever  · Your pain does not decrease, even after treatment  · You have questions or concerns about your condition or care  Follow up with your healthcare provider as directed: You may need physical therapy, an injection, or more testing  You may need to see an orthopedic specialist  Write down your questions so you remember to ask them during your visits    Manage your hip pain:   · Rest  your injured hip so that it can heal  You may need to avoid putting any weight on your hip for at least 48 hours  Return to normal activities as directed  · Ice  the injury for 20 minutes every 4 hours, or as directed  Use an ice pack, or put crushed ice in a plastic bag  Cover it with a towel to protect your skin  Ice helps prevent tissue damage and decreases swelling and pain  · Elevate  your injured hip above the level of your heart as often as you can  This will help decrease swelling and pain  If possible, prop your hip and leg on pillows or blankets to keep the area elevated comfortably  · Maintain a healthy weight  Extra body weight can cause pressure and pain in your hip, knee, and ankle joints  Ask your healthcare provider how much you should weigh  Ask him to help you create a weight loss plan if you are overweight  · Use assistive devices as directed  You may need to use a cane or crutches  Assistive devices help decrease pain and pressure on your hip when you walk  Ask your healthcare provider for more information about assistive devices and how to use them correctly  © 2017 [facility] Information is for End User's use only and may not be sold, redistributed or otherwise used for commercial purposes  All illustrations and images included in CareNotes® are the copyrighted property of A [employer] M [employer]  or [employer]  The above information is an  only  It is not intended as medical advice for individual conditions or treatments  Talk to your doctor, nurse or pharmacist before following any medical regimen to see if it is safe and effective for you

## 2018-09-01 NOTE — ED PROVIDER NOTES
History  Chief Complaint   Patient presents with    Hip Pain     Pt presents via EMS after walking and stepping in a puddle and hearing a twist and pop in left hip  [place] from standing yesterday, felt a "pop" in L hip and left low back pain  Chronic back pain, worse since falling yesterday  No fever  No urinary sxs  No other injuries or pain  No relief from nsaids prior to arrival  Worse w movement of L hip, better w rest          Hip Pain       Prior to Admission Medications   Prescriptions Last Dose Informant Patient Reported? Taking? amLODIPine (NORVASC) 5 mg tablet  Self No No   Sig: Take 1 tablet (5 mg total) by mouth daily   atorvastatin (LIPITOR) 40 mg tablet  Self No No   Sig: Take 1 tablet (40 mg total) by mouth daily for 90 days   citalopram (CELEXA) 40 mg tablet  Self Yes No   Sig: Take 40 mg by mouth daily     clonazePAM (KlonoPIN) 1 mg tablet  Self Yes No   Si mg 2 (two) times a day as needed for anxiety     famotidine (PEPCID) 40 MG tablet   No No   Sig: Take 1 tablet (40 mg total) by mouth daily   gabapentin (NEURONTIN) 800 mg tablet  Self Yes No   Sig: Take 800 mg by mouth 3 (three) times a day   ibuprofen (MOTRIN) 800 mg tablet   No No   Sig: Take 1 tablet (800 mg total) by mouth every 6 (six) hours as needed for mild pain   metoprolol tartrate (LOPRESSOR) 50 mg tablet  Self No No   Sig: Take 1 tablet (50 mg total) by mouth every 12 (twelve) hours   mirtazapine (REMERON) 30 mg tablet  Self Yes No   Sig: Take 30 mg by mouth daily at bedtime     pantoprazole (PROTONIX) 40 mg tablet   No No   Sig: take 1 tablet by mouth daily IN THE EARLY MORNING   predniSONE 10 mg tablet   No No   Sig: Take 1 tablet (10 mg total) by mouth daily Take 4 tablets for 5 days,  Then 3 tablets for 5 days,  Then 2 tablets for 5 days,  Then 1 tablet for 5 days        Facility-Administered Medications: None       Past Medical History:   Diagnosis Date    Anxiety     Bipolar 1 disorder ([facility] [address] )     Mental Health problems listed as Denied in [employer], cant't entire under pertinent negatives due to this diagnosis    CAD (coronary artery disease)     last assessed - 14Apr2017    Chronic back pain     Frequent headaches     Glaucoma     Hyperlipidemia     Hypertension     Ovarian cancer ([facility] [address] )     last assessed - 21Sep2016    Psychiatric disorder     bipolar    Rheumatoid arthritis ([facility] [address] )     [facility]terine carcinoma ([facility] )     last assessed - 21Sep2016       Past Surgical History:   Procedure Laterality Date    ADENOIDECTOMY      Tonsillectomy with Adenoidectomy - last assessed - 21Sep2016    APPENDECTOMY      last assessed - 21Sep2016    CATARACT EXTRACTION Left     Remove cataract, corneo-scleral section of left eye; last assessed - 21Sep2016    CHOLECYSTECTOMY      last assessed - 29Sep2016   [employer] EYE SURGERY      Anterior sclera fistulization for glaucoma; last assessed - 21Sep2016    HYSTERECTOMY      KRYSTA-BSO; last assessed - 21Sep2016    INTRAOCULAR LENS INSERTION      OR TEMPORAL ARTERY LIGATN OR BX Right 12/16/2016    Procedure: BIOPSY ARTERY TEMPORAL;  Surgeon: Bishop Nilesh MD;  Location: [facility];  Service: General    TONSILLECTOMY      Tonsillectomy with Adenoidectomy - last assessed - 21Sep2016       Family History   Problem Relation Age of Onset    Heart disease Mother     Coronary artery disease Mother     Other Mother         1) Gangrene; 2) Peripheral arterial disease    Hyperlipidemia Mother         Hypercholesterolemia    Stomach cancer Mother     Heart attack Mother         Myocardial Infarction    Other Father         1) Gangrene; 2) Peripheral arterial disease    Hyperlipidemia Father         Hypercholesterolemia    Stomach cancer Father     Heart attack Father         Myocardial Infarction    Stomach cancer Brother     Heart attack Brother         Myocardial Infarction    Stomach cancer Maternal Uncle      I have reviewed and agree with the history as documented      Social History Substance Use Topics    Smoking status: Current Every Day Smoker     Packs/day: 0 20     Years: 46 00     Types: Cigarettes    Smokeless tobacco: Never Used    Alcohol use Yes      Comment: occasional; (No alcohol use per All[employer])        Review of Systems   Constitutional: Negative  HENT: Negative  Musculoskeletal: Positive for arthralgias and back pain  Negative for joint swelling and neck stiffness  Neurological: Negative  Physical Exam  Physical Exam   Constitutional: She is oriented to person, place, and time  She appears well-developed and well-nourished  HENT:   Head: Normocephalic and atraumatic  Eyes: EOM are normal  Pupils are equal, round, and reactive to light  Neck: Normal range of motion  Neck supple  Cardiovascular: Normal rate and regular rhythm  Pulmonary/Chest: Effort normal and breath sounds normal    Abdominal: Soft  There is no tenderness  Musculoskeletal: Normal range of motion  She exhibits no edema or deformity  Pain w passive ROM L hip  No shortening or deformity of LLE  Back nontender  No ecchymosis or erythema  Neurological: She is alert and oriented to person, place, and time  Skin: Skin is warm and dry  Capillary refill takes less than 2 seconds  Nursing note and vitals reviewed        Vital Signs  ED Triage Vitals [09/01/18 1513]   Temperature Pulse Respirations Blood Pressure SpO2   98 4 °F (36 9 °C) 84 18 119/87 99 %      Temp Source Heart Rate Source Patient Position - Orthostatic VS BP Location FiO2 (%)   Oral Monitor Lying Right arm --      Pain Score       Worst Possible Pain           Vitals:    09/01/18 1513   BP: 119/87   Pulse: 84   Patient Position - Orthostatic VS: Lying       Visual Acuity      ED Medications  Medications   oxyCODONE-acetaminophen (PERCOCET) 5-325 mg per tablet 1 tablet (1 tablet Oral Given 9/1/18 1620)       Diagnostic Studies  Results Reviewed     None                 XR hip/pelv 2-3 vws left if performed   ED Interpretation by Marc Singleton MD (09/01 8240)   No acute findings                 Procedures  Procedures       Phone Contacts  ED Phone Contact    ED Course                               MDM  Number of Diagnoses or Management Options  Pain of left hip joint:   Diagnosis management comments: Acute on chronic low back pain and L hip pain after a reported fall yesterday  Xray interpreted by me, no acute findings  Ambulatory in ED without difficulty  Advised to use walker at home (has walker), stop smoking, and no clonazepam or gabapenting while taking percocet for pain  I discussed my hesitations at prescribing a CNS depressant given her concurrent use of benzos, she is aware of the risks of simultaneous use and promises not to take together  Amount and/or Complexity of Data Reviewed  Tests in the radiology section of CPT®: reviewed and ordered      CritCare Time    Disposition  Final diagnoses:   Pain of left hip joint   Chronic back pain   Smoker     Time reflects when diagnosis was documented in both MDM as applicable and the Disposition within this note     Time User Action Codes Description Comment    9/1/2018  4:27 PM Beti Galvan [M25 [id number]] Pain of left hip joint     9/1/2018  4:47 PM Beti Galvan [M54 9,  G89 29] Chronic back pain     9/1/2018  4:47 PM Beti Charles Add Chery Balderas Smoker       ED Disposition     ED Disposition Condition Comment    Discharge  [place] Harry discharge to home/self care      Condition at discharge: Stable        Follow-up Information    None         Discharge Medication List as of 9/1/2018  4:28 PM      START taking these medications    Details   oxyCODONE-acetaminophen (PERCOCET) 5-325 mg per tablet Take 1 tablet by mouth every 8 (eight) hours as needed for moderate pain for up to 10 days Max Daily Amount: 3 tablets, Starting Sat 9/1/2018, Until Tue 9/11/2018, Print         CONTINUE these medications which have NOT CHANGED    Details   amLODIPine (NORVASC) 5 mg tablet Take 1 tablet (5 mg total) by mouth daily, Starting Thu 6/14/2018, Normal      atorvastatin (LIPITOR) 40 mg tablet Take 1 tablet (40 mg total) by mouth daily for 90 days, Starting Thu 6/14/2018, Until Wed 9/12/2018, Normal      citalopram (CELEXA) 40 mg tablet Take 40 mg by mouth daily  , Until Discontinued, Historical Med      clonazePAM (KlonoPIN) 1 mg tablet 1 mg 2 (two) times a day as needed for anxiety  , Starting Fri 4/20/2018, Historical Med      famotidine (PEPCID) 40 MG tablet Take 1 tablet (40 mg total) by mouth daily, Starting Fri 8/24/2018, Normal      gabapentin (NEURONTIN) 800 mg tablet Take 800 mg by mouth 3 (three) times a day, Historical Med      ibuprofen (MOTRIN) 800 mg tablet Take 1 tablet (800 mg total) by mouth every 6 (six) hours as needed for mild pain, Starting Mon 7/9/2018, Print      metoprolol tartrate (LOPRESSOR) 50 mg tablet Take 1 tablet (50 mg total) by mouth every 12 (twelve) hours, Starting Thu 6/14/2018, Normal      mirtazapine (REMERON) 30 mg tablet Take 30 mg by mouth daily at bedtime  , Historical Med      pantoprazole (PROTONIX) 40 mg tablet take 1 tablet by mouth daily IN THE EARLY MORNING, Normal      predniSONE 10 mg tablet Take 1 tablet (10 mg total) by mouth daily Take 4 tablets for 5 days,  Then 3 tablets for 5 days,  Then 2 tablets for 5 days,  Then 1 tablet for 5 days  , Starting Mon 7/9/2018, Print           No discharge procedures on file      ED Provider  Electronically Signed by           Mercedes Zamora MD  09/01/18 6281

## 2018-09-21 DIAGNOSIS — K21.9 GASTROESOPHAGEAL REFLUX DISEASE WITHOUT ESOPHAGITIS: ICD-10-CM

## 2018-09-21 RX ORDER — PANTOPRAZOLE SODIUM 40 MG/1
TABLET, DELAYED RELEASE ORAL
Qty: 30 TABLET | Refills: 0 | Status: SHIPPED | OUTPATIENT
Start: 2018-09-21 | End: 2018-10-20 | Stop reason: SDUPTHER

## 2018-10-02 ENCOUNTER — HOSPITAL ENCOUNTER (EMERGENCY)
Facility: HOSPITAL | Age: 58
Discharge: HOME | End: 2018-10-02
Attending: EMERGENCY MEDICINE
Payer: COMMERCIAL

## 2018-10-02 ENCOUNTER — APPOINTMENT (EMERGENCY)
Dept: CT IMAGING | Facility: HOSPITAL | Age: 58
End: 2018-10-02
Payer: COMMERCIAL

## 2018-10-02 VITALS
OXYGEN SATURATION: 95 % | HEIGHT: 63 IN | BODY MASS INDEX: 26.58 KG/M2 | HEART RATE: 74 BPM | DIASTOLIC BLOOD PRESSURE: 94 MMHG | SYSTOLIC BLOOD PRESSURE: 167 MMHG | RESPIRATION RATE: 18 BRPM | TEMPERATURE: 98 F | WEIGHT: 150 LBS

## 2018-10-02 DIAGNOSIS — S39.012A STRAIN OF LUMBAR REGION, INITIAL ENCOUNTER: Primary | ICD-10-CM

## 2018-10-02 LAB
ALBUMIN SERPL BCP-MCNC: 3.5 G/DL (ref 3.5–5)
ALP SERPL-CCNC: 145 U/L (ref 46–116)
ALT SERPL W P-5'-P-CCNC: 53 U/L (ref 12–78)
ANION GAP SERPL CALCULATED.3IONS-SCNC: 11 MMOL/L (ref 4–13)
AST SERPL W P-5'-P-CCNC: 27 U/L (ref 5–45)
BACTERIA UR QL AUTO: ABNORMAL /HPF
BASOPHILS # BLD AUTO: 0.03 THOUSANDS/ΜL (ref 0–0.1)
BASOPHILS NFR BLD AUTO: 1 % (ref 0–1)
BILIRUB SERPL-MCNC: 0.2 MG/DL (ref 0.2–1)
BILIRUB UR QL STRIP: NEGATIVE
BUN SERPL-MCNC: 13 MG/DL (ref 5–25)
CALCIUM SERPL-MCNC: 8.9 MG/DL (ref 8.3–10.1)
CHLORIDE SERPL-SCNC: 107 MMOL/L (ref 100–108)
CLARITY UR: CLEAR
CO2 SERPL-SCNC: 25 MMOL/L (ref 21–32)
COLOR UR: ABNORMAL
CREAT SERPL-MCNC: 0.68 MG/DL (ref 0.6–1.3)
EOSINOPHIL # BLD AUTO: 0.24 THOUSAND/ΜL (ref 0–0.61)
EOSINOPHIL NFR BLD AUTO: 4 % (ref 0–6)
ERYTHROCYTE [DISTWIDTH] IN BLOOD BY AUTOMATED COUNT: 12.7 % (ref 11.6–15.1)
GFR SERPL CREATININE-BSD FRML MDRD: 95 ML/MIN/1.73SQ M
GLUCOSE SERPL-MCNC: 85 MG/DL (ref 65–140)
GLUCOSE UR STRIP-MCNC: NEGATIVE MG/DL
HCT VFR BLD AUTO: 42.7 % (ref 34.8–46.1)
HGB BLD-MCNC: 14.6 G/DL (ref 11.5–15.4)
HGB UR QL STRIP.AUTO: ABNORMAL
IMM GRANULOCYTES # BLD AUTO: 0.02 THOUSAND/UL (ref 0–0.2)
IMM GRANULOCYTES NFR BLD AUTO: 0 % (ref 0–2)
KETONES UR STRIP-MCNC: NEGATIVE MG/DL
LEUKOCYTE ESTERASE UR QL STRIP: NEGATIVE
LYMPHOCYTES # BLD AUTO: 1.94 THOUSANDS/ΜL (ref 0.6–4.47)
LYMPHOCYTES NFR BLD AUTO: 31 % (ref 14–44)
MCH RBC QN AUTO: 30.6 PG (ref 26.8–34.3)
MCHC RBC AUTO-ENTMCNC: 34.2 G/DL (ref 31.4–37.4)
MCV RBC AUTO: 90 FL (ref 82–98)
MONOCYTES # BLD AUTO: 0.32 THOUSAND/ΜL (ref 0.17–1.22)
MONOCYTES NFR BLD AUTO: 5 % (ref 4–12)
NEUTROPHILS # BLD AUTO: 3.62 THOUSANDS/ΜL (ref 1.85–7.62)
NEUTS SEG NFR BLD AUTO: 59 % (ref 43–75)
NITRITE UR QL STRIP: NEGATIVE
NON-SQ EPI CELLS URNS QL MICRO: ABNORMAL /HPF
NRBC BLD AUTO-RTO: 0 /100 WBCS
PH UR STRIP.AUTO: 6 [PH] (ref 4.5–8)
PLATELET # BLD AUTO: 178 THOUSANDS/UL (ref 149–390)
PMV BLD AUTO: 9.5 FL (ref 8.9–12.7)
POTASSIUM SERPL-SCNC: 3.9 MMOL/L (ref 3.5–5.3)
PROT SERPL-MCNC: 7.1 G/DL (ref 6.4–8.2)
PROT UR STRIP-MCNC: NEGATIVE MG/DL
RBC # BLD AUTO: 4.77 MILLION/UL (ref 3.81–5.12)
RBC #/AREA URNS AUTO: ABNORMAL /HPF
SODIUM SERPL-SCNC: 143 MMOL/L (ref 136–145)
SP GR UR STRIP.AUTO: <=1.005 (ref 1–1.03)
UROBILINOGEN UR QL STRIP.AUTO: 0.2 E.U./DL
WBC # BLD AUTO: 6.17 THOUSAND/UL (ref 4.31–10.16)
WBC #/AREA URNS AUTO: ABNORMAL /HPF

## 2018-10-02 PROCEDURE — 72128 CT CHEST SPINE W/O DYE: CPT

## 2018-10-02 PROCEDURE — 72125 CT NECK SPINE W/O DYE: CPT

## 2018-10-02 PROCEDURE — 70450 CT HEAD/BRAIN W/O DYE: CPT

## 2018-10-02 PROCEDURE — 81001 URINALYSIS AUTO W/SCOPE: CPT | Performed by: EMERGENCY MEDICINE

## 2018-10-02 PROCEDURE — 36415 COLL VENOUS BLD VENIPUNCTURE: CPT | Performed by: EMERGENCY MEDICINE

## 2018-10-02 PROCEDURE — 99284 EMERGENCY DEPT VISIT MOD MDM: CPT

## 2018-10-02 PROCEDURE — 80053 COMPREHEN METABOLIC PANEL: CPT | Performed by: EMERGENCY MEDICINE

## 2018-10-02 PROCEDURE — 96376 TX/PRO/DX INJ SAME DRUG ADON: CPT

## 2018-10-02 PROCEDURE — 74177 CT ABD & PELVIS W/CONTRAST: CPT

## 2018-10-02 PROCEDURE — 96374 THER/PROPH/DIAG INJ IV PUSH: CPT

## 2018-10-02 PROCEDURE — 85025 COMPLETE CBC W/AUTO DIFF WBC: CPT | Performed by: EMERGENCY MEDICINE

## 2018-10-02 RX ORDER — MORPHINE SULFATE 10 MG/ML
6 INJECTION, SOLUTION INTRAMUSCULAR; INTRAVENOUS ONCE
Status: COMPLETED | OUTPATIENT
Start: 2018-10-02 | End: 2018-10-02

## 2018-10-02 RX ORDER — MORPHINE SULFATE 4 MG/ML
4 INJECTION, SOLUTION INTRAMUSCULAR; INTRAVENOUS ONCE
Status: COMPLETED | OUTPATIENT
Start: 2018-10-02 | End: 2018-10-02

## 2018-10-02 RX ORDER — OXYCODONE HYDROCHLORIDE AND ACETAMINOPHEN 5; 325 MG/1; MG/1
1 TABLET ORAL ONCE
Status: COMPLETED | OUTPATIENT
Start: 2018-10-02 | End: 2018-10-02

## 2018-10-02 RX ORDER — OXYCODONE HYDROCHLORIDE AND ACETAMINOPHEN 5; 325 MG/1; MG/1
1 TABLET ORAL EVERY 8 HOURS PRN
Qty: 10 TABLET | Refills: 0 | Status: SHIPPED | OUTPATIENT
Start: 2018-10-02 | End: 2018-10-07

## 2018-10-02 RX ADMIN — MORPHINE SULFATE 6 MG: 10 INJECTION INTRAVENOUS at 13:43

## 2018-10-02 RX ADMIN — IOHEXOL 100 ML: 350 INJECTION, SOLUTION INTRAVENOUS at 15:05

## 2018-10-02 RX ADMIN — MORPHINE SULFATE 4 MG: 4 INJECTION INTRAVENOUS at 14:34

## 2018-10-02 RX ADMIN — OXYCODONE HYDROCHLORIDE AND ACETAMINOPHEN 1 TABLET: 5; 325 TABLET ORAL at 17:12

## 2018-10-02 NOTE — ED PROVIDER NOTES
History  Chief Complaint   Patient presents with    Fall     Pt brought in by EMS sts she slipped in the shower  Pt c/o right sided neck and right sided back pain, neg LOC     HPI     70-year-old female with history of coronary artery disease, hypertension, anxiety, bipolar disorder, who presents for evaluation of severe right flank and low back pain after a fall in the shower about an hour ago  Patient states that she slipped and fell, denies loss of consciousness  She did hit her head  Since that time, reports pain in the right side of her head, right side of her neck, upper and lower back, as well as a right flank  States her  was able to carry her to bed and dressed her, at which point EMS was called to bring her to the hospital   She has not ambulated since the fall  Denies numbness or tingling in her groin region or weakness in her lower extremities, but reports severe pain in her lower back with lifting her right leg  No dizziness, lightheadedness, palpitations, chest pain, or shortness of breath  She is not on blood thinners  Pain is described as sharp, severe  Aggravated by movement, and alleviated by rest   Headache is described as mild, with no nausea, vomiting, or vision changes  Prior to Admission Medications   Prescriptions Last Dose Informant Patient Reported? Taking?    amLODIPine (NORVASC) 5 mg tablet  Self No No   Sig: Take 1 tablet (5 mg total) by mouth daily   atorvastatin (LIPITOR) 40 mg tablet  Self No No   Sig: Take 1 tablet (40 mg total) by mouth daily for 90 days   citalopram (CELEXA) 40 mg tablet  Self Yes No   Sig: Take 40 mg by mouth daily     clonazePAM (KlonoPIN) 1 mg tablet  Self Yes No   Si mg 2 (two) times a day as needed for anxiety     famotidine (PEPCID) 40 MG tablet   No No   Sig: Take 1 tablet (40 mg total) by mouth daily   gabapentin (NEURONTIN) 800 mg tablet  Self Yes No   Sig: Take 800 mg by mouth 3 (three) times a day   ibuprofen (MOTRIN) 800 mg tablet   No No   Sig: Take 1 tablet (800 mg total) by mouth every 6 (six) hours as needed for mild pain   metoprolol tartrate (LOPRESSOR) 50 mg tablet  Self No No   Sig: Take 1 tablet (50 mg total) by mouth every 12 (twelve) hours   mirtazapine (REMERON) 30 mg tablet  Self Yes No   Sig: Take 30 mg by mouth daily at bedtime     pantoprazole (PROTONIX) 40 mg tablet   No No   Sig: take 1 tablet by mouth every morning   predniSONE 10 mg tablet   No No   Sig: Take 1 tablet (10 mg total) by mouth daily Take 4 tablets for 5 days,  Then 3 tablets for 5 days,  Then 2 tablets for 5 days,  Then 1 tablet for 5 days        Facility-Administered Medications: None       Past Medical History:   Diagnosis Date    Anxiety     Bipolar 1 disorder ([place] [address] )     Mental Health problems listed as Denied in [employer], cant't entire under pertinent negatives due to this diagnosis    CAD (coronary artery disease)     last assessed - 14Apr2017    Chronic back pain     Frequent headaches     Glaucoma     Hyperlipidemia     Hypertension     Ovarian cancer ([place] [address] )     last assessed - [id number]    Psychiatric disorder     bipolar    Rheumatoid arthritis ([place] [address] )     Uterine carcinoma ([place] [address] )     last assessed - [id number]       Past Surgical History:   Procedure Laterality Date    ADENOIDECTOMY      Tonsillectomy with Adenoidectomy - last assessed - [id number]    APPENDECTOMY      last assessed - 21Sep2016    CATARACT EXTRACTION Left     Remove cataract, corneo-scleral section of left eye; last assessed - 21Sep2016    CHOLECYSTECTOMY      last assessed - [id number]   [place] EYE SURGERY      Anterior sclera fistulization for glaucoma; last assessed - [id number]    HYSTERECTOMY      KRYSTA-BSO; last assessed - [id number]    INTRAOCULAR LENS INSERTION      AL TEMPORAL ARTERY LIGATN OR BX Right 12/16/2016    Procedure: BIOPSY ARTERY TEMPORAL;  Surgeon: Joy Kearney MD;  Location: [facility];  Service: General    TONSILLECTOMY      Tonsillectomy with Adenoidectomy - last assessed - [id number]       Family History   Problem Relation Age of Onset    Heart disease Mother     Coronary artery disease Mother     Other Mother         1) Gangrene; 2) Peripheral arterial disease    Hyperlipidemia Mother         Hypercholesterolemia    Stomach cancer Mother     Heart attack Mother         Myocardial Infarction    Other Father         1) Gangrene; 2) Peripheral arterial disease    Hyperlipidemia Father         Hypercholesterolemia    Stomach cancer Father     Heart attack Father         Myocardial Infarction    Stomach cancer Brother     Heart attack Brother         Myocardial Infarction    Stomach cancer Maternal Uncle      I have reviewed and agree with the history as documented  Social History   Substance Use Topics    Smoking status: Current Every Day Smoker     Packs/day: 0 20     Years: 46 00     Types: Cigarettes    Smokeless tobacco: Never Used    Alcohol use No      Comment: occasional; (No alcohol use per [employer])        Review of Systems   Constitutional: Negative for chills and fever  HENT: Negative for congestion and facial swelling  Eyes: Negative for visual disturbance  Respiratory: Negative for cough and shortness of breath  Cardiovascular: Negative for chest pain and leg swelling  Gastrointestinal: Negative for abdominal pain, diarrhea, nausea and vomiting  Genitourinary: Positive for flank pain (R flank)  Negative for dysuria and frequency  Musculoskeletal: Positive for back pain and neck pain  Negative for arthralgias, joint swelling, myalgias and neck stiffness  Skin: Negative for rash and wound  Neurological: Positive for headaches  Negative for dizziness, weakness and numbness  Psychiatric/Behavioral: Negative for agitation, behavioral problems and confusion  Physical Exam  Physical Exam   Constitutional: She is oriented to person, place, and time  She appears well-developed and well-nourished   No distress  HENT:   Head: Normocephalic and atraumatic  Right Ear: External ear normal    Left Ear: External ear normal    Nose: Nose normal    Mouth/Throat: Oropharynx is clear and moist    Eyes: Pupils are equal, round, and reactive to light  Conjunctivae and EOM are normal    Neck: Normal range of motion  Neck supple  Tenderness to palpation over the right paraspinous muscles of the cervical spine  No midline tenderness to palpation  Cardiovascular: Normal rate, regular rhythm, normal heart sounds and intact distal pulses  Exam reveals no gallop and no friction rub  No murmur heard  Pulmonary/Chest: Effort normal and breath sounds normal  No respiratory distress  She has no wheezes  She has no rales  She exhibits no tenderness  Abdominal: Soft  Bowel sounds are normal  She exhibits no distension  There is tenderness (Tenderness to palpation over the right flank)  There is no guarding  Musculoskeletal: Normal range of motion  She exhibits no edema or deformity  Midline tenderness to palpation over the upper thoracic and lower lumbar spine  Extremities atraumatic  Neurological: She is alert and oriented to person, place, and time  She exhibits normal muscle tone  5/5 strength in the left lower extremity, 4+ out of 5 strength in the right lower extremity, limited by pain  No perineal anesthesia  Normal rectal tone  No ankle clonus  It sensation intact to the bilateral lower extremities  Skin: Skin is warm and dry  She is not diaphoretic         Vital Signs  ED Triage Vitals [10/02/18 1248]   Temperature Pulse Respirations Blood Pressure SpO2   98 °F (36 7 °C) 74 18 (!) 177/98 98 %      Temp Source Heart Rate Source Patient Position - Orthostatic VS BP Location FiO2 (%)   Oral Monitor Lying Right arm --      Pain Score       Worst Possible Pain           Vitals:    10/02/18 1248 10/02/18 1416 10/02/18 1632   BP: (!) 177/98 162/87 167/94   Pulse: 74 75 74   Patient Position - Orthostatic VS: Lying Lying Lying       Visual Acuity      ED Medications  Medications   morphine (PF) 10 mg/mL injection 6 mg (6 mg Intravenous Given 10/2/18 1343)   morphine (PF) 4 mg/mL injection 4 mg (4 mg Intravenous Given 10/2/18 1434)   iohexol (OMNIPAQUE) 350 MG/ML injection (MULTI-DOSE) 100 mL (100 mL Intravenous Given 10/2/18 1505)   oxyCODONE-acetaminophen (PERCOCET) 5-325 mg per tablet 1 tablet (1 tablet Oral Given 10/2/18 1712)       Diagnostic Studies  Results Reviewed     Procedure Component Value Units Date/Time    Urinalysis with microscopic [[id number]]  (Abnormal) Collected:  10/02/18 1552    Lab Status:  Final result Specimen:  Urine from Urine, Straight Cath Updated:  10/02/18 1611     Clarity, UA Clear     Color, UA Light Yellow     Specific Gravity, UA <=1 005     pH, UA 6 0     Glucose, UA Negative mg/dl      Ketones, UA Negative mg/dl      Blood, UA Moderate (A)     Protein, UA Negative mg/dl      Nitrite, UA Negative     Bilirubin, UA Negative     Urobilinogen, UA 0 2 E U /dl      Leukocytes, UA Negative     WBC, UA None Seen /hpf      RBC, UA 2-4 (A) /hpf      Bacteria, UA None Seen /hpf      Epithelial Cells Occasional /hpf     Comprehensive metabolic panel [[id number]]  (Abnormal) Collected:  10/02/18 1339    Lab Status:  Final result Specimen:  Blood from Arm, Right Updated:  10/02/18 1402     Sodium 143 mmol/L      Potassium 3 9 mmol/L      Chloride 107 mmol/L      CO2 25 mmol/L      ANION GAP 11 mmol/L      BUN 13 mg/dL      Creatinine 0 68 mg/dL      Glucose 85 mg/dL      Calcium 8 9 mg/dL      AST 27 U/L      ALT 53 U/L      Alkaline Phosphatase 145 (H) U/L      Total Protein 7 1 g/dL      Albumin 3 5 g/dL      Total Bilirubin 0 20 mg/dL      eGFR 95 ml/min/1 73sq m     Narrative:         National Kidney Disease Education Program recommendations are as follows:  GFR calculation is accurate only with a steady state creatinine  Chronic Kidney disease less than 60 ml/min/1 73 sq  meters  Kidney failure less than 15 ml/min/1 73 sq  meters  CBC and differential [[id number]] Collected:  10/02/18 1339    Lab Status:  Final result Specimen:  Blood from Arm, Right Updated:  10/02/18 1345     WBC 6 17 Thousand/uL      RBC 4 77 Million/uL      Hemoglobin 14 6 g/dL      Hematocrit 42 7 %      MCV 90 fL      MCH 30 6 pg      MCHC 34 2 g/dL      RDW 12 7 %      MPV 9 5 fL      Platelets 395 Thousands/uL      nRBC 0 /100 WBCs      Neutrophils Relative 59 %      Immat GRANS % 0 %      Lymphocytes Relative 31 %      Monocytes Relative 5 %      Eosinophils Relative 4 %      Basophils Relative 1 %      Neutrophils Absolute 3 62 Thousands/µL      Immature Grans Absolute 0 02 Thousand/uL      Lymphocytes Absolute 1 94 Thousands/µL      Monocytes Absolute 0 32 Thousand/µL      Eosinophils Absolute 0 24 Thousand/µL      Basophils Absolute 0 03 Thousands/µL                  CT recon only lumbar spine   Final Result by Leticia Duval DO (10/02 1619)      Lumbar degenerative disc disease at L3-4, L4-5 and L5-S1  There is facet arthropathy present at L4-5 and L5-S1 as well  Mild noncompressive canal stenosis and foraminal narrowing  Workstation performed: [id number]         CT abdomen pelvis with contrast   Final Result by Dameon Nichols MD (10/02 1550)         1  No acute abnormality in the abdomen or pelvis  2   Diverticulosis  Workstation performed: [id number]         CT spine thoracic without contrast   Final Result by Hernesto Bello DO (10/02 1546)      No fracture or traumatic malalignment  Mild degenerative changes thoracic spine  Additional incidental findings as detailed above  Workstation performed: [id number]         CT spine cervical wo contrast   Final Result by Hernesto Bello DO (10/02 1535)      No cervical spine fracture or traumatic malalignment  Mild to moderate multilevel cervical spondylosis which is most pronounced at C5-6  Apical emphysema        Workstation performed: [id number]         CT head without contrast   Final Result by Pablo Putnam MD (10/02 1521)      No acute intracranial abnormality  Workstation performed: [id number]                    Procedures  Procedures       Phone Contacts  ED Phone Contact    ED Course                               MDM  Number of Diagnoses or Management Options  Strain of lumbar region, initial encounter: new and requires workup  Diagnosis management comments: Generally well appearing  Afebrile and hemodynamically stable  Patient has tenderness to palpation over the right flank and over her T and L-spine and over the right side of her head  Trauma exam as above  Trauma alert was not activated as the patient is not on blood thinners and was hemodynamically stable  CT head with no acute intracranial abnormalities  CT of the cervical spine with no acute fractures or malalignment  CT of the thoracic spine with no acute fractures or malalignment  CT of the abdomen pelvis with no acute traumatic findings, and recon of the lumbar spine with no acute fractures or malalignment  Patient was unable to urinate initially on arrival to the ED and had 600 cc of urine in her bladder  In and out cath performed, with moderate blood in her urine, which I suspect is traumatic, though discussed with the patient that I am unable to rule out very small occult injury to the kidney, though I think that this is less likely as her pain is more present over the right flank rather than the CVA, and her renal function is normal on labs  She was instructed to return to the ED for gross hematuria, to follow up with her PCP in 1 week for repeat UA  Rectal exam was performed with normal rectal tone, and patient has no ankle clonus or saddle anesthesia  Doubt spinal cord mass lesion or cauda equina  Patient was able to urinate after receiving pain meds in the ED, and did not have urinary retention prior to discharge   Strength in the bilateral LEs was 5/5 once pain was controlled  Patient is able to ambulate  CBC and CMP unremarkable  Plan to discharge home with small amount of Percocet for pain relief and instructions to take NSAIDs  Patient discharged in good condition  Amount and/or Complexity of Data Reviewed  Clinical lab tests: ordered and reviewed  Tests in the radiology section of CPT®: ordered and reviewed    Patient Progress  Patient progress: improved    CritCare Time         Disposition  Final diagnoses:   Strain of lumbar region, initial encounter     Time reflects when diagnosis was documented in both MDM as applicable and the Disposition within this note     Time User Action Codes Description Comment    10/2/2018  5:52 PM Celestina Phanmarielena Add [[id number]] Strain of lumbar region, initial encounter       ED Disposition     ED Disposition Condition Comment    Discharge  Ashanti Gutierrez discharge to home/self care  Condition at discharge: Good        Follow-up Information     Follow up With Specialties Details Why Contact Info Additional Information    Eugenie Singh MD Internal Medicine In 3 days As needed [address] [facility] Emergency Department Emergency Medicine  For sudden worsening of your pain    [address] [place] [id number]  [address] [place] [address] ED, [address] [place] [address], [facility]          Discharge Medication List as of 10/2/2018  5:53 PM      START taking these medications    Details   oxyCODONE-acetaminophen (PERCOCET) 5-325 mg per tablet Take 1 tablet by mouth every 8 (eight) hours as needed for moderate pain or severe pain for up to 5 days Max Daily Amount: 3 tablets, Starting Tue 10/2/2018, Until Sun 10/7/2018, Print         CONTINUE these medications which have NOT CHANGED    Details   amLODIPine (NORVASC) 5 mg tablet Take 1 tablet (5 mg total) by mouth daily, Starting u 6/14/2018, Normal atorvastatin (LIPITOR) 40 mg tablet Take 1 tablet (40 mg total) by mouth daily for 90 days, Starting Thu 6/14/2018, Until Wed 9/12/2018, Normal      citalopram (CELEXA) 40 mg tablet Take 40 mg by mouth daily  , Until Discontinued, Historical Med      clonazePAM (KlonoPIN) 1 mg tablet 1 mg 2 (two) times a day as needed for anxiety  , Starting Fri 4/20/2018, Historical Med      famotidine (PEPCID) 40 MG tablet Take 1 tablet (40 mg total) by mouth daily, Starting Fri 8/24/2018, Normal      gabapentin (NEURONTIN) 800 mg tablet Take 800 mg by mouth 3 (three) times a day, Historical Med      ibuprofen (MOTRIN) 800 mg tablet Take 1 tablet (800 mg total) by mouth every 6 (six) hours as needed for mild pain, Starting Mon 7/9/2018, Print      metoprolol tartrate (LOPRESSOR) 50 mg tablet Take 1 tablet (50 mg total) by mouth every 12 (twelve) hours, Starting Thu 6/14/2018, Normal      mirtazapine (REMERON) 30 mg tablet Take 30 mg by mouth daily at bedtime  , Historical Med      pantoprazole (PROTONIX) 40 mg tablet take 1 tablet by mouth every morning, Normal      predniSONE 10 mg tablet Take 1 tablet (10 mg total) by mouth daily Take 4 tablets for 5 days,  Then 3 tablets for 5 days,  Then 2 tablets for 5 days,  Then 1 tablet for 5 days  , Starting Mon 7/9/2018, Print           No discharge procedures on file      ED Provider  Electronically Signed by           Ivette Dinero MD  10/03/18 9171

## 2018-10-02 NOTE — DISCHARGE INSTRUCTIONS
Low Back Strain   WHAT YOU NEED TO KNOW:   Low back strain is an injury to your lower back muscles or tendons  Tendons are strong tissues that connect muscles to bones  The lower back supports most of your body weight and helps you move, twist, and bend  DISCHARGE INSTRUCTIONS:   Seek care immediately if:   · You hear or feel a pop in your lower back  · You have increased swelling or pain in your lower back  · You have trouble moving your legs  · Your legs are numb  Contact your healthcare provider if:   · You have a fever  · Your pain does not go away, even after treatment  · You have questions or concerns about your condition or care  Medicines: The following medicines may be ordered by your healthcare provider:  · Acetaminophen decreases pain and fever  It is available without a doctor's order  Ask how much to take and how often to take it  Follow directions  Acetaminophen can cause liver damage if not taken correctly  · NSAIDs , such as ibuprofen, help decrease swelling, pain, and fever  This medicine is available with or without a doctor's order  NSAIDs can cause stomach bleeding or kidney problems in certain people  If you take blood thinner medicine, always ask your healthcare provider if NSAIDs are safe for you  Always read the medicine label and follow directions  · Muscle relaxers  help decrease pain and muscle spasms  · Prescription pain medicine  may be given  Ask how to take this medicine safely  · Take your medicine as directed  Contact your healthcare provider if you think your medicine is not helping or if you have side effects  Tell him or her if you are allergic to any medicine  Keep a list of the medicines, vitamins, and herbs you take  Include the amounts, and when and why you take them  Bring the list or the pill bottles to follow-up visits  Carry your medicine list with you in case of an emergency  Self-care:   · Rest  as directed   You may need to rest in bed for a period of time after your injury  Do not lift heavy objects  · Apply ice  on your back for 15 to 20 minutes every hour or as directed  Use an ice pack, or put crushed ice in a plastic bag  Cover it with a towel  Ice helps prevent tissue damage and decreases swelling and pain  · Apply heat  on your lower back for 20 to 30 minutes every 2 hours for as many days as directed  Heat helps decrease pain and muscle spasms  · Slowly start to increase your activity  as the pain decreases, or as directed  Prevent another low back strain:   · Use correct body movements  ¨ Bend at the hips and knees when you  objects  Do not bend from the waist  Use your leg muscles as you lift the load  Do not use your back  Keep the object close to your chest as you lift it  Try not to twist or lift anything above your waist     ¨ Change your position often when you stand for long periods of time  Rest one foot on a small box or footrest, and then switch to the other foot often  ¨ Try not to sit for long periods of time  When you do, sit in a straight-backed chair with your feet flat on the floor  ¨ Never reach, pull, or push while you are sitting  · Warm up before you exercise  Do exercises that strengthen your back muscles  Ask your healthcare provider about the best exercise plan for you  · Maintain a healthy weight  Ask your healthcare provider how much you should weigh  Ask him to help you create a weight loss plan if you are overweight  Follow up with your healthcare provider as directed:  Write down your questions so you remember to ask them during your visits  © 2017 [employer] Information is for End User's use only and may not be sold, redistributed or otherwise used for commercial purposes  All illustrations and images included in CareNotes® are the copyrighted property of [employer]  or [employer]  The above information is an  only   It is not intended as medical advice for individual conditions or treatments  Talk to your doctor, nurse or pharmacist before following any medical regimen to see if it is safe and effective for you

## 2018-10-02 NOTE — ED NOTES
Pt ambulated to bathroom without any assistance  Pt denies any pain or complaints while ambulating  Pt was given hat to catch urine but sts she sat too far back and missed  Pt sts she did urinate       Leonardo Lopez RN  10/02/18 Jian Velazquez  10/02/18 2807

## 2018-10-20 DIAGNOSIS — K21.9 GASTROESOPHAGEAL REFLUX DISEASE WITHOUT ESOPHAGITIS: ICD-10-CM

## 2018-10-20 RX ORDER — PANTOPRAZOLE SODIUM 40 MG/1
TABLET, DELAYED RELEASE ORAL
Qty: 30 TABLET | Refills: 0 | Status: SHIPPED | OUTPATIENT
Start: 2018-10-20 | End: 2018-11-28 | Stop reason: SDUPTHER

## 2018-10-30 ENCOUNTER — APPOINTMENT (EMERGENCY)
Dept: RADIOLOGY | Facility: HOSPITAL | Age: 58
End: 2018-10-30
Payer: COMMERCIAL

## 2018-10-30 ENCOUNTER — HOSPITAL ENCOUNTER (EMERGENCY)
Facility: HOSPITAL | Age: 58
Discharge: LEFT BEFORE DISCHARGE | End: 2018-10-30
Attending: EMERGENCY MEDICINE
Payer: COMMERCIAL

## 2018-10-30 VITALS
HEIGHT: 63 IN | WEIGHT: 150 LBS | OXYGEN SATURATION: 99 % | BODY MASS INDEX: 26.58 KG/M2 | RESPIRATION RATE: 20 BRPM | TEMPERATURE: 97.8 F | HEART RATE: 73 BPM | SYSTOLIC BLOOD PRESSURE: 163 MMHG | DIASTOLIC BLOOD PRESSURE: 81 MMHG

## 2018-10-30 DIAGNOSIS — M54.50 ACUTE EXACERBATION OF CHRONIC LOW BACK PAIN: Primary | ICD-10-CM

## 2018-10-30 DIAGNOSIS — G89.29 ACUTE EXACERBATION OF CHRONIC LOW BACK PAIN: Primary | ICD-10-CM

## 2018-10-30 PROCEDURE — 99283 EMERGENCY DEPT VISIT LOW MDM: CPT

## 2018-10-30 NOTE — ED NOTES
Pt upset about plan of treatment ,refusing muscle relaxants or lidocaine patch   Pt states '' I'm leaving , I'm not going for unnecessary tests, you don't want to help me ''      Blanche Silva, RN  10/30/18 4155

## 2018-10-30 NOTE — ED NOTES
Pt leaving department with fast steady gait  Dr Juana Crenshaw made aware        Masood Brewster, RN  10/30/18 5770

## 2018-10-30 NOTE — ED PROVIDER NOTES
History  Chief Complaint   Patient presents with    Back Pain     pt with chronic back pain, getting worst, fell in the bathtub on , no blood thiners     HPI    Prior to Admission Medications   Prescriptions Last Dose Informant Patient Reported? Taking? amLODIPine (NORVASC) 5 mg tablet  Self No No   Sig: Take 1 tablet (5 mg total) by mouth daily   atorvastatin (LIPITOR) 40 mg tablet  Self No No   Sig: Take 1 tablet (40 mg total) by mouth daily for 90 days   citalopram (CELEXA) 40 mg tablet  Self Yes No   Sig: Take 40 mg by mouth daily     clonazePAM (KlonoPIN) 1 mg tablet  Self Yes No   Si mg 2 (two) times a day as needed for anxiety     famotidine (PEPCID) 40 MG tablet   No No   Sig: Take 1 tablet (40 mg total) by mouth daily   gabapentin (NEURONTIN) 800 mg tablet  Self Yes No   Sig: Take 800 mg by mouth 3 (three) times a day   ibuprofen (MOTRIN) 800 mg tablet   No No   Sig: Take 1 tablet (800 mg total) by mouth every 6 (six) hours as needed for mild pain   metoprolol tartrate (LOPRESSOR) 50 mg tablet  Self No No   Sig: Take 1 tablet (50 mg total) by mouth every 12 (twelve) hours   mirtazapine (REMERON) 30 mg tablet  Self Yes No   Sig: Take 30 mg by mouth daily at bedtime     pantoprazole (PROTONIX) 40 mg tablet   No No   Sig: take 1 tablet by mouth every morning   predniSONE 10 mg tablet   No No   Sig: Take 1 tablet (10 mg total) by mouth daily Take 4 tablets for 5 days,  Then 3 tablets for 5 days,  Then 2 tablets for 5 days,  Then 1 tablet for 5 days        Facility-Administered Medications: None       Past Medical History:   Diagnosis Date    Anxiety     [facility]     Mental Health problems listed as Denied in [employer], cant't entire under pertinent negatives due to this diagnosis    CAD (coronary artery disease)     last assessed - [id number]    Chronic back pain     Frequent headaches     Glaucoma     Hyperlipidemia     Hypertension     [facility]     last assessed - 21Sep2016    Psychiatric disorder     bipolar    [facility]     Uterine carcinoma ([facility] )     last assessed - 21Sep2016       Past Surgical History:   Procedure Laterality Date    ADENOIDECTOMY      Tonsillectomy with Adenoidectomy - last assessed - 21Sep2016    APPENDECTOMY      last assessed - 21Sep2016    CATARACT EXTRACTION Left     Remove cataract, corneo-scleral section of left eye; last assessed - 21Sep2016    CHOLECYSTECTOMY      last assessed - 29Sep2016   Livia Pall EYE SURGERY      Anterior sclera fistulization for glaucoma; last assessed - 21Sep2016    HYSTERECTOMY      KRYSTA-BSO; last assessed - 21Sep2016    INTRAOCULAR LENS INSERTION      [facility] TEMPORAL ARTERY LIGATN OR BX Right 12/16/2016    Procedure: BIOPSY ARTERY TEMPORAL;  Surgeon: Daljit Lowry MD;  Location: [facility];  Service: General    TONSILLECTOMY      Tonsillectomy with Adenoidectomy - last assessed - 21Sep2016       Family History   Problem Relation Age of Onset    Heart disease Mother     Coronary artery disease Mother     Other Mother         1) Gangrene; 2) Peripheral arterial disease    Hyperlipidemia Mother         Hypercholesterolemia    Stomach cancer Mother     Heart attack Mother         Myocardial Infarction    Other Father         1) Gangrene; 2) Peripheral arterial disease    Hyperlipidemia Father         Hypercholesterolemia    Stomach cancer Father     Heart attack Father         Myocardial Infarction    Stomach cancer Brother     Heart attack Brother         Myocardial Infarction    Stomach cancer Maternal Uncle      I have reviewed and agree with the history as documented      Social History   Substance Use Topics    Smoking status: Current Every Day Smoker     Packs/day: 0 20     Years: 46 00     Types: Cigarettes    Smokeless tobacco: Never Used    Alcohol use No      Comment: occasional; (No alcohol use per [employer])        Review of Systems    Physical Exam  Physical Exam Constitutional: She is oriented to person, place, and time  She appears well-developed and well-nourished  No distress  HENT:   Head: Normocephalic and atraumatic  Eyes: Pupils are equal, round, and reactive to light  Conjunctivae are normal    Neck: Normal range of motion  No tracheal deviation present  Cardiovascular: Normal rate, regular rhythm and intact distal pulses  Pulmonary/Chest: Effort normal  No respiratory distress  Musculoskeletal:        Lumbar back: She exhibits decreased range of motion (Due to pain) and tenderness (Diffuse upper back, worst on the left side and midline)  She exhibits no deformity  Back:    Palpation of the left upper back causes worsening radiation of pain down the leg   Neurological: She is alert and oriented to person, place, and time  GCS eye subscore is 4  GCS verbal subscore is 5  GCS motor subscore is 6  Reflex Scores:       Patellar reflexes are 1+ on the right side and 1+ on the left side  No ankle clonus  During straight leg testing, lifting the left heel off bed causes worsening pain, but there is no change in pain with lifting the right leg off the bed  Slightly decreased strength left hip due to pain  Otherwise normal strength in the left leg  No saddle anesthesia  Endorses pins and needle sensation with light touch to the anterior left thigh  Skin: Skin is warm and dry  Psychiatric: She has a normal mood and affect  Her behavior is normal    Nursing note and vitals reviewed        Vital Signs  ED Triage Vitals [10/30/18 1118]   Temperature Pulse Respirations Blood Pressure SpO2   97 8 °F (36 6 °C) 73 20 163/81 99 %      Temp Source Heart Rate Source Patient Position - Orthostatic VS BP Location FiO2 (%)   Oral Monitor Sitting Right arm --      Pain Score       Worst Possible Pain           Vitals:    10/30/18 1118   BP: 163/81   Pulse: 73   Patient Position - Orthostatic VS: Sitting       Visual Acuity      ED Medications  Medications - No data to display    Diagnostic Studies  Results Reviewed     None                 XR lumbar spine 2 or 3 views    (Results Pending)   XR hip/pelv 2-3 vws left    (Results Pending)              Procedures  Procedures       Phone Contacts  ED Phone Contact    ED Course                               MDM  Number of Diagnoses or Management Options  Acute exacerbation of chronic low back pain: new and does not require workup  Diagnosis management comments: This is a 78-year-old female who presents here today with back pain  She states she has a chronic history of back pain, but has never seen anybody for this, including her primary care doctor  She states on 10/28 she slipped in the bathtub and landed on her left side, hitting her left lower back in the midline  She is having worsening of her baseline pain, which radiates down her left leg through the hip  She endorses a paresthesia sensation in the anterior left thigh  She denies any weakness  She does endorse difficulties walking  She states she has been in bed the past couple of days because she is afraid to move  Today she was bending over to put dishes in the  when she had acute worsening of the pain, prompting her to come for evaluation  She has been taking 400 milligrams of ibuprofen approximately every 6 hours which she states is not providing any relief of her pain  She has been using ice and heat without relief  She denies any other treatment modalities for her pain  She denies any bowel or bladder incontinence or other complaints  She denies any other injuries from the fall  ROS: Otherwise negative, unless stated as above  She is well-appearing, in no acute distress  She does have tenderness to the upper lumbar back diffusely, worst in the midline and on the left side  Palpation this area does causes radiation of pain into the hip  Just lifting the left heel off the bed to assess straight leg testing causes pain    There is no worsening of the back pain with raising of the right leg  She has no saddle anesthesia  She does have slightly decreased strength of the left hip due to pain, but otherwise has normal strength in the left leg  The remainder of her exam is unremarkable  She does have a history of drug-seeking behavior, and this is her third visit in the past two months to our emergency department for mild fall resulting in worsening of her chronic back pain  There has been concerns expressed regarding drug-seeking behavior previously  According to the [place] drug database, she has had 21 prescriptions by 12 providers in the past calendar year  She is prescribed monthly clonazepam, but has nine different prescriptions for narcotic pain medications from nine different prescribers, most of these for a short course  Despite taking ibuprofen, she does endorse an anaphylactic allergy to Toradol  She has multiple listed allergies to nonnarcotic pain medications  When told that she would be getting a topical pain patch, she then states that she has been using this and has not working for her  I did discuss with her that we would not be giving her any narcotic pain medications unless we are able to confirm there are acute bony injuries contributing to her pain, given continued concern for drug-seeking behavior  Given her blunt injury, and pain in a slightly different area from normal, we will get x-rays to evaluate for bony injuries contributing to her acute symptoms  This is most likely exacerbation of her chronic pain due to the fall with secondary muscle spasms  I do have low suspicion for acute bony injury  She has no red flag symptoms to raise concern for spinal compressive process, such as cauda equina syndrome, spinal epidural abscess or hematoma  The patient was initially in agreement with this plan, however became upset decided to leave AMA    She told the nurse that she was not going to stay for evaluation, and left prior to be being re-evaluated by myself  Amount and/or Complexity of Data Reviewed  Decide to obtain previous medical records or to obtain history from someone other than the patient: yes  Review and summarize past medical records: yes      CritCare Time    Disposition  Final diagnoses:   Acute exacerbation of chronic low back pain     Time reflects when diagnosis was documented in both MDM as applicable and the Disposition within this note     Time User Action Codes Description Comment    10/30/2018 12:58 PM MikiQuinten Add [M54 5,  G89 29] Acute exacerbation of chronic low back pain       ED Disposition     ED Disposition Condition Comment    AMA  Date: 10/30/2018  Patient: Princess Felton  Admitted: 10/30/2018 11:15 AM  Attending Provider: Reilly Livingston    Princess Felton or her authorized caregiver has made the decision for the patient to leave the emergency department agains t the advice of the emergency department staff  She or her authorized caregiver has been informed and understands the inherent risks, including death  She or her authorized caregiver has decided to accept the responsibility for this decision  Princess Felton and all necessary parties have been advised that she may return for further evaluation or treatment  Her condition at time of discharge was stable  Princess Felton had current vital signs as follows:  /81 (BP Location: Right arm)   Puls e 73   Temp 97 8 °F (36 6 °C) (Oral)   Resp 20   Ht 5' 3" (1 6 m)   Wt 68 kg (150 lb)   LMP  (LMP Unknown)         Follow-up Information    None         Patient's Medications   Discharge Prescriptions    No medications on file     No discharge procedures on file      ED Provider  Electronically Signed by           Aundrea Flynn MD  11/02/18 6629

## 2018-11-06 ENCOUNTER — TELEPHONE (OUTPATIENT)
Dept: INTERNAL MEDICINE CLINIC | Facility: CLINIC | Age: 58
End: 2018-11-06

## 2018-11-06 DIAGNOSIS — M06.9 RHEUMATOID ARTHRITIS INVOLVING MULTIPLE SITES, UNSPECIFIED RHEUMATOID FACTOR PRESENCE: ICD-10-CM

## 2018-11-06 DIAGNOSIS — J44.9 CHRONIC OBSTRUCTIVE PULMONARY DISEASE, UNSPECIFIED COPD TYPE (HCC): Primary | ICD-10-CM

## 2018-11-06 RX ORDER — ALBUTEROL SULFATE 2.5 MG/3ML
2.5 SOLUTION RESPIRATORY (INHALATION) EVERY 6 HOURS PRN
Qty: 100 VIAL | Refills: 3 | Status: SHIPPED | OUTPATIENT
Start: 2018-11-06 | End: 2020-03-18

## 2018-11-06 NOTE — TELEPHONE ENCOUNTER
Wants Isadora Wang to call her [phone]  Wants meron to call her in a nebulizer and the medicine  I see nothing on her med list  She could not give me the names  Please call her

## 2018-11-06 NOTE — TELEPHONE ENCOUNTER
I ordered a nebulizer I spoke to the patient on the phone    Please contact [facility] in order to get her nebulizer delivered to her thanks

## 2018-11-26 ENCOUNTER — HOSPITAL ENCOUNTER (EMERGENCY)
Facility: HOSPITAL | Age: 58
Discharge: HOME | End: 2018-11-26
Attending: EMERGENCY MEDICINE
Payer: COMMERCIAL

## 2018-11-26 ENCOUNTER — APPOINTMENT (EMERGENCY)
Dept: CT IMAGING | Facility: HOSPITAL | Age: 58
End: 2018-11-26
Payer: COMMERCIAL

## 2018-11-26 ENCOUNTER — APPOINTMENT (EMERGENCY)
Dept: RADIOLOGY | Facility: HOSPITAL | Age: 58
End: 2018-11-26
Payer: COMMERCIAL

## 2018-11-26 VITALS
BODY MASS INDEX: 28.2 KG/M2 | HEART RATE: 68 BPM | SYSTOLIC BLOOD PRESSURE: 131 MMHG | OXYGEN SATURATION: 95 % | WEIGHT: 159.17 LBS | RESPIRATION RATE: 16 BRPM | DIASTOLIC BLOOD PRESSURE: 71 MMHG | TEMPERATURE: 97.6 F

## 2018-11-26 DIAGNOSIS — S30.0XXA CONTUSION OF LOWER BACK: ICD-10-CM

## 2018-11-26 DIAGNOSIS — S09.90XA CLOSED HEAD INJURY: ICD-10-CM
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
## 2018-11-26 DIAGNOSIS — S70.02XA CONTUSION OF LEFT HIP: Primary | ICD-10-CM

## 2018-11-26 LAB
ALBUMIN SERPL BCP-MCNC: 3.2 G/DL (ref 3.5–5)
ALP SERPL-CCNC: 139 U/L (ref 46–116)
ALT SERPL W P-5'-P-CCNC: 37 U/L (ref 12–78)
ANION GAP SERPL CALCULATED.3IONS-SCNC: 10 MMOL/L (ref 4–13)
APTT PPP: 22 SECONDS (ref 26–38)
AST SERPL W P-5'-P-CCNC: 23 U/L (ref 5–45)
BASOPHILS # BLD AUTO: 0.04 THOUSANDS/ΜL (ref 0–0.1)
BASOPHILS NFR BLD AUTO: 1 % (ref 0–1)
BILIRUB SERPL-MCNC: 0.3 MG/DL (ref 0.2–1)
BUN SERPL-MCNC: 16 MG/DL (ref 5–25)
CALCIUM SERPL-MCNC: 9.3 MG/DL (ref 8.3–10.1)
CHLORIDE SERPL-SCNC: 107 MMOL/L (ref 100–108)
CO2 SERPL-SCNC: 26 MMOL/L (ref 21–32)
CREAT SERPL-MCNC: 0.55 MG/DL (ref 0.6–1.3)
EOSINOPHIL # BLD AUTO: 0.23 THOUSAND/ΜL (ref 0–0.61)
EOSINOPHIL NFR BLD AUTO: 4 % (ref 0–6)
ERYTHROCYTE [DISTWIDTH] IN BLOOD BY AUTOMATED COUNT: 13.4 % (ref 11.6–15.1)
GFR SERPL CREATININE-BSD FRML MDRD: 102 ML/MIN/1.73SQ M
GLUCOSE SERPL-MCNC: 93 MG/DL (ref 65–140)
HCT VFR BLD AUTO: 41.2 % (ref 34.8–46.1)
HGB BLD-MCNC: 14.1 G/DL (ref 11.5–15.4)
IMM GRANULOCYTES # BLD AUTO: 0.01 THOUSAND/UL (ref 0–0.2)
IMM GRANULOCYTES NFR BLD AUTO: 0 % (ref 0–2)
INR PPP: 1.01 (ref 0.86–1.17)
LYMPHOCYTES # BLD AUTO: 1.58 THOUSANDS/ΜL (ref 0.6–4.47)
LYMPHOCYTES NFR BLD AUTO: 28 % (ref 14–44)
MCH RBC QN AUTO: 30.7 PG (ref 26.8–34.3)
MCHC RBC AUTO-ENTMCNC: 34.2 G/DL (ref 31.4–37.4)
MCV RBC AUTO: 90 FL (ref 82–98)
MONOCYTES # BLD AUTO: 0.38 THOUSAND/ΜL (ref 0.17–1.22)
MONOCYTES NFR BLD AUTO: 7 % (ref 4–12)
NEUTROPHILS # BLD AUTO: 3.32 THOUSANDS/ΜL (ref 1.85–7.62)
NEUTS SEG NFR BLD AUTO: 60 % (ref 43–75)
NRBC BLD AUTO-RTO: 0 /100 WBCS
PLATELET # BLD AUTO: 199 THOUSANDS/UL (ref 149–390)
PMV BLD AUTO: 9.9 FL (ref 8.9–12.7)
POTASSIUM SERPL-SCNC: 4.2 MMOL/L (ref 3.5–5.3)
PROT SERPL-MCNC: 7 G/DL (ref 6.4–8.2)
PROTHROMBIN TIME: 13.2 SECONDS (ref 11.8–14.2)
RBC # BLD AUTO: 4.59 MILLION/UL (ref 3.81–5.12)
SODIUM SERPL-SCNC: 143 MMOL/L (ref 136–145)
WBC # BLD AUTO: 5.56 THOUSAND/UL (ref 4.31–10.16)

## 2018-11-26 PROCEDURE — 36415 COLL VENOUS BLD VENIPUNCTURE: CPT | Performed by: EMERGENCY MEDICINE

## 2018-11-26 PROCEDURE — 85730 THROMBOPLASTIN TIME PARTIAL: CPT | Performed by: EMERGENCY MEDICINE

## 2018-11-26 PROCEDURE — 74176 CT ABD & PELVIS W/O CONTRAST: CPT

## 2018-11-26 PROCEDURE — 72170 X-RAY EXAM OF PELVIS: CPT

## 2018-11-26 PROCEDURE — 80053 COMPREHEN METABOLIC PANEL: CPT | Performed by: EMERGENCY MEDICINE

## 2018-11-26 PROCEDURE — 99284 EMERGENCY DEPT VISIT MOD MDM: CPT

## 2018-11-26 PROCEDURE — 85025 COMPLETE CBC W/AUTO DIFF WBC: CPT | Performed by: EMERGENCY MEDICINE

## 2018-11-26 PROCEDURE — 96361 HYDRATE IV INFUSION ADD-ON: CPT

## 2018-11-26 PROCEDURE — 73552 X-RAY EXAM OF FEMUR 2/>: CPT

## 2018-11-26 PROCEDURE — 96372 THER/PROPH/DIAG INJ SC/IM: CPT

## 2018-11-26 PROCEDURE — 96374 THER/PROPH/DIAG INJ IV PUSH: CPT

## 2018-11-26 PROCEDURE — 72125 CT NECK SPINE W/O DYE: CPT

## 2018-11-26 PROCEDURE — 96375 TX/PRO/DX INJ NEW DRUG ADDON: CPT

## 2018-11-26 PROCEDURE — 85610 PROTHROMBIN TIME: CPT | Performed by: EMERGENCY MEDICINE

## 2018-11-26 PROCEDURE — 70450 CT HEAD/BRAIN W/O DYE: CPT

## 2018-11-26 RX ORDER — FENTANYL CITRATE 50 UG/ML
50 INJECTION, SOLUTION INTRAMUSCULAR; INTRAVENOUS ONCE
Status: COMPLETED | OUTPATIENT
Start: 2018-11-26 | End: 2018-11-26

## 2018-11-26 RX ORDER — HYDROMORPHONE HCL/PF 1 MG/ML
1 SYRINGE (ML) INJECTION ONCE
Status: COMPLETED | OUTPATIENT
Start: 2018-11-26 | End: 2018-11-26

## 2018-11-26 RX ORDER — ACETAMINOPHEN 325 MG/1
975 TABLET ORAL ONCE
Status: COMPLETED | OUTPATIENT
Start: 2018-11-26 | End: 2018-11-26

## 2018-11-26 RX ORDER — IBUPROFEN 600 MG/1
600 TABLET ORAL ONCE
Status: COMPLETED | OUTPATIENT
Start: 2018-11-26 | End: 2018-11-26

## 2018-11-26 RX ORDER — LIDOCAINE 50 MG/G
1 PATCH TOPICAL ONCE
Status: DISCONTINUED | OUTPATIENT
Start: 2018-11-26 | End: 2018-11-26 | Stop reason: HOSPADM

## 2018-11-26 RX ADMIN — IBUPROFEN 600 MG: 600 TABLET ORAL at 13:01

## 2018-11-26 RX ADMIN — HYDROMORPHONE HYDROCHLORIDE 1 MG: 1 INJECTION, SOLUTION INTRAMUSCULAR; INTRAVENOUS; SUBCUTANEOUS at 11:02

## 2018-11-26 RX ADMIN — LIDOCAINE 1 PATCH: 50 PATCH TOPICAL at 13:01

## 2018-11-26 RX ADMIN — HYDROMORPHONE HYDROCHLORIDE 1 MG: 1 INJECTION, SOLUTION INTRAMUSCULAR; INTRAVENOUS; SUBCUTANEOUS at 11:13

## 2018-11-26 RX ADMIN — ACETAMINOPHEN 975 MG: 325 TABLET, FILM COATED ORAL at 13:01

## 2018-11-26 RX ADMIN — SODIUM CHLORIDE 1000 ML: 0.9 INJECTION, SOLUTION INTRAVENOUS at 10:33

## 2018-11-26 RX ADMIN — FENTANYL CITRATE 50 MCG: 50 INJECTION INTRAMUSCULAR; INTRAVENOUS at 10:34

## 2018-11-26 NOTE — DISCHARGE INSTRUCTIONS

## 2018-11-26 NOTE — ED NOTES
Patient was able to ambulate to the restroom  C/o pain, but steady gait       Balwinder Low RN  11/26/18 1444

## 2018-11-26 NOTE — ED PROVIDER NOTES
History  Chief Complaint   Patient presents with    Fall     pt fell this morning on a wood floor  c/o left hip and lower back pain  pt states that she hit her head but denies any LOC and is not on blood thinners     60 yo F presenting after a fall  Patient reports that she slipped on gravy that was on her dining room floor and fell landing on her left side  She reports she did strike the left side of her head  Denies LOC  No AC/AP agents  Unable to get up on her own, required her 's assistance and came in by ambulance  Complains of pain to the left side of her head, low back and left hip  Never injured her back or hip in the past   Pain with range of motion of left leg  Denies any numbness or tingling  Denies any neck pain, CP, SOB, nausea, vomiting, abdominal pain  A/P:  28-year-old female s/p mechanical fall, will treat pain, CTH to rule out intracranial bleed, CT C-spine to rule out fracture, CT A/P with lumbar recons to assess for lumbar spine fractures, x-ray of left hip/femur to assess for osseous abnormality, reassess, dispo accordingly            Prior to Admission Medications   Prescriptions Last Dose Informant Patient Reported? Taking?    albuterol (2 5 mg/3 mL) 0 083 % nebulizer solution   No No   Sig: Take 1 vial (2 5 mg total) by nebulization every 6 (six) hours as needed for wheezing or shortness of breath   amLODIPine (NORVASC) 5 mg tablet  Self No No   Sig: Take 1 tablet (5 mg total) by mouth daily   atorvastatin (LIPITOR) 40 mg tablet  Self No No   Sig: Take 1 tablet (40 mg total) by mouth daily for 90 days   citalopram (CELEXA) 40 mg tablet  Self Yes No   Sig: Take 40 mg by mouth daily     clonazePAM (KlonoPIN) 1 mg tablet  Self Yes No   Si mg 2 (two) times a day as needed for anxiety     famotidine (PEPCID) 40 MG tablet   No No   Sig: Take 1 tablet (40 mg total) by mouth daily   gabapentin (NEURONTIN) 800 mg tablet  Self Yes No   Sig: Take 800 mg by mouth 3 (three) times a day   ibuprofen (MOTRIN) 800 mg tablet   No No   Sig: Take 1 tablet (800 mg total) by mouth every 6 (six) hours as needed for mild pain   metoprolol tartrate (LOPRESSOR) 50 mg tablet  Self No No   Sig: Take 1 tablet (50 mg total) by mouth every 12 (twelve) hours   mirtazapine (REMERON) 30 mg tablet  Self Yes No   Sig: Take 30 mg by mouth daily at bedtime     pantoprazole (PROTONIX) 40 mg tablet   No No   Sig: take 1 tablet by mouth every morning   predniSONE 10 mg tablet   No No   Sig: Take 1 tablet (10 mg total) by mouth daily Take 4 tablets for 5 days,  Then 3 tablets for 5 days,  Then 2 tablets for 5 days,  Then 1 tablet for 5 days        Facility-Administered Medications: None       Past Medical History:   Diagnosis Date    Anxiety     Bipolar 1 disorder ([place] [address] )     Mental Health problems listed as Denied in [employer], cant't entire under pertinent negatives due to this diagnosis    CAD (coronary artery disease)     last assessed - 14Apr2017    Chronic back pain     Frequent headaches     Glaucoma     Hyperlipidemia     Hypertension     Ovarian cancer ([place] [address] )     last assessed - [id number]    Psychiatric disorder     bipolar    Rheumatoid arthritis ([place] [address] )     Uterine carcinoma ([place] [address] )     last assessed - [id number]       Past Surgical History:   Procedure Laterality Date    ADENOIDECTOMY      Tonsillectomy with Adenoidectomy - last assessed - [id number]    APPENDECTOMY      last assessed - [id number]    CATARACT EXTRACTION Left     Remove cataract, corneo-scleral section of left eye; last assessed - [id number]    CHOLECYSTECTOMY      last assessed - [id number]   Dele Denzel EYE SURGERY      Anterior sclera fistulization for glaucoma; last assessed - 52Knr8711    HYSTERECTOMY      KRYSTA-BSO; last assessed - [id number]    INTRAOCULAR LENS INSERTION      TX TEMPORAL ARTERY LIGATN OR BX Right 12/16/2016    Procedure: BIOPSY ARTERY TEMPORAL;  Surgeon: Alisia Davidson MD;  Location: [facility];  [place] [facility] [place]      Tonsillectomy with Adenoidectomy - last assessed - [id number]       Family History   Problem Relation Age of Onset    Heart disease Mother     Coronary artery disease Mother     Other Mother         1) Gangrene; 2) Peripheral arterial disease    Hyperlipidemia Mother         Hypercholesterolemia    Stomach cancer Mother     Heart attack Mother         Myocardial Infarction    Other Father         1) Gangrene; 2) Peripheral arterial disease    Hyperlipidemia Father         Hypercholesterolemia    Stomach cancer Father     Heart attack Father         Myocardial Infarction    Stomach cancer Brother     Heart attack Brother         Myocardial Infarction    Stomach cancer Maternal Uncle      I have reviewed and agree with the history as documented  Social History   Substance Use Topics    Smoking status: Current Every Day Smoker     Packs/day: 0 20     Years: 46 00     Types: Cigarettes    Smokeless tobacco: Never Used    Alcohol use No      Comment: occasional; (No alcohol use per [employer])        Review of Systems   Constitutional: Negative for chills, fever and unexpected weight change  HENT: Negative for ear pain, rhinorrhea and sore throat  Eyes: Negative for pain and visual disturbance  Respiratory: Negative for cough and shortness of breath  Cardiovascular: Negative for chest pain and leg swelling  Gastrointestinal: Negative for abdominal pain, constipation, diarrhea, nausea and vomiting  Genitourinary: Negative for dysuria, frequency, hematuria and urgency  Musculoskeletal: Positive for back pain  Negative for neck pain         + L hip pain, + low back pain   Skin: Negative for color change and rash  Allergic/Immunologic: Negative for immunocompromised state  Neurological: Negative for dizziness, weakness, light-headedness, numbness and headaches  Hematological: Negative for adenopathy  Does not bruise/bleed easily     Psychiatric/Behavioral: Negative for agitation and confusion  All other systems reviewed and are negative  Physical Exam  Physical Exam   Constitutional: She is oriented to person, place, and time  She appears well-developed and well-nourished  HENT:   Head: Normocephalic and atraumatic  Nose: Nose normal    Mouth/Throat: Oropharynx is clear and moist    No scalp ttp  No facial bone ttp  Eyes: Conjunctivae and EOM are normal    Neck: Normal range of motion  Neck supple  No midline C-spine ttp, no stepoffs/deformity   Cardiovascular: Normal rate, regular rhythm, normal heart sounds and intact distal pulses  Pulmonary/Chest: Effort normal and breath sounds normal  No stridor  No respiratory distress  She has no wheezes  She has no rales  She exhibits no tenderness  Abdominal: Soft  She exhibits no distension  There is no tenderness  There is no rebound and no guarding  Back: No T-spine ttp  TTP over midline low lumbar spine, TTP over L low back/L hip and L buttocks  No skin changes/ecchymosis/swelling  Musculoskeletal: She exhibits tenderness  She exhibits no edema or deformity  Pelvis stable  TTP over lateral L hip  Pain with hip flexion on L  No ttp over legs  No deformity  Distally NV intact throughout   Neurological: She is alert and oriented to person, place, and time  She exhibits normal muscle tone  Coordination normal    Skin: Skin is warm and dry  No rash noted  Psychiatric: She has a normal mood and affect  Judgment and thought content normal    Nursing note and vitals reviewed        Vital Signs  ED Triage Vitals [11/26/18 0931]   Temperature Pulse Respirations Blood Pressure SpO2   97 6 °F (36 4 °C) 79 20 148/74 97 %      Temp Source Heart Rate Source Patient Position - Orthostatic VS BP Location FiO2 (%)   Oral Monitor Lying Right arm --      Pain Score       Worst Possible Pain           Vitals:    11/26/18 0931 11/26/18 1158   BP: 148/74 131/71   Pulse: 79 68   Patient Position - Orthostatic VS: Lying Lying Visual Acuity      ED Medications  Medications   fentanyl citrate (PF) 100 MCG/2ML 50 mcg (50 mcg Intravenous Given 11/26/18 1034)   sodium chloride 0 9 % bolus 1,000 mL (0 mL Intravenous Hold 11/26/18 1103)   HYDROmorphone (DILAUDID) injection 1 mg (1 mg Intravenous Given 11/26/18 1102)   HYDROmorphone (DILAUDID) injection 1 mg (1 mg Intramuscular Given 11/26/18 1113)   acetaminophen (TYLENOL) tablet 975 mg (975 mg Oral Given 11/26/18 1301)   ibuprofen (MOTRIN) tablet 600 mg (600 mg Oral Given 11/26/18 1301)       Diagnostic Studies  Results Reviewed     Procedure Component Value Units Date/Time    Comprehensive metabolic panel [[id number]]  (Abnormal) Collected:  11/26/18 1033    Lab Status:  Final result Specimen:  Blood from Arm, Left Updated:  11/26/18 1105     Sodium 143 mmol/L      Potassium 4 2 mmol/L      Chloride 107 mmol/L      CO2 26 mmol/L      ANION GAP 10 mmol/L      BUN 16 mg/dL      Creatinine 0 55 (L) mg/dL      Glucose 93 mg/dL      Calcium 9 3 mg/dL      AST 23 U/L      ALT 37 U/L      Alkaline Phosphatase 139 (H) U/L      Total Protein 7 0 g/dL      Albumin 3 2 (L) g/dL      Total Bilirubin 0 30 mg/dL      eGFR 102 ml/min/1 73sq m     Narrative:         National Kidney Disease Education Program recommendations are as follows:  GFR calculation is accurate only with a steady state creatinine  Chronic Kidney disease less than 60 ml/min/1 73 sq  meters  Kidney failure less than 15 ml/min/1 73 sq  meters      APTT [[id number]]  (Abnormal) Collected:  11/26/18 1033    Lab Status:  Final result Specimen:  Blood from Arm, Left Updated:  11/26/18 1057     PTT 22 (L) seconds     Protime-INR [[id number]]  (Normal) Collected:  11/26/18 1033    Lab Status:  Final result Specimen:  Blood from Arm, Left Updated:  11/26/18 1057     Protime 13 2 seconds      INR 1 01    CBC and differential [[id number]] Collected:  11/26/18 1033    Lab Status:  Final result Specimen:  Blood from Arm, Left Updated:  11/26/18 1045 WBC 5 56 Thousand/uL      RBC 4 59 Million/uL      Hemoglobin 14 1 g/dL      Hematocrit 41 2 %      MCV 90 fL      MCH 30 7 pg      MCHC 34 2 g/dL      RDW 13 4 %      MPV 9 9 fL      Platelets 098 Thousands/uL      nRBC 0 /100 WBCs      Neutrophils Relative 60 %      Immat GRANS % 0 %      Lymphocytes Relative 28 %      Monocytes Relative 7 %      Eosinophils Relative 4 %      Basophils Relative 1 %      Neutrophils Absolute 3 32 Thousands/µL      Immature Grans Absolute 0 01 Thousand/uL      Lymphocytes Absolute 1 58 Thousands/µL      Monocytes Absolute 0 38 Thousand/µL      Eosinophils Absolute 0 23 Thousand/µL      Basophils Absolute 0 04 Thousands/µL                  XR pelvis ap only 1 or 2 vw   ED Interpretation by Katherine Cervantes DO (11/26 1221)   Interpreted by myself: no acute osseous abn      Final Result by Tori Rosales DO (11/26 1257)   Mild degenerative changes  No acute osseous abnormality  Workstation performed: [id number]         XR femur 2 views LEFT   ED Interpretation by Katherine Cervantes DO (11/26 1220)   Interpreted by myself: no acute osseous abn      Final Result by Tori Rosales DO (11/26 1257)   Degenerative changes within the hip and knee  No acute osseous abnormality  Workstation performed: [id number]         CT abdomen pelvis wo contrast   ED Interpretation by Katherine Cervantes DO (11/26 1208)   CT ABDOMEN AND PELVIS WITHOUT IV CONTRAST       INDICATION:   trauma        COMPARISON:  CT abdomen/pelvis dated October 2, 2018        TECHNIQUE:  CT examination of the abdomen and pelvis was performed without intravenous contrast   Axial, sagittal, and coronal 2D reformatted images were created from the source data and submitted for interpretation         Radiation dose length product (DLP) for this visit:  698 mGy-cm     This examination, like all CT scans performed in the [facility], was performed utilizing techniques to minimize radiation dose exposure, including the use of iterative    reconstruction and automated exposure control         Enteric contrast was not administered         FINDINGS:       ABDOMEN       LOWER CHEST:  No clinically significant abnormality identified in the visualized lower chest        LIVER/BILIARY TREE:  Unremarkable        GALLBLADDER:  Gallbladder is surgically absent        SPLEEN:  Unremarkable        PANCREAS:  Unremarkable        ADRENAL GLANDS:  There is a stable 11 mm left adrenal adenoma        KIDNEYS/URETERS:  No hydronephrosis  One or more sharply circumscribed subcentimeter renal hypodensities are noted  These lesions are too small to accurately characterize, but are statistically most likely to represent benign cortical renal cyst(s)  According to the guidelines published in    the [facility] Paper of the ACR Incidental Findings Committee (Radiology 2010), no further workup of these lesions is recommended        STOMACH AND BOWEL:  There is colonic diverticulosis without evidence of acute diverticulitis        APPENDIX:  The appendix is surgically absent        ABDOMINOPELVIC CAVITY:  No ascites or free intraperitoneal air  No lymphadenopathy        VESSELS:  Unremarkable for patient's age        PELVIS       REPRODUCTIVE ORGANS:  Patient is status post hysterectomy        URINARY BLADDER:  Unremarkable        ABDOMINAL WALL/INGUINAL REGIONS:  Unremarkable        OSSEOUS STRUCTURES:  No acute fracture or destructive osseous lesion        IMPRESSION:       Although the study was limited by the lack of intravenous contrast, there is no gross evidence of solid organ injury        No acute intra-abdominal abnormality  No free air or free fluid        Scattered colonic diverticulosis with no inflammatory changes present to suggest acute diverticulitis        Final Result by Salbador Oneal MD (11/26 6735)      Although the study was limited by the lack of intravenous contrast, there is no gross evidence of solid organ injury  No acute intra-abdominal abnormality  No free air or free fluid  Scattered colonic diverticulosis with no inflammatory changes present to suggest acute diverticulitis  Workstation performed: [id number]         CT recon only lumbar spine   ED Interpretation by Myranda Topete DO (11/26 9041)   CT LUMBAR SPINE       INDICATION:   fall, back pain        COMPARISON:  CT lumbar spine dated October 2, 2018        TECHNIQUE: Axial CT examination of the lumbar spine was obtained utilizing reconstructed images from CT of the abdomen and pelvis performed the same day  Images were reformatted in the sagittal and coronal planes        This examination, like all CT scans performed in the [facility], was performed utilizing techniques to minimize radiation dose exposure, including the use of iterative reconstruction and automated exposure control          FINDINGS:       ALIGNMENT: Normal alignment of lumbar vertebral bodies  No subluxation        VERTEBRAL BODIES: No fracture  No acute osseous abnormality        DEGENERATIVE CHANGES: Mild annular bulging at L3-4  Slight canal stenosis without foraminal nerve impingement        At L4-5 there is diffuse annular bulging as well with left greater than right facet hypertrophic degenerative change  Mild canal stenosis  No foraminal nerve impingement        L5-S1 annular bulging with a small central disc protrusion and moderate facet degenerative change  Mild canal stenosis  No foraminal narrowing        PREVERTEBRAL AND PARASPINAL SOFT TISSUES: Normal   No hematoma        IMPRESSION:       No fracture or traumatic subluxation        Stable mild multilevel spondylotic degenerative changes of the lumbar spine  Final Result by Jaxon Vasquez MD (11/26 0865)      No fracture or traumatic subluxation  Stable mild multilevel spondylotic degenerative changes of the lumbar spine        Workstation performed: [id number]         CT spine cervical without contrast   ED Interpretation by Edna Martinez DO (11/26 8006)   CT CERVICAL SPINE - WITHOUT CONTRAST       INDICATION:   fall        COMPARISON:  CT cervical spine dated October 2, 2018        TECHNIQUE:  CT examination of the cervical spine was performed without intravenous contrast   Contiguous axial images were obtained  Sagittal and coronal reconstructions were performed          Radiation dose length product (DLP) for this visit:  335 mGy-cm   This examination, like all CT scans performed in the [facility], was performed utilizing techniques to minimize radiation dose exposure, including the use of iterative    reconstruction and automated exposure control          IMAGE QUALITY:  Diagnostic        FINDINGS:       ALIGNMENT:  Normal alignment of the cervical spine  No subluxation        VERTEBRAL BODIES:  No fracture  There is a stable well-defined sclerotic density at the posterior aspect of the T1 vertebral body measuring 7 mm and most compatible with a bone island        DEGENERATIVE CHANGES:  Mild to moderate multilevel cervical degenerative changes are noted without critical central canal stenosis        PREVERTEBRAL AND PARASPINAL SOFT TISSUES:  Unremarkable        THORACIC INLET:  Emphysematous changes are noted at both lung apices        IMPRESSION:       No cervical spine fracture or traumatic malalignment        Mild to moderate multilevel spondylotic degenerative changes of the cervical spine  Final Result by Tevin Haywood MD (11/26 5679)      No cervical spine fracture or traumatic malalignment  Mild to moderate multilevel spondylotic degenerative changes of the cervical spine  Workstation performed: [id number]         CT head without contrast   ED Interpretation by Edna Martinez DO (11/26 3119)   CT BRAIN - WITHOUT CONTRAST       INDICATION:   head trauma        COMPARISON:  CT brain dated October 2, 2018      TECHNIQUE:  CT examination of the brain was performed  In addition to axial images, coronal 2D reformatted images were created and submitted for interpretation          Radiation dose length product (DLP) for this visit:  939 mGy-cm   This examination, like all CT scans performed in the [facility], was performed utilizing techniques to minimize radiation dose exposure, including the use of iterative    reconstruction and automated exposure control          IMAGE QUALITY:  Diagnostic        FINDINGS:       PARENCHYMA:  No intracranial mass, mass effect or midline shift  No CT signs of acute infarction  No acute parenchymal hemorrhage  There is mild periventricular white matter low attenuation which is nonspecific and most likely related to chronic small    vessel ischemic changes        VENTRICLES AND EXTRA-AXIAL SPACES:  Normal for the patient's age        VISUALIZED ORBITS AND PARANASAL SINUSES:  There is mild mucosal thickening of the right frontal sinus        CALVARIUM AND EXTRACRANIAL SOFT TISSUES:  Normal        IMPRESSION:       No acute intracranial abnormality           Final Result by Ross Perez MD (11/26 1142)      No acute intracranial abnormality  Workstation performed: [id number]                    Procedures  Procedures       Phone Contacts  ED Phone Contact    ED Course  ED Course as of Nov 27 0854   Mon Nov 26, 2018   1109 Iv infiltrated, will give dilaudid IM    1213 All imaging negative  Pt still with significant pain to L hip, called radiologist who looked at CT again and again says there is no fx   Reassessed hip- ttp to lateral hip/L low back/buttock- no skin changes/swelling, able to flex at hip but causes pain    1324 Pt appears comfortable in bed, willing to try ambulating    1334 Pt was able to ambulate without difficulty, will DC home                                MDM  Number of Diagnoses or Management Options  Closed head injury: Contusion of left hip:   Contusion of lower back:   Fall from standing:   Diagnosis management comments: 58 yo F s/p mechanical fall, no injuries found on workup  Pt's pain was controlled and she was able to ambulate around the ED without difficulty  Discharged to home with strict return precautions, PCP f/u       Amount and/or Complexity of Data Reviewed  Clinical lab tests: reviewed and ordered  Tests in the radiology section of CPT®: ordered and reviewed  Tests in the medicine section of CPT®: ordered and reviewed      CritCare Time    Disposition  Final diagnoses:   Contusion of left hip   Contusion of lower back   Closed head injury   Fall from standing     Time reflects when diagnosis was documented in both MDM as applicable and the Disposition within this note     Time User Action Codes Description Comment    11/26/2018  1:35 PM Marry Sacks A Add [S70 02XA] Contusion of left hip     11/26/2018  1:35 PM Odilon Ordonez Add [S30  0XXA] Contusion of lower back     11/26/2018  1:35 PM Parul Day Add [S09 90XA] Closed head injury     11/26/2018  1:35 PM Odilon Ordonez Add [V23  XXXA] Fall from standing       ED Disposition     ED Disposition Condition Comment    Discharge  Josue Kapoor discharge to home/self care  Condition at discharge: Stable        Follow-up Information     Follow up With Specialties Details Why Contact Info    Keith Rios MD Internal Medicine   [address] [place] [address]  [phone]          Take 400 mg Ibuprofen every 6 hours and 650 mg Tylenol every 6-8 hours as needed for pain   Return if any new or concerning symptoms, otherwise please make appointment with PCP          Discharge Medication List as of 11/26/2018  1:36 PM      CONTINUE these medications which have NOT CHANGED    Details   albuterol (2 5 mg/3 mL) 0 083 % nebulizer solution Take 1 vial (2 5 mg total) by nebulization every 6 (six) hours as needed for wheezing or shortness of breath, Starting Tue 11/6/2018, Normal      amLODIPine (NORVASC) 5 mg tablet Take 1 tablet (5 mg total) by mouth daily, Starting Thu 6/14/2018, Normal      atorvastatin (LIPITOR) 40 mg tablet Take 1 tablet (40 mg total) by mouth daily for 90 days, Starting Thu 6/14/2018, Until Wed 9/12/2018, Normal      citalopram (CELEXA) 40 mg tablet Take 40 mg by mouth daily  , Until Discontinued, Historical Med      clonazePAM (KlonoPIN) 1 mg tablet 1 mg 2 (two) times a day as needed for anxiety  , Starting Fri 4/20/2018, Historical Med      famotidine (PEPCID) 40 MG tablet Take 1 tablet (40 mg total) by mouth daily, Starting Fri 8/24/2018, Normal      gabapentin (NEURONTIN) 800 mg tablet Take 800 mg by mouth 3 (three) times a day, Historical Med      ibuprofen (MOTRIN) 800 mg tablet Take 1 tablet (800 mg total) by mouth every 6 (six) hours as needed for mild pain, Starting Mon 7/9/2018, Print      metoprolol tartrate (LOPRESSOR) 50 mg tablet Take 1 tablet (50 mg total) by mouth every 12 (twelve) hours, Starting Thu 6/14/2018, Normal      mirtazapine (REMERON) 30 mg tablet Take 30 mg by mouth daily at bedtime  , Historical Med      pantoprazole (PROTONIX) 40 mg tablet take 1 tablet by mouth every morning, Normal      predniSONE 10 mg tablet Take 1 tablet (10 mg total) by mouth daily Take 4 tablets for 5 days,  Then 3 tablets for 5 days,  Then 2 tablets for 5 days,  Then 1 tablet for 5 days  , Starting Mon 7/9/2018, Print           No discharge procedures on file      ED Provider  Electronically Signed by           Akira Forman DO  11/27/18 5169

## 2018-11-28 DIAGNOSIS — K21.9 GASTROESOPHAGEAL REFLUX DISEASE WITHOUT ESOPHAGITIS: ICD-10-CM

## 2018-11-30 RX ORDER — PANTOPRAZOLE SODIUM 40 MG/1
40 TABLET, DELAYED RELEASE ORAL EVERY MORNING
Qty: 30 TABLET | Refills: 5 | Status: SHIPPED | OUTPATIENT
Start: 2018-11-30 | End: 2018-12-31

## 2018-12-04 ENCOUNTER — HOSPITAL ENCOUNTER (EMERGENCY)
Facility: HOSPITAL | Age: 58
Discharge: HOME | End: 2018-12-04
Attending: EMERGENCY MEDICINE | Admitting: EMERGENCY MEDICINE
Payer: COMMERCIAL

## 2018-12-04 ENCOUNTER — APPOINTMENT (EMERGENCY)
Dept: RADIOLOGY | Facility: HOSPITAL | Age: 58
End: 2018-12-04
Payer: COMMERCIAL

## 2018-12-04 ENCOUNTER — APPOINTMENT (EMERGENCY)
Dept: CT IMAGING | Facility: HOSPITAL | Age: 58
End: 2018-12-04
Payer: COMMERCIAL

## 2018-12-04 VITALS
HEART RATE: 65 BPM | RESPIRATION RATE: 26 BRPM | DIASTOLIC BLOOD PRESSURE: 106 MMHG | SYSTOLIC BLOOD PRESSURE: 163 MMHG | TEMPERATURE: 97.4 F | OXYGEN SATURATION: 91 % | HEIGHT: 63 IN | WEIGHT: 157.85 LBS | BODY MASS INDEX: 27.97 KG/M2

## 2018-12-04 DIAGNOSIS — S30.0XXA CONTUSION OF LOWER BACK, INITIAL ENCOUNTER: ICD-10-CM

## 2018-12-04 DIAGNOSIS — S70.02XA CONTUSION OF LEFT HIP, INITIAL ENCOUNTER: Primary | ICD-10-CM

## 2018-12-04 LAB
ALBUMIN SERPL BCP-MCNC: 3.4 G/DL (ref 3.5–5)
ALP SERPL-CCNC: 161 U/L (ref 46–116)
ALT SERPL W P-5'-P-CCNC: 54 U/L (ref 12–78)
ANION GAP SERPL CALCULATED.3IONS-SCNC: 12 MMOL/L (ref 4–13)
APTT PPP: 26 SECONDS (ref 26–38)
AST SERPL W P-5'-P-CCNC: 26 U/L (ref 5–45)
BASOPHILS # BLD AUTO: 0.03 THOUSANDS/ΜL (ref 0–0.1)
BASOPHILS NFR BLD AUTO: 1 % (ref 0–1)
BILIRUB SERPL-MCNC: 0.2 MG/DL (ref 0.2–1)
BUN SERPL-MCNC: 10 MG/DL (ref 5–25)
CALCIUM SERPL-MCNC: 8.8 MG/DL (ref 8.3–10.1)
CHLORIDE SERPL-SCNC: 109 MMOL/L (ref 100–108)
CO2 SERPL-SCNC: 26 MMOL/L (ref 21–32)
CREAT SERPL-MCNC: 0.64 MG/DL (ref 0.6–1.3)
EOSINOPHIL # BLD AUTO: 0.24 THOUSAND/ΜL (ref 0–0.61)
EOSINOPHIL NFR BLD AUTO: 5 % (ref 0–6)
ERYTHROCYTE [DISTWIDTH] IN BLOOD BY AUTOMATED COUNT: 13.2 % (ref 11.6–15.1)
GFR SERPL CREATININE-BSD FRML MDRD: 97 ML/MIN/1.73SQ M
GLUCOSE SERPL-MCNC: 88 MG/DL (ref 65–140)
HCT VFR BLD AUTO: 41.4 % (ref 34.8–46.1)
HGB BLD-MCNC: 14.2 G/DL (ref 11.5–15.4)
IMM GRANULOCYTES # BLD AUTO: 0.02 THOUSAND/UL (ref 0–0.2)
IMM GRANULOCYTES NFR BLD AUTO: 0 % (ref 0–2)
INR PPP: 1.04 (ref 0.86–1.17)
LYMPHOCYTES # BLD AUTO: 1.66 THOUSANDS/ΜL (ref 0.6–4.47)
LYMPHOCYTES NFR BLD AUTO: 37 % (ref 14–44)
MCH RBC QN AUTO: 30.2 PG (ref 26.8–34.3)
MCHC RBC AUTO-ENTMCNC: 34.3 G/DL (ref 31.4–37.4)
MCV RBC AUTO: 88 FL (ref 82–98)
MONOCYTES # BLD AUTO: 0.27 THOUSAND/ΜL (ref 0.17–1.22)
MONOCYTES NFR BLD AUTO: 6 % (ref 4–12)
NEUTROPHILS # BLD AUTO: 2.32 THOUSANDS/ΜL (ref 1.85–7.62)
NEUTS SEG NFR BLD AUTO: 51 % (ref 43–75)
NRBC BLD AUTO-RTO: 0 /100 WBCS
PLATELET # BLD AUTO: 149 THOUSANDS/UL (ref 149–390)
PMV BLD AUTO: 9.6 FL (ref 8.9–12.7)
POTASSIUM SERPL-SCNC: 3.3 MMOL/L (ref 3.5–5.3)
PROT SERPL-MCNC: 7.1 G/DL (ref 6.4–8.2)
PROTHROMBIN TIME: 13.5 SECONDS (ref 11.8–14.2)
RBC # BLD AUTO: 4.7 MILLION/UL (ref 3.81–5.12)
SODIUM SERPL-SCNC: 147 MMOL/L (ref 136–145)
WBC # BLD AUTO: 4.54 THOUSAND/UL (ref 4.31–10.16)

## 2018-12-04 PROCEDURE — 85730 THROMBOPLASTIN TIME PARTIAL: CPT | Performed by: EMERGENCY MEDICINE

## 2018-12-04 PROCEDURE — 72131 CT LUMBAR SPINE W/O DYE: CPT

## 2018-12-04 PROCEDURE — 36415 COLL VENOUS BLD VENIPUNCTURE: CPT | Performed by: EMERGENCY MEDICINE

## 2018-12-04 PROCEDURE — 73502 X-RAY EXAM HIP UNI 2-3 VIEWS: CPT

## 2018-12-04 PROCEDURE — 72100 X-RAY EXAM L-S SPINE 2/3 VWS: CPT

## 2018-12-04 PROCEDURE — 96374 THER/PROPH/DIAG INJ IV PUSH: CPT

## 2018-12-04 PROCEDURE — 96361 HYDRATE IV INFUSION ADD-ON: CPT

## 2018-12-04 PROCEDURE — 73700 CT LOWER EXTREMITY W/O DYE: CPT

## 2018-12-04 PROCEDURE — 96375 TX/PRO/DX INJ NEW DRUG ADDON: CPT

## 2018-12-04 PROCEDURE — 85610 PROTHROMBIN TIME: CPT | Performed by: EMERGENCY MEDICINE

## 2018-12-04 PROCEDURE — 99284 EMERGENCY DEPT VISIT MOD MDM: CPT

## 2018-12-04 PROCEDURE — 80053 COMPREHEN METABOLIC PANEL: CPT | Performed by: EMERGENCY MEDICINE

## 2018-12-04 PROCEDURE — 85025 COMPLETE CBC W/AUTO DIFF WBC: CPT | Performed by: EMERGENCY MEDICINE

## 2018-12-04 RX ORDER — MORPHINE SULFATE 4 MG/ML
4 INJECTION, SOLUTION INTRAMUSCULAR; INTRAVENOUS ONCE
Status: COMPLETED | OUTPATIENT
Start: 2018-12-04 | End: 2018-12-04

## 2018-12-04 RX ORDER — OXYCODONE HYDROCHLORIDE AND ACETAMINOPHEN 5; 325 MG/1; MG/1
1 TABLET ORAL EVERY 4 HOURS PRN
Qty: 20 TABLET | Refills: 0 | Status: SHIPPED | OUTPATIENT
Start: 2018-12-04 | End: 2018-12-14

## 2018-12-04 RX ORDER — HYDROMORPHONE HCL/PF 1 MG/ML
1 SYRINGE (ML) INJECTION ONCE
Status: COMPLETED | OUTPATIENT
Start: 2018-12-04 | End: 2018-12-04

## 2018-12-04 RX ADMIN — HYDROMORPHONE HYDROCHLORIDE 1 MG: 1 INJECTION, SOLUTION INTRAMUSCULAR; INTRAVENOUS; SUBCUTANEOUS at 18:46

## 2018-12-04 RX ADMIN — MORPHINE SULFATE 2 MG: 2 INJECTION, SOLUTION INTRAMUSCULAR; INTRAVENOUS at 18:02

## 2018-12-04 RX ADMIN — MORPHINE SULFATE 4 MG: 4 INJECTION INTRAVENOUS at 16:55

## 2018-12-04 RX ADMIN — SODIUM CHLORIDE 500 ML: 0.9 INJECTION, SOLUTION INTRAVENOUS at 16:55

## 2018-12-04 NOTE — ED PROVIDER NOTES
History  Chief Complaint   Patient presents with    Fall     Pt reports to ED w c/o fall  Pt states she was helping someone into their car, and she fell on her left hip  Pt denies hitting her head  Negative blood thinners  Pt  Was helping a morbidly obese person get into a cab and pt  Andrei Stapletonid over hitting her L hip and lower back on a curb and now has had pain in those areas  An ambulance had to come get her, she was able to put some weight on the LLE  Pt  Didn't hit her head, no LOC, no n/v, no change in behavior, not on blood thinners            Prior to Admission Medications   Prescriptions Last Dose Informant Patient Reported? Taking?    albuterol (2 5 mg/3 mL) 0 083 % nebulizer solution   No No   Sig: Take 1 vial (2 5 mg total) by nebulization every 6 (six) hours as needed for wheezing or shortness of breath   amLODIPine (NORVASC) 5 mg tablet  Self No No   Sig: Take 1 tablet (5 mg total) by mouth daily   atorvastatin (LIPITOR) 40 mg tablet  Self No No   Sig: Take 1 tablet (40 mg total) by mouth daily for 90 days   citalopram (CELEXA) 40 mg tablet  Self Yes No   Sig: Take 40 mg by mouth daily     clonazePAM (KlonoPIN) 1 mg tablet  Self Yes No   Si mg 2 (two) times a day as needed for anxiety     famotidine (PEPCID) 40 MG tablet   No No   Sig: Take 1 tablet (40 mg total) by mouth daily   gabapentin (NEURONTIN) 800 mg tablet  Self Yes No   Sig: Take 800 mg by mouth 3 (three) times a day   ibuprofen (MOTRIN) 800 mg tablet   No No   Sig: Take 1 tablet (800 mg total) by mouth every 6 (six) hours as needed for mild pain   metoprolol tartrate (LOPRESSOR) 50 mg tablet  Self No No   Sig: Take 1 tablet (50 mg total) by mouth every 12 (twelve) hours   mirtazapine (REMERON) 30 mg tablet  Self Yes No   Sig: Take 30 mg by mouth daily at bedtime     pantoprazole (PROTONIX) 40 mg tablet   No No   Sig: Take 1 tablet (40 mg total) by mouth every morning for 30 days   predniSONE 10 mg tablet   No No   Sig: Take 1 tablet (10 mg total) by mouth daily Take 4 tablets for 5 days,  Then 3 tablets for 5 days,  Then 2 tablets for 5 days,  Then 1 tablet for 5 days        Facility-Administered Medications: None       Past Medical History:   Diagnosis Date    Anxiety     Bipolar 1 disorder ([place] [address] )     Mental Health problems listed as Denied in Allscripts, cant't entire under pertinent negatives due to this diagnosis    CAD (coronary artery disease)     last assessed - 14Apr2017    Chronic back pain     Frequent headaches     Glaucoma     Hyperlipidemia     Hypertension     Ovarian cancer ([place] [address] )     last assessed - 21Sep2016    Psychiatric disorder     bipolar    Rheumatoid arthritis ([place] [address] )     Uterine carcinoma ([place] [address] )     last assessed - 21Sep2016       Past Surgical History:   Procedure Laterality Date    ADENOIDECTOMY      Tonsillectomy with Adenoidectomy - last assessed - 21Sep2016    APPENDECTOMY      last assessed - 21Sep2016    CATARACT EXTRACTION Left     Remove cataract, corneo-scleral section of left eye; last assessed - 21Sep2016    CHOLECYSTECTOMY      last assessed - 29Sep2016   [place] [facility]ERY      Anterior sclera fistulization for glaucoma; last assessed - 21Sep2016    HYSTERECTOMY      KRYSTA-BSO; last assessed - 21Sep2016    INTRAOCULAR LENS INSERTION      IA TEMPORAL ARTERY LIGATN OR BX Right 12/16/2016    Procedure: BIOPSY ARTERY TEMPORAL;  Surgeon: Merline Dunnings, MD;  Location: [facility];  Service: General    TONSILLECTOMY      Tonsillectomy with Adenoidectomy - last assessed - 21Sep2016       Family History   Problem Relation Age of Onset    Heart disease Mother     Coronary artery disease Mother     Other Mother         1) Gangrene; 2) Peripheral arterial disease    Hyperlipidemia Mother         Hypercholesterolemia    Stomach cancer Mother     Heart attack Mother         Myocardial Infarction    Other Father         1) Gangrene; 2) Peripheral arterial disease    Hyperlipidemia Father Hypercholesterolemia    Stomach cancer Father     Heart attack Father         Myocardial Infarction    Stomach cancer Brother     Heart attack Brother         Myocardial Infarction    Stomach cancer Maternal Uncle      I have reviewed and agree with the history as documented  Social History   Substance Use Topics    Smoking status: Current Every Day Smoker     Packs/day: 0 20     Years: 46 00     Types: Cigarettes    Smokeless tobacco: Never Used    Alcohol use No      Comment: occasional; (No alcohol use per [employer])        Review of Systems   Constitutional: Negative for appetite change, fatigue and fever  HENT: Negative for rhinorrhea and sore throat  Respiratory: Negative for cough, shortness of breath and wheezing  Cardiovascular: Negative for chest pain and leg swelling  Gastrointestinal: Negative for abdominal pain, diarrhea and vomiting  Genitourinary: Negative for dysuria and flank pain  Musculoskeletal: Positive for back pain  Negative for neck pain  Skin: Negative for rash  Neurological: Negative for syncope and headaches  Psychiatric/Behavioral:        Mood normal       Physical Exam  Physical Exam   Constitutional: She is oriented to person, place, and time  She appears well-developed and well-nourished  HENT:   Head: Normocephalic and atraumatic  Neck: Normal range of motion  Neck supple  Cardiovascular: Normal rate and regular rhythm  Pulmonary/Chest: Effort normal and breath sounds normal    Abdominal: Soft  There is no tenderness  Musculoskeletal:   Lspine tenderness, paraspinal lumbar tenderness b/l, L hip tenderness, decreased ROM secondary to pain, +n/v intact   Neurological: She is alert and oriented to person, place, and time  Skin: Skin is warm and dry  Nursing note and vitals reviewed        Vital Signs  ED Triage Vitals   Temperature Pulse Respirations Blood Pressure SpO2   12/04/18 1509 12/04/18 1509 12/04/18 1509 12/04/18 1509 12/04/18 1509 Electa Yamilet ) 97 4 °F (36 3 °C) 75 (!) 26 (!) 163/106 97 %      Temp Source Heart Rate Source Patient Position - Orthostatic VS BP Location FiO2 (%)   12/04/18 1509 12/04/18 1509 12/04/18 1509 12/04/18 1509 --   Oral Monitor Lying Right arm       Pain Score       12/04/18 1505       Worst Possible Pain           Vitals:    12/04/18 1509 12/04/18 1900   BP: (!) 163/106    Pulse: 75 65   Patient Position - Orthostatic VS: Lying        Visual Acuity      ED Medications  Medications   sodium chloride 0 9 % bolus 500 mL (0 mL Intravenous Stopped 12/4/18 1801)   morphine (PF) 4 mg/mL injection 4 mg (4 mg Intravenous Given 12/4/18 1655)   morphine injection 2 mg (2 mg Intravenous Given 12/4/18 1802)   HYDROmorphone (DILAUDID) injection 1 mg (1 mg Intravenous Given 12/4/18 1846)       Diagnostic Studies  Results Reviewed     Procedure Component Value Units Date/Time    Comprehensive metabolic panel [[id number]]  (Abnormal) Collected:  12/04/18 1652    Lab Status:  Final result Specimen:  Blood from Arm, Left Updated:  12/04/18 1714     Sodium 147 (H) mmol/L      Potassium 3 3 (L) mmol/L      Chloride 109 (H) mmol/L      CO2 26 mmol/L      ANION GAP 12 mmol/L      BUN 10 mg/dL      Creatinine 0 64 mg/dL      Glucose 88 mg/dL      Calcium 8 8 mg/dL      AST 26 U/L      ALT 54 U/L      Alkaline Phosphatase 161 (H) U/L      Total Protein 7 1 g/dL      Albumin 3 4 (L) g/dL      Total Bilirubin 0 20 mg/dL      eGFR 97 ml/min/1 73sq m     Narrative:         National Kidney Disease Education Program recommendations are as follows:  GFR calculation is accurate only with a steady state creatinine  Chronic Kidney disease less than 60 ml/min/1 73 sq  meters  Kidney failure less than 15 ml/min/1 73 sq  meters      Cloretta Reddish [[id number]]  (Normal) Collected:  12/04/18 1652    Lab Status:  Final result Specimen:  Blood from Arm, Left Updated:  12/04/18 1709     Protime 13 5 seconds      INR 1 04    APTT [[id number]]  (Normal) Collected:  12/04/18 1652 Lab Status:  Final result Specimen:  Blood from Arm, Left Updated:  12/04/18 1709     PTT 26 seconds     CBC and differential [[id number]] Collected:  12/04/18 1652    Lab Status:  Final result Specimen:  Blood from Arm, Left Updated:  12/04/18 1657     WBC 4 54 Thousand/uL      RBC 4 70 Million/uL      Hemoglobin 14 2 g/dL      Hematocrit 41 4 %      MCV 88 fL      MCH 30 2 pg      MCHC 34 3 g/dL      RDW 13 2 %      MPV 9 6 fL      Platelets 964 Thousands/uL      nRBC 0 /100 WBCs      Neutrophils Relative 51 %      Immat GRANS % 0 %      Lymphocytes Relative 37 %      Monocytes Relative 6 %      Eosinophils Relative 5 %      Basophils Relative 1 %      Neutrophils Absolute 2 32 Thousands/µL      Immature Grans Absolute 0 02 Thousand/uL      Lymphocytes Absolute 1 66 Thousands/µL      Monocytes Absolute 0 27 Thousand/µL      Eosinophils Absolute 0 24 Thousand/µL      Basophils Absolute 0 03 Thousands/µL                  CT hip left without contrast   Final Result by Neo Haywood MD (12/04 1930)   No CT evidence of fracture or dislocation  If left hip pain/symptoms persist or worsen, please consider follow-up with left hip MRI  Workstation performed: [id number]         CT lumbar spine without contrast   Final Result by Karena Burton MD (12/04 1925)      No traumatic abnormality  Workstation performed: [id number]         XR hip/pelv 2-3 vws left   Final Result by Samir Guerra DO (12/04 1909)      No acute osseous abnormality  Workstation performed: [id number]         XR lumbar spine 2 or 3 views   Final Result by Samir Guerra DO (12/04 1859)      Mild lower lumbar degenerative change and facet arthropathy           Workstation performed: [id number]                    Procedures  Procedures       Phone Contacts  ED Phone Contact    ED Course                               MDM  Number of Diagnoses or Management Options  Contusion of left hip, initial encounter:   Contusion of lower back, initial encounter:      Amount and/or Complexity of Data Reviewed  Clinical lab tests: ordered and reviewed  Tests in the radiology section of CPT®: ordered and reviewed    Risk of Complications, Morbidity, and/or Mortality  Presenting problems: moderate  General comments: Pt  Felt better after meds, stable to follow up as an outpt  Will give crutches      CritCare Time    Disposition  Final diagnoses:   Contusion of left hip, initial encounter   Contusion of lower back, initial encounter     Time reflects when diagnosis was documented in both MDM as applicable and the Disposition within this note     Time User Action Codes Description Comment    12/4/2018  7:54 PM Reggie Amaya Add [S70 [id number]] Contusion of left hip, initial encounter     12/4/2018  7:54 PM Flora Amaya 6 [S30  0XXA] Contusion of lower back, initial encounter       ED Disposition     ED Disposition Condition Comment    Discharge  [place] discharge to home/self care      Condition at discharge: Stable        Follow-up Information     Follow up With Specialties Details Why Contact Info Additional Information    Lynn Mcclelland MD Internal Medicine   [address] [facility]   [address] [place] [id number] [facility], [address], [place], [place], [address]          Discharge Medication List as of 12/4/2018  7:56 PM      START taking these medications    Details   oxyCODONE-acetaminophen (PERCOCET) 5-325 mg per tablet Take 1 tablet by mouth every 4 (four) hours as needed for moderate pain for up to 10 days Max Daily Amount: 6 tablets, Starting Tue 12/4/2018, Until Fri 12/14/2018, Print         CONTINUE these medications which have NOT CHANGED    Details   albuterol (2 5 mg/3 mL) 0 083 % nebulizer solution Take 1 vial (2 5 mg total) by nebulization every 6 (six) hours as needed for wheezing or shortness of breath, Starting Tue 11/6/2018, Normal      amLODIPine (NORVASC) 5 mg tablet Take 1 tablet (5 mg total) by mouth daily, Starting Thu 6/14/2018, Normal      atorvastatin (LIPITOR) 40 mg tablet Take 1 tablet (40 mg total) by mouth daily for 90 days, Starting Thu 6/14/2018, Until Wed 9/12/2018, Normal      citalopram (CELEXA) 40 mg tablet Take 40 mg by mouth daily  , Until Discontinued, Historical Med      clonazePAM (KlonoPIN) 1 mg tablet 1 mg 2 (two) times a day as needed for anxiety  , Starting Fri 4/20/2018, Historical Med      famotidine (PEPCID) 40 MG tablet Take 1 tablet (40 mg total) by mouth daily, Starting Fri 8/24/2018, Normal      gabapentin (NEURONTIN) 800 mg tablet Take 800 mg by mouth 3 (three) times a day, Historical Med      ibuprofen (MOTRIN) 800 mg tablet Take 1 tablet (800 mg total) by mouth every 6 (six) hours as needed for mild pain, Starting Mon 7/9/2018, Print      metoprolol tartrate (LOPRESSOR) 50 mg tablet Take 1 tablet (50 mg total) by mouth every 12 (twelve) hours, Starting Thu 6/14/2018, Normal      mirtazapine (REMERON) 30 mg tablet Take 30 mg by mouth daily at bedtime  , Historical Med      pantoprazole (PROTONIX) 40 mg tablet Take 1 tablet (40 mg total) by mouth every morning for 30 days, Starting Fri 11/30/2018, Until Sun 12/30/2018, Normal      predniSONE 10 mg tablet Take 1 tablet (10 mg total) by mouth daily Take 4 tablets for 5 days,  Then 3 tablets for 5 days,  Then 2 tablets for 5 days,  Then 1 tablet for 5 days  , Starting Mon 7/9/2018, Print           No discharge procedures on file      ED Provider  Electronically Signed by           Toi Elliott MD  12/06/18 0428

## 2018-12-05 NOTE — DISCHARGE INSTRUCTIONS
Rest/ice /crutches for support/ice often    Contusion in Adults   WHAT YOU NEED TO KNOW:   A contusion is a bruise that appears on your skin after an injury  A bruise happens when small blood vessels tear but skin does not  When blood vessels tear, blood leaks into nearby tissue, such as soft tissue or muscle  DISCHARGE INSTRUCTIONS:   Return to the emergency department if:   · You have new trouble moving the injured area  · You have tingling or numbness in or near the injured area  · Your hand or foot below the bruise gets cold or turns pale  Contact your healthcare provider if:   · You find a new lump in the injured area  · Your symptoms do not improve with treatment after 4 to 5 days  · You have questions or concerns about your condition or care  Medicines: You may need any of the following:  · NSAIDs  help decrease swelling and pain or fever  This medicine is available with or without a doctor's order  NSAIDs can cause stomach bleeding or kidney problems in certain people  If you take blood thinner medicine, always ask your healthcare provider if NSAIDs are safe for you  Always read the medicine label and follow directions  · Prescription pain medicine  may be given  Do not wait until the pain is severe before you take your medicine  · Take your medicine as directed  Contact your healthcare provider if you think your medicine is not helping or if you have side effects  Tell him of her if you are allergic to any medicine  Keep a list of the medicines, vitamins, and herbs you take  Include the amounts, and when and why you take them  Bring the list or the pill bottles to follow-up visits  Carry your medicine list with you in case of an emergency  Follow up with your healthcare provider as directed: You may need to return within a week to check your injury again  Write down your questions so you remember to ask them during your visits    Help a contusion heal:   · Rest the injured area  or use it less than usual  If you bruised your leg or foot, you may need crutches or a cane to help you walk  This will help you keep weight off your injured body part  · Apply ice  to decrease swelling and pain  Ice may also help prevent tissue damage  Use an ice pack, or put crushed ice in a plastic bag  Cover it with a towel and place it on your bruise for 15 to 20 minutes every hour or as directed  · Use compression  to support the area and decrease swelling  Wrap an elastic bandage around the area over the bruised muscle  Make sure the bandage is not too tight  You should be able to fit 1 finger between the bandage and your skin  · Elevate (raise) your injured body part  above the level of your heart to help decrease pain and swelling  Use pillows, blankets, or rolled towels to elevate the area as often as you can  · Do not drink alcohol  as directed  Alcohol may slow healing  · Do not stretch injured muscles  right after your injury  Ask your healthcare provider when and how you may safely stretch after your injury  Gentle stretches can help increase your flexibility  · Do not massage the area or put heating pads  on the bruise right after your injury  Heat and massage may slow healing  Your healthcare provider may tell you to apply heat after several days  At that time, heat will start to help the injury heal   Prevent another contusion:   · Stretch and warm up before you play sports or exercise  · Wear protective gear when you play sports  Examples are shin guards and padding  · If you begin a new physical activity, start slowly to give your body a chance to adjust   © 2017 [address] [employer] Information is for End User's use only and may not be sold, redistributed or otherwise used for commercial purposes  All illustrations and images included in CareNotes® are the copyrighted property of [employer]  or [employer]    The above information is an  only  It is not intended as medical advice for individual conditions or treatments  Talk to your doctor, nurse or pharmacist before following any medical regimen to see if it is safe and effective for you

## 2018-12-31 ENCOUNTER — APPOINTMENT (OUTPATIENT)
Dept: RADIOLOGY | Facility: HOSPITAL | Age: 58
End: 2018-12-31
Payer: COMMERCIAL

## 2018-12-31 ENCOUNTER — HOSPITAL ENCOUNTER (OUTPATIENT)
Facility: HOSPITAL | Age: 58
Setting detail: OBSERVATION
Discharge: LEFT BEFORE DISCHARGE | End: 2018-12-31
Attending: EMERGENCY MEDICINE | Admitting: FAMILY MEDICINE
Payer: COMMERCIAL

## 2018-12-31 VITALS
HEIGHT: 63 IN | RESPIRATION RATE: 20 BRPM | TEMPERATURE: 98 F | HEART RATE: 68 BPM | BODY MASS INDEX: 27.96 KG/M2 | SYSTOLIC BLOOD PRESSURE: 139 MMHG | OXYGEN SATURATION: 96 % | DIASTOLIC BLOOD PRESSURE: 82 MMHG

## 2018-12-31 DIAGNOSIS — E87.6 HYPOKALEMIA: ICD-10-CM

## 2018-12-31 DIAGNOSIS — R07.9 CHEST PAIN: Primary | ICD-10-CM

## 2018-12-31 PROBLEM — F32.A DEPRESSION: Status: ACTIVE | Noted: 2017-03-05

## 2018-12-31 PROBLEM — I48.91 NEW ONSET A-FIB (HCC): Status: ACTIVE | Noted: 2018-12-31

## 2018-12-31 PROBLEM — F41.9 ANXIETY AND DEPRESSION: Status: ACTIVE | Noted: 2017-03-05

## 2018-12-31 PROBLEM — F10.10 ALCOHOL ABUSE: Status: ACTIVE | Noted: 2018-12-31

## 2018-12-31 LAB
ALBUMIN SERPL BCP-MCNC: 3.5 G/DL (ref 3.5–5)
ALP SERPL-CCNC: 161 U/L (ref 46–116)
ALT SERPL W P-5'-P-CCNC: 46 U/L (ref 12–78)
ANION GAP SERPL CALCULATED.3IONS-SCNC: 13 MMOL/L (ref 4–13)
APTT PPP: 26 SECONDS (ref 26–38)
AST SERPL W P-5'-P-CCNC: 23 U/L (ref 5–45)
ATRIAL RATE: 77 BPM
BASOPHILS # BLD AUTO: 0.02 THOUSANDS/ΜL (ref 0–0.1)
BASOPHILS NFR BLD AUTO: 0 % (ref 0–1)
BILIRUB DIRECT SERPL-MCNC: 0.08 MG/DL (ref 0–0.2)
BILIRUB SERPL-MCNC: 0.3 MG/DL (ref 0.2–1)
BUN SERPL-MCNC: 9 MG/DL (ref 5–25)
CALCIUM SERPL-MCNC: 9 MG/DL (ref 8.3–10.1)
CHLORIDE SERPL-SCNC: 106 MMOL/L (ref 100–108)
CO2 SERPL-SCNC: 24 MMOL/L (ref 21–32)
CREAT SERPL-MCNC: 0.7 MG/DL (ref 0.6–1.3)
EOSINOPHIL # BLD AUTO: 0.24 THOUSAND/ΜL (ref 0–0.61)
EOSINOPHIL NFR BLD AUTO: 4 % (ref 0–6)
ERYTHROCYTE [DISTWIDTH] IN BLOOD BY AUTOMATED COUNT: 13.4 % (ref 11.6–15.1)
ETHANOL SERPL-MCNC: 93 MG/DL (ref 0–3)
GFR SERPL CREATININE-BSD FRML MDRD: 94 ML/MIN/1.73SQ M
GLUCOSE SERPL-MCNC: 79 MG/DL (ref 65–140)
HCT VFR BLD AUTO: 42.3 % (ref 34.8–46.1)
HGB BLD-MCNC: 14.2 G/DL (ref 11.5–15.4)
IMM GRANULOCYTES # BLD AUTO: 0.01 THOUSAND/UL (ref 0–0.2)
IMM GRANULOCYTES NFR BLD AUTO: 0 % (ref 0–2)
INR PPP: 1 (ref 0.86–1.17)
LYMPHOCYTES # BLD AUTO: 2.08 THOUSANDS/ΜL (ref 0.6–4.47)
LYMPHOCYTES NFR BLD AUTO: 37 % (ref 14–44)
MAGNESIUM SERPL-MCNC: 2 MG/DL (ref 1.6–2.6)
MCH RBC QN AUTO: 30 PG (ref 26.8–34.3)
MCHC RBC AUTO-ENTMCNC: 33.6 G/DL (ref 31.4–37.4)
MCV RBC AUTO: 89 FL (ref 82–98)
MONOCYTES # BLD AUTO: 0.38 THOUSAND/ΜL (ref 0.17–1.22)
MONOCYTES NFR BLD AUTO: 7 % (ref 4–12)
NEUTROPHILS # BLD AUTO: 2.9 THOUSANDS/ΜL (ref 1.85–7.62)
NEUTS SEG NFR BLD AUTO: 52 % (ref 43–75)
NRBC BLD AUTO-RTO: 0 /100 WBCS
P AXIS: 64 DEGREES
PHOSPHATE SERPL-MCNC: 3.8 MG/DL (ref 2.3–4.1)
PLATELET # BLD AUTO: 177 THOUSANDS/UL (ref 149–390)
PMV BLD AUTO: 9.6 FL (ref 8.9–12.7)
POTASSIUM SERPL-SCNC: 2.9 MMOL/L (ref 3.5–5.3)
PR INTERVAL: 102 MS
PROT SERPL-MCNC: 7.5 G/DL (ref 6.4–8.2)
PROTHROMBIN TIME: 13.1 SECONDS (ref 11.8–14.2)
QRS AXIS: 48 DEGREES
QRSD INTERVAL: 76 MS
QT INTERVAL: 420 MS
QTC INTERVAL: 475 MS
RBC # BLD AUTO: 4.74 MILLION/UL (ref 3.81–5.12)
SODIUM SERPL-SCNC: 143 MMOL/L (ref 136–145)
T WAVE AXIS: 40 DEGREES
TROPONIN I SERPL-MCNC: <0.02 NG/ML
TSH SERPL DL<=0.05 MIU/L-ACNC: 0.81 UIU/ML (ref 0.36–3.74)
VENTRICULAR RATE: 77 BPM
WBC # BLD AUTO: 5.63 THOUSAND/UL (ref 4.31–10.16)

## 2018-12-31 PROCEDURE — 84100 ASSAY OF PHOSPHORUS: CPT | Performed by: EMERGENCY MEDICINE

## 2018-12-31 PROCEDURE — 85610 PROTHROMBIN TIME: CPT | Performed by: EMERGENCY MEDICINE

## 2018-12-31 PROCEDURE — 99285 EMERGENCY DEPT VISIT HI MDM: CPT

## 2018-12-31 PROCEDURE — 93010 ELECTROCARDIOGRAM REPORT: CPT | Performed by: INTERNAL MEDICINE

## 2018-12-31 PROCEDURE — 85025 COMPLETE CBC W/AUTO DIFF WBC: CPT | Performed by: EMERGENCY MEDICINE

## 2018-12-31 PROCEDURE — 85730 THROMBOPLASTIN TIME PARTIAL: CPT | Performed by: EMERGENCY MEDICINE

## 2018-12-31 PROCEDURE — 80320 DRUG SCREEN QUANTALCOHOLS: CPT | Performed by: EMERGENCY MEDICINE

## 2018-12-31 PROCEDURE — 93005 ELECTROCARDIOGRAM TRACING: CPT

## 2018-12-31 PROCEDURE — 36415 COLL VENOUS BLD VENIPUNCTURE: CPT | Performed by: EMERGENCY MEDICINE

## 2018-12-31 PROCEDURE — 80076 HEPATIC FUNCTION PANEL: CPT | Performed by: EMERGENCY MEDICINE

## 2018-12-31 PROCEDURE — 84443 ASSAY THYROID STIM HORMONE: CPT | Performed by: EMERGENCY MEDICINE

## 2018-12-31 PROCEDURE — 96374 THER/PROPH/DIAG INJ IV PUSH: CPT

## 2018-12-31 PROCEDURE — 99236 HOSP IP/OBS SAME DATE HI 85: CPT | Performed by: INTERNAL MEDICINE

## 2018-12-31 PROCEDURE — 80048 BASIC METABOLIC PNL TOTAL CA: CPT | Performed by: EMERGENCY MEDICINE

## 2018-12-31 PROCEDURE — 83735 ASSAY OF MAGNESIUM: CPT | Performed by: EMERGENCY MEDICINE

## 2018-12-31 PROCEDURE — 84484 ASSAY OF TROPONIN QUANT: CPT | Performed by: EMERGENCY MEDICINE

## 2018-12-31 PROCEDURE — 96361 HYDRATE IV INFUSION ADD-ON: CPT

## 2018-12-31 PROCEDURE — 71046 X-RAY EXAM CHEST 2 VIEWS: CPT

## 2018-12-31 PROCEDURE — 96375 TX/PRO/DX INJ NEW DRUG ADDON: CPT

## 2018-12-31 RX ORDER — MIRTAZAPINE 15 MG/1
30 TABLET, FILM COATED ORAL
Status: DISCONTINUED | OUTPATIENT
Start: 2018-12-31 | End: 2018-12-31 | Stop reason: HOSPADM

## 2018-12-31 RX ORDER — ONDANSETRON 2 MG/ML
4 INJECTION INTRAMUSCULAR; INTRAVENOUS EVERY 6 HOURS PRN
Status: DISCONTINUED | OUTPATIENT
Start: 2018-12-31 | End: 2018-12-31 | Stop reason: HOSPADM

## 2018-12-31 RX ORDER — LIDOCAINE 50 MG/G
1 PATCH TOPICAL DAILY
Status: DISCONTINUED | OUTPATIENT
Start: 2019-01-01 | End: 2018-12-31 | Stop reason: HOSPADM

## 2018-12-31 RX ORDER — GABAPENTIN 400 MG/1
800 CAPSULE ORAL 3 TIMES DAILY
Status: DISCONTINUED | OUTPATIENT
Start: 2018-12-31 | End: 2018-12-31 | Stop reason: HOSPADM

## 2018-12-31 RX ORDER — FAMOTIDINE 20 MG/1
40 TABLET, FILM COATED ORAL DAILY
Status: DISCONTINUED | OUTPATIENT
Start: 2019-01-01 | End: 2018-12-31 | Stop reason: HOSPADM

## 2018-12-31 RX ORDER — POTASSIUM CHLORIDE 20 MEQ/1
40 TABLET, EXTENDED RELEASE ORAL ONCE
Status: COMPLETED | OUTPATIENT
Start: 2018-12-31 | End: 2018-12-31

## 2018-12-31 RX ORDER — MORPHINE SULFATE 4 MG/ML
4 INJECTION, SOLUTION INTRAMUSCULAR; INTRAVENOUS ONCE
Status: COMPLETED | OUTPATIENT
Start: 2018-12-31 | End: 2018-12-31

## 2018-12-31 RX ORDER — SODIUM CHLORIDE 9 MG/ML
125 INJECTION, SOLUTION INTRAVENOUS CONTINUOUS
Status: DISCONTINUED | OUTPATIENT
Start: 2018-12-31 | End: 2018-12-31 | Stop reason: HOSPADM

## 2018-12-31 RX ORDER — ONDANSETRON 2 MG/ML
4 INJECTION INTRAMUSCULAR; INTRAVENOUS ONCE
Status: COMPLETED | OUTPATIENT
Start: 2018-12-31 | End: 2018-12-31

## 2018-12-31 RX ORDER — ALBUTEROL SULFATE 2.5 MG/3ML
2.5 SOLUTION RESPIRATORY (INHALATION) EVERY 6 HOURS PRN
Status: DISCONTINUED | OUTPATIENT
Start: 2018-12-31 | End: 2018-12-31 | Stop reason: HOSPADM

## 2018-12-31 RX ORDER — CLONAZEPAM 0.5 MG/1
0.5 TABLET ORAL 2 TIMES DAILY PRN
Status: DISCONTINUED | OUTPATIENT
Start: 2018-12-31 | End: 2018-12-31 | Stop reason: HOSPADM

## 2018-12-31 RX ORDER — CITALOPRAM 20 MG/1
40 TABLET ORAL DAILY
Status: DISCONTINUED | OUTPATIENT
Start: 2019-01-01 | End: 2018-12-31 | Stop reason: HOSPADM

## 2018-12-31 RX ORDER — METOPROLOL TARTRATE 50 MG/1
50 TABLET, FILM COATED ORAL EVERY 12 HOURS SCHEDULED
Status: DISCONTINUED | OUTPATIENT
Start: 2018-12-31 | End: 2018-12-31 | Stop reason: HOSPADM

## 2018-12-31 RX ORDER — ACETAMINOPHEN 325 MG/1
650 TABLET ORAL EVERY 6 HOURS SCHEDULED
Status: DISCONTINUED | OUTPATIENT
Start: 2018-12-31 | End: 2018-12-31 | Stop reason: HOSPADM

## 2018-12-31 RX ORDER — ATORVASTATIN CALCIUM 40 MG/1
40 TABLET, FILM COATED ORAL DAILY
Status: DISCONTINUED | OUTPATIENT
Start: 2019-01-01 | End: 2018-12-31 | Stop reason: HOSPADM

## 2018-12-31 RX ADMIN — MORPHINE SULFATE 4 MG: 4 INJECTION INTRAVENOUS at 13:02

## 2018-12-31 RX ADMIN — POTASSIUM CHLORIDE 40 MEQ: 1500 TABLET, EXTENDED RELEASE ORAL at 14:30

## 2018-12-31 RX ADMIN — ONDANSETRON 4 MG: 2 INJECTION INTRAMUSCULAR; INTRAVENOUS at 13:02

## 2018-12-31 RX ADMIN — GABAPENTIN 800 MG: 400 CAPSULE ORAL at 17:06

## 2018-12-31 RX ADMIN — SODIUM CHLORIDE 125 ML/HR: 0.9 INJECTION, SOLUTION INTRAVENOUS at 17:09

## 2018-12-31 RX ADMIN — MORPHINE SULFATE 2 MG: 2 INJECTION, SOLUTION INTRAMUSCULAR; INTRAVENOUS at 15:51

## 2018-12-31 RX ADMIN — MORPHINE SULFATE 1 MG: 2 INJECTION, SOLUTION INTRAMUSCULAR; INTRAVENOUS at 18:29

## 2018-12-31 RX ADMIN — ACETAMINOPHEN 650 MG: 325 TABLET, FILM COATED ORAL at 17:06

## 2018-12-31 RX ADMIN — SODIUM CHLORIDE 125 ML/HR: 0.9 INJECTION, SOLUTION INTRAVENOUS at 12:55

## 2018-12-31 NOTE — ED PROVIDER NOTES
History  Chief Complaint   Patient presents with    Chest Pain     HPI  78-year-old white female with a chief complaint of chest pain on the left side of her chest radiating down her left arm and into her back  Patient states that she was out and was having half a glass of beer when the chest pain started  Patient states the pain is sharp in nature and goes down here left upper extremity and into her back  Pt  Is a smoker  Prior to Admission Medications   Prescriptions Last Dose Informant Patient Reported? Taking?    albuterol (2 5 mg/3 mL) 0 083 % nebulizer solution 2018 at Unknown time  No Yes   Sig: Take 1 vial (2 5 mg total) by nebulization every 6 (six) hours as needed for wheezing or shortness of breath   atorvastatin (LIPITOR) 40 mg tablet  Self No No   Sig: Take 1 tablet (40 mg total) by mouth daily for 90 days   citalopram (CELEXA) 40 mg tablet 2018 at Unknown time Self Yes Yes   Sig: Take 40 mg by mouth daily     clonazePAM (KlonoPIN) 1 mg tablet 2018 at Unknown time Self Yes Yes   Si mg 2 (two) times a day as needed for anxiety     famotidine (PEPCID) 40 MG tablet 2018 at Unknown time  No Yes   Sig: Take 1 tablet (40 mg total) by mouth daily   gabapentin (NEURONTIN) 800 mg tablet 2018 at Unknown time Self Yes Yes   Sig: Take 800 mg by mouth 3 (three) times a day   metoprolol tartrate (LOPRESSOR) 50 mg tablet 2018 at Unknown time Self No Yes   Sig: Take 1 tablet (50 mg total) by mouth every 12 (twelve) hours   mirtazapine (REMERON) 30 mg tablet 2018 at Unknown time Self Yes Yes   Sig: Take 30 mg by mouth daily at bedtime        Facility-Administered Medications: None       Past Medical History:   Diagnosis Date    Anxiety     Bipolar 1 disorder ([facility] [address] )     Mental Health problems listed as Denied in [employer], cant't entire under pertinent negatives due to this diagnosis    CAD (coronary artery disease)     last assessed - 2017    Chronic back pain  Frequent headaches     Glaucoma     Hyperlipidemia     Hypertension     Ovarian cancer ([facility] )     last assessed - 21Sep2016    Psychiatric disorder     bipolar    [facility] arthritis ([facility] [address] )     Uterine carcinoma ([facility] )     last assessed - 21Sep2016       Past Surgical History:   Procedure Laterality Date    ADENOIDECTOMY      Tonsillectomy with Adenoidectomy - last assessed - 21Sep2016    APPENDECTOMY      last assessed - 21Sep2016    CATARACT EXTRACTION Left     Remove cataract, corneo-scleral section of left eye; last assessed - 21Sep2016    CHOLECYSTECTOMY      last assessed - 29Sep2016   Livia Pall EYE SURGERY      Anterior sclera fistulization for glaucoma; last assessed - 21Sep2016    HYSTERECTOMY      KRYSTA-BSO; last assessed - 21Sep2016    INTRAOCULAR LENS INSERTION      WA TEMPORAL ARTERY LIGATN OR BX Right 12/16/2016    Procedure: BIOPSY ARTERY TEMPORAL;  Surgeon: Daljit Lowry MD;  Location: [facility];  Service: General    TONSILLECTOMY      Tonsillectomy with Adenoidectomy - last assessed - 21Sep2016       Family History   Problem Relation Age of Onset    Heart disease Mother     Coronary artery disease Mother     Other Mother         1) Gangrene; 2) Peripheral arterial disease    Hyperlipidemia Mother         Hypercholesterolemia    Stomach cancer Mother     Heart attack Mother         Myocardial Infarction    Other Father         1) Gangrene; 2) Peripheral arterial disease    Hyperlipidemia Father         Hypercholesterolemia    Stomach cancer Father     Heart attack Father         Myocardial Infarction    Stomach cancer Brother     Heart attack Brother         Myocardial Infarction    Stomach cancer Maternal Uncle      I have reviewed and agree with the history as documented      Social History   Substance Use Topics    Smoking status: Current Every Day Smoker     Packs/day: 0 20     Years: 46 00     Types: Cigarettes    Smokeless tobacco: Never Used    Alcohol use Yes Comment: occasional; (No alcohol use per [employer])        Review of Systems   Constitutional: Negative for diaphoresis, fatigue and fever  HENT: Negative for congestion, ear pain, nosebleeds and sore throat  Eyes: Negative for photophobia, pain, discharge and visual disturbance  Respiratory: Positive for cough  Negative for choking, chest tightness and wheezing  Cardiovascular: Positive for chest pain  Negative for palpitations  Gastrointestinal: Negative for abdominal distention, abdominal pain, diarrhea and vomiting  Genitourinary: Negative for dysuria, flank pain and frequency  Musculoskeletal: Negative for back pain, gait problem and joint swelling  Skin: Negative for color change and rash  Neurological: Negative for dizziness, syncope and headaches  Psychiatric/Behavioral: Negative for behavioral problems and confusion  The patient is not nervous/anxious  All other systems reviewed and are negative  Physical Exam  Physical Exam   Constitutional: She is oriented to person, place, and time  She appears well-developed and well-nourished  47-year-old white female lying on the stretcher holding the left side of her chest complaining of chest    HENT:   Head: Normocephalic and atraumatic  Mouth/Throat: Oropharynx is clear and moist    Eyes: Pupils are equal, round, and reactive to light  EOM are normal    Neck: Normal range of motion  Neck supple  Cardiovascular: Normal rate, regular rhythm and normal heart sounds  Pulmonary/Chest: Effort normal  No respiratory distress  She has no wheezes  She has no rales  Coarse breath sounds bilaterally   Abdominal: Soft  Bowel sounds are normal  There is no tenderness  There is no rebound and no guarding  Musculoskeletal: Normal range of motion  Neurological: She is alert and oriented to person, place, and time  No cranial nerve deficit  She exhibits normal muscle tone  Coordination normal    Skin: Skin is warm and dry  Psychiatric: She has a normal mood and affect  Nursing note and vitals reviewed        Vital Signs  ED Triage Vitals   Temperature Pulse Respirations Blood Pressure SpO2   12/31/18 1203 12/31/18 1203 12/31/18 1203 12/31/18 1203 12/31/18 1203   97 6 °F (36 4 °C) 75 18 (!) 175/95 98 %      Temp Source Heart Rate Source Patient Position - Orthostatic VS BP Location FiO2 (%)   12/31/18 1810 -- -- -- --   Oral          Pain Score       12/31/18 1233       Worst Possible Pain           Vitals:    12/31/18 1600 12/31/18 1615 12/31/18 1630 12/31/18 1810   BP: 138/86  155/86 139/82   Pulse: 68 70 71 68       Visual Acuity      ED Medications  Medications   sodium chloride 0 9 % infusion (125 mL/hr Intravenous New Bag 12/31/18 1709)   albuterol inhalation solution 2 5 mg (not administered)   atorvastatin (LIPITOR) tablet 40 mg (not administered)   citalopram (CeleXA) tablet 40 mg (not administered)   clonazePAM (KlonoPIN) tablet 0 5 mg (not administered)   famotidine (PEPCID) tablet 40 mg (not administered)   gabapentin (NEURONTIN) capsule 800 mg (800 mg Oral Given 12/31/18 1706)   metoprolol tartrate (LOPRESSOR) tablet 50 mg (not administered)   mirtazapine (REMERON) tablet 30 mg (not administered)   ondansetron (ZOFRAN) injection 4 mg (not administered)   enoxaparin (LOVENOX) subcutaneous injection 40 mg (not administered)   acetaminophen (TYLENOL) tablet 650 mg (650 mg Oral Given 12/31/18 1706)   morphine injection 1 mg (1 mg Intravenous Given 12/31/18 1829)   lidocaine (LIDODERM) 5 % patch 1 patch (not administered)   morphine (PF) 4 mg/mL injection 4 mg (4 mg Intravenous Given 12/31/18 1302)   ondansetron (ZOFRAN) injection 4 mg (4 mg Intravenous Given 12/31/18 1302)   potassium chloride (K-DUR,KLOR-CON) CR tablet 40 mEq (40 mEq Oral Given 12/31/18 1430)   morphine injection 2 mg (2 mg Intravenous Given 12/31/18 1551)       Diagnostic Studies  Results Reviewed     Procedure Component Value Units Date/Time    Rapid drug screen, urine [[id number]]     Lab Status:  No result Specimen:  Urine     Troponin I [[id number]]     Lab Status:  No result Specimen:  Blood     Basic metabolic panel [[id number]]  (Abnormal) Collected:  12/31/18 1253    Lab Status:  Final result Specimen:  Blood from Arm, Left Updated:  12/31/18 1337     Sodium 143 mmol/L      Potassium 2 9 (L) mmol/L      Chloride 106 mmol/L      CO2 24 mmol/L      ANION GAP 13 mmol/L      BUN 9 mg/dL      Creatinine 0 70 mg/dL      Glucose 79 mg/dL      Calcium 9 0 mg/dL      eGFR 94 ml/min/1 73sq m     Narrative:         National Kidney Disease Education Program recommendations are as follows:  GFR calculation is accurate only with a steady state creatinine  Chronic Kidney disease less than 60 ml/min/1 73 sq  meters  Kidney failure less than 15 ml/min/1 73 sq  meters  Hepatic function panel [[id number]]  (Abnormal) Collected:  12/31/18 1253    Lab Status:  Final result Specimen:  Blood from Arm, Left Updated:  12/31/18 1337     Total Bilirubin 0 30 mg/dL      Bilirubin, Direct 0 08 mg/dL      Alkaline Phosphatase 161 (H) U/L      AST 23 U/L      ALT 46 U/L      Total Protein 7 5 g/dL      Albumin 3 5 g/dL     TSH [[id number]]  (Normal) Collected:  12/31/18 1253    Lab Status:  Final result Specimen:  Blood from Arm, Left Updated:  12/31/18 1337     TSH 3RD GENERATON 0 805 uIU/mL     Narrative:         Patients undergoing fluorescein dye angiography may retain small amounts of fluorescein in the body for 48-72 hours post procedure  Samples containing fluorescein can produce falsely depressed TSH values  If the patient had this procedure,a specimen should be resubmitted post fluorescein clearance            The recommended reference ranges for TSH during pregnancy are as follows:  First trimester 0 1 to 2 5 uIU/mL  Second trimester  0 2 to 3 0 uIU/mL  Third trimester 0 3 to 3 0 uIU/m      Phosphorus [[id number]]  (Normal) Collected:  12/31/18 1253    Lab Status:  Final result Specimen:  Blood from Arm, Left Updated:  12/31/18 1337     Phosphorus 3 8 mg/dL     Magnesium [[id number]]  (Normal) Collected:  12/31/18 1253    Lab Status:  Final result Specimen:  Blood from Arm, Left Updated:  12/31/18 1337     Magnesium 2 0 mg/dL     Troponin I [[id number]]  (Normal) Collected:  12/31/18 1253    Lab Status:  Final result Specimen:  Blood from Arm, Left Updated:  12/31/18 1331     Troponin I <0 02 ng/mL     Ethanol [[id number]]  (Abnormal) Collected:  12/31/18 1253    Lab Status:  Final result Specimen:  Blood from Arm, Left Updated:  12/31/18 1323     Ethanol Lvl 93 (H) mg/dL     Protime-INR [[id number]]  (Normal) Collected:  12/31/18 1253    Lab Status:  Final result Specimen:  Blood from Arm, Left Updated:  12/31/18 1320     Protime 13 1 seconds      INR 1 00    APTT [[id number]]  (Normal) Collected:  12/31/18 1253    Lab Status:  Final result Specimen:  Blood from Arm, Left Updated:  12/31/18 1320     PTT 26 seconds     CBC and differential [[id number]] Collected:  12/31/18 1253    Lab Status:  Final result Specimen:  Blood from Arm, Left Updated:  12/31/18 1306     WBC 5 63 Thousand/uL      RBC 4 74 Million/uL      Hemoglobin 14 2 g/dL      Hematocrit 42 3 %      MCV 89 fL      MCH 30 0 pg      MCHC 33 6 g/dL      RDW 13 4 %      MPV 9 6 fL      Platelets 001 Thousands/uL      nRBC 0 /100 WBCs      Neutrophils Relative 52 %      Immat GRANS % 0 %      Lymphocytes Relative 37 %      Monocytes Relative 7 %      Eosinophils Relative 4 %      Basophils Relative 0 %      Neutrophils Absolute 2 90 Thousands/µL      Immature Grans Absolute 0 01 Thousand/uL      Lymphocytes Absolute 2 08 Thousands/µL      Monocytes Absolute 0 38 Thousand/µL      Eosinophils Absolute 0 24 Thousand/µL      Basophils Absolute 0 02 Thousands/µL     Troponin I [[id number]]     Lab Status:  No result Specimen:  Blood                  XR chest 2 views   Final Result by Nora Larose DO (12/31 1607)      Slightly increased density along the extreme posterior inferior lung base seen only on lateral view  Unclear if this represents some atelectasis, confluent shadows or less likely tiny right pleural effusion  Correlate with any abnormal breath sounds and consider short interval follow-up plain films  Workstation performed: [id number]                    Procedures  ECG 12 Lead Documentation  Date/Time: 12/31/2018 12:32 PM  Performed by: Ariella Wlison  Authorized by: Ariella Wilson     Indications / Diagnosis:  Chest pain  ECG reviewed by me, the ED Provider: yes    Patient location:  ED  Previous ECG:     Previous ECG:  Compared to current  Interpretation:     Interpretation: non-specific    Rate:     ECG rate assessment: normal    Rhythm:     Rhythm: sinus rhythm    ST segments:     ST segments:  Non-specific  T waves:     T waves: flattening      Flattening:  V1, V2, V3, V4, V5 and V6           Phone Contacts  ED Phone Contact    ED Course      I initially offered patient nitro when she said she does not want any nitro  She states she gets a profuse headache with this  I offered her Tylenol with the nitro and she again declined  She states that Tylenol does not work for her  She also states that aspirin makes her stop breathing  MDM  CritCare Time    Disposition  Final diagnoses:   Chest pain   Hypokalemia     Time reflects when diagnosis was documented in both MDM as applicable and the Disposition within this note     Time User Action Codes Description Comment    12/31/2018  2:18 PM Gm Galvan [R07 9] Chest pain     12/31/2018  9:59 PM Gm Galvan [E87 6] Hypokalemia       ED Disposition     ED Disposition Condition Comment    Admit  Case was discussed with Dr Lorraine Crespo and the patient's admission status was agreed to be Admission Status: observation status to the service of Dr Lorraine Crespo           Follow-up Information     Follow up With Specialties Details Why Contact Info Dania Barclay MD Internal Medicine Schedule an appointment as soon as possible for a visit in 1 week(s)  [address]      Ramonita Dc MD Cardiology, Radiology Schedule an appointment as soon as possible for a visit as soon as possible [facility] [address]  [phone]            Discharge Medication List as of 12/31/2018  9:20 PM      CONTINUE these medications which have NOT CHANGED    Details   albuterol (2 5 mg/3 mL) 0 083 % nebulizer solution Take 1 vial (2 5 mg total) by nebulization every 6 (six) hours as needed for wheezing or shortness of breath, Starting Tue 11/6/2018, Normal      citalopram (CELEXA) 40 mg tablet Take 40 mg by mouth daily  , Until Discontinued, Historical Med      clonazePAM (KlonoPIN) 1 mg tablet 1 mg 2 (two) times a day as needed for anxiety  , Starting Fri 4/20/2018, Historical Med      famotidine (PEPCID) 40 MG tablet Take 1 tablet (40 mg total) by mouth daily, Starting Fri 8/24/2018, Normal      gabapentin (NEURONTIN) 800 mg tablet Take 800 mg by mouth 3 (three) times a day, Historical Med      metoprolol tartrate (LOPRESSOR) 50 mg tablet Take 1 tablet (50 mg total) by mouth every 12 (twelve) hours, Starting Thu 6/14/2018, Normal      mirtazapine (REMERON) 30 mg tablet Take 30 mg by mouth daily at bedtime  , Historical Med      atorvastatin (LIPITOR) 40 mg tablet Take 1 tablet (40 mg total) by mouth daily for 90 days, Starting Thu 6/14/2018, Until Wed 9/12/2018, Normal             Outpatient Discharge Orders  Ambulatory referral to Cardiology   Standing Status: Future  Standing Exp   Date: 06/30/19     Call provider for:  persistent nausea or vomiting     Call provider for:  severe uncontrolled pain     Call provider for:  redness, tenderness, or signs of infection (pain, swelling, redness, odor or green/yellow discharge around incision site)     Call provider for: active or persistent bleeding     Call provider for:  difficulty breathing, headache or visual disturbances     Call provider for:  persistent dizziness or light-headedness     Call provider for:  extreme fatigue         ED Provider  Electronically Signed by           Saar Cruz DO  12/31/18 3149

## 2018-12-31 NOTE — H&P
History and Physical - [facility]    Patient Information: Josue Kapoor 59 y o  female MRN: [id number]  Unit/Bed#: FT 04 Encounter: [id number]  Admitting Physician: Vincent Ness MD  PCP: Keith Rios MD  Date of Admission:  12/31/18    Assessment/Plan:    Hospital Problem List:     Principal Problem:    Chest pain  Active Problems:    Depression    Anxiety    Chronic back pain    COPD (chronic obstructive pulmonary disease) ([address] )    Essential hypertension    Coronary artery disease involving native coronary artery of native heart without angina pectoris    Mixed hyperlipidemia    Rheumatoid arthritis involving multiple sites [facility])      Plan :    Chest pain  Rule out ACS  On exam, chest pain is reproducible  Other differentials GERD versus alcoholic gastritis versus peptic ulcer disease  EKG reviewed  Atrial fibrillation, Showed nonspecific T-wave changes in anterior inferior leads  Troponin negative  Continue to trend x3 Continue to monitor on tele  Consult Cardiology  Continue aspirin, statin, metoprolol  Continue Pepcid  Patient is allergy to pantoprazole    Atrial fibrillation, ?new onset  EKG on admission showed? AFib  Rate controlled  Consult Cardiology    Anxiety and depression  Continue current medications    Nicotine dependent  Patient refused nicotine patch    Chronic obstructive pulmonary disease not in acute exacerbation  Continue albuterol p r n  Hypertension  Blood pressure elevated on admission, currently ranging in 130s to 140s  Continue to monitor    Alcohol use  Patient reports any prior history of alcohol dependence  She mentions to me that she does not have withdrawal symptoms or signs  Continue to monitor clinically  Recheck blood alcohol level in the a m      VTE Prophylaxis: Enoxaparin (Lovenox)  / sequential compression device   Code Status:  Full code  POLST: There is no POLST form on file for this patient (pre-hospital)    Anticipated Length of Stay: Patient will be admitted on an Observation basis with an anticipated length of stay of less than 2 midnights  Justification for Hospital Stay:  Chest pain, rule out ACS    Total Time for Visit, including Counseling / Coordination of Care: 30 minutes  Greater than 50% of this total time spent on direct patient counseling and coordination of care  Chief Complaint:  Chest pain    History of Present Illness:    Mesha Ontiveros is a 59 y o  female with past medical history of coronary artery disease non amenable to PCI, hypertension, tobacco use, anxiety, depression, alcohol use who presents with chest pain radiating to left arm, associated with diaphoresis  Patient reports that 'only morphine' makes her pain better  She admits drinking few beers this morning  Review of Systems:    Review of Systems   Constitutional: Negative for appetite change, chills and fever  Respiratory: Positive for chest tightness  Negative for cough, shortness of breath and wheezing  Cardiovascular: Positive for chest pain  Negative for palpitations and leg swelling  Gastrointestinal: Positive for diarrhea  Negative for abdominal pain and nausea  Genitourinary: Negative for dysuria  Musculoskeletal: Negative for back pain  Neurological: Negative for dizziness, weakness, numbness and headaches         Past Medical and Surgical History:     Past Medical History:   Diagnosis Date    Anxiety     Bipolar 1 disorder ([facility] )     Mental Health problems listed as Denied in Allscripts, cant't entire under pertinent negatives due to this diagnosis    CAD (coronary artery disease)     last assessed - [id number]    Chronic back pain     Frequent headaches     Glaucoma     Hyperlipidemia     Hypertension     Ovarian cancer ([facility] )     last assessed - [id number]    Psychiatric disorder     bipolar    Rheumatoid arthritis ([facility] )     Uterine carcinoma ([facility] )     last assessed - [id number]       Past Surgical History:   Procedure Laterality Date    ADENOIDECTOMY      Tonsillectomy with Adenoidectomy - last assessed - 21Sep2016    APPENDECTOMY      last assessed - 21Sep2016    CATARACT EXTRACTION Left     Remove cataract, corneo-scleral section of left eye; last assessed - 21Sep2016    CHOLECYSTECTOMY      last assessed - 29Sep2016   [facility] EYE SURGERY      Anterior sclera fistulization for glaucoma; last assessed - 21Sep2016    HYSTERECTOMY      KRYSTA-BSO; last assessed - 21Sep2016    INTRAOCULAR LENS INSERTION      WA TEMPORAL ARTERY LIGATN OR BX Right 12/16/2016    Procedure: BIOPSY ARTERY TEMPORAL;  Surgeon: Marissa Barclay MD;  Location: [facility];  Service: General    TONSILLECTOMY      Tonsillectomy with Adenoidectomy - last assessed - 21Sep2016       Meds/Allergies:    Prior to Admission medications    Medication Sig Start Date End Date Taking?  Authorizing Provider   albuterol (2 5 mg/3 mL) 0 083 % nebulizer solution Take 1 vial (2 5 mg total) by nebulization every 6 (six) hours as needed for wheezing or shortness of breath 11/6/18  Yes Dayami Little PA-C   citalopram (CELEXA) 40 mg tablet Take 40 mg by mouth daily     Yes Historical Provider, MD   clonazePAM (KlonoPIN) 1 mg tablet 1 mg 2 (two) times a day as needed for anxiety   4/20/18  Yes Historical Provider, MD   famotidine (PEPCID) 40 MG tablet Take 1 tablet (40 mg total) by mouth daily 8/24/18  Yes ROMEO Silverman   gabapentin (NEURONTIN) 800 mg tablet Take 800 mg by mouth 3 (three) times a day   Yes Historical Provider, MD   metoprolol tartrate (LOPRESSOR) 50 mg tablet Take 1 tablet (50 mg total) by mouth every 12 (twelve) hours 6/14/18  Yes Florentino Encinas MD   mirtazapine (REMERON) 30 mg tablet Take 30 mg by mouth daily at bedtime     Yes Historical Provider, MD   atorvastatin (LIPITOR) 40 mg tablet Take 1 tablet (40 mg total) by mouth daily for 90 days 6/14/18 9/12/18  Florentino Encinas MD   amLODIPine (NORVASC) 5 mg tablet Take 1 tablet (5 mg total) by mouth daily 6/14/18 12/31/18  Ivania Jose MD   ibuprofen (MOTRIN) 800 mg tablet Take 1 tablet (800 mg total) by mouth every 6 (six) hours as needed for mild pain 7/9/18 12/31/18  Chilango Sanders MD   pantoprazole (PROTONIX) 40 mg tablet Take 1 tablet (40 mg total) by mouth every morning for 30 days 11/30/18 12/31/18  Petty Gusman MD   predniSONE 10 mg tablet Take 1 tablet (10 mg total) by mouth daily Take 4 tablets for 5 days,  Then 3 tablets for 5 days,  Then 2 tablets for 5 days,  Then 1 tablet for 5 days  7/9/18 12/31/18  Chilango Sanders MD     I have reviewed home medications with patient personally  Allergies: Allergies   Allergen Reactions    Aspirin Anaphylaxis and Other (See Comments)    Bee Venom Anaphylaxis    Robaxin [Methocarbamol] Anaphylaxis and Seizures     Seizures per pt       Tramadol Hives, Shortness Of Breath and Other (See Comments)     Other reaction(s): Nausea and/or vomiting  Pt received morphine IV 7-6-18    Ketorolac     Omeprazole GI Intolerance     Other reaction(s): Nausea and/or vomiting    Codeine Rash and Other (See Comments)     Dizziness nausea;   Pt received morphine IV 7-6-18       Social History:     Marital Status: /Civil Union   Occupation:   Patient Pre-hospital Living Situation  Patient Pre-hospital Level of Mobility:  Patient Pre-hospital Diet Restrictions:   Substance Use History:   History   Alcohol Use    Yes     Comment: occasional; (No alcohol use per Allscripts)     History   Smoking Status    Current Every Day Smoker    Packs/day: 0 20    Years: 46 00    Types: Cigarettes   Smokeless Tobacco    Never Used     History   Drug Use No       Family History:    Family History   Problem Relation Age of Onset    Heart disease Mother     Coronary artery disease Mother     Other Mother         1) Gangrene; 2) Peripheral arterial disease    Hyperlipidemia Mother         Hypercholesterolemia    Stomach cancer Mother     Heart attack Mother         Myocardial Infarction    Other Father         1) Gangrene; 2) Peripheral arterial disease    Hyperlipidemia Father         Hypercholesterolemia    Stomach cancer Father     Heart attack Father         Myocardial Infarction    Stomach cancer Brother     Heart attack Brother         Myocardial Infarction    Stomach cancer Maternal Uncle        Physical Exam:     Vitals:   Blood Pressure: 155/86 (12/31/18 1630)  Pulse: 71 (12/31/18 1630)  Temperature: 97 6 °F (36 4 °C) (12/31/18 1203)  Respirations: 20 (12/31/18 1630)  SpO2: 96 % (12/31/18 1630)    Physical Exam   Constitutional: She is oriented to person, place, and time  She appears well-developed and well-nourished  No distress  HENT:   Head: Normocephalic and atraumatic  Eyes: Pupils are equal, round, and reactive to light  EOM are normal    Neck: Normal range of motion  Neck supple  Cardiovascular: Normal rate and regular rhythm  Pulmonary/Chest: Effort normal and breath sounds normal  No respiratory distress  Abdominal: Soft  Bowel sounds are normal    Musculoskeletal: Normal range of motion  Neurological: She is alert and oriented to person, place, and time  Skin: Skin is warm and dry  She is not diaphoretic  Additional Data:     Lab Results: I have personally reviewed pertinent reports  Results from last 7 days  Lab Units 12/31/18  1253   WBC Thousand/uL 5 63   HEMOGLOBIN g/dL 14 2   HEMATOCRIT % 42 3   PLATELETS Thousands/uL 177   NEUTROS PCT % 52   LYMPHS PCT % 37   MONOS PCT % 7   EOS PCT % 4       Results from last 7 days  Lab Units 12/31/18  1253   POTASSIUM mmol/L 2 9*   CHLORIDE mmol/L 106   CO2 mmol/L 24   BUN mg/dL 9   CREATININE mg/dL 0 70   CALCIUM mg/dL 9 0   ALK PHOS U/L 161*   ALT U/L 46   AST U/L 23       Results from last 7 days  Lab Units 12/31/18  1253   INR  1 00       Imaging: I have personally reviewed pertinent reports  Xr Chest 2 Views    Result Date: 12/31/2018  Narrative: CHEST INDICATION:   Chest pain  COMPARISON:  7/6/2018, 11/26/2017 and CT chest 7/7/2018 EXAM PERFORMED/VIEWS:  XR CHEST PA & LATERAL  The frontal view was performed utilizing dual energy radiographic technique  FINDINGS: Cardiomediastinal silhouette appears unremarkable  Slightly increased density along the extreme posterior inferior lung base seen only on lateral view  Unclear if this represents some atelectasis, confluent shadows or less likely tiny right pleural effusion  The lungs are otherwise clear  No pneumothorax  Osseous structures appear within normal limits for patient age  Surgical clips right upper quadrant  Impression: Slightly increased density along the extreme posterior inferior lung base seen only on lateral view  Unclear if this represents some atelectasis, confluent shadows or less likely tiny right pleural effusion  Correlate with any abnormal breath sounds and consider short interval follow-up plain films  Workstation performed: [id number]     Xr Lumbar Spine 2 Or 3 Views    Result Date: 12/4/2018  Narrative: LUMBAR SPINE INDICATION:   fall  COMPARISON:  5/10/2017 VIEWS:  XR SPINE LUMBAR 2 OR 3 VIEWS INJURY Images: 3 FINDINGS: There is no evidence of acute fracture or destructive osseous lesion  Slight anterior spondylolisthesis of L5 in relation to L4  Facet arthropathy within the lower lumbar spine at L4-5 and L5-S1  The pedicles appear intact  Vascular calcification of the aorta  Impression: Mild lower lumbar degenerative change and facet arthropathy  Workstation performed: [id number]     Xr Hip/pelv 2-3 Vws Left    Result Date: 12/4/2018  Narrative: LEFT HIP INDICATION:   fall  COMPARISON:  11/26/2018 VIEWS:  XR HIP/PELV 2-3 VWS LEFT  W PELVIS IF PERFORMED Images: 3 FINDINGS: There is no acute fracture or dislocation  No significant hip degenerative changes  No lytic or blastic osseous lesions  Soft tissues are unremarkable  The visualized lumbar spine is unremarkable       Impression: No acute osseous abnormality  Workstation performed: [id number]     Ct Lumbar Spine Without Contrast    Result Date: 12/4/2018  Narrative: CT LUMBAR SPINE INDICATION:   fall  COMPARISON: 11/26/2018  TECHNIQUE:  Contiguous axial images through the lumbar spine were obtained  Sagittal and coronal reconstructions were performed  Radiation dose length product (DLP) for this visit:  637 mGy-cm   This examination, like all CT scans performed in the [facility], was performed utilizing techniques to minimize radiation dose exposure, including the use of iterative reconstruction and automated exposure control  IMAGE QUALITY:  Diagnostic  FINDINGS: ALIGNMENT:  Normal alignment of the lumbar spine  No spondylolisthesis or spondylolysis  VERTEBRAL BODIES:  No fracture  No lytic or blastic lesion  DEGENERATIVE CHANGES: Stable mild degenerative changes  PARASPINAL SOFT TISSUES:  11 mm left adrenal adenoma again present  Impression: No traumatic abnormality  Workstation performed: [id number]     Ct Hip Left Without Contrast    Result Date: 12/4/2018  Narrative: CT left hip without contrast HISTORY: Fall, trauma  TECHNIQUE: Multiplanar noncontrast left hip CT  This examination, like all CT scans performed in the [facility], was performed utilizing techniques to minimize radiation dose exposure, including the use of iterative reconstruction and automated exposure control  COMPARISON: None  FINDINGS: No evidence of fracture or dislocation  No evidence of soft tissue hematoma  Impression: No CT evidence of fracture or dislocation  If left hip pain/symptoms persist or worsen, please consider follow-up with left hip MRI   Workstation performed: [id number]       EKG, Pathology, and Other Studies Reviewed on Admission:   · EKG:  Nonspecific T-wave changes in anterior and inferior leads    Allscripts / Epic Records Reviewed: Yes     ** Please Note: This note has been constructed using a voice recognition system   **

## 2019-01-01 NOTE — ASSESSMENT & PLAN NOTE
· POA, severe  · Potassium 2 9  · Patient signed out AMA, risks including cardiac arrhythmias and death discussed with patient prior to discharge    Patient verbalized understanding and signed AMA form

## 2019-01-01 NOTE — ASSESSMENT & PLAN NOTE
· Did not appear to be in acute exacerbation  · Continue p r n   Neb treatments  · Encouraged tobacco cessation

## 2019-01-01 NOTE — NURSING NOTE
Patient AxOx4 states, "My chest feels much better, and I am ready to go home, I would like to speak to a doctor so I can leave " Educated patient about the risks of leaving AMA  Patient still determined to sign herself out  SLIM notified  Patient signed release form from provider  No prescriptions needed  IV access removed  Patient left facility via walking with spouse

## 2019-01-01 NOTE — ASSESSMENT & PLAN NOTE
· Rule out ACS  · Nonspecific T-wave changes on EKG  · Troponin x1 negative  · Continue aspirin, statin and beta-blocker  · Cardiology consult  · Patient reporting that the only medication that helps her pain is morphine and receiving it every 6 hours is not enough, therefore patient signed out [place]    Risks were reviewed with patient and patient's spouse at bedside and patient signed form

## 2019-01-01 NOTE — DISCHARGE SUMMARY
Discharge- Obdulia Garland 1960, 59 y o  female MRN: [id number]    Unit/Bed#: -01 Encounter: [id number]    Primary Care Provider: Naima Paige MD   Date and time admitted to hospital: 12/31/2018 11:57 AM       DOS 12/31/2018      * Chest pain   Assessment & Plan    · Rule out ACS  · Nonspecific T-wave changes on EKG  · Troponin x1 negative  · Continue aspirin, statin and beta-blocker  · Cardiology consult  · Patient reporting that the only medication that helps her pain is morphine and receiving it every 6 hours is not enough, therefore patient signed out AMA  Risks were reviewed with patient and patient's spouse at bedside and patient signed form     [facility]   Assessment & Plan    · Noted on admission EKG  · Patient denies any history of  · Rate controlled with Lopressor 50 mg b i d   · Patient signed out AMA prior to Cardiology consultation, reports that she will follow up with her cardiologist Dr Ger Boogie in the next few days regarding any anticoagulation  Alcohol abuse   Assessment & Plan    · Currently without any withdrawal symptoms  · Alcohol level 93 on admission  · Pt alert and oriented, currently has capacity to make her own decisions   also at bedside  Hypokalemia   Assessment & Plan    · POA, severe  · Potassium 2 9  · Patient signed out AMA, risks including cardiac arrhythmias and death discussed with patient prior to discharge  Patient verbalized understanding and signed AMA form     Mixed hyperlipidemia   Assessment & Plan    · Continue statin     Essential hypertension   Assessment & Plan    · BP noted to be elevated on admission  · Improved  · Continue Lopressor 50 mg b i d      COPD (chronic obstructive pulmonary disease) ([facility] )   Assessment & Plan    · Did not appear to be in acute exacerbation  · Continue p r n   Neb treatments  · Encouraged tobacco cessation     Anxiety and depression   Assessment & Plan    · Mood stable  · Continue Celexa and Remeron Discharging Physician / Practitioner: Sivakumar Lenz PA-C  PCP: Fidelia Song MD  Admission Date:   Admission Orders     Ordered        12/31/18 1420  Place in Observation (expected length of stay for this patient is less than two midnights)  Once             Discharge Date: 12/31/18    Resolved Problems  Date Reviewed: 12/31/2018    None          Consultations During Hospital Stay:  · None, patient left AMA prior to Cardiology consultation    Procedures Performed:     · Chest x-ray 12/31-slightly increased density along the extreme posterior inferior lung base seen only on lateral view  Unclear of this represent some atelectasis, confluent shadows are less likely tiny right pleural effusion  Correlate with any abnormal breath sounds and consider short interval follow-up plain films  Significant Findings / Test Results:     · Potassium 2 9  · Alk-phos 161  · Troponin x1 negative  · Medical alcohol level 93    Incidental Findings:   · None     Test Results Pending at Discharge (will require follow up): · None     Outpatient Tests Requested:  · Patient to follow up with PCP/Cardiology    Complications:  None    Reason for Admission:  Chest pain    Hospital Course:     [place] is a 59 y o  female patient with significant past medical history of anxiety/depression, tobacco abuse, alcohol abuse, hypertension, hyperlipidemia who originally presented to the hospital on 12/31/2018 due to chest pain  Patient was admitted to rule out ACS, reporting that only morphine helps her pain  Patient also noted to be in new onset AFib on admission EKG  Therefore Cardiology was consulted and troponins were ordered  However patient stating she wanted to leave AMA due to not getting adequate pain control here  She reports that she is only receiving Tylenol and ibuprofen here which she can do at home    She reports that morphine is realizing that helps her chest pain and having it every 6 hours is not an appropriate interval for her  Extensively discussed risks of atrial fibrillation and chest pain symptoms  Discussed risks of low potassium as well including worsening cardiac arrhythmias, stroke, heart attack and MI  Patient verbalized understanding and agreed to sign AMA form  She reports she will follow up with her cardiologist Dr Rachel Chicas in the next few days  Reports she was previously on Lopressor prior to admission and will continue this at home  No medication changes during this admission  Please see above list of diagnoses and related plan for additional information  Condition at Discharge: poor , pt left AMA    Discharge Day Visit / Exam:     * Please refer to separate progress note for these details *    Discussion with Family:  Discussed extensively with patient and patient's spouse at bedside  Risks of leaving the hospital discussed with patient, patient is alert and oriented and verbalized understanding  AMA form signed  Discharge instructions/Information to patient and family:   See after visit summary for information provided to patient and family  Provisions for Follow-Up Care:  See after visit summary for information related to follow-up care and any pertinent home health orders  Disposition:     Home, patient left AMA    For Discharges to [facility]:   · Not Applicable to this Patient - Not Applicable to this Patient    Planned Readmission:  None     Discharge Statement:  I spent 30 minutes discharging the patient  This time was spent on the day of discharge  I had direct contact with the patient on the day of discharge  Greater than 50% of the total time was spent examining patient, answering all patient questions, arranging and discussing plan of care with patient as well as directly providing post-discharge instructions  Additional time then spent on discharge activities      Discharge Medications:  See after visit summary for reconciled discharge medications provided to patient and family        ** Please Note: This note has been constructed using a voice recognition system **

## 2019-01-01 NOTE — ASSESSMENT & PLAN NOTE
· Noted on admission EKG  · Patient denies any history of  · Rate controlled with Lopressor 50 mg b i d   · Patient signed out AMA prior to Cardiology consultation, reports that she will follow up with her cardiologist Dr Martina Ospina in the next few days regarding any anticoagulation

## 2019-01-01 NOTE — DISCHARGE INSTRUCTIONS
Please follow up with cardiology as soon as possible  Chest Pain   WHAT YOU NEED TO KNOW:   What causes chest pain? Chest pain can be caused by a range of conditions, from not serious to life-threatening  Chest pain can be a symptom of a digestive problem, such as acid reflux or a stomach ulcer  An anxiety attack or a strong emotion, such as anger, can also cause chest pain  Infection, inflammation, or a fracture in the bones or cartilage in your chest can cause pain or discomfort  Sometimes chest pain or pressure is caused by poor blood flow to your heart (angina)  Chest pain may also be caused by life-threatening conditions such as a heart attack or blood clot in your lungs  What other symptoms might I have with chest pain? · A burning feeling behind your breastbone    · A racing or slow heartbeat     · Fever or sweating     · Nausea or vomiting     · Shortness of breath     · Discomfort or pressure that spreads from your chest to your back, jaw, or arm     · Feeling weak, tired, or faint  How is the cause of chest pain diagnosed? Your healthcare provider will examine you  Describe your chest pain in as much detail as possible  Tell him or her where your pain is and when it began  Tell the provider if you notice anything that makes the pain worse or better  Tell him or her if it is constant or comes and goes  Your healthcare provider will ask about any medicines you use and medical conditions you have  He or she will also examine you  You may also need any of the following tests:  · An EKG  is a test that records your heart's electrical activity  · Blood tests  check for heart damage and signs of a heart attack  · An echocardiogram  uses sound waves to see if blood is flowing normally through your heart  · An ultrasound, x-ray, CT, or MRI scan  may show the cause of your chest pain  You may be given contrast liquid to help your heart show up better in the pictures   Tell the healthcare provider if you have ever had an allergic reaction to contrast liquid  Do not enter the MRI room with anything metal  Metal can cause serious injury  Tell the healthcare provider if you have any metal in or on your body  · An endoscopy  may be done to check for ulcers or problem with your esophagus  How is chest pain treated? Medicines may be given to treat the cause of your chest pain  Examples include pain medicine, anxiety medicine, or medicines to increase blood flow to your heart  Do not  take certain medicines without asking your healthcare provider first  These include NSAIDs, herbal or vitamin supplements, or hormones (estrogen or progestin)  What are some healthy living tips? The following are general healthy guidelines  If your chest pain is caused by a heart problem, your healthcare provider will give you specific guidelines to follow  · Do not smoke  Nicotine and other chemicals in cigarettes and cigars can cause lung and heart damage  Ask your healthcare provider for information if you currently smoke and need help to quit  E-cigarettes or smokeless tobacco still contain nicotine  Talk to your healthcare provider before you use these products  · Eat a variety of healthy, low-fat foods  Healthy foods include fruits, vegetables, whole-grain breads, low-fat dairy products, beans, lean meats, and fish  Ask for more information about a heart healthy diet  · Ask about activity  Your healthcare provider will tell you which activities to limit or avoid  Ask when you can drive, return to work, and have sex  Ask about the best exercise plan for you  · Maintain a healthy weight  Ask your healthcare provider how much you should weigh  Ask him or her to help you create a weight loss plan if you are overweight    Call 911 if:   · You have any of the following signs of a heart attack:      ¨ Squeezing, pressure, or pain in your chest that lasts longer than 5 minutes or returns    ¨ Discomfort or pain in your back, neck, jaw, stomach, or arm     ¨ Trouble breathing    ¨ Nausea or vomiting    ¨ Lightheadedness or a sudden cold sweat, especially with chest pain or trouble breathing    When should I seek immediate care? · You have chest discomfort that gets worse, even with medicine  · You cough or vomit blood  · Your bowel movements are black or bloody  · You cannot stop vomiting, or it hurts to swallow  When should I contact my healthcare provider? · You have questions or concerns about your condition or care  CARE AGREEMENT:   You have the right to help plan your care  Learn about your health condition and how it may be treated  Discuss treatment options with your caregivers to decide what care you want to receive  You always have the right to refuse treatment  The above information is an  only  It is not intended as medical advice for individual conditions or treatments  Talk to your doctor, nurse or pharmacist before following any medical regimen to see if it is safe and effective for you  © 2017 [employer] Information is for End User's use only and may not be sold, redistributed or otherwise used for commercial purposes  All illustrations and images included in CareNotes® are the copyrighted property of A D A M , Inc  or [employer]

## 2019-01-02 ENCOUNTER — TRANSITIONAL CARE MANAGEMENT (OUTPATIENT)
Dept: INTERNAL MEDICINE CLINIC | Facility: CLINIC | Age: 59
End: 2019-01-02

## 2019-01-02 NOTE — PROGRESS NOTES
1st attempt-LVM asking pt to return call for TCM documentation and TCM appointment        By Noemí Lewis

## 2019-01-30 ENCOUNTER — APPOINTMENT (EMERGENCY)
Dept: CT IMAGING | Facility: HOSPITAL | Age: 59
End: 2019-01-30
Payer: COMMERCIAL

## 2019-01-30 ENCOUNTER — HOSPITAL ENCOUNTER (OUTPATIENT)
Facility: HOSPITAL | Age: 59
Setting detail: OBSERVATION
Discharge: HOME | End: 2019-01-31
Attending: EMERGENCY MEDICINE | Admitting: INTERNAL MEDICINE
Payer: COMMERCIAL

## 2019-01-30 ENCOUNTER — APPOINTMENT (EMERGENCY)
Dept: RADIOLOGY | Facility: HOSPITAL | Age: 59
End: 2019-01-30
Payer: COMMERCIAL

## 2019-01-30 DIAGNOSIS — M79.602 LEFT ARM PAIN: ICD-10-CM

## 2019-01-30 DIAGNOSIS — I10 ESSENTIAL HYPERTENSION: ICD-10-CM

## 2019-01-30 DIAGNOSIS — F10.10 ALCOHOL ABUSE: ICD-10-CM

## 2019-01-30 DIAGNOSIS — Z72.0 TOBACCO ABUSE DISORDER: ICD-10-CM

## 2019-01-30 DIAGNOSIS — M54.9 BACK PAIN: ICD-10-CM

## 2019-01-30 DIAGNOSIS — R07.9 CHEST PAIN: Primary | ICD-10-CM

## 2019-01-30 DIAGNOSIS — E78.2 MIXED HYPERLIPIDEMIA: ICD-10-CM

## 2019-01-30 DIAGNOSIS — F41.9 ANXIETY: ICD-10-CM

## 2019-01-30 PROBLEM — K20.90 ESOPHAGITIS: Status: ACTIVE | Noted: 2019-01-30

## 2019-01-30 LAB
ALBUMIN SERPL BCP-MCNC: 3.6 G/DL (ref 3.5–5)
ALP SERPL-CCNC: 179 U/L (ref 46–116)
ALT SERPL W P-5'-P-CCNC: 41 U/L (ref 12–78)
AMPHETAMINES SERPL QL SCN: NEGATIVE
ANION GAP SERPL CALCULATED.3IONS-SCNC: 11 MMOL/L (ref 4–13)
APTT PPP: 26 SECONDS (ref 26–38)
AST SERPL W P-5'-P-CCNC: 35 U/L (ref 5–45)
ATRIAL RATE: 81 BPM
BARBITURATES UR QL: NEGATIVE
BASOPHILS # BLD AUTO: 0.06 THOUSANDS/ΜL (ref 0–0.1)
BASOPHILS NFR BLD AUTO: 1 % (ref 0–1)
BENZODIAZ UR QL: NEGATIVE
BILIRUB DIRECT SERPL-MCNC: 0.05 MG/DL (ref 0–0.2)
BILIRUB SERPL-MCNC: 0.3 MG/DL (ref 0.2–1)
BILIRUB UR QL STRIP: NEGATIVE
BUN SERPL-MCNC: 6 MG/DL (ref 5–25)
CALCIUM SERPL-MCNC: 9.1 MG/DL (ref 8.3–10.1)
CHLORIDE SERPL-SCNC: 107 MMOL/L (ref 100–108)
CLARITY UR: CLEAR
CO2 SERPL-SCNC: 26 MMOL/L (ref 21–32)
COCAINE UR QL: NEGATIVE
COLOR UR: NORMAL
CREAT SERPL-MCNC: 0.65 MG/DL (ref 0.6–1.3)
DEPRECATED D DIMER PPP: ABNORMAL NG/ML (FEU)
EOSINOPHIL # BLD AUTO: 0.32 THOUSAND/ΜL (ref 0–0.61)
EOSINOPHIL NFR BLD AUTO: 5 % (ref 0–6)
ERYTHROCYTE [DISTWIDTH] IN BLOOD BY AUTOMATED COUNT: 13.4 % (ref 11.6–15.1)
ETHANOL SERPL-MCNC: 78 MG/DL (ref 0–3)
GFR SERPL CREATININE-BSD FRML MDRD: 97 ML/MIN/1.73SQ M
GLUCOSE SERPL-MCNC: 86 MG/DL (ref 65–140)
GLUCOSE UR STRIP-MCNC: NEGATIVE MG/DL
HCT VFR BLD AUTO: 42.1 % (ref 34.8–46.1)
HGB BLD-MCNC: 14.3 G/DL (ref 11.5–15.4)
HGB UR QL STRIP.AUTO: NEGATIVE
IMM GRANULOCYTES # BLD AUTO: 0.02 THOUSAND/UL (ref 0–0.2)
IMM GRANULOCYTES NFR BLD AUTO: 0 % (ref 0–2)
INR PPP: 1.01 (ref 0.86–1.17)
KETONES UR STRIP-MCNC: NEGATIVE MG/DL
LEUKOCYTE ESTERASE UR QL STRIP: NEGATIVE
LYMPHOCYTES # BLD AUTO: 1.93 THOUSANDS/ΜL (ref 0.6–4.47)
LYMPHOCYTES NFR BLD AUTO: 32 % (ref 14–44)
MCH RBC QN AUTO: 30.3 PG (ref 26.8–34.3)
MCHC RBC AUTO-ENTMCNC: 34 G/DL (ref 31.4–37.4)
MCV RBC AUTO: 89 FL (ref 82–98)
METHADONE UR QL: NEGATIVE
MONOCYTES # BLD AUTO: 0.42 THOUSAND/ΜL (ref 0.17–1.22)
MONOCYTES NFR BLD AUTO: 7 % (ref 4–12)
NEUTROPHILS # BLD AUTO: 3.3 THOUSANDS/ΜL (ref 1.85–7.62)
NEUTS SEG NFR BLD AUTO: 55 % (ref 43–75)
NITRITE UR QL STRIP: NEGATIVE
NRBC BLD AUTO-RTO: 0 /100 WBCS
OPIATES UR QL SCN: NEGATIVE
P AXIS: 65 DEGREES
PCP UR QL: NEGATIVE
PH UR STRIP.AUTO: 5.5 [PH] (ref 4.5–8)
PLATELET # BLD AUTO: 178 THOUSANDS/UL (ref 149–390)
PLATELET # BLD AUTO: 191 THOUSANDS/UL (ref 149–390)
PMV BLD AUTO: 9.6 FL (ref 8.9–12.7)
PMV BLD AUTO: 9.7 FL (ref 8.9–12.7)
POTASSIUM SERPL-SCNC: 3.8 MMOL/L (ref 3.5–5.3)
PR INTERVAL: 106 MS
PROT SERPL-MCNC: 7.6 G/DL (ref 6.4–8.2)
PROT UR STRIP-MCNC: NEGATIVE MG/DL
PROTHROMBIN TIME: 13.2 SECONDS (ref 11.8–14.2)
QRS AXIS: 67 DEGREES
QRSD INTERVAL: 78 MS
QT INTERVAL: 422 MS
QTC INTERVAL: 490 MS
RBC # BLD AUTO: 4.72 MILLION/UL (ref 3.81–5.12)
SODIUM SERPL-SCNC: 144 MMOL/L (ref 136–145)
SP GR UR STRIP.AUTO: <=1.005 (ref 1–1.03)
T WAVE AXIS: 32 DEGREES
THC UR QL: NEGATIVE
TROPONIN I SERPL-MCNC: 0.02 NG/ML
TROPONIN I SERPL-MCNC: <0.02 NG/ML
TROPONIN I SERPL-MCNC: <0.02 NG/ML
TSH SERPL DL<=0.05 MIU/L-ACNC: 2.04 UIU/ML (ref 0.36–3.74)
UROBILINOGEN UR QL STRIP.AUTO: 0.2 E.U./DL
VENTRICULAR RATE: 81 BPM
WBC # BLD AUTO: 6.05 THOUSAND/UL (ref 4.31–10.16)

## 2019-01-30 PROCEDURE — 99285 EMERGENCY DEPT VISIT HI MDM: CPT

## 2019-01-30 PROCEDURE — 85025 COMPLETE CBC W/AUTO DIFF WBC: CPT | Performed by: EMERGENCY MEDICINE

## 2019-01-30 PROCEDURE — 85610 PROTHROMBIN TIME: CPT | Performed by: EMERGENCY MEDICINE

## 2019-01-30 PROCEDURE — 93005 ELECTROCARDIOGRAM TRACING: CPT

## 2019-01-30 PROCEDURE — 80076 HEPATIC FUNCTION PANEL: CPT | Performed by: EMERGENCY MEDICINE

## 2019-01-30 PROCEDURE — 85730 THROMBOPLASTIN TIME PARTIAL: CPT | Performed by: EMERGENCY MEDICINE

## 2019-01-30 PROCEDURE — 71045 X-RAY EXAM CHEST 1 VIEW: CPT

## 2019-01-30 PROCEDURE — 96361 HYDRATE IV INFUSION ADD-ON: CPT

## 2019-01-30 PROCEDURE — 84443 ASSAY THYROID STIM HORMONE: CPT | Performed by: EMERGENCY MEDICINE

## 2019-01-30 PROCEDURE — 81003 URINALYSIS AUTO W/O SCOPE: CPT | Performed by: EMERGENCY MEDICINE

## 2019-01-30 PROCEDURE — 80307 DRUG TEST PRSMV CHEM ANLYZR: CPT | Performed by: EMERGENCY MEDICINE

## 2019-01-30 PROCEDURE — 99220 PR INITIAL OBSERVATION CARE/DAY 70 MINUTES: CPT | Performed by: INTERNAL MEDICINE

## 2019-01-30 PROCEDURE — 84484 ASSAY OF TROPONIN QUANT: CPT | Performed by: EMERGENCY MEDICINE

## 2019-01-30 PROCEDURE — 85379 FIBRIN DEGRADATION QUANT: CPT | Performed by: EMERGENCY MEDICINE

## 2019-01-30 PROCEDURE — 93010 ELECTROCARDIOGRAM REPORT: CPT | Performed by: INTERNAL MEDICINE

## 2019-01-30 PROCEDURE — 96374 THER/PROPH/DIAG INJ IV PUSH: CPT

## 2019-01-30 PROCEDURE — 71275 CT ANGIOGRAPHY CHEST: CPT

## 2019-01-30 PROCEDURE — 85049 AUTOMATED PLATELET COUNT: CPT | Performed by: PHYSICIAN ASSISTANT

## 2019-01-30 PROCEDURE — 80048 BASIC METABOLIC PNL TOTAL CA: CPT | Performed by: EMERGENCY MEDICINE

## 2019-01-30 PROCEDURE — 36415 COLL VENOUS BLD VENIPUNCTURE: CPT | Performed by: EMERGENCY MEDICINE

## 2019-01-30 PROCEDURE — 74177 CT ABD & PELVIS W/CONTRAST: CPT

## 2019-01-30 PROCEDURE — 96376 TX/PRO/DX INJ SAME DRUG ADON: CPT

## 2019-01-30 PROCEDURE — 80320 DRUG SCREEN QUANTALCOHOLS: CPT | Performed by: EMERGENCY MEDICINE

## 2019-01-30 RX ORDER — ACETAMINOPHEN 325 MG/1
650 TABLET ORAL ONCE
Status: COMPLETED | OUTPATIENT
Start: 2019-01-30 | End: 2019-01-30

## 2019-01-30 RX ORDER — METOPROLOL TARTRATE 50 MG/1
50 TABLET, FILM COATED ORAL EVERY 12 HOURS SCHEDULED
Status: DISCONTINUED | OUTPATIENT
Start: 2019-01-30 | End: 2019-01-31 | Stop reason: HOSPADM

## 2019-01-30 RX ORDER — MIRTAZAPINE 15 MG/1
30 TABLET, FILM COATED ORAL
Status: DISCONTINUED | OUTPATIENT
Start: 2019-01-30 | End: 2019-01-31 | Stop reason: HOSPADM

## 2019-01-30 RX ORDER — ATORVASTATIN CALCIUM 40 MG/1
40 TABLET, FILM COATED ORAL DAILY
Status: DISCONTINUED | OUTPATIENT
Start: 2019-01-31 | End: 2019-01-31 | Stop reason: HOSPADM

## 2019-01-30 RX ORDER — HYDROMORPHONE HCL/PF 1 MG/ML
0.5 SYRINGE (ML) INJECTION EVERY 6 HOURS PRN
Status: DISCONTINUED | OUTPATIENT
Start: 2019-01-30 | End: 2019-01-31

## 2019-01-30 RX ORDER — NITROGLYCERIN 0.4 MG/1
0.4 TABLET SUBLINGUAL ONCE
Status: DISCONTINUED | OUTPATIENT
Start: 2019-01-30 | End: 2019-01-31 | Stop reason: HOSPADM

## 2019-01-30 RX ORDER — NICOTINE 21 MG/24HR
1 PATCH, TRANSDERMAL 24 HOURS TRANSDERMAL DAILY
Status: DISCONTINUED | OUTPATIENT
Start: 2019-01-31 | End: 2019-01-31 | Stop reason: HOSPADM

## 2019-01-30 RX ORDER — MAGNESIUM HYDROXIDE/ALUMINUM HYDROXICE/SIMETHICONE 120; 1200; 1200 MG/30ML; MG/30ML; MG/30ML
30 SUSPENSION ORAL EVERY 4 HOURS PRN
Status: DISCONTINUED | OUTPATIENT
Start: 2019-01-30 | End: 2019-01-31 | Stop reason: HOSPADM

## 2019-01-30 RX ORDER — CITALOPRAM 20 MG/1
40 TABLET ORAL DAILY
Status: DISCONTINUED | OUTPATIENT
Start: 2019-01-31 | End: 2019-01-31 | Stop reason: HOSPADM

## 2019-01-30 RX ORDER — FAMOTIDINE 20 MG/1
40 TABLET, FILM COATED ORAL DAILY
Status: DISCONTINUED | OUTPATIENT
Start: 2019-01-31 | End: 2019-01-31 | Stop reason: HOSPADM

## 2019-01-30 RX ORDER — ALBUTEROL SULFATE 2.5 MG/3ML
2.5 SOLUTION RESPIRATORY (INHALATION) EVERY 6 HOURS PRN
Status: DISCONTINUED | OUTPATIENT
Start: 2019-01-30 | End: 2019-01-31 | Stop reason: HOSPADM

## 2019-01-30 RX ORDER — OXYCODONE HYDROCHLORIDE 5 MG/1
5 TABLET ORAL EVERY 4 HOURS PRN
Status: DISCONTINUED | OUTPATIENT
Start: 2019-01-30 | End: 2019-01-31 | Stop reason: HOSPADM

## 2019-01-30 RX ORDER — GABAPENTIN 400 MG/1
800 CAPSULE ORAL 3 TIMES DAILY
Status: DISCONTINUED | OUTPATIENT
Start: 2019-01-30 | End: 2019-01-31 | Stop reason: HOSPADM

## 2019-01-30 RX ORDER — SODIUM CHLORIDE 9 MG/ML
125 INJECTION, SOLUTION INTRAVENOUS CONTINUOUS
Status: DISCONTINUED | OUTPATIENT
Start: 2019-01-30 | End: 2019-01-31 | Stop reason: HOSPADM

## 2019-01-30 RX ORDER — ONDANSETRON 2 MG/ML
4 INJECTION INTRAMUSCULAR; INTRAVENOUS EVERY 8 HOURS PRN
Status: DISCONTINUED | OUTPATIENT
Start: 2019-01-30 | End: 2019-01-31 | Stop reason: HOSPADM

## 2019-01-30 RX ORDER — CLONAZEPAM 1 MG/1
1 TABLET ORAL 2 TIMES DAILY PRN
Status: DISCONTINUED | OUTPATIENT
Start: 2019-01-30 | End: 2019-01-31 | Stop reason: HOSPADM

## 2019-01-30 RX ADMIN — GABAPENTIN 800 MG: 400 CAPSULE ORAL at 21:12

## 2019-01-30 RX ADMIN — MORPHINE SULFATE 2 MG: 2 INJECTION, SOLUTION INTRAMUSCULAR; INTRAVENOUS at 18:50

## 2019-01-30 RX ADMIN — ACETAMINOPHEN 650 MG: 325 TABLET, FILM COATED ORAL at 14:09

## 2019-01-30 RX ADMIN — METOPROLOL TARTRATE 50 MG: 50 TABLET, FILM COATED ORAL at 21:12

## 2019-01-30 RX ADMIN — HYDROMORPHONE HYDROCHLORIDE 0.5 MG: 1 INJECTION, SOLUTION INTRAMUSCULAR; INTRAVENOUS; SUBCUTANEOUS at 20:07

## 2019-01-30 RX ADMIN — OXYCODONE HYDROCHLORIDE 5 MG: 5 TABLET ORAL at 21:54

## 2019-01-30 RX ADMIN — MORPHINE SULFATE 2 MG: 2 INJECTION, SOLUTION INTRAMUSCULAR; INTRAVENOUS at 16:53

## 2019-01-30 RX ADMIN — SODIUM CHLORIDE 1000 ML: 0.9 INJECTION, SOLUTION INTRAVENOUS at 14:10

## 2019-01-30 RX ADMIN — CLONAZEPAM 1 MG: 1 TABLET ORAL at 21:17

## 2019-01-30 RX ADMIN — MIRTAZAPINE 30 MG: 15 TABLET, FILM COATED ORAL at 21:12

## 2019-01-30 RX ADMIN — SODIUM CHLORIDE 125 ML/HR: 0.9 INJECTION, SOLUTION INTRAVENOUS at 19:56

## 2019-01-30 RX ADMIN — IOHEXOL 100 ML: 350 INJECTION, SOLUTION INTRAVENOUS at 17:28

## 2019-01-30 NOTE — ED NOTES
Unable to obtain IV access on patient after CT stated that it was infiltrated  Pt has been stuck multiple times since with site rite and ultrasound, IV access was unable to be obtained  Kassandra Pinto made aware       Ana Rosa Campbell RN  01/30/19 0161

## 2019-01-30 NOTE — ED PROVIDER NOTES
History  Chief Complaint   Patient presents with    Chest Pain     Pt brought in by EMS from [de-identified] [facility] for chest pain      HPI  25-year-old white female complaining of chest pain radiating to her back and down her left arm  Patient states this started about an hour ago  Patient has a history of hypertension hyperlipidemia, coronary artery disease, bipolar disorder, anxiety, and alcohol abuse  I explained to patient that we would give her some nitro and Tylenol at this time and patient states Tylenol does nothing for her nitro gives her headache  I explained that if her chest pain was so bad and if cardiac in nature the nitro should help  Prior to Admission Medications   Prescriptions Last Dose Informant Patient Reported? Taking?    albuterol (2 5 mg/3 mL) 0 083 % nebulizer solution   No No   Sig: Take 1 vial (2 5 mg total) by nebulization every 6 (six) hours as needed for wheezing or shortness of breath   atorvastatin (LIPITOR) 40 mg tablet  Self No No   Sig: Take 1 tablet (40 mg total) by mouth daily for 90 days   citalopram (CELEXA) 40 mg tablet  Self Yes No   Sig: Take 40 mg by mouth daily     clonazePAM (KlonoPIN) 1 mg tablet  Self Yes No   Si mg 2 (two) times a day as needed for anxiety     famotidine (PEPCID) 40 MG tablet   No No   Sig: Take 1 tablet (40 mg total) by mouth daily   gabapentin (NEURONTIN) 800 mg tablet  Self Yes No   Sig: Take 800 mg by mouth 3 (three) times a day   metoprolol tartrate (LOPRESSOR) 50 mg tablet  Self No No   Sig: Take 1 tablet (50 mg total) by mouth every 12 (twelve) hours   mirtazapine (REMERON) 30 mg tablet  Self Yes No   Sig: Take 30 mg by mouth daily at bedtime        Facility-Administered Medications: None       Past Medical History:   Diagnosis Date    Alcohol abuse 2018    Anxiety     Bipolar 1 disorder ([facility] )     Mental Health problems listed as Denied in Allscripts, cant't entire under pertinent negatives due to this diagnosis    CAD (coronary artery disease)     last assessed - 14Apr2017    Chronic back pain     Frequent headaches     Glaucoma     Hyperlipidemia     Hypertension     Ovarian cancer ([facility] )     last assessed - 21Sep2016    Psychiatric disorder     bipolar    Rheumatoid arthritis ([facility] )     Uterine carcinoma ([facility] )     last assessed - 21Sep2016       Past Surgical History:   Procedure Laterality Date    ADENOIDECTOMY      Tonsillectomy with Adenoidectomy - last assessed - 21Sep2016    APPENDECTOMY      last assessed - 21Sep2016    CATARACT EXTRACTION Left     Remove cataract, corneo-scleral section of left eye; last assessed - 21Sep2016    CHOLECYSTECTOMY      last assessed - 29Sep2016   [facility]el[facility] Leisure EYE SURGERY      Anterior sclera fistulization for glaucoma; last assessed - 21Sep2016    HYSTERECTOMY      RKYSTA-BSO; last assessed - 21Sep2016    INTRAOCULAR LENS INSERTION      GA TEMPORAL ARTERY LIGATN OR BX Right 12/16/2016    Procedure: BIOPSY ARTERY TEMPORAL;  Surgeon: Cele Olson MD;  Location: [facility];  Service: General    TONSILLECTOMY      Tonsillectomy with Adenoidectomy - last assessed - 21Sep2016       Family History   Problem Relation Age of Onset    Heart disease Mother     Coronary artery disease Mother     Other Mother         1) Gangrene; 2) Peripheral arterial disease    Hyperlipidemia Mother         Hypercholesterolemia    Stomach cancer Mother     Heart attack Mother         Myocardial Infarction    Other Father         1) Gangrene; 2) Peripheral arterial disease    Hyperlipidemia Father         Hypercholesterolemia    Stomach cancer Father     Heart attack Father         Myocardial Infarction    Stomach cancer Brother     Heart attack Brother         Myocardial Infarction    Stomach cancer Maternal Uncle      I have reviewed and agree with the history as documented      Social History   Substance Use Topics    Smoking status: Current Every Day Smoker     Packs/day: 0 20     Years: 46 00     Types: Cigarettes    Smokeless tobacco: Never Used    Alcohol use Yes      Comment: occasional; (No alcohol use per Allscripts)        Review of Systems   Constitutional: Negative for diaphoresis, fatigue and fever  HENT: Negative for congestion, ear pain, nosebleeds and sore throat  Eyes: Negative for photophobia, pain, discharge and visual disturbance  Respiratory: Positive for chest tightness  Negative for cough, choking, shortness of breath and wheezing  Cardiovascular: Positive for chest pain  Negative for palpitations  Positive pain down her left arm   Gastrointestinal: Negative for abdominal distention, abdominal pain, diarrhea and vomiting  Genitourinary: Negative for dysuria, flank pain and frequency  Musculoskeletal: Positive for back pain  Negative for gait problem and joint swelling  Skin: Negative for color change and rash  Neurological: Negative for dizziness, syncope and headaches  Psychiatric/Behavioral: Negative for behavioral problems and confusion  The patient is not nervous/anxious  All other systems reviewed and are negative  Physical Exam  Physical Exam   Constitutional: She is oriented to person, place, and time  78-year-old white female lying on the stretcher with her eyes closed and holding her chest complaining of pain in her chest or back and down her left arm  Patient has alcohol on her breath  HENT:   Head: Normocephalic and atraumatic  Mouth/Throat: Oropharynx is clear and moist    Eyes: Pupils are equal, round, and reactive to light  EOM are normal    Neck: Normal range of motion  Neck supple  Cardiovascular: Normal rate, regular rhythm and normal heart sounds  Pulmonary/Chest: Effort normal  No respiratory distress  She has no wheezes  She has no rales  patient has coarse breath sounds bilaterally   Abdominal: Soft  Bowel sounds are normal  There is no tenderness  There is no rebound and no guarding     Musculoskeletal: Normal range of motion  Neurological: She is alert and oriented to person, place, and time  No cranial nerve deficit  She exhibits normal muscle tone  Coordination normal    Skin: Skin is warm and dry  There is pallor  Psychiatric:   Tearful and anxious-requesting morphine multiple times   Nursing note and vitals reviewed        Vital Signs  ED Triage Vitals [01/30/19 1329]   Temperature Pulse Respirations Blood Pressure SpO2   98 °F (36 7 °C) 80 18 108/75 100 %      Temp Source Heart Rate Source Patient Position - Orthostatic VS BP Location FiO2 (%)   Oral Monitor Lying Right arm --      Pain Score       Worst Possible Pain           Vitals:    01/30/19 1329 01/30/19 1552   BP: 108/75 158/83   Pulse: 80 71   Patient Position - Orthostatic VS: Lying Lying       Visual Acuity      ED Medications  Medications   nitroglycerin (NITROSTAT) SL tablet 0 4 mg (not administered)   famotidine (PEPCID) injection 20 mg (20 mg Intravenous Not Given 1/30/19 1833)   sodium chloride 0 9 % bolus 1,000 mL (0 mL Intravenous Stopped 1/30/19 1801)   acetaminophen (TYLENOL) tablet 650 mg (650 mg Oral Given 1/30/19 1409)   morphine injection 2 mg (2 mg Intravenous Given 1/30/19 1653)   iohexol (OMNIPAQUE) 350 MG/ML injection (MULTI-DOSE) 100 mL (100 mL Intravenous Given 1/30/19 1728)   morphine injection 2 mg (2 mg Intravenous Given 1/30/19 1850)       Diagnostic Studies  Results Reviewed     Procedure Component Value Units Date/Time    Troponin I [[id number]]  (Normal) Collected:  01/30/19 1812    Lab Status:  Final result Specimen:  Blood from Arm, Right Updated:  01/30/19 1835     Troponin I <0 02 ng/mL     Troponin I [[id number]]     Lab Status:  No result Specimen:  Blood     Ethanol [[id number]]  (Abnormal) Collected:  01/30/19 1408    Lab Status:  Final result Specimen:  Blood from Arm, Left Updated:  01/30/19 1531     Ethanol Lvl 78 (H) mg/dL     D-Dimer [[id number]]  (Abnormal) Collected:  01/30/19 1408    Lab Status:  Final result Specimen: Blood from Arm, Left Updated:  01/30/19 1446     D-Dimer, Quant >10,000 (H) ng/ml (FEU)     Basic metabolic panel [[id number]] Collected:  01/30/19 1408    Lab Status:  Final result Specimen:  Blood from Arm, Left Updated:  01/30/19 1446     Sodium 144 mmol/L      Potassium 3 8 mmol/L      Chloride 107 mmol/L      CO2 26 mmol/L      ANION GAP 11 mmol/L      BUN 6 mg/dL      Creatinine 0 65 mg/dL      Glucose 86 mg/dL      Calcium 9 1 mg/dL      eGFR 97 ml/min/1 73sq m     Narrative:         National Kidney Disease Education Program recommendations are as follows:  GFR calculation is accurate only with a steady state creatinine  Chronic Kidney disease less than 60 ml/min/1 73 sq  meters  Kidney failure less than 15 ml/min/1 73 sq  meters  Hepatic function panel [[id number]]  (Abnormal) Collected:  01/30/19 1408    Lab Status:  Final result Specimen:  Blood from Arm, Left Updated:  01/30/19 1446     Total Bilirubin 0 30 mg/dL      Bilirubin, Direct 0 05 mg/dL      Alkaline Phosphatase 179 (H) U/L      AST 35 U/L      ALT 41 U/L      Total Protein 7 6 g/dL      Albumin 3 6 g/dL     TSH [[id number]]  (Normal) Collected:  01/30/19 1408    Lab Status:  Final result Specimen:  Blood from Arm, Left Updated:  01/30/19 1446     TSH 3RD GENERATON 2 042 uIU/mL     Narrative:         Patients undergoing fluorescein dye angiography may retain small amounts of fluorescein in the body for 48-72 hours post procedure  Samples containing fluorescein can produce falsely depressed TSH values  If the patient had this procedure,a specimen should be resubmitted post fluorescein clearance            The recommended reference ranges for TSH during pregnancy are as follows:  First trimester 0 1 to 2 5 uIU/mL  Second trimester  0 2 to 3 0 uIU/mL  Third trimester 0 3 to 3 0 uIU/m      Troponin I [[id number]]  (Normal) Collected:  01/30/19 1408    Lab Status:  Final result Specimen:  Blood from Arm, Left Updated:  01/30/19 1436     Troponin I <0 02 ng/mL     Rapid drug screen, urine [[id number]]  (Normal) Collected:  01/30/19 1420    Lab Status:  Final result Specimen:  Urine from Urine, Clean Catch Updated:  01/30/19 1436     Amph/Meth UR Negative     Barbiturate Ur Negative     Benzodiazepine Urine Negative     Cocaine Urine Negative     Methadone Urine Negative     Opiate Urine Negative     PCP Ur Negative     THC Urine Negative    Narrative:         FOR MEDICAL PURPOSES ONLY  IF CONFIRMATION NEEDED PLEASE CONTACT THE LAB WITHIN 5 DAYS      Drug Screen Cutoff Levels:  AMPHETAMINE/METHAMPHETAMINES  1000 ng/mL  BARBITURATES     200 ng/mL  BENZODIAZEPINES     200 ng/mL  COCAINE      300 ng/mL  METHADONE      300 ng/mL  OPIATES      300 ng/mL  PHENCYCLIDINE     25 ng/mL  THC       50 ng/mL    UA w Reflex to Microscopic w Reflex to Culture [[id number]] Collected:  01/30/19 1420    Lab Status:  Final result Specimen:  Urine from Urine, Clean Catch Updated:  01/30/19 1429     Color, UA Light Yellow     Clarity, UA Clear     Specific Gravity, UA <=1 005     pH, UA 5 5     Leukocytes, UA Negative     Nitrite, UA Negative     Protein, UA Negative mg/dl      Glucose, UA Negative mg/dl      Ketones, UA Negative mg/dl      Urobilinogen, UA 0 2 E U /dl      Bilirubin, UA Negative     Blood, UA Negative    Protime-INR [[id number]]  (Normal) Collected:  01/30/19 1408    Lab Status:  Final result Specimen:  Blood from Arm, Left Updated:  01/30/19 1424     Protime 13 2 seconds      INR 1 01    APTT [[id number]]  (Normal) Collected:  01/30/19 1408    Lab Status:  Final result Specimen:  Blood from Arm, Left Updated:  01/30/19 1424     PTT 26 seconds     CBC and differential [[id number]] Collected:  01/30/19 1408    Lab Status:  Final result Specimen:  Blood from Arm, Left Updated:  01/30/19 1416     WBC 6 05 Thousand/uL      RBC 4 72 Million/uL      Hemoglobin 14 3 g/dL      Hematocrit 42 1 %      MCV 89 fL      MCH 30 3 pg      MCHC 34 0 g/dL      RDW 13 4 %      MPV 9 6 fL      Platelets 767 Thousands/uL      nRBC 0 /100 WBCs      Neutrophils Relative 55 %      Immat GRANS % 0 %      Lymphocytes Relative 32 %      Monocytes Relative 7 %      Eosinophils Relative 5 %      Basophils Relative 1 %      Neutrophils Absolute 3 30 Thousands/µL      Immature Grans Absolute 0 02 Thousand/uL      Lymphocytes Absolute 1 93 Thousands/µL      Monocytes Absolute 0 42 Thousand/µL      Eosinophils Absolute 0 32 Thousand/µL      Basophils Absolute 0 06 Thousands/µL                  PE Study with CT Abdomen and Pelvis with contrast   Final Result by John Jaime MD (01/30 1817)         1  No evidence of pulmonary embolism  2   No acute inflammatory process of the chest, abdomen or pelvis  3   Emphysema  4   Distal esophageal wall thickening, correlate for esophagitis and gastroesophageal reflux disease  5   Stable small left adrenal adenoma  Workstation performed: [id number]         XR chest 1 view portable   Final Result by Homar De Leon MD (01/30 7738)      No acute cardiopulmonary disease  Workstation performed: [id number]                    Procedures  ECG 12 Lead Documentation  Date/Time: 1/30/2019 2:23 PM  Performed by: Sunni Kern by: Sina Cr     ECG reviewed by me, the ED Provider: yes    Patient location:  ED  Previous ECG:     Previous ECG:  Compared to current    Similarity:  No change  Interpretation:     Interpretation: abnormal    Rate:     ECG rate assessment: normal    Rhythm:     Rhythm: sinus rhythm    ST segments:     ST segments:  Normal  T waves:     T waves: flattening             Phone Contacts  ED Phone Contact    ED Course      RN Nadege Montaño states that patient refused her nitro in Tylenol and requested morphine    When reviewing the chart I admitted patient on 12/31/2018 for chest pain and at that time on the floor she signed herself out because the morphine every 6 hours was not enough for her          echo results from 07/07/2018 revealed:           LEFT VENTRICLE:  Systolic function was normal  Ejection fraction was estimated in the range of 55 % to 65 %  There were no regional wall motion abnormalities        RN states that patient is also refusing the IV Pepcid -     Patient's D-dimer is greater than 10,000 therefore I will get a CT of her chest to rule out a PE  I will also give patient 2 mg of morphine at this time  patient's CT was negative for pulmonary embolus  It did show some esophagitis and GERD  Patient was still continuing to complain of chest pain  I explained to patient that her 2nd troponin was also negative  Patient requesting more pain medication  I will admit her to the hospitalist service  CT results reveal:    Impression         1   No evidence of pulmonary embolism  2   No acute inflammatory process of the chest, abdomen or pelvis  3   Emphysema  4   Distal esophageal wall thickening, correlate for esophagitis and gastroesophageal reflux disease  5   Stable small left adrenal adenoma  Spoke with Paulina Joaquin -Dr Joaquín Garcia will admit            [facility]     Differential diagnosis includes:  1  Chest pain   2  Back pain  3  Left arm pain  4  Anxiety  5  Rule out acute MI  6  Rule out cardiac disease  7  Rule out acute dissection  8   Rule out pulmonary etiology       Disposition  Final diagnoses:   Chest pain   Back pain   Left arm pain   Anxiety     Time reflects when diagnosis was documented in both MDM as applicable and the Disposition within this note     Time User Action Codes Description Comment    1/30/2019  7:07 PM Glee Lessen Add [R07 9] Chest pain     1/30/2019  7:07 PM Glee Lessen Add [M54 9] Back pain     1/30/2019  7:07 PM Glee Lessen Add [L71 455] Left arm pain     1/30/2019  7:07 PM Glee Lessen Add [F41 9] Anxiety       ED Disposition     ED Disposition Condition Date/Time Comment    Admit  Wed Jan 30, 2019  7:08 PM Case was discussed with Dr Joaquín Garcia and the patient's admission status was agreed to be Admission Status: observation status to the service of Dr Jack Duff   Follow-up Information    None         Patient's Medications   Discharge Prescriptions    No medications on file     No discharge procedures on file      ED Provider  Electronically Signed by           Robert Bess,   01/30/19 [address] [place] [address],   01/30/19 8317

## 2019-01-31 ENCOUNTER — TRANSITIONAL CARE MANAGEMENT (OUTPATIENT)
Dept: INTERNAL MEDICINE CLINIC | Facility: CLINIC | Age: 59
End: 2019-01-31

## 2019-01-31 VITALS
TEMPERATURE: 98.4 F | OXYGEN SATURATION: 93 % | DIASTOLIC BLOOD PRESSURE: 58 MMHG | HEART RATE: 67 BPM | HEIGHT: 63 IN | SYSTOLIC BLOOD PRESSURE: 109 MMHG | WEIGHT: 156.53 LBS | RESPIRATION RATE: 18 BRPM | BODY MASS INDEX: 27.73 KG/M2

## 2019-01-31 PROBLEM — K20.90 ESOPHAGITIS: Status: RESOLVED | Noted: 2019-01-30 | Resolved: 2019-01-31

## 2019-01-31 PROBLEM — R07.9 CHEST PAIN: Status: RESOLVED | Noted: 2017-03-05 | Resolved: 2019-01-31

## 2019-01-31 LAB
ALBUMIN SERPL BCP-MCNC: 2.7 G/DL (ref 3.5–5)
ALP SERPL-CCNC: 145 U/L (ref 46–116)
ALT SERPL W P-5'-P-CCNC: 31 U/L (ref 12–78)
ANION GAP SERPL CALCULATED.3IONS-SCNC: 10 MMOL/L (ref 4–13)
AST SERPL W P-5'-P-CCNC: 27 U/L (ref 5–45)
BILIRUB SERPL-MCNC: 0.2 MG/DL (ref 0.2–1)
BUN SERPL-MCNC: 7 MG/DL (ref 5–25)
CALCIUM SERPL-MCNC: 7.9 MG/DL (ref 8.3–10.1)
CHLORIDE SERPL-SCNC: 111 MMOL/L (ref 100–108)
CHOLEST SERPL-MCNC: 136 MG/DL (ref 50–200)
CO2 SERPL-SCNC: 22 MMOL/L (ref 21–32)
CREAT SERPL-MCNC: 0.82 MG/DL (ref 0.6–1.3)
ERYTHROCYTE [DISTWIDTH] IN BLOOD BY AUTOMATED COUNT: 13.8 % (ref 11.6–15.1)
GFR SERPL CREATININE-BSD FRML MDRD: 78 ML/MIN/1.73SQ M
GLUCOSE SERPL-MCNC: 84 MG/DL (ref 65–140)
HCT VFR BLD AUTO: 40.9 % (ref 34.8–46.1)
HDLC SERPL-MCNC: 40 MG/DL (ref 40–60)
HGB BLD-MCNC: 13.5 G/DL (ref 11.5–15.4)
LDLC SERPL CALC-MCNC: 78 MG/DL (ref 0–100)
MAGNESIUM SERPL-MCNC: 1.6 MG/DL (ref 1.6–2.6)
MCH RBC QN AUTO: 30.3 PG (ref 26.8–34.3)
MCHC RBC AUTO-ENTMCNC: 33 G/DL (ref 31.4–37.4)
MCV RBC AUTO: 92 FL (ref 82–98)
NONHDLC SERPL-MCNC: 96 MG/DL
PHOSPHATE SERPL-MCNC: 5.2 MG/DL (ref 2.3–4.1)
PLATELET # BLD AUTO: 168 THOUSANDS/UL (ref 149–390)
PMV BLD AUTO: 9.8 FL (ref 8.9–12.7)
POTASSIUM SERPL-SCNC: 4 MMOL/L (ref 3.5–5.3)
PROT SERPL-MCNC: 6 G/DL (ref 6.4–8.2)
RBC # BLD AUTO: 4.45 MILLION/UL (ref 3.81–5.12)
SODIUM SERPL-SCNC: 143 MMOL/L (ref 136–145)
TRIGL SERPL-MCNC: 88 MG/DL
WBC # BLD AUTO: 4.87 THOUSAND/UL (ref 4.31–10.16)

## 2019-01-31 PROCEDURE — 83735 ASSAY OF MAGNESIUM: CPT | Performed by: PHYSICIAN ASSISTANT

## 2019-01-31 PROCEDURE — 99244 OFF/OP CNSLTJ NEW/EST MOD 40: CPT | Performed by: INTERNAL MEDICINE

## 2019-01-31 PROCEDURE — 99217 PR OBSERVATION CARE DISCHARGE MANAGEMENT: CPT | Performed by: PHYSICIAN ASSISTANT

## 2019-01-31 PROCEDURE — 80053 COMPREHEN METABOLIC PANEL: CPT | Performed by: PHYSICIAN ASSISTANT

## 2019-01-31 PROCEDURE — 85027 COMPLETE CBC AUTOMATED: CPT | Performed by: PHYSICIAN ASSISTANT

## 2019-01-31 PROCEDURE — 94762 N-INVAS EAR/PLS OXIMTRY CONT: CPT

## 2019-01-31 PROCEDURE — 84100 ASSAY OF PHOSPHORUS: CPT | Performed by: PHYSICIAN ASSISTANT

## 2019-01-31 PROCEDURE — 80061 LIPID PANEL: CPT | Performed by: PHYSICIAN ASSISTANT

## 2019-01-31 RX ORDER — THIAMINE MONONITRATE (VIT B1) 100 MG
100 TABLET ORAL DAILY
Status: DISCONTINUED | OUTPATIENT
Start: 2019-01-31 | End: 2019-01-31 | Stop reason: HOSPADM

## 2019-01-31 RX ORDER — TRAMADOL HYDROCHLORIDE 50 MG/1
50 TABLET ORAL EVERY 6 HOURS PRN
Qty: 12 TABLET | Refills: 0 | Status: SHIPPED | OUTPATIENT
Start: 2019-01-31 | End: 2019-02-03

## 2019-01-31 RX ORDER — LANOLIN ALCOHOL/MO/W.PET/CERES
100 CREAM (GRAM) TOPICAL DAILY
Qty: 30 TABLET | Refills: 0 | Status: SHIPPED | OUTPATIENT
Start: 2019-02-01 | End: 2019-06-11 | Stop reason: CLARIF

## 2019-01-31 RX ORDER — NICOTINE 21 MG/24HR
1 PATCH, TRANSDERMAL 24 HOURS TRANSDERMAL DAILY
Qty: 28 PATCH | Refills: 0 | Status: SHIPPED | OUTPATIENT
Start: 2019-02-01 | End: 2019-06-11 | Stop reason: CLARIF

## 2019-01-31 RX ORDER — FOLIC ACID 1 MG/1
1 TABLET ORAL DAILY
Status: DISCONTINUED | OUTPATIENT
Start: 2019-01-31 | End: 2019-01-31 | Stop reason: HOSPADM

## 2019-01-31 RX ORDER — ACETAMINOPHEN 325 MG/1
650 TABLET ORAL EVERY 6 HOURS PRN
Status: DISCONTINUED | OUTPATIENT
Start: 2019-01-31 | End: 2019-01-31 | Stop reason: HOSPADM

## 2019-01-31 RX ORDER — ISOSORBIDE MONONITRATE 30 MG/1
30 TABLET, EXTENDED RELEASE ORAL DAILY
Status: DISCONTINUED | OUTPATIENT
Start: 2019-01-31 | End: 2019-01-31

## 2019-01-31 RX ORDER — FOLIC ACID 1 MG/1
1 TABLET ORAL DAILY
Qty: 30 TABLET | Refills: 0 | Status: SHIPPED | OUTPATIENT
Start: 2019-02-01 | End: 2019-06-11 | Stop reason: CLARIF

## 2019-01-31 RX ADMIN — THIAMINE HCL TAB 100 MG 100 MG: 100 TAB at 11:04

## 2019-01-31 RX ADMIN — OXYCODONE HYDROCHLORIDE 5 MG: 5 TABLET ORAL at 11:04

## 2019-01-31 RX ADMIN — Medication 1 TABLET: at 11:04

## 2019-01-31 RX ADMIN — ATORVASTATIN CALCIUM 40 MG: 40 TABLET, FILM COATED ORAL at 09:21

## 2019-01-31 RX ADMIN — HYDROMORPHONE HYDROCHLORIDE 0.5 MG: 1 INJECTION, SOLUTION INTRAMUSCULAR; INTRAVENOUS; SUBCUTANEOUS at 02:43

## 2019-01-31 RX ADMIN — FAMOTIDINE 40 MG: 20 TABLET ORAL at 09:21

## 2019-01-31 RX ADMIN — FOLIC ACID 1 MG: 1 TABLET ORAL at 11:04

## 2019-01-31 RX ADMIN — METOPROLOL TARTRATE 50 MG: 50 TABLET, FILM COATED ORAL at 09:21

## 2019-01-31 RX ADMIN — GABAPENTIN 800 MG: 400 CAPSULE ORAL at 09:21

## 2019-01-31 RX ADMIN — SODIUM CHLORIDE 125 ML/HR: 0.9 INJECTION, SOLUTION INTRAVENOUS at 02:50

## 2019-01-31 RX ADMIN — ENOXAPARIN SODIUM 40 MG: 40 INJECTION SUBCUTANEOUS at 09:21

## 2019-01-31 RX ADMIN — CITALOPRAM HYDROBROMIDE 40 MG: 20 TABLET ORAL at 09:21

## 2019-01-31 RX ADMIN — OXYCODONE HYDROCHLORIDE 5 MG: 5 TABLET ORAL at 05:59

## 2019-01-31 RX ADMIN — NICOTINE 1 PATCH: 14 PATCH TRANSDERMAL at 09:20

## 2019-01-31 RX ADMIN — CLONAZEPAM 1 MG: 1 TABLET ORAL at 11:04

## 2019-01-31 NOTE — RESPIRATORY THERAPY NOTE
RT Protocol Note  William Yao 59 y o  female MRN: [id number]  Unit/Bed#: -01 Encounter: [id number]    Assessment    Principal Problem:    Chest pain  Active Problems:    Anxiety and depression    Tobacco abuse disorder    COPD (chronic obstructive pulmonary disease) ([facility] )    Essential hypertension    Esophagitis      Home Pulmonary Medications:  Albuterol       Past Medical History:   Diagnosis Date    Alcohol abuse 12/31/2018    Anxiety     Bipolar 1 disorder ([place] [address] )     Mental Health problems listed as Denied in Allscripts, cant't entire under pertinent negatives due to this diagnosis    CAD (coronary artery disease)     last assessed - [id number]    Chronic back pain     Frequent headaches     Glaucoma     Hyperlipidemia     Hypertension     Ovarian cancer ([place] [address] )     last assessed - [id number]    Psychiatric disorder     bipolar    Rheumatoid arthritis ([place] [address] )     Uterine carcinoma ([place] [address] )     last assessed - [id number]     Social History     Social History    Marital status: /Civil Union     Spouse name: N/A    Number of children: N/A    Years of education: N/A     Occupational History    Disabled      Social History Main Topics    Smoking status: Current Every Day Smoker     Packs/day: 0 20     Years: 46 00     Types: Cigarettes    Smokeless tobacco: Never Used    Alcohol use Yes      Comment: occasional; (No alcohol use per Allscripts)    Drug use: No    Sexual activity: Not Asked     Other Topics Concern    None     Social History Narrative    None       Subjective  Pt offers no respiratory complaints at this time  Objective    Physical Exam:   Assessment Type: Assess only  General Appearance: Alert, Awake  Respiratory Pattern: Normal  Chest Assessment: Chest expansion symmetrical  Bilateral Breath Sounds: Clear  O2 Device: RA    Vitals:  Blood pressure 151/72, pulse 80, temperature 97 5 °F (36 4 °C), temperature source Oral, resp   rate 18, height 5' 3" (1 6 m), weight 71 kg (156 lb 8 4 oz), SpO2 96 %  Imaging and other studies: I have personally reviewed pertinent reports        O2 Device: RA     Plan    Respiratory Plan: Home Bronchodilator Patient pathway   Albuterol Q6PRN

## 2019-01-31 NOTE — PLAN OF CARE
Problem: Potential for Falls  Goal: Patient will remain free of falls  INTERVENTIONS:  - Assess patient frequently for physical needs  -  Identify cognitive and physical deficits and behaviors that affect risk of falls    -  [place] fall precautions as indicated by assessment   - Educate patient/family on patient safety including physical limitations  - Instruct patient to call for assistance with activity based on assessment  - Modify environment to reduce risk of injury  - Consider OT/PT consult to assist with strengthening/mobility   Outcome: Progressing      Problem: PAIN - ADULT  Goal: Verbalizes/displays adequate comfort level or baseline comfort level  Interventions:  - Encourage patient to monitor pain and request assistance  - Assess pain using appropriate pain scale  - Administer analgesics based on type and severity of pain and evaluate response  - Implement non-pharmacological measures as appropriate and evaluate response  - Consider cultural and social influences on pain and pain management  - Notify physician/advanced practitioner if interventions unsuccessful or patient reports new pain   Outcome: Progressing      Problem: INFECTION - ADULT  Goal: Absence or prevention of progression during hospitalization  INTERVENTIONS:  - Assess and monitor for signs and symptoms of infection  - Monitor lab/diagnostic results  - Monitor all insertion sites, i e  indwelling lines, tubes, and drains  - [place] appropriate cooling/warming therapies per order  - Administer medications as ordered  - Instruct and encourage patient and family to use good hand hygiene technique  - Identify and instruct in appropriate isolation precautions for identified infection/condition   Outcome: Progressing      Problem: SAFETY ADULT  Goal: Maintain or return to baseline ADL function  INTERVENTIONS:  -  Assess patient's ability to carry out ADLs; assess patient's baseline for ADL function and identify physical deficits which impact ability to perform ADLs (bathing, care of mouth/teeth, toileting, grooming, dressing, etc )  - Assess/evaluate cause of self-care deficits   - Assess range of motion  - Assess patient's mobility; develop plan if impaired  - Assess patient's need for assistive devices and provide as appropriate  - Encourage maximum independence but intervene and supervise when necessary  ¯ Involve family in performance of ADLs  ¯ Assess for home care needs following discharge   ¯ Request OT consult to assist with ADL evaluation and planning for discharge  ¯ Provide patient education as appropriate   Outcome: Progressing    Goal: Maintain or return mobility status to optimal level  INTERVENTIONS:  - Assess patient's baseline mobility status (ambulation, transfers, stairs, etc )    - Identify cognitive and physical deficits and behaviors that affect mobility  - Identify mobility aids required to assist with transfers and/or ambulation (gait belt, sit-to-stand, lift, walker, cane, etc )  - [place] fall precautions as indicated by assessment  - Record patient progress and toleration of activity level on Mobility SBAR; progress patient to next Phase/Stage  - Instruct patient to call for assistance with activity based on assessment  - Request Rehabilitation consult to assist with strengthening/weightbearing, etc    Outcome: Progressing      Problem: DISCHARGE PLANNING  Goal: Discharge to home or other facility with appropriate resources  INTERVENTIONS:  - Identify barriers to discharge w/patient and caregiver  - Arrange for needed discharge resources and transportation as appropriate  - Identify discharge learning needs (meds, wound care, etc )  - Refer to Case Management Department for coordinating discharge planning if the patient needs post-hospital services based on physician/advanced practitioner order or complex needs related to functional status, cognitive ability, or social support system   Outcome: Progressing      Problem: Knowledge Deficit  Goal: Patient/family/caregiver demonstrates understanding of disease process, treatment plan, medications, and discharge instructions  Complete learning assessment and assess knowledge base  Interventions:  - Provide teaching at level of understanding  - Provide teaching via preferred learning methods   Outcome: Progressing      Problem: CARDIOVASCULAR - ADULT  Goal: Maintains optimal cardiac output and hemodynamic stability  INTERVENTIONS:  - Monitor I/O, vital signs and rhythm  - Monitor for S/S and trends of decreased cardiac output i e  bleeding, hypotension  - Administer and titrate ordered vasoactive medications to optimize hemodynamic stability  - Assess quality of pulses, skin color and temperature  - Assess for signs of decreased coronary artery perfusion - ex   Angina  - Instruct patient to report change in severity of symptoms   Outcome: Progressing    Goal: Absence of cardiac dysrhythmias or at baseline rhythm  INTERVENTIONS:  - Continuous cardiac monitoring, monitor vital signs, obtain 12 lead EKG if indicated  - Administer antiarrhythmic and heart rate control medications as ordered  - Monitor electrolytes and administer replacement therapy as ordered   Outcome: Progressing      Problem: DISCHARGE PLANNING - CARE MANAGEMENT  Goal: Discharge to post-acute care or home with appropriate resources  INTERVENTIONS:  - Conduct assessment to determine patient/family and health care team treatment goals, and need for post-acute services based on payer coverage, community resources, and patient preferences, and barriers to discharge  - Address psychosocial, clinical, and financial barriers to discharge as identified in assessment in conjunction with the patient/family and health care team  - Arrange appropriate level of post-acute services according to patient's   needs and preference and payer coverage in collaboration with the physician and health care team  - Communicate with and update the patient/family, physician, and health care team regarding progress on the discharge plan  - Arrange appropriate transportation to post-acute venues   Outcome: Progressing

## 2019-01-31 NOTE — H&P
H&P - Itzel Beyer 1960, 59 y o  female MRN: [id number]    Unit/Bed#: ANMOL Encounter: [id number]    Primary Care Provider: Mickey Quinones MD   Date and time admitted to hospital: 1/30/2019  1:25 PM        Esophagitis   Assessment & Plan    · This is likely causing chest pain  · Continue with oral PPI  · Order Zofran p r n  For nausea  · Consider GI consult of resolution of symptoms do not occur after Pepcid and Maalox     * Chest pain   Assessment & Plan    · 25-year-old female with history of hypertension, hyperlipidemia, GERD, COPD presents today with severe chest pain  · CT scan negative for PE or aortic dissection, it does show esophagitis, small left adrenal adenoma  · Trend troponins, 1st 2 were negative  · EKG does not show any acute ischemia, it is normal sinus rhythm  · Refused Nitrostat "it gives me a headache" refused Tylenol, refused ASA, states morphine mildly helped with the pain  · Cardiology c/s placed,  evaluation it appears that patient had a normal stress echo test June 2018, ef 59%, patient has had CP in the past and was told that she may need a cath   · Pain likely cardiac vs  Esophagitis, will continue to trend troponins and monitor  · Medication ordered for pain control       Essential hypertension   Assessment & Plan    · BP slightly elevated  · Continue with blood pressure medications  · Monitor vital signs       COPD (chronic obstructive pulmonary disease) (HCC)   Assessment & Plan    · Stable  · Order nebulization q 4-6 hours p r n         Tobacco abuse disorder   Assessment & Plan    · Ordered Nicoderm patch  · Spoke about smoking cessation     Anxiety and depression   Assessment & Plan    · Patient is very anxious right now, crying, asking for pain meds  · Utox is negative   · Continue with citalopram and Remeron         VTE Prophylaxis: Enoxaparin (Lovenox)  / sequential compression device   Code Status:  Full code  POLST: There is no POLST form on file for this patient (pre-hospital)  Discussion with family:  Patient and family    Anticipated Length of Stay:  Patient will be admitted on an Observation basis with an anticipated length of stay of  less than 2 midnights  Justification for Hospital Stay:     Total Time for Visit, including Counseling / Coordination of Care: 30 minutes  Greater than 50% of this total time spent on direct patient counseling and coordination of care  Chief Complaint:   Chest pain    History of Present Illness:    John Menjivar is a 59 y o  female with history of anxiety, hypertension, CAD, anxiety, bipolar disorder presents today with severe right-sided chest pain radiating through her back and her left arm and jaw  Patient states that the chest pain has been present for 2 days worsening in nature  Patient states that pain is beyond a 10/10  She states that the pain is sharp, worse with taking deep breaths, associated with nausea but no vomiting denies any diaphoresis, fever, chills, dysuria ,syncope, hemoptysis or hematochezia  Patient admits to smoking about 5 cigarettes a day, she reports that her last use of alcohol was on new year's  Patient appears anxious, distressed, and in pain and clutching her chest  She does not appear toxic and is afebrile  Patient had a nuclear stress treadmill test done June 2018 which was normal, her ejection fraction was calculated at 59% with no regional wall motion abnormality seen  Her D-dimer was elevated at greater than 10,000 however chest CT did not show any evidence of pulmonary embolism or aortic dissection  There ppears to be esophageal wall thickening consistent with esophagitis and GERD  Review of Systems:    Review of Systems  Constitutional: Negative for diaphoresis, fatigue and fever  HENT: Negative for congestion, ear pain, nosebleeds and sore throat  Eyes: Negative for photophobia, pain, discharge and visual disturbance  Respiratory: Positive for chest tightness   Negative for cough, choking, shortness of breath and wheezing  Cardiovascular: Positive for chest pain  Negative for palpitations  Positive pain down her left arm   Gastrointestinal: Negative for abdominal distention, abdominal pain, diarrhea and vomiting  Genitourinary: Negative for dysuria, flank pain and frequency  Musculoskeletal: Positive for back pain  Negative for gait problem and joint swelling  Skin: Negative for color change and rash  Neurological: Negative for dizziness, syncope and headaches  Psychiatric/Behavioral: Negative for behavioral problems and confusion  The patient is not nervous/anxious      All other systems reviewed and are negative      Past Medical and Surgical History:     Past Medical History:   Diagnosis Date    Alcohol abuse 12/31/2018    Anxiety     Bipolar 1 disorder ([place] [address] )     Mental Health problems listed as Denied in Allscripts, cant't entire under pertinent negatives due to this diagnosis    CAD (coronary artery disease)     last assessed - 14Apr2017    Chronic back pain     Frequent headaches     Glaucoma     Hyperlipidemia     Hypertension     Ovarian cancer ([place] [address] )     last assessed - 21Sep2016    Psychiatric disorder     bipolar    Rheumatoid arthritis ([place] [address] )     Uterine carcinoma ([place] [address] )     last assessed - 21Sep2016       Past Surgical History:   Procedure Laterality Date    ADENOIDECTOMY      Tonsillectomy with Adenoidectomy - last assessed - 21Sep2016    APPENDECTOMY      last assessed - 21Sep2016    CATARACT EXTRACTION Left     Remove cataract, corneo-scleral section of left eye; last assessed - 21Sep2016    CHOLECYSTECTOMY      last assessed - 29Sep2016   [facility] EYE SURGERY      Anterior sclera fistulization for glaucoma; last assessed - [id number]    HYSTERECTOMY      KRYSTA-BSO; last assessed - [id number]    INTRAOCULAR LENS INSERTION      MA TEMPORAL ARTERY LIGATN OR BX Right 12/16/2016    Procedure: BIOPSY ARTERY TEMPORAL;  Surgeon: Christina Balderrama MD; Location: [facility];  Service: General    TONSILLECTOMY      Tonsillectomy with Adenoidectomy - last assessed - [id number]       Meds/Allergies:    Prior to Admission medications    Medication Sig Start Date End Date Taking? Authorizing Provider   albuterol (2 5 mg/3 mL) 0 083 % nebulizer solution Take 1 vial (2 5 mg total) by nebulization every 6 (six) hours as needed for wheezing or shortness of breath 11/6/18  Yes Ayanna Scott PA-C   citalopram (CELEXA) 40 mg tablet Take 40 mg by mouth daily     Yes Historical Provider, MD   clonazePAM (KlonoPIN) 1 mg tablet 1 mg 2 (two) times a day as needed for anxiety   4/20/18  Yes Historical Provider, MD   gabapentin (NEURONTIN) 800 mg tablet Take 800 mg by mouth 3 (three) times a day   Yes Historical Provider, MD   metoprolol tartrate (LOPRESSOR) 50 mg tablet Take 1 tablet (50 mg total) by mouth every 12 (twelve) hours 6/14/18  Yes Bentlye Dale MD   mirtazapine (REMERON) 30 mg tablet Take 30 mg by mouth daily at bedtime     Yes Historical Provider, MD   atorvastatin (LIPITOR) 40 mg tablet Take 1 tablet (40 mg total) by mouth daily for 90 days 6/14/18 9/12/18  Bentley Dale MD   famotidine (PEPCID) 40 MG tablet Take 1 tablet (40 mg total) by mouth daily 8/24/18   ROMEO Estrada     I have reviewed home medications with patient personally  Allergies: Allergies   Allergen Reactions    Aspirin Anaphylaxis and Other (See Comments)    Bee Venom Anaphylaxis    Robaxin [Methocarbamol] Anaphylaxis and Seizures     Seizures per pt       Tramadol Hives, Shortness Of Breath and Other (See Comments)     Other reaction(s): Nausea and/or vomiting  Pt received morphine IV 7-6-18    Ketorolac     Omeprazole GI Intolerance     Other reaction(s): Nausea and/or vomiting    Codeine Rash and Other (See Comments)     Dizziness nausea;   Pt received morphine IV 7-6-18       Social History:     Marital Status: /Civil Union   Occupation:   Patient Pre-hospital Living Situation:  Patient lives with  at home  Patient Pre-hospital Level of Mobility:  Ambulates without a cane or walker  Patient Pre-hospital Diet Restrictions:  None  Substance Use History:   History   Alcohol Use    Yes     Comment: occasional; (No alcohol use per Allscripts)     History   Smoking Status    Current Every Day Smoker    Packs/day: 0 20    Years: 46 00    Types: Cigarettes   Smokeless Tobacco    Never Used     History   Drug Use No       Family History:    Family History   Problem Relation Age of Onset    Heart disease Mother     Coronary artery disease Mother     Other Mother         1) Gangrene; 2) Peripheral arterial disease    Hyperlipidemia Mother         Hypercholesterolemia    Stomach cancer Mother     Heart attack Mother         Myocardial Infarction    Other Father         1) Gangrene; 2) Peripheral arterial disease    Hyperlipidemia Father         Hypercholesterolemia    Stomach cancer Father     Heart attack Father         Myocardial Infarction    Stomach cancer Brother     Heart attack Brother         Myocardial Infarction    Stomach cancer Maternal Uncle        Physical Exam:     Vitals:   Blood Pressure: 130/75 (01/30/19 1931)  Pulse: 79 (01/30/19 1931)  Temperature: 98 °F (36 7 °C) (01/30/19 1329)  Temp Source: Oral (01/30/19 1329)  Respirations: 18 (01/30/19 1931)  Height: 5' 3" (160 cm) (01/30/19 1329)  Weight - Scale: 71 6 kg (157 lb 13 6 oz) (01/30/19 1329)  SpO2: 96 % (01/30/19 1931)    Physical Exam    Constitutional: She is oriented to person, place, and time  75-year-old white female lying on the stretcher with her eyes closed and holding her chest complaining of pain in her chest or back and down her left arm  HENT:   Head: Normocephalic and atraumatic  Mouth/Throat: Oropharynx is clear and moist    Eyes: Pupils are equal, round, and reactive to light  EOM are normal    Neck: Normal range of motion  Neck supple     Cardiovascular: Normal rate, regular rhythm and normal heart sounds  Pulmonary/Chest: Effort normal  No respiratory distress  She has no wheezes  She has no rales  patient has coarse breath sounds bilaterally   Abdominal: Soft  Bowel sounds are normal  There is no tenderness  There is no rebound and no guarding  Musculoskeletal:  Positive tenderness to palpation on the right anterior chest  Neurological: She is alert and oriented to person, place, and time  No cranial nerve deficit  She exhibits normal muscle tone  Coordination normal    Skin: Skin is warm and dry  There is pallor  Psychiatric:   Tearful and anxious-clenching right-side of chest  Nursing note and vitals reviewed  Additional Data:     Lab Results: I have personally reviewed pertinent reports  Results from last 7 days  Lab Units 01/30/19  1408   WBC Thousand/uL 6 05   HEMOGLOBIN g/dL 14 3   HEMATOCRIT % 42 1   PLATELETS Thousands/uL 191   NEUTROS PCT % 55   LYMPHS PCT % 32   MONOS PCT % 7   EOS PCT % 5       Results from last 7 days  Lab Units 01/30/19  1408   SODIUM mmol/L 144   POTASSIUM mmol/L 3 8   CHLORIDE mmol/L 107   CO2 mmol/L 26   BUN mg/dL 6   CREATININE mg/dL 0 65   ANION GAP mmol/L 11   CALCIUM mg/dL 9 1   ALBUMIN g/dL 3 6   TOTAL BILIRUBIN mg/dL 0 30   ALK PHOS U/L 179*   ALT U/L 41   AST U/L 35   GLUCOSE RANDOM mg/dL 86       Results from last 7 days  Lab Units 01/30/19  1408   INR  1 01                   Imaging: I have personally reviewed pertinent reports  PE Study with CT Abdomen and Pelvis with contrast   Final Result by Elease Buerger, MD (01/30 1817)         1  No evidence of pulmonary embolism  2   No acute inflammatory process of the chest, abdomen or pelvis  3   Emphysema  4   Distal esophageal wall thickening, correlate for esophagitis and gastroesophageal reflux disease  5   Stable small left adrenal adenoma                       Workstation performed: [id number]         XR chest 1 view portable   Final Result by Isaiah Sahu Tim Ponce MD (01/30 0596)      No acute cardiopulmonary disease  Workstation performed: [id number]             EKG, Pathology, and Other Studies Reviewed on Admission:   · EKG:  Sinus rhythm    Allscripts / Epic Records Reviewed:  Yes

## 2019-01-31 NOTE — ASSESSMENT & PLAN NOTE
· 29-year-old female with history of hypertension, hyperlipidemia, GERD, COPD presents today with severe chest pain  · CT scan negative for PE or aortic dissection, it does show esophagitis, small left adrenal adenoma  · Trend troponins, 1st 2 were negative  · EKG does not show any acute ischemia, it is normal sinus rhythm  · Refused Nitrostat "it gives me a headache" refused Tylenol, as per ED physician, states morphine mildly helped with the pain  · Consider Cardiology,  evaluation it appears that patient had a normal stress echo test June 2018, ef 59%   · I do not think that this is cardiac in nature this is likely esophagitis however will continue to trend troponins and monitor  · Medication ordered for pain control

## 2019-01-31 NOTE — ASSESSMENT & PLAN NOTE
· This is likely causing chest pain  · Continue with oral PPI  · Order Zofran p r n   For nausea  · Consider GI consult of resolution of symptoms do not occur after Pepcid and Maalox

## 2019-01-31 NOTE — PLAN OF CARE
CARDIOVASCULAR - ADULT     Maintains optimal cardiac output and hemodynamic stability Progressing     Absence of cardiac dysrhythmias or at baseline rhythm Progressing        DISCHARGE PLANNING     Discharge to home or other facility with appropriate resources Progressing        INFECTION - ADULT     Absence or prevention of progression during hospitalization Progressing        Knowledge Deficit     Patient/family/caregiver demonstrates understanding of disease process, treatment plan, medications, and discharge instructions Progressing        PAIN - ADULT     Verbalizes/displays adequate comfort level or baseline comfort level Progressing        Potential for Falls     Patient will remain free of falls Progressing        SAFETY ADULT     Maintain or return to baseline ADL function Progressing     Maintain or return mobility status to optimal level Progressing

## 2019-01-31 NOTE — ASSESSMENT & PLAN NOTE
· Patient is very anxious right now, crying, asking for pain meds  · Continue with citalopram and Remeron

## 2019-01-31 NOTE — PLAN OF CARE
Problem: DISCHARGE PLANNING - CARE MANAGEMENT  Goal: Discharge to post-acute care or home with appropriate resources  INTERVENTIONS:  - Conduct assessment to determine patient/family and health care team treatment goals, and need for post-acute services based on payer coverage, community resources, and patient preferences, and barriers to discharge  - Address psychosocial, clinical, and financial barriers to discharge as identified in assessment in conjunction with the patient/family and health care team  - Arrange appropriate level of post-acute services according to patients   needs and preference and payer coverage in collaboration with the physician and health care team  - Communicate with and update the patient/family, physician, and health care team regarding progress on the discharge plan  - Arrange appropriate transportation to post-acute venues  Outcome: Progressing  Obs notice signed  Pt is self sufficient  Pt said that she has a  through [employer]  Pt stated that she may need assistance with transportation upon discharge  CM will continue to follow

## 2019-01-31 NOTE — DISCHARGE INSTRUCTIONS
Chest Pain, Ambulatory Care   Cameron ODMO: Evaluation of chest pain in primary care patients  Am Fam Physician 2011; 83(5):603-605  Gurwinder RIZVI, Amadeo SINGH, & Estuardo Velasquez: Emergency department and office-based evaluation of patients with chest pain  [facility] Clin Proc 2010; 85(3):284-299  Boy JS, Carmen Pereira, et al: NICE guidance  Chest pain of recent onset: assessment and diagnosis of recent onset chest pain or discomfort of suspected cardiac origin  Heart 2010; 96(12):974-978  Donelda Doing E: Chest Pain  In: Brian Pereira, Norris DELGADO, [employer], et al, eds  [address] Emergency Medicine Consult, 4th ed  [address] [facility],[address] [place], [place], 2011  [address] [place] [address]: An algorithm for the diagnosis and management of chest pain in primary care  Med Clin [place] [facility] 2010; 19(9):339-868  © 2014 [address] is for End User's use only and may not be sold, redistributed or otherwise used for commercial purposes  All illustrations and images included in CareNotes® are the copyrighted property of A D A M , Inc  or Sage [employer]  The above information is an  only  It is not intended as medical advice for individual conditions or treatments  Talk to your doctor, nurse or pharmacist before following any medical regimen to see if it is safe and effective for you  Cigarette Smoking and Your Health, Ambulatory Care   GENERAL INFORMATION:   What are the risks to my health if I smoke? Chemicals in tobacco are addictive and damage every cell in your body  All tobacco products are dangerous to you and to nonsmokers who breathe your secondhand smoke  Even if you are a light smoker or a social smoker, you have an increased risk for cancer, heart disease, and lung disease  If you are pregnant or have diabetes, smoking increases your risk for complications  What are the benefits to my health if I stop smoking?    · Lower risk of cancer, heart disease, blood clots, heart attack, and stroke    · Lower risk of diabetes complications, such as kidney, artery, or eye disease, and nerve damage that can result in amputations    · Lower risk of lung infections and diseases, such as pneumonia, asthma, chronic bronchitis, and emphysema           · Increased benefits of chemotherapy if you already have cancer, and decreased risk for cancer returning after treatment    · Improved circulation, allowing more oxygen to be delivered to your body    · Improved ability to heal and to fight infections  What are the benefits to the health of others if I stop smoking? · Lower risk of lung cancer and heart disease in nonsmokers    · Lower risk of miscarriage, early delivery, low birth weight, and stillbirth    · Lower risk of SIDS, obesity, developmental delay, ear infections, colds, pneumonia, bronchitis, asthma, and ADHD in your child  Where can I find more information and support to stop smoking? It is never too late to stop smoking  Ask your healthcare provider for more information if you need help  · ReferralMD  Phone: 1- [phone]  Web Address: [employer]ve  Follow up with your healthcare provider as directed:  Write down your questions so you remember to ask them during your visits  CARE AGREEMENT:   You have the right to help plan your care  Learn about your health condition and how it may be treated  Discuss treatment options with your caregivers to decide what care you want to receive  You always have the right to refuse treatment  The above information is an  only  It is not intended as medical advice for individual conditions or treatments  Talk to your doctor, nurse or pharmacist before following any medical regimen to see if it is safe and effective for you  © 2014 [address] is for End User's use only and may not be sold, redistributed or otherwise used for commercial purposes   All illustrations and images included in CareNotes® are the copyrighted property of A D A M , Inc  or Sage Blackwell[employer]  Esophagitis   WHAT YOU NEED TO KNOW:   Esophagitis is inflammation or irritation of the lining of the esophagus  DISCHARGE INSTRUCTIONS:   Call 911 for any of the following:   · You have chest pain that does not go away within a few minutes or gets worse  Seek care immediately if:   · You feel like you have food stuck in your throat and you cannot cough it out  Contact your healthcare provider if:   · You have new or worsening symptoms, even after treatment  · You have questions or concerns about your condition or care  Medicines:   · Medicines  may be given to fight an infection or to control stomach acid  · Take your medicine as directed  Contact your healthcare provider if you think your medicine is not helping or if you have side effects  Tell him or her if you are allergic to any medicine  Keep a list of the medicines, vitamins, and herbs you take  Include the amounts, and when and why you take them  Bring the list or the pill bottles to follow-up visits  Carry your medicine list with you in case of an emergency  Follow up with your healthcare provider as directed: You may need ongoing tests or treatment  Write down your questions so you remember to ask them during your visits  Do not smoke:  Nicotine and other chemicals in cigarettes and cigars can cause blood vessel and lung damage  Ask your healthcare provider for information if you currently smoke and need help to quit  E-cigarettes or smokeless tobacco still contain nicotine  Talk to your healthcare provider before you use these products  Do not drink alcohol:  Alcohol can irritate your esophagus  Talk to your healthcare provider if you need help to stop drinking  Keep batteries and similar objects out of the reach of children:  Babies often put items in their mouths to explore them   Button batteries are easy to swallow and can cause serious damage  Keep the battery covers of electronic devices such as remote controls taped closed  Store all batteries and toxic materials where children cannot get to them  Use childproof locks to keep children away from dangerous materials  Manage or prevent esophagitis:   · Limit or do not eat foods that can lead to esophagitis  Foods such as oranges and salsa can irritate your esophagus  Caffeine and chocolate can cause acid reflux  High-fat and fried foods make your stomach digest food more slowly  This increases the amount of stomach acid your esophagus is exposed to  Eat small meals, and drink water with your meals  Soft foods such as yogurt and applesauce may help soothe your throat  Do not eat for at least 3 hours before you go to bed  · Prevent acid reflux  Do not bend over unless it is necessary  Acid may back up into your esophagus when you bend over  If possible, keep the head of your bed elevated while you sleep  This will help keep acid from backing up  Manage stress  Stress can make your symptoms worse or cause stomach acid to back up  · Drink more liquid when you take pills  Drink a full glass of water when you take your pills  Ask your healthcare provider if you can take your pills at least an hour before you go to bed  © 2017 [address] Information is for End User's use only and may not be sold, redistributed or otherwise used for commercial purposes  All illustrations and images included in CareNotes® are the copyrighted property of A D A M , Inc  or Sage Tate  The above information is an  only  It is not intended as medical advice for individual conditions or treatments  Talk to your doctor, nurse or pharmacist before following any medical regimen to see if it is safe and effective for you

## 2019-01-31 NOTE — UTILIZATION REVIEW
Initial Clinical Review    Admit OBS  On  1/30  @  [address] This Encounter   Procedures    Place in Observation (expected length of stay for this patient is less than two midnights)     Standing Status:   Standing     Number of Occurrences:   1     Order Specific Question:   Admitting Physician     Answer:   Balwinder Villaseñor     Order Specific Question:   Level of Care     Answer:   Med Surg [16]     ED: Date/Time/Mode of Arrival:   ED Arrival Information     Expected Arrival Acuity Means of Arrival Escorted By Service Admission Type    - 1/30/2019 13:24 Urgent Ambulance [facility] General Medicine Urgent    Arrival Complaint    CHEST PAIN        Chief Complaint:   Chief Complaint   Patient presents with    Chest Pain     Pt brought in by EMS from [de-identified] [facility] for chest pain      History of Illness:   59 y o  female with history of anxiety, hypertension, CAD, anxiety, bipolar disorder presents today with severe right-sided chest pain radiating through her back and her left arm and jaw  Patient states that the chest pain has been present for 2 days worsening in nature  -  nuclear stress treadmill test done June 2018 which was normal, her ejection fraction was calculated at 59% with no regional wall motion abnormality seen  IN ER,   D-dimer was elevated at greater than 10,000 however chest CT did not show any evidence of pulmonary embolism or aortic dissection    There ppears to be esophageal wall thickening consistent with esophagitis and GERD      01/31/19 0246  98 1 °F (36 7 °C)  78  20  164/96  --  90 %  None (Room air)  Lying                      01/30/19 2236  98 4 °F (36 9 °C)  74  18  98/56  --  93 %  None (Room air)  Lying                                         01/30/19 2003  97 5 °F (36 4 °C)  78  18  151/72  --  96 %  None (Room air)  Lying   01/30/19 1931  --  79  18  130/75  --  96 %  None (Room air)  Lying   01/30/19 1552  --  71  18  158/83  --  96 %  None (Room air) Lying   01/30/19 1329  98 °F (36 7 °C)  80  18  108/75  --  100 %  None (Room air)  Lying     71 kg (156 lb 8 4 oz)    Pertinent Labs/Diagnostic Test Results: Alk phos  179   145  Phos  5 2  Trop  <0 02  (x3)   D-dimer  >10,000  ETOH  78  uds  Neg  UA  sg  <=1 005    EKG -  Sinus rhythm with short PA     compared with ECG of 31-DEC-2018 12:04,  No significant change was found  CXR  nad   CT scan negative for PE or aortic dissection, it does show esophagitis, small left adrenal adenoma    ED Treatment:   Medication Administration from 01/30/2019 1324 to 01/30/2019 1959       Date/Time Order Dose Route Action Action by Comments                01/30/2019 1410 sodium chloride 0 9 % bolus 1,000 mL 1,000 mL Intravenous [address] oJhn Milner RN                 01/30/2019 1409 acetaminophen (TYLENOL) tablet 650 mg 650 mg Oral Given John Milner RN                 01/30/2019 1653 morphine injection 2 mg 2 mg Intravenous Given John Milner RN                 01/30/2019 1850 morphine injection 2 mg 2 mg Intravenous Given John Milner RN      01/30/2019 1956 sodium chloride 0 9 % infusion 125 mL/hr Intravenous New Kulwant Castro RN         Past Medical/Surgical History:    Active Ambulatory Problems     Diagnosis Date Noted    Increased severity of headaches 12/16/2016    Chest pain 03/05/2017    Anxiety and depression 03/05/2017    Chronic back pain 03/05/2017    Tobacco abuse disorder 03/05/2017    Neuropathy 03/05/2017    Positive cardiac stress test 03/07/2017    Coronary artery spasm (HCC) 03/08/2017    Acute bronchitis 03/08/2017    Abdominal pain 09/29/2017    C  difficile diarrhea 09/29/2017    COPD (chronic obstructive pulmonary disease) ([facility] [address] ) 09/30/2017    Transaminitis 09/30/2017    Essential hypertension 10/27/2017    Lumbosacral radiculopathy at L4 06/05/2018    Coronary artery disease involving native coronary artery of native heart without angina pectoris 06/14/2018    Angina, class III ([place] [address] ) 06/14/2018    Mixed hyperlipidemia 06/14/2018    Lung nodules 07/07/2018    Rheumatoid arthritis involving multiple sites ([place] [address] ) 07/09/2018    New onset a-fib ([place] [address] ) 12/31/2018    Hypokalemia 12/31/2018    Alcohol abuse 12/31/2018     Resolved Ambulatory Problems     Diagnosis Date Noted    Enteritis 10/27/2017     Past Medical History:   Diagnosis Date    Alcohol abuse 12/31/2018    Anxiety     Bipolar 1 disorder (HCC)     CAD (coronary artery disease)     Chronic back pain     Frequent headaches     Glaucoma     Hyperlipidemia     Hypertension     Ovarian cancer ([place] [address] )     Psychiatric disorder     Rheumatoid arthritis ([place] [address] )     Uterine carcinoma ([place] [address] )      Admitting Diagnosis:  Anxiety [F41 9]  Back pain [M54 9]  Chest pain [R07 9]  Left arm pain [M79 602]     Assessment/Plan:  Esophagitis -  Oral PPI,  Consider GI consult if no resolution of symptoms after Pepcid and Maalox  Chest pain -  Trend troponins,  Telemetry, consult cardiology  HTN -  bp slightly elevated,  Cont w/bp meds, monitor    Admission Orders:  Admit telemetry, continuous pulse ox  Serial trops  Sequential compression device to b/l LE  Cardiology consult  Cardiac diet  IVF NS @  125   Monitor s/s etoh withdrawal -  CIWA protocol    @  2117 -  Klonopin po   @  2007 -  IV dilaudid  @  2154 -  Po roxicodone    1/31/2019  98 4   78   20   164/96    90-91% ra     @ 0600  roxicodone po  @  0243  IV dilaudid     Scheduled Meds:   Current Facility-Administered Medications:  acetaminophen 650 mg Oral Q6H PRN Teresa Caballero PA-C    albuterol 2 5 mg Nebulization Q6H PRN Kyara Bud Munoz PA-C    aluminum-magnesium hydroxide-simethicone 30 mL Oral Q4H PRN Kyara Bud Munoz PA-C    atorvastatin 40 mg Oral Daily Jesenialuna Munoz PA-C    citalopram 40 mg Oral Daily Jesenia DAMEON Munoz PA-C    clonazePAM 1 mg Oral BID PRN Kyara Bud Munoz PA-C    enoxaparin 40 mg Subcutaneous Daily Kyara Bud SYLVESTER Munoz    famotidine 20 mg Intravenous Once Juluis Nearing, DO    famotidine 40 mg Oral Daily Robby Rubinstein Of[place]bu, PA-C    folic acid 1 mg Oral Daily Soumya Ahmadi PA-C    gabapentin 800 mg Oral TID Robby Rubinstein [place]bu, PA-C    metoprolol tartrate 50 mg Oral Q12H [address] Jesenia N SYLVESTER Munoz    mirtazapine 30 mg Oral HS Robby Rubinstein Ofoegbu, PA-C    multivitamin-minerals 1 tablet Oral Daily Soumya Ahmadi PA-C    nicotine 1 patch Transdermal Daily Robby Rubinstein Ofo[place]bu, PA-C    nitroglycerin 0 4 mg Sublingual Once Juluis Nearing, DO    ondansetron 4 mg Intravenous Q8H PRN Robby Rubinstein Of[place]bu, PA-C    oxyCODONE 5 mg Oral Q4H PRN Robby Rubinstein Ofo[place]bu, PA-C    sodium chloride 125 mL/hr Intravenous Continuous Robby Rubinstein Ofoegbu, PA-C Last Rate: 125 mL/hr (01/31/19 0250)   thiamine 100 mg Oral Daily Soumya Ahmadi PA-C      Continuous Infusions:   sodium chloride 125 mL/hr Last Rate: 125 mL/hr (01/31/19 0250)

## 2019-01-31 NOTE — CONSULTS
Consultation - Cardiology Team One  John Menjivar 59 y o  female MRN: [id number]  Unit/Bed#: -01 Encounter: [id number]    Consults    Physician Requesting Consult: Tracy Michaud MD  Reason for Consult / Principal Problem: chest pain    HPI: Cardiologist Dr Chris Aleksander is a 59y o  year old female who has a history of  anxiety, hypertension, bipolar disorder  Patient came to the emergency room with complaints of severe right-sided chest pain that radiated through to her back and to the left arm and jaw  Patient states has been occurring for the last couple of days but just got worse  She states at its worst it was a 10 in 10 on a pain scale  She describes it as sharp, worse with deep breath  Patient states it is worse with touch  She has no shortness of breath  She has associated nausea but no vomiting, diaphoresis, fever, chills, syncope  Patient does smoke about 5 cigarettes a day  Patient becomes tearful when asking about her history stating that she did have a drink a couple of days ago because her son  recently  Upon further questioning the son  3 years ago  Patient states she had 1 small beers a few days ago  Upon review of her records patient does have history of alcohol abuse in the past      Patient had cardiac catheterization 2017 with all vessels showing mild atherosclerosis      REVIEW OF SYSTEMS:  Constitutional:  Denies fever or chills   Eyes:  Denies change in visual acuity   HENT:  Denies nasal congestion or sore throat   Respiratory:  Denies cough or shortness of breath   Cardiovascular:  + chest pain, worse with palpation  GI: + nausea Denies abdominal pain, vomiting, bloody stools or diarrhea   :  Denies dysuria, frequency, difficulty in micturition and nocturia  Musculoskeletal:  Denies back pain or joint pain   Neurologic:  Denies headache, focal weakness or sensory changes   Endocrine:  Denies polyuria or polydipsia   Lymphatic: Denies swollen glands   Psychiatric:  Denies depression or anxiety     Historical Information   Past Medical History:   Diagnosis Date    Alcohol abuse 12/31/2018    Anxiety     Bipolar 1 disorder ([facility] [place] [facility] )     Mental Health problems listed as Denied in [employer], cant't entire under pertinent negatives due to this diagnosis    CAD (coronary artery disease)     last assessed - 14Apr2017    Chronic back pain     Frequent headaches     Glaucoma     Hyperlipidemia     Hypertension     Ovarian cancer ([facility] [place] [facility] )     last assessed - 21Sep2016    Psychiatric disorder     bipolar    Rheumatoid arthritis ([facility] [place] [facility] )     Uterine carcinoma ([facility] [place] [facility] )     last assessed - 21Sep2016     Past Surgical History:   Procedure Laterality Date    ADENOIDECTOMY      Tonsillectomy with Adenoidectomy - last assessed - 21Sep2016    APPENDECTOMY      last assessed - 21Sep2016    CATARACT EXTRACTION Left     Remove cataract, corneo-scleral section of left eye; last assessed - 21Sep2016    CHOLECYSTECTOMY      last assessed - 29Sep2016   [facility] EYE SURGERY      Anterior sclera fistulization for glaucoma; last assessed - 21Sep2016    HYSTERECTOMY      KRYSTA-BSO; last assessed - 21Sep2016    INTRAOCULAR LENS INSERTION      DE TEMPORAL ARTERY LIGATN OR BX Right 12/16/2016    Procedure: BIOPSY ARTERY TEMPORAL;  Surgeon: Jill Cullen MD;  Location: [facility];  Service: General    TONSILLECTOMY      Tonsillectomy with Adenoidectomy - last assessed - 21Sep2016     History   Alcohol Use    Yes     Comment: occasional; (No alcohol use per [employer])     History   Drug Use No     History   Smoking Status    Current Every Day Smoker    Packs/day: 0 20    Years: 46 00    Types: Cigarettes   Smokeless Tobacco    Never Used       Family History:   Family History   Problem Relation Age of Onset    Heart disease Mother     Coronary artery disease Mother     Other Mother         1) Gangrene; 2) Peripheral arterial disease    Hyperlipidemia Mother Hypercholesterolemia    Stomach cancer Mother     Heart attack Mother         Myocardial Infarction    Other Father         1) Gangrene; 2) Peripheral arterial disease    Hyperlipidemia Father         Hypercholesterolemia    Stomach cancer Father     Heart attack Father         Myocardial Infarction    Stomach cancer Brother     Heart attack Brother         Myocardial Infarction    Stomach cancer Maternal Uncle        MEDS & ALLERGIES:  all current active meds have been reviewed and current meds: Current Facility-Administered Medications   Medication Dose Route Frequency    acetaminophen (TYLENOL) tablet 650 mg  650 mg Oral Q6H PRN    albuterol inhalation solution 2 5 mg  2 5 mg Nebulization Q6H PRN    aluminum-magnesium hydroxide-simethicone (MYLANTA) 200-200-20 mg/5 mL oral suspension 30 mL  30 mL Oral Q4H PRN    atorvastatin (LIPITOR) tablet 40 mg  40 mg Oral Daily    citalopram (CeleXA) tablet 40 mg  40 mg Oral Daily    clonazePAM (KlonoPIN) tablet 1 mg  1 mg Oral BID PRN    enoxaparin (LOVENOX) subcutaneous injection 40 mg  40 mg Subcutaneous Daily    famotidine (PEPCID) injection 20 mg  20 mg Intravenous Once    famotidine (PEPCID) tablet 40 mg  40 mg Oral Daily    folic acid (FOLVITE) tablet 1 mg  1 mg Oral Daily    gabapentin (NEURONTIN) capsule 800 mg  800 mg Oral TID    metoprolol tartrate (LOPRESSOR) tablet 50 mg  50 mg Oral Q12H OSCAR    mirtazapine (REMERON) tablet 30 mg  30 mg Oral HS    multivitamin-minerals (CENTRUM) tablet 1 tablet  1 tablet Oral Daily    nicotine (NICODERM CQ) 14 mg/24hr TD 24 hr patch 1 patch  1 patch Transdermal Daily    nitroglycerin (NITROSTAT) SL tablet 0 4 mg  0 4 mg Sublingual Once    ondansetron (ZOFRAN) injection 4 mg  4 mg Intravenous Q8H PRN    oxyCODONE (ROXICODONE) IR tablet 5 mg  5 mg Oral Q4H PRN    sodium chloride 0 9 % infusion  125 mL/hr Intravenous Continuous    thiamine (VITAMIN B1) tablet 100 mg  100 mg Oral Daily       sodium chloride 125 mL/hr Last Rate: 125 mL/hr (01/31/19 0250)     Allergies   Allergen Reactions    Aspirin Anaphylaxis and Other (See Comments)    Bee Venom Anaphylaxis    Robaxin [Methocarbamol] Anaphylaxis and Seizures     Seizures per pt       Tramadol Hives, Shortness Of Breath and Other (See Comments)     Other reaction(s): Nausea and/or vomiting  Pt received morphine IV 7-6-18    Ketorolac     Omeprazole GI Intolerance     Other reaction(s): Nausea and/or vomiting    Codeine Rash and Other (See Comments)     Dizziness nausea; Pt received morphine IV 7-6-18       OBJECTIVE:  Vitals:   Vitals:    01/31/19 1020   BP:    Pulse:    Resp:    Temp:    SpO2: 93%     Body mass index is 27 73 kg/m²  Systolic (73IYR), LQU:129 , Min:98 , CGD:884     Diastolic (39YSP), LTU:83, Min:56, Max:96      Intake/Output Summary (Last 24 hours) at 01/31/19 1100  Last data filed at 01/31/19 0251   Gross per 24 hour   Intake             1400 ml   Output              150 ml   Net             1250 ml     Weight (last 2 days)     Date/Time   Weight    01/30/19 2003  71 (156 53)    01/30/19 1329  71 6 (157 85)            Invasive Devices          No matching active lines, drains, or airways          PHYSICAL EXAMS:  General:  Patient is mild distress holding her chest, laying in the bed comfortably, awake,  tearful at times, alert responding to commands  Head: Normocephalic, Atraumatic  HEENT: White sclera, pink conjunctiva,  PERRLA,pharynx benign  Neck:  Supple, no neck vein distention, carotids+2/+2 no bruits, thyromegaly, adenopathy  Respiratory: clear to P/A  Cardiovascular:  PMI normal, S1-S2 normal, No  Murmurs, thrills, gallops, rubs   Regular rhythm  GI:  Abdomen soft nontender   No hepatosplenomegaly, adenopathy, ascites,or rebound tenderness  Extremities: No edema, normal pulses, no calf tenderness, no joint deformities, no venous disease   Integument:  No skin rashes or ulceration  Lymphatic:  No cervical or inguinal lymphadenopathy  Neurologic:  Patient is awake alert, responding to command, well-oriented to time and place and person moving all extremities    LABORATORY RESULTS:    Results from last 7 days  Lab Units 01/30/19 2123 01/30/19  1812 01/30/19  1408   TROPONIN I ng/mL 0 02 <0 02 <0 02     CBC with diff:   Results from last 7 days  Lab Units 01/31/19  0541 01/30/19 2123 01/30/19  1408   WBC Thousand/uL 4 87  --  6 05   HEMOGLOBIN g/dL 13 5  --  14 3   HEMATOCRIT % 40 9  --  42 1   MCV fL 92  --  89   PLATELETS Thousands/uL 168 178 191   MCH pg 30 3  --  30 3   MCHC g/dL 33 0  --  34 0   RDW % 13 8  --  13 4   MPV fL 9 8 9 7 9 6   NRBC AUTO /100 WBCs  --   --  0       CMP:  Results from last 7 days  Lab Units 01/31/19  0541 01/30/19  1408   POTASSIUM mmol/L 4 0 3 8   CHLORIDE mmol/L 111* 107   CO2 mmol/L 22 26   BUN mg/dL 7 6   CREATININE mg/dL 0 82 0 65   CALCIUM mg/dL 7 9* 9 1   AST U/L 27 35   ALT U/L 31 41   ALK PHOS U/L 145* 179*   EGFR ml/min/1 73sq m 78 97       BMP:  Results from last 7 days  Lab Units 01/31/19  0541 01/30/19  1408   POTASSIUM mmol/L 4 0 3 8   CHLORIDE mmol/L 111* 107   CO2 mmol/L 22 26   BUN mg/dL 7 6   CREATININE mg/dL 0 82 0 65   CALCIUM mg/dL 7 9* 9 1              Results from last 7 days  Lab Units 01/31/19  0541   MAGNESIUM mg/dL 1 6           Results from last 7 days  Lab Units 01/30/19  1408   TSH 3RD GENERATON uIU/mL 2 042       Results from last 7 days  Lab Units 01/30/19  1408   INR  1 01       Lipid Profile:   No results found for: CHOL  Lab Results   Component Value Date    HDL 40 01/31/2019    HDL 46 07/07/2018     Lab Results   Component Value Date    LDLCALC 78 01/31/2019    LDLCALC 116 (H) 07/07/2018     Lab Results   Component Value Date    TRIG 88 01/31/2019    TRIG 95 07/07/2018       Cardiac testing:   No results found for this or any previous visit  No results found for this or any previous visit  No procedure found    No results found for this or any previous visit       Imaging:   I have personally reviewed pertinent reports  EKG reviewed personally:  Sr, short pr, nonspecific t wave abnormality  Prolonged qt    Telemetry reviewed personally:   sr    Assessment/Plan:  1-Chest pain; trop 0 02 x3; Non cardiac could be musculoskeletal or GI in nature given CT scan shows distal esophageal wall thickening correlate for esophagitis or GERD  Normal cardiac cath 3/2017  Echo limited 7/2018 EF 55-65%  Continue all meds  No need any further cardiac testing needed  2-HTN, stable continue all meds  3-HPL, continue statins    Patient is stable from a cardiac standpoint  Will sign off  Call if needed  Code Status: Level 1 - Full Code    Counseling / Coordination of Care  Total floor / unit time spent today 35 minutes  Greater than 50% of total time was spent with the patient and / or family counseling and / or coordination of care  A description of the counseling / coordination of care: Review of history, current assessment, development of a plan      Lianna Teixeira PA-C  1/31/2019,11:00 AM

## 2019-01-31 NOTE — ED NOTES
SLIM provider at bedside       Danika Hodge, RN  01/30/19 [address], RN  01/30/19 8545

## 2019-01-31 NOTE — SOCIAL WORK
CM name and role reviewed  Discharge Checklist reviewed and CM will continue to monitor for progress toward discharge goals in nursing and provider rounds  CM met with pt at bedside  Pt alert  CM reviewed observation status  Pt signed obs notice  Obs placed in medical records  Leslie is 70  Pt reported that she lives at home with her   Pt reported that she uses a rolator PRN  No dme at baseline  She stated that she is ADL independent and self sufficient  Pt reported no VNA  She said that she has a  through [employer]  She stated that she uses [address] in [facility]  She said that she takes all her medications as prescribed  She stated that her PCP is Dr Yonny Meadows, but will follow up with someone else in the practice due to Dr Yonny Meadows leaving  She stated that she uses [facility]  She said that she will need assistance with transportation upon discharge  CM reviewed discharge planning process including the following: identifying help at home, patient preference for discharge planning needs, pharmacy preference, and availability of treatment team to discuss questions or concerns patient and/or family may have regarding understanding medications and recognizing signs and symptoms once discharged  CM also encouraged patient to follow up with all recommended appointments after discharge  Patient advised of importance for patient and family to participate in managing patients medical well being

## 2019-02-01 NOTE — ASSESSMENT & PLAN NOTE
· This is likely causing chest pain, patient shows marked clinical improvement today  · Continue with oral PPI and Maalox

## 2019-02-01 NOTE — ASSESSMENT & PLAN NOTE
· 70-year-old female with history of hypertension, hyperlipidemia, GERD, COPD presents today with severe chest pain  · CT scan negative for PE or aortic dissection, it does show esophagitis, small left adrenal adenoma  ·  troponins negative x3  · EKG does not show any acute ischemia, it is normal sinus rhythm  · No events on telemetry monitor  · Refused Nitrostat "it gives me a headache" refused Tylenol, as per ED physician, states morphine mildly helped with the pain  · Cardiology signed off pain is likely due to esophagitis versus costochondritis

## 2019-02-23 ENCOUNTER — APPOINTMENT (EMERGENCY)
Dept: CT IMAGING | Facility: HOSPITAL | Age: 59
End: 2019-02-23
Payer: COMMERCIAL

## 2019-02-23 ENCOUNTER — HOSPITAL ENCOUNTER (EMERGENCY)
Facility: HOSPITAL | Age: 59
Discharge: HOME | End: 2019-02-23
Attending: EMERGENCY MEDICINE | Admitting: EMERGENCY MEDICINE
Payer: COMMERCIAL

## 2019-02-23 ENCOUNTER — APPOINTMENT (EMERGENCY)
Dept: RADIOLOGY | Facility: HOSPITAL | Age: 59
End: 2019-02-23
Payer: COMMERCIAL

## 2019-02-23 VITALS
OXYGEN SATURATION: 96 % | DIASTOLIC BLOOD PRESSURE: 82 MMHG | SYSTOLIC BLOOD PRESSURE: 168 MMHG | RESPIRATION RATE: 18 BRPM | BODY MASS INDEX: 29.68 KG/M2 | WEIGHT: 167.55 LBS | HEART RATE: 78 BPM

## 2019-02-23 DIAGNOSIS — R07.9 CHRONIC CHEST PAIN: Primary | ICD-10-CM

## 2019-02-23 DIAGNOSIS — G89.29 CHRONIC CHEST PAIN: Primary | ICD-10-CM

## 2019-02-23 LAB
ALBUMIN SERPL BCP-MCNC: 3.1 G/DL (ref 3.5–5)
ALP SERPL-CCNC: 145 U/L (ref 46–116)
ALT SERPL W P-5'-P-CCNC: 41 U/L (ref 12–78)
ANION GAP SERPL CALCULATED.3IONS-SCNC: 12 MMOL/L (ref 4–13)
AST SERPL W P-5'-P-CCNC: 28 U/L (ref 5–45)
ATRIAL RATE: 75 BPM
BASOPHILS # BLD AUTO: 0.04 THOUSANDS/ΜL (ref 0–0.1)
BASOPHILS NFR BLD AUTO: 1 % (ref 0–1)
BILIRUB SERPL-MCNC: 0.3 MG/DL (ref 0.2–1)
BUN SERPL-MCNC: 17 MG/DL (ref 5–25)
CALCIUM SERPL-MCNC: 8.8 MG/DL (ref 8.3–10.1)
CHLORIDE SERPL-SCNC: 108 MMOL/L (ref 100–108)
CO2 SERPL-SCNC: 24 MMOL/L (ref 21–32)
CREAT SERPL-MCNC: 0.73 MG/DL (ref 0.6–1.3)
EOSINOPHIL # BLD AUTO: 0.25 THOUSAND/ΜL (ref 0–0.61)
EOSINOPHIL NFR BLD AUTO: 5 % (ref 0–6)
ERYTHROCYTE [DISTWIDTH] IN BLOOD BY AUTOMATED COUNT: 13.2 % (ref 11.6–15.1)
GFR SERPL CREATININE-BSD FRML MDRD: 90 ML/MIN/1.73SQ M
GLUCOSE SERPL-MCNC: 90 MG/DL (ref 65–140)
HCT VFR BLD AUTO: 41.1 % (ref 34.8–46.1)
HGB BLD-MCNC: 13.8 G/DL (ref 11.5–15.4)
IMM GRANULOCYTES # BLD AUTO: 0.01 THOUSAND/UL (ref 0–0.2)
IMM GRANULOCYTES NFR BLD AUTO: 0 % (ref 0–2)
LYMPHOCYTES # BLD AUTO: 1.49 THOUSANDS/ΜL (ref 0.6–4.47)
LYMPHOCYTES NFR BLD AUTO: 27 % (ref 14–44)
MCH RBC QN AUTO: 29.7 PG (ref 26.8–34.3)
MCHC RBC AUTO-ENTMCNC: 33.6 G/DL (ref 31.4–37.4)
MCV RBC AUTO: 88 FL (ref 82–98)
MONOCYTES # BLD AUTO: 0.35 THOUSAND/ΜL (ref 0.17–1.22)
MONOCYTES NFR BLD AUTO: 6 % (ref 4–12)
NEUTROPHILS # BLD AUTO: 3.34 THOUSANDS/ΜL (ref 1.85–7.62)
NEUTS SEG NFR BLD AUTO: 61 % (ref 43–75)
NRBC BLD AUTO-RTO: 0 /100 WBCS
P AXIS: 67 DEGREES
PLATELET # BLD AUTO: 169 THOUSANDS/UL (ref 149–390)
PMV BLD AUTO: 10.1 FL (ref 8.9–12.7)
POTASSIUM SERPL-SCNC: 3.9 MMOL/L (ref 3.5–5.3)
PR INTERVAL: 110 MS
PROT SERPL-MCNC: 6.8 G/DL (ref 6.4–8.2)
QRS AXIS: 70 DEGREES
QRSD INTERVAL: 74 MS
QT INTERVAL: 410 MS
QTC INTERVAL: 457 MS
RBC # BLD AUTO: 4.65 MILLION/UL (ref 3.81–5.12)
SODIUM SERPL-SCNC: 144 MMOL/L (ref 136–145)
T WAVE AXIS: 61 DEGREES
TROPONIN I SERPL-MCNC: <0.02 NG/ML
VENTRICULAR RATE: 75 BPM
WBC # BLD AUTO: 5.48 THOUSAND/UL (ref 4.31–10.16)

## 2019-02-23 PROCEDURE — 85025 COMPLETE CBC W/AUTO DIFF WBC: CPT | Performed by: EMERGENCY MEDICINE

## 2019-02-23 PROCEDURE — 71046 X-RAY EXAM CHEST 2 VIEWS: CPT

## 2019-02-23 PROCEDURE — 80053 COMPREHEN METABOLIC PANEL: CPT | Performed by: EMERGENCY MEDICINE

## 2019-02-23 PROCEDURE — 99285 EMERGENCY DEPT VISIT HI MDM: CPT

## 2019-02-23 PROCEDURE — 93005 ELECTROCARDIOGRAM TRACING: CPT

## 2019-02-23 PROCEDURE — 36415 COLL VENOUS BLD VENIPUNCTURE: CPT | Performed by: EMERGENCY MEDICINE

## 2019-02-23 PROCEDURE — 96374 THER/PROPH/DIAG INJ IV PUSH: CPT

## 2019-02-23 PROCEDURE — 84484 ASSAY OF TROPONIN QUANT: CPT | Performed by: EMERGENCY MEDICINE

## 2019-02-23 PROCEDURE — 93010 ELECTROCARDIOGRAM REPORT: CPT | Performed by: INTERNAL MEDICINE

## 2019-02-23 PROCEDURE — 74176 CT ABD & PELVIS W/O CONTRAST: CPT

## 2019-02-23 RX ORDER — FENTANYL CITRATE 50 UG/ML
50 INJECTION, SOLUTION INTRAMUSCULAR; INTRAVENOUS ONCE
Status: COMPLETED | OUTPATIENT
Start: 2019-02-23 | End: 2019-02-23

## 2019-02-23 RX ADMIN — FENTANYL CITRATE 50 MCG: 50 INJECTION, SOLUTION INTRAMUSCULAR; INTRAVENOUS at 13:03

## 2019-03-29 ENCOUNTER — APPOINTMENT (EMERGENCY)
Dept: RADIOLOGY | Facility: HOSPITAL | Age: 59
End: 2019-03-29
Payer: COMMERCIAL

## 2019-03-29 ENCOUNTER — APPOINTMENT (EMERGENCY)
Dept: CT IMAGING | Facility: HOSPITAL | Age: 59
End: 2019-03-29
Payer: COMMERCIAL

## 2019-03-29 ENCOUNTER — HOSPITAL ENCOUNTER (EMERGENCY)
Facility: HOSPITAL | Age: 59
Discharge: HOME | End: 2019-03-29
Attending: EMERGENCY MEDICINE | Admitting: EMERGENCY MEDICINE
Payer: COMMERCIAL

## 2019-03-29 VITALS
RESPIRATION RATE: 20 BRPM | TEMPERATURE: 98.2 F | WEIGHT: 158.95 LBS | BODY MASS INDEX: 29.25 KG/M2 | HEART RATE: 84 BPM | DIASTOLIC BLOOD PRESSURE: 79 MMHG | OXYGEN SATURATION: 97 % | SYSTOLIC BLOOD PRESSURE: 184 MMHG | HEIGHT: 62 IN
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
## 2019-03-29 DIAGNOSIS — S39.012A LOW BACK STRAIN: Primary | ICD-10-CM

## 2019-03-29 PROCEDURE — 72131 CT LUMBAR SPINE W/O DYE: CPT

## 2019-03-29 PROCEDURE — 73502 X-RAY EXAM HIP UNI 2-3 VIEWS: CPT

## 2019-03-29 PROCEDURE — 70450 CT HEAD/BRAIN W/O DYE: CPT

## 2019-03-29 PROCEDURE — 99284 EMERGENCY DEPT VISIT MOD MDM: CPT

## 2019-03-29 RX ORDER — MIRTAZAPINE 30 MG/1
30 TABLET, FILM COATED ORAL
COMMUNITY
End: 2019-03-29

## 2019-03-29 RX ORDER — CLONAZEPAM 0.5 MG/1
0.5 TABLET ORAL
COMMUNITY
End: 2019-03-29

## 2019-03-29 RX ORDER — ACETAMINOPHEN 325 MG/1
650 TABLET ORAL ONCE
Status: COMPLETED | OUTPATIENT
Start: 2019-03-29 | End: 2019-03-29

## 2019-03-29 RX ORDER — ATORVASTATIN CALCIUM 10 MG/1
10 TABLET, FILM COATED ORAL
COMMUNITY
Start: 2018-01-27 | End: 2019-06-24

## 2019-03-29 RX ADMIN — ACETAMINOPHEN 650 MG: 325 TABLET, FILM COATED ORAL at 19:02

## 2019-04-01 ENCOUNTER — NURSE TRIAGE (OUTPATIENT)
Dept: PHYSICAL THERAPY | Facility: OTHER | Age: 59
End: 2019-04-01

## 2019-04-06 ENCOUNTER — TELEPHONE (OUTPATIENT)
Dept: INTERNAL MEDICINE CLINIC | Facility: CLINIC | Age: 59
End: 2019-04-06

## 2019-04-08 ENCOUNTER — TELEPHONE (OUTPATIENT)
Dept: PHYSICAL THERAPY | Facility: OTHER | Age: 59
End: 2019-04-08

## 2019-04-27 ENCOUNTER — APPOINTMENT (EMERGENCY)
Dept: CT IMAGING | Facility: HOSPITAL | Age: 59
End: 2019-04-27
Payer: COMMERCIAL

## 2019-04-27 ENCOUNTER — HOSPITAL ENCOUNTER (EMERGENCY)
Facility: HOSPITAL | Age: 59
Discharge: HOME | End: 2019-04-27
Attending: EMERGENCY MEDICINE | Admitting: EMERGENCY MEDICINE
Payer: COMMERCIAL

## 2019-04-27 VITALS
DIASTOLIC BLOOD PRESSURE: 71 MMHG | TEMPERATURE: 98.1 F | HEART RATE: 76 BPM | RESPIRATION RATE: 20 BRPM | OXYGEN SATURATION: 96 % | SYSTOLIC BLOOD PRESSURE: 108 MMHG

## 2019-04-27 DIAGNOSIS — Z76.5 DRUG-SEEKING BEHAVIOR: ICD-10-CM

## 2019-04-27 DIAGNOSIS — R10.11 RIGHT UPPER QUADRANT ABDOMINAL PAIN: Primary | ICD-10-CM

## 2019-04-27 LAB
ALBUMIN SERPL BCP-MCNC: 3.4 G/DL (ref 3.5–5)
ALP SERPL-CCNC: 170 U/L (ref 46–116)
ALT SERPL W P-5'-P-CCNC: 95 U/L (ref 12–78)
ANION GAP SERPL CALCULATED.3IONS-SCNC: 12 MMOL/L (ref 4–13)
AST SERPL W P-5'-P-CCNC: 66 U/L (ref 5–45)
ATRIAL RATE: 77 BPM
BACTERIA UR QL AUTO: ABNORMAL /HPF
BASOPHILS # BLD AUTO: 0.03 THOUSANDS/ΜL (ref 0–0.1)
BASOPHILS NFR BLD AUTO: 0 % (ref 0–1)
BILIRUB SERPL-MCNC: 0.3 MG/DL (ref 0.2–1)
BILIRUB UR QL STRIP: NEGATIVE
BUN SERPL-MCNC: 14 MG/DL (ref 5–25)
CALCIUM SERPL-MCNC: 9.1 MG/DL (ref 8.3–10.1)
CHLORIDE SERPL-SCNC: 105 MMOL/L (ref 100–108)
CLARITY UR: CLEAR
CO2 SERPL-SCNC: 21 MMOL/L (ref 21–32)
COLOR UR: YELLOW
CREAT SERPL-MCNC: 0.81 MG/DL (ref 0.6–1.3)
EOSINOPHIL # BLD AUTO: 0.04 THOUSAND/ΜL (ref 0–0.61)
EOSINOPHIL NFR BLD AUTO: 1 % (ref 0–6)
ERYTHROCYTE [DISTWIDTH] IN BLOOD BY AUTOMATED COUNT: 13.5 % (ref 11.6–15.1)
GFR SERPL CREATININE-BSD FRML MDRD: 79 ML/MIN/1.73SQ M
GLUCOSE SERPL-MCNC: 125 MG/DL (ref 65–140)
GLUCOSE UR STRIP-MCNC: NEGATIVE MG/DL
HCT VFR BLD AUTO: 43 % (ref 34.8–46.1)
HGB BLD-MCNC: 14.1 G/DL (ref 11.5–15.4)
HGB UR QL STRIP.AUTO: ABNORMAL
IMM GRANULOCYTES # BLD AUTO: 0.03 THOUSAND/UL (ref 0–0.2)
IMM GRANULOCYTES NFR BLD AUTO: 0 % (ref 0–2)
KETONES UR STRIP-MCNC: NEGATIVE MG/DL
LEUKOCYTE ESTERASE UR QL STRIP: ABNORMAL
LIPASE SERPL-CCNC: 58 U/L (ref 73–393)
LYMPHOCYTES # BLD AUTO: 1.18 THOUSANDS/ΜL (ref 0.6–4.47)
LYMPHOCYTES NFR BLD AUTO: 16 % (ref 14–44)
MCH RBC QN AUTO: 29.6 PG (ref 26.8–34.3)
MCHC RBC AUTO-ENTMCNC: 32.8 G/DL (ref 31.4–37.4)
MCV RBC AUTO: 90 FL (ref 82–98)
MONOCYTES # BLD AUTO: 0.1 THOUSAND/ΜL (ref 0.17–1.22)
MONOCYTES NFR BLD AUTO: 1 % (ref 4–12)
NEUTROPHILS # BLD AUTO: 5.93 THOUSANDS/ΜL (ref 1.85–7.62)
NEUTS SEG NFR BLD AUTO: 82 % (ref 43–75)
NITRITE UR QL STRIP: NEGATIVE
NON-SQ EPI CELLS URNS QL MICRO: ABNORMAL /HPF
NRBC BLD AUTO-RTO: 0 /100 WBCS
P AXIS: 67 DEGREES
PH UR STRIP.AUTO: 5.5 [PH]
PLATELET # BLD AUTO: 167 THOUSANDS/UL (ref 149–390)
PMV BLD AUTO: 10.4 FL (ref 8.9–12.7)
POTASSIUM SERPL-SCNC: 4.2 MMOL/L (ref 3.5–5.3)
PR INTERVAL: 102 MS
PROT SERPL-MCNC: 7.4 G/DL (ref 6.4–8.2)
PROT UR STRIP-MCNC: NEGATIVE MG/DL
QRS AXIS: 63 DEGREES
QRSD INTERVAL: 82 MS
QT INTERVAL: 414 MS
QTC INTERVAL: 468 MS
RBC # BLD AUTO: 4.77 MILLION/UL (ref 3.81–5.12)
RBC #/AREA URNS AUTO: ABNORMAL /HPF
SODIUM SERPL-SCNC: 138 MMOL/L (ref 136–145)
SP GR UR STRIP.AUTO: 1.01 (ref 1–1.03)
T WAVE AXIS: 55 DEGREES
TROPONIN I SERPL-MCNC: <0.02 NG/ML
UROBILINOGEN UR QL STRIP.AUTO: 0.2 E.U./DL
VENTRICULAR RATE: 77 BPM
WBC # BLD AUTO: 7.31 THOUSAND/UL (ref 4.31–10.16)
WBC #/AREA URNS AUTO: ABNORMAL /HPF

## 2019-04-27 PROCEDURE — 93005 ELECTROCARDIOGRAM TRACING: CPT

## 2019-04-27 PROCEDURE — 83690 ASSAY OF LIPASE: CPT | Performed by: EMERGENCY MEDICINE

## 2019-04-27 PROCEDURE — 96375 TX/PRO/DX INJ NEW DRUG ADDON: CPT

## 2019-04-27 PROCEDURE — 96374 THER/PROPH/DIAG INJ IV PUSH: CPT

## 2019-04-27 PROCEDURE — 81001 URINALYSIS AUTO W/SCOPE: CPT | Performed by: EMERGENCY MEDICINE

## 2019-04-27 PROCEDURE — 93010 ELECTROCARDIOGRAM REPORT: CPT | Performed by: INTERNAL MEDICINE

## 2019-04-27 PROCEDURE — 99283 EMERGENCY DEPT VISIT LOW MDM: CPT | Performed by: EMERGENCY MEDICINE

## 2019-04-27 PROCEDURE — 80053 COMPREHEN METABOLIC PANEL: CPT | Performed by: EMERGENCY MEDICINE

## 2019-04-27 PROCEDURE — 36415 COLL VENOUS BLD VENIPUNCTURE: CPT | Performed by: EMERGENCY MEDICINE

## 2019-04-27 PROCEDURE — 99285 EMERGENCY DEPT VISIT HI MDM: CPT

## 2019-04-27 PROCEDURE — 74176 CT ABD & PELVIS W/O CONTRAST: CPT

## 2019-04-27 PROCEDURE — 84484 ASSAY OF TROPONIN QUANT: CPT | Performed by: EMERGENCY MEDICINE

## 2019-04-27 PROCEDURE — 85025 COMPLETE CBC W/AUTO DIFF WBC: CPT | Performed by: EMERGENCY MEDICINE

## 2019-04-27 RX ORDER — ONDANSETRON 2 MG/ML
4 INJECTION INTRAMUSCULAR; INTRAVENOUS ONCE
Status: COMPLETED | OUTPATIENT
Start: 2019-04-27 | End: 2019-04-27

## 2019-04-27 RX ORDER — DICYCLOMINE HCL 20 MG
20 TABLET ORAL 2 TIMES DAILY
Qty: 20 TABLET | Refills: 0 | Status: SHIPPED | OUTPATIENT
Start: 2019-04-27 | End: 2019-06-24

## 2019-04-27 RX ORDER — KETOROLAC TROMETHAMINE 30 MG/ML
15 INJECTION, SOLUTION INTRAMUSCULAR; INTRAVENOUS ONCE
Status: COMPLETED | OUTPATIENT
Start: 2019-04-27 | End: 2019-04-27

## 2019-04-27 RX ORDER — HYDROMORPHONE HCL/PF 1 MG/ML
1 SYRINGE (ML) INJECTION ONCE
Status: COMPLETED | OUTPATIENT
Start: 2019-04-27 | End: 2019-04-27

## 2019-04-27 RX ADMIN — KETOROLAC TROMETHAMINE 15 MG: 30 INJECTION, SOLUTION INTRAMUSCULAR at 18:28

## 2019-04-27 RX ADMIN — ONDANSETRON 4 MG: 2 INJECTION INTRAMUSCULAR; INTRAVENOUS at 16:28

## 2019-04-27 RX ADMIN — HYDROMORPHONE HYDROCHLORIDE 1 MG: 1 INJECTION, SOLUTION INTRAMUSCULAR; INTRAVENOUS; SUBCUTANEOUS at 16:31

## 2019-04-28 ENCOUNTER — APPOINTMENT (EMERGENCY)
Dept: CT IMAGING | Facility: HOSPITAL | Age: 59
End: 2019-04-28
Payer: COMMERCIAL

## 2019-04-28 ENCOUNTER — HOSPITAL ENCOUNTER (EMERGENCY)
Facility: HOSPITAL | Age: 59
Discharge: HOME | End: 2019-04-28
Attending: EMERGENCY MEDICINE | Admitting: EMERGENCY MEDICINE
Payer: COMMERCIAL

## 2019-04-28 VITALS
DIASTOLIC BLOOD PRESSURE: 86 MMHG | RESPIRATION RATE: 20 BRPM | SYSTOLIC BLOOD PRESSURE: 151 MMHG | OXYGEN SATURATION: 96 % | HEART RATE: 80 BPM | TEMPERATURE: 97.8 F

## 2019-04-28 DIAGNOSIS — R10.11 RIGHT UPPER QUADRANT PAIN: Primary | ICD-10-CM

## 2019-04-28 LAB
ALBUMIN SERPL BCP-MCNC: 3.4 G/DL (ref 3.5–5)
ALP SERPL-CCNC: 147 U/L (ref 46–116)
ALT SERPL W P-5'-P-CCNC: 79 U/L (ref 12–78)
ANION GAP SERPL CALCULATED.3IONS-SCNC: 12 MMOL/L (ref 4–13)
APAP SERPL-MCNC: <2 UG/ML (ref 10–20)
AST SERPL W P-5'-P-CCNC: 35 U/L (ref 5–45)
BACTERIA UR QL AUTO: ABNORMAL /HPF
BASOPHILS # BLD AUTO: 0.02 THOUSANDS/ΜL (ref 0–0.1)
BASOPHILS NFR BLD AUTO: 0 % (ref 0–1)
BILIRUB DIRECT SERPL-MCNC: 0.07 MG/DL (ref 0–0.2)
BILIRUB SERPL-MCNC: 0.3 MG/DL (ref 0.2–1)
BILIRUB UR QL STRIP: NEGATIVE
BUN SERPL-MCNC: 12 MG/DL (ref 5–25)
CALCIUM SERPL-MCNC: 8.7 MG/DL (ref 8.3–10.1)
CHLORIDE SERPL-SCNC: 107 MMOL/L (ref 100–108)
CLARITY UR: CLEAR
CO2 SERPL-SCNC: 24 MMOL/L (ref 21–32)
COLOR UR: ABNORMAL
CREAT SERPL-MCNC: 0.71 MG/DL (ref 0.6–1.3)
EOSINOPHIL # BLD AUTO: 0.16 THOUSAND/ΜL (ref 0–0.61)
EOSINOPHIL NFR BLD AUTO: 2 % (ref 0–6)
ERYTHROCYTE [DISTWIDTH] IN BLOOD BY AUTOMATED COUNT: 13.8 % (ref 11.6–15.1)
GFR SERPL CREATININE-BSD FRML MDRD: 93 ML/MIN/1.73SQ M
GLUCOSE SERPL-MCNC: 84 MG/DL (ref 65–140)
GLUCOSE UR STRIP-MCNC: NEGATIVE MG/DL
HCT VFR BLD AUTO: 40.8 % (ref 34.8–46.1)
HGB BLD-MCNC: 13.5 G/DL (ref 11.5–15.4)
HGB UR QL STRIP.AUTO: ABNORMAL
IMM GRANULOCYTES # BLD AUTO: 0.04 THOUSAND/UL (ref 0–0.2)
IMM GRANULOCYTES NFR BLD AUTO: 1 % (ref 0–2)
KETONES UR STRIP-MCNC: NEGATIVE MG/DL
LACTATE SERPL-SCNC: 1.6 MMOL/L (ref 0.5–2)
LEUKOCYTE ESTERASE UR QL STRIP: ABNORMAL
LIPASE SERPL-CCNC: 56 U/L (ref 73–393)
LYMPHOCYTES # BLD AUTO: 2.18 THOUSANDS/ΜL (ref 0.6–4.47)
LYMPHOCYTES NFR BLD AUTO: 32 % (ref 14–44)
MCH RBC QN AUTO: 29.9 PG (ref 26.8–34.3)
MCHC RBC AUTO-ENTMCNC: 33.1 G/DL (ref 31.4–37.4)
MCV RBC AUTO: 90 FL (ref 82–98)
MONOCYTES # BLD AUTO: 0.34 THOUSAND/ΜL (ref 0.17–1.22)
MONOCYTES NFR BLD AUTO: 5 % (ref 4–12)
NEUTROPHILS # BLD AUTO: 4.16 THOUSANDS/ΜL (ref 1.85–7.62)
NEUTS SEG NFR BLD AUTO: 60 % (ref 43–75)
NITRITE UR QL STRIP: NEGATIVE
NON-SQ EPI CELLS URNS QL MICRO: ABNORMAL /HPF
NRBC BLD AUTO-RTO: 0 /100 WBCS
PH UR STRIP.AUTO: 5.5 [PH]
PLATELET # BLD AUTO: 170 THOUSANDS/UL (ref 149–390)
PMV BLD AUTO: 10.1 FL (ref 8.9–12.7)
POTASSIUM SERPL-SCNC: 3.9 MMOL/L (ref 3.5–5.3)
PROT SERPL-MCNC: 7 G/DL (ref 6.4–8.2)
PROT UR STRIP-MCNC: NEGATIVE MG/DL
RBC # BLD AUTO: 4.52 MILLION/UL (ref 3.81–5.12)
RBC #/AREA URNS AUTO: ABNORMAL /HPF
SODIUM SERPL-SCNC: 143 MMOL/L (ref 136–145)
SP GR UR STRIP.AUTO: 1.01 (ref 1–1.03)
UROBILINOGEN UR QL STRIP.AUTO: 0.2 E.U./DL
WBC # BLD AUTO: 6.9 THOUSAND/UL (ref 4.31–10.16)
WBC #/AREA URNS AUTO: ABNORMAL /HPF

## 2019-04-28 PROCEDURE — 74176 CT ABD & PELVIS W/O CONTRAST: CPT

## 2019-04-28 PROCEDURE — 99284 EMERGENCY DEPT VISIT MOD MDM: CPT | Performed by: EMERGENCY MEDICINE

## 2019-04-28 PROCEDURE — 83605 ASSAY OF LACTIC ACID: CPT | Performed by: EMERGENCY MEDICINE

## 2019-04-28 PROCEDURE — 83690 ASSAY OF LIPASE: CPT | Performed by: EMERGENCY MEDICINE

## 2019-04-28 PROCEDURE — 99285 EMERGENCY DEPT VISIT HI MDM: CPT

## 2019-04-28 PROCEDURE — 96361 HYDRATE IV INFUSION ADD-ON: CPT

## 2019-04-28 PROCEDURE — 85025 COMPLETE CBC W/AUTO DIFF WBC: CPT | Performed by: EMERGENCY MEDICINE

## 2019-04-28 PROCEDURE — 80048 BASIC METABOLIC PNL TOTAL CA: CPT | Performed by: EMERGENCY MEDICINE

## 2019-04-28 PROCEDURE — 80076 HEPATIC FUNCTION PANEL: CPT | Performed by: EMERGENCY MEDICINE

## 2019-04-28 PROCEDURE — 80329 ANALGESICS NON-OPIOID 1 OR 2: CPT | Performed by: EMERGENCY MEDICINE

## 2019-04-28 PROCEDURE — 36415 COLL VENOUS BLD VENIPUNCTURE: CPT | Performed by: EMERGENCY MEDICINE

## 2019-04-28 PROCEDURE — 96375 TX/PRO/DX INJ NEW DRUG ADDON: CPT

## 2019-04-28 PROCEDURE — 96374 THER/PROPH/DIAG INJ IV PUSH: CPT

## 2019-04-28 PROCEDURE — 81001 URINALYSIS AUTO W/SCOPE: CPT | Performed by: EMERGENCY MEDICINE

## 2019-04-28 RX ORDER — SUCRALFATE ORAL 1 G/10ML
1000 SUSPENSION ORAL ONCE
Status: COMPLETED | OUTPATIENT
Start: 2019-04-28 | End: 2019-04-28

## 2019-04-28 RX ORDER — ONDANSETRON 4 MG/1
4 TABLET, ORALLY DISINTEGRATING ORAL ONCE
Status: DISCONTINUED | OUTPATIENT
Start: 2019-04-28 | End: 2019-04-28

## 2019-04-28 RX ORDER — ONDANSETRON 2 MG/ML
4 INJECTION INTRAMUSCULAR; INTRAVENOUS ONCE
Status: DISCONTINUED | OUTPATIENT
Start: 2019-04-28 | End: 2019-04-28

## 2019-04-28 RX ORDER — HYDROMORPHONE HCL/PF 1 MG/ML
1 SYRINGE (ML) INJECTION ONCE
Status: COMPLETED | OUTPATIENT
Start: 2019-04-28 | End: 2019-04-28

## 2019-04-28 RX ORDER — OXYCODONE HYDROCHLORIDE AND ACETAMINOPHEN 5; 325 MG/1; MG/1
1 TABLET ORAL ONCE
Status: DISCONTINUED | OUTPATIENT
Start: 2019-04-28 | End: 2019-04-28

## 2019-04-28 RX ORDER — SUCRALFATE ORAL 1 G/10ML
1 SUSPENSION ORAL
Qty: 420 ML | Refills: 0 | Status: SHIPPED | OUTPATIENT
Start: 2019-04-28 | End: 2019-06-11 | Stop reason: CLARIF

## 2019-04-28 RX ORDER — MORPHINE SULFATE 10 MG/ML
6 INJECTION, SOLUTION INTRAMUSCULAR; INTRAVENOUS ONCE
Status: COMPLETED | OUTPATIENT
Start: 2019-04-28 | End: 2019-04-28

## 2019-04-28 RX ADMIN — MORPHINE SULFATE 6 MG: 10 INJECTION INTRAVENOUS at 20:07

## 2019-04-28 RX ADMIN — HYDROMORPHONE HYDROCHLORIDE 1 MG: 1 INJECTION, SOLUTION INTRAMUSCULAR; INTRAVENOUS; SUBCUTANEOUS at 21:56

## 2019-04-28 RX ADMIN — SODIUM CHLORIDE 1000 ML: 0.9 INJECTION, SOLUTION INTRAVENOUS at 20:10

## 2019-04-28 RX ADMIN — SUCRALFATE 1000 MG: 1 SUSPENSION ORAL at 22:00

## 2019-05-25 DIAGNOSIS — I10 ESSENTIAL HYPERTENSION: ICD-10-CM

## 2019-05-27 ENCOUNTER — HOSPITAL ENCOUNTER (EMERGENCY)
Facility: HOSPITAL | Age: 59
Discharge: LEFT BEFORE DISCHARGE | End: 2019-05-27
Attending: EMERGENCY MEDICINE
Payer: COMMERCIAL

## 2019-05-27 VITALS
OXYGEN SATURATION: 100 % | SYSTOLIC BLOOD PRESSURE: 183 MMHG | DIASTOLIC BLOOD PRESSURE: 91 MMHG | RESPIRATION RATE: 18 BRPM | HEART RATE: 83 BPM | TEMPERATURE: 98.1 F | WEIGHT: 156.75 LBS | HEIGHT: 62 IN | BODY MASS INDEX: 28.84 KG/M2

## 2019-05-27 DIAGNOSIS — Z53.29 LEFT AGAINST MEDICAL ADVICE: ICD-10-CM

## 2019-05-27 DIAGNOSIS — R07.9 CHEST PAIN: ICD-10-CM

## 2019-05-27 DIAGNOSIS — M54.50 LOW BACK PAIN: Primary | ICD-10-CM

## 2019-05-27 LAB
ATRIAL RATE: 71 BPM
P AXIS: 69 DEGREES
PR INTERVAL: 110 MS
QRS AXIS: 68 DEGREES
QRSD INTERVAL: 80 MS
QT INTERVAL: 424 MS
QTC INTERVAL: 460 MS
T WAVE AXIS: 47 DEGREES
VENTRICULAR RATE: 71 BPM

## 2019-05-27 PROCEDURE — 99284 EMERGENCY DEPT VISIT MOD MDM: CPT

## 2019-05-27 PROCEDURE — 99282 EMERGENCY DEPT VISIT SF MDM: CPT | Performed by: PHYSICIAN ASSISTANT

## 2019-05-27 PROCEDURE — 93010 ELECTROCARDIOGRAM REPORT: CPT | Performed by: INTERNAL MEDICINE

## 2019-05-27 PROCEDURE — 93005 ELECTROCARDIOGRAM TRACING: CPT

## 2019-05-27 RX ORDER — NITROGLYCERIN 0.4 MG/1
0.4 TABLET SUBLINGUAL ONCE
Status: DISCONTINUED | OUTPATIENT
Start: 2019-05-27 | End: 2019-05-27 | Stop reason: HOSPADM

## 2019-05-27 RX ORDER — ACETAMINOPHEN 325 MG/1
650 TABLET ORAL ONCE
Status: DISCONTINUED | OUTPATIENT
Start: 2019-05-27 | End: 2019-05-27 | Stop reason: HOSPADM

## 2019-05-27 RX ORDER — DIAZEPAM 5 MG/ML
2.5 INJECTION, SOLUTION INTRAMUSCULAR; INTRAVENOUS ONCE
Status: DISCONTINUED | OUTPATIENT
Start: 2019-05-27 | End: 2019-05-27 | Stop reason: HOSPADM

## 2019-05-28 RX ORDER — ATORVASTATIN CALCIUM 40 MG/1
TABLET, FILM COATED ORAL
Qty: 90 TABLET | Refills: 3 | Status: SHIPPED | OUTPATIENT
Start: 2019-05-28 | End: 2020-08-10 | Stop reason: SDUPTHER

## 2019-05-28 RX ORDER — METOPROLOL TARTRATE 50 MG/1
TABLET, FILM COATED ORAL
Qty: 60 TABLET | Refills: 11 | Status: SHIPPED | OUTPATIENT
Start: 2019-05-28 | End: 2020-08-10 | Stop reason: SDUPTHER

## 2019-06-11 ENCOUNTER — OFFICE VISIT (OUTPATIENT)
Dept: INTERNAL MEDICINE CLINIC | Facility: CLINIC | Age: 59
End: 2019-06-11
Payer: COMMERCIAL

## 2019-06-11 VITALS
SYSTOLIC BLOOD PRESSURE: 140 MMHG | BODY MASS INDEX: 28.07 KG/M2 | DIASTOLIC BLOOD PRESSURE: 96 MMHG | HEART RATE: 70 BPM | TEMPERATURE: 97.8 F | WEIGHT: 152.52 LBS | OXYGEN SATURATION: 99 % | HEIGHT: 62 IN

## 2019-06-11 DIAGNOSIS — J44.9 CHRONIC OBSTRUCTIVE PULMONARY DISEASE, UNSPECIFIED COPD TYPE (HCC): ICD-10-CM

## 2019-06-11 DIAGNOSIS — K21.9 GASTROESOPHAGEAL REFLUX DISEASE WITHOUT ESOPHAGITIS: Primary | ICD-10-CM

## 2019-06-11 DIAGNOSIS — F10.10 ALCOHOL ABUSE: ICD-10-CM

## 2019-06-11 DIAGNOSIS — F32.A ANXIETY AND DEPRESSION: ICD-10-CM

## 2019-06-11 DIAGNOSIS — F41.9 ANXIETY AND DEPRESSION: ICD-10-CM

## 2019-06-11 DIAGNOSIS — M23.42 LOOSE BODY OF LEFT KNEE: Primary | ICD-10-CM

## 2019-06-11 DIAGNOSIS — I10 ESSENTIAL HYPERTENSION: ICD-10-CM

## 2019-06-11 DIAGNOSIS — M06.9 RHEUMATOID ARTHRITIS INVOLVING MULTIPLE SITES, UNSPECIFIED RHEUMATOID FACTOR PRESENCE: ICD-10-CM

## 2019-06-11 DIAGNOSIS — I25.10 CORONARY ARTERY DISEASE INVOLVING NATIVE CORONARY ARTERY OF NATIVE HEART WITHOUT ANGINA PECTORIS: ICD-10-CM

## 2019-06-11 PROCEDURE — 3008F BODY MASS INDEX DOCD: CPT | Performed by: PHYSICIAN ASSISTANT

## 2019-06-11 PROCEDURE — 99214 OFFICE O/P EST MOD 30 MIN: CPT | Performed by: PHYSICIAN ASSISTANT

## 2019-06-11 RX ORDER — PANTOPRAZOLE SODIUM 40 MG/1
40 TABLET, DELAYED RELEASE ORAL DAILY
Qty: 30 TABLET | Refills: 1 | Status: SHIPPED | OUTPATIENT
Start: 2019-06-11 | End: 2019-06-24

## 2019-06-24 ENCOUNTER — HOSPITAL ENCOUNTER (INPATIENT)
Facility: HOSPITAL | Age: 59
LOS: 2 days | Discharge: HOME | DRG: 251 | End: 2019-06-27
Attending: EMERGENCY MEDICINE | Admitting: INTERNAL MEDICINE
Payer: COMMERCIAL

## 2019-06-24 ENCOUNTER — APPOINTMENT (EMERGENCY)
Dept: CT IMAGING | Facility: HOSPITAL | Age: 59
DRG: 251 | End: 2019-06-24
Payer: COMMERCIAL

## 2019-06-24 ENCOUNTER — APPOINTMENT (EMERGENCY)
Dept: RADIOLOGY | Facility: HOSPITAL | Age: 59
DRG: 251 | End: 2019-06-24
Payer: COMMERCIAL

## 2019-06-24 DIAGNOSIS — R10.13 EPIGASTRIC PAIN: ICD-10-CM

## 2019-06-24 DIAGNOSIS — K20.90 ESOPHAGITIS: ICD-10-CM

## 2019-06-24 DIAGNOSIS — R07.9 CHEST PAIN, UNSPECIFIED: Primary | ICD-10-CM

## 2019-06-24 PROBLEM — B37.0 CANDIDAL STOMATITIS: Status: ACTIVE | Noted: 2019-06-24

## 2019-06-24 LAB
ALBUMIN SERPL BCP-MCNC: 3.4 G/DL (ref 3.5–5)
ALP SERPL-CCNC: 135 U/L (ref 46–116)
ALT SERPL W P-5'-P-CCNC: 48 U/L (ref 12–78)
ANION GAP SERPL CALCULATED.3IONS-SCNC: 10 MMOL/L (ref 4–13)
AST SERPL W P-5'-P-CCNC: 34 U/L (ref 5–45)
ATRIAL RATE: 74 BPM
BASOPHILS # BLD AUTO: 0.05 THOUSANDS/ΜL (ref 0–0.1)
BASOPHILS NFR BLD AUTO: 1 % (ref 0–1)
BILIRUB SERPL-MCNC: 0.4 MG/DL (ref 0.2–1)
BUN SERPL-MCNC: 13 MG/DL (ref 5–25)
CALCIUM SERPL-MCNC: 9 MG/DL (ref 8.3–10.1)
CHLORIDE SERPL-SCNC: 106 MMOL/L (ref 100–108)
CO2 SERPL-SCNC: 24 MMOL/L (ref 21–32)
CREAT SERPL-MCNC: 0.69 MG/DL (ref 0.6–1.3)
DEPRECATED D DIMER PPP: 4807 NG/ML (FEU)
EOSINOPHIL # BLD AUTO: 0.22 THOUSAND/ΜL (ref 0–0.61)
EOSINOPHIL NFR BLD AUTO: 4 % (ref 0–6)
ERYTHROCYTE [DISTWIDTH] IN BLOOD BY AUTOMATED COUNT: 13.7 % (ref 11.6–15.1)
GFR SERPL CREATININE-BSD FRML MDRD: 95 ML/MIN/1.73SQ M
GLUCOSE SERPL-MCNC: 82 MG/DL (ref 65–140)
HCT VFR BLD AUTO: 39.9 % (ref 34.8–46.1)
HGB BLD-MCNC: 13.3 G/DL (ref 11.5–15.4)
IMM GRANULOCYTES # BLD AUTO: 0.01 THOUSAND/UL (ref 0–0.2)
IMM GRANULOCYTES NFR BLD AUTO: 0 % (ref 0–2)
LYMPHOCYTES # BLD AUTO: 2.35 THOUSANDS/ΜL (ref 0.6–4.47)
LYMPHOCYTES NFR BLD AUTO: 40 % (ref 14–44)
MAGNESIUM SERPL-MCNC: 1.8 MG/DL (ref 1.6–2.6)
MCH RBC QN AUTO: 29.9 PG (ref 26.8–34.3)
MCHC RBC AUTO-ENTMCNC: 33.3 G/DL (ref 31.4–37.4)
MCV RBC AUTO: 90 FL (ref 82–98)
MONOCYTES # BLD AUTO: 0.44 THOUSAND/ΜL (ref 0.17–1.22)
MONOCYTES NFR BLD AUTO: 8 % (ref 4–12)
NEUTROPHILS # BLD AUTO: 2.82 THOUSANDS/ΜL (ref 1.85–7.62)
NEUTS SEG NFR BLD AUTO: 47 % (ref 43–75)
NRBC BLD AUTO-RTO: 0 /100 WBCS
P AXIS: 59 DEGREES
PLATELET # BLD AUTO: 188 THOUSANDS/UL (ref 149–390)
PMV BLD AUTO: 10 FL (ref 8.9–12.7)
POTASSIUM SERPL-SCNC: 3.9 MMOL/L (ref 3.5–5.3)
PR INTERVAL: 100 MS
PROT SERPL-MCNC: 7.1 G/DL (ref 6.4–8.2)
QRS AXIS: 49 DEGREES
QRSD INTERVAL: 80 MS
QT INTERVAL: 410 MS
QTC INTERVAL: 455 MS
RBC # BLD AUTO: 4.45 MILLION/UL (ref 3.81–5.12)
SODIUM SERPL-SCNC: 140 MMOL/L (ref 136–145)
T WAVE AXIS: 46 DEGREES
TROPONIN I SERPL-MCNC: <0.02 NG/ML
TROPONIN I SERPL-MCNC: <0.02 NG/ML
VENTRICULAR RATE: 74 BPM
WBC # BLD AUTO: 5.89 THOUSAND/UL (ref 4.31–10.16)

## 2019-06-24 PROCEDURE — 36415 COLL VENOUS BLD VENIPUNCTURE: CPT | Performed by: EMERGENCY MEDICINE

## 2019-06-24 PROCEDURE — 71275 CT ANGIOGRAPHY CHEST: CPT

## 2019-06-24 PROCEDURE — 84484 ASSAY OF TROPONIN QUANT: CPT | Performed by: EMERGENCY MEDICINE

## 2019-06-24 PROCEDURE — 85379 FIBRIN DEGRADATION QUANT: CPT | Performed by: EMERGENCY MEDICINE

## 2019-06-24 PROCEDURE — 93010 ELECTROCARDIOGRAM REPORT: CPT | Performed by: INTERNAL MEDICINE

## 2019-06-24 PROCEDURE — 96375 TX/PRO/DX INJ NEW DRUG ADDON: CPT

## 2019-06-24 PROCEDURE — 93005 ELECTROCARDIOGRAM TRACING: CPT

## 2019-06-24 PROCEDURE — 96374 THER/PROPH/DIAG INJ IV PUSH: CPT

## 2019-06-24 PROCEDURE — 99220 PR INITIAL OBSERVATION CARE/DAY 70 MINUTES: CPT | Performed by: INTERNAL MEDICINE

## 2019-06-24 PROCEDURE — 99285 EMERGENCY DEPT VISIT HI MDM: CPT | Performed by: EMERGENCY MEDICINE

## 2019-06-24 PROCEDURE — 85025 COMPLETE CBC W/AUTO DIFF WBC: CPT | Performed by: EMERGENCY MEDICINE

## 2019-06-24 PROCEDURE — 99285 EMERGENCY DEPT VISIT HI MDM: CPT

## 2019-06-24 PROCEDURE — 83735 ASSAY OF MAGNESIUM: CPT | Performed by: EMERGENCY MEDICINE

## 2019-06-24 PROCEDURE — 80053 COMPREHEN METABOLIC PANEL: CPT | Performed by: EMERGENCY MEDICINE

## 2019-06-24 RX ORDER — ATORVASTATIN CALCIUM 40 MG/1
40 TABLET, FILM COATED ORAL DAILY
Status: DISCONTINUED | OUTPATIENT
Start: 2019-06-25 | End: 2019-06-27 | Stop reason: HOSPADM

## 2019-06-24 RX ORDER — HYDROMORPHONE HCL/PF 1 MG/ML
1 SYRINGE (ML) INJECTION ONCE
Status: COMPLETED | OUTPATIENT
Start: 2019-06-24 | End: 2019-06-24

## 2019-06-24 RX ORDER — ACETAMINOPHEN 325 MG/1
650 TABLET ORAL EVERY 6 HOURS PRN
Status: DISCONTINUED | OUTPATIENT
Start: 2019-06-24 | End: 2019-06-27 | Stop reason: HOSPADM

## 2019-06-24 RX ORDER — FENTANYL CITRATE 50 UG/ML
50 INJECTION, SOLUTION INTRAMUSCULAR; INTRAVENOUS ONCE
Status: COMPLETED | OUTPATIENT
Start: 2019-06-24 | End: 2019-06-24

## 2019-06-24 RX ORDER — MIRTAZAPINE 15 MG/1
30 TABLET, FILM COATED ORAL
Status: DISCONTINUED | OUTPATIENT
Start: 2019-06-24 | End: 2019-06-27 | Stop reason: HOSPADM

## 2019-06-24 RX ORDER — CITALOPRAM 20 MG/1
40 TABLET ORAL DAILY
Status: DISCONTINUED | OUTPATIENT
Start: 2019-06-25 | End: 2019-06-27 | Stop reason: HOSPADM

## 2019-06-24 RX ORDER — METOPROLOL TARTRATE 50 MG/1
50 TABLET, FILM COATED ORAL EVERY 12 HOURS
Status: DISCONTINUED | OUTPATIENT
Start: 2019-06-24 | End: 2019-06-27 | Stop reason: HOSPADM

## 2019-06-24 RX ORDER — ALBUTEROL SULFATE 2.5 MG/3ML
5 SOLUTION RESPIRATORY (INHALATION) ONCE
Status: COMPLETED | OUTPATIENT
Start: 2019-06-24 | End: 2019-06-24

## 2019-06-24 RX ORDER — SODIUM CHLORIDE 9 MG/ML
50 INJECTION, SOLUTION INTRAVENOUS CONTINUOUS
Status: DISCONTINUED | OUTPATIENT
Start: 2019-06-24 | End: 2019-06-27

## 2019-06-24 RX ORDER — ONDANSETRON 2 MG/ML
4 INJECTION INTRAMUSCULAR; INTRAVENOUS ONCE
Status: COMPLETED | OUTPATIENT
Start: 2019-06-24 | End: 2019-06-24

## 2019-06-24 RX ORDER — ALBUTEROL SULFATE 2.5 MG/3ML
2.5 SOLUTION RESPIRATORY (INHALATION) EVERY 6 HOURS PRN
Status: DISCONTINUED | OUTPATIENT
Start: 2019-06-24 | End: 2019-06-27 | Stop reason: HOSPADM

## 2019-06-24 RX ORDER — PANTOPRAZOLE SODIUM 40 MG/1
40 INJECTION, POWDER, FOR SOLUTION INTRAVENOUS EVERY 12 HOURS SCHEDULED
Status: DISCONTINUED | OUTPATIENT
Start: 2019-06-24 | End: 2019-06-26

## 2019-06-24 RX ORDER — LIDOCAINE HYDROCHLORIDE 20 MG/ML
15 SOLUTION OROPHARYNGEAL ONCE
Status: COMPLETED | OUTPATIENT
Start: 2019-06-24 | End: 2019-06-24

## 2019-06-24 RX ORDER — MAGNESIUM HYDROXIDE/ALUMINUM HYDROXICE/SIMETHICONE 120; 1200; 1200 MG/30ML; MG/30ML; MG/30ML
30 SUSPENSION ORAL ONCE
Status: COMPLETED | OUTPATIENT
Start: 2019-06-24 | End: 2019-06-24

## 2019-06-24 RX ORDER — GABAPENTIN 400 MG/1
800 CAPSULE ORAL 3 TIMES DAILY
Status: DISCONTINUED | OUTPATIENT
Start: 2019-06-24 | End: 2019-06-27 | Stop reason: HOSPADM

## 2019-06-24 RX ORDER — CLONAZEPAM 1 MG/1
1 TABLET ORAL 2 TIMES DAILY PRN
Status: DISCONTINUED | OUTPATIENT
Start: 2019-06-24 | End: 2019-06-27 | Stop reason: HOSPADM

## 2019-06-24 RX ADMIN — IPRATROPIUM BROMIDE 0.5 MG: 0.5 SOLUTION RESPIRATORY (INHALATION) at 17:00

## 2019-06-24 RX ADMIN — HYDROMORPHONE HYDROCHLORIDE 1 MG: 1 INJECTION, SOLUTION INTRAMUSCULAR; INTRAVENOUS; SUBCUTANEOUS at 19:39

## 2019-06-24 RX ADMIN — FENTANYL CITRATE 50 MCG: 50 INJECTION INTRAMUSCULAR; INTRAVENOUS at 16:55

## 2019-06-24 RX ADMIN — NYSTATIN 500000 UNITS: 100000 SUSPENSION ORAL at 23:36

## 2019-06-24 RX ADMIN — ONDANSETRON 4 MG: 2 INJECTION INTRAMUSCULAR; INTRAVENOUS at 16:55

## 2019-06-24 RX ADMIN — ALUMINUM HYDROXIDE, MAGNESIUM HYDROXIDE, AND SIMETHICONE 30 ML: 200; 200; 20 SUSPENSION ORAL at 20:39

## 2019-06-24 RX ADMIN — HYDROMORPHONE HYDROCHLORIDE 1 MG: 1 INJECTION, SOLUTION INTRAMUSCULAR; INTRAVENOUS; SUBCUTANEOUS at 18:18

## 2019-06-24 RX ADMIN — LIDOCAINE HYDROCHLORIDE 15 ML: 20 SOLUTION ORAL; TOPICAL at 20:38

## 2019-06-24 RX ADMIN — MORPHINE SULFATE 1 MG: 2 INJECTION, SOLUTION INTRAMUSCULAR; INTRAVENOUS at 22:45

## 2019-06-24 RX ADMIN — ALBUTEROL SULFATE 5 MG: 2.5 SOLUTION RESPIRATORY (INHALATION) at 17:00

## 2019-06-24 RX ADMIN — IOHEXOL 85 ML: 350 INJECTION, SOLUTION INTRAVENOUS at 19:25

## 2019-06-24 NOTE — ED PROVIDER NOTES
History  Chief Complaint   Patient presents with    Chest Pain     Chest pain x 30 min PTA  Refused nitro      59 y o  F presents w chest pain that started 30 minutes prior to arrival   Patient called EMS  She refuse to take nitroglycerin  Refuses to take aspirin secondary to allergy  She states that 30 minutes prior to arrival, she started with a crushing central chest pain, radiates leftward into the left shoulder  This is associated with shortness of breath  She has associated epigastric pain, no nausea or vomiting, no diaphoresis  Pain has continued and does not dissipate until she is given narcotics in the emergency department  Past medical history is pertinent for alcohol abuse, anxiety, coronary artery disease, hypertension, hyperlipidemia, atrial fibrillation, ovarian cancer, psychiatric disorders  Prior to Admission Medications   Prescriptions Last Dose Informant Patient Reported? Taking?    albuterol (2 5 mg/3 mL) 0 083 % nebulizer solution 2019 at Unknown time Self No Yes   Sig: Take 1 vial (2 5 mg total) by nebulization every 6 (six) hours as needed for wheezing or shortness of breath   atorvastatin (LIPITOR) 40 mg tablet 2019 at Unknown time Self No Yes   Sig: take 1 tablet by mouth once daily   citalopram (CELEXA) 40 mg tablet 2019 at Unknown time Self Yes Yes   Sig: Take 40 mg by mouth daily     clonazePAM (KlonoPIN) 1 mg tablet Past Week at Unknown time Self Yes Yes   Si mg 2 (two) times a day as needed for anxiety     famotidine (PEPCID) 40 MG tablet 2019 at Unknown time Self No Yes   Sig: Take 1 tablet (40 mg total) by mouth daily   gabapentin (NEURONTIN) 800 mg tablet Past Week at Unknown time Self Yes Yes   Sig: Take 800 mg by mouth 3 (three) times a day   metoprolol tartrate (LOPRESSOR) 50 mg tablet 2019 at Unknown time Self No Yes   Sig: TAKE 1 TABLET EVERY 12 HOURS   mirtazapine (REMERON) 30 mg tablet 2019 at Unknown time Self Yes Yes Sig: Take 30 mg by mouth daily at bedtime        Facility-Administered Medications: None       Past Medical History:   Diagnosis Date    Alcohol abuse 12/31/2018    Anxiety     Bipolar 1 disorder ([facility] )     Mental Health problems listed as Denied in [employer], cant't entire under pertinent negatives due to this diagnosis    CAD (coronary artery disease)     last assessed - 14Apr2017    Chronic back pain     Frequent headaches     Glaucoma     Hyperlipidemia     Hypertension     Irregular heart beat     AFib    Ovarian cancer ([facility] )     last assessed - 21Sep2016    Psychiatric disorder     bipolar    Rheumatoid arthritis ([place] [address] )     Shortness of breath     Uterine carcinoma ([place] [address] )     last assessed - 21Sep2016       Past Surgical History:   Procedure Laterality Date    ADENOIDECTOMY      Tonsillectomy with Adenoidectomy - last assessed - 21Sep2016    APPENDECTOMY      last assessed - 21Sep2016    CATARACT EXTRACTION Left     Remove cataract, corneo-scleral section of left eye; last assessed - 21Sep2016    CHOLECYSTECTOMY      last assessed - 29Sep2016   Kristen Slipper EYE SURGERY      Anterior sclera fistulization for glaucoma; last assessed - 21Sep2016    HYSTERECTOMY      KRYSTA-BSO; last assessed - 21Sep2016    INTRAOCULAR LENS INSERTION      WI TEMPORAL ARTERY LIGATN OR BX Right 12/16/2016    Procedure: BIOPSY ARTERY TEMPORAL;  Surgeon: Chela Crowley MD;  Location: [facility];  Service: General    TONSILLECTOMY      Tonsillectomy with Adenoidectomy - last assessed - 21Sep2016       Family History   Problem Relation Age of Onset    Heart disease Mother     Coronary artery disease Mother     Other Mother         1) Gangrene; 2) Peripheral arterial disease    Hyperlipidemia Mother         Hypercholesterolemia    Stomach cancer Mother     Heart attack Mother         Myocardial Infarction    Other Father         1) Gangrene; 2) Peripheral arterial disease    Hyperlipidemia Father Hypercholesterolemia    Stomach cancer Father     Heart attack Father         Myocardial Infarction    Stomach cancer Brother     Heart attack Brother         Myocardial Infarction    Stomach cancer Maternal Uncle      I have reviewed and agree with the history as documented  Social History     Tobacco Use    Smoking status: Current Every Day Smoker     Packs/day: 0 25     Years: 46 00     Pack years: 11 50     Types: Cigarettes    Smokeless tobacco: Never Used   Substance Use Topics    Alcohol use: Yes     Comment: occasional; (No alcohol use per [employer])    Drug use: No        Review of Systems   Constitutional: Positive for fatigue  Negative for chills and fever  HENT: Negative for congestion and sore throat  Respiratory: Positive for chest tightness and shortness of breath  Negative for apnea, cough and wheezing  Cardiovascular: Positive for chest pain  Negative for palpitations and leg swelling  Gastrointestinal: Negative for abdominal pain, constipation, diarrhea, nausea and vomiting  Genitourinary: Negative for dysuria and flank pain  Musculoskeletal: Negative for back pain and neck pain  Skin: Negative for color change and rash  Allergic/Immunologic: Negative for immunocompromised state  Neurological: Negative for dizziness, syncope, weakness, light-headedness, numbness and headaches  Physical Exam  Physical Exam   Constitutional: She is oriented to person, place, and time  She appears well-developed and well-nourished  Non-toxic appearance  She does not appear ill  She appears distressed (CP)  HENT:   Head: Normocephalic and atraumatic  Eyes: Pupils are equal, round, and reactive to light  Conjunctivae and EOM are normal  Right eye exhibits no discharge  Left eye exhibits no discharge  No scleral icterus  Neck: Normal range of motion  Neck supple  No JVD present  Cardiovascular: Normal rate, regular rhythm, normal heart sounds and intact distal pulses   Exam reveals no gallop and no friction rub  No murmur heard  Pulmonary/Chest: Effort normal and breath sounds normal  No respiratory distress  She has no decreased breath sounds  She has no wheezes  She has no rhonchi  She has no rales  She exhibits no tenderness  Chest tenderness    Abdominal: Soft  Bowel sounds are normal  She exhibits no distension  There is tenderness (epigastric)  There is no rebound and no guarding  Musculoskeletal: Normal range of motion  She exhibits no edema, tenderness or deformity  Right lower leg: Normal         Left lower leg: Normal    Neurological: She is alert and oriented to person, place, and time  No cranial nerve deficit  Skin: Skin is warm and dry  No rash noted  She is not diaphoretic  No erythema  No pallor  Psychiatric: She has a normal mood and affect  Her behavior is normal    Vitals reviewed        Vital Signs  ED Triage Vitals   Temperature Pulse Respirations Blood Pressure SpO2   06/24/19 1637 06/24/19 1637 06/24/19 1637 06/24/19 1637 06/24/19 1637   97 7 °F (36 5 °C) 74 (!) 23 135/89 96 %      Temp Source Heart Rate Source Patient Position - Orthostatic VS BP Location FiO2 (%)   06/25/19 1317 06/24/19 1906 06/24/19 1906 06/24/19 1906 --   Axillary Monitor Lying Right arm       Pain Score       06/24/19 1637       Worst Possible Pain           Vitals:    06/27/19 0004 06/27/19 0243 06/27/19 0725 06/27/19 1215   BP: 148/79 152/80 152/81 148/80   Pulse: 82 76 86 85   Patient Position - Orthostatic VS:             Visual Acuity      ED Medications  Medications   fentanyl citrate (PF) 100 MCG/2ML 50 mcg (50 mcg Intravenous Given 6/24/19 1655)   ondansetron (ZOFRAN) injection 4 mg (4 mg Intravenous Given 6/24/19 1655)   albuterol inhalation solution 5 mg (5 mg Nebulization Given 6/24/19 1700)   ipratropium (ATROVENT) 0 02 % inhalation solution 0 5 mg (0 5 mg Nebulization Given 6/24/19 1700)   HYDROmorphone (DILAUDID) injection 1 mg (1 mg Intravenous Given 6/24/19 1818)   iohexol (OMNIPAQUE) 350 MG/ML injection (MULTI-DOSE) 85 mL (85 mL Intravenous Given 6/24/19 1925)   HYDROmorphone (DILAUDID) injection 1 mg (1 mg Intravenous Given 6/24/19 1939)   aluminum-magnesium hydroxide-simethicone (MYLANTA) 200-200-20 mg/5 mL oral suspension 30 mL (30 mL Oral Given 6/24/19 2039)   Lidocaine Viscous HCl (XYLOCAINE) 2 % mucosal solution 15 mL (15 mL Swish & Spit Given 6/24/19 2038)   sodium chloride 0 9 % bolus 500 mL (0 mL Intravenous Stopped 6/25/19 0150)   ipratropium-albuterol (DUO-NEB) 0 5-2 5 mg/3 mL inhalation solution 3 mL (3 mL Nebulization Given 6/25/19 1249)       Diagnostic Studies  Results Reviewed     Procedure Component Value Units Date/Time    TSH, 3rd generation [[id number]]  (Normal) Collected:  06/26/19 0554    Lab Status:  Final result Specimen:  Blood from Hand, Left Updated:  06/26/19 0644     TSH 3RD GENERATON 0 808 uIU/mL     Narrative:       Patients undergoing fluorescein dye angiography may retain small amounts of fluorescein in the body for 48-72 hours post procedure  Samples containing fluorescein can produce falsely depressed TSH values  If the patient had this procedure,a specimen should be resubmitted post fluorescein clearance        CBC and differential [[id number]] Collected:  06/25/19 0809    Lab Status:  Final result Specimen:  Blood from Arm, Right Updated:  06/25/19 0814     WBC 6 13 Thousand/uL      RBC 4 60 Million/uL      Hemoglobin 13 9 g/dL      Hematocrit 42 5 %      MCV 92 fL      MCH 30 2 pg      MCHC 32 7 g/dL      RDW 14 0 %      MPV 9 2 fL      Platelets 939 Thousands/uL      nRBC 0 /100 WBCs      Neutrophils Relative 68 %      Immat GRANS % 0 %      Lymphocytes Relative 24 %      Monocytes Relative 4 %      Eosinophils Relative 3 %      Basophils Relative 1 %      Neutrophils Absolute 4 19 Thousands/µL      Immature Grans Absolute 0 02 Thousand/uL      Lymphocytes Absolute 1 47 Thousands/µL      Monocytes Absolute 0 26 Thousand/µL Eosinophils Absolute 0 16 Thousand/µL      Basophils Absolute 0 03 Thousands/µL     Basic metabolic panel [[id number]] Collected:  06/25/19 0738    Lab Status:  Final result Specimen:  Blood from Hand, Left Updated:  06/25/19 0804     Sodium 141 mmol/L      Potassium 4 4 mmol/L      Chloride 106 mmol/L      CO2 24 mmol/L      ANION GAP 11 mmol/L      BUN 11 mg/dL      Creatinine 0 86 mg/dL      Glucose 82 mg/dL      Calcium 8 7 mg/dL      eGFR 74 ml/min/1 73sq m     Narrative:       [place] guidelines for Chronic Kidney Disease (CKD):     Stage 1 with normal or high GFR (GFR > 90 mL/min/1 73 square meters)    Stage 2 Mild CKD (GFR = 60-89 mL/min/1 73 square meters)    Stage 3A Moderate CKD (GFR = 45-59 mL/min/1 73 square meters)    Stage 3B Moderate CKD (GFR = 30-44 mL/min/1 73 square meters)    Stage 4 Severe CKD (GFR = 15-29 mL/min/1 73 square meters)    Stage 5 End Stage CKD (GFR <15 mL/min/1 73 square meters)  Note: GFR calculation is accurate only with a steady state creatinine    Rapid drug screen, urine [[id number]]  (Abnormal) Collected:  06/25/19 0329    Lab Status:  Final result Specimen:  Urine, Catheter Updated:  06/25/19 0350     Amph/Meth UR Negative     Barbiturate Ur Negative     Benzodiazepine Urine Positive     Cocaine Urine Negative     Methadone Urine Negative     Opiate Urine Positive     PCP Ur Negative     THC Urine Negative    Narrative:       Presumptive report  If requested, specimen will be sent to reference lab for confirmation  FOR MEDICAL PURPOSES ONLY  IF CONFIRMATION NEEDED PLEASE CONTACT THE LAB WITHIN 5 DAYS      Drug Screen Cutoff Levels:  AMPHETAMINE/METHAMPHETAMINES  1000 ng/mL  BARBITURATES     200 ng/mL  BENZODIAZEPINES     200 ng/mL  COCAINE      300 ng/mL  METHADONE      300 ng/mL  OPIATES      300 ng/mL  PHENCYCLIDINE     25 ng/mL  THC       50 ng/mL      Troponin I [[id number]]  (Normal) Collected:  06/24/19 2038    Lab Status:  Final result Specimen:  Blood from Arm, Left Updated:  06/24/19 2104     Troponin I <0 02 ng/mL     D-dimer, quantitative [[id number]]  (Abnormal) Collected:  06/24/19 1656    Lab Status:  Final result Specimen:  Blood from Arm, Right Updated:  06/24/19 1729     D-Dimer, Quant 4,807 ng/ml (FEU)     Troponin I [[id number]]  (Normal) Collected:  06/24/19 1656    Lab Status:  Final result Specimen:  Blood from Arm, Right Updated:  06/24/19 1728     Troponin I <0 02 ng/mL     Comprehensive metabolic panel [[id number]]  (Abnormal) Collected:  06/24/19 1656    Lab Status:  Final result Specimen:  Blood from Arm, Right Updated:  06/24/19 1721     Sodium 140 mmol/L      Potassium 3 9 mmol/L      Chloride 106 mmol/L      CO2 24 mmol/L      ANION GAP 10 mmol/L      BUN 13 mg/dL      Creatinine 0 69 mg/dL      Glucose 82 mg/dL      Calcium 9 0 mg/dL      AST 34 U/L      ALT 48 U/L      Alkaline Phosphatase 135 U/L      Total Protein 7 1 g/dL      Albumin 3 4 g/dL      Total Bilirubin 0 40 mg/dL      eGFR 95 ml/min/1 73sq m     Narrative:       [facility] guidelines for Chronic Kidney Disease (CKD):     Stage 1 with normal or high GFR (GFR > 90 mL/min/1 73 square meters)    Stage 2 Mild CKD (GFR = 60-89 mL/min/1 73 square meters)    Stage 3A Moderate CKD (GFR = 45-59 mL/min/1 73 square meters)    Stage 3B Moderate CKD (GFR = 30-44 mL/min/1 73 square meters)    Stage 4 Severe CKD (GFR = 15-29 mL/min/1 73 square meters)    Stage 5 End Stage CKD (GFR <15 mL/min/1 73 square meters)  Note: GFR calculation is accurate only with a steady state creatinine    Magnesium [[id number]]  (Normal) Collected:  06/24/19 1656    Lab Status:  Final result Specimen:  Blood from Arm, Right Updated:  06/24/19 1721     Magnesium 1 8 mg/dL     CBC and differential [[id number]] Collected:  06/24/19 1656    Lab Status:  Final result Specimen:  Blood from Arm, Right Updated:  06/24/19 1706     WBC 5 89 Thousand/uL      RBC 4 45 Million/uL Hemoglobin 13 3 g/dL      Hematocrit 39 9 %      MCV 90 fL      MCH 29 9 pg      MCHC 33 3 g/dL      RDW 13 7 %      MPV 10 0 fL      Platelets 830 Thousands/uL      nRBC 0 /100 WBCs      Neutrophils Relative 47 %      Immat GRANS % 0 %      Lymphocytes Relative 40 %      Monocytes Relative 8 %      Eosinophils Relative 4 %      Basophils Relative 1 %      Neutrophils Absolute 2 82 Thousands/µL      Immature Grans Absolute 0 01 Thousand/uL      Lymphocytes Absolute 2 35 Thousands/µL      Monocytes Absolute 0 44 Thousand/µL      Eosinophils Absolute 0 22 Thousand/µL      Basophils Absolute 0 05 Thousands/µL                  VAS celiac and/or mesenteric duplex; complete study   Final Result by Jason Loera MD (06/27 1799)      CT abdomen pelvis wo contrast   Final Result by Greg Ortiz MD (06/25 4201)      1  Development of small opacity in the medial right lower lobe likely due to atelectasis given clear lungs on recent CT chest exam    2   Small hiatal hernia  Workstation performed: [id number]         CTA ED chest PE Study   ED Interpretation by Marleny Mas DO (06/24 2000)   No acute finding; no pulmonary arterial embolism or pulmonary infiltrate/consolidation        Stable emphysematous changes        Persistent distal esophageal circumferential wall thickening  Final Result by Alyssa Newsome MD (06/24 1949)      No acute finding; no pulmonary arterial embolism or pulmonary infiltrate/consolidation  Stable emphysematous changes  Persistent distal esophageal circumferential wall thickening        Workstation performed: [id number]                    Procedures  ECG 12 Lead Documentation  Date/Time: 6/24/2019 4:42 PM  Performed by: Marleny Mas DO  Authorized by: Marleny Mas DO     Indications / Diagnosis:  74  ECG reviewed by me, the ED Provider: yes    Patient location:  ED  Previous ECG:     Previous ECG:  Compared to current    Comparison ECG info:  74    Similarity:  No change    Comparison to cardiac monitor: Yes    Interpretation:     Interpretation: non-specific    Rate:     ECG rate:  74    ECG rate assessment: normal    Rhythm:     Rhythm: sinus rhythm    Ectopy:     Ectopy: none    QRS:     QRS axis:  Normal    QRS intervals:  Normal  Conduction:     Conduction: normal    ST segments:     ST segments:  Normal  T waves:     T waves: flattening      Flattening:  AVL and III           ED Course  ED Course as of Jun 28 0043   Mon Jun 24, 2019   1757 Still with significant chest pain  Patient is given Dilaudid  1937 Complaining more pain  Will be given another dose of Dilaudid  2000 Imaging indicative of esophagitis  Will give Maalox with viscous lidocaine  HEART Risk Score      Most Recent Value   History  1 Filed at: 06/24/2019 2035   ECG  0 Filed at: 06/24/2019 2035   Age  1 Filed at: 06/24/2019 2035   Risk Factors  2 Filed at: 06/24/2019 2035   Troponin  0 Filed at: 06/24/2019 2035   Heart Score Risk Calculator   History  1 Filed at: 06/24/2019 2035   ECG  0 Filed at: 06/24/2019 2035   Age  1 Filed at: 06/24/2019 2035   Risk Factors  2 Filed at: 06/24/2019 2035   Troponin  0 Filed at: 06/24/2019 2035   HEART Score  4 Filed at: 06/24/2019 2035   HEART Score  4 Filed at: 06/24/2019 2035                            MDM  Number of Diagnoses or Management Options  Chest pain, unspecified:   Esophagitis:   Diagnosis management comments: CP   DDX includes but is not limited to: ACS (with a HEART score of 4*), PE (+ D-dimer and negative PE scan), pericarditis, AAA with dissection (unlikely based on description), pneumonia, reactive airway disease, costochondritis, anxiety  Eval will include: ACS work up, Dimer, PE scan  Symptomatic treatment provided in it takes multiple doses of narcotics in order  to control her chest pain    She is admitted for heart score of 4 for continued cardiac evaluation, continued pain control and treatment  Patient will require treatment for her esophagitis found on CT scan, this can be treated inpatient and patient admits to improvement with medications given in the emergency department  Discussed all results with patient prior to admission  Amount and/or Complexity of Data Reviewed  Clinical lab tests: ordered and reviewed  Tests in the radiology section of CPT®: reviewed and ordered        Disposition  Final diagnoses:   Chest pain, unspecified   Esophagitis     Time reflects when diagnosis was documented in both MDM as applicable and the Disposition within this note     Time User Action Codes Description Comment    6/24/2019  9:38 PM Coppersmith, Threasa Pedro L Add [R07 2] Precordial pain     6/24/2019  9:38 PM Coppersmith, Threasa Pedro L Remove [R07 2] Precordial pain     6/24/2019  9:38 PM Coppersmith, Threasa Pedro L Add [R07 9] Chest pain, unspecified     6/24/2019  9:38 PM Coppersmith, Threasa Pedro L Add [K20 9] Esophagitis     6/27/2019 12:38 PM Vern Masker Add [R10 13] Epigastric pain       ED Disposition     ED Disposition Condition Date/Time Comment    Admit Stable Mon Jun 24, 2019  9:38 PM [facility] and the patient's admission status was agreed to be Admission Status: observation status to the service of Dr Rakan Shook ([facility])   Follow-up Information     Follow up With Specialties Details Why Contact Info    Jerri Espinoza MD Internal Medicine Follow up in 3 day(s) Call to schedule follow up  Kailey Beasley, DO Gastroenterology Follow up in 1 week(s) It is recommended that you have esophageal manometry testing as an outpatient  [address] [facility]            Discharge Medication List as of 6/27/2019  1:13 PM      START taking these medications    Details   dicyclomine (BENTYL) 10 mg capsule Take 1 capsule (10 mg total) by mouth 3 (three) times a day before meals, Starting Thu 6/27/2019, Normal      pantoprazole (PROTONIX) 40 mg tablet Take 1 tablet (40 mg total) by mouth daily in the early morning, Starting Fri 6/28/2019, Normal         CONTINUE these medications which have NOT CHANGED    Details   albuterol (2 5 mg/3 mL) 0 083 % nebulizer solution Take 1 vial (2 5 mg total) by nebulization every 6 (six) hours as needed for wheezing or shortness of breath, Starting Tue 11/6/2018, Normal      atorvastatin (LIPITOR) 40 mg tablet take 1 tablet by mouth once daily, Normal      citalopram (CELEXA) 40 mg tablet Take 40 mg by mouth daily  , Until Discontinued, Historical Med      clonazePAM (KlonoPIN) 1 mg tablet 1 mg 2 (two) times a day as needed for anxiety  , Starting Fri 4/20/2018, Historical Med      famotidine (PEPCID) 40 MG tablet Take 1 tablet (40 mg total) by mouth daily, Starting Fri 8/24/2018, Normal      gabapentin (NEURONTIN) 800 mg tablet Take 800 mg by mouth 3 (three) times a day, Historical Med      metoprolol tartrate (LOPRESSOR) 50 mg tablet TAKE 1 TABLET EVERY 12 HOURS, Normal      mirtazapine (REMERON) 30 mg tablet Take 30 mg by mouth daily at bedtime  , Historical Med           Outpatient Discharge Orders   Discharge Diet     Activity as tolerated     Call provider for:  severe uncontrolled pain     Call provider for:  persistent nausea or vomiting       ED Provider  Electronically Signed by           Mateo Dailey DO  06/28/19 6571

## 2019-06-25 ENCOUNTER — APPOINTMENT (OUTPATIENT)
Dept: GASTROENTEROLOGY | Facility: HOSPITAL | Age: 59
DRG: 251 | End: 2019-06-25
Payer: COMMERCIAL

## 2019-06-25 ENCOUNTER — APPOINTMENT (OUTPATIENT)
Dept: CT IMAGING | Facility: HOSPITAL | Age: 59
DRG: 251 | End: 2019-06-25
Payer: COMMERCIAL

## 2019-06-25 ENCOUNTER — ANESTHESIA (OUTPATIENT)
Dept: GASTROENTEROLOGY | Facility: HOSPITAL | Age: 59
DRG: 251 | End: 2019-06-25
Payer: COMMERCIAL

## 2019-06-25 ENCOUNTER — ANESTHESIA EVENT (OUTPATIENT)
Dept: GASTROENTEROLOGY | Facility: HOSPITAL | Age: 59
DRG: 251 | End: 2019-06-25
Payer: COMMERCIAL

## 2019-06-25 LAB
AMPHETAMINES SERPL QL SCN: NEGATIVE
ANION GAP SERPL CALCULATED.3IONS-SCNC: 11 MMOL/L (ref 4–13)
ATRIAL RATE: 72 BPM
BARBITURATES UR QL: NEGATIVE
BASOPHILS # BLD AUTO: 0.03 THOUSANDS/ΜL (ref 0–0.1)
BASOPHILS NFR BLD AUTO: 1 % (ref 0–1)
BENZODIAZ UR QL: POSITIVE
BUN SERPL-MCNC: 11 MG/DL (ref 5–25)
CALCIUM SERPL-MCNC: 8.7 MG/DL (ref 8.3–10.1)
CHLORIDE SERPL-SCNC: 106 MMOL/L (ref 100–108)
CO2 SERPL-SCNC: 24 MMOL/L (ref 21–32)
COCAINE UR QL: NEGATIVE
CREAT SERPL-MCNC: 0.86 MG/DL (ref 0.6–1.3)
EOSINOPHIL # BLD AUTO: 0.16 THOUSAND/ΜL (ref 0–0.61)
EOSINOPHIL NFR BLD AUTO: 3 % (ref 0–6)
ERYTHROCYTE [DISTWIDTH] IN BLOOD BY AUTOMATED COUNT: 14 % (ref 11.6–15.1)
GFR SERPL CREATININE-BSD FRML MDRD: 74 ML/MIN/1.73SQ M
GLUCOSE SERPL-MCNC: 82 MG/DL (ref 65–140)
HCT VFR BLD AUTO: 42.5 % (ref 34.8–46.1)
HGB BLD-MCNC: 13.9 G/DL (ref 11.5–15.4)
IMM GRANULOCYTES # BLD AUTO: 0.02 THOUSAND/UL (ref 0–0.2)
IMM GRANULOCYTES NFR BLD AUTO: 0 % (ref 0–2)
LYMPHOCYTES # BLD AUTO: 1.47 THOUSANDS/ΜL (ref 0.6–4.47)
LYMPHOCYTES NFR BLD AUTO: 24 % (ref 14–44)
MCH RBC QN AUTO: 30.2 PG (ref 26.8–34.3)
MCHC RBC AUTO-ENTMCNC: 32.7 G/DL (ref 31.4–37.4)
MCV RBC AUTO: 92 FL (ref 82–98)
METHADONE UR QL: NEGATIVE
MONOCYTES # BLD AUTO: 0.26 THOUSAND/ΜL (ref 0.17–1.22)
MONOCYTES NFR BLD AUTO: 4 % (ref 4–12)
NEUTROPHILS # BLD AUTO: 4.19 THOUSANDS/ΜL (ref 1.85–7.62)
NEUTS SEG NFR BLD AUTO: 68 % (ref 43–75)
NRBC BLD AUTO-RTO: 0 /100 WBCS
OPIATES UR QL SCN: POSITIVE
P AXIS: 47 DEGREES
PCP UR QL: NEGATIVE
PLATELET # BLD AUTO: 151 THOUSANDS/UL (ref 149–390)
PMV BLD AUTO: 9.2 FL (ref 8.9–12.7)
POTASSIUM SERPL-SCNC: 4.4 MMOL/L (ref 3.5–5.3)
PR INTERVAL: 114 MS
QRS AXIS: 47 DEGREES
QRSD INTERVAL: 86 MS
QT INTERVAL: 462 MS
QTC INTERVAL: 505 MS
RBC # BLD AUTO: 4.6 MILLION/UL (ref 3.81–5.12)
SODIUM SERPL-SCNC: 141 MMOL/L (ref 136–145)
T WAVE AXIS: 54 DEGREES
THC UR QL: NEGATIVE
TROPONIN I SERPL-MCNC: <0.02 NG/ML
VENTRICULAR RATE: 72 BPM
WBC # BLD AUTO: 6.13 THOUSAND/UL (ref 4.31–10.16)

## 2019-06-25 PROCEDURE — 0DB78ZX EXCISION OF STOMACH, PYLORUS, VIA NATURAL OR ARTIFICIAL OPENING ENDOSCOPIC, DIAGNOSTIC: ICD-10-PCS | Performed by: INTERNAL MEDICINE

## 2019-06-25 PROCEDURE — 93010 ELECTROCARDIOGRAM REPORT: CPT | Performed by: INTERNAL MEDICINE

## 2019-06-25 PROCEDURE — 36415 COLL VENOUS BLD VENIPUNCTURE: CPT | Performed by: INTERNAL MEDICINE

## 2019-06-25 PROCEDURE — 85025 COMPLETE CBC W/AUTO DIFF WBC: CPT | Performed by: INTERNAL MEDICINE

## 2019-06-25 PROCEDURE — 88342 IMHCHEM/IMCYTCHM 1ST ANTB: CPT | Performed by: PATHOLOGY

## 2019-06-25 PROCEDURE — 99284 EMERGENCY DEPT VISIT MOD MDM: CPT | Performed by: PHYSICIAN ASSISTANT

## 2019-06-25 PROCEDURE — 43239 EGD BIOPSY SINGLE/MULTIPLE: CPT | Performed by: INTERNAL MEDICINE

## 2019-06-25 PROCEDURE — C9113 INJ PANTOPRAZOLE SODIUM, VIA: HCPCS | Performed by: INTERNAL MEDICINE

## 2019-06-25 PROCEDURE — 74176 CT ABD & PELVIS W/O CONTRAST: CPT

## 2019-06-25 PROCEDURE — 80307 DRUG TEST PRSMV CHEM ANLYZR: CPT | Performed by: INTERNAL MEDICINE

## 2019-06-25 PROCEDURE — 88305 TISSUE EXAM BY PATHOLOGIST: CPT | Performed by: PATHOLOGY

## 2019-06-25 PROCEDURE — 99225 PR SBSQ OBSERVATION CARE/DAY 25 MINUTES: CPT | Performed by: PHYSICIAN ASSISTANT

## 2019-06-25 PROCEDURE — 84484 ASSAY OF TROPONIN QUANT: CPT | Performed by: PHYSICIAN ASSISTANT

## 2019-06-25 PROCEDURE — 80048 BASIC METABOLIC PNL TOTAL CA: CPT | Performed by: INTERNAL MEDICINE

## 2019-06-25 RX ORDER — IPRATROPIUM BROMIDE AND ALBUTEROL SULFATE 2.5; .5 MG/3ML; MG/3ML
3 SOLUTION RESPIRATORY (INHALATION) ONCE
Status: COMPLETED | OUTPATIENT
Start: 2019-06-25 | End: 2019-06-25

## 2019-06-25 RX ORDER — SUCRALFATE ORAL 1 G/10ML
1000 SUSPENSION ORAL
Status: DISCONTINUED | OUTPATIENT
Start: 2019-06-25 | End: 2019-06-27 | Stop reason: HOSPADM

## 2019-06-25 RX ORDER — FENTANYL CITRATE 50 UG/ML
25 INJECTION, SOLUTION INTRAMUSCULAR; INTRAVENOUS EVERY 2 HOUR PRN
Status: DISCONTINUED | OUTPATIENT
Start: 2019-06-25 | End: 2019-06-25

## 2019-06-25 RX ORDER — FENTANYL CITRATE 50 UG/ML
12.5 INJECTION, SOLUTION INTRAMUSCULAR; INTRAVENOUS EVERY 2 HOUR PRN
Status: DISCONTINUED | OUTPATIENT
Start: 2019-06-25 | End: 2019-06-26

## 2019-06-25 RX ORDER — PROPOFOL 10 MG/ML
INJECTION, EMULSION INTRAVENOUS AS NEEDED
Status: DISCONTINUED | OUTPATIENT
Start: 2019-06-25 | End: 2019-06-25 | Stop reason: SURG

## 2019-06-25 RX ORDER — IPRATROPIUM BROMIDE AND ALBUTEROL SULFATE 2.5; .5 MG/3ML; MG/3ML
SOLUTION RESPIRATORY (INHALATION)
Status: DISCONTINUED
Start: 2019-06-25 | End: 2019-06-25 | Stop reason: WASHOUT

## 2019-06-25 RX ADMIN — MIRTAZAPINE 30 MG: 15 TABLET, FILM COATED ORAL at 00:33

## 2019-06-25 RX ADMIN — CITALOPRAM HYDROBROMIDE 40 MG: 20 TABLET ORAL at 08:40

## 2019-06-25 RX ADMIN — SUCRALFATE 1000 MG: 1 SUSPENSION ORAL at 23:14

## 2019-06-25 RX ADMIN — SODIUM CHLORIDE 75 ML/HR: 0.9 INJECTION, SOLUTION INTRAVENOUS at 00:51

## 2019-06-25 RX ADMIN — MIRTAZAPINE 30 MG: 15 TABLET, FILM COATED ORAL at 23:14

## 2019-06-25 RX ADMIN — PHENYLEPHRINE HYDROCHLORIDE 100 MCG: 10 INJECTION INTRAVENOUS at 13:09

## 2019-06-25 RX ADMIN — FENTANYL CITRATE 12.5 MCG: 50 INJECTION INTRAMUSCULAR; INTRAVENOUS at 20:58

## 2019-06-25 RX ADMIN — FENTANYL CITRATE 12.5 MCG: 50 INJECTION INTRAMUSCULAR; INTRAVENOUS at 18:22

## 2019-06-25 RX ADMIN — SUCRALFATE 1000 MG: 1 SUSPENSION ORAL at 17:24

## 2019-06-25 RX ADMIN — LIDOCAINE HYDROCHLORIDE 100 MG: 20 INJECTION, SOLUTION INTRAVENOUS at 13:07

## 2019-06-25 RX ADMIN — FENTANYL CITRATE 12.5 MCG: 50 INJECTION INTRAMUSCULAR; INTRAVENOUS at 13:59

## 2019-06-25 RX ADMIN — ALBUTEROL SULFATE 2.5 MG: 2.5 SOLUTION RESPIRATORY (INHALATION) at 00:56

## 2019-06-25 RX ADMIN — GABAPENTIN 800 MG: 400 CAPSULE ORAL at 17:24

## 2019-06-25 RX ADMIN — MORPHINE SULFATE 1 MG: 2 INJECTION, SOLUTION INTRAMUSCULAR; INTRAVENOUS at 23:32

## 2019-06-25 RX ADMIN — FENTANYL CITRATE 25 MCG: 50 INJECTION, SOLUTION INTRAMUSCULAR; INTRAVENOUS at 02:32

## 2019-06-25 RX ADMIN — FENTANYL CITRATE 12.5 MCG: 50 INJECTION INTRAMUSCULAR; INTRAVENOUS at 05:27

## 2019-06-25 RX ADMIN — MORPHINE SULFATE 1 MG: 2 INJECTION, SOLUTION INTRAMUSCULAR; INTRAVENOUS at 15:11

## 2019-06-25 RX ADMIN — NYSTATIN 500000 UNITS: 100000 SUSPENSION ORAL at 08:40

## 2019-06-25 RX ADMIN — NYSTATIN 500000 UNITS: 100000 SUSPENSION ORAL at 23:14

## 2019-06-25 RX ADMIN — PANTOPRAZOLE SODIUM 40 MG: 40 INJECTION, POWDER, FOR SOLUTION INTRAVENOUS at 23:14

## 2019-06-25 RX ADMIN — GABAPENTIN 800 MG: 400 CAPSULE ORAL at 00:33

## 2019-06-25 RX ADMIN — PROPOFOL 120 MG: 10 INJECTION, EMULSION INTRAVENOUS at 13:07

## 2019-06-25 RX ADMIN — GABAPENTIN 800 MG: 400 CAPSULE ORAL at 23:14

## 2019-06-25 RX ADMIN — PANTOPRAZOLE SODIUM 40 MG: 40 INJECTION, POWDER, FOR SOLUTION INTRAVENOUS at 08:41

## 2019-06-25 RX ADMIN — ATORVASTATIN CALCIUM 40 MG: 40 TABLET, FILM COATED ORAL at 17:27

## 2019-06-25 RX ADMIN — IPRATROPIUM BROMIDE AND ALBUTEROL SULFATE 3 ML: .5; 3 SOLUTION RESPIRATORY (INHALATION) at 12:49

## 2019-06-25 RX ADMIN — GABAPENTIN 800 MG: 400 CAPSULE ORAL at 08:41

## 2019-06-25 RX ADMIN — MORPHINE SULFATE 1 MG: 2 INJECTION, SOLUTION INTRAMUSCULAR; INTRAVENOUS at 19:15

## 2019-06-25 RX ADMIN — METOPROLOL TARTRATE 50 MG: 50 TABLET, FILM COATED ORAL at 17:24

## 2019-06-25 RX ADMIN — MORPHINE SULFATE 1 MG: 2 INJECTION, SOLUTION INTRAMUSCULAR; INTRAVENOUS at 09:18

## 2019-06-25 RX ADMIN — NYSTATIN 500000 UNITS: 100000 SUSPENSION ORAL at 17:25

## 2019-06-25 RX ADMIN — FENTANYL CITRATE 12.5 MCG: 50 INJECTION INTRAMUSCULAR; INTRAVENOUS at 08:24

## 2019-06-25 RX ADMIN — SODIUM CHLORIDE 500 ML: 0.9 INJECTION, SOLUTION INTRAVENOUS at 00:50

## 2019-06-25 RX ADMIN — FENTANYL CITRATE 12.5 MCG: 50 INJECTION INTRAMUSCULAR; INTRAVENOUS at 11:47

## 2019-06-25 NOTE — ED NOTES
Offered pt a hospital bed, however pt refused at this time     Connor Weaver, [facility]  06/25/19 7836

## 2019-06-25 NOTE — ED NOTES
Medicated for discomfort as per prn medication  Continued support provided       Siomara Whalen, OLGA  06/25/19 9042

## 2019-06-25 NOTE — PROGRESS NOTES
Progress Note - Manny Grey 1960, 59 y o  female MRN: [id number]    Unit/Bed#: ED 21 Encounter: [id number]    Primary Care Provider: Esteban Starks MD   Date and time admitted to hospital: 6/24/2019  4:30 PM        Esophagitis  Assessment & Plan  · Recurrent and present on admission  She complains of significant epigastric/chest pain, nausea, and dysphagia  · She has a history of chronic GERD and takes Pepcid  · CT Scan on admission showed circumferential thickening of the espohagus  This finding was also noted in January and patient was referred to GI but did not follow up as recommended  · NPO, IVFs, Protonix 40mg IV BID  · GI consulted for EGD today to further investigate  Chest pain  Assessment & Plan  · Patient with CP/epigastric pain GI in nature and noncardiac  · CTA shows circumferential thickening of the esophagus, no evidence of PE  · She has a history of recurrent admissions for the same pain and has been evaluated by cardiology with a negative work up  · Troponins negative and EKG negative for ischemic changes  Candidal stomatitis  Assessment & Plan  · Nystatin swish and swallow  COPD (chronic obstructive pulmonary disease) (HCC)  Assessment & Plan  · Albuteral nebulizers prn  No wheezing on exam   · Nicotine patch offered but declined    Tobacco abuse disorder  Assessment & Plan  Nicotine patch offered and declined  VTE Pharmacologic Prophylaxis:   Pharmacologic: Enoxaparin (Lovenox)  Mechanical VTE Prophylaxis in Place: Yes    Patient Centered Rounds: I discussed with nurse outside of patient's room  Discussions with Specialists or Other Care Team Provider: Discussed with GI  Education and Discussions with Family / Patient: Discussed with patient  Time Spent for Care: 30 minutes  More than 50% of total time spent on counseling and coordination of care as described above      Current Length of Stay: 0 day(s)    Current Patient Status: Observation now requiring > 2 midnight stay  Certification Statement: The patient will continue to require additional inpatient hospital stay due to need for EGD to evaluate intractable pain  Discharge Plan: Not medically cleared  Code Status: Level 1 - Full Code      Subjective:   Patient reports severe epigastric/chest discomfort with radiation to the back  She also reports nausea and vomiting and intermittent dysphagia  She has had prior admissions for similar complaints with negative cardiac evaluations  She was recommended previously to follow up with GI as an outpatient but did not  Objective:     Vitals:   Temp (24hrs), Av 7 °F (36 5 °C), Min:97 7 °F (36 5 °C), Max:97 7 °F (36 5 °C)    Temp:  [97 7 °F (36 5 °C)] 97 7 °F (36 5 °C)  HR:  [65-91] 91  Resp:  [16-38] 18  BP: ()/(52-89) 109/70  SpO2:  [91 %-100 %] 97 %  Body mass index is 29 8 kg/m²  Input and Output Summary (last 24 hours): Intake/Output Summary (Last 24 hours) at 2019 1108  Last data filed at 2019 0150  Gross per 24 hour   Intake 500 ml   Output 500 ml   Net 0 ml       Physical Exam:     Physical Exam   Constitutional: She is oriented to person, place, and time  No distress  HENT:   Head: Normocephalic and atraumatic  Eyes: No scleral icterus  Cardiovascular: Normal rate and regular rhythm  Pulmonary/Chest: Effort normal  No respiratory distress  She has no wheezes  She has no rales  Abdominal: Soft  Bowel sounds are normal    +epigastric TTP  Musculoskeletal: She exhibits no edema  Neurological: She is alert and oriented to person, place, and time  Skin: Skin is warm and dry  She is not diaphoretic  Vitals reviewed           Additional Data:     Labs:    Results from last 7 days   Lab Units 19  0809   WBC Thousand/uL 6 13   HEMOGLOBIN g/dL 13 9   HEMATOCRIT % 42 5   PLATELETS Thousands/uL 151   NEUTROS PCT % 68   LYMPHS PCT % 24   MONOS PCT % 4   EOS PCT % 3     Results from last 7 days   Lab Units 06/25/19  0738 06/24/19  1656   POTASSIUM mmol/L 4 4 3 9   CHLORIDE mmol/L 106 106   CO2 mmol/L 24 24   BUN mg/dL 11 13   CREATININE mg/dL 0 86 0 69   CALCIUM mg/dL 8 7 9 0   ALK PHOS U/L  --  135*   ALT U/L  --  48   AST U/L  --  34           * I Have Reviewed All Lab Data Listed Above  * Additional Pertinent Lab Tests Reviewed: All Labs Within Last 24 Hours Reviewed    Imaging:    Imaging Reports Reviewed Today Include: CTA of the Chest and CT of the abdomen and pelvis as noted above  Recent Cultures (last 7 days):           Last 24 Hours Medication List:     Current Facility-Administered Medications:  acetaminophen 650 mg Oral Q6H PRN Brody Bragg MD    albuterol 2 5 mg Nebulization Q6H PRN Brody Bragg MD    atorvastatin 40 mg Oral Daily Brody Bragg MD    citalopram 40 mg Oral Daily Brody Bragg MD    clonazePAM 1 mg Oral BID PRN Brody Bragg MD    fentanyl citrate (PF) 12 5 mcg Intravenous Q2H PRN Brody Bragg MD    gabapentin 800 mg Oral TID Brody Bragg MD    metoprolol tartrate 50 mg Oral Q12H Brody Bragg MD    mirtazapine 30 mg Oral HS Brody Bragg MD    morphine injection 1 mg Intravenous Q4H PRN Brody Bragg MD    nitroglycerin 1 inch Topical Once Dexter Ingram L Coppersmith, DO    nystatin 500,000 Units Swish & Swallow 4x Daily Brody Bragg MD    pantoprazole 40 mg Intravenous Q12H Lyle Mc MD    sodium chloride 100 mL/hr Intravenous Continuous Brody Bragg MD Last Rate: 100 mL/hr (06/25/19 0317)        Today, Patient Was Seen By: Oliva Cruz PA-C    ** Please Note: Dictation voice to text software may have been used in the creation of this document   **

## 2019-06-25 NOTE — ASSESSMENT & PLAN NOTE
Recurrent and present on admission  It appears the patient had similar presentation in January  She was referred at discharge to GI, but I don't see where she was seen  · NPO  · Protonix IV  · Nystatin oral, consider diflucan if scope reviews more invasive infection    · GI consult

## 2019-06-25 NOTE — ED NOTES
Received notification from [facility] from [address] to please bring pt to [address] at 1215  Will update pt and family       Mariya Valencia RN  06/25/19 6247

## 2019-06-25 NOTE — PERIOPERATIVE NURSING NOTE
Pt continues to complain of pain  Was able to tolerate 2 pkgs goldfish crackers and 360ml of apple juice  Report given and pt transferred to floor

## 2019-06-25 NOTE — PLAN OF CARE
Problem: Potential for Falls  Goal: Patient will remain free of falls  Description  INTERVENTIONS:  - Assess patient frequently for physical needs  -  Identify cognitive and physical deficits and behaviors that affect risk of falls    -  Nixon fall precautions as indicated by assessment   - Educate patient/family on patient safety including physical limitations  - Instruct patient to call for assistance with activity based on assessment  - Modify environment to reduce risk of injury  - Consider OT/PT consult to assist with strengthening/mobility  Outcome: Progressing     Problem: PAIN - ADULT  Goal: Verbalizes/displays adequate comfort level or baseline comfort level  Description  Interventions:  - Encourage patient to monitor pain and request assistance  - Assess pain using appropriate pain scale  - Administer analgesics based on type and severity of pain and evaluate response  - Implement non-pharmacological measures as appropriate and evaluate response  - Consider cultural and social influences on pain and pain management  - Notify physician/advanced practitioner if interventions unsuccessful or patient reports new pain  Outcome: Progressing     Problem: INFECTION - ADULT  Goal: Absence or prevention of progression during hospitalization  Description  INTERVENTIONS:  - Assess and monitor for signs and symptoms of infection  - Monitor lab/diagnostic results  - Monitor all insertion sites, i e  indwelling lines, tubes, and drains  - Monitor endotracheal (as able) and nasal secretions for changes in amount and color  - Nixon appropriate cooling/warming therapies per order  - Administer medications as ordered  - Instruct and encourage patient and family to use good hand hygiene technique  - Identify and instruct in appropriate isolation precautions for identified infection/condition  Outcome: Progressing  Goal: Absence of fever/infection during neutropenic period  Description  INTERVENTIONS:  - Monitor WBC  - Implement neutropenic guidelines  Outcome: Progressing     Problem: SAFETY ADULT  Goal: Maintain or return to baseline ADL function  Description  INTERVENTIONS:  -  Assess patient's ability to carry out ADLs; assess patient's baseline for ADL function and identify physical deficits which impact ability to perform ADLs (bathing, care of mouth/teeth, toileting, grooming, dressing, etc )  - Assess/evaluate cause of self-care deficits   - Assess range of motion  - Assess patient's mobility; develop plan if impaired  - Assess patient's need for assistive devices and provide as appropriate  - Encourage maximum independence but intervene and supervise when necessary  ¯ Involve family in performance of ADLs  ¯ Assess for home care needs following discharge   ¯ Request OT consult to assist with ADL evaluation and planning for discharge  ¯ Provide patient education as appropriate  Outcome: Progressing  Goal: Maintain or return mobility status to optimal level  Description  INTERVENTIONS:  - Assess patient's baseline mobility status (ambulation, transfers, stairs, etc )    - Identify cognitive and physical deficits and behaviors that affect mobility  - Identify mobility aids required to assist with transfers and/or ambulation (gait belt, sit-to-stand, lift, walker, cane, etc )  - Garber fall precautions as indicated by assessment  - Record patient progress and toleration of activity level on Mobility SBAR; progress patient to next Phase/Stage  - Instruct patient to call for assistance with activity based on assessment  - Request Rehabilitation consult to assist with strengthening/weightbearing, etc   Outcome: Progressing     Problem: DISCHARGE PLANNING  Goal: Discharge to home or other facility with appropriate resources  Description  INTERVENTIONS:  - Identify barriers to discharge w/patient and caregiver  - Arrange for needed discharge resources and transportation as appropriate  - Identify discharge learning needs (meds, wound care, etc )  - Arrange for interpretive services to assist at discharge as needed  - Refer to Case Management Department for coordinating discharge planning if the patient needs post-hospital services based on physician/advanced practitioner order or complex needs related to functional status, cognitive ability, or social support system  Outcome: Progressing     Problem: Knowledge Deficit  Goal: Patient/family/caregiver demonstrates understanding of disease process, treatment plan, medications, and discharge instructions  Description  Complete learning assessment and assess knowledge base    Interventions:  - Provide teaching at level of understanding  - Provide teaching via preferred learning methods  Outcome: Progressing

## 2019-06-25 NOTE — ASSESSMENT & PLAN NOTE
· Recurrent and present on admission  She complains of significant epigastric/chest pain, nausea, and dysphagia  · She has a history of chronic GERD and takes Pepcid  · CT Scan on admission showed circumferential thickening of the espohagus  This finding was also noted in January and patient was referred to GI but did not follow up as recommended  · NPO, IVFs, Protonix 40mg IV BID  · GI consulted for EGD today to further investigate

## 2019-06-25 NOTE — ASSESSMENT & PLAN NOTE
· Patient with CP/epigastric pain GI in nature and noncardiac  · CTA shows circumferential thickening of the esophagus, no evidence of PE  · She has a history of recurrent admissions for the same pain and has been evaluated by cardiology with a negative work up  · Troponins negative and EKG negative for ischemic changes

## 2019-06-25 NOTE — CONSULTS
Consultation - [facility]  Pepe Biswas 59 y o  female MRN: [id number]  Unit/Bed#: ED 21 Encounter: [id number]        Consults    Reason for Consult / Principal Problem: Chest Pain, Epigastric Pain    HPI: Ms Jayson Cho is a 58 yo F with a PMH of bipolar disorder, HLD, GERDm presenting for evaluation of acute onset of chest/epigastric pain since yesterday associated with nausea and bilious emesis  She denies any fevers, diarrhea, melena, or hematochezia  She reports chronic heartburn for which she takes pepcid but this does not control her symptoms  She reports dysphagia to solids which has become progressively worse and she is eating only soft foods and cutting up meats in very small pieces to avoid obstruction  She uses NSAIDs 3x/month for headaches  She had a EGD/colonoscopy in 2016 with Dr Aury Suarez which were relatively normal except for a small hiatal hernia and sigmoid diverticulosis  She is a current every day smoker       REVIEW OF SYSTEMS: Negative except for as stated above      Historical Information   Past Medical History:   Diagnosis Date    Alcohol abuse 12/31/2018    Anxiety     Bipolar 1 disorder ([facility] )     Mental Health problems listed as Denied in All[employer], cant't entire under pertinent negatives due to this diagnosis    CAD (coronary artery disease)     last assessed - 14Apr2017    Chronic back pain     Frequent headaches     Glaucoma     Hyperlipidemia     Hypertension     Ovarian cancer ([facility] )     last assessed - 21Sep2016    Psychiatric disorder     bipolar    [facility]     Uterine carcinoma ([facility] )     last assessed - 21Sep2016     Past Surgical History:   Procedure Laterality Date    ADENOIDECTOMY      Tonsillectomy with Adenoidectomy - last assessed - 21Sep2016    APPENDECTOMY      last assessed - 21Sep2016    CATARACT EXTRACTION Left     Remove cataract, corneo-scleral section of left eye; last assessed - 21Sep2016    CHOLECYSTECTOMY last assessed - 29Sep2016   [facility] EYE SURGERY      Anterior sclera fistulization for glaucoma; last assessed - 21Sep2016    HYSTERECTOMY      KRYSTA-BSO; last assessed - 21Sep2016    INTRAOCULAR LENS INSERTION      KS TEMPORAL ARTERY LIGATN OR BX Right 12/16/2016    Procedure: BIOPSY ARTERY TEMPORAL;  Surgeon: Chepe White MD;  Location: [facility];  Service: General    TONSILLECTOMY      Tonsillectomy with Adenoidectomy - last assessed - 21Sep2016     Social History   Social History     Substance and Sexual Activity   Alcohol Use Yes    Comment: occasional; (No alcohol use per [employer])     Social History     Substance and Sexual Activity   Drug Use No     Social History     Tobacco Use   Smoking Status Current Every Day Smoker    Packs/day: 0 25    Years: 46 00    Pack years: 11 50    Types: Cigarettes   Smokeless Tobacco Never Used     Family History   Problem Relation Age of Onset    Heart disease Mother     Coronary artery disease Mother     Other Mother         1) Gangrene; 2) Peripheral arterial disease    Hyperlipidemia Mother         Hypercholesterolemia    Stomach cancer Mother     Heart attack Mother         Myocardial Infarction    Other Father         1) Gangrene; 2) Peripheral arterial disease    Hyperlipidemia Father         Hypercholesterolemia    Stomach cancer Father     Heart attack Father         Myocardial Infarction    Stomach cancer Brother     Heart attack Brother         Myocardial Infarction    Stomach cancer Maternal Uncle        Meds/Allergies       (Not in a hospital admission)  Current Facility-Administered Medications   Medication Dose Route Frequency    acetaminophen (TYLENOL) tablet 650 mg  650 mg Oral Q6H PRN    albuterol inhalation solution 2 5 mg  2 5 mg Nebulization Q6H PRN    atorvastatin (LIPITOR) tablet 40 mg  40 mg Oral Daily    citalopram (CeleXA) tablet 40 mg  40 mg Oral Daily    clonazePAM (KlonoPIN) tablet 1 mg  1 mg Oral BID PRN    fentanyl citrate (PF) 100 MCG/2ML 12 5 mcg  12 5 mcg Intravenous Q2H PRN    gabapentin (NEURONTIN) capsule 800 mg  800 mg Oral TID    metoprolol tartrate (LOPRESSOR) tablet 50 mg  50 mg Oral Q12H    mirtazapine (REMERON) tablet 30 mg  30 mg Oral HS    morphine injection 1 mg  1 mg Intravenous Q4H PRN    nitroglycerin (NITRO-BID) 2 % TD ointment 1 inch  1 inch Topical Once    nystatin (MYCOSTATIN) oral suspension 500,000 Units  500,000 Units Swish & Swallow 4x Daily    pantoprazole (PROTONIX) injection 40 mg  40 mg Intravenous Q12H [address]    sodium chloride 0 9 % infusion  100 mL/hr Intravenous Continuous       Allergies   Allergen Reactions    Aspirin Anaphylaxis and Other (See Comments)    Bee Venom Anaphylaxis    Robaxin [Methocarbamol] Anaphylaxis and Seizures     Seizures per pt       Tramadol Hives, Shortness Of Breath and Other (See Comments)     Other reaction(s): Nausea and/or vomiting  Pt received morphine IV 7-6-18    Ketorolac     Nitroglycerin Other (See Comments)     Patient states"she gets headaches from nitro"    Omeprazole GI Intolerance     Other reaction(s): Nausea and/or vomiting    Codeine Rash and Other (See Comments)     Dizziness nausea; Pt received morphine IV 7-6-18           Objective     Blood pressure 109/70, pulse 91, temperature 97 7 °F (36 5 °C), resp  rate 18, height 5' 2" (1 575 m), weight 73 9 kg (162 lb 14 7 oz), SpO2 97 %  Intake/Output Summary (Last 24 hours) at 6/25/2019 0917  Last data filed at 6/25/2019 0150  Gross per 24 hour   Intake 500 ml   Output 500 ml   Net 0 ml         PHYSICAL EXAM:      General Appearance:   Alert, cooperative, no distress, appears stated age    HEENT:   Normocephalic, atraumatic, anicteric      Neck:  Supple, symmetrical, trachea midline   Lungs:   Clear to auscultation bilaterally; no rales, rhonchi or wheezing; respirations unlabored    Heart[de-identified]   S1 and S2 normal; regular rate and rhythm; no murmur, rub, or gallop     Abdomen:   Non-distended, soft, BS active, (+) TTP in the epigastric region   Rectal:   Deferred    Extremities:  No cyanosis, clubbing or edema    Pulses:  2+ and symmetric all extremities    Skin:  Skin color, texture, turgor normal, no rashes or lesions      Lab Results:   Results from last 7 days   Lab Units 06/25/19  0809   WBC Thousand/uL 6 13   HEMOGLOBIN g/dL 13 9   HEMATOCRIT % 42 5   PLATELETS Thousands/uL 151   NEUTROS PCT % 68   LYMPHS PCT % 24   MONOS PCT % 4   EOS PCT % 3     Results from last 7 days   Lab Units 06/25/19  0738 06/24/19  1656   POTASSIUM mmol/L 4 4 3 9   CHLORIDE mmol/L 106 106   CO2 mmol/L 24 24   BUN mg/dL 11 13   CREATININE mg/dL 0 86 0 69   CALCIUM mg/dL 8 7 9 0   ALK PHOS U/L  --  135*   ALT U/L  --  48   AST U/L  --  34               Imaging Studies: I have personally reviewed pertinent imaging studies  Ct Abdomen Pelvis Wo Contrast  Result Date: 6/25/2019  Impression: 1  Development of small opacity in the medial right lower lobe likely due to atelectasis given clear lungs on recent CT chest exam  2   Small hiatal hernia  Cta Ed Chest Pe Study  Result Date: 6/24/2019  Impression: No acute finding; no pulmonary arterial embolism or pulmonary infiltrate/consolidation  Stable emphysematous changes  Persistent distal esophageal circumferential wall thickening        ASSESSMENT and PLAN:      Chest Pain  Epigastric Pain  Esophagitis  Dysphagia  - Acute onset of severe chest/epigastric pain yesterday with associated nausea and vomiting, no hematemesis  - Worsening GERD symptoms including dysphagia, not on PPI prior to admission  - CT shows persistent distal esophageal circumferential wall thickening which was seen on prior CT earlier this year but the patient did not follow up with GI as instructed  - NPO, plan for EGD today for further evaluation of Cobian's esophagus v peptic stricture v PUD v candidal esophagitis  - Protonix 40 mg IV BID  - Further recommendations pending EGD findings  - Can consider adding carafate after EGD but will hold off for now to prevent limiting view on EGD      Patient will be seen and examined by Dr Sherita Sheikh

## 2019-06-25 NOTE — ASSESSMENT & PLAN NOTE
Patient reports taking no biologics and no prednisone for her rheumatoid arthritis she is also not on any opiates at home

## 2019-06-25 NOTE — H&P
H&P- Kassie Ball 1960, 59 y o  female MRN: [id number]    Unit/Bed#: ED 21 Encounter: [id number]    Primary Care Provider: Alex Amor MD   Date and time admitted to hospital: 6/24/2019  4:30 PM        Esophagitis  Assessment & Plan  Recurrent and present on admission  It appears the patient had similar presentation in January  She was referred at discharge to GI, but I don't see where she was seen  · NPO  · Protonix IV  · Nystatin oral, consider diflucan if scope reviews more invasive infection  · GI consult    Candidal stomatitis  Assessment & Plan  Nystatin Swish for tonight  Patient will likely need EGD asap  Can Diflucan if there appears to be a more aggressive infection involving the esophagus  COPD (chronic obstructive pulmonary disease) ([facility] [address] )  Assessment & Plan  Appear compensated but will leave nebulizers  · Nicotine patch declined    Coronary artery disease involving native coronary artery of native heart without angina pectoris  Assessment & Plan  Hx of present on admission See Dr César Trinidad  Pain appears to be none cardiac  Two negative troponin at present  Check third but EKG also does not support primary cardiac source for pain  Patient declined nitroglycerin  Patient is allergic to aspirin  · Under telemetry  · No need for cardiac intervention at this time  Tobacco abuse disorder  Assessment & Plan  Nicotine patch offered and declined    Rheumatoid arthritis involving multiple sites [facility])  Assessment & Plan  Patient reports taking no biologics and no prednisone for her rheumatoid arthritis she is also not on any opiates at home  VTE Prophylaxis: Heparin  / sequential compression device   Code Status:  Full code  POLST: POLST form is not discussed and not completed at this time  Discussion with family:  None    Anticipated Length of Stay:  Patient will be admitted on an inpatient basis with an anticipated length of stay of  greater than 2 midnights     Justification for Hospital Stay:  Needs treatment for soft diet is likely will need EGD  Total Time for Visit, including Counseling / Coordination of Care: 1 hour  Greater than 50% of this total time spent on direct patient counseling and coordination of care  Chief Complaint:   Abdominal pain    History of Present Illness:    Vera Kan is a 59 y o  female who presents with severe abdominal pain acute in onset this morning  Patient reports that there may be a chronic component to this  Review of old records indicates patient was admitted in January of this year with similar pain  That time she was found of esophagitis and was discharged home to follow up with GI  Not finding any indication that she is followed GI physician and she reports that she has not had an EGD  This is quite worrisome  Patient appears to have thrush on her tongue  She reports no diarrhea or constipation  Physical exam confirms epigastric pain on palpation  Patient reports no nausea or vomiting  Pain is somewhat relieved by morphine according to her report  Patient reports no fever chills  In the emergency room the patient was given multiple pain medications including fentanyl and Dilaudid as well as a GI cocktail with some improvement but not complete resolution  Her D-dimer was elevated and therefore a CT angio was completed finding this persistent esophageal edema/swelling  There is no evidence for PE  A troponins have been negative    Review of Systems:    Review of Systems   Constitutional: Positive for appetite change  Negative for chills, diaphoresis, fatigue, fever and unexpected weight change  HENT: Negative for dental problem, ear discharge, ear pain, facial swelling, hearing loss, mouth sores, nosebleeds and rhinorrhea  Eyes: Negative for pain, discharge, redness and visual disturbance  Respiratory: Negative for cough, chest tightness, shortness of breath and wheezing  Cardiovascular: Positive for chest pain  Negative for palpitations and leg swelling  Gastrointestinal: Positive for abdominal pain  Negative for abdominal distention, blood in stool, constipation, diarrhea, nausea and vomiting  Endocrine: Negative for cold intolerance, heat intolerance, polydipsia, polyphagia and polyuria  Genitourinary: Negative for dysuria, frequency, hematuria and urgency  Musculoskeletal: Negative for arthralgias and back pain  Skin: Negative for rash and wound  Neurological: Negative for dizziness, weakness, light-headedness, numbness and headaches  Hematological: Negative for adenopathy  Does not bruise/bleed easily  Psychiatric/Behavioral: Negative for confusion and dysphoric mood         Past Medical and Surgical History:     Past Medical History:   Diagnosis Date    Alcohol abuse 12/31/2018    Anxiety     Bipolar 1 disorder ([place] [address] )     Mental Health problems listed as Denied in Allscripts, cant't entire under pertinent negatives due to this diagnosis    CAD (coronary artery disease)     last assessed - 14Apr2017    Chronic back pain     Frequent headaches     Glaucoma     Hyperlipidemia     Hypertension     Ovarian cancer ([place] [address] )     last assessed - 21Sep2016    Psychiatric disorder     bipolar    Rheumatoid arthritis ([place] [address] )     Uterine carcinoma ([place] [address] )     last assessed - 21Sep2016       Past Surgical History:   Procedure Laterality Date    ADENOIDECTOMY      Tonsillectomy with Adenoidectomy - last assessed - 21Sep2016    APPENDECTOMY      last assessed - 21Sep2016    CATARACT EXTRACTION Left     Remove cataract, corneo-scleral section of left eye; last assessed - 21Sep2016    CHOLECYSTECTOMY      last assessed - 29Sep2016   Elsa Avalos EYE SURGERY      Anterior sclera fistulization for glaucoma; last assessed - [id number]    HYSTERECTOMY      KRYSTA-BSO; last assessed - [id number]    INTRAOCULAR LENS INSERTION      [place] TEMPORAL ARTERY LIGATN OR BX Right 12/16/2016    Procedure: BIOPSY ARTERY TEMPORAL;  Surgeon: Marnie Wang MD;  Location: [facility];  Service: General    TONSILLECTOMY      Tonsillectomy with Adenoidectomy - last assessed - 21Sep2016       Meds/Allergies:    Prior to Admission medications    Medication Sig Start Date End Date Taking? Authorizing Provider   albuterol (2 5 mg/3 mL) 0 083 % nebulizer solution Take 1 vial (2 5 mg total) by nebulization every 6 (six) hours as needed for wheezing or shortness of breath 11/6/18  Yes Arian Butler PA-C   atorvastatin (LIPITOR) 40 mg tablet take 1 tablet by mouth once daily 5/28/19  Yes Nabil Hopkins MD   citalopram (CELEXA) 40 mg tablet Take 40 mg by mouth daily     Yes Historical Provider, MD   clonazePAM (KlonoPIN) 1 mg tablet 1 mg 2 (two) times a day as needed for anxiety   4/20/18  Yes Historical Provider, MD   famotidine (PEPCID) 40 MG tablet Take 1 tablet (40 mg total) by mouth daily 8/24/18  Yes ROMEO Hester   gabapentin (NEURONTIN) 800 mg tablet Take 800 mg by mouth 3 (three) times a day   Yes Historical Provider, MD   metoprolol tartrate (LOPRESSOR) 50 mg tablet TAKE 1 TABLET EVERY 12 HOURS 5/28/19  Yes Nabil Hopkins MD   mirtazapine (REMERON) 30 mg tablet Take 30 mg by mouth daily at bedtime     Yes Historical Provider, MD   atorvastatin (LIPITOR) 10 mg tablet Take 10 mg by mouth 1/27/18 6/24/19 Yes Historical Provider, MD   dicyclomine (BENTYL) 20 mg tablet Take 1 tablet (20 mg total) by mouth 2 (two) times a day 4/27/19 6/24/19 Yes Hermann Aviles MD   pantoprazole (PROTONIX) 40 mg tablet Take 1 tablet (40 mg total) by mouth daily 6/11/19 6/24/19 Yes Arian Butler PA-C     I have reviewed home medications with patient personally  Allergies:    Allergies   Allergen Reactions    Aspirin Anaphylaxis and Other (See Comments)    Bee Venom Anaphylaxis    Robaxin [Methocarbamol] Anaphylaxis and Seizures     Seizures per pt       Tramadol Hives, Shortness Of Breath and Other (See Comments)     Other reaction(s): Nausea and/or vomiting  Pt received morphine IV 7-6-18    Ketorolac     Nitroglycerin Other (See Comments)     Patient states"she gets headaches from nitro"    Omeprazole GI Intolerance     Other reaction(s): Nausea and/or vomiting    Codeine Rash and Other (See Comments)     Dizziness nausea;   Pt received morphine IV 7-6-18       Social History:     Marital Status: /Civil Union   Occupation:  Patient was shaft  Patient Pre-hospital Living Situation:  Lives at home without restriction  Patient Pre-hospital Level of Mobility:  Mobility no restriction  Patient Pre-hospital Diet Restrictions:  Diet no restriction although she reports decreased appetite  Substance Use History:   Social History     Substance and Sexual Activity   Alcohol Use Yes    Comment: occasional; (No alcohol use per Allscripts)     Social History     Tobacco Use   Smoking Status Current Every Day Smoker    Packs/day: 0 25    Years: 46 00    Pack years: 11 50    Types: Cigarettes   Smokeless Tobacco Never Used     Social History     Substance and Sexual Activity   Drug Use No       Family History:    Family History   Problem Relation Age of Onset    Heart disease Mother     Coronary artery disease Mother     Other Mother         1) Gangrene; 2) Peripheral arterial disease    Hyperlipidemia Mother         Hypercholesterolemia    Stomach cancer Mother     Heart attack Mother         Myocardial Infarction    Other Father         1) Gangrene; 2) Peripheral arterial disease    Hyperlipidemia Father         Hypercholesterolemia    Stomach cancer Father     Heart attack Father         Myocardial Infarction    Stomach cancer Brother     Heart attack Brother         Myocardial Infarction    Stomach cancer Maternal Uncle        Physical Exam:     Vitals:   Blood Pressure: 104/55 (06/24/19 1906)  Pulse: 80 (06/24/19 1906)  Temperature: 97 7 °F (36 5 °C) (06/24/19 1637)  Respirations: (!) 26 (06/24/19 1906)  Weight - Scale: 73 9 kg (162 lb 14 7 oz) (06/24/19 1637)  SpO2: 94 % (06/24/19 1906)    Physical Exam    Generally well-developed moderately nourished female no acute distress normocephalic atraumatic pupils equal round reactive to light extraocular muscles intact mucous membranes are moist neck is supple there is no JVD no lymph nodes no carotid bruits   chest is decreased with poor air entry there is no rhonchi rales or wheezes the patient is holding her chest in the middle to the left  She does have significant epigastric tenderness without guarding or rebound  Cardiovascular regular rate rhythm positive S1 and S2 heard appreciate an S3-S4 murmur or gallop  Abdomen soft nontender nondistended with positive bowel sounds no hepatosplenomegaly no guarding or rebound  Neurologically patient is awake alert oriented cranial nerves 2-12 intact      Additional Data:     Lab Results: I have personally reviewed pertinent reports  Results from last 7 days   Lab Units 06/24/19  1656   WBC Thousand/uL 5 89   HEMOGLOBIN g/dL 13 3   HEMATOCRIT % 39 9   PLATELETS Thousands/uL 188   NEUTROS PCT % 47   LYMPHS PCT % 40   MONOS PCT % 8   EOS PCT % 4     Results from last 7 days   Lab Units 06/24/19  1656   SODIUM mmol/L 140   POTASSIUM mmol/L 3 9   CHLORIDE mmol/L 106   CO2 mmol/L 24   BUN mg/dL 13   CREATININE mg/dL 0 69   ANION GAP mmol/L 10   CALCIUM mg/dL 9 0   ALBUMIN g/dL 3 4*   TOTAL BILIRUBIN mg/dL 0 40   ALK PHOS U/L 135*   ALT U/L 48   AST U/L 34   GLUCOSE RANDOM mg/dL 82                       Imaging: I have personally reviewed pertinent reports  CTA ED chest PE Study   ED Interpretation by Hari Francisco DO (06/24 2000)   No acute finding; no pulmonary arterial embolism or pulmonary infiltrate/consolidation        Stable emphysematous changes        Persistent distal esophageal circumferential wall thickening        Final Result by Alisha Darden MD (06/24 1949)      No acute finding; no pulmonary arterial embolism or pulmonary infiltrate/consolidation  Stable emphysematous changes  Persistent distal esophageal circumferential wall thickening  Workstation performed: [id number]             EKG, Pathology, and Other Studies Reviewed on Admission:   · EKG:  Normal sinus rhythm no acute ST T-wave changes    Allscripts / Epic Records Reviewed: Yes     ** Please Note: This note has been constructed using a voice recognition system   **

## 2019-06-25 NOTE — ASSESSMENT & PLAN NOTE
Hx of present on admission See Dr Elder Reed  Pain appears to be none cardiac  Two negative troponin at present  Check third but EKG also does not support primary cardiac source for pain  Patient declined nitroglycerin  Patient is allergic to aspirin  · Under telemetry  · No need for cardiac intervention at this time

## 2019-06-25 NOTE — UTILIZATION REVIEW
Initial Clinical Review    Admission: Date/Time/Statement: OBS  6/24 2139 converted to IP on 6/25 @ 1617 for continued managment of intractable abd pain w/ need for iv pain meds   Admitting Physician KAILA KUO    Level of Care Med Surg    Estimated length of stay More than 2 Midnights    Certification I certify that inpatient services are medically necessary for this patient for a duration of greater than two midnights  See H&P and MD Progress Notes for additional information about the patient's course of treatment  ED Arrival Information     Expected Arrival Acuity Means of Arrival Escorted By Service Admission Type    - 6/24/2019 16:30 Urgent Ambulance [facility] General Medicine Urgent    Arrival Complaint    -        Chief Complaint   Patient presents with    Chest Pain     Chest pain x 30 min PTA  Refused nitro  Assessment/Plan: 60 yo female to ED from home w/ acute abd pain this morning   Admitted w/ esophagitis to OBS status make NPO , IV protonix , nystatin oral , GI consult    Candidal stomatitis nystatin swish   CAD tele        6/25 GI consult   Chest Pain  Epigastric Pain  Esophagitis  Dysphagia  - Acute onset of severe chest/epigastric pain yesterday with associated nausea and vomiting, no hematemesis  - Worsening GERD symptoms including dysphagia, not on PPI prior to admission  - CT shows persistent distal esophageal circumferential wall thickening which was seen on prior CT earlier this year but the patient did not follow up with GI as instructed  - NPO, plan for EGD today for further evaluation of Cobian's esophagus v peptic stricture v PUD v candidal esophagitis  - Protonix 40 mg IV BID  - Further recommendations pending EGD findings  - Can consider adding carafate after EGD but will hold off for now to prevent limiting view on EGD     ED Triage Vitals   Temperature Pulse Respirations Blood Pressure SpO2   06/24/19 1637 06/24/19 1637 06/24/19 1637 06/24/19 1637 06/24/19 1637   97 7 °F (36 5 °C) 74 (!) 23 135/89 96 %      Temp src Heart Rate Source Patient Position - Orthostatic VS BP Location FiO2 (%)   -- 06/24/19 1906 06/24/19 1906 06/24/19 1906 --    Monitor Lying Right arm       Pain Score       06/24/19 1637       Worst Possible Pain        Wt Readings from Last 1 Encounters:   06/24/19 73 9 kg (162 lb 14 7 oz)     Additional Vital Signs:   06/25/19 0445    75  18  120/71  98 %  None (Room air)  Lying   06/25/19 0400    78  16  117/66  98 %  None (Room air)  Lying   06/25/19 0300    87  20  113/70  98 %  None (Room air)  Lying   06/25/19 0215    79  18  105/57  94 %  None (Room air)  Lying   06/25/19 0100    65  18  96/54  100 %  None (Room air)  Lying   06/25/19 0040    75  19  80/52Abnormal    96 %  None (Room air)  Lying   BP: provider made aware at 06/25/19 0040   06/25/19 0039    75    80/52Abnormal          06/24/19 2339    73  23Abnormal   101/57  96 %  None (Room air)  Lying   06/24/19 2315    77  19  108/55  92 %  None (Room air)  Lying   06/24/19 1906    80  26Abnormal   104/55  94 %  None (Room air)  Lying   06/24/19 1900    79  38Abnormal     93 %       06/24/19 1845    80  30Abnormal     91 %       06/24/19 1830    80  19    91 %       06/24/19 1815    82  38Abnormal     96 %       06/24/19 1800    84  32Abnormal     95 %       06/24/19 1745    88  26Abnormal     93 %       06/24/19 1730    81  26Abnormal   113/55  93 %       06/24/19 1715    69  16  121/65  99 %       06/24/19 1700    66  24Abnormal   113/57  96 %       06/24/19 1645    70  25Abnormal   108/67  97 %           Pertinent Labs/Diagnostic Test Results:   6/25  EGD -pending   CT chest   No acute finding; no pulmonary arterial embolism or pulmonary infiltrate/consolidation  Stable emphysematous changes     Persistent distal esophageal circumferential wall thickening   EKG -  Normal sinus rhythm no acute ST T-wave changes    Results from last 7 days Lab Units 06/25/19  0809 06/24/19  1656   WBC Thousand/uL 6 13 5 89   HEMOGLOBIN g/dL 13 9 13 3   HEMATOCRIT % 42 5 39 9   PLATELETS Thousands/uL 151 188   NEUTROS ABS Thousands/µL 4 19 2 82     Results from last 7 days   Lab Units 06/25/19  0738 06/24/19  1656   SODIUM mmol/L 141 140   POTASSIUM mmol/L 4 4 3 9   CHLORIDE mmol/L 106 106   CO2 mmol/L 24 24   ANION GAP mmol/L 11 10   BUN mg/dL 11 13   CREATININE mg/dL 0 86 0 69   EGFR ml/min/1 73sq m 74 95   CALCIUM mg/dL 8 7 9 0   MAGNESIUM mg/dL  --  1 8     Results from last 7 days   Lab Units 06/24/19  1656   AST U/L 34   ALT U/L 48   ALK PHOS U/L 135*   TOTAL PROTEIN g/dL 7 1   ALBUMIN g/dL 3 4*   TOTAL BILIRUBIN mg/dL 0 40     Results from last 7 days   Lab Units 06/25/19  0738 06/24/19  1656   GLUCOSE RANDOM mg/dL 82 82     Results from last 7 days   Lab Units 06/24/19  2038 06/24/19  1656   TROPONIN I ng/mL <0 02 <0 02     Results from last 7 days   Lab Units 06/24/19  1656   D DIMER QUANT ng/ml (FEU) 4,807*     Results from last 7 days   Lab Units 06/25/19  0329   AMPH/METH  Negative   BARBITURATE UR  Negative   BENZODIAZEPINE UR  Positive*   COCAINE UR  Negative   METHADONE URINE  Negative   OPIATE UR  Positive*   PCP UR  Negative   THC UR  Negative     ED Treatment:   Medication Administration from 06/24/2019 1630 to 06/25/2019 0818       Date/Time Order Dose Route Action     06/24/2019 1655 fentanyl citrate (PF) 100 MCG/2ML 50 mcg 50 mcg Intravenous Given     06/24/2019 1655 ondansetron (ZOFRAN) injection 4 mg 4 mg Intravenous Given     06/24/2019 1654 nitroglycerin (NITRO-BID) 2 % TD ointment 1 inch 1 inch Topical Not Given     06/24/2019 1700 albuterol inhalation solution 5 mg 5 mg Nebulization Given     06/24/2019 1700 ipratropium (ATROVENT) 0 02 % inhalation solution 0 5 mg 0 5 mg Nebulization Given     06/24/2019 1818 HYDROmorphone (DILAUDID) injection 1 mg 1 mg Intravenous Given     06/24/2019 1939 HYDROmorphone (DILAUDID) injection 1 mg 1 mg Intravenous Given     06/24/2019 2039 aluminum-magnesium hydroxide-simethicone (MYLANTA) 200-200-20 mg/5 mL oral suspension 30 mL 30 mL Oral Given     06/24/2019 2038 Lidocaine Viscous HCl (XYLOCAINE) 2 % mucosal solution 15 mL 15 mL Swish & Spit Given     06/24/2019 2245 morphine injection 1 mg 1 mg Intravenous Given     06/24/2019 2313 pantoprazole (PROTONIX) injection 40 mg 40 mg Intravenous Not Given     06/24/2019 2336 nystatin (MYCOSTATIN) oral suspension 500,000 Units 500,000 Units Swish & Swallow Given     06/25/2019 0056 albuterol inhalation solution 2 5 mg 2 5 mg Nebulization Given     06/25/2019 0127 clonazePAM (KlonoPIN) tablet 1 mg 1 mg Oral Not Given     06/25/2019 0033 gabapentin (NEURONTIN) capsule 800 mg 800 mg Oral Given     06/25/2019 0039 metoprolol tartrate (LOPRESSOR) tablet 50 mg 0 mg Oral Hold     06/25/2019 0033 mirtazapine (REMERON) tablet 30 mg 30 mg Oral Given     06/25/2019 0317 sodium chloride 0 9 % infusion 100 mL/hr Intravenous Rate/Dose Change     06/25/2019 0051 sodium chloride 0 9 % infusion 75 mL/hr Intravenous New Bag     06/25/2019 0150 sodium chloride 0 9 % bolus 500 mL 0 mL Intravenous Stopped     06/25/2019 0050 sodium chloride 0 9 % bolus 500 mL 500 mL Intravenous New Bag     06/25/2019 0232 fentanyl citrate (PF) 100 MCG/2ML 25 mcg 25 mcg Intravenous Given     06/25/2019 0527 fentanyl citrate (PF) 100 MCG/2ML 12 5 mcg 12 5 mcg Intravenous Given        Past Medical History:   Diagnosis Date    Alcohol abuse 12/31/2018    Anxiety     [facility]     Mental Health problems listed as Denied in Allscripts, cant't entire under pertinent negatives due to this diagnosis    CAD (coronary artery disease)     last assessed - [id number]    Chronic back pain     Frequent headaches     Glaucoma     Hyperlipidemia     Hypertension     Ovarian cancer ([facility] [address] )     last assessed - [id number]    Psychiatric disorder     bipolar    [facility]     Uterine carcinoma ([facility] )     last assessed - [id number]     Present on Admission:   Esophagitis   Coronary artery disease involving native coronary artery of native heart without angina pectoris   COPD (chronic obstructive pulmonary disease) (HCC)   Rheumatoid arthritis involving multiple sites ([facility] )   Tobacco abuse disorder      Admitting Diagnosis: Chest pain [R07 9]  Age/Sex: 59 y o  female  Admission Orders:    Current Facility-Administered Medications:  acetaminophen 650 mg Oral Q6H PRN     albuterol 2 5 mg Nebulization Q6H PRN     atorvastatin 40 mg Oral Daily     citalopram 40 mg Oral Daily     clonazePAM 1 mg Oral BID PRN     fentanyl citrate (PF) 12 5 mcg Intravenous Q2H PRN x4    gabapentin 800 mg Oral TID     metoprolol tartrate 50 mg Oral Q12H     mirtazapine 30 mg Oral HS     morphine injection 1 mg Intravenous Q4H PRN     nitroglycerin 1 inch Topical Once     nystatin 500,000 Units Swish & Swallow 4x Daily     pantoprazole 40 mg Intravenous Q12H [address]     sodium chloride 100 mL/hr Intravenous Continuous       Tele  NPO  IP CONSULT TO GASTROENTEROLOGY    Network Utilization Review Department  Phone: [phone]; Fax [phone]  [email or web address]  ATTENTION: Please call with any questions or concerns to [phone]  and carefully listen to the prompts so that you are directed to the right person  Send all requests for admission clinical reviews, approved or denied determinations and any other requests to fax [phone]   All voicemails are confidential

## 2019-06-25 NOTE — ASSESSMENT & PLAN NOTE
Nystatin Swish for tonight  Patient will likely need EGD asap  Can Diflucan if there appears to be a more aggressive infection involving the esophagus

## 2019-06-26 PROBLEM — R10.13 EPIGASTRIC PAIN: Status: ACTIVE | Noted: 2019-06-26

## 2019-06-26 LAB — TSH SERPL DL<=0.05 MIU/L-ACNC: 0.81 UIU/ML (ref 0.36–3.74)

## 2019-06-26 PROCEDURE — 99232 SBSQ HOSP IP/OBS MODERATE 35: CPT | Performed by: PHYSICIAN ASSISTANT

## 2019-06-26 PROCEDURE — C9113 INJ PANTOPRAZOLE SODIUM, VIA: HCPCS | Performed by: INTERNAL MEDICINE

## 2019-06-26 PROCEDURE — 84443 ASSAY THYROID STIM HORMONE: CPT | Performed by: INTERNAL MEDICINE

## 2019-06-26 PROCEDURE — 99232 SBSQ HOSP IP/OBS MODERATE 35: CPT | Performed by: INTERNAL MEDICINE

## 2019-06-26 RX ORDER — DICYCLOMINE HYDROCHLORIDE 10 MG/1
10 CAPSULE ORAL
Status: DISCONTINUED | OUTPATIENT
Start: 2019-06-26 | End: 2019-06-27 | Stop reason: HOSPADM

## 2019-06-26 RX ORDER — PANTOPRAZOLE SODIUM 40 MG/1
40 TABLET, DELAYED RELEASE ORAL
Status: DISCONTINUED | OUTPATIENT
Start: 2019-06-26 | End: 2019-06-26

## 2019-06-26 RX ORDER — PANTOPRAZOLE SODIUM 40 MG/1
40 TABLET, DELAYED RELEASE ORAL
Status: DISCONTINUED | OUTPATIENT
Start: 2019-06-27 | End: 2019-06-27 | Stop reason: HOSPADM

## 2019-06-26 RX ORDER — OXYCODONE HYDROCHLORIDE 5 MG/1
2.5 TABLET ORAL EVERY 6 HOURS PRN
Status: DISCONTINUED | OUTPATIENT
Start: 2019-06-26 | End: 2019-06-27 | Stop reason: HOSPADM

## 2019-06-26 RX ORDER — FAMOTIDINE 20 MG/1
20 TABLET, FILM COATED ORAL 2 TIMES DAILY
Status: DISCONTINUED | OUTPATIENT
Start: 2019-06-26 | End: 2019-06-27 | Stop reason: HOSPADM

## 2019-06-26 RX ADMIN — DICYCLOMINE HYDROCHLORIDE 10 MG: 10 CAPSULE ORAL at 16:15

## 2019-06-26 RX ADMIN — MORPHINE SULFATE 1 MG: 2 INJECTION, SOLUTION INTRAMUSCULAR; INTRAVENOUS at 05:00

## 2019-06-26 RX ADMIN — DICYCLOMINE HYDROCHLORIDE 10 MG: 10 CAPSULE ORAL at 11:27

## 2019-06-26 RX ADMIN — NYSTATIN 500000 UNITS: 100000 SUSPENSION ORAL at 14:00

## 2019-06-26 RX ADMIN — GABAPENTIN 800 MG: 400 CAPSULE ORAL at 16:15

## 2019-06-26 RX ADMIN — NYSTATIN 500000 UNITS: 100000 SUSPENSION ORAL at 18:53

## 2019-06-26 RX ADMIN — FENTANYL CITRATE 12.5 MCG: 50 INJECTION INTRAMUSCULAR; INTRAVENOUS at 07:02

## 2019-06-26 RX ADMIN — NYSTATIN 500000 UNITS: 100000 SUSPENSION ORAL at 08:34

## 2019-06-26 RX ADMIN — GABAPENTIN 800 MG: 400 CAPSULE ORAL at 08:35

## 2019-06-26 RX ADMIN — FAMOTIDINE 20 MG: 20 TABLET, FILM COATED ORAL at 18:53

## 2019-06-26 RX ADMIN — SUCRALFATE 1000 MG: 1 SUSPENSION ORAL at 16:15

## 2019-06-26 RX ADMIN — SUCRALFATE 1000 MG: 1 SUSPENSION ORAL at 06:55

## 2019-06-26 RX ADMIN — SUCRALFATE 1000 MG: 1 SUSPENSION ORAL at 22:39

## 2019-06-26 RX ADMIN — MORPHINE SULFATE 1 MG: 2 INJECTION, SOLUTION INTRAMUSCULAR; INTRAVENOUS at 09:05

## 2019-06-26 RX ADMIN — METOPROLOL TARTRATE 50 MG: 50 TABLET, FILM COATED ORAL at 22:45

## 2019-06-26 RX ADMIN — PANTOPRAZOLE SODIUM 40 MG: 40 INJECTION, POWDER, FOR SOLUTION INTRAVENOUS at 08:35

## 2019-06-26 RX ADMIN — NYSTATIN 500000 UNITS: 100000 SUSPENSION ORAL at 22:40

## 2019-06-26 RX ADMIN — SUCRALFATE 1000 MG: 1 SUSPENSION ORAL at 11:27

## 2019-06-26 RX ADMIN — MORPHINE SULFATE 1 MG: 2 INJECTION, SOLUTION INTRAMUSCULAR; INTRAVENOUS at 22:13

## 2019-06-26 RX ADMIN — METOPROLOL TARTRATE 50 MG: 50 TABLET, FILM COATED ORAL at 11:27

## 2019-06-26 RX ADMIN — CITALOPRAM HYDROBROMIDE 40 MG: 20 TABLET ORAL at 08:35

## 2019-06-26 RX ADMIN — SODIUM CHLORIDE 50 ML/HR: 0.9 INJECTION, SOLUTION INTRAVENOUS at 16:19

## 2019-06-26 RX ADMIN — FENTANYL CITRATE 12.5 MCG: 50 INJECTION INTRAMUSCULAR; INTRAVENOUS at 11:26

## 2019-06-26 RX ADMIN — SODIUM CHLORIDE 100 ML/HR: 0.9 INJECTION, SOLUTION INTRAVENOUS at 06:55

## 2019-06-26 RX ADMIN — OXYCODONE HYDROCHLORIDE 2.5 MG: 5 TABLET ORAL at 14:00

## 2019-06-26 RX ADMIN — OXYCODONE HYDROCHLORIDE 2.5 MG: 5 TABLET ORAL at 20:06

## 2019-06-26 RX ADMIN — MIRTAZAPINE 30 MG: 15 TABLET, FILM COATED ORAL at 22:39

## 2019-06-26 RX ADMIN — ATORVASTATIN CALCIUM 40 MG: 40 TABLET, FILM COATED ORAL at 16:15

## 2019-06-26 RX ADMIN — ENOXAPARIN SODIUM 40 MG: 40 INJECTION SUBCUTANEOUS at 08:35

## 2019-06-26 RX ADMIN — LIDOCAINE HYDROCHLORIDE 10 ML: 20 SOLUTION ORAL; TOPICAL at 08:34

## 2019-06-26 RX ADMIN — MORPHINE SULFATE 1 MG: 2 INJECTION, SOLUTION INTRAMUSCULAR; INTRAVENOUS at 16:13

## 2019-06-26 RX ADMIN — GABAPENTIN 800 MG: 400 CAPSULE ORAL at 22:40

## 2019-06-26 RX ADMIN — FAMOTIDINE 20 MG: 20 TABLET, FILM COATED ORAL at 12:34

## 2019-06-26 NOTE — PROGRESS NOTES
Progress Note - Mary Reed 1960, 59 y o  female MRN: [id number]    Unit/Bed#: -01 Encounter: [id number]    Primary Care Provider: Isaias Baugh MD   Date and time admitted to hospital: 6/24/2019  4:30 PM        Epigastric pain  Assessment & Plan  · Recurrent and present on admission  She complains of significant epigastric/chest pain, nausea, and dysphagia  · CT Scan on admission showed circumferential thickening of the espohagus  · EGD showed only mild erythema in the antrum, esophagus normal, biopsies pending  Follow up path  · She has had a cholecystectomy in the past   · Continue PPI, Pepcid, and Carafate  · Check Doppler SMA/Celiac  · GI following  She likely will need outpatient follow up for esophageal manometry to r/o nutcracker esophagus, JUAN PABLO, etc   · Also, I am concerned about narcotic drug seeking in this patient  Currently, she is on Fentanyl IV here - I am discontinuing this IV pain medication  Low dose Oxycodone 2 5mg q 6 hours only while here and Bentyl TID  NO narcotics will be provided on discharge  I reviewed her PMED and she has received small scripts for Oxycodone from other providers on multiple occasions this year  * Chest pain  Assessment & Plan  · Patient with CP/epigastric pain GI in nature and noncardiac  · See principle plan  · She has a history of recurrent admissions for the same pain and has been evaluated by cardiology with a negative work up  · Troponins negative and EKG negative for ischemic changes  Candidal stomatitis  Assessment & Plan  · On Nystatin swish and swallow course  COPD (chronic obstructive pulmonary disease) (HCC)  Assessment & Plan  · Albuteral nebulizers prn    No wheezing on exam   · Nicotine patch offered but declined      VTE Pharmacologic Prophylaxis:   Pharmacologic: Enoxaparin (Lovenox)  Mechanical VTE Prophylaxis in Place: Yes    Patient Centered Rounds: I have performed bedside rounds with nursing staff today     Discussions with Specialists or Other Care Team Provider: Discussed with GI  Education and Discussions with Family / Patient: Discussed with patient  Time Spent for Care: 30 minutes  More than 50% of total time spent on counseling and coordination of care as described above  Current Length of Stay: 1 day(s)    Current Patient Status: Inpatient   Certification Statement: The patient will continue to require additional inpatient hospital stay due to further evaluation of abdominal pain  Discharge Plan: Anticipate discharge hopefully tomorrow  Code Status: Level 1 - Full Code      Subjective:   Patient reports continued epigastric pain and nausea  No SOB  She has been receiving IV Fentanyl  When asked if she takes narcotics at home, she said no- she only takes Ibuprofen  However, review of PMED shows that she has received multiple small scripts this year but providers for Oxycodone  Objective:     Vitals:   Temp (24hrs), Av 4 °F (36 9 °C), Min:98 1 °F (36 7 °C), Max:98 8 °F (37 1 °C)    Temp:  [98 1 °F (36 7 °C)-98 8 °F (37 1 °C)] 98 8 °F (37 1 °C)  HR:  [] 86  Resp:  [14-18] 14  BP: ()/(64-82) 129/64  SpO2:  [93 %-96 %] 96 %  Body mass index is 29 8 kg/m²  Input and Output Summary (last 24 hours): Intake/Output Summary (Last 24 hours) at 2019 1551  Last data filed at 2019 1400  Gross per 24 hour   Intake 2828 33 ml   Output    Net 2828 33 ml       Physical Exam:     Physical Exam   Constitutional: She is oriented to person, place, and time  No distress  HENT:   Head: Normocephalic and atraumatic  Eyes: No scleral icterus  Cardiovascular: Normal rate and regular rhythm  Pulmonary/Chest: Effort normal  No respiratory distress  She has no wheezes  She has no rales  Abdominal: Soft  Bowel sounds are normal  She exhibits no distension  There is no rebound and no guarding  Mild epigastric TTP  Musculoskeletal: She exhibits no edema  Neurological: She is alert and oriented to person, place, and time  Skin: Skin is warm and dry  She is not diaphoretic  Vitals reviewed  Additional Data:     Labs:    Results from last 7 days   Lab Units 06/25/19  0809   WBC Thousand/uL 6 13   HEMOGLOBIN g/dL 13 9   HEMATOCRIT % 42 5   PLATELETS Thousands/uL 151   NEUTROS PCT % 68   LYMPHS PCT % 24   MONOS PCT % 4   EOS PCT % 3     Results from last 7 days   Lab Units 06/25/19  0738 06/24/19  1656   POTASSIUM mmol/L 4 4 3 9   CHLORIDE mmol/L 106 106   CO2 mmol/L 24 24   BUN mg/dL 11 13   CREATININE mg/dL 0 86 0 69   CALCIUM mg/dL 8 7 9 0   ALK PHOS U/L  --  135*   ALT U/L  --  48   AST U/L  --  34           * I Have Reviewed All Lab Data Listed Above  * Additional Pertinent Lab Tests Reviewed: All Labs Within Last 24 Hours Reviewed    Imaging:    Imaging Reports Reviewed Today Include:  EGD report reviewed      Recent Cultures (last 7 days):           Last 24 Hours Medication List:     Current Facility-Administered Medications:  acetaminophen 650 mg Oral Q6H PRN Tamra Donald MD    al mag oxide-diphenhydramine-lidocaine viscous 10 mL Swish & Swallow Q4H PRN Nicole Diaz PA-C    albuterol 2 5 mg Nebulization Q6H PRN Tamra Donald MD    atorvastatin 40 mg Oral Daily Tamra Donald MD    citalopram 40 mg Oral Daily Tamra Donald MD    clonazePAM 1 mg Oral BID PRN Tamra Donald MD    dicyclomine 10 mg Oral TID AC Nicole Diaz PA-C    enoxaparin 40 mg Subcutaneous Q24H [facility] Dariana Watkins PA-C    famotidine 20 mg Oral BID Nicole Diaz PA-C    gabapentin 800 mg Oral TID Tamra Donald MD    metoprolol tartrate 50 mg Oral Q12H Tamra Donald MD    mirtazapine 30 mg Oral HS Tamra Donald MD    morphine injection 1 mg Intravenous Q4H PRN Tamra Donald MD    nitroglycerin 1 inch Topical Once Nora L Coppersmith, DO    nystatin 500,000 Units Swish & Swallow 4x Daily Tamra Donald MD    oxyCODONE 2 5 mg Oral Q6H PRN Claudell Shells, PA-C    [START ON 6/27/2019] pantoprazole 40 mg Oral Early Morning Nicole Diaz PA-C    sodium chloride 50 mL/hr Intravenous Continuous Claudell Shells, PA-C Last Rate: 50 mL/hr (06/26/19 1312)   sucralfate 1,000 mg Oral 4x Daily (AC & HS) Nicole Diaz PA-C         Today, Patient Was Seen By: Claudell Shells, PA-C    ** Please Note: Dictation voice to text software may have been used in the creation of this document   **

## 2019-06-26 NOTE — ASSESSMENT & PLAN NOTE
· Patient with CP/epigastric pain GI in nature and noncardiac  · See principle plan  · She has a history of recurrent admissions for the same pain and has been evaluated by cardiology with a negative work up  · Troponins negative and EKG negative for ischemic changes

## 2019-06-26 NOTE — ASSESSMENT & PLAN NOTE
· Recurrent and present on admission  She complains of significant epigastric/chest pain, nausea, and dysphagia  · CT Scan on admission showed circumferential thickening of the espohagus  · EGD showed only mild erythema in the antrum, esophagus normal, biopsies pending  Follow up path  · She has had a cholecystectomy in the past   · Continue PPI, Pepcid, and Carafate  · Check Doppler SMA/Celiac  · GI following  She likely will need outpatient follow up for esophageal manometry to r/o nutcracker esophagus, JUAN PABLO, etc   · Also, I am concerned about narcotic drug seeking in this patient  Currently, she is on Fentanyl IV here - I am discontinuing this IV pain medication  Low dose Oxycodone 2 5mg q 6 hours only while here and Bentyl TID  NO narcotics will be provided on discharge  I reviewed her PMED and she has received small scripts for Oxycodone from other providers on multiple occasions this year

## 2019-06-26 NOTE — PROGRESS NOTES
GI Progress Note - Mansi Henderson 59 y o  female MRN: [id number]    Unit/Bed#: -01 Encounter: [id number]    Subjective: Ms Jorge Purcell continues to have epigastric pain, nausea  She reports her pain only improves with pain medication  She is able to tolerate PO after she receives pain medications  She denies any constipation or diarrhea, is having regular BMs  Objective:     Vitals: Blood pressure 129/64, pulse 86, temperature 98 8 °F (37 1 °C), resp  rate 14, height 5' 2" (1 575 m), weight 73 9 kg (162 lb 14 7 oz), SpO2 96 %  ,Body mass index is 29 8 kg/m²  Intake/Output Summary (Last 24 hours) at 6/26/2019 1138  Last data filed at 6/26/2019 0655  Gross per 24 hour   Intake 1540 ml   Output    Net 1540 ml       Physical Exam:     General Appearance: Alert, oriented x3, appears in discomfort and hold epigastric region  Lungs: Clear to auscultation bilaterally, no respiratory distress  Heart: RRR, no murmur  Abdomen: Non-distended, soft, BS active, (+) mild TTP in the epigastric region  Extremities: No cyanosis or LE edema    Invasive Devices     Peripheral Intravenous Line            Peripheral IV 06/24/19 Left Antecubital 1 day                Lab Results:  Results from last 7 days   Lab Units 06/25/19  0809   WBC Thousand/uL 6 13   HEMOGLOBIN g/dL 13 9   HEMATOCRIT % 42 5   PLATELETS Thousands/uL 151   NEUTROS PCT % 68   LYMPHS PCT % 24   MONOS PCT % 4   EOS PCT % 3     Results from last 7 days   Lab Units 06/25/19  0738 06/24/19  1656   POTASSIUM mmol/L 4 4 3 9   CHLORIDE mmol/L 106 106   CO2 mmol/L 24 24   BUN mg/dL 11 13   CREATININE mg/dL 0 86 0 69   CALCIUM mg/dL 8 7 9 0   ALK PHOS U/L  --  135*   ALT U/L  --  48   AST U/L  --  34               Imaging Studies: I have personally reviewed pertinent imaging studies  Ct Abdomen Pelvis Wo Contrast  Result Date: 6/25/2019  Impression: 1    Development of small opacity in the medial right lower lobe likely due to atelectasis given clear lungs on recent CT chest exam  2   Small hiatal hernia  Workstation performed: [id number]     Cta Ed Chest Pe Study  Result Date: 6/24/2019  Impression: No acute finding; no pulmonary arterial embolism or pulmonary infiltrate/consolidation  Stable emphysematous changes  Persistent distal esophageal circumferential wall thickening  Workstation performed: [id number]       Assessment and Plan:     Chest Pain  Epigastric Pain  - CTA chest PE study showed distal esophageal circumferential wall thickening  - S/P EGD yesterday 6/25 showed mild erythema localized antrum, normal appearing esophagus  - Unclear etiology of chest/epigastric pain given relatively normal EGD, pt s/p cholecystectomy  - Follow up biopsies from EGD to rule out H  Pylori  - Continue protonix 40 mg daily, carafate 1 g QID  - Add pepcid 20 mg BID, magic mouthwash PRN  - Check mesenteric/celiac dopplers, pt reports she is able to eat after pain medication is given so unclear if she has postprandial pain or not  - Consider outpatient manometry to evaluate for diffuse esophageal spasms/motility disorder      The patient will be seen by Dr Enoc De La Cruz

## 2019-06-27 ENCOUNTER — APPOINTMENT (INPATIENT)
Dept: ULTRASOUND IMAGING | Facility: HOSPITAL | Age: 59
DRG: 251 | End: 2019-06-27
Payer: COMMERCIAL

## 2019-06-27 ENCOUNTER — PREP FOR PROCEDURE (OUTPATIENT)
Dept: GASTROENTEROLOGY | Facility: CLINIC | Age: 59
End: 2019-06-27

## 2019-06-27 VITALS
SYSTOLIC BLOOD PRESSURE: 148 MMHG | HEART RATE: 85 BPM | HEIGHT: 62 IN | DIASTOLIC BLOOD PRESSURE: 80 MMHG | RESPIRATION RATE: 20 BRPM | OXYGEN SATURATION: 96 % | WEIGHT: 162.92 LBS | BODY MASS INDEX: 29.98 KG/M2 | TEMPERATURE: 98.6 F

## 2019-06-27 DIAGNOSIS — R07.89 CHEST PAIN, NON-CARDIAC: Primary | ICD-10-CM

## 2019-06-27 LAB
BASOPHILS # BLD AUTO: 0.02 THOUSANDS/ΜL (ref 0–0.1)
BASOPHILS NFR BLD AUTO: 0 % (ref 0–1)
EOSINOPHIL # BLD AUTO: 0.2 THOUSAND/ΜL (ref 0–0.61)
EOSINOPHIL NFR BLD AUTO: 4 % (ref 0–6)
ERYTHROCYTE [DISTWIDTH] IN BLOOD BY AUTOMATED COUNT: 13.3 % (ref 11.6–15.1)
HCT VFR BLD AUTO: 35.6 % (ref 34.8–46.1)
HGB BLD-MCNC: 12.1 G/DL (ref 11.5–15.4)
IMM GRANULOCYTES # BLD AUTO: 0.02 THOUSAND/UL (ref 0–0.2)
IMM GRANULOCYTES NFR BLD AUTO: 0 % (ref 0–2)
LYMPHOCYTES # BLD AUTO: 1.6 THOUSANDS/ΜL (ref 0.6–4.47)
LYMPHOCYTES NFR BLD AUTO: 29 % (ref 14–44)
MCH RBC QN AUTO: 30.7 PG (ref 26.8–34.3)
MCHC RBC AUTO-ENTMCNC: 34 G/DL (ref 31.4–37.4)
MCV RBC AUTO: 90 FL (ref 82–98)
MONOCYTES # BLD AUTO: 0.33 THOUSAND/ΜL (ref 0.17–1.22)
MONOCYTES NFR BLD AUTO: 6 % (ref 4–12)
NEUTROPHILS # BLD AUTO: 3.3 THOUSANDS/ΜL (ref 1.85–7.62)
NEUTS SEG NFR BLD AUTO: 61 % (ref 43–75)
NRBC BLD AUTO-RTO: 0 /100 WBCS
PLATELET # BLD AUTO: 134 THOUSANDS/UL (ref 149–390)
PMV BLD AUTO: 10.1 FL (ref 8.9–12.7)
RBC # BLD AUTO: 3.94 MILLION/UL (ref 3.81–5.12)
WBC # BLD AUTO: 5.47 THOUSAND/UL (ref 4.31–10.16)

## 2019-06-27 PROCEDURE — 99231 SBSQ HOSP IP/OBS SF/LOW 25: CPT | Performed by: INTERNAL MEDICINE

## 2019-06-27 PROCEDURE — 99239 HOSP IP/OBS DSCHRG MGMT >30: CPT | Performed by: PHYSICIAN ASSISTANT

## 2019-06-27 PROCEDURE — 85025 COMPLETE CBC W/AUTO DIFF WBC: CPT | Performed by: PHYSICIAN ASSISTANT

## 2019-06-27 PROCEDURE — 93975 VASCULAR STUDY: CPT | Performed by: SURGERY

## 2019-06-27 PROCEDURE — 93975 VASCULAR STUDY: CPT

## 2019-06-27 RX ORDER — DICYCLOMINE HYDROCHLORIDE 10 MG/1
10 CAPSULE ORAL
Qty: 90 CAPSULE | Refills: 0 | Status: SHIPPED | OUTPATIENT
Start: 2019-06-27 | End: 2021-03-20 | Stop reason: HOSPADM

## 2019-06-27 RX ORDER — PANTOPRAZOLE SODIUM 40 MG/1
40 TABLET, DELAYED RELEASE ORAL
Qty: 30 TABLET | Refills: 0 | Status: SHIPPED | OUTPATIENT
Start: 2019-06-28 | End: 2019-07-15

## 2019-06-27 RX ADMIN — ENOXAPARIN SODIUM 40 MG: 40 INJECTION SUBCUTANEOUS at 08:13

## 2019-06-27 RX ADMIN — SODIUM CHLORIDE 50 ML/HR: 0.9 INJECTION, SOLUTION INTRAVENOUS at 09:03

## 2019-06-27 RX ADMIN — NYSTATIN 500000 UNITS: 100000 SUSPENSION ORAL at 12:17

## 2019-06-27 RX ADMIN — SUCRALFATE 1000 MG: 1 SUSPENSION ORAL at 06:37

## 2019-06-27 RX ADMIN — GABAPENTIN 800 MG: 400 CAPSULE ORAL at 08:13

## 2019-06-27 RX ADMIN — OXYCODONE HYDROCHLORIDE 2.5 MG: 5 TABLET ORAL at 05:50

## 2019-06-27 RX ADMIN — OXYCODONE HYDROCHLORIDE 2.5 MG: 5 TABLET ORAL at 12:16

## 2019-06-27 RX ADMIN — CITALOPRAM HYDROBROMIDE 40 MG: 20 TABLET ORAL at 08:13

## 2019-06-27 RX ADMIN — SUCRALFATE 1000 MG: 1 SUSPENSION ORAL at 12:17

## 2019-06-27 RX ADMIN — METOPROLOL TARTRATE 50 MG: 50 TABLET, FILM COATED ORAL at 12:16

## 2019-06-27 RX ADMIN — PANTOPRAZOLE SODIUM 40 MG: 40 TABLET, DELAYED RELEASE ORAL at 06:37

## 2019-06-27 RX ADMIN — MORPHINE SULFATE 1 MG: 2 INJECTION, SOLUTION INTRAMUSCULAR; INTRAVENOUS at 03:01

## 2019-06-27 RX ADMIN — DICYCLOMINE HYDROCHLORIDE 10 MG: 10 CAPSULE ORAL at 12:16

## 2019-06-27 RX ADMIN — FAMOTIDINE 20 MG: 20 TABLET, FILM COATED ORAL at 08:13

## 2019-06-27 RX ADMIN — MORPHINE SULFATE 1 MG: 2 INJECTION, SOLUTION INTRAMUSCULAR; INTRAVENOUS at 08:13

## 2019-06-27 RX ADMIN — DICYCLOMINE HYDROCHLORIDE 10 MG: 10 CAPSULE ORAL at 06:37

## 2019-06-27 RX ADMIN — NYSTATIN 500000 UNITS: 100000 SUSPENSION ORAL at 08:14

## 2019-06-27 NOTE — PROGRESS NOTES
GI Progress Note - Alfonzo Vargas 59 y o  female MRN: [id number]    Unit/Bed#: -01 Encounter: [id number]    Subjective: Pt was very comfortable sitting on edge of bed eating a hamburger  She asked about providing pain medications on discharge  Objective:     Vitals: Blood pressure 148/80, pulse 85, temperature 98 6 °F (37 °C), resp  rate 20, height 5' 2" (1 575 m), weight 73 9 kg (162 lb 14 7 oz), SpO2 96 %  ,Body mass index is 29 8 kg/m²  Intake/Output Summary (Last 24 hours) at 6/27/2019 1326  Last data filed at 6/27/2019 0110  Gross per 24 hour   Intake 2235 83 ml   Output    Net 2235 83 ml       Physical Exam:     General Appearance: Alert, oriented x3, no acute distress  Lungs: Clear to auscultation bilaterally, no rales or rhonchi, no labored breathing/accessory muscle use  Heart: Regular rate and rhythm, S1, S2 normal, no murmur, click, rub or gallop  Abdomen: Soft, non-tender, non-distended; bowel sounds normal; no masses or no organomegaly  Extremities: No cyanosis, edema    Invasive Devices     Peripheral Intravenous Line            Peripheral IV 06/24/19 Left Antecubital 2 days                Lab Results:  Results from last 7 days   Lab Units 06/27/19  0512   WBC Thousand/uL 5 47   HEMOGLOBIN g/dL 12 1   HEMATOCRIT % 35 6   PLATELETS Thousands/uL 134*   NEUTROS PCT % 61   LYMPHS PCT % 29   MONOS PCT % 6   EOS PCT % 4     Results from last 7 days   Lab Units 06/25/19  0738 06/24/19  1656   POTASSIUM mmol/L 4 4 3 9   CHLORIDE mmol/L 106 106   CO2 mmol/L 24 24   BUN mg/dL 11 13   CREATININE mg/dL 0 86 0 69   CALCIUM mg/dL 8 7 9 0   ALK PHOS U/L  --  135*   ALT U/L  --  48   AST U/L  --  34               Imaging Studies: I have personally reviewed pertinent imaging studies  Ct Abdomen Pelvis Wo Contrast    Result Date: 6/25/2019  Impression: 1    Development of small opacity in the medial right lower lobe likely due to atelectasis given clear lungs on recent CT chest exam  2   Small hiatal hernia  Workstation performed: [id number]     Cta Ed Chest Pe Study    Result Date: 6/24/2019  Impression: No acute finding; no pulmonary arterial embolism or pulmonary infiltrate/consolidation  Stable emphysematous changes  Persistent distal esophageal circumferential wall thickening   Workstation performed: [id number]       Assessment and Plan:     Chest Pain/Epigastric Pain  - CTA chest PE study showed distal esophageal circumferential wall thickening  - S/P EGD on 6/25 showed mild erythema localized antrum, normal appearing esophagus  - Unclear etiology of chest/epigastric pain given relatively normal EGD, pt s/p cholecystectomy  - Celiac/mesenteric dopplers WNL  - Suspect there is an element of drug seeking as her reported pain does not correlate with her exam and she has multiple normal imaging studies/relatively normal EGD  - Follow up biopsies from EGD  - Will arrange for outpatient manometry to rule out diffuse esophageal spasms  - Discussed avoiding NSAIDs and using tylenol instead        The patient is stable for discharge from GI standpoint

## 2019-06-27 NOTE — PLAN OF CARE
Problem: Potential for Falls  Goal: Patient will remain free of falls  Description  INTERVENTIONS:  - Assess patient frequently for physical needs  -  Identify cognitive and physical deficits and behaviors that affect risk of falls    -  [place] fall precautions as indicated by assessment   - Educate patient/family on patient safety including physical limitations  - Instruct patient to call for assistance with activity based on assessment  - Modify environment to reduce risk of injury  - Consider OT/PT consult to assist with strengthening/mobility  Outcome: Progressing     Problem: PAIN - ADULT  Goal: Verbalizes/displays adequate comfort level or baseline comfort level  Description  Interventions:  - Encourage patient to monitor pain and request assistance  - Assess pain using appropriate pain scale  - Administer analgesics based on type and severity of pain and evaluate response  - Implement non-pharmacological measures as appropriate and evaluate response  - Consider cultural and social influences on pain and pain management  - Notify physician/advanced practitioner if interventions unsuccessful or patient reports new pain  Outcome: Progressing     Problem: INFECTION - ADULT  Goal: Absence or prevention of progression during hospitalization  Description  INTERVENTIONS:  - Assess and monitor for signs and symptoms of infection  - Monitor lab/diagnostic results  - Monitor all insertion sites, i e  indwelling lines, tubes, and drains  - Monitor endotracheal (as able) and nasal secretions for changes in amount and color  - [place] appropriate cooling/warming therapies per order  - Administer medications as ordered  - Instruct and encourage patient and family to use good hand hygiene technique  - Identify and instruct in appropriate isolation precautions for identified infection/condition  Outcome: Progressing  Goal: Absence of fever/infection during neutropenic period  Description  INTERVENTIONS:  - Monitor WBC  - Implement neutropenic guidelines  Outcome: Progressing     Problem: SAFETY ADULT  Goal: Maintain or return to baseline ADL function  Description  INTERVENTIONS:  -  Assess patient's ability to carry out ADLs; assess patient's baseline for ADL function and identify physical deficits which impact ability to perform ADLs (bathing, care of mouth/teeth, toileting, grooming, dressing, etc )  - Assess/evaluate cause of self-care deficits   - Assess range of motion  - Assess patient's mobility; develop plan if impaired  - Assess patient's need for assistive devices and provide as appropriate  - Encourage maximum independence but intervene and supervise when necessary  ¯ Involve family in performance of ADLs  ¯ Assess for home care needs following discharge   ¯ Request OT consult to assist with ADL evaluation and planning for discharge  ¯ Provide patient education as appropriate  Outcome: Progressing  Goal: Maintain or return mobility status to optimal level  Description  INTERVENTIONS:  - Assess patient's baseline mobility status (ambulation, transfers, stairs, etc )    - Identify cognitive and physical deficits and behaviors that affect mobility  - Identify mobility aids required to assist with transfers and/or ambulation (gait belt, sit-to-stand, lift, walker, cane, etc )  - [place] fall precautions as indicated by assessment  - Record patient progress and toleration of activity level on Mobility SBAR; progress patient to next Phase/Stage  - Instruct patient to call for assistance with activity based on assessment  - Request Rehabilitation consult to assist with strengthening/weightbearing, etc   Outcome: Progressing     Problem: DISCHARGE PLANNING  Goal: Discharge to home or other facility with appropriate resources  Description  INTERVENTIONS:  - Identify barriers to discharge w/patient and caregiver  - Arrange for needed discharge resources and transportation as appropriate  - Identify discharge learning needs (meds, wound care, etc )  - Arrange for interpretive services to assist at discharge as needed  - Refer to Case Management Department for coordinating discharge planning if the patient needs post-hospital services based on physician/advanced practitioner order or complex needs related to functional status, cognitive ability, or social support system  Outcome: Progressing     Problem: Knowledge Deficit  Goal: Patient/family/caregiver demonstrates understanding of disease process, treatment plan, medications, and discharge instructions  Description  Complete learning assessment and assess knowledge base    Interventions:  - Provide teaching at level of understanding  - Provide teaching via preferred learning methods  Outcome: Progressing

## 2019-06-27 NOTE — ASSESSMENT & PLAN NOTE
· Patient with CP/epigastric pain GI in nature and noncardiac versus narcotic drug seeking  · See principle plan  · She has a history of recurrent admissions for the same pain and has been evaluated by cardiology with a negative work up  · Troponins negative and EKG negative for ischemic changes

## 2019-06-27 NOTE — DISCHARGE SUMMARY
Discharge- Tenzin Lundberg 1960, 59 y o  female MRN: [id number]    Unit/Bed#: -01 Encounter: [id number]    Primary Care Provider: Shayy Eng MD   Date and time admitted to hospital: 6/24/2019  4:30 PM        Epigastric pain  Assessment & Plan  · Recurrent and present on admission: patient complained of significant epigastric/chest pain, nausea, and dysphagia  Etiology unclear but strongly suspect a component of drug seeking behavior  · CT Scan on admission showed circumferential thickening of the espohagus  · EGD showed only mild erythema in the antrum, esophagus normal, biopsies pending  Follow up path to rule out h pylori  · She has had a cholecystectomy in the past   · Doppler SMA/Celiac negative  · She will need outpatient follow up for esophageal manometry to r/o nutcracker esophagus, diffuse esophageal spasm, etc   · Note: I am very concerned about narcotic drug seeking in this patient as her exam and work up does not correlate with her symptoms and she repeatedly asks for additional narcotic pain medication  When I asked her the last time she was given a prescription for Oxycodone, she told me 9 months ago  I reviewed her PMED, and she has received scripts for Oxycodone on 6/7, 5/2, 3/31, and 2/27 by different providers  Recommended a PPI trial, Bentyl prn, and Tylenol prn for her symptoms  NO NARCOTICS provided on discharge  * Chest pain  Assessment & Plan  · Patient with CP/epigastric pain GI in nature and noncardiac versus narcotic drug seeking  · See principle plan  · She has a history of recurrent admissions for the same pain and has been evaluated by cardiology with a negative work up  · Troponins negative and EKG negative for ischemic changes  COPD (chronic obstructive pulmonary disease) ([facility])  Assessment & Plan  · Albuteral nebulizers prn    · Nicotine patch offered but declined    Tobacco abuse disorder  Assessment & Plan  Nicotine patch offered and declined  Discharging Physician / Practitioner: Beto Farrell PA-C  PCP: Silver Cerda MD  Admission Date:   Admission Orders (From admission, onward)    Ordered        06/25/19 1617  Inpatient Admission  Once         06/24/19 2139  Place in Observation  Once             Discharge Date: 06/27/19    Resolved Problems  Date Reviewed: 6/27/2019    None          Consultations During Hospital Stay:  · Gastroenterology    Procedures Performed:   · EGD which showed mild erythema in the antrum, biopsies pending to rule out H pylori  Significant Findings / Test Results:   · CTA Chest showing no evidence of a pulmonary embolism, stable emphysematous changes, and distal esophageal circumferential wall thickening  · CT of the chest and abdomen showing no acute intra-abdominal pathology, small hiatal hernia  · SMA/Celiac Doppler was unremarkable  · Troponins x 3 negative  Incidental Findings:   · None    Test Results Pending at Discharge (will require follow up): · Path for h pylori  Outpatient Tests Requested:  · Esophageal manometry    Complications:  None    Reason for Admission: Epigastric pain    Hospital Course:     Magda Saenz is a 59 y o  female patient who originally presented to the hospital on 6/24/2019 due to epigastric pain  Patient reported epigastric and chest discomfort with radiation to the back, dysphagia, and nausea  Patient has a history of prior admissions for chest pain and has had a negative cardiac workup in the past   Upon admission, CTA of the chest was performed which was negative for PE and suggested circumferential thickening of the esophagus  CT scan of the abdomen and pelvis showed no acute intra-abdominal abnormality, a small hiatal hernia was noted  GI was consulted for EGD  EGD was performed which showed only mild erythema in the antrum and biopsies are pending at this time to rule out H pylori    A Doppler of the SMA and celiac vessels was performed and unremarkable  Of note, there is a very strong concern for narcotic drug-seeking behavior in this patient as she repeatedly asks for additional narcotic pain medication  Her reported pain does not correlate with her exam and she appears otherwise quite comfortable when being examined  Upon my exchange with her, she had told me that she had not received Oxycodone for 9 months which is not the case as noted above in greater detail with multiple prescriptions noted on her PMED  Additionally, when I told her I would not be providing her any narcotic pain medication on discharge, she quickly asked the nurse to remove her IV as soon as possible so that she could leave  She is stable for discharge  No narcotic medication was provided on discharge  She was recommended to follow up with Gastroenterology as instructed for esophageal manometry testing  Please see above list of diagnoses and related plan for additional information  Condition at Discharge: stable     Discharge Day Visit / Exam:     Subjective:  Patient reports continued abdominal pain and repeatedly asks for narcotic pain medication  Patient noted to be very comfortable prior to entering room in no distress  Vitals: Blood Pressure: 148/80 (06/27/19 1215)  Pulse: 85 (06/27/19 1215)  Temperature: 98 6 °F (37 °C) (06/27/19 0725)  Temp Source: Oral (06/26/19 0241)  Respirations: 20 (06/27/19 1215)  Height: 5' 2" (157 5 cm) (06/24/19 2339)  Weight - Scale: 73 9 kg (162 lb 14 7 oz) (06/24/19 2339)  SpO2: 96 % (06/27/19 1215)  Exam:   Physical Exam   Constitutional: She is oriented to person, place, and time  No distress  HENT:   Head: Normocephalic and atraumatic  Eyes: No scleral icterus  Cardiovascular: Normal rate and regular rhythm  Pulmonary/Chest: Effort normal  No respiratory distress  She has no wheezes  She has no rales  Abdominal: Soft  Bowel sounds are normal  She exhibits no distension  There is no tenderness  Musculoskeletal: She exhibits no edema  Neurological: She is alert and oriented to person, place, and time  Skin: She is not diaphoretic  Vitals reviewed  Discharge instructions/Information to patient and family:   See after visit summary for information provided to patient and family  Provisions for Follow-Up Care:  See after visit summary for information related to follow-up care and any pertinent home health orders  Disposition:     Home    For Discharges to [facility] SNF:   · Not Applicable to this Patient - Not Applicable to this Patient    Planned Readmission: None     Discharge Statement:  I spent 35 minutes discharging the patient  This time was spent on the day of discharge  I had direct contact with the patient on the day of discharge  Greater than 50% of the total time was spent examining patient, answering all patient questions, arranging and discussing plan of care with patient as well as directly providing post-discharge instructions  Additional time then spent on discharge activities  Discharge Medications:  See after visit summary for reconciled discharge medications provided to patient and family        ** Please Note: This note has been constructed using a voice recognition system **

## 2019-06-28 ENCOUNTER — TRANSITIONAL CARE MANAGEMENT (OUTPATIENT)
Dept: INTERNAL MEDICINE CLINIC | Facility: CLINIC | Age: 59
End: 2019-06-28

## 2019-07-15 ENCOUNTER — OFFICE VISIT (OUTPATIENT)
Dept: INTERNAL MEDICINE CLINIC | Facility: CLINIC | Age: 59
End: 2019-07-15
Payer: COMMERCIAL

## 2019-07-15 VITALS
BODY MASS INDEX: 26.86 KG/M2 | DIASTOLIC BLOOD PRESSURE: 90 MMHG | HEART RATE: 79 BPM | WEIGHT: 151.6 LBS | HEIGHT: 63 IN | OXYGEN SATURATION: 97 % | SYSTOLIC BLOOD PRESSURE: 140 MMHG

## 2019-07-15 DIAGNOSIS — K21.9 CHRONIC GERD: ICD-10-CM

## 2019-07-15 DIAGNOSIS — M06.9 RHEUMATOID ARTHRITIS INVOLVING MULTIPLE SITES, UNSPECIFIED RHEUMATOID FACTOR PRESENCE: ICD-10-CM

## 2019-07-15 DIAGNOSIS — Z12.39 BREAST SCREENING: ICD-10-CM

## 2019-07-15 DIAGNOSIS — I10 ESSENTIAL HYPERTENSION: Primary | ICD-10-CM

## 2019-07-15 DIAGNOSIS — K20.90 ESOPHAGITIS: ICD-10-CM

## 2019-07-15 PROBLEM — A04.72 C. DIFFICILE DIARRHEA: Status: RESOLVED | Noted: 2017-09-29 | Resolved: 2019-07-15

## 2019-07-15 PROBLEM — J20.9 ACUTE BRONCHITIS: Status: RESOLVED | Noted: 2017-03-08 | Resolved: 2019-07-15

## 2019-07-15 PROCEDURE — 3008F BODY MASS INDEX DOCD: CPT | Performed by: INTERNAL MEDICINE

## 2019-07-15 PROCEDURE — 99214 OFFICE O/P EST MOD 30 MIN: CPT | Performed by: INTERNAL MEDICINE

## 2019-07-15 RX ORDER — FAMOTIDINE 40 MG/1
40 TABLET, FILM COATED ORAL DAILY
Qty: 90 TABLET | Refills: 3 | Status: SHIPPED | OUTPATIENT
Start: 2019-07-15 | End: 2021-03-20 | Stop reason: HOSPADM

## 2019-07-15 NOTE — PROGRESS NOTES
Assessment/Plan:       Diagnoses and all orders for this visit:    Essential hypertension    Chronic GERD  -     famotidine (PEPCID) 40 MG tablet; Take 1 tablet (40 mg total) by mouth daily    Rheumatoid arthritis involving multiple sites, unspecified rheumatoid factor presence (HCC)  -     NICKI Screen w/ Reflex to Titer/Pattern; Future  -     RF Screen w/ Reflex to Titer; Future  -     Sedimentation rate, automated; Future  -     Cyclic citrul peptide antibody, IgG; Future    Esophagitis    Breast screening  -     Mammo screening bilateral w cad; Future          Patient Instructions    A patient with the above issues  I would like to do some lab screens were activity of RA  Mammogram should be done  Follow-up visit with me in 6 months  Subjective:      Patient ID: Tyrell Rodgers is a 59 y o  female  A new patient to me  A 64-year-old female  Essential hypertension  Esophagitis on famotidine  Multiple hospitalizations for chest pain with CAD ruled out  Most recent hospital notes note possibility of drug-seeking activity with multiple narcotic prescriptions from multiple providers    Rheumatoid arthritis appears to be burnt out  The patient has had joint pain for 20 years  She has classic swan-neck in both near deformities of the fingers and has limitation of strength and range of motion but states that pain is minimal   She has never been treated with a biological   Never seen a rheumatologist       The following portions of the patient's history were reviewed and updated as appropriate:   She has a past medical history of Alcohol abuse (12/31/2018), Anxiety, Bipolar 1 disorder ([address] ), Chronic back pain, Frequent headaches, Glaucoma, Hyperlipidemia, Irregular heart beat, Ovarian cancer ([address] ), Psychiatric disorder, Shortness of breath, and Uterine carcinoma ([address] )  ,  does not have any pertinent problems on file  ,   has a past surgical history that includes Cholecystectomy;  Hysterectomy; Appendectomy; Tonsillectomy; Intraocular lens insertion; Cataract extraction (Left); pr temporal artery ligatn or bx (Right, 12/16/2016); Eye surgery; and Adenoidectomy  ,  family history includes Coronary artery disease in her mother; Heart attack in her brother, father, and mother; Heart disease in her mother; Hyperlipidemia in her father and mother; Other in her father and mother; Stomach cancer in her brother, father, maternal uncle, and mother  ,   reports that she has been smoking cigarettes  She has a 23 00 pack-year smoking history  She has never used smokeless tobacco  She reports that she drank alcohol  She reports that she does not use drugs  ,  is allergic to aspirin; bee venom; robaxin [methocarbamol]; tramadol; ketorolac; nitroglycerin; omeprazole; and codeine     Current Outpatient Medications   Medication Sig Dispense Refill    albuterol (2 5 mg/3 mL) 0 083 % nebulizer solution Take 1 vial (2 5 mg total) by nebulization every 6 (six) hours as needed for wheezing or shortness of breath 100 vial 3    atorvastatin (LIPITOR) 40 mg tablet take 1 tablet by mouth once daily 90 tablet 3    citalopram (CELEXA) 40 mg tablet Take 40 mg by mouth daily        clonazePAM (KlonoPIN) 1 mg tablet 1 mg 2 (two) times a day as needed for anxiety    0    dicyclomine (BENTYL) 10 mg capsule Take 1 capsule (10 mg total) by mouth 3 (three) times a day before meals 90 capsule 0    famotidine (PEPCID) 40 MG tablet Take 1 tablet (40 mg total) by mouth daily 90 tablet 3    gabapentin (NEURONTIN) 800 mg tablet Take 800 mg by mouth 3 (three) times a day      metoprolol tartrate (LOPRESSOR) 50 mg tablet TAKE 1 TABLET EVERY 12 HOURS 60 tablet 11    mirtazapine (REMERON) 30 mg tablet Take 30 mg by mouth daily at bedtime         No current facility-administered medications for this visit  Review of Systems   Constitutional: Negative for chills and fever  HENT: Negative for sore throat and trouble swallowing      Eyes: Negative for pain  Respiratory: Negative for cough, shortness of breath and wheezing  Cardiovascular: Negative for chest pain and leg swelling  Gastrointestinal: Negative for abdominal pain, diarrhea, nausea and vomiting  Endocrine: Negative for cold intolerance and heat intolerance  Genitourinary: Negative for dysuria, frequency and pelvic pain  Musculoskeletal: Positive for arthralgias  Negative for joint swelling  Skin: Negative for rash and wound  Allergic/Immunologic: Negative for immunocompromised state  Neurological: Negative for dizziness, seizures, syncope and headaches  Psychiatric/Behavioral: Negative for dysphoric mood  The patient is not nervous/anxious  Objective:  Vitals:    07/15/19 1339   BP: 140/90   Pulse: 79   SpO2: 97%      Physical Exam   Constitutional: She is oriented to person, place, and time  She appears well-developed and well-nourished  Female patient who appears to be stated age   HENT:   Head: Normocephalic and atraumatic  Eyes: Pupils are equal, round, and reactive to light  EOM are normal    Neck: Normal range of motion  Neck supple  No tracheal deviation present  No thyromegaly present  Cardiovascular: Normal rate, regular rhythm and normal heart sounds  Exam reveals no gallop  No murmur heard  Pulmonary/Chest: No respiratory distress  She has no wheezes  She has no rales  Abdominal: Soft  Bowel sounds are normal  There is no tenderness  Musculoskeletal: Normal range of motion  She exhibits deformity  She exhibits no tenderness  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

## 2019-07-15 NOTE — PATIENT INSTRUCTIONS
A patient with the above issues  I would like to do some lab screens were activity of RA  Mammogram should be done  Follow-up visit with me in 6 months

## 2019-07-15 NOTE — PROGRESS NOTES
Assessment/Plan:       Diagnoses and all orders for this visit:    Essential hypertension    Chronic GERD  -     famotidine (PEPCID) 40 MG tablet; Take 1 tablet (40 mg total) by mouth daily    Rheumatoid arthritis involving multiple sites, unspecified rheumatoid factor presence (HCC)    Esophagitis          There are no Patient Instructions on file for this visit  Subjective:      Patient ID: Declan Aguiar is a 59 y o  female  HPI    The following portions of the patient's history were reviewed and updated as appropriate:   She has a past medical history of Alcohol abuse (12/31/2018), Anxiety, Bipolar 1 disorder ([facility] ), Chronic back pain, Frequent headaches, Glaucoma, Hyperlipidemia, Irregular heart beat, [facility]varian cancer ([facility] ), Psychiatric disorder, Shortness of breath, and Uterine carcinoma ([facility] )  ,  does not have any pertinent problems on file  ,   has a past surgical history that includes Cholecystectomy; Hysterectomy; Appendectomy; Tonsillectomy; Intraocular lens insertion; Cataract extraction (Left); pr temporal artery ligatn or bx (Right, 12/16/2016); Eye surgery; and Adenoidectomy  ,  family history includes Coronary artery disease in her mother; Heart attack in her brother, father, and mother; Heart disease in her mother; Hyperlipidemia in her father and mother; Other in her father and mother; Stomach cancer in her brother, father, maternal uncle, and mother  ,   reports that she has been smoking cigarettes  She has a 23 00 pack-year smoking history  She has never used smokeless tobacco  She reports that she drank alcohol  She reports that she does not use drugs  ,  is allergic to aspirin; bee venom; robaxin [methocarbamol]; tramadol; ketorolac; nitroglycerin; omeprazole; and codeine     Current Outpatient Medications   Medication Sig Dispense Refill    albuterol (2 5 mg/3 mL) 0 083 % nebulizer solution Take 1 vial (2 5 mg total) by nebulization every 6 (six) hours as needed for wheezing or shortness of breath 100 vial 3    atorvastatin (LIPITOR) 40 mg tablet take 1 tablet by mouth once daily 90 tablet 3    citalopram (CELEXA) 40 mg tablet Take 40 mg by mouth daily        clonazePAM (KlonoPIN) 1 mg tablet 1 mg 2 (two) times a day as needed for anxiety    0    dicyclomine (BENTYL) 10 mg capsule Take 1 capsule (10 mg total) by mouth 3 (three) times a day before meals 90 capsule 0    famotidine (PEPCID) 40 MG tablet Take 1 tablet (40 mg total) by mouth daily 90 tablet 3    gabapentin (NEURONTIN) 800 mg tablet Take 800 mg by mouth 3 (three) times a day      metoprolol tartrate (LOPRESSOR) 50 mg tablet TAKE 1 TABLET EVERY 12 HOURS 60 tablet 11    mirtazapine (REMERON) 30 mg tablet Take 30 mg by mouth daily at bedtime         No current facility-administered medications for this visit          Review of Systems      Objective:  Vitals:    07/15/19 1339   BP: 140/90   Pulse: 79   SpO2: 97%      Physical Exam

## 2019-08-05 ENCOUNTER — APPOINTMENT (OUTPATIENT)
Dept: LAB | Facility: CLINIC | Age: 59
End: 2019-08-05
Payer: COMMERCIAL

## 2019-08-05 DIAGNOSIS — Z72.0 TOBACCO ABUSE: Primary | ICD-10-CM

## 2019-08-05 DIAGNOSIS — M06.9 RHEUMATOID ARTHRITIS INVOLVING MULTIPLE SITES, UNSPECIFIED RHEUMATOID FACTOR PRESENCE: ICD-10-CM

## 2019-08-05 DIAGNOSIS — B37.0 THRUSH: ICD-10-CM

## 2019-08-05 LAB — ERYTHROCYTE [SEDIMENTATION RATE] IN BLOOD: 29 MM/HOUR (ref 0–20)

## 2019-08-05 PROCEDURE — 86431 RHEUMATOID FACTOR QUANT: CPT

## 2019-08-05 PROCEDURE — 36415 COLL VENOUS BLD VENIPUNCTURE: CPT

## 2019-08-05 PROCEDURE — 85652 RBC SED RATE AUTOMATED: CPT

## 2019-08-05 PROCEDURE — 86200 CCP ANTIBODY: CPT

## 2019-08-05 PROCEDURE — 86430 RHEUMATOID FACTOR TEST QUAL: CPT

## 2019-08-05 PROCEDURE — 86038 ANTINUCLEAR ANTIBODIES: CPT

## 2019-08-05 RX ORDER — FLUCONAZOLE 150 MG/1
150 TABLET ORAL ONCE
Qty: 1 TABLET | Refills: 0 | Status: SHIPPED | OUTPATIENT
Start: 2019-08-05 | End: 2019-08-05

## 2019-08-06 LAB
CRYOGLOB RF SER-ACNC: ABNORMAL [IU]/ML
RHEUMATOID FACT SER QL LA: POSITIVE

## 2019-08-07 LAB — RYE IGE QN: NEGATIVE

## 2019-08-08 LAB — CCP IGA+IGG SERPL IA-ACNC: >250 UNITS (ref 0–19)

## 2019-08-14 ENCOUNTER — TELEPHONE (OUTPATIENT)
Dept: INTERNAL MEDICINE CLINIC | Facility: CLINIC | Age: 59
End: 2019-08-14

## 2019-08-14 NOTE — TELEPHONE ENCOUNTER
----- Message from William Prakash MD sent at 8/14/2019  8:55 AM EDT -----  Lupus marker is very high  I think we know this already  If she is not having any symptoms it does not need any further work    If she is having symptoms she were to go to a Rheumatology

## 2019-08-24 ENCOUNTER — APPOINTMENT (EMERGENCY)
Dept: CT IMAGING | Facility: HOSPITAL | Age: 59
End: 2019-08-24
Payer: COMMERCIAL

## 2019-08-24 ENCOUNTER — HOSPITAL ENCOUNTER (EMERGENCY)
Facility: HOSPITAL | Age: 59
Discharge: HOME | End: 2019-08-24
Attending: EMERGENCY MEDICINE | Admitting: EMERGENCY MEDICINE
Payer: COMMERCIAL

## 2019-08-24 VITALS
HEIGHT: 63 IN | DIASTOLIC BLOOD PRESSURE: 87 MMHG | HEART RATE: 77 BPM | OXYGEN SATURATION: 96 % | RESPIRATION RATE: 18 BRPM | BODY MASS INDEX: 26.85 KG/M2 | TEMPERATURE: 97.9 F | SYSTOLIC BLOOD PRESSURE: 137 MMHG

## 2019-08-24 DIAGNOSIS — R11.2 NAUSEA AND VOMITING: ICD-10-CM

## 2019-08-24 DIAGNOSIS — R10.31 ACUTE BILATERAL LOWER ABDOMINAL PAIN: Primary | ICD-10-CM

## 2019-08-24 DIAGNOSIS — R10.32 ACUTE BILATERAL LOWER ABDOMINAL PAIN: Primary | ICD-10-CM

## 2019-08-24 LAB
ALBUMIN SERPL BCP-MCNC: 3.2 G/DL (ref 3.5–5)
ALP SERPL-CCNC: 110 U/L (ref 46–116)
ALT SERPL W P-5'-P-CCNC: 36 U/L (ref 12–78)
ANION GAP SERPL CALCULATED.3IONS-SCNC: 9 MMOL/L (ref 4–13)
AST SERPL W P-5'-P-CCNC: 21 U/L (ref 5–45)
BACTERIA UR QL AUTO: ABNORMAL /HPF
BASOPHILS # BLD AUTO: 0.04 THOUSANDS/ΜL (ref 0–0.1)
BASOPHILS NFR BLD AUTO: 1 % (ref 0–1)
BILIRUB SERPL-MCNC: 0.3 MG/DL (ref 0.2–1)
BILIRUB UR QL STRIP: NEGATIVE
BUN SERPL-MCNC: 23 MG/DL (ref 5–25)
CALCIUM SERPL-MCNC: 8.6 MG/DL (ref 8.3–10.1)
CHLORIDE SERPL-SCNC: 106 MMOL/L (ref 100–108)
CLARITY UR: CLEAR
CO2 SERPL-SCNC: 26 MMOL/L (ref 21–32)
COLOR UR: YELLOW
CREAT SERPL-MCNC: 0.78 MG/DL (ref 0.6–1.3)
EOSINOPHIL # BLD AUTO: 0.2 THOUSAND/ΜL (ref 0–0.61)
EOSINOPHIL NFR BLD AUTO: 3 % (ref 0–6)
ERYTHROCYTE [DISTWIDTH] IN BLOOD BY AUTOMATED COUNT: 13.6 % (ref 11.6–15.1)
GFR SERPL CREATININE-BSD FRML MDRD: 82 ML/MIN/1.73SQ M
GLUCOSE SERPL-MCNC: 92 MG/DL (ref 65–140)
GLUCOSE UR STRIP-MCNC: NEGATIVE MG/DL
HCT VFR BLD AUTO: 43.6 % (ref 34.8–46.1)
HGB BLD-MCNC: 14.6 G/DL (ref 11.5–15.4)
HGB UR QL STRIP.AUTO: ABNORMAL
IMM GRANULOCYTES # BLD AUTO: 0.03 THOUSAND/UL (ref 0–0.2)
IMM GRANULOCYTES NFR BLD AUTO: 1 % (ref 0–2)
KETONES UR STRIP-MCNC: NEGATIVE MG/DL
LACTATE SERPL-SCNC: 0.9 MMOL/L (ref 0.5–2)
LEUKOCYTE ESTERASE UR QL STRIP: NEGATIVE
LIPASE SERPL-CCNC: 64 U/L (ref 73–393)
LYMPHOCYTES # BLD AUTO: 2.18 THOUSANDS/ΜL (ref 0.6–4.47)
LYMPHOCYTES NFR BLD AUTO: 34 % (ref 14–44)
MCH RBC QN AUTO: 30.6 PG (ref 26.8–34.3)
MCHC RBC AUTO-ENTMCNC: 33.5 G/DL (ref 31.4–37.4)
MCV RBC AUTO: 91 FL (ref 82–98)
MONOCYTES # BLD AUTO: 0.47 THOUSAND/ΜL (ref 0.17–1.22)
MONOCYTES NFR BLD AUTO: 7 % (ref 4–12)
NEUTROPHILS # BLD AUTO: 3.56 THOUSANDS/ΜL (ref 1.85–7.62)
NEUTS SEG NFR BLD AUTO: 54 % (ref 43–75)
NITRITE UR QL STRIP: NEGATIVE
NON-SQ EPI CELLS URNS QL MICRO: ABNORMAL /HPF
NRBC BLD AUTO-RTO: 0 /100 WBCS
PH UR STRIP.AUTO: 5.5 [PH]
PLATELET # BLD AUTO: 161 THOUSANDS/UL (ref 149–390)
PMV BLD AUTO: 9.8 FL (ref 8.9–12.7)
POTASSIUM SERPL-SCNC: 4.1 MMOL/L (ref 3.5–5.3)
PROT SERPL-MCNC: 6.9 G/DL (ref 6.4–8.2)
PROT UR STRIP-MCNC: NEGATIVE MG/DL
RBC # BLD AUTO: 4.77 MILLION/UL (ref 3.81–5.12)
RBC #/AREA URNS AUTO: ABNORMAL /HPF
SODIUM SERPL-SCNC: 141 MMOL/L (ref 136–145)
SP GR UR STRIP.AUTO: >=1.03 (ref 1–1.03)
UROBILINOGEN UR QL STRIP.AUTO: 0.2 E.U./DL
WBC # BLD AUTO: 6.48 THOUSAND/UL (ref 4.31–10.16)
WBC #/AREA URNS AUTO: ABNORMAL /HPF

## 2019-08-24 PROCEDURE — 83690 ASSAY OF LIPASE: CPT | Performed by: EMERGENCY MEDICINE

## 2019-08-24 PROCEDURE — 36415 COLL VENOUS BLD VENIPUNCTURE: CPT | Performed by: EMERGENCY MEDICINE

## 2019-08-24 PROCEDURE — 96374 THER/PROPH/DIAG INJ IV PUSH: CPT

## 2019-08-24 PROCEDURE — 80053 COMPREHEN METABOLIC PANEL: CPT | Performed by: EMERGENCY MEDICINE

## 2019-08-24 PROCEDURE — 99284 EMERGENCY DEPT VISIT MOD MDM: CPT

## 2019-08-24 PROCEDURE — 81001 URINALYSIS AUTO W/SCOPE: CPT | Performed by: EMERGENCY MEDICINE

## 2019-08-24 PROCEDURE — 85025 COMPLETE CBC W/AUTO DIFF WBC: CPT | Performed by: EMERGENCY MEDICINE

## 2019-08-24 PROCEDURE — 99285 EMERGENCY DEPT VISIT HI MDM: CPT | Performed by: EMERGENCY MEDICINE

## 2019-08-24 PROCEDURE — 83605 ASSAY OF LACTIC ACID: CPT | Performed by: EMERGENCY MEDICINE

## 2019-08-24 PROCEDURE — 96375 TX/PRO/DX INJ NEW DRUG ADDON: CPT

## 2019-08-24 PROCEDURE — 74177 CT ABD & PELVIS W/CONTRAST: CPT

## 2019-08-24 PROCEDURE — 96361 HYDRATE IV INFUSION ADD-ON: CPT

## 2019-08-24 RX ORDER — DICYCLOMINE HCL 20 MG
20 TABLET ORAL EVERY 8 HOURS PRN
Qty: 12 TABLET | Refills: 0 | Status: SHIPPED | OUTPATIENT
Start: 2019-08-24 | End: 2021-03-20 | Stop reason: HOSPADM

## 2019-08-24 RX ORDER — IBUPROFEN 600 MG/1
600 TABLET ORAL ONCE
Status: COMPLETED | OUTPATIENT
Start: 2019-08-24 | End: 2019-08-24

## 2019-08-24 RX ORDER — HYDROMORPHONE HCL/PF 1 MG/ML
1 SYRINGE (ML) INJECTION ONCE
Status: COMPLETED | OUTPATIENT
Start: 2019-08-24 | End: 2019-08-24

## 2019-08-24 RX ORDER — ONDANSETRON 4 MG/1
4 TABLET, ORALLY DISINTEGRATING ORAL EVERY 8 HOURS PRN
Qty: 12 TABLET | Refills: 0 | Status: SHIPPED | OUTPATIENT
Start: 2019-08-24 | End: 2020-04-30 | Stop reason: SDUPTHER

## 2019-08-24 RX ORDER — DICYCLOMINE HCL 20 MG
20 TABLET ORAL ONCE
Status: COMPLETED | OUTPATIENT
Start: 2019-08-24 | End: 2019-08-24

## 2019-08-24 RX ORDER — ONDANSETRON 2 MG/ML
4 INJECTION INTRAMUSCULAR; INTRAVENOUS ONCE
Status: COMPLETED | OUTPATIENT
Start: 2019-08-24 | End: 2019-08-24

## 2019-08-24 RX ORDER — ACETAMINOPHEN 325 MG/1
650 TABLET ORAL ONCE
Status: DISCONTINUED | OUTPATIENT
Start: 2019-08-24 | End: 2019-08-24 | Stop reason: HOSPADM

## 2019-08-24 RX ADMIN — DICYCLOMINE HYDROCHLORIDE 20 MG: 20 TABLET ORAL at 20:40

## 2019-08-24 RX ADMIN — IOHEXOL 100 ML: 350 INJECTION, SOLUTION INTRAVENOUS at 20:46

## 2019-08-24 RX ADMIN — SODIUM CHLORIDE 1000 ML: 0.9 INJECTION, SOLUTION INTRAVENOUS at 19:11

## 2019-08-24 RX ADMIN — ONDANSETRON 4 MG: 2 INJECTION INTRAMUSCULAR; INTRAVENOUS at 19:11

## 2019-08-24 RX ADMIN — IBUPROFEN 600 MG: 600 TABLET ORAL at 21:36

## 2019-08-24 RX ADMIN — HYDROMORPHONE HYDROCHLORIDE 1 MG: 1 INJECTION, SOLUTION INTRAMUSCULAR; INTRAVENOUS; SUBCUTANEOUS at 19:12

## 2019-08-24 NOTE — ED PROVIDER NOTES
History  Chief Complaint   Patient presents with    Abdominal Pain     Pt c/o lower abd pain, reporting the excessive pain is causing nausea  Pt denies any urinary or bowel abnormalites     Patient is a 42-year-old female with past medical history of anxiety, bipolar disorder, chronic back pain, atrial fibrillation, hyperlipidemia, uterine and ovarian carcinoma status post total hysterectomy, appendectomy, cholecystectomy, presents to the emergency department complaining of severe acute abdominal pain  Patient states about 4 hours ago she developed sharp abdominal pain localized to her central lower abdomen  She states it radiates across her lower abdomen  She denies ever having this type of pain before  She states the pain is been constant and getting progressively worse  She has been nauseated and had several episodes of nonbilious nonbloody vomiting since being in the ED  She denies fever, shaking chills, headaches, dizziness or near syncope, cough or URI symptoms, chest pain, palpitations, dyspnea, abdominal distension, hematemesis, diarrhea, constipation, blood per rectum or melena, dysuria, change in urinary frequency, hematuria, flank pain, skin rash or color change, extremity weakness or paresthesia or other focal neurologic deficits  History provided by:  Patient   used: No    Abdominal Pain   Associated symptoms: nausea and vomiting    Associated symptoms: no chest pain, no chills, no constipation, no cough, no diarrhea, no dysuria, no fever, no hematuria, no shortness of breath and no sore throat        Prior to Admission Medications   Prescriptions Last Dose Informant Patient Reported? Taking?    albuterol (2 5 mg/3 mL) 0 083 % nebulizer solution  Self No No   Sig: Take 1 vial (2 5 mg total) by nebulization every 6 (six) hours as needed for wheezing or shortness of breath   atorvastatin (LIPITOR) 40 mg tablet  Self No No   Sig: take 1 tablet by mouth once daily citalopram (CELEXA) 40 mg tablet  Self Yes No   Sig: Take 40 mg by mouth daily     clonazePAM (KlonoPIN) 1 mg tablet  Self Yes No   Si mg 2 (two) times a day as needed for anxiety     dicyclomine (BENTYL) 10 mg capsule   No No   Sig: Take 1 capsule (10 mg total) by mouth 3 (three) times a day before meals   famotidine (PEPCID) 40 MG tablet   No No   Sig: Take 1 tablet (40 mg total) by mouth daily   gabapentin (NEURONTIN) 800 mg tablet  Self Yes No   Sig: Take 800 mg by mouth 3 (three) times a day   metoprolol tartrate (LOPRESSOR) 50 mg tablet  Self No No   Sig: TAKE 1 TABLET EVERY 12 HOURS   mirtazapine (REMERON) 30 mg tablet  Self Yes No   Sig: Take 30 mg by mouth daily at bedtime     nicotine polacrilex (NICORETTE) 2 mg gum   No No   Sig: Chew 1 each (2 mg total) as needed for smoking cessation   nystatin (MYCOSTATIN) 500,000 units/5 mL suspension   No No   Sig: Apply 5 mL (500,000 Units total) to the mouth or throat 4 (four) times a day      Facility-Administered Medications: None       Past Medical History:   Diagnosis Date    Alcohol abuse 2018    Anxiety     Bipolar 1 disorder ([facility] )     Mental Health problems listed as Denied in Allscripts, cant't entire under pertinent negatives due to this diagnosis    Chronic back pain     Frequent headaches     Glaucoma     Hyperlipidemia     Irregular heart beat     AFib    Ovarian cancer ([facility] )     last assessed - 2016    Psychiatric disorder     bipolar    Shortness of breath     Uterine carcinoma ([facility] )     last assessed - 2016       Past Surgical History:   Procedure Laterality Date    ADENOIDECTOMY      Tonsillectomy with Adenoidectomy - last assessed - 2016    APPENDECTOMY      last assessed - 2016    CATARACT EXTRACTION Left     Remove cataract, corneo-scleral section of left eye; last assessed - 2016    CHOLECYSTECTOMY      last assessed - 2016    EYE SURGERY      Anterior sclera fistulization for glaucoma; last assessed - 21Sep2016    HYSTERECTOMY      KRYSTA-BSO; last assessed - 21Sep2016    INTRAOCULAR LENS INSERTION      MD TEMPORAL ARTERY LIGATN OR BX Right 12/16/2016    Procedure: BIOPSY ARTERY TEMPORAL;  Surgeon: Araceli Manzo MD;  Location: [facility];  Service: General    TONSILLECTOMY      Tonsillectomy with Adenoidectomy - last assessed - 21Sep2016       Family History   Problem Relation Age of Onset    Heart disease Mother     Coronary artery disease Mother     Other Mother         1) Gangrene; 2) Peripheral arterial disease    Hyperlipidemia Mother         Hypercholesterolemia    Stomach cancer Mother     Heart attack Mother         Myocardial Infarction    Other Father         1) Gangrene; 2) Peripheral arterial disease    Hyperlipidemia Father         Hypercholesterolemia    Stomach cancer Father     Heart attack Father         Myocardial Infarction    Stomach cancer Brother     Heart attack Brother         Myocardial Infarction    Stomach cancer Maternal Uncle      I have reviewed and agree with the history as documented  Social History     Tobacco Use    Smoking status: Current Every Day Smoker     Packs/day: 0 50     Years: 46 00     Pack years: 23 00     Types: Cigarettes    Smokeless tobacco: Never Used   Substance Use Topics    Alcohol use: Not Currently     Comment: occasional; (No alcohol use per Allscripts)    Drug use: No        Review of Systems   Constitutional: Negative for chills and fever  HENT: Negative for congestion, ear pain, rhinorrhea and sore throat  Respiratory: Negative for cough and shortness of breath  Cardiovascular: Negative for chest pain and palpitations  Gastrointestinal: Positive for abdominal pain, nausea and vomiting  Negative for abdominal distention, blood in stool, constipation and diarrhea  Genitourinary: Negative for dysuria, flank pain, frequency and hematuria  Musculoskeletal: Negative for back pain and neck pain     Skin: Negative for color change and rash  Allergic/Immunologic: Negative for immunocompromised state  Neurological: Negative for dizziness, syncope, weakness, light-headedness, numbness and headaches  Hematological: Negative for adenopathy  Psychiatric/Behavioral: Negative for confusion and decreased concentration  All other systems reviewed and are negative  Physical Exam  Physical Exam   Constitutional: She is oriented to person, place, and time  She appears well-developed and well-nourished  No distress  HENT:   Head: Normocephalic and atraumatic  Mouth/Throat: Oropharynx is clear and moist    Eyes: Pupils are equal, round, and reactive to light  Conjunctivae and EOM are normal    Neck: Normal range of motion  Neck supple  No JVD present  Cardiovascular: Normal rate, regular rhythm, normal heart sounds and intact distal pulses  Exam reveals no gallop and no friction rub  No murmur heard  Pulmonary/Chest: Effort normal and breath sounds normal  No respiratory distress  She has no wheezes  She has no rales  Abdominal: Soft  Bowel sounds are normal  She exhibits no distension  There is tenderness  There is no rebound and no guarding  +Tenderness in the suprapubic and central lower abdomen  No CVA tenderness  Musculoskeletal: Normal range of motion  She exhibits no edema or tenderness  Neurological: She is alert and oriented to person, place, and time  No gross motor or sensory deficits  Skin: Skin is warm and dry  No rash noted  She is not diaphoretic  No erythema  No pallor  Psychiatric: She has a normal mood and affect  Her behavior is normal    Nursing note and vitals reviewed        Vital Signs  ED Triage Vitals [08/24/19 1732]   Temperature Pulse Respirations Blood Pressure SpO2   97 9 °F (36 6 °C) 84 18 140/80 98 %      Temp src Heart Rate Source Patient Position - Orthostatic VS BP Location FiO2 (%)   -- Monitor Sitting Left arm --      Pain Score       Worst Possible Pain Vitals:    08/24/19 1732 08/24/19 1932 08/24/19 2115   BP: 140/80 146/72 137/87   BP Location: Left arm Left arm Left arm   Pulse: 84 82 77   Resp: 18 18 18   Temp: 97 9 °F (36 6 °C)     SpO2: 98% 97% 96%   Height: 5' 3" (1 6 m)         Visual Acuity      ED Medications  Medications   acetaminophen (TYLENOL) tablet 650 mg (650 mg Oral Not Given 8/24/19 2041)   ibuprofen (MOTRIN) tablet 600 mg (has no administration in time range)   sodium chloride 0 9 % bolus 1,000 mL (1,000 mL Intravenous New Bag 8/24/19 1911)   ondansetron (ZOFRAN) injection 4 mg (4 mg Intravenous Given 8/24/19 1911)   HYDROmorphone (DILAUDID) injection 1 mg (1 mg Intravenous Given 8/24/19 1912)   dicyclomine (BENTYL) tablet 20 mg (20 mg Oral Given 8/24/19 2040)   iohexol (OMNIPAQUE) 350 MG/ML injection (MULTI-DOSE) 100 mL (100 mL Intravenous Given 8/24/19 2046)       Diagnostic Studies  Results Reviewed     Procedure Component Value Units Date/Time    Lactic acid, plasma [[id number]]  (Normal) Collected:  08/24/19 1924    Lab Status:  Final result Specimen:  Blood from Hand, Right Updated:  08/24/19 1956     LACTIC ACID 0 9 mmol/L     Narrative:       Result may be elevated if tourniquet was used during collection      Urine Microscopic [[id number]]  (Abnormal) Collected:  08/24/19 1930    Lab Status:  Final result Specimen:  Urine, Clean Catch Updated:  08/24/19 1954     RBC, UA 0-1 /hpf      WBC, UA 0-1 /hpf      Epithelial Cells Occasional /hpf      Bacteria, UA Occasional /hpf     Comprehensive metabolic panel [[id number]]  (Abnormal) Collected:  08/24/19 1924    Lab Status:  Final result Specimen:  Blood from Hand, Right Updated:  08/24/19 1951     Sodium 141 mmol/L      Potassium 4 1 mmol/L      Chloride 106 mmol/L      CO2 26 mmol/L      ANION GAP 9 mmol/L      BUN 23 mg/dL      Creatinine 0 78 mg/dL      Glucose 92 mg/dL      Calcium 8 6 mg/dL      AST 21 U/L      ALT 36 U/L      Alkaline Phosphatase 110 U/L      Total Protein 6 9 g/dL Albumin 3 2 g/dL      Total Bilirubin 0 30 mg/dL      eGFR 82 ml/min/1 73sq m     Narrative:       National Kidney Disease Foundation guidelines for Chronic Kidney Disease (CKD):     Stage 1 with normal or high GFR (GFR > 90 mL/min/1 73 square meters)    Stage 2 Mild CKD (GFR = 60-89 mL/min/1 73 square meters)    Stage 3A Moderate CKD (GFR = 45-59 mL/min/1 73 square meters)    Stage 3B Moderate CKD (GFR = 30-44 mL/min/1 73 square meters)    Stage 4 Severe CKD (GFR = 15-29 mL/min/1 73 square meters)    Stage 5 End Stage CKD (GFR <15 mL/min/1 73 square meters)  Note: GFR calculation is accurate only with a steady state creatinine    Lipase [[id number]]  (Abnormal) Collected:  08/24/19 1924    Lab Status:  Final result Specimen:  Blood from Hand, Right Updated:  08/24/19 1951     Lipase 64 u/L     UA w Reflex to Microscopic w Reflex to Culture [[id number]]  (Abnormal) Collected:  08/24/19 1930    Lab Status:  Final result Specimen:  Urine, Clean Catch Updated:  08/24/19 1944     Color, UA Yellow     Clarity, UA Clear     Specific Gravity, UA >=1 030     pH, UA 5 5     Leukocytes, UA Negative     Nitrite, UA Negative     Protein, UA Negative mg/dl      Glucose, UA Negative mg/dl      Ketones, UA Negative mg/dl      Urobilinogen, UA 0 2 E U /dl      Bilirubin, UA Negative     Blood, UA Trace-Intact    CBC and differential [[id number]] Collected:  08/24/19 1924    Lab Status:  Final result Specimen:  Blood from Hand, Right Updated:  08/24/19 1931     WBC 6 48 Thousand/uL      RBC 4 77 Million/uL      Hemoglobin 14 6 g/dL      Hematocrit 43 6 %      MCV 91 fL      MCH 30 6 pg      MCHC 33 5 g/dL      RDW 13 6 %      MPV 9 8 fL      Platelets 033 Thousands/uL      nRBC 0 /100 WBCs      Neutrophils Relative 54 %      Immat GRANS % 1 %      Lymphocytes Relative 34 %      Monocytes Relative 7 %      Eosinophils Relative 3 %      Basophils Relative 1 %      Neutrophils Absolute 3 56 Thousands/µL      Immature Grans Absolute 0 03 Thousand/uL      Lymphocytes Absolute 2 18 Thousands/µL      Monocytes Absolute 0 47 Thousand/µL      Eosinophils Absolute 0 20 Thousand/µL      Basophils Absolute 0 04 Thousands/µL                  CT abdomen pelvis with contrast   Final Result by Jesus Alberto Be DO (08/24 2120)      No acute findings            Workstation performed: [id number]                    Procedures  Procedures       ED Course  ED Course as of Aug 24 2134   Sat Aug 24, 2019   2038 Patient requesting more pain medication  According to medical records and a previous visit that I actually saw patient at, she has displayed drug-seeking behavior in the past   Will give nonnarcotic medication until CT scan results  If CT shows an acute process that warrants narcotic pain medication, will give an additional dose but otherwise will treat pain with non narcotics  2134 Updated patient about normal workup including CT scan  Patient still states she is in a lot of pain and offered Tylenol which chills already refused  She is willing to take ibuprofen which she states she has tolerated in the past without allergic reaction despite allergy to aspirin and Toradol  Recommended she follow up with her PCP and will also give referral for GI  Discussed ED return parameters  MDM  Number of Diagnoses or Management Options  Diagnosis management comments: 62year old female presents with acute onset lower abdominal pain and nausea/vomiting  Differential includes cystitis, pyelonephritis, gastroenteritis/colitis, diverticulitis, bowel obstruction, mesenteric ischemia, ureteral stone  Will check UA, abdominal labs including lactic acid, and CT abdomen/pelvis  Will give IVF bolus, dilaudid for pain and zofran for nausea          Amount and/or Complexity of Data Reviewed  Clinical lab tests: ordered and reviewed  Tests in the radiology section of CPT®: ordered and reviewed  Independent visualization of images, tracings, or specimens: yes        Disposition  Final diagnoses:   Acute bilateral lower abdominal pain   Nausea and vomiting     Time reflects when diagnosis was documented in both MDM as applicable and the Disposition within this note     Time User Action Codes Description Comment    8/24/2019  9:32 PM Lalito Keto E Add [R10 31,  R10 32] Acute bilateral lower abdominal pain     8/24/2019  9:32 PM Lalito Keto E Add [R11 2] Nausea and vomiting       ED Disposition     ED Disposition Condition Date/Time Comment    Discharge Stable Sat Aug 24, 2019  9:32 PM Carla Gannon discharge to home/self care              Follow-up Information     Follow up With Specialties Details Why Contact Info Additional Information    Eran Tobar MD Internal Medicine Schedule an appointment as soon as possible for a visit   [address] [facility] [id number] [place] [address] [address] [facility] Schedule an appointment as soon as possible for a visit   [address]  [place] [address]  [address] [facility], [address] [place], [place], [address] [address] [facility] [facility] Schedule an appointment as soon as possible for a visit   [facility] [address] [facility] [place], [address] [place], [place], [address] Emergency Department Emergency Medicine Go to  If symptoms worsen [address] [facility] [address]  [phone] [place] [facility], [address] [facility], [place], [place], [address]          Patient's Medications   Discharge Prescriptions    DICYCLOMINE (BENTYL) 20 MG TABLET    Take 1 tablet (20 mg total) by mouth every 8 (eight) hours as needed (abdominal pain)       Start Date: 8/24/2019 End Date: --       Order Dose: 20 mg       Quantity: 12 tablet    Refills: 0    ONDANSETRON (ZOFRAN-ODT) 4 MG DISINTEGRATING TABLET    Take 1 tablet (4 mg total) by mouth every 8 (eight) hours as needed for nausea or vomiting       Start Date: 8/24/2019 End Date: --       Order Dose: 4 mg       Quantity: 12 tablet    Refills: 0     No discharge procedures on file      ED Provider  Electronically Signed by           Kristopher Luke DO  08/24/19 8179

## 2019-08-24 NOTE — ED NOTES
Pt has rang call bell 4 times asking for pain medication  Pt educated each time, pain medication can not be given because there is no order and she has not yet been seen by a provider  Pt currently crying at this time               Adrianna Buenrostro RN  08/24/19 8631

## 2019-08-25 NOTE — DISCHARGE INSTRUCTIONS
Acute Abdominal Pain   WHAT YOU NEED TO KNOW:   The cause of your abdominal pain may not be found  If a cause is found, treatment will depend on what the cause is  DISCHARGE INSTRUCTIONS:   Seek care immediately if:   · You vomit blood or cannot stop vomiting  · You have blood in your bowel movement or it looks like tar  · You have bleeding from your rectum  · Your abdomen is larger than usual, more painful, and hard  · You have severe pain in your abdomen  · You stop passing gas and having bowel movements  · You feel weak, dizzy, or faint  Contact your healthcare provider if:   · You have a fever  · You have new signs and symptoms  · Your symptoms do not get better with treatment  · You have questions or concerns about your condition or care  Medicines  may be given to decrease pain, treat an infection, and manage your symptoms  Take your medicine as directed  Call your healthcare provider if you think your medicine is not helping or if you have side effects  Tell him if you are allergic to any medicine  Keep a list of the medicines, vitamins, and herbs you take  Include the amounts, and when and why you take them  Bring the list or the pill bottles to follow-up visits  Carry your medicine list with you in case of an emergency  Manage your symptoms:   · Apply heat  on your abdomen for 20 to 30 minutes every 2 hours for as many days as directed  Heat helps decrease pain and muscle spasms  · Manage your stress  Stress may cause abdominal pain  Your healthcare provider may recommend relaxation techniques and deep breathing exercises to help decrease your stress  Your healthcare provider may recommend you talk to someone about your stress or anxiety, such as a counselor or a trusted friend  Get plenty of sleep and exercise regularly  · Limit or do not drink alcohol  Alcohol can make your abdominal pain worse   Ask your healthcare provider if it is safe for you to drink alcohol  Also ask how much is safe for you to drink  · Do not smoke  Nicotine and other chemicals in cigarettes can damage your esophagus and stomach  Ask your healthcare provider for information if you currently smoke and need help to quit  E-cigarettes or smokeless tobacco still contain nicotine  Talk to your healthcare provider before you use these products  Make changes to the food you eat as directed:  Do not eat foods that cause abdominal pain or other symptoms  Eat small meals more often  · Eat more high-fiber foods if you are constipated  High-fiber foods include fruits, vegetables, whole-grain foods, and legumes  · Do not eat foods that cause gas if you have bloating  Examples include broccoli, cabbage, and cauliflower  Do not drink soda or carbonated drinks, because these may also cause gas  · Do not eat foods or drinks that contain sorbitol or fructose if you have diarrhea and bloating  Some examples are fruit juices, candy, jelly, and sugar-free gum  · Do not eat high-fat foods, such as fried foods, cheeseburgers, hot dogs, and desserts  · Limit or do not drink caffeine  Caffeine may make symptoms, such as heart burn or nausea, worse  · Drink plenty of liquids to prevent dehydration from diarrhea or vomiting  Ask your healthcare provider how much liquid to drink each day and which liquids are best for you  Follow up with your healthcare provider as directed:  Write down your questions so you remember to ask them during your visits  © 2017 [address] [employer] Information is for End User's use only and may not be sold, redistributed or otherwise used for commercial purposes  All illustrations and images included in CareNotes® are the copyrighted property of [employer]  or [employer]  The above information is an  only  It is not intended as medical advice for individual conditions or treatments   Talk to your doctor, nurse or pharmacist before following any medical regimen to see if it is safe and effective for you  Acute Nausea and Vomiting   WHAT YOU NEED TO KNOW:   Acute nausea and vomiting start suddenly, worsen quickly, and last a short time  DISCHARGE INSTRUCTIONS:   Seek care immediately if:   · You see blood in your vomit or your bowel movements  · You have sudden, severe pain in your chest and upper abdomen after hard vomiting or retching  · You have swelling in your neck and chest      · You are dizzy, cold, and thirsty and your eyes and mouth are dry  · You are urinating very little or not at all  · You have muscle weakness, leg cramps, and trouble breathing  · Your heart is beating much faster than normal      · You continue to vomit for more than 48 hours  Contact your healthcare provider if:   · You have frequent dry heaves (vomiting but nothing comes out)  · Your nausea and vomiting does not get better or go away after you use medicine  · You have questions or concerns about your condition or treatment  Medicines: You may need any of the following:  · Medicines  may be given to calm your stomach and stop your vomiting  You may also need medicines to help you feel more relaxed or to stop nausea and vomiting caused by motion sickness  · Gastrointestinal stimulants  are used to help empty your stomach and bowels  This may help decrease nausea and vomiting  · Take your medicine as directed  Contact your healthcare provider if you think your medicine is not helping or if you have side effects  Tell him or her if you are allergic to any medicine  Keep a list of the medicines, vitamins, and herbs you take  Include the amounts, and when and why you take them  Bring the list or the pill bottles to follow-up visits  Carry your medicine list with you in case of an emergency  Prevent or manage acute nausea and vomiting:   · Do not drink alcohol  Alcohol may upset or irritate your stomach   Too much alcohol can also cause acute nausea and vomiting  · Control stress  Headaches due to stress may cause nausea and vomiting  Find ways to relax and manage your stress  Get more rest and sleep  · Drink more liquids as directed  Vomiting can lead to dehydration  It is important to drink more liquids to help replace lost body fluids  Ask your healthcare provider how much liquid to drink each day and which liquids are best for you  Your provider may recommend that you drink an oral rehydration solution (ORS)  ORS contains water, salts, and sugar that are needed to replace the lost body fluids  Ask what kind of ORS to use, how much to drink, and where to get it  · Eat smaller meals, more often  Eat small amounts of food every 2 to 3 hours, even if you are not hungry  Food in your stomach may decrease your nausea  · Talk to your healthcare provider before you take over-the-counter (OTC) medicines  These medicines can cause serious problems if you use certain other medicines, or you have a medical condition  You may have problems if you use too much or use them for longer than the label says  Follow directions on the label carefully  Follow up with your healthcare provider as directed:  Write down your questions so you remember to ask them during your visits  © 2017 [address] [employer] Information is for End User's use only and may not be sold, redistributed or otherwise used for commercial purposes  All illustrations and images included in CareNotes® are the copyrighted property of [employer]  or [employer]  The above information is an  only  It is not intended as medical advice for individual conditions or treatments  Talk to your doctor, nurse or pharmacist before following any medical regimen to see if it is safe and effective for you

## 2019-08-29 NOTE — TELEPHONE ENCOUNTER
Attempted to do peer to peer for myocardial perfusion imaging stress test, however was told that study is currently under review  Entered into Epic: Recall CLN per Dr. Pagan Falling in 3 yrs for gastric intestinal metaplsia. Last done 8/14/2019, next due 8/14/22. HM updated.

## 2019-09-06 ENCOUNTER — TELEPHONE (OUTPATIENT)
Dept: INTERNAL MEDICINE CLINIC | Facility: CLINIC | Age: 59
End: 2019-09-06

## 2019-09-09 DIAGNOSIS — R10.13 EPIGASTRIC PAIN: ICD-10-CM

## 2019-09-10 RX ORDER — PANTOPRAZOLE SODIUM 40 MG/1
TABLET, DELAYED RELEASE ORAL
Qty: 30 TABLET | Refills: 0 | OUTPATIENT
Start: 2019-09-10

## 2019-09-24 ENCOUNTER — HOSPITAL ENCOUNTER (EMERGENCY)
Facility: HOSPITAL | Age: 59
Discharge: HOME | End: 2019-09-24
Attending: EMERGENCY MEDICINE | Admitting: EMERGENCY MEDICINE
Payer: COMMERCIAL

## 2019-09-24 ENCOUNTER — APPOINTMENT (EMERGENCY)
Dept: CT IMAGING | Facility: HOSPITAL | Age: 59
End: 2019-09-24
Payer: COMMERCIAL

## 2019-09-24 ENCOUNTER — APPOINTMENT (EMERGENCY)
Dept: RADIOLOGY | Facility: HOSPITAL | Age: 59
End: 2019-09-24
Payer: COMMERCIAL

## 2019-09-24 VITALS
RESPIRATION RATE: 18 BRPM | OXYGEN SATURATION: 94 % | DIASTOLIC BLOOD PRESSURE: 88 MMHG | HEART RATE: 87 BPM | TEMPERATURE: 97.7 F | SYSTOLIC BLOOD PRESSURE: 126 MMHG

## 2019-09-24 DIAGNOSIS — R07.89 ACUTE CHEST WALL PAIN: ICD-10-CM
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
## 2019-09-24 LAB
ANION GAP SERPL CALCULATED.3IONS-SCNC: 8 MMOL/L (ref 4–13)
BASOPHILS # BLD AUTO: 0.04 THOUSANDS/ΜL (ref 0–0.1)
BASOPHILS NFR BLD AUTO: 1 % (ref 0–1)
BUN SERPL-MCNC: 11 MG/DL (ref 5–25)
CALCIUM SERPL-MCNC: 9.2 MG/DL (ref 8.3–10.1)
CHLORIDE SERPL-SCNC: 102 MMOL/L (ref 100–108)
CO2 SERPL-SCNC: 26 MMOL/L (ref 21–32)
CREAT SERPL-MCNC: 0.69 MG/DL (ref 0.6–1.3)
EOSINOPHIL # BLD AUTO: 0.17 THOUSAND/ΜL (ref 0–0.61)
EOSINOPHIL NFR BLD AUTO: 4 % (ref 0–6)
ERYTHROCYTE [DISTWIDTH] IN BLOOD BY AUTOMATED COUNT: 13.7 % (ref 11.6–15.1)
GFR SERPL CREATININE-BSD FRML MDRD: 94 ML/MIN/1.73SQ M
GLUCOSE SERPL-MCNC: 86 MG/DL (ref 65–140)
HCT VFR BLD AUTO: 42.5 % (ref 34.8–46.1)
HGB BLD-MCNC: 14.2 G/DL (ref 11.5–15.4)
IMM GRANULOCYTES # BLD AUTO: 0.01 THOUSAND/UL (ref 0–0.2)
IMM GRANULOCYTES NFR BLD AUTO: 0 % (ref 0–2)
LYMPHOCYTES # BLD AUTO: 1.8 THOUSANDS/ΜL (ref 0.6–4.47)
LYMPHOCYTES NFR BLD AUTO: 38 % (ref 14–44)
MCH RBC QN AUTO: 30.5 PG (ref 26.8–34.3)
MCHC RBC AUTO-ENTMCNC: 33.4 G/DL (ref 31.4–37.4)
MCV RBC AUTO: 91 FL (ref 82–98)
MONOCYTES # BLD AUTO: 0.43 THOUSAND/ΜL (ref 0.17–1.22)
MONOCYTES NFR BLD AUTO: 9 % (ref 4–12)
NEUTROPHILS # BLD AUTO: 2.29 THOUSANDS/ΜL (ref 1.85–7.62)
NEUTS SEG NFR BLD AUTO: 48 % (ref 43–75)
NRBC BLD AUTO-RTO: 0 /100 WBCS
PLATELET # BLD AUTO: 137 THOUSANDS/UL (ref 149–390)
PMV BLD AUTO: 9.5 FL (ref 8.9–12.7)
POTASSIUM SERPL-SCNC: 3.7 MMOL/L (ref 3.5–5.3)
RBC # BLD AUTO: 4.66 MILLION/UL (ref 3.81–5.12)
SODIUM SERPL-SCNC: 136 MMOL/L (ref 136–145)
WBC # BLD AUTO: 4.74 THOUSAND/UL (ref 4.31–10.16)

## 2019-09-24 PROCEDURE — 85025 COMPLETE CBC W/AUTO DIFF WBC: CPT | Performed by: EMERGENCY MEDICINE

## 2019-09-24 PROCEDURE — 73610 X-RAY EXAM OF ANKLE: CPT

## 2019-09-24 PROCEDURE — 99285 EMERGENCY DEPT VISIT HI MDM: CPT | Performed by: EMERGENCY MEDICINE

## 2019-09-24 PROCEDURE — 99284 EMERGENCY DEPT VISIT MOD MDM: CPT

## 2019-09-24 PROCEDURE — 74177 CT ABD & PELVIS W/CONTRAST: CPT

## 2019-09-24 PROCEDURE — 96374 THER/PROPH/DIAG INJ IV PUSH: CPT

## 2019-09-24 PROCEDURE — 36415 COLL VENOUS BLD VENIPUNCTURE: CPT | Performed by: EMERGENCY MEDICINE

## 2019-09-24 PROCEDURE — 80048 BASIC METABOLIC PNL TOTAL CA: CPT | Performed by: EMERGENCY MEDICINE

## 2019-09-24 PROCEDURE — 71260 CT THORAX DX C+: CPT

## 2019-09-24 PROCEDURE — 96361 HYDRATE IV INFUSION ADD-ON: CPT

## 2019-09-24 RX ORDER — OXYCODONE HYDROCHLORIDE AND ACETAMINOPHEN 5; 325 MG/1; MG/1
1 TABLET ORAL EVERY 4 HOURS PRN
Qty: 8 TABLET | Refills: 0 | Status: SHIPPED | OUTPATIENT
Start: 2019-09-24 | End: 2019-10-04

## 2019-09-24 RX ORDER — OXYCODONE HYDROCHLORIDE AND ACETAMINOPHEN 5; 325 MG/1; MG/1
1 TABLET ORAL ONCE
Status: COMPLETED | OUTPATIENT
Start: 2019-09-24 | End: 2019-09-24

## 2019-09-24 RX ORDER — MORPHINE SULFATE 4 MG/ML
4 INJECTION, SOLUTION INTRAMUSCULAR; INTRAVENOUS ONCE
Status: DISCONTINUED | OUTPATIENT
Start: 2019-09-24 | End: 2019-09-24

## 2019-09-24 RX ORDER — HYDROMORPHONE HCL/PF 1 MG/ML
0.5 SYRINGE (ML) INJECTION ONCE
Status: COMPLETED | OUTPATIENT
Start: 2019-09-24 | End: 2019-09-24

## 2019-09-24 RX ADMIN — OXYCODONE HYDROCHLORIDE AND ACETAMINOPHEN 1 TABLET: 5; 325 TABLET ORAL at 19:14

## 2019-09-24 RX ADMIN — HYDROMORPHONE HYDROCHLORIDE 0.5 MG: 1 INJECTION, SOLUTION INTRAMUSCULAR; INTRAVENOUS; SUBCUTANEOUS at 17:29

## 2019-09-24 RX ADMIN — SODIUM CHLORIDE 1000 ML: 0.9 INJECTION, SOLUTION INTRAVENOUS at 17:24

## 2019-09-24 RX ADMIN — IOHEXOL 100 ML: 350 INJECTION, SOLUTION INTRAVENOUS at 17:59

## 2019-09-24 NOTE — ED PROVIDER NOTES
History  Chief Complaint   Patient presents with    Fall     pt lost fotting and slipped in her tub this morning hitting R side of ribs off side of tub and twisting left ankle  pt c/o R rib pain and ankle pain  pt denies taking any blood thinners, denies hitting head, no LOC  [de-identified] year female coming with complaint of right lateral chest/rib pain for the past hour  She reports that she was taking a shower and that her left foot slipped on some soap  She inverted her left ankle and fell striking the right side of her chest on to the tub  She did not hit her head or lose consciousness  She has no headache or neck pain  She has no abdominal pain  She complains of some mild left ankle pain from when she inverted the ankle but denies any other extremity pain  History provided by:  Patient  Fall   Mechanism of injury: fall    Injury location:  Torso  Torso injury location:  R chest  Incident location:  Bathroom  Time since incident:  1 hour  Arrived directly from scene: yes    Fall:     Fall occurred:  Standing    Impact surface: side of the tub  Point of impact: right torso  Suspicion of alcohol use: no    Suspicion of drug use: no    Prior to arrival data:     Bystander interventions:  None    Loss of consciousness: no      Amnesic to event: no      Airway interventions:  None    Cardiac interventions:  None    Medications administered:  None    Immobilization:  None  Associated symptoms: chest pain and difficulty breathing (pain with breathing and movement)    Associated symptoms: no abdominal pain, no back pain, no headaches, no loss of consciousness, no neck pain and no vomiting    Risk factors: no anticoagulation therapy        Prior to Admission Medications   Prescriptions Last Dose Informant Patient Reported? Taking?    albuterol (2 5 mg/3 mL) 0 083 % nebulizer solution  Self No No   Sig: Take 1 vial (2 5 mg total) by nebulization every 6 (six) hours as needed for wheezing or shortness of breath   atorvastatin (LIPITOR) 40 mg tablet  Self No No   Sig: take 1 tablet by mouth once daily   citalopram (CELEXA) 40 mg tablet  Self Yes No   Sig: Take 40 mg by mouth daily     clonazePAM (KlonoPIN) 1 mg tablet  Self Yes No   Si mg 2 (two) times a day as needed for anxiety     dicyclomine (BENTYL) 10 mg capsule   No No   Sig: Take 1 capsule (10 mg total) by mouth 3 (three) times a day before meals   dicyclomine (BENTYL) 20 mg tablet   No No   Sig: Take 1 tablet (20 mg total) by mouth every 8 (eight) hours as needed (abdominal pain)   famotidine (PEPCID) 40 MG tablet   No No   Sig: Take 1 tablet (40 mg total) by mouth daily   gabapentin (NEURONTIN) 800 mg tablet  Self Yes No   Sig: Take 800 mg by mouth 3 (three) times a day   metoprolol tartrate (LOPRESSOR) 50 mg tablet  Self No No   Sig: TAKE 1 TABLET EVERY 12 HOURS   mirtazapine (REMERON) 30 mg tablet  Self Yes No   Sig: Take 30 mg by mouth daily at bedtime     nicotine polacrilex (NICORETTE) 2 mg gum   No No   Sig: Chew 1 each (2 mg total) as needed for smoking cessation   nystatin (MYCOSTATIN) 500,000 units/5 mL suspension   No No   Sig: Apply 5 mL (500,000 Units total) to the mouth or throat 4 (four) times a day   ondansetron (ZOFRAN-ODT) 4 mg disintegrating tablet   No No   Sig: Take 1 tablet (4 mg total) by mouth every 8 (eight) hours as needed for nausea or vomiting      Facility-Administered Medications: None       Past Medical History:   Diagnosis Date    Alcohol abuse 2018    Anxiety     Bipolar 1 disorder ([facility] )     Mental Health problems listed as Denied in All[employer], cant't entire under pertinent negatives due to this diagnosis    Chronic back pain     Frequent headaches     Glaucoma     Hyperlipidemia     Irregular heart beat     AFib    Ovarian cancer ([facility] )     last assessed - [id number]    Psychiatric disorder     bipolar    Shortness of breath     Uterine carcinoma ([facility] )     last assessed - [id number]       Past Surgical History:   Procedure Laterality Date    ADENOIDECTOMY      Tonsillectomy with Adenoidectomy - last assessed - 21Sep2016    APPENDECTOMY      last assessed - 21Sep2016    CATARACT EXTRACTION Left     Remove cataract, corneo-scleral section of left eye; last assessed - 21Sep2016    CHOLECYSTECTOMY      last assessed - 29Sep2016   Emilia Roles EYE SURGERY      Anterior sclera fistulization for glaucoma; last assessed - 21Sep2016    HYSTERECTOMY      KRYSTA-BSO; last assessed - 21Sep2016    INTRAOCULAR LENS INSERTION      NV TEMPORAL ARTERY LIGATN OR BX Right 12/16/2016    Procedure: BIOPSY ARTERY TEMPORAL;  Surgeon: Jonah Montoya MD;  Location: [facility];  Service: General    TONSILLECTOMY      Tonsillectomy with Adenoidectomy - last assessed - 21Sep2016       Family History   Problem Relation Age of Onset    Heart disease Mother     Coronary artery disease Mother     Other Mother         1) Gangrene; 2) Peripheral arterial disease    Hyperlipidemia Mother         Hypercholesterolemia    Stomach cancer Mother     Heart attack Mother         Myocardial Infarction    Other Father         1) Gangrene; 2) Peripheral arterial disease    Hyperlipidemia Father         Hypercholesterolemia    Stomach cancer Father     Heart attack Father         Myocardial Infarction    Stomach cancer Brother     Heart attack Brother         Myocardial Infarction    Stomach cancer Maternal Uncle      I have reviewed and agree with the history as documented  Social History     Tobacco Use    Smoking status: Current Every Day Smoker     Packs/day: 0 25     Years: 46 00     Pack years: 11 50     Types: Cigarettes    Smokeless tobacco: Never Used   Substance Use Topics    Alcohol use: Not Currently     Comment: occasional; (No alcohol use per Allscripts)    Drug use: No        Review of Systems   Cardiovascular: Positive for chest pain  Gastrointestinal: Negative for abdominal pain and vomiting     Musculoskeletal: Negative for back pain and neck pain  Neurological: Negative for loss of consciousness and headaches  All other systems reviewed and are negative  Physical Exam  Physical Exam   Constitutional: She is oriented to person, place, and time  She appears well-developed and well-nourished  No distress  HENT:   Head: Normocephalic and atraumatic  Nose: Nose normal    Mouth/Throat: Oropharynx is clear and moist    Eyes: Conjunctivae and EOM are normal    Neck: Normal range of motion  Neck supple  Cardiovascular: Normal rate, regular rhythm and normal heart sounds  Pulmonary/Chest: Effort normal and breath sounds normal  No respiratory distress  She exhibits tenderness (right chest wall)  Abdominal: Soft  She exhibits no distension  There is no tenderness  Musculoskeletal: Normal range of motion  She exhibits no edema  Neurological: She is alert and oriented to person, place, and time  No cranial nerve deficit  She exhibits normal muscle tone  Skin: Skin is warm and dry  She is not diaphoretic  No erythema  Psychiatric: She has a normal mood and affect  Nursing note and vitals reviewed        Vital Signs  ED Triage Vitals   Temperature Pulse Respirations Blood Pressure SpO2   09/24/19 1536 09/24/19 1536 09/24/19 1536 09/24/19 1536 09/24/19 1536   97 7 °F (36 5 °C) 87 18 126/88 94 %      Temp Source Heart Rate Source Patient Position - Orthostatic VS BP Location FiO2 (%)   09/24/19 1536 09/24/19 1536 09/24/19 1536 09/24/19 1536 --   Oral Monitor Sitting Left arm       Pain Score       09/24/19 1725       Worst Possible Pain           Vitals:    09/24/19 1536   BP: 126/88   Pulse: 87   Patient Position - Orthostatic VS: Sitting         Visual Acuity      ED Medications  Medications   oxyCODONE-acetaminophen (PERCOCET) 5-325 mg per tablet 1 tablet (has no administration in time range)   sodium chloride 0 9 % bolus 1,000 mL (1,000 mL Intravenous New Bag 9/24/19 1724)   HYDROmorphone (DILAUDID) injection 0 5 mg (0 5 mg Intravenous Given 9/24/19 1729)   iohexol (OMNIPAQUE) 350 MG/ML injection (MULTI-DOSE) 100 mL (100 mL Intravenous Given 9/24/19 1759)       Diagnostic Studies  Results Reviewed     Procedure Component Value Units Date/Time    Basic metabolic panel [[id number]] Collected:  09/24/19 1721    Lab Status:  Final result Specimen:  Blood from Arm, Left Updated:  09/24/19 1740     Sodium 136 mmol/L      Potassium 3 7 mmol/L      Chloride 102 mmol/L      CO2 26 mmol/L      ANION GAP 8 mmol/L      BUN 11 mg/dL      Creatinine 0 69 mg/dL      Glucose 86 mg/dL      Calcium 9 2 mg/dL      eGFR 94 ml/min/1 73sq m     Narrative:       [place] guidelines for Chronic Kidney Disease (CKD):     Stage 1 with normal or high GFR (GFR > 90 mL/min/1 73 square meters)    Stage 2 Mild CKD (GFR = 60-89 mL/min/1 73 square meters)    Stage 3A Moderate CKD (GFR = 45-59 mL/min/1 73 square meters)    Stage 3B Moderate CKD (GFR = 30-44 mL/min/1 73 square meters)    Stage 4 Severe CKD (GFR = 15-29 mL/min/1 73 square meters)    Stage 5 End Stage CKD (GFR <15 mL/min/1 73 square meters)  Note: GFR calculation is accurate only with a steady state creatinine    CBC and differential [[id number]]  (Abnormal) Collected:  09/24/19 1721    Lab Status:  Final result Specimen:  Blood from Arm, Left Updated:  09/24/19 1728     WBC 4 74 Thousand/uL      RBC 4 66 Million/uL      Hemoglobin 14 2 g/dL      Hematocrit 42 5 %      MCV 91 fL      MCH 30 5 pg      MCHC 33 4 g/dL      RDW 13 7 %      MPV 9 5 fL      Platelets 255 Thousands/uL      nRBC 0 /100 WBCs      Neutrophils Relative 48 %      Immat GRANS % 0 %      Lymphocytes Relative 38 %      Monocytes Relative 9 %      Eosinophils Relative 4 %      Basophils Relative 1 %      Neutrophils Absolute 2 29 Thousands/µL      Immature Grans Absolute 0 01 Thousand/uL      Lymphocytes Absolute 1 80 Thousands/µL      Monocytes Absolute 0 43 Thousand/µL      Eosinophils Absolute 0 17 Thousand/µL      Basophils Absolute 0 04 Thousands/µL                  CT chest abdomen pelvis w contrast   Final Result by Nickie Davis MD (09/24 1830)      No evidence of an acute traumatic injury in the chest, abdomen or pelvis  Workstation performed: [id number]         XR ankle 3+ views LEFT   ED Interpretation by Santos Herrera MD (09/24 3018)   NAD                 Procedures  Procedures       ED Course                               MDM  Number of Diagnoses or Management Options  Acute chest wall pain: new and requires workup  Fall, initial encounter: new and requires workup     Amount and/or Complexity of Data Reviewed  Clinical lab tests: ordered and reviewed  Tests in the radiology section of CPT®: ordered and reviewed    Risk of Complications, Morbidity, and/or Mortality  Presenting problems: high    Patient Progress  Patient progress: stable      Disposition  Final diagnoses:   Fall, initial encounter   Acute chest wall pain     Time reflects when diagnosis was documented in both MDM as applicable and the Disposition within this note     Time User Action Codes Description Comment    9/24/2019  6:41 PM Diego BOTELLO Add [W19  PDHD] Fall, initial encounter     9/24/2019  6:41 PM Ruddy Pete Add [R07 89] Acute chest wall pain       ED Disposition     ED Disposition Condition Date/Time Comment    Discharge Stable Tue Sep 24, 2019  6:41 PM Adonay Tavarez discharge to home/self care              Follow-up Information     Follow up With Specialties Details Why Contact Info Additional [address] [facility] Emergency Department Emergency Medicine Go to  If symptoms worsen [address] [facility] [address]  [phone] [place] ED, [address] [place] [address], [facility]          Patient's Medications   Discharge Prescriptions    OXYCODONE-ACETAMINOPHEN (PERCOCET) 5-325 MG PER TABLET    Take 1 tablet by mouth every 4 (four) hours as needed for moderate pain for up to 10 daysMax Daily Amount: 6 tablets       Start Date: 9/24/2019 End Date: 10/4/2019       Order Dose: 1 tablet       Quantity: 8 tablet    Refills: 0     No discharge procedures on file      ED Provider  Electronically Signed by           Remi Adams MD  09/24/19 7648

## 2019-09-24 NOTE — ED NOTES
Multiple attempts made for IV access   RN requested for assistance      Gwen Priest, OLGA  09/24/19 3121

## 2019-09-24 NOTE — ED NOTES
RN was getting dilaudid ready and spilled medication on the floor because of new packaging  A new dilaudid was pulled out of the medbox        Lenin Cristina RN  09/24/19 1937

## 2019-09-30 ENCOUNTER — TELEPHONE (OUTPATIENT)
Dept: INTERNAL MEDICINE CLINIC | Facility: CLINIC | Age: 59
End: 2019-09-30

## 2019-10-09 DIAGNOSIS — R10.13 EPIGASTRIC PAIN: ICD-10-CM

## 2019-10-09 RX ORDER — PANTOPRAZOLE SODIUM 40 MG/1
TABLET, DELAYED RELEASE ORAL
Qty: 30 TABLET | Refills: 0 | Status: SHIPPED | OUTPATIENT
Start: 2019-10-09 | End: 2019-11-08 | Stop reason: SDUPTHER

## 2019-11-08 DIAGNOSIS — R10.13 EPIGASTRIC PAIN: ICD-10-CM

## 2019-11-12 RX ORDER — PANTOPRAZOLE SODIUM 40 MG/1
TABLET, DELAYED RELEASE ORAL
Qty: 30 TABLET | Refills: 0 | Status: SHIPPED | OUTPATIENT
Start: 2019-11-12 | End: 2019-12-08 | Stop reason: SDUPTHER

## 2019-11-22 ENCOUNTER — APPOINTMENT (EMERGENCY)
Dept: CT IMAGING | Facility: HOSPITAL | Age: 59
End: 2019-11-22
Payer: COMMERCIAL

## 2019-11-22 ENCOUNTER — HOSPITAL ENCOUNTER (EMERGENCY)
Facility: HOSPITAL | Age: 59
Discharge: HOME | End: 2019-11-23
Attending: EMERGENCY MEDICINE | Admitting: EMERGENCY MEDICINE
Payer: COMMERCIAL

## 2019-11-22 DIAGNOSIS — K52.9 COLITIS: Primary | ICD-10-CM

## 2019-11-22 DIAGNOSIS — K92.2 ACUTE LOWER GI BLEEDING: ICD-10-CM

## 2019-11-22 LAB
ALBUMIN SERPL BCP-MCNC: 3.7 G/DL (ref 3.5–5)
ALP SERPL-CCNC: 137 U/L (ref 46–116)
ALT SERPL W P-5'-P-CCNC: 33 U/L (ref 12–78)
ANION GAP SERPL CALCULATED.3IONS-SCNC: 10 MMOL/L (ref 4–13)
AST SERPL W P-5'-P-CCNC: 16 U/L (ref 5–45)
BACTERIA UR QL AUTO: ABNORMAL /HPF
BASOPHILS # BLD AUTO: 0.03 THOUSANDS/ΜL (ref 0–0.1)
BASOPHILS NFR BLD AUTO: 0 % (ref 0–1)
BILIRUB SERPL-MCNC: 0.5 MG/DL (ref 0.2–1)
BILIRUB UR QL STRIP: NEGATIVE
BUN SERPL-MCNC: 10 MG/DL (ref 5–25)
CALCIUM SERPL-MCNC: 9.9 MG/DL (ref 8.3–10.1)
CHLORIDE SERPL-SCNC: 103 MMOL/L (ref 100–108)
CLARITY UR: CLEAR
CO2 SERPL-SCNC: 28 MMOL/L (ref 21–32)
COLOR UR: YELLOW
CREAT SERPL-MCNC: 0.74 MG/DL (ref 0.6–1.3)
EOSINOPHIL # BLD AUTO: 0.17 THOUSAND/ΜL (ref 0–0.61)
EOSINOPHIL NFR BLD AUTO: 2 % (ref 0–6)
ERYTHROCYTE [DISTWIDTH] IN BLOOD BY AUTOMATED COUNT: 13.5 % (ref 11.6–15.1)
GFR SERPL CREATININE-BSD FRML MDRD: 88 ML/MIN/1.73SQ M
GLUCOSE SERPL-MCNC: 98 MG/DL (ref 65–140)
GLUCOSE UR STRIP-MCNC: NEGATIVE MG/DL
HCT VFR BLD AUTO: 44.7 % (ref 34.8–46.1)
HGB BLD-MCNC: 15.1 G/DL (ref 11.5–15.4)
HGB UR QL STRIP.AUTO: ABNORMAL
IMM GRANULOCYTES # BLD AUTO: 0.03 THOUSAND/UL (ref 0–0.2)
IMM GRANULOCYTES NFR BLD AUTO: 0 % (ref 0–2)
KETONES UR STRIP-MCNC: NEGATIVE MG/DL
LEUKOCYTE ESTERASE UR QL STRIP: NEGATIVE
LIPASE SERPL-CCNC: 38 U/L (ref 73–393)
LYMPHOCYTES # BLD AUTO: 1.58 THOUSANDS/ΜL (ref 0.6–4.47)
LYMPHOCYTES NFR BLD AUTO: 16 % (ref 14–44)
MCH RBC QN AUTO: 31 PG (ref 26.8–34.3)
MCHC RBC AUTO-ENTMCNC: 33.8 G/DL (ref 31.4–37.4)
MCV RBC AUTO: 92 FL (ref 82–98)
MONOCYTES # BLD AUTO: 0.5 THOUSAND/ΜL (ref 0.17–1.22)
MONOCYTES NFR BLD AUTO: 5 % (ref 4–12)
NEUTROPHILS # BLD AUTO: 7.43 THOUSANDS/ΜL (ref 1.85–7.62)
NEUTS SEG NFR BLD AUTO: 77 % (ref 43–75)
NITRITE UR QL STRIP: NEGATIVE
NON-SQ EPI CELLS URNS QL MICRO: ABNORMAL /HPF
NRBC BLD AUTO-RTO: 0 /100 WBCS
PH UR STRIP.AUTO: 5.5 [PH]
PLATELET # BLD AUTO: 188 THOUSANDS/UL (ref 149–390)
PMV BLD AUTO: 9.6 FL (ref 8.9–12.7)
POTASSIUM SERPL-SCNC: 3.6 MMOL/L (ref 3.5–5.3)
PROT SERPL-MCNC: 7.6 G/DL (ref 6.4–8.2)
PROT UR STRIP-MCNC: NEGATIVE MG/DL
RBC # BLD AUTO: 4.87 MILLION/UL (ref 3.81–5.12)
RBC #/AREA URNS AUTO: ABNORMAL /HPF
SODIUM SERPL-SCNC: 141 MMOL/L (ref 136–145)
SP GR UR STRIP.AUTO: <=1.005 (ref 1–1.03)
UROBILINOGEN UR QL STRIP.AUTO: 0.2 E.U./DL
WBC # BLD AUTO: 9.74 THOUSAND/UL (ref 4.31–10.16)
WBC #/AREA URNS AUTO: ABNORMAL /HPF

## 2019-11-22 PROCEDURE — 96374 THER/PROPH/DIAG INJ IV PUSH: CPT

## 2019-11-22 PROCEDURE — 96361 HYDRATE IV INFUSION ADD-ON: CPT

## 2019-11-22 PROCEDURE — 36415 COLL VENOUS BLD VENIPUNCTURE: CPT | Performed by: EMERGENCY MEDICINE

## 2019-11-22 PROCEDURE — 81001 URINALYSIS AUTO W/SCOPE: CPT | Performed by: EMERGENCY MEDICINE

## 2019-11-22 PROCEDURE — 80053 COMPREHEN METABOLIC PANEL: CPT | Performed by: EMERGENCY MEDICINE

## 2019-11-22 PROCEDURE — 85025 COMPLETE CBC W/AUTO DIFF WBC: CPT | Performed by: EMERGENCY MEDICINE

## 2019-11-22 PROCEDURE — 99285 EMERGENCY DEPT VISIT HI MDM: CPT

## 2019-11-22 PROCEDURE — 83690 ASSAY OF LIPASE: CPT | Performed by: EMERGENCY MEDICINE

## 2019-11-22 PROCEDURE — 96375 TX/PRO/DX INJ NEW DRUG ADDON: CPT

## 2019-11-22 PROCEDURE — 93005 ELECTROCARDIOGRAM TRACING: CPT

## 2019-11-22 PROCEDURE — 99284 EMERGENCY DEPT VISIT MOD MDM: CPT | Performed by: EMERGENCY MEDICINE

## 2019-11-22 PROCEDURE — 96376 TX/PRO/DX INJ SAME DRUG ADON: CPT

## 2019-11-22 PROCEDURE — 74177 CT ABD & PELVIS W/CONTRAST: CPT

## 2019-11-22 RX ORDER — AMOXICILLIN AND CLAVULANATE POTASSIUM 875; 125 MG/1; MG/1
1 TABLET, FILM COATED ORAL EVERY 12 HOURS
Qty: 14 TABLET | Refills: 0 | Status: SHIPPED | OUTPATIENT
Start: 2019-11-22 | End: 2019-11-29

## 2019-11-22 RX ORDER — IBUPROFEN 800 MG/1
800 TABLET ORAL 3 TIMES DAILY
Qty: 21 TABLET | Refills: 0 | Status: SHIPPED | OUTPATIENT
Start: 2019-11-22 | End: 2022-05-25

## 2019-11-22 RX ORDER — ONDANSETRON 4 MG/1
4 TABLET, ORALLY DISINTEGRATING ORAL EVERY 6 HOURS PRN
Qty: 20 TABLET | Refills: 0 | Status: SHIPPED | OUTPATIENT
Start: 2019-11-22 | End: 2019-11-22 | Stop reason: SDUPTHER

## 2019-11-22 RX ORDER — ONDANSETRON 2 MG/ML
4 INJECTION INTRAMUSCULAR; INTRAVENOUS ONCE
Status: COMPLETED | OUTPATIENT
Start: 2019-11-22 | End: 2019-11-22

## 2019-11-22 RX ORDER — ONDANSETRON 4 MG/1
4 TABLET, ORALLY DISINTEGRATING ORAL EVERY 6 HOURS PRN
Qty: 20 TABLET | Refills: 0 | Status: SHIPPED | OUTPATIENT
Start: 2019-11-22 | End: 2020-04-30 | Stop reason: SDUPTHER

## 2019-11-22 RX ORDER — IBUPROFEN 800 MG/1
800 TABLET ORAL 3 TIMES DAILY
Qty: 21 TABLET | Refills: 0 | Status: SHIPPED | OUTPATIENT
Start: 2019-11-22 | End: 2019-11-22 | Stop reason: SDUPTHER

## 2019-11-22 RX ORDER — AMOXICILLIN AND CLAVULANATE POTASSIUM 875; 125 MG/1; MG/1
1 TABLET, FILM COATED ORAL ONCE
Status: COMPLETED | OUTPATIENT
Start: 2019-11-22 | End: 2019-11-22

## 2019-11-22 RX ORDER — FENTANYL CITRATE 50 UG/ML
50 INJECTION, SOLUTION INTRAMUSCULAR; INTRAVENOUS ONCE
Status: COMPLETED | OUTPATIENT
Start: 2019-11-22 | End: 2019-11-22

## 2019-11-22 RX ADMIN — IOHEXOL 100 ML: 350 INJECTION, SOLUTION INTRAVENOUS at 22:24

## 2019-11-22 RX ADMIN — FENTANYL CITRATE 50 MCG: 50 INJECTION, SOLUTION INTRAMUSCULAR; INTRAVENOUS at 23:04

## 2019-11-22 RX ADMIN — FENTANYL CITRATE 50 MCG: 50 INJECTION, SOLUTION INTRAMUSCULAR; INTRAVENOUS at 21:31

## 2019-11-22 RX ADMIN — SODIUM CHLORIDE 1000 ML: 0.9 INJECTION, SOLUTION INTRAVENOUS at 21:29

## 2019-11-22 RX ADMIN — ONDANSETRON 4 MG: 2 INJECTION INTRAMUSCULAR; INTRAVENOUS at 21:30

## 2019-11-22 RX ADMIN — AMOXICILLIN AND CLAVULANATE POTASSIUM 1 TABLET: 875; 125 TABLET, FILM COATED ORAL at 23:45

## 2019-11-23 VITALS
OXYGEN SATURATION: 97 % | DIASTOLIC BLOOD PRESSURE: 86 MMHG | TEMPERATURE: 97.5 F | RESPIRATION RATE: 19 BRPM | HEIGHT: 63 IN | BODY MASS INDEX: 26.91 KG/M2 | HEART RATE: 84 BPM | WEIGHT: 151.9 LBS | SYSTOLIC BLOOD PRESSURE: 128 MMHG

## 2019-11-23 LAB
ATRIAL RATE: 77 BPM
P AXIS: 74 DEGREES
PR INTERVAL: 102 MS
QRS AXIS: 54 DEGREES
QRSD INTERVAL: 84 MS
QT INTERVAL: 402 MS
QTC INTERVAL: 454 MS
T WAVE AXIS: -14 DEGREES
VENTRICULAR RATE: 77 BPM

## 2019-11-23 PROCEDURE — 93010 ELECTROCARDIOGRAM REPORT: CPT | Performed by: INTERNAL MEDICINE

## 2019-11-23 NOTE — ED NOTES
1mL of fentanyl wasted and squirted in needle box with Pippa Spence RN     [facility], RN  11/23/19 3198 Jackie Grady, RN  11/23/19 0646

## 2019-11-23 NOTE — ED NOTES
Pt ambulated to restroom with steady gate  Pt rang bathroom call bell and reported she accidentally dropped her cup of urine then showed this narrator some toilet paper which had a small amount of bright red blood on it  This narrator assisted pt into new gown and pt ambulated back to room with steady gate  Pt advised to alert staff when she is able to provide a new urine sample        OLGA Holbrook  11/22/19 2709

## 2019-11-23 NOTE — ED NOTES
Patient rang pain call bell at 1719 upon assessing patient she stated fentanyl did not work  Stated only morphine and Dilaudid works  Advised I will inform nurse and doctor       [address]  11/22/19 4056

## 2019-12-05 NOTE — ED PROVIDER NOTES
History  Chief Complaint   Patient presents with    Rectal Bleeding     Abd pain all day, had a bm an hour ago, noticed bright red blood in toilet, pt reports she had 7 polyps that were removed never had a problem since  pt reports n/v/d      17-year-old female presenting to the emergency department for evaluation of GI bleed  Patient states she noted some bright red blood per rectum  Associated with some abdominal pain, has been present throughout the course of the day today  No fevers chills, nausea vomiting, no chest pain palpitations or lightheadedness  Prior to Admission Medications   Prescriptions Last Dose Informant Patient Reported? Taking?    albuterol (2 5 mg/3 mL) 0 083 % nebulizer solution  Self No No   Sig: Take 1 vial (2 5 mg total) by nebulization every 6 (six) hours as needed for wheezing or shortness of breath   atorvastatin (LIPITOR) 40 mg tablet  Self No No   Sig: take 1 tablet by mouth once daily   citalopram (CELEXA) 40 mg tablet  Self Yes No   Sig: Take 40 mg by mouth daily     clonazePAM (KlonoPIN) 1 mg tablet  Self Yes No   Si mg 2 (two) times a day as needed for anxiety     dicyclomine (BENTYL) 10 mg capsule   No No   Sig: Take 1 capsule (10 mg total) by mouth 3 (three) times a day before meals   dicyclomine (BENTYL) 20 mg tablet   No No   Sig: Take 1 tablet (20 mg total) by mouth every 8 (eight) hours as needed (abdominal pain)   famotidine (PEPCID) 40 MG tablet   No No   Sig: Take 1 tablet (40 mg total) by mouth daily   gabapentin (NEURONTIN) 800 mg tablet  Self Yes No   Sig: Take 800 mg by mouth 3 (three) times a day   metoprolol tartrate (LOPRESSOR) 50 mg tablet  Self No No   Sig: TAKE 1 TABLET EVERY 12 HOURS   mirtazapine (REMERON) 30 mg tablet  Self Yes No   Sig: Take 30 mg by mouth daily at bedtime     nicotine polacrilex (NICORETTE) 2 mg gum   No No   Sig: Chew 1 each (2 mg total) as needed for smoking cessation   nystatin (MYCOSTATIN) 500,000 units/5 mL suspension No No   Sig: Apply 5 mL (500,000 Units total) to the mouth or throat 4 (four) times a day   ondansetron (ZOFRAN-ODT) 4 mg disintegrating tablet   No No   Sig: Take 1 tablet (4 mg total) by mouth every 8 (eight) hours as needed for nausea or vomiting   pantoprazole (PROTONIX) 40 mg tablet   No No   Sig: take 1 tablet by mouth every morning      Facility-Administered Medications: None       Past Medical History:   Diagnosis Date    Alcohol abuse 12/31/2018    Anxiety     Bipolar 1 disorder ([facility] )     Mental Health problems listed as Denied in [employer], cant't entire under pertinent negatives due to this diagnosis    Chronic back pain     Frequent headaches     Glaucoma     Hyperlipidemia     Irregular heart beat     AFib    Ovarian cancer ([facility] )     last assessed - 21Sep2016    Psychiatric disorder     bipolar    Shortness of breath     Uterine carcinoma ([facility] )     last assessed - 21Sep2016       Past Surgical History:   Procedure Laterality Date    ADENOIDECTOMY      Tonsillectomy with Adenoidectomy - last assessed - 21Sep2016    APPENDECTOMY      last assessed - 21Sep2016    CATARACT EXTRACTION Left     Remove cataract, corneo-scleral section of left eye; last assessed - 21Sep2016    CHOLECYSTECTOMY      last assessed - 29Sep2016   [facility] EYE SURGERY      Anterior sclera fistulization for glaucoma; last assessed - 21Sep2016    HYSTERECTOMY      KRYSTA-BSO; last assessed - 21Sep2016    INTRAOCULAR LENS INSERTION      AR TEMPORAL ARTERY LIGATN OR BX Right 12/16/2016    Procedure: BIOPSY ARTERY TEMPORAL;  Surgeon: Isa Siemens, MD;  Location: [facility];  Service: General    TONSILLECTOMY      Tonsillectomy with Adenoidectomy - last assessed - 21Sep2016       Family History   Problem Relation Age of Onset    Heart disease Mother     Coronary artery disease Mother     Other Mother         1) Gangrene; 2) Peripheral arterial disease    Hyperlipidemia Mother         Hypercholesterolemia    Stomach cancer Mother     Heart attack Mother         Myocardial Infarction    Other Father         1) Gangrene; 2) Peripheral arterial disease    Hyperlipidemia Father         Hypercholesterolemia    Stomach cancer Father     Heart attack Father         Myocardial Infarction    Stomach cancer Brother     Heart attack Brother         Myocardial Infarction    Stomach cancer Maternal Uncle      I have reviewed and agree with the history as documented  Social History     Tobacco Use    Smoking status: Current Every Day Smoker     Packs/day: 0 25     Years: 46 00     Pack years: 11 50     Types: Cigarettes    Smokeless tobacco: Never Used   Substance Use Topics    Alcohol use: Not Currently     Comment: occasional; (No alcohol use per [employer])    Drug use: No        Review of Systems   Constitutional: Negative for appetite change, chills, fatigue and fever  HENT: Negative for sneezing and sore throat  Eyes: Negative for visual disturbance  Respiratory: Negative for cough, choking, chest tightness, shortness of breath and wheezing  Cardiovascular: Negative for chest pain and palpitations  Gastrointestinal: Positive for abdominal pain and blood in stool  Negative for constipation, diarrhea, nausea and vomiting  Genitourinary: Negative for difficulty urinating and dysuria  Neurological: Negative for dizziness, weakness, light-headedness, numbness and headaches  All other systems reviewed and are negative  Physical Exam  Physical Exam   Constitutional: She is oriented to person, place, and time  She appears well-developed and well-nourished  No distress  HENT:   Head: Normocephalic and atraumatic  Mouth/Throat: Oropharynx is clear and moist    Eyes: Pupils are equal, round, and reactive to light  EOM are normal    Neck: No JVD present  No tracheal deviation present  Cardiovascular: Normal rate, regular rhythm, normal heart sounds and intact distal pulses   Exam reveals no gallop and no friction rub  No murmur heard  Pulmonary/Chest: Effort normal and breath sounds normal  No respiratory distress  She has no wheezes  She has no rales  Abdominal: Soft  Bowel sounds are normal  She exhibits no distension  There is tenderness  There is no rebound and no guarding  Neurological: She is alert and oriented to person, place, and time  No cranial nerve deficit  She exhibits normal muscle tone  Skin: Skin is warm and dry  She is not diaphoretic  No pallor  Psychiatric: She has a normal mood and affect  Her behavior is normal    Nursing note and vitals reviewed        Vital Signs  ED Triage Vitals [11/22/19 2032]   Temperature Pulse Respirations Blood Pressure SpO2   97 5 °F (36 4 °C) 82 16 (!) 204/94 99 %      Temp Source Heart Rate Source Patient Position - Orthostatic VS BP Location FiO2 (%)   Oral Monitor Sitting Right arm --      Pain Score       Worst Possible Pain           Vitals:    11/22/19 2032 11/22/19 2130 11/22/19 2300 11/23/19 0001   BP: (!) 204/94 170/91 111/80 128/86   Pulse: 82 84 86 84   Patient Position - Orthostatic VS: Sitting            Visual Acuity      ED Medications  Medications   fentanyl citrate (PF) 100 MCG/2ML 50 mcg (50 mcg Intravenous Given 11/22/19 2131)   ondansetron (ZOFRAN) injection 4 mg (4 mg Intravenous Given 11/22/19 2130)   sodium chloride 0 9 % bolus 1,000 mL (0 mL Intravenous Stopped 11/22/19 2346)   iohexol (OMNIPAQUE) 350 MG/ML injection (MULTI-DOSE) 100 mL (100 mL Intravenous Given 11/22/19 2224)   fentanyl citrate (PF) 100 MCG/2ML 50 mcg (50 mcg Intravenous Given 11/22/19 2304)   amoxicillin-clavulanate (AUGMENTIN) 875-125 mg per tablet 1 tablet (1 tablet Oral Given 11/22/19 2345)       Diagnostic Studies  Results Reviewed     Procedure Component Value Units Date/Time    Urine Microscopic [[id number]]  (Abnormal) Collected:  11/22/19 2340    Lab Status:  Final result Specimen:  Urine, Clean Catch Updated:  11/22/19 2358     RBC, UA 2-4 /hpf      WBC, UA None Seen /hpf      Epithelial Cells Occasional /hpf      Bacteria, UA Occasional /hpf     UA w Reflex to Microscopic w Reflex to Culture [[id number]]  (Abnormal) Collected:  11/22/19 2340    Lab Status:  Final result Specimen:  Urine, Clean Catch Updated:  11/22/19 2347     Color, UA Yellow     Clarity, UA Clear     Specific Gravity, UA <=1 005     pH, UA 5 5     Leukocytes, UA Negative     Nitrite, UA Negative     Protein, UA Negative mg/dl      Glucose, UA Negative mg/dl      Ketones, UA Negative mg/dl      Urobilinogen, UA 0 2 E U /dl      Bilirubin, UA Negative     Blood, UA Small    Comprehensive metabolic panel [[id number]]  (Abnormal) Collected:  11/22/19 2115    Lab Status:  Final result Specimen:  Blood from Arm, Right Updated:  11/22/19 2139     Sodium 141 mmol/L      Potassium 3 6 mmol/L      Chloride 103 mmol/L      CO2 28 mmol/L      ANION GAP 10 mmol/L      BUN 10 mg/dL      Creatinine 0 74 mg/dL      Glucose 98 mg/dL      Calcium 9 9 mg/dL      AST 16 U/L      ALT 33 U/L      Alkaline Phosphatase 137 U/L      Total Protein 7 6 g/dL      Albumin 3 7 g/dL      Total Bilirubin 0 50 mg/dL      eGFR 88 ml/min/1 73sq m     Narrative:       [place] guidelines for Chronic Kidney Disease (CKD):     Stage 1 with normal or high GFR (GFR > 90 mL/min/1 73 square meters)    Stage 2 Mild CKD (GFR = 60-89 mL/min/1 73 square meters)    Stage 3A Moderate CKD (GFR = 45-59 mL/min/1 73 square meters)    Stage 3B Moderate CKD (GFR = 30-44 mL/min/1 73 square meters)    Stage 4 Severe CKD (GFR = 15-29 mL/min/1 73 square meters)    Stage 5 End Stage CKD (GFR <15 mL/min/1 73 square meters)  Note: GFR calculation is accurate only with a steady state creatinine    Lipase [[id number]]  (Abnormal) Collected:  11/22/19 2115    Lab Status:  Final result Specimen:  Blood from Arm, Right Updated:  11/22/19 2139     Lipase 38 u/L     CBC and differential [[id number]]  (Abnormal) Collected:  11/22/19 2115 Lab Status:  Final result Specimen:  Blood from Arm, Right Updated:  11/22/19 2123     WBC 9 74 Thousand/uL      RBC 4 87 Million/uL      Hemoglobin 15 1 g/dL      Hematocrit 44 7 %      MCV 92 fL      MCH 31 0 pg      MCHC 33 8 g/dL      RDW 13 5 %      MPV 9 6 fL      Platelets 549 Thousands/uL      nRBC 0 /100 WBCs      Neutrophils Relative 77 %      Immat GRANS % 0 %      Lymphocytes Relative 16 %      Monocytes Relative 5 %      Eosinophils Relative 2 %      Basophils Relative 0 %      Neutrophils Absolute 7 43 Thousands/µL      Immature Grans Absolute 0 03 Thousand/uL      Lymphocytes Absolute 1 58 Thousands/µL      Monocytes Absolute 0 50 Thousand/µL      Eosinophils Absolute 0 17 Thousand/µL      Basophils Absolute 0 03 Thousands/µL                  CT abdomen pelvis with contrast   Final Result by Cait Melo MD (11/22 2329)         1  Diffuse colitis, most severe in the splenic flexure  2  Indeterminate left lower pole renal cystic lesion appears larger than the prior exam, correlate with nonemergent outpatient renal ultrasound  Workstation performed: [id number]                    Procedures  Procedures         ED Course                               MDM  Number of Diagnoses or Management Options  Acute lower GI bleeding:   Colitis:   Diagnosis management comments: 78-year-old female with abdominal pain, history of blood in stool but Hemoccult negative here will check labs, give fluids pain medicines, reassess          Disposition  Final diagnoses:   Colitis   Acute lower GI bleeding     Time reflects when diagnosis was documented in both MDM as applicable and the Disposition within this note     Time User Action Codes Description Comment    11/22/2019 11:36 PM Brad Boyle Add [K52 9] Colitis     11/22/2019 11:40 PM Aki Bautista Add [K92 2] Acute lower GI bleeding       ED Disposition     ED Disposition Condition Date/Time Comment    Discharge Stable Fri Nov 22, 2019 11:35 PM Noemi Carey Sg Zepeda discharge to home/self care              Follow-up Information    None         Discharge Medication List as of 11/22/2019 11:40 PM      START taking these medications    Details   amoxicillin-clavulanate (AUGMENTIN) 875-125 mg per tablet Take 1 tablet by mouth every 12 (twelve) hours for 7 days, Starting Fri 11/22/2019, Until Fri 11/29/2019, Print      ibuprofen (MOTRIN) 800 mg tablet Take 1 tablet (800 mg total) by mouth 3 (three) times a day, Starting Fri 11/22/2019, Normal         CONTINUE these medications which have NOT CHANGED    Details   albuterol (2 5 mg/3 mL) 0 083 % nebulizer solution Take 1 vial (2 5 mg total) by nebulization every 6 (six) hours as needed for wheezing or shortness of breath, Starting Tue 11/6/2018, Normal      atorvastatin (LIPITOR) 40 mg tablet take 1 tablet by mouth once daily, Normal      citalopram (CELEXA) 40 mg tablet Take 40 mg by mouth daily  , Until Discontinued, Historical Med      clonazePAM (KlonoPIN) 1 mg tablet 1 mg 2 (two) times a day as needed for anxiety  , Starting Fri 4/20/2018, Historical Med      dicyclomine (BENTYL) 10 mg capsule Take 1 capsule (10 mg total) by mouth 3 (three) times a day before meals, Starting Thu 6/27/2019, Normal      dicyclomine (BENTYL) 20 mg tablet Take 1 tablet (20 mg total) by mouth every 8 (eight) hours as needed (abdominal pain), Starting Sat 8/24/2019, Print      famotidine (PEPCID) 40 MG tablet Take 1 tablet (40 mg total) by mouth daily, Starting Mon 7/15/2019, Normal      gabapentin (NEURONTIN) 800 mg tablet Take 800 mg by mouth 3 (three) times a day, Historical Med      metoprolol tartrate (LOPRESSOR) 50 mg tablet TAKE 1 TABLET EVERY 12 HOURS, Normal      mirtazapine (REMERON) 30 mg tablet Take 30 mg by mouth daily at bedtime  , Historical Med      nicotine polacrilex (NICORETTE) 2 mg gum Chew 1 each (2 mg total) as needed for smoking cessation, Starting Mon 8/5/2019, Normal      nystatin (MYCOSTATIN) 500,000 units/5 mL suspension Apply 5 mL (500,000 Units total) to the mouth or throat 4 (four) times a day, Starting Mon 8/5/2019, Normal      ondansetron (ZOFRAN-ODT) 4 mg disintegrating tablet Take 1 tablet (4 mg total) by mouth every 8 (eight) hours as needed for nausea or vomiting, Starting Sat 8/24/2019, Print      pantoprazole (PROTONIX) 40 mg tablet take 1 tablet by mouth every morning, Normal           No discharge procedures on file      ED Provider  Electronically Signed by           Eric Godinez MD  12/05/19 [id number]

## 2019-12-08 DIAGNOSIS — R10.13 EPIGASTRIC PAIN: ICD-10-CM

## 2019-12-08 RX ORDER — PANTOPRAZOLE SODIUM 40 MG/1
TABLET, DELAYED RELEASE ORAL
Qty: 30 TABLET | Refills: 0 | Status: SHIPPED | OUTPATIENT
Start: 2019-12-08 | End: 2020-03-09

## 2020-01-07 DIAGNOSIS — R10.13 EPIGASTRIC PAIN: ICD-10-CM

## 2020-01-07 RX ORDER — PANTOPRAZOLE SODIUM 40 MG/1
TABLET, DELAYED RELEASE ORAL
Qty: 30 TABLET | Refills: 0 | OUTPATIENT
Start: 2020-01-07

## 2020-01-15 ENCOUNTER — TELEPHONE (OUTPATIENT)
Dept: INTERNAL MEDICINE CLINIC | Facility: CLINIC | Age: 60
End: 2020-01-15

## 2020-01-18 ENCOUNTER — HOSPITAL ENCOUNTER (EMERGENCY)
Facility: HOSPITAL | Age: 60
Discharge: HOME | End: 2020-01-18
Attending: EMERGENCY MEDICINE | Admitting: EMERGENCY MEDICINE
Payer: COMMERCIAL

## 2020-01-18 ENCOUNTER — APPOINTMENT (EMERGENCY)
Dept: RADIOLOGY | Facility: HOSPITAL | Age: 60
End: 2020-01-18
Payer: COMMERCIAL

## 2020-01-18 VITALS
OXYGEN SATURATION: 94 % | TEMPERATURE: 98 F | SYSTOLIC BLOOD PRESSURE: 143 MMHG | HEART RATE: 65 BPM | DIASTOLIC BLOOD PRESSURE: 68 MMHG | RESPIRATION RATE: 18 BRPM

## 2020-01-18 DIAGNOSIS — M25.552 LEFT HIP PAIN: Primary | ICD-10-CM

## 2020-01-18 PROCEDURE — 99284 EMERGENCY DEPT VISIT MOD MDM: CPT

## 2020-01-18 PROCEDURE — 99284 EMERGENCY DEPT VISIT MOD MDM: CPT | Performed by: EMERGENCY MEDICINE

## 2020-01-18 PROCEDURE — 73502 X-RAY EXAM HIP UNI 2-3 VIEWS: CPT

## 2020-01-18 PROCEDURE — 96372 THER/PROPH/DIAG INJ SC/IM: CPT

## 2020-01-18 RX ORDER — ONDANSETRON 4 MG/1
4 TABLET, FILM COATED ORAL EVERY 8 HOURS PRN
COMMUNITY
End: 2021-03-20 | Stop reason: HOSPADM

## 2020-01-18 RX ORDER — NAPROXEN 500 MG/1
TABLET ORAL
Refills: 0 | COMMUNITY
Start: 2019-11-18 | End: 2022-05-25

## 2020-01-18 RX ORDER — CYCLOBENZAPRINE HCL 5 MG
TABLET ORAL
Refills: 0 | COMMUNITY
Start: 2019-11-18 | End: 2021-03-25 | Stop reason: SDUPTHER

## 2020-01-18 RX ORDER — OXYCODONE HYDROCHLORIDE AND ACETAMINOPHEN 5; 325 MG/1; MG/1
TABLET ORAL
Refills: 0 | COMMUNITY
Start: 2019-11-23 | End: 2021-03-20 | Stop reason: HOSPADM

## 2020-01-18 RX ORDER — FENTANYL CITRATE 50 UG/ML
100 INJECTION, SOLUTION INTRAMUSCULAR; INTRAVENOUS ONCE
Status: COMPLETED | OUTPATIENT
Start: 2020-01-18 | End: 2020-01-18

## 2020-01-18 RX ADMIN — FENTANYL CITRATE 100 MCG: 50 INJECTION INTRAMUSCULAR; INTRAVENOUS at 15:00

## 2020-01-18 NOTE — ED PROVIDER NOTES
History  Chief Complaint   Patient presents with    Hip Pain     pt c/o left hip pain after stating she fell on black ice yesterday, pt seen at [facility] for same yesterday      Pt comes to ED c/o moderate L hip pain since falling yesterday on the ice  She reports being seen at [facility] yesterday after the fall for which she received both a hip xray as well as a pelvis CT which were normal  She reports falling again today and hearing a "pop" and having worsening of her L hip pain which is worse with movement and ambulation and alleviated w rest  She denies any other injuries or pain, denies striking her head, denies HA, denies neck pain, denies weakness and numbness of the arms and legs  Denies back pain  Denies abd pain  Additional history per review of yesterday's ED visit in [address] [place] [address] Rd  Prior to Admission Medications   Prescriptions Last Dose Informant Patient Reported? Taking? albuterol (2 5 mg/3 mL) 0 083 % nebulizer solution  Self No No   Sig: Take 1 vial (2 5 mg total) by nebulization every 6 (six) hours as needed for wheezing or shortness of breath   atorvastatin (LIPITOR) 40 mg tablet  Self No No   Sig: take 1 tablet by mouth once daily   citalopram (CELEXA) 40 mg tablet  Self Yes No   Sig: Take 40 mg by mouth daily     clonazePAM (KlonoPIN) 1 mg tablet  Self Yes No   Si mg 2 (two) times a day as needed for anxiety     cyclobenzaprine (FLEXERIL) 5 mg tablet   Yes No   Sig: TAKE 1-2 TABLETS (5-10 MG TOTAL) BY MOUTH 3 (THREE) TIMES A DAY AS NEEDED FOR MUSCLE SPASMS     dicyclomine (BENTYL) 10 mg capsule   No No   Sig: Take 1 capsule (10 mg total) by mouth 3 (three) times a day before meals   dicyclomine (BENTYL) 20 mg tablet   No No   Sig: Take 1 tablet (20 mg total) by mouth every 8 (eight) hours as needed (abdominal pain)   famotidine (PEPCID) 40 MG tablet   No No   Sig: Take 1 tablet (40 mg total) by mouth daily   gabapentin (NEURONTIN) 800 mg tablet  Self Yes No   Sig: Take 800 mg by mouth 3 (three) times a day   ibuprofen (MOTRIN) 800 mg tablet   No No   Sig: Take 1 tablet (800 mg total) by mouth 3 (three) times a day   metoprolol tartrate (LOPRESSOR) 50 mg tablet  Self No No   Sig: TAKE 1 TABLET EVERY 12 HOURS   mirtazapine (REMERON) 30 mg tablet  Self Yes No   Sig: Take 30 mg by mouth daily at bedtime     naproxen (NAPROSYN) 500 mg tablet   Yes No   Sig: TAKE 1 TABLET EVERY 12 HOURS AS NEEDED FOR MILD PAIN (PAIN SCORE 1-3)  TAKE WITH MEALS     nicotine polacrilex (NICORETTE) 2 mg gum   No No   Sig: Chew 1 each (2 mg total) as needed for smoking cessation   nystatin (MYCOSTATIN) 500,000 units/5 mL suspension   No No   Sig: Apply 5 mL (500,000 Units total) to the mouth or throat 4 (four) times a day   ondansetron (ZOFRAN) 4 mg tablet   Yes No   Sig: Take 4 mg by mouth every 8 (eight) hours as needed   ondansetron (ZOFRAN-ODT) 4 mg disintegrating tablet   No No   Sig: Take 1 tablet (4 mg total) by mouth every 8 (eight) hours as needed for nausea or vomiting   ondansetron (ZOFRAN-ODT) 4 mg disintegrating tablet   No No   Sig: Take 1 tablet (4 mg total) by mouth every 6 (six) hours as needed for nausea or vomiting   oxyCODONE-acetaminophen (PERCOCET) 5-325 mg per tablet   Yes No   Sig: TAKE 1 TO 2 TABLETS BY MOUTH EVERY 6 HOURS FOR SEVERE PAIN   pantoprazole (PROTONIX) 40 mg tablet   No No   Sig: take 1 tablet by mouth every morning      Facility-Administered Medications: None       Past Medical History:   Diagnosis Date    Alcohol abuse 12/31/2018    Anxiety     Bipolar [facility] disorder ([facility] )     Mental Health problems listed as Denied in Allscripts, cant't entire under pertinent negatives due to this diagnosis    Chronic back pain     Frequent headaches     Glaucoma     Hyperlipidemia     Irregular heart beat     AFib    Ovarian cancer ([facility] )     last assessed - [id number]    Psychiatric disorder     bipolar    Shortness of breath     Uterine carcinoma ([facility] )     last assessed - [id number]       Past Surgical History:   Procedure Laterality Date    ADENOIDECTOMY      Tonsillectomy with Adenoidectomy - last assessed - 21Sep2016    APPENDECTOMY      last assessed - 21Sep2016    CATARACT EXTRACTION Left     Remove cataract, corneo-scleral section of left eye; last assessed - 21Sep2016    CHOLECYSTECTOMY      last assessed - 29Sep2016   [facility] EYE SURGERY      Anterior sclera fistulization for glaucoma; last assessed - 21Sep2016    HYSTERECTOMY      KRYSTA-BSO; last assessed - 21Sep2016    INTRAOCULAR LENS INSERTION      [place] TEMPORAL ARTERY LIGATN OR BX Right 12/16/2016    Procedure: BIOPSY ARTERY TEMPORAL;  Surgeon: Helio Sneed MD;  Location: [facility];  Service: General    TONSILLECTOMY      Tonsillectomy with Adenoidectomy - last assessed - 21Sep2016       Family History   Problem Relation Age of Onset    Heart disease Mother     Coronary artery disease Mother     Other Mother         1) Gangrene; 2) Peripheral arterial disease    Hyperlipidemia Mother         Hypercholesterolemia    Stomach cancer Mother     Heart attack Mother         Myocardial Infarction    Other Father         1) Gangrene; 2) Peripheral arterial disease    Hyperlipidemia Father         Hypercholesterolemia    Stomach cancer Father     Heart attack Father         Myocardial Infarction    Stomach cancer Brother     Heart attack Brother         Myocardial Infarction    Stomach cancer Maternal Uncle      I have reviewed and agree with the history as documented  Social History     Tobacco Use    Smoking status: Current Every Day Smoker     Packs/day: 0 25     Years: 46 00     Pack years: 11 50     Types: Cigarettes    Smokeless tobacco: Never Used   Substance Use Topics    Alcohol use: Not Currently     Comment: occasional; (No alcohol use per Allscripts)    Drug use: No        Review of Systems   Musculoskeletal: Positive for arthralgias  All other systems reviewed and are negative        Physical Exam  Physical Exam Constitutional: She is oriented to person, place, and time  She appears well-developed and well-nourished  No distress  HENT:   Head: Normocephalic and atraumatic  Eyes: Pupils are equal, round, and reactive to light  Conjunctivae and EOM are normal    Neck: Normal range of motion  Neck supple  No JVD present  c spine cleared by nexus criteria  Cardiovascular: Normal rate, regular rhythm, normal heart sounds and intact distal pulses  Pulmonary/Chest: Effort normal and breath sounds normal  No stridor  No respiratory distress  She has no wheezes  Abdominal: Soft  She exhibits no distension  There is no tenderness  There is no rebound and no guarding  Musculoskeletal: Normal range of motion  She exhibits no edema, tenderness or deformity  Neurological: She is alert and oriented to person, place, and time  No cranial nerve deficit or sensory deficit  She exhibits normal muscle tone  Coordination normal    Skin: Skin is warm and dry  Capillary refill takes less than 2 seconds  No rash noted  She is not diaphoretic  No erythema  No pallor  Nursing note and vitals reviewed        Vital Signs  ED Triage Vitals   Temperature Pulse Respirations Blood Pressure SpO2   01/18/20 1209 01/18/20 1209 01/18/20 1209 01/18/20 1209 01/18/20 1209   98 °F (36 7 °C) 74 18 138/75 97 %      Temp Source Heart Rate Source Patient Position - Orthostatic VS BP Location FiO2 (%)   01/18/20 1209 01/18/20 1209 01/18/20 1209 01/18/20 1209 --   Oral Monitor Sitting Right arm       Pain Score       01/18/20 1500       8           Vitals:    01/18/20 1300 01/18/20 1330 01/18/20 1400 01/18/20 1430   BP: 116/58 120/68 133/77 143/68   Pulse: 74 69 64 65   Patient Position - Orthostatic VS:             Visual Acuity  Visual Acuity      Most Recent Value   L Pupil Size (mm)  3   R Pupil Size (mm)  3          ED Medications  Medications   fentanyl citrate (PF) 100 MCG/2ML 100 mcg (100 mcg Intramuscular Given 1/18/20 1500) Diagnostic Studies  Results Reviewed     None                 XR hip/pelv 2-3 vws left if performed    (Results Pending)        Intepreted by me  No acute findings  No fx or dislocation  Procedures  Procedures         ED Course                               MDM      Disposition  Final diagnoses:   Left hip pain     Time reflects when diagnosis was documented in both MDM as applicable and the Disposition within this note     Time User Action Codes Description Comment    1/18/2020  3:02 PM Natalie Galvan [M25 [id number]] Left hip pain       ED Disposition     ED Disposition Condition Date/Time Comment    Discharge Stable Sat Jan 18, 2020  3:02 PM Jarrett Manzano discharge to home/self care              Follow-up Information     Follow up With Specialties Details Why Contact Info    Delta Larson MD Internal Medicine In 1 week  [address]  [phone]            Discharge Medication List as of 1/18/2020  3:04 PM      CONTINUE these medications which have NOT CHANGED    Details   albuterol (2 5 mg/3 mL) 0 083 % nebulizer solution Take 1 vial (2 5 mg total) by nebulization every 6 (six) hours as needed for wheezing or shortness of breath, Starting Tue 11/6/2018, Normal      atorvastatin (LIPITOR) 40 mg tablet take 1 tablet by mouth once daily, Normal      citalopram (CELEXA) 40 mg tablet Take 40 mg by mouth daily  , Until Discontinued, Historical Med      clonazePAM (KlonoPIN) 1 mg tablet 1 mg 2 (two) times a day as needed for anxiety  , Starting Fri 4/20/2018, Historical Med      cyclobenzaprine (FLEXERIL) 5 mg tablet TAKE 1-2 TABLETS (5-10 MG TOTAL) BY MOUTH 3 (THREE) TIMES A DAY AS NEEDED FOR MUSCLE SPASMS , Historical Med      dicyclomine (BENTYL) 10 mg capsule Take 1 capsule (10 mg total) by mouth 3 (three) times a day before meals, Starting Thu 6/27/2019, Normal      dicyclomine (BENTYL) 20 mg tablet Take 1 tablet (20 mg total) by mouth every 8 (eight) hours as needed (abdominal pain), Starting Sat 8/24/2019, Print      famotidine (PEPCID) 40 MG tablet Take 1 tablet (40 mg total) by mouth daily, Starting Mon 7/15/2019, Normal      gabapentin (NEURONTIN) 800 mg tablet Take 800 mg by mouth 3 (three) times a day, Historical Med      ibuprofen (MOTRIN) 800 mg tablet Take 1 tablet (800 mg total) by mouth 3 (three) times a day, Starting Fri 11/22/2019, Print      metoprolol tartrate (LOPRESSOR) 50 mg tablet TAKE 1 TABLET EVERY 12 HOURS, Normal      mirtazapine (REMERON) 30 mg tablet Take 30 mg by mouth daily at bedtime  , Historical Med      naproxen (NAPROSYN) 500 mg tablet TAKE 1 TABLET EVERY 12 HOURS AS NEEDED FOR MILD PAIN (PAIN SCORE 1-3)  TAKE WITH MEALS , Historical Med      nicotine polacrilex (NICORETTE) 2 mg gum Chew 1 each (2 mg total) as needed for smoking cessation, Starting Mon 8/5/2019, Normal      nystatin (MYCOSTATIN) 500,000 units/5 mL suspension Apply 5 mL (500,000 Units total) to the mouth or throat 4 (four) times a day, Starting Mon 8/5/2019, Normal      ondansetron (ZOFRAN) 4 mg tablet Take 4 mg by mouth every 8 (eight) hours as needed, Historical Med      !! ondansetron (ZOFRAN-ODT) 4 mg disintegrating tablet Take 1 tablet (4 mg total) by mouth every 8 (eight) hours as needed for nausea or vomiting, Starting Sat 8/24/2019, Print      !! ondansetron (ZOFRAN-ODT) 4 mg disintegrating tablet Take 1 tablet (4 mg total) by mouth every 6 (six) hours as needed for nausea or vomiting, Starting Fri 11/22/2019, Print      oxyCODONE-acetaminophen (PERCOCET) 5-325 mg per tablet TAKE 1 TO 2 TABLETS BY MOUTH EVERY 6 HOURS FOR SEVERE PAIN, Historical Med      pantoprazole (PROTONIX) 40 mg tablet take 1 tablet by mouth every morning, Normal       !! - Potential duplicate medications found  Please discuss with provider  No discharge procedures on file      ED Provider  Electronically Signed by           Aaron Thrasher MD  01/18/20 5881

## 2020-02-06 DIAGNOSIS — R10.13 EPIGASTRIC PAIN: ICD-10-CM

## 2020-02-06 RX ORDER — PANTOPRAZOLE SODIUM 40 MG/1
TABLET, DELAYED RELEASE ORAL
Qty: 30 TABLET | Refills: 0 | OUTPATIENT
Start: 2020-02-06

## 2020-02-15 ENCOUNTER — APPOINTMENT (EMERGENCY)
Dept: CT IMAGING | Facility: HOSPITAL | Age: 60
End: 2020-02-15
Payer: COMMERCIAL

## 2020-02-15 ENCOUNTER — HOSPITAL ENCOUNTER (EMERGENCY)
Facility: HOSPITAL | Age: 60
Discharge: LEFT BEFORE DISCHARGE | End: 2020-02-15
Attending: EMERGENCY MEDICINE | Admitting: EMERGENCY MEDICINE
Payer: COMMERCIAL

## 2020-02-15 VITALS
SYSTOLIC BLOOD PRESSURE: 189 MMHG | OXYGEN SATURATION: 98 % | TEMPERATURE: 98.3 F | BODY MASS INDEX: 26.4 KG/M2 | DIASTOLIC BLOOD PRESSURE: 116 MMHG | WEIGHT: 149.03 LBS | HEART RATE: 73 BPM | RESPIRATION RATE: 20 BRPM

## 2020-02-15 DIAGNOSIS — R10.9 FLANK PAIN: Primary | ICD-10-CM

## 2020-02-15 LAB
ANION GAP SERPL CALCULATED.3IONS-SCNC: 11 MMOL/L (ref 4–13)
BASOPHILS # BLD AUTO: 0.04 THOUSANDS/ΜL (ref 0–0.1)
BASOPHILS NFR BLD AUTO: 1 % (ref 0–1)
BUN SERPL-MCNC: 13 MG/DL (ref 5–25)
CALCIUM SERPL-MCNC: 8.6 MG/DL (ref 8.3–10.1)
CHLORIDE SERPL-SCNC: 104 MMOL/L (ref 100–108)
CO2 SERPL-SCNC: 24 MMOL/L (ref 21–32)
CREAT SERPL-MCNC: 0.67 MG/DL (ref 0.6–1.3)
EOSINOPHIL # BLD AUTO: 0.19 THOUSAND/ΜL (ref 0–0.61)
EOSINOPHIL NFR BLD AUTO: 3 % (ref 0–6)
ERYTHROCYTE [DISTWIDTH] IN BLOOD BY AUTOMATED COUNT: 13.3 % (ref 11.6–15.1)
GFR SERPL CREATININE-BSD FRML MDRD: 95 ML/MIN/1.73SQ M
GLUCOSE SERPL-MCNC: 90 MG/DL (ref 65–140)
HCT VFR BLD AUTO: 44.2 % (ref 34.8–46.1)
HGB BLD-MCNC: 15 G/DL (ref 11.5–15.4)
IMM GRANULOCYTES # BLD AUTO: 0.01 THOUSAND/UL (ref 0–0.2)
IMM GRANULOCYTES NFR BLD AUTO: 0 % (ref 0–2)
LYMPHOCYTES # BLD AUTO: 2.54 THOUSANDS/ΜL (ref 0.6–4.47)
LYMPHOCYTES NFR BLD AUTO: 36 % (ref 14–44)
MCH RBC QN AUTO: 31.4 PG (ref 26.8–34.3)
MCHC RBC AUTO-ENTMCNC: 33.9 G/DL (ref 31.4–37.4)
MCV RBC AUTO: 93 FL (ref 82–98)
MONOCYTES # BLD AUTO: 0.42 THOUSAND/ΜL (ref 0.17–1.22)
MONOCYTES NFR BLD AUTO: 6 % (ref 4–12)
NEUTROPHILS # BLD AUTO: 3.94 THOUSANDS/ΜL (ref 1.85–7.62)
NEUTS SEG NFR BLD AUTO: 54 % (ref 43–75)
NRBC BLD AUTO-RTO: 0 /100 WBCS
PLATELET # BLD AUTO: 175 THOUSANDS/UL (ref 149–390)
PMV BLD AUTO: 10.1 FL (ref 8.9–12.7)
POTASSIUM SERPL-SCNC: 3.6 MMOL/L (ref 3.5–5.3)
RBC # BLD AUTO: 4.77 MILLION/UL (ref 3.81–5.12)
SODIUM SERPL-SCNC: 139 MMOL/L (ref 136–145)
WBC # BLD AUTO: 7.14 THOUSAND/UL (ref 4.31–10.16)

## 2020-02-15 PROCEDURE — 36415 COLL VENOUS BLD VENIPUNCTURE: CPT | Performed by: EMERGENCY MEDICINE

## 2020-02-15 PROCEDURE — 85025 COMPLETE CBC W/AUTO DIFF WBC: CPT | Performed by: EMERGENCY MEDICINE

## 2020-02-15 PROCEDURE — 80048 BASIC METABOLIC PNL TOTAL CA: CPT | Performed by: EMERGENCY MEDICINE

## 2020-02-15 PROCEDURE — 96374 THER/PROPH/DIAG INJ IV PUSH: CPT

## 2020-02-15 PROCEDURE — 93005 ELECTROCARDIOGRAM TRACING: CPT

## 2020-02-15 PROCEDURE — 74176 CT ABD & PELVIS W/O CONTRAST: CPT

## 2020-02-15 PROCEDURE — 96375 TX/PRO/DX INJ NEW DRUG ADDON: CPT

## 2020-02-15 PROCEDURE — 99285 EMERGENCY DEPT VISIT HI MDM: CPT

## 2020-02-15 PROCEDURE — 99283 EMERGENCY DEPT VISIT LOW MDM: CPT | Performed by: EMERGENCY MEDICINE

## 2020-02-15 RX ORDER — DIPHENHYDRAMINE HYDROCHLORIDE 50 MG/ML
50 INJECTION INTRAMUSCULAR; INTRAVENOUS ONCE
Status: COMPLETED | OUTPATIENT
Start: 2020-02-15 | End: 2020-02-15

## 2020-02-15 RX ORDER — FENTANYL CITRATE 50 UG/ML
50 INJECTION, SOLUTION INTRAMUSCULAR; INTRAVENOUS ONCE
Status: COMPLETED | OUTPATIENT
Start: 2020-02-15 | End: 2020-02-15

## 2020-02-15 RX ADMIN — FENTANYL CITRATE 50 MCG: 50 INJECTION, SOLUTION INTRAMUSCULAR; INTRAVENOUS at 21:56

## 2020-02-15 RX ADMIN — DIPHENHYDRAMINE HYDROCHLORIDE 50 MG: 50 INJECTION, SOLUTION INTRAMUSCULAR; INTRAVENOUS at 21:56

## 2020-02-16 LAB
ATRIAL RATE: 68 BPM
P AXIS: 52 DEGREES
PR INTERVAL: 98 MS
QRS AXIS: 48 DEGREES
QRSD INTERVAL: 92 MS
QT INTERVAL: 470 MS
QTC INTERVAL: 499 MS
T WAVE AXIS: 69 DEGREES
VENTRICULAR RATE: 68 BPM

## 2020-02-16 PROCEDURE — 93010 ELECTROCARDIOGRAM REPORT: CPT | Performed by: INTERNAL MEDICINE

## 2020-02-16 NOTE — ED PROVIDER NOTES
History  Chief Complaint   Patient presents with    Flank Pain     pt c/o bilateral flank pain that started 1600 tonight  pt c/o suprapubic pain and c/o pain with urination  pt states to have hx of kidney stones     62year old female patient presents emergency department for evaluation of flank pain  She states history of kidney stones and that this is very consistent with her previous bouts of kidney stones  She has a long medical history as well  She has had a cholecystectomy, appendectomy, previously  Currently patient is lying in bed, in minimal distress from pain and be evaluated with a differential diagnosis includes but not limited to renal calculus, urinary tract infection, adhesions, bowel obstruction  History provided by:  Patient   used: No    Flank Pain   Pain location:  Suprapubic  Pain quality: aching    Pain radiates to:  Does not radiate  Pain severity:  Mild  Onset quality:  Gradual  Timing:  Constant  Progression:  Worsening  Chronicity:  New  Context: not alcohol use, not eating, not recent illness, not recent sexual activity and not suspicious food intake    Relieved by:  Nothing  Worsened by:  Nothing  Ineffective treatments:  None tried  Associated symptoms: no cough, no diarrhea, no flatus and no shortness of breath        Prior to Admission Medications   Prescriptions Last Dose Informant Patient Reported? Taking?    albuterol (2 5 mg/3 mL) 0 083 % nebulizer solution  Self No No   Sig: Take 1 vial (2 5 mg total) by nebulization every 6 (six) hours as needed for wheezing or shortness of breath   atorvastatin (LIPITOR) 40 mg tablet  Self No No   Sig: take 1 tablet by mouth once daily   citalopram (CELEXA) 40 mg tablet  Self Yes No   Sig: Take 40 mg by mouth daily     clonazePAM (KlonoPIN) 1 mg tablet  Self Yes No   Si mg 2 (two) times a day as needed for anxiety     cyclobenzaprine (FLEXERIL) 5 mg tablet   Yes No   Sig: TAKE 1-2 TABLETS (5-10 MG TOTAL) BY MOUTH 3 (THREE) TIMES A DAY AS NEEDED FOR MUSCLE SPASMS  dicyclomine (BENTYL) 10 mg capsule   No No   Sig: Take 1 capsule (10 mg total) by mouth 3 (three) times a day before meals   dicyclomine (BENTYL) 20 mg tablet   No No   Sig: Take 1 tablet (20 mg total) by mouth every 8 (eight) hours as needed (abdominal pain)   famotidine (PEPCID) 40 MG tablet   No No   Sig: Take 1 tablet (40 mg total) by mouth daily   gabapentin (NEURONTIN) 800 mg tablet  Self Yes No   Sig: Take 800 mg by mouth 3 (three) times a day   ibuprofen (MOTRIN) 800 mg tablet   No No   Sig: Take 1 tablet (800 mg total) by mouth 3 (three) times a day   metoprolol tartrate (LOPRESSOR) 50 mg tablet  Self No No   Sig: TAKE 1 TABLET EVERY 12 HOURS   mirtazapine (REMERON) 30 mg tablet  Self Yes No   Sig: Take 30 mg by mouth daily at bedtime     naproxen (NAPROSYN) 500 mg tablet   Yes No   Sig: TAKE 1 TABLET EVERY 12 HOURS AS NEEDED FOR MILD PAIN (PAIN SCORE 1-3)  TAKE WITH MEALS     nicotine polacrilex (NICORETTE) 2 mg gum   No No   Sig: Chew 1 each (2 mg total) as needed for smoking cessation   nystatin (MYCOSTATIN) 500,000 units/5 mL suspension   No No   Sig: Apply 5 mL (500,000 Units total) to the mouth or throat 4 (four) times a day   ondansetron (ZOFRAN) 4 mg tablet   Yes No   Sig: Take 4 mg by mouth every 8 (eight) hours as needed   ondansetron (ZOFRAN-ODT) 4 mg disintegrating tablet   No No   Sig: Take 1 tablet (4 mg total) by mouth every 8 (eight) hours as needed for nausea or vomiting   ondansetron (ZOFRAN-ODT) 4 mg disintegrating tablet   No No   Sig: Take 1 tablet (4 mg total) by mouth every 6 (six) hours as needed for nausea or vomiting   oxyCODONE-acetaminophen (PERCOCET) 5-325 mg per tablet   Yes No   Sig: TAKE 1 TO 2 TABLETS BY MOUTH EVERY 6 HOURS FOR SEVERE PAIN   pantoprazole (PROTONIX) 40 mg tablet   No No   Sig: take 1 tablet by mouth every morning      Facility-Administered Medications: None       Past Medical History:   Diagnosis Date    Alcohol abuse 12/31/2018    Anxiety     Bipolar 1 disorder ([facility] )     Mental Health problems listed as Denied in Allscripts, cant't entire under pertinent negatives due to this diagnosis    Chronic back pain     Frequent headaches     Glaucoma     Hyperlipidemia     Irregular heart beat     AFib    Ovarian cancer ([facility] )     last assessed - 21Sep2016    Psychiatric disorder     bipolar    Shortness of breath     Uterine carcinoma ([facility] )     last assessed - 21Sep2016       Past Surgical History:   Procedure Laterality Date    ADENOIDECTOMY      Tonsillectomy with Adenoidectomy - last assessed - 21Sep2016    APPENDECTOMY      last assessed - 21Sep2016    CATARACT EXTRACTION Left     Remove cataract, corneo-scleral section of left eye; last assessed - 21Sep2016    CHOLECYSTECTOMY      last assessed - 29Sep2016   [facility] EYE SURGERY      Anterior sclera fistulization for glaucoma; last assessed - 21Sep2016    HYSTERECTOMY      KRYSTA-BSO; last assessed - 21Sep2016    INTRAOCULAR LENS INSERTION      [place] TEMPORAL ARTERY LIGATN OR BX Right 12/16/2016    Procedure: BIOPSY ARTERY TEMPORAL;  Surgeon: Tu Guzman MD;  Location: [facility];  Service: General    TONSILLECTOMY      Tonsillectomy with Adenoidectomy - last assessed - 21Sep2016       Family History   Problem Relation Age of Onset    Heart disease Mother     Coronary artery disease Mother     Other Mother         1) Gangrene; 2) Peripheral arterial disease    Hyperlipidemia Mother         Hypercholesterolemia    Stomach cancer Mother     Heart attack Mother         Myocardial Infarction    Other Father         1) Gangrene; 2) Peripheral arterial disease    Hyperlipidemia Father         Hypercholesterolemia    Stomach cancer Father     Heart attack Father         Myocardial Infarction    Stomach cancer Brother     Heart attack Brother         Myocardial Infarction    Stomach cancer Maternal Uncle      I have reviewed and agree with the history as documented  Social History     Tobacco Use    Smoking status: Current Every Day Smoker     Packs/day: 0 25     Years: 46 00     Pack years: 11 50     Types: Cigarettes    Smokeless tobacco: Never Used   Substance Use Topics    Alcohol use: Not Currently     Comment: occasional; (No alcohol use per Allscripts)    Drug use: No       Review of Systems   Respiratory: Negative for cough and shortness of breath  Gastrointestinal: Negative for diarrhea and flatus  Genitourinary: Positive for flank pain  All other systems reviewed and are negative  Physical Exam  Physical Exam   Constitutional: She is oriented to person, place, and time  She appears well-developed and well-nourished  HENT:   Head: Normocephalic and atraumatic  Right Ear: External ear normal    Left Ear: External ear normal    Eyes: Conjunctivae and EOM are normal    Neck: No JVD present  No tracheal deviation present  No thyromegaly present  Cardiovascular: Normal rate  Pulmonary/Chest: Effort normal and breath sounds normal  No stridor  Abdominal: Soft  She exhibits no distension and no mass  There is no tenderness  There is no guarding  No hernia  Musculoskeletal: Normal range of motion  She exhibits no edema, tenderness or deformity  Lymphadenopathy:     She has no cervical adenopathy  Neurological: She is alert and oriented to person, place, and time  Skin: Skin is warm  No rash noted  No erythema  No pallor  Psychiatric: She has a normal mood and affect  Her behavior is normal    Nursing note and vitals reviewed        Vital Signs  ED Triage Vitals [02/15/20 2124]   Temperature Pulse Respirations Blood Pressure SpO2   98 3 °F (36 8 °C) 73 20 (!) 189/116 98 %      Temp Source Heart Rate Source Patient Position - Orthostatic VS BP Location FiO2 (%)   Oral Monitor Sitting Right arm --      Pain Score       Worst Possible Pain           Vitals:    02/15/20 2124   BP: (!) 189/116   Pulse: 73   Patient Position - Orthostatic VS: Sitting         Visual Acuity      ED Medications  Medications   fentanyl citrate (PF) 100 MCG/2ML 50 mcg (50 mcg Intravenous Given 2/15/20 2156)   diphenhydrAMINE (BENADRYL) injection 50 mg (50 mg Intravenous Given 2/15/20 2156)       Diagnostic Studies  Results Reviewed     Procedure Component Value Units Date/Time    CBC and differential [[id number]] Collected:  02/15/20 2156    Lab Status:  Final result Specimen:  Blood from Arm, Right Updated:  02/15/20 2203     WBC 7 14 Thousand/uL      RBC 4 77 Million/uL      Hemoglobin 15 0 g/dL      Hematocrit 44 2 %      MCV 93 fL      MCH 31 4 pg      MCHC 33 9 g/dL      RDW 13 3 %      MPV 10 1 fL      Platelets 826 Thousands/uL      nRBC 0 /100 WBCs      Neutrophils Relative 54 %      Immat GRANS % 0 %      Lymphocytes Relative 36 %      Monocytes Relative 6 %      Eosinophils Relative 3 %      Basophils Relative 1 %      Neutrophils Absolute 3 94 Thousands/µL      Immature Grans Absolute 0 01 Thousand/uL      Lymphocytes Absolute 2 54 Thousands/µL      Monocytes Absolute 0 42 Thousand/µL      Eosinophils Absolute 0 19 Thousand/µL      Basophils Absolute 0 04 Thousands/µL     Basic metabolic panel [[id number]] Collected:  02/15/20 2156    Lab Status: In process Specimen:  Blood from Arm, Right Updated:  02/15/20 2200    UA w Reflex to Microscopic w Reflex to Culture [[id number]]     Lab Status:  No result Specimen:  Urine                  CT renal stone study abdomen pelvis wo contrast    (Results Pending)              Procedures  Procedures         ED Course                               MDM  Number of Diagnoses or Management Options  Flank pain: new and requires workup  Diagnosis management comments: The patient was demanding more pain medication  I explained to her that I gave her IV pain medication at a dose which was sufficient to keep her comfortable while I evaluate her for causes of the discomfort     She then yelled at me that fentanyl doesn't work for her and that only dilaudid will help  I explained to her that with normal labs and a normal CT there is no obvious pathology that would causing her symptoms and that I needed urine from her  She became agitated and told me that she just wanted to leave if I won't "help" her  I again tried to explain that I was not going to give multiple doses of IV narcotics without any documented pathology and that I was looking for the cause of her pain which I was unable as to this time, to find  Amount and/or Complexity of Data Reviewed  Clinical lab tests: ordered and reviewed  Tests in the radiology section of CPT®: ordered and reviewed  Decide to obtain previous medical records or to obtain history from someone other than the patient: yes  Review and summarize past medical records: yes    Patient Progress  Patient progress: stable        Disposition  Final diagnoses:   None     ED Disposition     None      Follow-up Information    None         Patient's Medications   Discharge Prescriptions    No medications on file     No discharge procedures on file      PDMP Review     None          ED Provider  Electronically Signed by           Shea Guajardo DO  02/16/20 2866

## 2020-02-16 NOTE — ED NOTES
Patient's IV removed from left arm  Patient called family to pick her up        Maria Antonia Jonas RN  02/15/20 5008

## 2020-02-16 NOTE — ED NOTES
Patient stated that she wants more pain medication  Provider went into room to discuss results  Patient unhappy and crying out stating she is in pain  Primary RN and provider aware        Jeanne Flores RN  02/15/20 6989

## 2020-03-07 DIAGNOSIS — R10.13 EPIGASTRIC PAIN: ICD-10-CM

## 2020-03-09 RX ORDER — PANTOPRAZOLE SODIUM 40 MG/1
TABLET, DELAYED RELEASE ORAL
Qty: 30 TABLET | Refills: 0 | Status: SHIPPED | OUTPATIENT
Start: 2020-03-09 | End: 2020-06-02 | Stop reason: SDUPTHER

## 2020-03-16 ENCOUNTER — TELEPHONE (OUTPATIENT)
Dept: INTERNAL MEDICINE CLINIC | Facility: CLINIC | Age: 60
End: 2020-03-16

## 2020-03-16 DIAGNOSIS — R05.9 COUGH: Primary | ICD-10-CM

## 2020-03-16 RX ORDER — PROMETHAZINE HYDROCHLORIDE AND CODEINE PHOSPHATE 6.25; 1 MG/5ML; MG/5ML
5 SYRUP ORAL EVERY 4 HOURS PRN
Qty: 120 ML | Refills: 0 | Status: SHIPPED | OUTPATIENT
Start: 2020-03-16 | End: 2020-03-27 | Stop reason: SDUPTHER

## 2020-03-16 NOTE — TELEPHONE ENCOUNTER
Spoke with patient has had a cough for about a week, sometimes coughs up clear mucus, but its an "annoying/painful cough" has sore throat from it    Nose is stuffy/runny    Didn't take anything since she has High BP    No fever, no contact with anyone thats been sick & no travel

## 2020-03-16 NOTE — TELEPHONE ENCOUNTER
I return patient calls regarding her symptoms  The patient states that she has a cough which is productive for clear sputum  Patient denies any fevers or shortness of breath  She states this cough is stopping her from sleeping at night  She had tried Robitussin at night without relief  Phenergan with codeine sent to the patient's pharmacy for her to take at night  I did recommend to the patient that should she developed additional symptoms of shortness of breath, fever, or problems breathing she should contact the office  The patient has not traveled nor is she at high risk for corona virus

## 2020-03-16 NOTE — TELEPHONE ENCOUNTER
Patient would like to know if anything is going to be sent for her cough  She states she is really miserable   # [phone]

## 2020-03-16 NOTE — TELEPHONE ENCOUNTER
Bad cough for about a week w/clear mucus   Giving her a sore throat andis asking if  would prescribe cough med for her ???  Libertad Thorne she can't take it anymore        [facility]

## 2020-03-18 DIAGNOSIS — J44.9 CHRONIC OBSTRUCTIVE PULMONARY DISEASE, UNSPECIFIED COPD TYPE (HCC): ICD-10-CM

## 2020-03-18 DIAGNOSIS — M06.9 RHEUMATOID ARTHRITIS INVOLVING MULTIPLE SITES, UNSPECIFIED RHEUMATOID FACTOR PRESENCE: ICD-10-CM

## 2020-03-18 RX ORDER — ALBUTEROL SULFATE 2.5 MG/3ML
SOLUTION RESPIRATORY (INHALATION)
Qty: 150 ML | Refills: 0 | Status: SHIPPED | OUTPATIENT
Start: 2020-03-18

## 2020-03-21 NOTE — ED PROVIDER NOTES
History  Chief Complaint   Patient presents with    Chest Pain     Pt states she has had sharp sharp heavy chest pain that radiates to her back and down her left arm for about an hour  Pt states she feels nauseous  A 23-year-old female patient presents emergency department for evaluation of chest pains  Patient states that chest pain is not similar to her previous bouts of chest pains that she has been seen here in the emergency department for countless times previously  She states this is a mid substernal chest pain that radiates into her back and radiates into her abdomen and radiates around the abdomen back into her back  Currently the patient is lying in bed, she is in no distress, she is able to speak in full and interrupted sentences without pausing to breathe  She is not diaphoretic she is not nauseated she is asking multiple times for pain medications  Patient was a difficult stick, took multiple attempts at IV access once IV access was initiated the patient was given fentanyl for her pain and she has shows an allergy to codeine and morphine  Plan will be for evaluation of chest pains with a differential diagnosis to include but not be limited to acute coronary syndrome, STEMI, reflux, chronic chest pain  History provided by:  Patient   used: No    Chest Pain   Pain location:  Substernal area  Pain quality: aching    Pain radiates to:  Does not radiate  Pain severity:  Mild  Onset quality:  Gradual  Timing:  Constant  Progression:  Unchanged  Context: not breathing, not eating, no intercourse, no movement, not raising an arm and no trauma    Relieved by:  Nothing  Worsened by:  Nothing tried  Ineffective treatments:  None tried  Associated symptoms: back pain, fatigue and headache    Associated symptoms: no cough, no lower extremity edema, no shortness of breath and no weakness        Prior to Admission Medications   Prescriptions Last Dose Informant Patient Reported? Taking?    albuterol (2 5 mg/3 mL) 0 083 % nebulizer solution   No No   Sig: Take 1 vial (2 5 mg total) by nebulization every 6 (six) hours as needed for wheezing or shortness of breath   atorvastatin (LIPITOR) 40 mg tablet  Self No No   Sig: Take 1 tablet (40 mg total) by mouth daily for 90 days   citalopram (CELEXA) 40 mg tablet  Self Yes No   Sig: Take 40 mg by mouth daily     clonazePAM (KlonoPIN) 1 mg tablet  Self Yes No   Si mg 2 (two) times a day as needed for anxiety     famotidine (PEPCID) 40 MG tablet   No No   Sig: Take 1 tablet (40 mg total) by mouth daily   folic acid (FOLVITE) 1 mg tablet   No No   Sig: Take 1 tablet (1 mg total) by mouth daily   gabapentin (NEURONTIN) 800 mg tablet  Self Yes No   Sig: Take 800 mg by mouth 3 (three) times a day   metoprolol tartrate (LOPRESSOR) 50 mg tablet  Self No No   Sig: Take 1 tablet (50 mg total) by mouth every 12 (twelve) hours   mirtazapine (REMERON) 30 mg tablet  Self Yes No   Sig: Take 30 mg by mouth daily at bedtime     nicotine (NICODERM CQ) 14 mg/24hr TD 24 hr patch   No No   Sig: Place 1 patch on the skin daily   thiamine 100 MG tablet   No No   Sig: Take 1 tablet (100 mg total) by mouth daily      Facility-Administered Medications: None       Past Medical History:   Diagnosis Date    Alcohol abuse 2018    Anxiety     Bipolar 1 disorder ([facility] [place] [facility] )     Mental Health problems listed as Denied in [employer], cant't entire under pertinent negatives due to this diagnosis    CAD (coronary artery disease)     last assessed - 2017    Chronic back pain     Frequent headaches     Glaucoma     Hyperlipidemia     Hypertension     Ovarian cancer ([facility] [place] [facility] )     last assessed - [id number]    Psychiatric disorder     bipolar    Rheumatoid arthritis ([facility] [place] [facility] )     Uterine carcinoma ([facility] [place] [facility] )     last assessed - [id number]       Past Surgical History:   Procedure Laterality Date    ADENOIDECTOMY      Tonsillectomy with Adenoidectomy - last assessed - 2016   Alverto Patel APPENDECTOMY      last assessed - 21Sep2016    CATARACT EXTRACTION Left     Remove cataract, corneo-scleral section of left eye; last assessed - 21Sep2016    CHOLECYSTECTOMY      last assessed - 29Sep2016   [facility] EYE SURGERY      Anterior sclera fistulization for glaucoma; last assessed - 21Sep2016    HYSTERECTOMY      KRYSTA-BSO; last assessed - 21Sep2016    INTRAOCULAR LENS INSERTION      [place] TEMPORAL ARTERY LIGATN OR BX Right 12/16/2016    Procedure: BIOPSY ARTERY TEMPORAL;  Surgeon: Rylee Luu MD;  Location: [facility];  Service: General    TONSILLECTOMY      Tonsillectomy with Adenoidectomy - last assessed - 21Sep2016       Family History   Problem Relation Age of Onset    Heart disease Mother     Coronary artery disease Mother     Other Mother         1) Gangrene; 2) Peripheral arterial disease    Hyperlipidemia Mother         Hypercholesterolemia    Stomach cancer Mother     Heart attack Mother         Myocardial Infarction    Other Father         1) Gangrene; 2) Peripheral arterial disease    Hyperlipidemia Father         Hypercholesterolemia    Stomach cancer Father     Heart attack Father         Myocardial Infarction    Stomach cancer Brother     Heart attack Brother         Myocardial Infarction    Stomach cancer Maternal Uncle      I have reviewed and agree with the history as documented  Social History     Tobacco Use    Smoking status: Current Every Day Smoker     Packs/day: 0 20     Years: 46 00     Pack years: 9 20     Types: Cigarettes    Smokeless tobacco: Never Used   Substance Use Topics    Alcohol use: Yes     Comment: occasional; (No alcohol use per Allscripts)    Drug use: No        Review of Systems   Constitutional: Positive for fatigue  Respiratory: Negative for cough and shortness of breath  Cardiovascular: Positive for chest pain  Musculoskeletal: Positive for back pain  Neurological: Positive for headaches  Negative for weakness     All other systems reviewed and are negative  Physical Exam  Physical Exam   Constitutional: She is oriented to person, place, and time  She appears well-developed and well-nourished  HENT:   Head: Normocephalic and atraumatic  Right Ear: External ear normal    Left Ear: External ear normal    Eyes: Conjunctivae and EOM are normal    Neck: No JVD present  No tracheal deviation present  No thyromegaly present  Cardiovascular: Normal rate  Pulmonary/Chest: Effort normal and breath sounds normal  No stridor  Abdominal: Soft  She exhibits no distension and no mass  There is no tenderness  There is no guarding  No hernia  Musculoskeletal: Normal range of motion  She exhibits no edema, tenderness or deformity  Lymphadenopathy:     She has no cervical adenopathy  Neurological: She is alert and oriented to person, place, and time  Skin: Skin is warm  No rash noted  No erythema  No pallor  Psychiatric: She has a normal mood and affect  Her behavior is normal    Nursing note and vitals reviewed        Vital Signs  ED Triage Vitals   Temp Pulse Respirations Blood Pressure SpO2   -- 02/23/19 1132 02/23/19 1132 02/23/19 1132 02/23/19 1132    76 18 (!) 188/95 98 %      Temp src Heart Rate Source Patient Position - Orthostatic VS BP Location FiO2 (%)   -- 02/23/19 1132 02/23/19 1132 02/23/19 1132 --    Monitor Sitting Right arm       Pain Score       02/23/19 1303       Worst Possible Pain           Vitals:    02/23/19 1330 02/23/19 1400 02/23/19 1430 02/23/19 1545   BP: 131/85 116/56 (!) 180/103 168/82   Pulse: 71 71 77 78   Patient Position - Orthostatic VS:           Visual Acuity      ED Medications  Medications   fentanyl citrate (PF) 100 MCG/2ML 50 mcg (50 mcg Intravenous Given 2/23/19 1303)       Diagnostic Studies  Results Reviewed     Procedure Component Value Units Date/Time    Troponin I [[id number]]     Lab Status:  No result Specimen:  Blood     Troponin I [[id number]]  (Normal) Collected:  02/23/19 1238    Lab Status:  Final result Specimen:  Blood from Arm, Right Updated:  02/23/19 1305     Troponin I <0 02 ng/mL     Comprehensive metabolic panel [[id number]]  (Abnormal) Collected:  02/23/19 1238    Lab Status:  Final result Specimen:  Blood from Hand, Right Updated:  02/23/19 1301     Sodium 144 mmol/L      Potassium 3 9 mmol/L      Chloride 108 mmol/L      CO2 24 mmol/L      ANION GAP 12 mmol/L      BUN 17 mg/dL      Creatinine 0 73 mg/dL      Glucose 90 mg/dL      Calcium 8 8 mg/dL      AST 28 U/L      ALT 41 U/L      Alkaline Phosphatase 145 U/L      Total Protein 6 8 g/dL      Albumin 3 1 g/dL      Total Bilirubin 0 30 mg/dL      eGFR 90 ml/min/1 73sq m     Narrative:       National Kidney Disease Education Program recommendations are as follows:  GFR calculation is accurate only with a steady state creatinine  Chronic Kidney disease less than 60 ml/min/1 73 sq  meters  Kidney failure less than 15 ml/min/1 73 sq  meters  CBC and differential [[id number]] Collected:  02/23/19 1238    Lab Status:  Final result Specimen:  Blood from Hand, Right Updated:  02/23/19 1243     WBC 5 48 Thousand/uL      RBC 4 65 Million/uL      Hemoglobin 13 8 g/dL      Hematocrit 41 1 %      MCV 88 fL      MCH 29 7 pg      MCHC 33 6 g/dL      RDW 13 2 %      MPV 10 1 fL      Platelets 383 Thousands/uL      nRBC 0 /100 WBCs      Neutrophils Relative 61 %      Immat GRANS % 0 %      Lymphocytes Relative 27 %      Monocytes Relative 6 %      Eosinophils Relative 5 %      Basophils Relative 1 %      Neutrophils Absolute 3 34 Thousands/µL      Immature Grans Absolute 0 01 Thousand/uL      Lymphocytes Absolute 1 49 Thousands/µL      Monocytes Absolute 0 35 Thousand/µL      Eosinophils Absolute 0 25 Thousand/µL      Basophils Absolute 0 04 Thousands/µL                  CT abdomen pelvis wo contrast   Final Result by Kathryn Hopkins MD (02/23 1536)      No acute abnormality seen  Mild colonic diverticulosis        Cholecystectomy and hysterectomy  Left renal cyst               Workstation performed: [id number]         X-ray chest 2 views    (Results Pending)              Procedures  Procedures       Phone Contacts  ED Phone Contact    ED Course                               MDM  Number of Diagnoses or Management Options  Chronic chest pain: new and requires workup  Diagnosis management comments: I offered to refer the patient to pain management but she states that she has already due to see them on Tuesday for her chronic pain       Amount and/or Complexity of Data Reviewed  Clinical lab tests: ordered and reviewed  Tests in the radiology section of CPT®: reviewed and ordered  Decide to obtain previous medical records or to obtain history from someone other than the patient: yes  Review and summarize past medical records: yes    Patient Progress  Patient progress: stable      Disposition  Final diagnoses:   Chronic chest pain     Time reflects when diagnosis was documented in both MDM as applicable and the Disposition within this note     Time User Action Codes Description Comment    2/23/2019  3:52 PM Marthena Cal Add [R07 89] Atypical chest pain     2/23/2019  3:53 PM Marthena Cal Add [R07 9,  G89 29] Chronic chest pain     2/23/2019  3:53 PM Marthena Lynden Modify [R07 9,  G89 29] Chronic chest pain     2/23/2019  3:53 PM Marthena Cal Remove [R07 89] Atypical chest pain       ED Disposition     ED Disposition Condition Date/Time Comment    Discharge Stable Sat Feb 23, 2019  3:52 PM Nino Felix discharge to home/self care              Follow-up Information    None         Discharge Medication List as of 2/23/2019  3:54 PM      CONTINUE these medications which have NOT CHANGED    Details   albuterol (2 5 mg/3 mL) 0 083 % nebulizer solution Take 1 vial (2 5 mg total) by nebulization every 6 (six) hours as needed for wheezing or shortness of breath, Starting Tue 11/6/2018, Normal      atorvastatin (LIPITOR) 40 mg tablet Take 1 tablet (40 mg total) by mouth daily for 90 days, Starting Thu 6/14/2018, Until Wed 9/12/2018, Normal      citalopram (CELEXA) 40 mg tablet Take 40 mg by mouth daily  , Until Discontinued, Historical Med      clonazePAM (KlonoPIN) 1 mg tablet 1 mg 2 (two) times a day as needed for anxiety  , Starting Fri 4/20/2018, Historical Med      famotidine (PEPCID) 40 MG tablet Take 1 tablet (40 mg total) by mouth daily, Starting Fri 2/07/2610, Normal      folic acid (FOLVITE) 1 mg tablet Take 1 tablet (1 mg total) by mouth daily, Starting Fri 2/1/2019, Print      gabapentin (NEURONTIN) 800 mg tablet Take 800 mg by mouth 3 (three) times a day, Historical Med      metoprolol tartrate (LOPRESSOR) 50 mg tablet Take 1 tablet (50 mg total) by mouth every 12 (twelve) hours, Starting Thu 6/14/2018, Normal      mirtazapine (REMERON) 30 mg tablet Take 30 mg by mouth daily at bedtime  , Historical Med      nicotine (NICODERM CQ) 14 mg/24hr TD 24 hr patch Place 1 patch on the skin daily, Starting Fri 2/1/2019, Normal      thiamine 100 MG tablet Take 1 tablet (100 mg total) by mouth daily, Starting Fri 2/1/2019, Print           No discharge procedures on file      ED Provider  Electronically Signed by           Stefania Stewart DO  02/23/19 7473 [phone]

## 2020-03-27 DIAGNOSIS — R05.9 COUGH: ICD-10-CM

## 2020-03-27 RX ORDER — PROMETHAZINE HYDROCHLORIDE AND CODEINE PHOSPHATE 6.25; 1 MG/5ML; MG/5ML
5 SYRUP ORAL EVERY 4 HOURS PRN
Qty: 120 ML | Refills: 0 | Status: SHIPPED | OUTPATIENT
Start: 2020-03-27 | End: 2021-03-20 | Stop reason: HOSPADM

## 2020-03-27 NOTE — TELEPHONE ENCOUNTER
Patient yamile has a very bad cough   Lorna Blanco Requesting refill of :     (PHENERGAN WITH CODEINE) 6 25-10 mg/5 mL syrup   Lorna Blanco      sent to [facility]

## 2020-04-23 ENCOUNTER — TELEPHONE (OUTPATIENT)
Dept: INTERNAL MEDICINE CLINIC | Facility: CLINIC | Age: 60
End: 2020-04-23

## 2020-04-28 ENCOUNTER — TELEMEDICINE (OUTPATIENT)
Dept: INTERNAL MEDICINE CLINIC | Facility: CLINIC | Age: 60
End: 2020-04-28
Payer: COMMERCIAL

## 2020-04-28 DIAGNOSIS — M79.89 RIGHT LEG SWELLING: Primary | ICD-10-CM

## 2020-04-28 PROCEDURE — G2012 BRIEF CHECK IN BY MD/QHP: HCPCS | Performed by: INTERNAL MEDICINE

## 2020-04-29 DIAGNOSIS — R05.9 COUGH: ICD-10-CM

## 2020-04-29 RX ORDER — PROMETHAZINE HYDROCHLORIDE AND CODEINE PHOSPHATE 6.25; 1 MG/5ML; MG/5ML
5 SYRUP ORAL EVERY 4 HOURS PRN
Qty: 120 ML | Refills: 0 | Status: CANCELLED | OUTPATIENT
Start: 2020-04-29

## 2020-04-30 ENCOUNTER — TELEPHONE (OUTPATIENT)
Dept: INTERNAL MEDICINE CLINIC | Facility: CLINIC | Age: 60
End: 2020-04-30

## 2020-04-30 ENCOUNTER — TELEMEDICINE (OUTPATIENT)
Dept: INTERNAL MEDICINE CLINIC | Facility: CLINIC | Age: 60
End: 2020-04-30
Payer: COMMERCIAL

## 2020-04-30 DIAGNOSIS — M79.89 RIGHT LEG SWELLING: ICD-10-CM

## 2020-04-30 DIAGNOSIS — R05.9 COUGH: ICD-10-CM

## 2020-04-30 DIAGNOSIS — M79.604 RIGHT LEG PAIN: Primary | ICD-10-CM

## 2020-04-30 PROCEDURE — G2012 BRIEF CHECK IN BY MD/QHP: HCPCS | Performed by: INTERNAL MEDICINE

## 2020-04-30 RX ORDER — PROMETHAZINE HYDROCHLORIDE AND CODEINE PHOSPHATE 6.25; 1 MG/5ML; MG/5ML
5 SYRUP ORAL EVERY 4 HOURS PRN
Qty: 120 ML | Refills: 0 | Status: CANCELLED | OUTPATIENT
Start: 2020-04-30

## 2020-05-05 ENCOUNTER — HOSPITAL ENCOUNTER (OUTPATIENT)
Dept: NON INVASIVE DIAGNOSTICS | Facility: CLINIC | Age: 60
Discharge: HOME | End: 2020-05-05
Payer: COMMERCIAL

## 2020-05-05 ENCOUNTER — TELEPHONE (OUTPATIENT)
Dept: INTERNAL MEDICINE CLINIC | Facility: CLINIC | Age: 60
End: 2020-05-05

## 2020-05-05 DIAGNOSIS — M79.89 RIGHT LEG SWELLING: ICD-10-CM

## 2020-05-05 DIAGNOSIS — M79.604 RIGHT LEG PAIN: ICD-10-CM

## 2020-05-05 PROCEDURE — 93971 EXTREMITY STUDY: CPT

## 2020-05-05 PROCEDURE — 93971 EXTREMITY STUDY: CPT | Performed by: SURGERY

## 2020-05-06 ENCOUNTER — OFFICE VISIT (OUTPATIENT)
Dept: INTERNAL MEDICINE CLINIC | Facility: CLINIC | Age: 60
End: 2020-05-06
Payer: COMMERCIAL

## 2020-05-06 VITALS
WEIGHT: 140 LBS | TEMPERATURE: 98.2 F | OXYGEN SATURATION: 97 % | HEART RATE: 79 BPM | BODY MASS INDEX: 24.8 KG/M2 | SYSTOLIC BLOOD PRESSURE: 120 MMHG | HEIGHT: 63 IN | DIASTOLIC BLOOD PRESSURE: 70 MMHG

## 2020-05-06 DIAGNOSIS — M25.561 ACUTE PAIN OF RIGHT KNEE: Primary | ICD-10-CM

## 2020-05-06 PROCEDURE — 99212 OFFICE O/P EST SF 10 MIN: CPT | Performed by: INTERNAL MEDICINE

## 2020-05-06 PROCEDURE — 3078F DIAST BP <80 MM HG: CPT | Performed by: INTERNAL MEDICINE

## 2020-05-06 PROCEDURE — 3074F SYST BP LT 130 MM HG: CPT | Performed by: INTERNAL MEDICINE

## 2020-05-06 PROCEDURE — 3008F BODY MASS INDEX DOCD: CPT | Performed by: INTERNAL MEDICINE

## 2020-05-06 RX ORDER — NITROGLYCERIN 0.4 MG/1
TABLET SUBLINGUAL
COMMUNITY
End: 2022-05-25

## 2020-05-06 RX ORDER — TRIAMCINOLONE ACETONIDE 5 MG/G
CREAM TOPICAL
COMMUNITY
Start: 2017-10-05 | End: 2022-05-25

## 2020-05-16 ENCOUNTER — TELEPHONE (OUTPATIENT)
Dept: INTERNAL MEDICINE CLINIC | Facility: CLINIC | Age: 60
End: 2020-05-16

## 2020-05-16 ENCOUNTER — TELEPHONE (OUTPATIENT)
Dept: OTHER | Facility: OTHER | Age: 60
End: 2020-05-16

## 2020-05-18 DIAGNOSIS — M25.561 ACUTE PAIN OF RIGHT KNEE: Primary | ICD-10-CM

## 2020-05-27 ENCOUNTER — TELEPHONE (OUTPATIENT)
Dept: INTERNAL MEDICINE CLINIC | Facility: CLINIC | Age: 60
End: 2020-05-27

## 2020-06-02 DIAGNOSIS — R10.13 EPIGASTRIC PAIN: ICD-10-CM

## 2020-06-02 RX ORDER — PANTOPRAZOLE SODIUM 40 MG/1
40 TABLET, DELAYED RELEASE ORAL EVERY MORNING
Qty: 90 TABLET | Refills: 3 | Status: SHIPPED | OUTPATIENT
Start: 2020-06-02 | End: 2021-03-20 | Stop reason: HOSPADM

## 2020-07-29 NOTE — TELEPHONE ENCOUNTER
Refill request form pharmacy [facility])  Metoprolol Tart 50 mg  Patient last seen by Valeri Madsen ~ 2018  Please call patient to schedule a follow-up appointment, Thanks

## 2020-08-10 DIAGNOSIS — I10 ESSENTIAL HYPERTENSION: ICD-10-CM

## 2020-08-10 RX ORDER — ATORVASTATIN CALCIUM 40 MG/1
40 TABLET, FILM COATED ORAL DAILY
Qty: 90 TABLET | Refills: 0 | Status: SHIPPED | OUTPATIENT
Start: 2020-08-10 | End: 2020-09-16

## 2020-08-10 RX ORDER — METOPROLOL TARTRATE 50 MG/1
50 TABLET, FILM COATED ORAL EVERY 12 HOURS
Qty: 60 TABLET | Refills: 11 | Status: SHIPPED | OUTPATIENT
Start: 2020-08-10 | End: 2021-08-10

## 2020-08-10 NOTE — TELEPHONE ENCOUNTER
Patient is requesting a refill on Metoprolol Tartrate 50mg to be sent to the [facility]  Patient has an appointment on 10/01/2020  Thank you

## 2020-09-15 DIAGNOSIS — I10 ESSENTIAL HYPERTENSION: ICD-10-CM

## 2020-09-16 RX ORDER — ATORVASTATIN CALCIUM 40 MG/1
TABLET, FILM COATED ORAL
Qty: 90 TABLET | Refills: 0 | Status: SHIPPED | OUTPATIENT
Start: 2020-09-16 | End: 2021-03-26 | Stop reason: SDUPTHER

## 2020-10-13 ENCOUNTER — APPOINTMENT (EMERGENCY)
Dept: CT IMAGING | Facility: HOSPITAL | Age: 60
End: 2020-10-13
Payer: COMMERCIAL

## 2020-10-13 ENCOUNTER — HOSPITAL ENCOUNTER (EMERGENCY)
Facility: HOSPITAL | Age: 60
Discharge: HOME | End: 2020-10-13
Attending: EMERGENCY MEDICINE | Admitting: EMERGENCY MEDICINE
Payer: COMMERCIAL

## 2020-10-13 ENCOUNTER — TELEPHONE (OUTPATIENT)
Dept: INTERVENTIONAL RADIOLOGY/VASCULAR | Facility: HOSPITAL | Age: 60
End: 2020-10-13

## 2020-10-13 VITALS
DIASTOLIC BLOOD PRESSURE: 80 MMHG | WEIGHT: 138.45 LBS | BODY MASS INDEX: 24.53 KG/M2 | OXYGEN SATURATION: 96 % | SYSTOLIC BLOOD PRESSURE: 169 MMHG | HEART RATE: 69 BPM | RESPIRATION RATE: 16 BRPM | TEMPERATURE: 97.5 F

## 2020-10-13 DIAGNOSIS — S20.211A RIB CONTUSION, RIGHT, INITIAL ENCOUNTER: Primary | ICD-10-CM
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
## 2020-10-13 LAB
ANION GAP SERPL CALCULATED.3IONS-SCNC: 10 MMOL/L (ref 4–13)
BASOPHILS # BLD AUTO: 0.04 THOUSANDS/ΜL (ref 0–0.1)
BASOPHILS NFR BLD AUTO: 1 % (ref 0–1)
BUN SERPL-MCNC: 20 MG/DL (ref 5–25)
CALCIUM SERPL-MCNC: 8.5 MG/DL (ref 8.3–10.1)
CHLORIDE SERPL-SCNC: 108 MMOL/L (ref 100–108)
CO2 SERPL-SCNC: 26 MMOL/L (ref 21–32)
CREAT SERPL-MCNC: 0.85 MG/DL (ref 0.6–1.3)
EOSINOPHIL # BLD AUTO: 0.13 THOUSAND/ΜL (ref 0–0.61)
EOSINOPHIL NFR BLD AUTO: 2 % (ref 0–6)
ERYTHROCYTE [DISTWIDTH] IN BLOOD BY AUTOMATED COUNT: 13.3 % (ref 11.6–15.1)
GFR SERPL CREATININE-BSD FRML MDRD: 74 ML/MIN/1.73SQ M
GLUCOSE SERPL-MCNC: 98 MG/DL (ref 65–140)
HCT VFR BLD AUTO: 44 % (ref 34.8–46.1)
HGB BLD-MCNC: 14.7 G/DL (ref 11.5–15.4)
IMM GRANULOCYTES # BLD AUTO: 0.01 THOUSAND/UL (ref 0–0.2)
IMM GRANULOCYTES NFR BLD AUTO: 0 % (ref 0–2)
LYMPHOCYTES # BLD AUTO: 1.5 THOUSANDS/ΜL (ref 0.6–4.47)
LYMPHOCYTES NFR BLD AUTO: 24 % (ref 14–44)
MCH RBC QN AUTO: 31.8 PG (ref 26.8–34.3)
MCHC RBC AUTO-ENTMCNC: 33.4 G/DL (ref 31.4–37.4)
MCV RBC AUTO: 95 FL (ref 82–98)
MONOCYTES # BLD AUTO: 0.32 THOUSAND/ΜL (ref 0.17–1.22)
MONOCYTES NFR BLD AUTO: 5 % (ref 4–12)
NEUTROPHILS # BLD AUTO: 4.14 THOUSANDS/ΜL (ref 1.85–7.62)
NEUTS SEG NFR BLD AUTO: 68 % (ref 43–75)
NRBC BLD AUTO-RTO: 0 /100 WBCS
PLATELET # BLD AUTO: 178 THOUSANDS/UL (ref 149–390)
PMV BLD AUTO: 9.6 FL (ref 8.9–12.7)
POTASSIUM SERPL-SCNC: 4.6 MMOL/L (ref 3.5–5.3)
RBC # BLD AUTO: 4.62 MILLION/UL (ref 3.81–5.12)
SODIUM SERPL-SCNC: 144 MMOL/L (ref 136–145)
WBC # BLD AUTO: 6.14 THOUSAND/UL (ref 4.31–10.16)

## 2020-10-13 PROCEDURE — G1004 CDSM NDSC: HCPCS

## 2020-10-13 PROCEDURE — 85025 COMPLETE CBC W/AUTO DIFF WBC: CPT | Performed by: PHYSICIAN ASSISTANT

## 2020-10-13 PROCEDURE — 96374 THER/PROPH/DIAG INJ IV PUSH: CPT

## 2020-10-13 PROCEDURE — 74176 CT ABD & PELVIS W/O CONTRAST: CPT

## 2020-10-13 PROCEDURE — 36415 COLL VENOUS BLD VENIPUNCTURE: CPT | Performed by: PHYSICIAN ASSISTANT

## 2020-10-13 PROCEDURE — 99284 EMERGENCY DEPT VISIT MOD MDM: CPT

## 2020-10-13 PROCEDURE — 99285 EMERGENCY DEPT VISIT HI MDM: CPT | Performed by: PHYSICIAN ASSISTANT

## 2020-10-13 PROCEDURE — 80048 BASIC METABOLIC PNL TOTAL CA: CPT | Performed by: PHYSICIAN ASSISTANT

## 2020-10-13 PROCEDURE — 71250 CT THORAX DX C-: CPT

## 2020-10-13 PROCEDURE — 96372 THER/PROPH/DIAG INJ SC/IM: CPT

## 2020-10-13 RX ORDER — LIDOCAINE 50 MG/G
2 PATCH TOPICAL ONCE
Status: DISCONTINUED | OUTPATIENT
Start: 2020-10-13 | End: 2020-10-13 | Stop reason: HOSPADM

## 2020-10-13 RX ORDER — OXYCODONE HYDROCHLORIDE 5 MG/1
5 TABLET ORAL EVERY 6 HOURS PRN
Qty: 12 TABLET | Refills: 0 | Status: SHIPPED | OUTPATIENT
Start: 2020-10-13 | End: 2020-10-16

## 2020-10-13 RX ORDER — OXYCODONE HYDROCHLORIDE 5 MG/1
5 TABLET ORAL ONCE
Status: DISCONTINUED | OUTPATIENT
Start: 2020-10-13 | End: 2020-10-13

## 2020-10-13 RX ORDER — ACETAMINOPHEN 325 MG/1
975 TABLET ORAL ONCE
Status: DISCONTINUED | OUTPATIENT
Start: 2020-10-13 | End: 2020-10-13 | Stop reason: HOSPADM

## 2020-10-13 RX ORDER — HYDROMORPHONE HCL/PF 1 MG/ML
0.5 SYRINGE (ML) INJECTION ONCE
Status: COMPLETED | OUTPATIENT
Start: 2020-10-13 | End: 2020-10-13

## 2020-10-13 RX ORDER — HYDROMORPHONE HCL 110MG/55ML
2 PATIENT CONTROLLED ANALGESIA SYRINGE INTRAVENOUS ONCE
Status: COMPLETED | OUTPATIENT
Start: 2020-10-13 | End: 2020-10-13

## 2020-10-13 RX ADMIN — LIDOCAINE 5% 2 PATCH: 700 PATCH TOPICAL at 12:20

## 2020-10-13 RX ADMIN — HYDROMORPHONE HYDROCHLORIDE 2 MG: 2 INJECTION, SOLUTION INTRAMUSCULAR; INTRAVENOUS; SUBCUTANEOUS at 12:20

## 2020-10-13 RX ADMIN — HYDROMORPHONE HYDROCHLORIDE 0.5 MG: 1 INJECTION, SOLUTION INTRAMUSCULAR; INTRAVENOUS; SUBCUTANEOUS at 10:38

## 2020-10-14 ENCOUNTER — TELEPHONE (OUTPATIENT)
Dept: INTERVENTIONAL RADIOLOGY/VASCULAR | Facility: HOSPITAL | Age: 60
End: 2020-10-14

## 2020-11-13 ENCOUNTER — HOSPITAL ENCOUNTER (EMERGENCY)
Facility: HOSPITAL | Age: 60
Discharge: HOME | End: 2020-11-13
Attending: EMERGENCY MEDICINE | Admitting: EMERGENCY MEDICINE
Payer: COMMERCIAL

## 2020-11-13 ENCOUNTER — APPOINTMENT (EMERGENCY)
Dept: RADIOLOGY | Facility: HOSPITAL | Age: 60
End: 2020-11-13
Payer: COMMERCIAL

## 2020-11-13 ENCOUNTER — APPOINTMENT (EMERGENCY)
Dept: CT IMAGING | Facility: HOSPITAL | Age: 60
End: 2020-11-13
Payer: COMMERCIAL

## 2020-11-13 VITALS
WEIGHT: 142 LBS | HEART RATE: 63 BPM | SYSTOLIC BLOOD PRESSURE: 150 MMHG | TEMPERATURE: 98.4 F | OXYGEN SATURATION: 98 % | DIASTOLIC BLOOD PRESSURE: 86 MMHG | BODY MASS INDEX: 25.16 KG/M2 | HEIGHT: 63 IN | RESPIRATION RATE: 16 BRPM

## 2020-11-13 DIAGNOSIS — M54.50 LOW BACK PAIN: ICD-10-CM

## 2020-11-13 DIAGNOSIS — S70.02XA CONTUSION OF LEFT HIP, INITIAL ENCOUNTER: Primary | ICD-10-CM

## 2020-11-13 PROCEDURE — 96376 TX/PRO/DX INJ SAME DRUG ADON: CPT

## 2020-11-13 PROCEDURE — 99285 EMERGENCY DEPT VISIT HI MDM: CPT | Performed by: PHYSICIAN ASSISTANT

## 2020-11-13 PROCEDURE — 96375 TX/PRO/DX INJ NEW DRUG ADDON: CPT

## 2020-11-13 PROCEDURE — 73502 X-RAY EXAM HIP UNI 2-3 VIEWS: CPT

## 2020-11-13 PROCEDURE — 96374 THER/PROPH/DIAG INJ IV PUSH: CPT

## 2020-11-13 PROCEDURE — G1004 CDSM NDSC: HCPCS

## 2020-11-13 PROCEDURE — 72131 CT LUMBAR SPINE W/O DYE: CPT

## 2020-11-13 PROCEDURE — 99284 EMERGENCY DEPT VISIT MOD MDM: CPT

## 2020-11-13 RX ORDER — CYCLOBENZAPRINE HCL 10 MG
10 TABLET ORAL 2 TIMES DAILY PRN
Qty: 20 TABLET | Refills: 0 | Status: SHIPPED | OUTPATIENT
Start: 2020-11-13 | End: 2021-03-20 | Stop reason: HOSPADM

## 2020-11-13 RX ORDER — CYCLOBENZAPRINE HCL 10 MG
10 TABLET ORAL ONCE
Status: COMPLETED | OUTPATIENT
Start: 2020-11-13 | End: 2020-11-13

## 2020-11-13 RX ORDER — ONDANSETRON 2 MG/ML
4 INJECTION INTRAMUSCULAR; INTRAVENOUS ONCE
Status: COMPLETED | OUTPATIENT
Start: 2020-11-13 | End: 2020-11-13

## 2020-11-13 RX ORDER — OXYCODONE HYDROCHLORIDE AND ACETAMINOPHEN 5; 325 MG/1; MG/1
1 TABLET ORAL EVERY 4 HOURS PRN
Qty: 12 TABLET | Refills: 0 | Status: SHIPPED | OUTPATIENT
Start: 2020-11-13 | End: 2020-11-23

## 2020-11-13 RX ADMIN — MORPHINE SULFATE 2 MG: 2 INJECTION, SOLUTION INTRAMUSCULAR; INTRAVENOUS at 10:29

## 2020-11-13 RX ADMIN — ONDANSETRON 4 MG: 2 INJECTION INTRAMUSCULAR; INTRAVENOUS at 10:29

## 2020-11-13 RX ADMIN — MORPHINE SULFATE 2 MG: 2 INJECTION, SOLUTION INTRAMUSCULAR; INTRAVENOUS at 11:22

## 2020-11-13 RX ADMIN — CYCLOBENZAPRINE HYDROCHLORIDE 10 MG: 10 TABLET, FILM COATED ORAL at 11:22

## 2020-11-16 ENCOUNTER — TELEPHONE (OUTPATIENT)
Dept: PHYSICAL THERAPY | Facility: OTHER | Age: 60
End: 2020-11-16

## 2020-12-09 DIAGNOSIS — K20.90 ESOPHAGITIS: Primary | ICD-10-CM

## 2020-12-09 RX ORDER — FAMOTIDINE 20 MG/1
TABLET, FILM COATED ORAL
Qty: 90 TABLET | Refills: 2 | Status: SHIPPED | OUTPATIENT
Start: 2020-12-09 | End: 2021-03-25 | Stop reason: SDUPTHER

## 2021-01-04 ENCOUNTER — TELEPHONE (OUTPATIENT)
Dept: CARDIOLOGY CLINIC | Facility: CLINIC | Age: 61
End: 2021-01-04

## 2021-03-09 ENCOUNTER — HOSPITAL ENCOUNTER (EMERGENCY)
Facility: HOSPITAL | Age: 61
Discharge: HOME | End: 2021-03-09
Attending: EMERGENCY MEDICINE | Admitting: EMERGENCY MEDICINE
Payer: COMMERCIAL

## 2021-03-09 ENCOUNTER — APPOINTMENT (EMERGENCY)
Dept: CT IMAGING | Facility: HOSPITAL | Age: 61
End: 2021-03-09
Payer: COMMERCIAL

## 2021-03-09 ENCOUNTER — APPOINTMENT (EMERGENCY)
Dept: RADIOLOGY | Facility: HOSPITAL | Age: 61
End: 2021-03-09
Payer: COMMERCIAL

## 2021-03-09 VITALS
WEIGHT: 150.57 LBS | HEIGHT: 63 IN | BODY MASS INDEX: 26.68 KG/M2 | HEART RATE: 71 BPM | OXYGEN SATURATION: 96 % | DIASTOLIC BLOOD PRESSURE: 88 MMHG | TEMPERATURE: 98.4 F | SYSTOLIC BLOOD PRESSURE: 190 MMHG | RESPIRATION RATE: 18 BRPM

## 2021-03-09 DIAGNOSIS — M54.50 ACUTE EXACERBATION OF CHRONIC LOW BACK PAIN: ICD-10-CM
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
## 2021-03-09 DIAGNOSIS — M25.561 ACUTE PAIN OF RIGHT KNEE: ICD-10-CM

## 2021-03-09 DIAGNOSIS — I10 HYPERTENSION: ICD-10-CM

## 2021-03-09 DIAGNOSIS — G89.29 ACUTE EXACERBATION OF CHRONIC LOW BACK PAIN: ICD-10-CM

## 2021-03-09 PROCEDURE — 72131 CT LUMBAR SPINE W/O DYE: CPT

## 2021-03-09 PROCEDURE — 96372 THER/PROPH/DIAG INJ SC/IM: CPT

## 2021-03-09 PROCEDURE — 99285 EMERGENCY DEPT VISIT HI MDM: CPT | Performed by: PHYSICIAN ASSISTANT

## 2021-03-09 PROCEDURE — 99284 EMERGENCY DEPT VISIT MOD MDM: CPT

## 2021-03-09 PROCEDURE — 73564 X-RAY EXAM KNEE 4 OR MORE: CPT

## 2021-03-09 RX ORDER — DIAZEPAM 5 MG/1
5 TABLET ORAL ONCE
Status: COMPLETED | OUTPATIENT
Start: 2021-03-09 | End: 2021-03-09

## 2021-03-09 RX ORDER — ACETAMINOPHEN 325 MG/1
650 TABLET ORAL ONCE
Status: COMPLETED | OUTPATIENT
Start: 2021-03-09 | End: 2021-03-09

## 2021-03-09 RX ORDER — MORPHINE SULFATE 10 MG/ML
5 INJECTION, SOLUTION INTRAMUSCULAR; INTRAVENOUS ONCE
Status: COMPLETED | OUTPATIENT
Start: 2021-03-09 | End: 2021-03-09

## 2021-03-09 RX ORDER — OXYCODONE HYDROCHLORIDE AND ACETAMINOPHEN 5; 325 MG/1; MG/1
1 TABLET ORAL EVERY 8 HOURS PRN
Qty: 5 TABLET | Refills: 0 | Status: SHIPPED | OUTPATIENT
Start: 2021-03-09 | End: 2021-03-20 | Stop reason: HOSPADM

## 2021-03-09 RX ORDER — LIDOCAINE 50 MG/G
1 PATCH TOPICAL ONCE
Status: DISCONTINUED | OUTPATIENT
Start: 2021-03-09 | End: 2021-03-09 | Stop reason: HOSPADM

## 2021-03-09 RX ADMIN — DIAZEPAM 5 MG: 5 TABLET ORAL at 10:53

## 2021-03-09 RX ADMIN — LIDOCAINE 5% 1 PATCH: 700 PATCH TOPICAL at 11:13

## 2021-03-09 RX ADMIN — MORPHINE SULFATE 5 MG: 10 INJECTION INTRAVENOUS at 11:39

## 2021-03-09 RX ADMIN — MORPHINE SULFATE 2 MG: 2 INJECTION, SOLUTION INTRAMUSCULAR; INTRAVENOUS at 10:55

## 2021-03-09 RX ADMIN — ACETAMINOPHEN 650 MG: 325 TABLET, COATED ORAL at 11:39

## 2021-03-09 NOTE — DISCHARGE INSTRUCTIONS
Encourage rest, ice, compression, elevation of right knee  Follow up with Orthopedics if knee pain persists  Please follow up with [facility] to manage back pain  Take percocet for severe breakthrough pain  Return immediately to the emergency department if you develop new or worsening symptoms  Thank you for involving us in your care today

## 2021-03-09 NOTE — ED PROVIDER NOTES
History  Chief Complaint   Patient presents with   [facility] Fall     pt states she tripped over boxes and fell and hurt her right knee and lower back  (-) headstrike, loc, thinners     Hernan Clark is a 56year-old female with history of uterine/ovarian cancer in 2016 s/p hysterectomy, chronic back pain, bipolar disorder, etoh abuse, tobacco use arriving to the ED for evaluation of injuries sustained status post mechanical fall just prior to hospital arrival today  The patient reports that she had been carrying boxes at home and tripped in front of her causing her to fall directly landing on a bent right knee and onto her low back  She denies any head strike or loss of consciousness and she is not on any blood thinning medications  She presently denies headache, neck pain or stiffness or any focal neurologic deficits  She does report acute worsening of chronic low back pain and she also reports right knee pain  She denies prior injury to the right knee  The patient describes generalized low back pain that does not radiate down either lower extremity  She denies any extremity numbness or weakness, saddle anesthesia, bladder or bowel incontinence/retention  She denies recent steroid use  She denies recent procedure to be lumbar spine and has no past surgical history of the lumbar spine  She has not tried anything for symptom relief thus far  The patient has not attempted to ambulate since the fall        History provided by:  Patient  Fall  Mechanism of injury: fall    Injury location:  Torso  Torso injury location:  Back  Incident location:  Home  Arrived directly from scene: yes    Fall:     Fall occurred:  Tripped and walking    Point of impact:  Back  Suspicion of alcohol use: no    Suspicion of drug use: no    Tetanus status:  Unknown  Prior to arrival data:     Loss of consciousness: no      Amnesic to event: no    Associated symptoms: back pain    Associated symptoms: no abdominal pain, no difficulty breathing, no loss of consciousness, no nausea, no neck pain and no vomiting        Prior to Admission Medications   Prescriptions Last Dose Informant Patient Reported? Taking? albuterol (2 5 mg/3 mL) 0 083 % nebulizer solution Not Taking at Unknown time  No No   Sig: inhale contents of 1 vial in nebulizer every 6 hours if needed for wheezing shortness of breath   Patient not taking: Reported on 3/9/2021   atorvastatin (LIPITOR) 40 mg tablet Not Taking at Unknown time  No No   Sig: take 1 tablet by mouth once daily   Patient not taking: Reported on 3/9/2021   citalopram (CELEXA) 40 mg tablet 3/9/2021 at Unknown time Self Yes Yes   Sig: Take 40 mg by mouth daily     clonazePAM (KlonoPIN) 1 mg tablet Not Taking at Unknown time Self Yes No   Si mg 2 (two) times a day as needed for anxiety     cyclobenzaprine (FLEXERIL) 10 mg tablet Not Taking at Unknown time  No No   Sig: Take 1 tablet (10 mg total) by mouth 2 (two) times a day as needed for muscle spasms   Patient not taking: Reported on 3/9/2021   cyclobenzaprine (FLEXERIL) 5 mg tablet Not Taking at Unknown time  Yes No   Sig: TAKE 1-2 TABLETS (5-10 MG TOTAL) BY MOUTH 3 (THREE) TIMES A DAY AS NEEDED FOR MUSCLE SPASMS     dicyclomine (BENTYL) 10 mg capsule Not Taking at Unknown time  No No   Sig: Take 1 capsule (10 mg total) by mouth 3 (three) times a day before meals   Patient not taking: Reported on 3/9/2021   dicyclomine (BENTYL) 20 mg tablet Not Taking at Unknown time  No No   Sig: Take 1 tablet (20 mg total) by mouth every 8 (eight) hours as needed (abdominal pain)   Patient not taking: Reported on 2020   famotidine (PEPCID) 20 mg tablet Not Taking at Unknown time  No No   Sig: take 2 tablets by mouth once daily   Patient not taking: Reported on 3/9/2021   famotidine (PEPCID) 40 MG tablet Not Taking at Unknown time  No No   Sig: Take 1 tablet (40 mg total) by mouth daily   Patient not taking: Reported on 3/9/2021   gabapentin (NEURONTIN) 800 mg tablet Not Taking at Unknown time Self Yes No   Sig: Take 800 mg by mouth 3 (three) times a day   ibuprofen (MOTRIN) 800 mg tablet Not Taking at Unknown time  No No   Sig: Take 1 tablet (800 mg total) by mouth 3 (three) times a day   Patient not taking: Reported on 3/9/2021   metoprolol tartrate (LOPRESSOR) 50 mg tablet 3/9/2021 at Unknown time  No Yes   Sig: Take 1 tablet (50 mg total) by mouth every 12 (twelve) hours   mirtazapine (REMERON) 30 mg tablet Not Taking at Unknown time Self Yes No   Sig: Take 30 mg by mouth daily at bedtime     naproxen (NAPROSYN) 500 mg tablet Not Taking at Unknown time  Yes No   Sig: TAKE 1 TABLET EVERY 12 HOURS AS NEEDED FOR MILD PAIN (PAIN SCORE 1-3)  TAKE WITH MEALS     nicotine polacrilex (NICORETTE) 2 mg gum Not Taking at Unknown time  No No   Sig: Chew 1 each (2 mg total) as needed for smoking cessation   Patient not taking: Reported on 4/30/2020   nitroglycerin (NITROSTAT) 0 4 mg SL tablet Not Taking at Unknown time  Yes No   Sig: Place under the tongue   nystatin (MYCOSTATIN) 500,000 units/5 mL suspension Not Taking at Unknown time  No No   Sig: Apply 5 mL (500,000 Units total) to the mouth or throat 4 (four) times a day   Patient not taking: Reported on 4/30/2020   ondansetron (ZOFRAN) 4 mg tablet Not Taking at Unknown time  Yes No   Sig: Take 4 mg by mouth every 8 (eight) hours as needed    oxyCODONE-acetaminophen (PERCOCET) 5-325 mg per tablet Not Taking at Unknown time  Yes No   pantoprazole (PROTONIX) 40 mg tablet Not Taking at Unknown time  No No   Sig: Take 1 tablet (40 mg total) by mouth every morning   Patient not taking: Reported on 3/9/2021   promethazine-codeine (PHENERGAN WITH CODEINE) 6 25-10 mg/5 mL syrup Not Taking at Unknown time  No No   Sig: Take 5 mL by mouth every 4 (four) hours as needed for cough   Patient not taking: Reported on 3/9/2021   triamcinolone (KENALOG) 0 5 % cream Not Taking at Unknown time  Yes No   Sig: Triamcinolone Acetonide 0 5 % External Cream  APPLY 2-3 TIMES DAILY TO AFFECTED AREA(S)     Quantity: 1;  Refills: 3      Norris Knight ;  Start 5-Oct-2017  Active  15 GM Tube      Facility-Administered Medications: None       Past Medical History:   Diagnosis Date    Alcohol abuse 12/31/2018    Anxiety     Bipolar 1 disorder ([facility] )     Mental Health problems listed as Denied in Allscripts, cant't entire under pertinent negatives due to this diagnosis    Chronic back pain     COPD (chronic obstructive pulmonary disease) ([facility] )     Frequent headaches     Glaucoma     Hyperlipidemia     Irregular heart beat     AFib    Ovarian cancer ([facility] )     last assessed - 21Sep2016    Psychiatric disorder     bipolar    Shortness of breath     Uterine carcinoma ([facility] )     last assessed - 21Sep2016       Past Surgical History:   Procedure Laterality Date    ADENOIDECTOMY      Tonsillectomy with Adenoidectomy - last assessed - 21Sep2016    APPENDECTOMY      last assessed - 21Sep2016    CATARACT EXTRACTION Left     Remove cataract, corneo-scleral section of left eye; last assessed - 21Sep2016    CHOLECYSTECTOMY      last assessed - 29Sep2016   Gloann Frankel EYE SURGERY      Anterior sclera fistulization for glaucoma; last assessed - 21Sep2016    HYSTERECTOMY      KRYSTA-BSO; last assessed - 21Sep2016    INTRAOCULAR LENS INSERTION      TX TEMPORAL ARTERY LIGATN OR BX Right 12/16/2016    Procedure: BIOPSY ARTERY TEMPORAL;  Surgeon: Arpit High MD;  Location: [facility];  Service: General    TONSILLECTOMY      Tonsillectomy with Adenoidectomy - last assessed - 21Sep2016       Family History   Problem Relation Age of Onset    Heart disease Mother     Coronary artery disease Mother     Other Mother         1) Gangrene; 2) Peripheral arterial disease    Hyperlipidemia Mother         Hypercholesterolemia    Stomach cancer Mother     Heart attack Mother         Myocardial Infarction    Other Father         1) Gangrene; 2) Peripheral arterial disease    Hyperlipidemia Father         Hypercholesterolemia    Stomach cancer Father     Heart attack Father         Myocardial Infarction    Stomach cancer Brother     Heart attack Brother         Myocardial Infarction    Stomach cancer Maternal Uncle      I have reviewed and agree with the history as documented  E-Cigarette/Vaping    E-Cigarette Use Never User      E-Cigarette/Vaping Substances    Nicotine No     THC No     CBD No     Flavoring No     Other No     Unknown No      Social History     Tobacco Use    Smoking status: Current Every Day Smoker     Packs/day: 0 25     Years: 46 00     Pack years: 11 50     Types: Cigarettes    Smokeless tobacco: Never Used   Substance Use Topics    Alcohol use: Not Currently     Comment: occasional; (No alcohol use per Allscripts)    Drug use: No       Review of Systems   Constitutional: Negative for chills and fever  Gastrointestinal: Negative for abdominal pain, nausea and vomiting  Musculoskeletal: Positive for arthralgias, back pain, gait problem and myalgias  Negative for neck pain and neck stiffness  Skin: Negative for wound  Neurological: Negative for loss of consciousness, weakness and numbness  All other systems reviewed and are negative  Physical Exam  Physical Exam  Vitals signs and nursing note reviewed  Constitutional:       General: She is not in acute distress  Appearance: Normal appearance  She is well-developed  She is obese  She is not ill-appearing, toxic-appearing or diaphoretic  HENT:      Head: Normocephalic and atraumatic  Eyes:      Conjunctiva/sclera: Conjunctivae normal    Neck:      Musculoskeletal: Normal range of motion and neck supple  No neck rigidity or muscular tenderness  Cardiovascular:      Rate and Rhythm: Normal rate and regular rhythm  Pulses: Normal pulses  Radial pulses are 2+ on the right side and 2+ on the left side          Dorsalis pedis pulses are 2+ on the right side and 2+ on the left side  Heart sounds: Normal heart sounds  Pulmonary:      Effort: Pulmonary effort is normal  No respiratory distress  Breath sounds: Normal breath sounds  No wheezing  Abdominal:      General: Bowel sounds are normal  There is no distension  Palpations: Abdomen is soft  Tenderness: There is no abdominal tenderness  There is no guarding or rebound  Musculoskeletal:         General: No deformity  Right lower leg: No edema  Left lower leg: No edema  Comments: There is no spinous process tenderness to palpation along the cervical or thoracic spine  There is generalized bony/muscular tenderness to palpation throughout the lumbar region  No bony deformities or step-offs  Pelvis stable  Right knee effusion on inspection, no obvious deformity  There is generalized tenderness to palpation of the right knee with decreased range of motion secondary to pain and swelling  There are no motor deficits of bilateral lower extremities, and no sensory deficits to palpation along the dermatomal distributions of both lower extremities  PF/DF intact bilaterally  Skin:     General: Skin is warm and dry  Capillary Refill: Capillary refill takes less than 2 seconds  Neurological:      General: No focal deficit present  Mental Status: She is alert and oriented to person, place, and time  Mental status is at baseline  GCS: GCS eye subscore is 4  GCS verbal subscore is 5  GCS motor subscore is 6  Sensory: Sensation is intact  No sensory deficit  Motor: No weakness  Deep Tendon Reflexes: Reflexes are normal and symmetric  Reflex Scores:       Patellar reflexes are 2+ on the right side and 2+ on the left side  Achilles reflexes are 2+ on the right side and 2+ on the left side       Comments: No focal deficits         Vital Signs  ED Triage Vitals [03/09/21 0950]   Temperature Pulse Respirations Blood Pressure SpO2   98 4 °F (36 9 °C) 76 20 (!) 180/104 98 %      Temp Source Heart Rate Source Patient Position - Orthostatic VS BP Location FiO2 (%)   Oral Monitor Lying Right arm --      Pain Score       Worst Possible Pain           Vitals:    03/09/21 0950 03/09/21 1000 03/09/21 1100 03/09/21 1200   BP: (!) 180/104 (!) 194/115 151/83 (!) 190/88   Pulse: 76 71 75 71   Patient Position - Orthostatic VS: Lying Lying Lying Lying         Visual Acuity  Visual Acuity      Most Recent Value   L Pupil Size (mm)  3   R Pupil Size (mm)  3          ED Medications  Medications   diazepam (VALIUM) tablet 5 mg (5 mg Oral Given 3/9/21 1053)   morphine injection 2 mg (2 mg Subcutaneous Given 3/9/21 1055)   acetaminophen (TYLENOL) tablet 650 mg (650 mg Oral Given 3/9/21 1139)   morphine (PF) 10 mg/mL injection 5 mg (5 mg Subcutaneous Given 3/9/21 1139)       Diagnostic Studies  Results Reviewed     None                 XR knee 4+ vw right injury   ED Interpretation by Tamiko Bliss PA-C (03/09 1143)   No acute osseous abnormality      Final Result by Patricia Esparza MD (03/09 1634)      Right knee joint effusion without an acute osseous abnormality  Workstation performed: [id number]         CT spine lumbar without contrast   Final Result by Tobias Blanco DO (03/09 1113)      Lumbar degenerative disc disease most prominent at the L4-5 level where there is a moderate central and left paramedian disc extrusion resulting in moderate left lateral canal stenosis and lateral recess stenosis  Correlate for left L5 radiculopathy  Findings appear stable compared to prior study  No acute fracture status post fall  Workstation performed: [id number]                    Procedures  Procedures         ED Course                             SBIRT 22yo+      Most Recent Value   SBIRT (22 yo +)   In order to provide better care to our patients, we are screening all of our patients for alcohol and drug use  Would it be okay to ask you these screening questions?   Yes Filed at: 03/09/2021 1000   Initial Alcohol Screen: US AUDIT-C    1  How often do you have a drink containing alcohol?  0 Filed at: 03/09/2021 1000   2  How many drinks containing alcohol do you have on a typical day you are drinking? 0 Filed at: 03/09/2021 1000   3b  FEMALE Any Age, or MALE 65+: How often do you have 4 or more drinks on one occassion? 0 Filed at: 03/09/2021 1000   Audit-C Score  0 Filed at: 03/09/2021 1000   MAURIZIO: How many times in the past year have you    Used an illegal drug or used a prescription medication for non-medical reasons? Never Filed at: 03/09/2021 1000                    MDM  Number of Diagnoses or Management Options  Acute exacerbation of chronic low back pain: new and requires workup  Acute pain of right knee: new and requires workup  Fall, initial encounter: new and requires workup  Hypertension:   Diagnosis management comments: This is a 59-year-old female with history of chronic back pain, bipolar disorder, ovarian/uterine cancer s/p hysterectomy arriving to the emergency department for evaluation of generalized low back pain and right knee pain status post mechanical fall just prior to hospital arrival   The patient reports that she was carrying boxes when she had tripped in front of her landing on a flexed right knee then twisting and landing on her buttocks  She states that she has not attempted to ambulate since the fall  She denies head strike or loss of consciousness  She is not on any blood thinning medications  She denies any focal neurologic deficits  She reports pain involving the generalized lumbar region of the lower back that does not radiate down either lower extremity  She denies prior injury to the right knee  She has not tried anything for symptom relief thus far      Differential diagnosis includes but not limited to:  Assess for fracture/dislocation, disc herniation, traumatic hematoma, or other acute pathology of the lumbar spine; lumbar sprain, strain, osteoarthritis, degenerative disc disease, patellar fracture/dislocation, knee sprain, strain, contusion, effusion, hematoma    Initial ED plan:  CT lumbar spine, x-ray right knee, pain control    Final ED Assessment:  Vital signs stable on hospital arrival though notable for hypertension, examination as documented above which is without focal neurologic deficits  GCS 15  All imaging independently reviewed with imaging interpreted by the radiologist   X-ray of the right knee reports no acute osseous abnormality with right knee effusion  CT lumbar spine  reports lumbar degenerative disc disease, findings appear stable when compared to prior study with no acute fractures  The patient had been provided morphine and Valium with adequate pain control and symptomatic improvement  The patient and spouse were advised of imaging findings with plan for discharge home with pain control  Encouraged rest, ice, compression, elevation of the right knee  May take Tylenol for mild to moderate pain  Short-term narcotic script sent to the pharmacy for the patient for severe/breakthrough pain, and standard narcotic precautions were discussed at length  The patient had been provided orthopedic follow-up if right knee pain persists  She is agreeable with plan for placement of [facility] referral for ongoing management of back pain  She has been encouraged to return immediately to the emergency department for any new or worsening symptoms which had been discussed at length  The patient had also been made aware of elevated blood pressure readings while being monitored in the emergency department with recommendation for primary care provider follow-up for reassessment and blood pressure recheck  The patient verbalized understanding and was agreeable with disposition plan  She is stable for discharge home at this time           Amount and/or Complexity of Data Reviewed  Tests in the radiology section of CPT®: ordered and reviewed  Review and summarize past medical records: yes  Independent visualization of images, tracings, or specimens: yes    Risk of Complications, Morbidity, and/or Mortality  Presenting problems: low  Diagnostic procedures: low  Management options: low    Patient Progress  Patient progress: stable      Disposition  Final diagnoses:   Fall, initial encounter   Acute exacerbation of chronic low back pain   Acute pain of right knee   Hypertension     Time reflects when diagnosis was documented in both MDM as applicable and the Disposition within this note     Time User Action Codes Description Comment    3/9/2021 12:00 PM Avanell Faden Add [W19  HXIP] Fall, initial encounter     3/9/2021 12:00 PM Avanell Faden Add [M54 5,  G89 29] Acute exacerbation of chronic low back pain     3/9/2021 12:01 PM Avanell Faden Add [H34 115] Acute pain of right knee     3/9/2021 12:17 PM Avanell Faden Add [I10] Hypertension       ED Disposition     ED Disposition Condition Date/Time Comment    Discharge Stable Tue Mar 9, 2021 12:00 PM Emmanuel Smith discharge to home/self care              Follow-up Information     Follow up With Specialties Details Why Contact Info Additional Information    Emanuel Hutson MD Internal Medicine  Follow up for blood pressure re-check [address] [facility] Orthopedic Surgery Call  If right knee pain persists [address] [place] [id number]  [address], [address] [place] [address] 652, [facility] [address], [place], [place], [address], [id number]    [facility] Physical Therapy  Follow up for back pain [phone] [address] [facility] Emergency Department Emergency Medicine  If symptoms worsen [address] [facility] Emergency Department, [address] [facility], [place], [address] [facility], [address]          Discharge Medication List as of 3/9/2021 12:17 PM      START taking these medications    Details   !! oxyCODONE-acetaminophen (PERCOCET) 5-325 mg per tablet Take 1 tablet by mouth every 8 (eight) hours as needed for moderate pain or severe pain for up to 10 daysMax Daily Amount: 3 tablets, Starting Tue 3/9/2021, Until Fri 3/19/2021, Normal       !! - Potential duplicate medications found  Please discuss with provider        CONTINUE these medications which have NOT CHANGED    Details   albuterol (2 5 mg/3 mL) 0 083 % nebulizer solution inhale contents of 1 vial in nebulizer every 6 hours if needed for wheezing shortness of breath, Normal      atorvastatin (LIPITOR) 40 mg tablet take 1 tablet by mouth once daily, Normal      citalopram (CELEXA) 40 mg tablet Take 40 mg by mouth daily  , Until Discontinued, Historical Med      clonazePAM (KlonoPIN) 1 mg tablet 1 mg 2 (two) times a day as needed for anxiety  , Starting Fri 4/20/2018, Historical Med      !! cyclobenzaprine (FLEXERIL) 10 mg tablet Take 1 tablet (10 mg total) by mouth 2 (two) times a day as needed for muscle spasms, Starting Fri 11/13/2020, Normal      !! cyclobenzaprine (FLEXERIL) 5 mg tablet TAKE 1-2 TABLETS (5-10 MG TOTAL) BY MOUTH 3 (THREE) TIMES A DAY AS NEEDED FOR MUSCLE SPASMS , Historical Med      dicyclomine (BENTYL) 10 mg capsule Take 1 capsule (10 mg total) by mouth 3 (three) times a day before meals, Starting Thu 6/27/2019, Normal      dicyclomine (BENTYL) 20 mg tablet Take 1 tablet (20 mg total) by mouth every 8 (eight) hours as needed (abdominal pain), Starting Sat 8/24/2019, Print      !! famotidine (PEPCID) 20 mg tablet take 2 tablets by mouth once daily, Normal      !! famotidine (PEPCID) 40 MG tablet Take 1 tablet (40 mg total) by mouth daily, Starting Mon 7/15/2019, Normal      gabapentin (NEURONTIN) 800 mg tablet Take 800 mg by mouth 3 (three) times a day, Historical Med      ibuprofen (MOTRIN) 800 mg tablet Take 1 tablet (800 mg total) by mouth 3 (three) times a day, Starting Fri 11/22/2019, Print      metoprolol tartrate (LOPRESSOR) 50 mg tablet Take 1 tablet (50 mg total) by mouth every 12 (twelve) hours, Starting Mon 8/10/2020, Normal      mirtazapine (REMERON) 30 mg tablet Take 30 mg by mouth daily at bedtime  , Historical Med      naproxen (NAPROSYN) 500 mg tablet TAKE 1 TABLET EVERY 12 HOURS AS NEEDED FOR MILD PAIN (PAIN SCORE 1-3)  TAKE WITH MEALS , Historical Med      nicotine polacrilex (NICORETTE) 2 mg gum Chew 1 each (2 mg total) as needed for smoking cessation, Starting Mon 8/5/2019, Normal      nitroglycerin (NITROSTAT) 0 4 mg SL tablet Place under the tongue, Historical Med      nystatin (MYCOSTATIN) 500,000 units/5 mL suspension Apply 5 mL (500,000 Units total) to the mouth or throat 4 (four) times a day, Starting Mon 8/5/2019, Normal      ondansetron (ZOFRAN) 4 mg tablet Take 4 mg by mouth every 8 (eight) hours as needed , Historical Med      !! oxyCODONE-acetaminophen (PERCOCET) 5-325 mg per tablet Historical Med      pantoprazole (PROTONIX) 40 mg tablet Take 1 tablet (40 mg total) by mouth every morning, Starting Tue 6/2/2020, Normal      promethazine-codeine (PHENERGAN WITH CODEINE) 6 25-10 mg/5 mL syrup Take 5 mL by mouth every 4 (four) hours as needed for cough, Starting Fri 3/27/2020, Normal      triamcinolone (KENALOG) 0 5 % cream Triamcinolone Acetonide 0 5 % External Cream  APPLY 2-3 TIMES DAILY TO AFFECTED AREA(S)  Quantity: 1;  Refills: 3      Norris Knight ;  Start 5-Oct-2017  Active  15 GM Tube, Historical Med       !! - Potential duplicate medications found  Please discuss with provider              PDMP Review       Value Time User    PDMP Reviewed  Yes 3/27/2020  5:58 PM Ulisses Vides MD          ED Provider  Electronically Signed by           Theresa Jaquez PA-C  03/11/21 8046

## 2021-03-10 ENCOUNTER — NURSE TRIAGE (OUTPATIENT)
Dept: PHYSICAL THERAPY | Facility: OTHER | Age: 61
End: 2021-03-10

## 2021-03-10 ENCOUNTER — TELEPHONE (OUTPATIENT)
Dept: INTERNAL MEDICINE CLINIC | Facility: CLINIC | Age: 61
End: 2021-03-10

## 2021-03-10 DIAGNOSIS — M54.50 ACUTE EXACERBATION OF CHRONIC LOW BACK PAIN: Primary | ICD-10-CM

## 2021-03-10 DIAGNOSIS — Z23 ENCOUNTER FOR IMMUNIZATION: ICD-10-CM

## 2021-03-10 DIAGNOSIS — G89.29 ACUTE EXACERBATION OF CHRONIC LOW BACK PAIN: Primary | ICD-10-CM

## 2021-03-17 ENCOUNTER — APPOINTMENT (EMERGENCY)
Dept: RADIOLOGY | Facility: HOSPITAL | Age: 61
DRG: 342 | End: 2021-03-17
Payer: COMMERCIAL

## 2021-03-17 ENCOUNTER — HOSPITAL ENCOUNTER (INPATIENT)
Facility: HOSPITAL | Age: 61
LOS: 1 days | Discharge: HOME HEALTH | DRG: 342 | End: 2021-03-20
Attending: EMERGENCY MEDICINE | Admitting: INTERNAL MEDICINE
Payer: COMMERCIAL

## 2021-03-17 DIAGNOSIS — M25.561 ACUTE PAIN OF RIGHT KNEE: Primary | ICD-10-CM

## 2021-03-17 LAB
PLATELET # BLD AUTO: 146 THOUSANDS/UL (ref 149–390)
PMV BLD AUTO: 9.3 FL (ref 8.9–12.7)

## 2021-03-17 PROCEDURE — 99220 PR INITIAL OBSERVATION CARE/DAY 70 MINUTES: CPT | Performed by: INTERNAL MEDICINE

## 2021-03-17 PROCEDURE — 87040 BLOOD CULTURE FOR BACTERIA: CPT | Performed by: INTERNAL MEDICINE

## 2021-03-17 PROCEDURE — 97163 PT EVAL HIGH COMPLEX 45 MIN: CPT

## 2021-03-17 PROCEDURE — 96374 THER/PROPH/DIAG INJ IV PUSH: CPT

## 2021-03-17 PROCEDURE — 85049 AUTOMATED PLATELET COUNT: CPT | Performed by: INTERNAL MEDICINE

## 2021-03-17 PROCEDURE — 99285 EMERGENCY DEPT VISIT HI MDM: CPT

## 2021-03-17 PROCEDURE — 73564 X-RAY EXAM KNEE 4 OR MORE: CPT

## 2021-03-17 PROCEDURE — 99285 EMERGENCY DEPT VISIT HI MDM: CPT | Performed by: PHYSICIAN ASSISTANT

## 2021-03-17 PROCEDURE — 96375 TX/PRO/DX INJ NEW DRUG ADDON: CPT

## 2021-03-17 PROCEDURE — 84145 PROCALCITONIN (PCT): CPT | Performed by: INTERNAL MEDICINE

## 2021-03-17 PROCEDURE — 2W3LXYZ IMMOBILIZATION OF RIGHT LOWER EXTREMITY USING OTHER DEVICE: ICD-10-PCS | Performed by: EMERGENCY MEDICINE

## 2021-03-17 RX ORDER — FAMOTIDINE 20 MG/1
20 TABLET, FILM COATED ORAL DAILY
Status: DISCONTINUED | OUTPATIENT
Start: 2021-03-18 | End: 2021-03-20 | Stop reason: HOSPADM

## 2021-03-17 RX ORDER — MORPHINE SULFATE 4 MG/ML
4 INJECTION, SOLUTION INTRAMUSCULAR; INTRAVENOUS ONCE
Status: COMPLETED | OUTPATIENT
Start: 2021-03-17 | End: 2021-03-17

## 2021-03-17 RX ORDER — NICOTINE 21 MG/24HR
1 PATCH, TRANSDERMAL 24 HOURS TRANSDERMAL DAILY
Status: DISCONTINUED | OUTPATIENT
Start: 2021-03-18 | End: 2021-03-20 | Stop reason: HOSPADM

## 2021-03-17 RX ORDER — ATORVASTATIN CALCIUM 40 MG/1
40 TABLET, FILM COATED ORAL DAILY
Status: DISCONTINUED | OUTPATIENT
Start: 2021-03-18 | End: 2021-03-20 | Stop reason: HOSPADM

## 2021-03-17 RX ORDER — CYCLOBENZAPRINE HCL 10 MG
10 TABLET ORAL 2 TIMES DAILY PRN
Status: DISCONTINUED | OUTPATIENT
Start: 2021-03-17 | End: 2021-03-20 | Stop reason: HOSPADM

## 2021-03-17 RX ORDER — OXYCODONE HYDROCHLORIDE 5 MG/1
5 TABLET ORAL EVERY 6 HOURS PRN
Status: DISCONTINUED | OUTPATIENT
Start: 2021-03-17 | End: 2021-03-20 | Stop reason: HOSPADM

## 2021-03-17 RX ORDER — CEFAZOLIN SODIUM 1 G/50ML
1000 SOLUTION INTRAVENOUS EVERY 8 HOURS
Status: DISCONTINUED | OUTPATIENT
Start: 2021-03-17 | End: 2021-03-20 | Stop reason: HOSPADM

## 2021-03-17 RX ORDER — HYDROMORPHONE HCL/PF 1 MG/ML
0.5 SYRINGE (ML) INJECTION EVERY 4 HOURS PRN
Status: DISCONTINUED | OUTPATIENT
Start: 2021-03-17 | End: 2021-03-18

## 2021-03-17 RX ORDER — ALBUTEROL SULFATE 2.5 MG/3ML
2.5 SOLUTION RESPIRATORY (INHALATION) EVERY 6 HOURS PRN
Status: DISCONTINUED | OUTPATIENT
Start: 2021-03-17 | End: 2021-03-20 | Stop reason: HOSPADM

## 2021-03-17 RX ORDER — ACETAMINOPHEN 325 MG/1
650 TABLET ORAL EVERY 6 HOURS PRN
Status: DISCONTINUED | OUTPATIENT
Start: 2021-03-17 | End: 2021-03-18

## 2021-03-17 RX ORDER — CITALOPRAM 20 MG/1
40 TABLET ORAL DAILY
Status: DISCONTINUED | OUTPATIENT
Start: 2021-03-18 | End: 2021-03-20 | Stop reason: HOSPADM

## 2021-03-17 RX ORDER — CLONAZEPAM 0.5 MG/1
0.5 TABLET ORAL 2 TIMES DAILY PRN
Status: DISCONTINUED | OUTPATIENT
Start: 2021-03-17 | End: 2021-03-20 | Stop reason: HOSPADM

## 2021-03-17 RX ORDER — METOPROLOL TARTRATE 50 MG/1
50 TABLET, FILM COATED ORAL EVERY 12 HOURS
Status: DISCONTINUED | OUTPATIENT
Start: 2021-03-17 | End: 2021-03-20 | Stop reason: HOSPADM

## 2021-03-17 RX ORDER — MIRTAZAPINE 15 MG/1
30 TABLET, FILM COATED ORAL
Status: DISCONTINUED | OUTPATIENT
Start: 2021-03-17 | End: 2021-03-20 | Stop reason: HOSPADM

## 2021-03-17 RX ADMIN — CEFAZOLIN SODIUM 1000 MG: 1 SOLUTION INTRAVENOUS at 19:35

## 2021-03-17 RX ADMIN — HYDROMORPHONE HYDROCHLORIDE 0.5 MG: 1 INJECTION, SOLUTION INTRAMUSCULAR; INTRAVENOUS; SUBCUTANEOUS at 23:35

## 2021-03-17 RX ADMIN — MIRTAZAPINE 30 MG: 15 TABLET, FILM COATED ORAL at 21:21

## 2021-03-17 RX ADMIN — MORPHINE SULFATE 2 MG: 2 INJECTION, SOLUTION INTRAMUSCULAR; INTRAVENOUS at 14:52

## 2021-03-17 RX ADMIN — MORPHINE SULFATE 2 MG: 2 INJECTION, SOLUTION INTRAMUSCULAR; INTRAVENOUS at 17:41

## 2021-03-17 RX ADMIN — OXYCODONE HYDROCHLORIDE 5 MG: 5 TABLET ORAL at 21:21

## 2021-03-17 RX ADMIN — MORPHINE SULFATE 4 MG: 4 INJECTION INTRAVENOUS at 16:01

## 2021-03-17 RX ADMIN — ACETAMINOPHEN 650 MG: 325 TABLET ORAL at 21:22

## 2021-03-17 RX ADMIN — HYDROMORPHONE HYDROCHLORIDE 0.5 MG: 1 INJECTION, SOLUTION INTRAMUSCULAR; INTRAVENOUS; SUBCUTANEOUS at 19:35

## 2021-03-17 RX ADMIN — CYCLOBENZAPRINE HYDROCHLORIDE 10 MG: 10 TABLET, FILM COATED ORAL at 21:21

## 2021-03-17 RX ADMIN — METOPROLOL TARTRATE 50 MG: 50 TABLET, FILM COATED ORAL at 19:34

## 2021-03-17 NOTE — H&P
[address]  H&P- Emmanuel Smith 1960, 56 y o  female MRN: [id number]  Unit/Bed#: -01 Encounter: [id number]  Primary Care Provider: Emanuel Hutson MD   Date and time admitted to hospital: 3/17/2021  2:02 PM    * Acute pain of right knee  Assessment & Plan  Patient having right knee worsening pain over last few months  Her knee gives out off and on  Unable to walk  Decided to come to emergency room today  Pain is 10/10 in intensity  In the emergency room x-ray did not show any bony abnormality  On exam warm to touch and mildly swollen  Immobilizer placed by ED  Will consult Orthopedic surgery for aspiration  Start on IV antibiotics, Ancef  ? Suspicious for septic joint  Send blood culture  Pain control  PT/OT      Rheumatoid arthritis involving multiple sites [facility])  Assessment & Plan  Not on any medication  Involving multiple joints  Essential hypertension  Assessment & Plan  Continue metoprolol  Monitor blood pressure    COPD (chronic obstructive pulmonary disease) ([facility])  Assessment & Plan  Currently stable  Continue albuterol p r n  Tobacco abuse disorder  Assessment & Plan  Smoking cessation counseling given  Nicotine patch    Anxiety and depression  Assessment & Plan  Continue home medication including Celexa  Remeron      VTE Prophylaxis: Enoxaparin (Lovenox)  / sequential compression device   Code Status:  Full code  POLST: POLST is not applicable to this patient  Discussion with family:  Patient    Anticipated Length of Stay:  Patient will be admitted on an Observation basis with an anticipated length of stay of  less than 2 midnights  Justification for Hospital Stay:  See above    Total Time for Visit, including Counseling / Coordination of Care:Greater than 50% of this total time spent on direct patient counseling and coordination of care  Chief Complaint:   Right knee pain    History of Present Illness:    Emmanuel Smith is a 56 y o  female who presents with right knee pain  Pain is 10/10 intensity  Her pain started over a month now  Waxing and waning  No injury  She able to walk but her knee gives out off and on  Today she could not bear the pain and decided to come to emergency room  In the emergency room x-ray did not show any acute pathology  However knee is warm to touch, also mild swollen  Immobilizer placed  Evaluated by PT  Patient unable to walk now  Admitted for further evaluation  She has history of COPD  But currently denies any shortness of breath  Afebrile  Review of Systems:    Review of Systems   Constitutional: Negative for chills and fever  HENT: Negative for ear pain, nosebleeds, sneezing, sore throat, tinnitus and voice change  Eyes: Negative for pain and visual disturbance  Respiratory: Negative for cough, chest tightness, shortness of breath and wheezing  Cardiovascular: Negative for chest pain, palpitations and leg swelling  Gastrointestinal: Negative for abdominal distention, abdominal pain, blood in stool, nausea and vomiting  Endocrine: Negative for cold intolerance  Genitourinary: Negative for dysuria, flank pain, frequency, hematuria and urgency  Musculoskeletal: Positive for arthralgias and gait problem  Negative for back pain and joint swelling  Skin: Negative for pallor and rash  Neurological: Negative for dizziness, speech difficulty, light-headedness, numbness and headaches  Psychiatric/Behavioral: Negative for agitation and confusion  All other systems reviewed and are negative        Past Medical and Surgical History:     Past Medical History:   Diagnosis Date    Alcohol abuse 12/31/2018    Anxiety     Bipolar 1 disorder ([facility] )     Mental Health problems listed as Denied in Allscripts, cant't entire under pertinent negatives due to this diagnosis    Chronic back pain     COPD (chronic obstructive pulmonary disease) ([facility] )     Frequent headaches     Glaucoma     Hyperlipidemia     Irregular heart beat     AFib    Ovarian cancer ([facility] )     last assessed - 21Sep2016    Psychiatric disorder     bipolar    Shortness of breath     Uterine carcinoma ([facility] )     last assessed - 21Sep2016       Past Surgical History:   Procedure Laterality Date    ADENOIDECTOMY      Tonsillectomy with Adenoidectomy - last assessed - 21Sep2016    APPENDECTOMY      last assessed - 21Sep2016    CATARACT EXTRACTION Left     Remove cataract, corneo-scleral section of left eye; last assessed - 21Sep2016    CHOLECYSTECTOMY      last assessed - 29Sep2016   [place] EYE SURGERY      Anterior sclera fistulization for glaucoma; last assessed - 21Sep2016    HYSTERECTOMY      KRYSTA-BSO; last assessed - 21Sep2016    INTRAOCULAR LENS INSERTION      NM TEMPORAL ARTERY LIGATN OR BX Right 12/16/2016    Procedure: BIOPSY ARTERY TEMPORAL;  Surgeon: Susan Lynch MD;  Location: [facility];  Service: General    TONSILLECTOMY      Tonsillectomy with Adenoidectomy - last assessed - 21Sep2016       Meds/Allergies:    Prior to Admission medications    Medication Sig Start Date End Date Taking?  Authorizing Provider   albuterol (2 5 mg/3 mL) 0 083 % nebulizer solution inhale contents of 1 vial in nebulizer every 6 hours if needed for wheezing shortness of breath 3/18/20  Yes Johnny Swanson DO   atorvastatin (LIPITOR) 40 mg tablet take 1 tablet by mouth once daily 9/16/20  Yes Chirag Garay PA-C   citalopram (CELEXA) 40 mg tablet Take 40 mg by mouth daily     Yes Historical Provider, MD   clonazePAM (KlonoPIN) 1 mg tablet 1 mg 2 (two) times a day as needed for anxiety   4/20/18  Yes Historical Provider, MD   famotidine (PEPCID) 20 mg tablet take 2 tablets by mouth once daily 12/9/20  Yes Maykel Atkinson MD   famotidine (PEPCID) 40 MG tablet Take 1 tablet (40 mg total) by mouth daily 7/15/19  Yes Maykel Atkinson MD   gabapentin (NEURONTIN) 800 mg tablet Take 800 mg by mouth 3 (three) times a day   Yes Historical Provider, MD   ibuprofen (MOTRIN) 800 mg tablet Take 1 tablet (800 mg total) by mouth 3 (three) times a day 11/22/19  Yes Zita Tee MD   metoprolol tartrate (LOPRESSOR) 50 mg tablet Take 1 tablet (50 mg total) by mouth every 12 (twelve) hours 8/10/20  Yes ROMEO Glynn   mirtazapine (REMERON) 30 mg tablet Take 30 mg by mouth daily at bedtime     Yes Historical Provider, MD   cyclobenzaprine (FLEXERIL) 10 mg tablet Take 1 tablet (10 mg total) by mouth 2 (two) times a day as needed for muscle spasms  Patient not taking: Reported on 3/9/2021 11/13/20   Mrala Maddox PA-C   cyclobenzaprine (FLEXERIL) 5 mg tablet TAKE 1-2 TABLETS (5-10 MG TOTAL) BY MOUTH 3 (THREE) TIMES A DAY AS NEEDED FOR MUSCLE SPASMS  11/18/19   Historical Provider, MD   dicyclomine (BENTYL) 10 mg capsule Take 1 capsule (10 mg total) by mouth 3 (three) times a day before meals  Patient not taking: Reported on 3/9/2021 6/27/19   Vanessa Mathews PA-C   dicyclomine (BENTYL) 20 mg tablet Take 1 tablet (20 mg total) by mouth every 8 (eight) hours as needed (abdominal pain)  Patient not taking: Reported on 4/30/2020 8/24/19   Rennie Kocher E Aufiero, DO   naproxen (NAPROSYN) 500 mg tablet TAKE 1 TABLET EVERY 12 HOURS AS NEEDED FOR MILD PAIN (PAIN SCORE 1-3)   TAKE WITH MEALS  11/18/19   Historical Provider, MD   nicotine polacrilex (NICORETTE) 2 mg gum Chew 1 each (2 mg total) as needed for smoking cessation  Patient not taking: Reported on 4/30/2020 8/5/19   Marivel Turner MD   nitroglycerin (NITROSTAT) 0 4 mg SL tablet Place under the tongue    Historical Provider, MD   nystatin (MYCOSTATIN) 500,000 units/5 mL suspension Apply 5 mL (500,000 Units total) to the mouth or throat 4 (four) times a day  Patient not taking: Reported on 4/30/2020 8/5/19   Marivel Turner MD   ondansetron [facility]) 4 mg tablet Take 4 mg by mouth every 8 (eight) hours as needed     Historical Provider, MD   oxyCODONE-acetaminophen (PERCOCET) 5-325 mg per tablet  11/23/19 Historical Provider, MD   oxyCODONE-acetaminophen (PERCOCET) 5-325 mg per tablet Take 1 tablet by mouth every 8 (eight) hours as needed for moderate pain or severe pain for up to 10 daysMax Daily Amount: 3 tablets  Patient not taking: Reported on 3/17/2021 3/9/21 3/19/21  Sosa Lu PA-C   pantoprazole (PROTONIX) 40 mg tablet Take 1 tablet (40 mg total) by mouth every morning  Patient not taking: Reported on 3/9/2021 6/2/20   Adrianna Grey PA-C   promethazine-codeine (PHENERGAN WITH CODEINE) 6 25-10 mg/5 mL syrup Take 5 mL by mouth every 4 (four) hours as needed for cough  Patient not taking: Reported on 3/9/2021 3/27/20   Nicko Cerna MD   triamcinolone (KENALOG) 0 5 % cream Triamcinolone Acetonide 0 5 % External Cream  APPLY 2-3 TIMES DAILY TO AFFECTED AREA(S)  Quantity: 1;  Refills: 3      Norris Knight ;  Start 5-Oct-2017  Active  15 GM Tube 10/5/17   Historical Provider, MD     I have reviewed home medications with patient personally  Allergies:    Allergies   Allergen Reactions    Aspirin Anaphylaxis and Other (See Comments)    Bee Venom Anaphylaxis    Robaxin [Methocarbamol] Anaphylaxis and Seizures     Seizures per pt       Tramadol Hives, Shortness Of Breath and Other (See Comments)     Other reaction(s): Nausea and/or vomiting  Pt received morphine IV 7-6-18    Ketorolac     Nitroglycerin Other (See Comments)     Patient states"she gets headaches from nitro"    Omeprazole GI Intolerance     Other reaction(s): Nausea and/or vomiting       Social History:     Marital Status: /Civil Union   Occupation:   Patient Pre-hospital Living Situation:   Patient Pre-hospital Level of Mobility:  Ambulatory, use walker  Patient Pre-hospital Diet Restrictions:   Substance Use History:   Social History     Substance and Sexual Activity   Alcohol Use Not Currently    Comment: occasional; (No alcohol use per Allscripts)     Social History     Tobacco Use   Smoking Status Current Every Day Smoker    Packs/day: 0 25    Years: 46 00    Pack years: 11 50    Types: Cigarettes   Smokeless Tobacco Never Used     Social History     Substance and Sexual Activity   Drug Use No       Family History:    Family History   Problem Relation Age of Onset    Heart disease Mother     Coronary artery disease Mother     Other Mother         1) Gangrene; 2) Peripheral arterial disease    Hyperlipidemia Mother         Hypercholesterolemia    Stomach cancer Mother     Heart attack Mother         Myocardial Infarction    Other Father         1) Gangrene; 2) Peripheral arterial disease    Hyperlipidemia Father         Hypercholesterolemia    Stomach cancer Father     Heart attack Father         Myocardial Infarction    Stomach cancer Brother     Heart attack Brother         Myocardial Infarction    Stomach cancer Maternal Uncle        Physical Exam:     Vitals:   Blood Pressure: 148/80 (03/17/21 1832)  Pulse: 89 (03/17/21 1832)  Temperature: 98 2 °F (36 8 °C) (03/17/21 1832)  Temp Source: Oral (03/17/21 1329)  Respirations: 16 (03/17/21 1832)  Height: 5' 3" (160 cm) (03/17/21 1329)  Weight - Scale: 68 kg (150 lb) (03/17/21 1329)  SpO2: 92 % (03/17/21 1832)    Physical Exam    General- Awake, alert and oriented x 3, looks comfortable  HEENT- Normocephalic, atraumatic, oral mucosa- moist  Neck- Supple,no JVD  CVS- Normal S1/ S2, Regular rate and rhythm, No edema  Respiratory system- B/L clear breath sounds, no wheezing  Abdomen- Soft, Non distended, no tenderness, Bowel sound- present  Genitourinary- No suprapubic tenderness, No CVA tenderness  Skin- No new bruise or rash  Musculoskeletal- right knee warm to touch, immobilizer  Hand deformity from RA  Psych- No acute psychosis  CNS- CN II- XII grossly intact, No acute focal neurologic deficit noted    Additional Data:     Lab Results: I have personally reviewed pertinent reports                                  Imaging: I have personally reviewed pertinent reports  XR knee 4+ views Right injury   ED Interpretation by Ronnell Escobar PA-C (03/17 1614)   Medial cortical defect present on previous x-ray  Final Result by Kye Conte DO (03/17 1653)      No acute osseous abnormality  Workstation performed: [id number]             EKG, Pathology, and Other Studies Reviewed on Admission:   · EKG:     Allscripts / Epic Records Reviewed: Yes     ** Please Note: This note has been constructed using a voice recognition system   **

## 2021-03-17 NOTE — ASSESSMENT & PLAN NOTE
Patient having right knee worsening pain over last few months  Her knee gives out off and on  Unable to walk  Decided to come to emergency room today  Pain is 10/10 in intensity  In the emergency room x-ray did not show any bony abnormality  On exam warm to touch and mildly swollen  Immobilizer placed by ED  Will consult Orthopedic surgery for aspiration  Start on IV antibiotics, Ancef  ? Suspicious for septic joint  Send blood culture  Pain control    PT/OT

## 2021-03-17 NOTE — ED NOTES
10/19/2020  Name: Willie Ashford   MRN: [id number]   YOB: 2008   Date of Visit: 10/19/2020    Dear Dr. Bennie Yeung   I had the opportunity to see your patient, Willie Ashford, in the Pediatric Lung Care office at [facility] for ongoing management of asthma VCD. Impression/Suggestions:  Patient Instructions     Difficulty Breathing with activity (Basketball)  PFT normal  IMPRESSION  Vocal Cord Dysfunction (VCD) +/- Exercise Induced Asthma    PLAN:  1) American Thoracic Society (ATS) VCD Handout given, directed to VCD online resources for breathing exercises (Vocal cord dysfunction/Breathing Exercises/Speech Language Pathology)  2) Speech language pathology (SLP) follow up NOVEMBER  3) Continue for difficulty breathing as needed (with chamber): Albuterol Inhaler, 1-2 puffs, every four hours OR    Albuterol Inhaler, 1-2 puffs, 15 minutes pre-exercise OR   Atrovent Inhaler, 1-2 puffs, every 6 hours as needed       FUTURE:  Follow Up Dr Jaison Gautam 2 months or earlier if needed              Interim History:  History obtained from father, chart review and the patient  Liza Alexander was last seen  10/1/2020. by myself. Albuterol with BBall seems to help  Seeing SLP in november  Liza Alexander has been very well a respiratory perspective. No cough; No difficulty breathing, no wheeze, no indrawing; No SOB, no exercise limitation, no chest pain; No infection, no rhinnorhea. Investigations:  Pulmonary Function Testing:   Spirometry reviewed: Normal spirometry  Normal Flow Volume Loop  No BD response    Adherence of daily controller: none  Current Disease Severity  Liza Alexander has no daytime  asthma symptoms . Liza Alxeander has  no nightime asthma symptoms . Liza Alexander is using short-acting beta agonists for symptom control less than twice a week. Liza Alexander has  0 exacerbations requiring oral systemic corticosteroids or ER visits in the interval.  Number of urgent/emergent visit in the interval: 0  Current limitations in activity from asthma: none. Patient transported to [address] [place]  03/17/21 7766 Number of days of school or work missed in the interval: 0. BACKGROUND:  No specialty comments available. Review of Systems:  A comprehensive review of systems was negative except for that written in the HPI. Medical History:  History reviewed. No pertinent past medical history. Allergies:  Patient has no known allergies. No Known Allergies    Medications:   Current Outpatient Medications   Medication Sig    albuterol (PROVENTIL HFA, VENTOLIN HFA, PROAIR HFA) 90 mcg/actuation inhaler INHALE 2 PUFFS BY MOUTH 20 30 MINUTES BEFORE EXERCISE AND EVERY 4 TO 6 HOURS AS NEEDED    ipratropium (ATROVENT HFA) 17 mcg/actuation inhaler Take 2 Puffs by inhalation every six (6) hours as needed for Wheezing. No current facility-administered medications for this visit. Allergies:  Patient has no known allergies. Medical History:  History reviewed. No pertinent past medical history. Family History: No interval change. Environment: No interval change. Physical Exam:  Visit Vitals  /55 (BP 1 Location: Left arm, BP Patient Position: Sitting)   Pulse 67   Temp 98.4 °F (36.9 °C) (Oral)   Resp 18   Ht 5' 7.21\" (1.707 m)   Wt 154 lb 8.7 oz (70.1 kg)   SpO2 99%   BMI 24.06 kg/m²     Physical Exam   Constitutional: Appears well-developed and well-nourished. Active. HENT:   Nose: Nose normal.   Mouth/Throat: Mucous membranes are moist. Oropharynx is clear. Eyes: Conjunctivae are normal.   Neck: Normal range of motion. Neck supple. Cardiovascular: Normal rate, regular rhythm, S1 normal and S2 normal.    Pulmonary/Chest: Effort normal and breath sounds normal. There is normal air entry. No accessory muscle usage or stridor. No respiratory distress. Air movement is not decreased. No wheezes. No retraction. Musculoskeletal: Normal range of motion. Neurological: Alert. Skin: Skin is warm and dry. Capillary refill takes less than 3 seconds. Nursing note and vitals reviewed.     Dr. Parrish Nunez Niesha Tong MD, [facility]  Pediatric Lung Care  [address] [place] [address], [address] [facility], [address] [place] [address],[address]  [place], [facility] [place]  L) [phone]  (F) [phone]

## 2021-03-17 NOTE — PLAN OF CARE
Problem: PHYSICAL THERAPY ADULT  Goal: Performs mobility at highest level of function for planned discharge setting  See evaluation for individualized goals  Description: Treatment/Interventions: Functional transfer training, LE strengthening/ROM, Elevations, Therapeutic exercise, Endurance training, Patient/family training, Equipment eval/education, Bed mobility, Gait training, Spoke to MD, Spoke to nursing, Spoke to advanced practitioner, Family  Equipment Recommended: [place]       See flowsheet documentation for full assessment, interventions and recommendations  Note: Prognosis: Good  Problem List: Decreased strength, Decreased endurance, Impaired balance, Decreased mobility, Orthopedic restrictions, Pain, Decreased range of motion, Impaired sensation  Assessment: Pt is 56 y o  female seen for PT evaluation s/p admit to [facility] on 3/17/2021 s/p fall  PT consulted to assess pt's functional mobility and d/c needs  Order placed for PT eval and tx, w/ NWB R LE (per communication with Dr Judge Mcclain) order  Performed at least 2 patient identifiers during session: Name and   Comorbidities affecting pt's physical performance at time of assessment include: Anxiety and depression, COPD, Neuropathy (Chronic), Rheumatoid arthritis involving multiple sites  PTA, pt was independent w/ all functional mobility w/ RW (as needed), ambulates community distances and elevations, has 3 PILLO, lives w/ spouse in one level apartment and retired  Personal factors affecting pt at time of IE include: ambulating w/ assistive device, stairs to enter home, inability to navigate community distances, inability to navigate level surfaces w/o external assistance, unable to perform dynamic tasks in community, positive fall history, limited insight into impairments, inability to perform IADLs and inability to perform ADLs   Please find objective findings from PT assessment regarding body systems outlined above with impairments and limitations including weakness, decreased ROM, impaired balance, decreased endurance, gait deviations, pain, decreased activity tolerance, decreased functional mobility tolerance, altered sensation, fall risk and orthopedic restrictions  The following objective measures performed on IE also reveal limitations: Barthel Index: 55/100 and Modified [place]: 4 (moderate/severe disability)  Pt's clinical presentation is currently unstable/unpredictable seen in pt's presentation of ongoing medical management/monitoring, evident in need for assist w/ all phases of mobility when usually mobilizing independently, pain impacting overall mobility status and need for input for mobility technique/safety  Pt to benefit from continued PT tx to address deficits as defined above and maximize level of functional independent mobility and consistency  From PT/mobility standpoint, recommendation at time of d/c would be STR pending progress in order to facilitate return to PLOF  Barriers to Discharge: Inaccessible home environment     PT Discharge Recommendation: [address]     PT - OK to Discharge: Yes(when medically cleared; if to STR)    See flowsheet documentation for full assessment

## 2021-03-17 NOTE — PHYSICAL THERAPY NOTE
Physical Therapy Evaluation     Patient's Name: Mansi Henderson    Admitting Diagnosis  Knee injury [S89 90XA]    Problem List  Patient Active Problem List   Diagnosis    Increased severity of headaches    Chest pain    Anxiety and depression    Chronic back pain    Tobacco abuse disorder    Neuropathy    Positive cardiac stress test    Abdominal pain    COPD (chronic obstructive pulmonary disease) (RUST 75 )    Transaminitis    Essential hypertension    Lumbosacral radiculopathy at L4    Angina, class III (HCC)    Mixed hyperlipidemia    Lung nodules    Rheumatoid arthritis involving multiple sites ([place] [address] )    New onset a-fib ([place] [address] )    Hypokalemia    Alcohol abuse    Esophagitis    Candidal stomatitis    Epigastric pain    Right leg swelling    Acute pain of right knee    Contusion of left hip    Low back pain       Past Medical History  Past Medical History:   Diagnosis Date    Alcohol abuse 12/31/2018    Anxiety     Bipolar 1 disorder ([place] [address] )     Mental Health problems listed as Denied in [employer], cant't entire under pertinent negatives due to this diagnosis    Chronic back pain     COPD (chronic obstructive pulmonary disease) ([place] [address] )     Frequent headaches     Glaucoma     Hyperlipidemia     Irregular heart beat     AFib    Ovarian cancer ([place] [address] )     last assessed - 21Sep2016    Psychiatric disorder     bipolar    Shortness of breath     Uterine carcinoma ([place] [address] )     last assessed - [id number]       Past Surgical History  Past Surgical History:   Procedure Laterality Date    ADENOIDECTOMY      Tonsillectomy with Adenoidectomy - last assessed - [id number]    APPENDECTOMY      last assessed - [id number]    CATARACT EXTRACTION Left     Remove cataract, corneo-scleral section of left eye; last assessed - 21Sep2016    CHOLECYSTECTOMY      last assessed - [id number]   [place] EYE SURGERY      Anterior sclera fistulization for glaucoma; last assessed - [id number]    HYSTERECTOMY      KRYSTA-BSO; last assessed - 21Sep2016    INTRAOCULAR LENS INSERTION      CO TEMPORAL ARTERY LIGATN OR BX Right 12/16/2016    Procedure: BIOPSY ARTERY TEMPORAL;  Surgeon: Marnie Wang MD;  Location: [facility];  Service: General    TONSILLECTOMY      Tonsillectomy with Adenoidectomy - last assessed - 21Sep2016 03/17/21 1604   PT Last Visit   PT Visit Date 03/17/21   Note Type   Note type Evaluation   Pain Assessment   Pain Assessment Tool 0-10   Pain Score 6   Pain Location/Orientation Orientation: Right;Location: Knee   Hospital Pain Intervention(s) Repositioned   Multiple Pain Sites No   Home Living   Type of [address] One level;Performs ADLs on one level; Able to live on main level with bedroom/bathroom;Stairs to enter with rails  (3 PILLO)   Bathroom Shower/Tub Tub/shower unit   Bathroom Toilet Standard   Bathroom Equipment Shower chair   [address] [facility]  (rollator)   Prior Function   Level of [place] Independent with ADLs and functional mobility   Lives With Spouse   Receives Help From Family   ADL Assistance Independent   IADLs Needs assistance   Falls in the last 6 months 5 to 10  ("about 10")   Vocational Retired   Restrictions/Precautions   [place] Bearing Precautions Per Order Yes   RLE [place] Bearing Per Order NWB  (per communication with Dr Maxime Flores)   Braces or Orthoses Other (Comment)  (Knee Immobilizer )   Other Precautions WBS; Fall Risk;Pain   General   Family/Caregiver Present Yes   Cognition   Overall Cognitive Status WFL   Arousal/Participation Cooperative   Orientation Level Oriented X4   Memory Within functional limits   Following Commands Follows all commands and directions without difficulty   Comments patient agreeable to PT eval   RUE Assessment   RUE Assessment WFL  (based on functional assessment)   LUE Assessment   LUE Assessment WFL  (based on functional assessment)   RLE Assessment   RLE Assessment X   Strength RLE   R Hip Flexion 3/5  (based on functional assessment)   LLE Assessment   LLE Assessment WFL  (grossly assessed with functional mobility: at least 4/5)   Coordination   Movements are Fluid and Coordinated 1   Sensation X   Light Touch   RLE Light Touch Impaired   RLE Light Touch Comments patient reports "mild numbness to my foot"  (history of neuropathy (Chronic) per chart review)   LLE Light Touch Grossly intact   Bed Mobility   Supine to Sit 4  Minimal assistance   Additional items Assist x 1; Increased time required;Verbal cues;LE management;HOB elevated   Sit to Supine 4  Minimal assistance   Additional items Assist x 1; Increased time required;Verbal cues;LE management;HOB elevated   Transfers   Sit to Stand 4  Minimal assistance   Additional items Assist x 1; Increased time required;Verbal cues   Stand to Sit 4  Minimal assistance   Additional items Assist x 1; Increased time required;Verbal cues   Additional Comments Provided patient education to maintain NWB % of the time; noted non-compliance upon initiating functional transfers and level surface ambulation despite education   Ambulation/Elevation   Gait pattern Excessively slow;Decreased foot clearance   Gait Assistance 4  Minimal assist   Additional items Assist x 1;Verbal cues   Assistive Device Rolling walker   Distance 10 ft  Stair Management Assistance Not tested   Balance   Static Sitting Fair +   Dynamic Sitting Fair   Static Standing Fair -   Dynamic Standing Poor +   Ambulatory Poor +   Endurance Deficit   Endurance Deficit Yes   Activity Tolerance   Activity Tolerance Patient limited by fatigue;Patient limited by pain   Medical Staff Made Aware SYLVESTER Galvez made aware of session outcomes/recommendations; spoke with Dr Shemar Peters who reported maintain NWB RLE with knee immobilizer   Nurse Made Aware RN made aware of session outcomes   Assessment   Prognosis Good   Problem List Decreased strength;Decreased endurance; Impaired balance;Decreased mobility;Orthopedic restrictions;Pain;Decreased range of motion; Impaired sensation   Assessment Pt is 56 y o  female seen for PT evaluation s/p admit to [facility] on 3/17/2021 s/p fall  PT consulted to assess pt's functional mobility and d/c needs  Order placed for PT eval and tx, w/ NWB R LE (per communication with Dr Holly Aquino) order  Performed at least 2 patient identifiers during session: Name and   Comorbidities affecting pt's physical performance at time of assessment include: Anxiety and depression, COPD, Neuropathy (Chronic), Rheumatoid arthritis involving multiple sites  PTA, pt was independent w/ all functional mobility w/ RW (as needed), ambulates community distances and elevations, has 3 PILLO, lives w/ spouse in one level apartment and retired  Personal factors affecting pt at time of IE include: ambulating w/ assistive device, stairs to enter home, inability to navigate community distances, inability to navigate level surfaces w/o external assistance, unable to perform dynamic tasks in community, positive fall history, limited insight into impairments, inability to perform IADLs and inability to perform ADLs  Please find objective findings from PT assessment regarding body systems outlined above with impairments and limitations including weakness, decreased ROM, impaired balance, decreased endurance, gait deviations, pain, decreased activity tolerance, decreased functional mobility tolerance, altered sensation, fall risk and orthopedic restrictions  The following objective measures performed on IE also reveal limitations: Barthel Index: 55/100 and Modified Muriel: 4 (moderate/severe disability)   Pt's clinical presentation is currently unstable/unpredictable seen in pt's presentation of ongoing medical management/monitoring, evident in need for assist w/ all phases of mobility when usually mobilizing independently, pain impacting overall mobility status and need for input for mobility technique/safety  Pt to benefit from continued PT tx to address deficits as defined above and maximize level of functional independent mobility and consistency  From PT/mobility standpoint, recommendation at time of d/c would be STR pending progress in order to facilitate return to PLOF  Barriers to Discharge Inaccessible home environment   Goals   Patient Goals to return home   STG Expiration Date 03/27/21   Short Term Goal #1 In 7-10 days: Increase L LE strength 1/2 grade to facilitate independent mobility, Perform all bed mobility tasks modified independent to decrease caregiver burden, Perform all transfers modified independent maintaining NWB % of the time to improve independence, Ambulate > 15 ft  with RW modified independent w/o LOB and maintaining NWB % of the time, Navigate 3 stairs modified independent with bilateral handrails to facilitate return to previous living environment and Increase all balance 1 grade to decrease risk for falls   PT Treatment Day 0   Plan   Treatment/Interventions Functional transfer training;LE strengthening/ROM; Elevations; Therapeutic exercise; Endurance training;Patient/family training;Equipment eval/education; Bed mobility;Gait training;Spoke to MD;Spoke to nursing;Spoke to advanced practitioner;Family   PT Frequency 5x/wk   Recommendation   PT Discharge Recommendation Post-Acute Rehabilitation Services   Equipment Recommended [address] [facility] Recommended Wheeled walker   Change/add to [employer]?  No   PT - OK to Discharge Yes  (when medically cleared; if to STR)   AM-PAC Basic Mobility Inpatient   Turning in Bed Without Bedrails 3   Lying on Back to Sitting on Edge of Flat Bed 3   Moving Bed to Chair 3   Standing Up From Chair 3   Walk in Room 3   Climb 3-5 Stairs 2   Basic Mobility Inpatient Raw Score 17   Basic Mobility Standardized Score 39 67   Modified Yell Scale   Modified Muriel Scale 4   Barthel Index   Feeding 10   Bathing 0   Grooming Score 5   Dressing Score 5   Bladder Score 10   Bowels Score 10   Toilet Use Score 5   Transfers (Bed/Chair) Score 10   Mobility (Level Surface) Score 0   Stairs Score 0   Barthel Index Score 55         Radha Galindoo, PT, DPT

## 2021-03-17 NOTE — ED PROVIDER NOTES
History  Chief Complaint   Patient presents with    Fall     pt c/o fall after her right knee "giving out"  denies any head injury or LOC  pt denies any thinners     MB is a 56year old female with PMH of anxiety, bipolar, back pain, ovarian cancer, uterine cancer hyperlipidemia, alcohol abuse, and COPD who presents to emergency department right knee pain after a fall today  The patient states that on 3/9 she was seen after fall resulting in right knee pain as well as low back pain  No acute abnormalities found that time, patient is discharged to control medications  The patient states that she has had a subsequent fall because really gave out  She she fell on her right knee in brace herself further upper extremities  She did not hit her head, neck pain, no back pain, no headache, no loss of consciousness, blood thinners  The patient denies any weakness, numbness or paresthesias, changes in color than the leg  The patient states she has not been able to ambulate on the leg since her fall today  She denies any headache, syncope, chest pain, shortness of breath, palpitations, abdominal pain, nausea or vomiting, diarrhea, or left lower extremity pain  History provided by:  Patient   used: No    Knee Pain  Location:  Knee  Time since incident:  1 hour  Injury: yes    Mechanism of injury: fall    Fall:     Fall occurred:  Standing    Impact surface:  Hard floor    Point of impact:  Knees    Entrapped after fall: no    Knee location:  R knee  Pain details:     Quality:  Sharp    Radiates to:  Does not radiate    Severity:  Severe    Onset quality:  Sudden    Duration:  1 hour    Timing:  Constant    Progression:  Worsening  Chronicity:  New  Dislocation: no    Foreign body present:  No foreign bodies  Relieved by:  Nothing  Worsened by:   Activity  Ineffective treatments:  None tried  Associated symptoms: decreased ROM    Associated symptoms: no back pain, no fatigue, no fever, no itching, no muscle weakness, no neck pain, no numbness, no stiffness, no swelling and no tingling    Risk factors: no concern for non-accidental trauma, no frequent fractures, no known bone disorder, no obesity and no recent illness        Prior to Admission Medications   Prescriptions Last Dose Informant Patient Reported? Taking? albuterol (2 5 mg/3 mL) 0 083 % nebulizer solution Past Week at Unknown time  No Yes   Sig: inhale contents of 1 vial in nebulizer every 6 hours if needed for wheezing shortness of breath   atorvastatin (LIPITOR) 40 mg tablet Past Week at Unknown time  No Yes   Sig: take 1 tablet by mouth once daily   citalopram (CELEXA) 40 mg tablet 3/16/2021 at Unknown time Self Yes Yes   Sig: Take 40 mg by mouth daily     clonazePAM (KlonoPIN) 1 mg tablet Past Week at Unknown time Self Yes Yes   Si mg 2 (two) times a day as needed for anxiety     cyclobenzaprine (FLEXERIL) 10 mg tablet More than a month at Unknown time  No No   Sig: Take 1 tablet (10 mg total) by mouth 2 (two) times a day as needed for muscle spasms   Patient not taking: Reported on 3/9/2021   cyclobenzaprine (FLEXERIL) 5 mg tablet More than a month at Unknown time  Yes No   Sig: TAKE 1-2 TABLETS (5-10 MG TOTAL) BY MOUTH 3 (THREE) TIMES A DAY AS NEEDED FOR MUSCLE SPASMS     dicyclomine (BENTYL) 10 mg capsule Not Taking at Unknown time  No No   Sig: Take 1 capsule (10 mg total) by mouth 3 (three) times a day before meals   Patient not taking: Reported on 3/9/2021   dicyclomine (BENTYL) 20 mg tablet Not Taking at Unknown time  No No   Sig: Take 1 tablet (20 mg total) by mouth every 8 (eight) hours as needed (abdominal pain)   Patient not taking: Reported on 2020   famotidine (PEPCID) 20 mg tablet 3/16/2021 at Unknown time  No Yes   Sig: take 2 tablets by mouth once daily   famotidine (PEPCID) 40 MG tablet 3/16/2021 at Unknown time  No Yes   Sig: Take 1 tablet (40 mg total) by mouth daily   gabapentin (NEURONTIN) 800 mg tablet 3/16/2021 at Unknown time Self Yes Yes   Sig: Take 800 mg by mouth 3 (three) times a day   ibuprofen (MOTRIN) 800 mg tablet Past Week at Unknown time  No Yes   Sig: Take 1 tablet (800 mg total) by mouth 3 (three) times a day   metoprolol tartrate (LOPRESSOR) 50 mg tablet 3/16/2021 at Unknown time  No Yes   Sig: Take 1 tablet (50 mg total) by mouth every 12 (twelve) hours   mirtazapine (REMERON) 30 mg tablet 3/16/2021 at Unknown time Self Yes Yes   Sig: Take 30 mg by mouth daily at bedtime     naproxen (NAPROSYN) 500 mg tablet Not Taking at Unknown time  Yes No   Sig: TAKE 1 TABLET EVERY 12 HOURS AS NEEDED FOR MILD PAIN (PAIN SCORE 1-3)  TAKE WITH MEALS     nicotine polacrilex (NICORETTE) 2 mg gum Not Taking at Unknown time  No No   Sig: Chew 1 each (2 mg total) as needed for smoking cessation   Patient not taking: Reported on 4/30/2020   nitroglycerin (NITROSTAT) 0 4 mg SL tablet Not Taking at Unknown time  Yes No   Sig: Place under the tongue   nystatin (MYCOSTATIN) 500,000 units/5 mL suspension Not Taking at Unknown time  No No   Sig: Apply 5 mL (500,000 Units total) to the mouth or throat 4 (four) times a day   Patient not taking: Reported on 4/30/2020   ondansetron (ZOFRAN) 4 mg tablet Not Taking at Unknown time  Yes No   Sig: Take 4 mg by mouth every 8 (eight) hours as needed    oxyCODONE-acetaminophen (PERCOCET) 5-325 mg per tablet Not Taking at Unknown time  Yes No   oxyCODONE-acetaminophen (PERCOCET) 5-325 mg per tablet Not Taking at Unknown time  No No   Sig: Take 1 tablet by mouth every 8 (eight) hours as needed for moderate pain or severe pain for up to 10 daysMax Daily Amount: 3 tablets   Patient not taking: Reported on 3/17/2021   pantoprazole (PROTONIX) 40 mg tablet Not Taking at Unknown time  No No   Sig: Take 1 tablet (40 mg total) by mouth every morning   Patient not taking: Reported on 3/9/2021   promethazine-codeine (PHENERGAN WITH CODEINE) 6 25-10 mg/5 mL syrup Not Taking at Unknown time  No No Sig: Take 5 mL by mouth every 4 (four) hours as needed for cough   Patient not taking: Reported on 3/9/2021   triamcinolone (KENALOG) 0 5 % cream Not Taking at Unknown time  Yes No   Sig: Triamcinolone Acetonide 0 5 % External Cream  APPLY 2-3 TIMES DAILY TO AFFECTED AREA(S)     Quantity: 1;  Refills: 3      Norris Knight ;  Start 5-Oct-2017  Active  15 GM Tube      Facility-Administered Medications: None       Past Medical History:   Diagnosis Date    Alcohol abuse 12/31/2018    Anxiety     Bipolar 1 disorder ([facility] )     Mental Health problems listed as Denied in Allscripts, cant't entire under pertinent negatives due to this diagnosis    Chronic back pain     COPD (chronic obstructive pulmonary disease) ([facility] )     Frequent headaches     Glaucoma     Hyperlipidemia     Irregular heart beat     AFib    Ovarian cancer ([facility] )     last assessed - 21Sep2016    Psychiatric disorder     bipolar    Shortness of breath     Uterine carcinoma ([facility] )     last assessed - 21Sep2016       Past Surgical History:   Procedure Laterality Date    ADENOIDECTOMY      Tonsillectomy with Adenoidectomy - last assessed - 21Sep2016    APPENDECTOMY      last assessed - 21Sep2016    CATARACT EXTRACTION Left     Remove cataract, corneo-scleral section of left eye; last assessed - 21Sep2016    CHOLECYSTECTOMY      last assessed - 29Sep2016   Herlinda Splinter EYE SURGERY      Anterior sclera fistulization for glaucoma; last assessed - 21Sep2016    HYSTERECTOMY      KRYSTA-BSO; last assessed - 21Sep2016    INTRAOCULAR LENS INSERTION      IN TEMPORAL ARTERY LIGATN OR BX Right 12/16/2016    Procedure: BIOPSY ARTERY TEMPORAL;  Surgeon: Francisco Moore MD;  Location: [facility];  Service: General    TONSILLECTOMY      Tonsillectomy with Adenoidectomy - last assessed - 21Sep2016       Family History   Problem Relation Age of Onset    Heart disease Mother     Coronary artery disease Mother     Other Mother         1) Gangrene; 2) Peripheral arterial disease    Hyperlipidemia Mother         Hypercholesterolemia    Stomach cancer Mother     Heart attack Mother         Myocardial Infarction    Other Father         1) Gangrene; 2) Peripheral arterial disease    Hyperlipidemia Father         Hypercholesterolemia    Stomach cancer Father     Heart attack Father         Myocardial Infarction    Stomach cancer Brother     Heart attack Brother         Myocardial Infarction    Stomach cancer Maternal Uncle      I have reviewed and agree with the history as documented  E-Cigarette/Vaping    E-Cigarette Use Never User      E-Cigarette/Vaping Substances    Nicotine No     THC No     CBD No     Flavoring No     Other No     Unknown No      Social History     Tobacco Use    Smoking status: Current Every Day Smoker     Packs/day: 0 25     Years: 46 00     Pack years: 11 50     Types: Cigarettes    Smokeless tobacco: Never Used   Substance Use Topics    Alcohol use: Not Currently     Comment: occasional; (No alcohol use per Allscripts)    Drug use: No       Review of Systems   Constitutional: Positive for activity change  Negative for appetite change, chills, diaphoresis, fatigue and fever  HENT: Negative for congestion, drooling, ear discharge, hearing loss, rhinorrhea, sinus pain, sore throat, tinnitus and trouble swallowing  Respiratory: Negative for apnea, cough, choking, chest tightness, shortness of breath, wheezing and stridor  Cardiovascular: Negative for chest pain, palpitations and leg swelling  Gastrointestinal: Negative for abdominal pain, diarrhea, nausea and vomiting  Genitourinary: Negative for dysuria, frequency and urgency  Musculoskeletal: Positive for gait problem  Negative for arthralgias, back pain, joint swelling, myalgias, neck pain, neck stiffness and stiffness  Skin: Negative for color change, itching, pallor, rash and wound     Neurological: Negative for dizziness, tremors, seizures, syncope, weakness, numbness and headaches  Hematological: Negative for adenopathy  Does not bruise/bleed easily  Psychiatric/Behavioral: Negative for agitation and behavioral problems  All other systems reviewed and are negative  Physical Exam  Physical Exam  Vitals signs and nursing note reviewed  Constitutional:       General: She is not in acute distress  Appearance: Normal appearance  She is normal weight  She is not ill-appearing or toxic-appearing  HENT:      Head: Normocephalic and atraumatic  Eyes:      Pupils: Pupils are equal, round, and reactive to light  Neck:      Musculoskeletal: Normal range of motion and neck supple  No neck rigidity or muscular tenderness  Cardiovascular:      Rate and Rhythm: Normal rate and regular rhythm  Pulses: Normal pulses  Heart sounds: Normal heart sounds  No murmur  No friction rub  No gallop  Pulmonary:      Effort: Pulmonary effort is normal  No respiratory distress  Breath sounds: Normal breath sounds  No stridor  No wheezing, rhonchi or rales  Chest:      Chest wall: No tenderness  Abdominal:      General: Abdomen is flat  Bowel sounds are normal  There is no distension  Palpations: Abdomen is soft  There is no mass  Tenderness: There is no abdominal tenderness  There is no guarding or rebound  Hernia: No hernia is present  Musculoskeletal:      Right knee: She exhibits MCL laxity  She exhibits normal range of motion, no swelling, no effusion, no ecchymosis, no deformity, no laceration, no erythema, normal alignment, no LCL laxity, normal patellar mobility, no bony tenderness and normal meniscus  Tenderness found  Medial joint line and lateral joint line tenderness noted  Legs:    Lymphadenopathy:      Cervical: No cervical adenopathy  Skin:     General: Skin is warm and dry  Capillary Refill: Capillary refill takes less than 2 seconds  Coloration: Skin is not jaundiced or pale        Findings: No bruising or erythema  Neurological:      General: No focal deficit present  Mental Status: She is alert and oriented to person, place, and time  Mental status is at baseline  Cranial Nerves: No cranial nerve deficit  Sensory: No sensory deficit  Motor: No weakness        Coordination: Coordination normal    Psychiatric:         Mood and Affect: Mood normal          Behavior: Behavior normal          Vital Signs  ED Triage Vitals   Temperature Pulse Respirations Blood Pressure SpO2   03/17/21 1329 03/17/21 1329 03/17/21 1329 03/17/21 1329 03/17/21 1329   98 °F (36 7 °C) 87 18 132/73 94 %      Temp Source Heart Rate Source Patient Position - Orthostatic VS BP Location FiO2 (%)   03/17/21 1329 03/17/21 1329 03/17/21 1329 03/17/21 1329 --   Oral Monitor Sitting Left arm       Pain Score       03/17/21 1452       Worst Possible Pain           Vitals:    03/17/21 1329 03/17/21 1547 03/17/21 1832   BP: 132/73 134/73 148/80   Pulse: 87 72 89   Patient Position - Orthostatic VS: Sitting Sitting          Visual Acuity  Visual Acuity      Most Recent Value   L Pupil Size (mm)  2   R Pupil Size (mm)  2          ED Medications  Medications   albuterol inhalation solution 2 5 mg (has no administration in time range)   atorvastatin (LIPITOR) tablet 40 mg (has no administration in time range)   citalopram (CeleXA) tablet 40 mg (has no administration in time range)   clonazePAM (KlonoPIN) tablet 0 5 mg (has no administration in time range)   cyclobenzaprine (FLEXERIL) tablet 10 mg (10 mg Oral Given 3/17/21 2121)   famotidine (PEPCID) tablet 20 mg (has no administration in time range)   metoprolol tartrate (LOPRESSOR) tablet 50 mg (50 mg Oral Given 3/17/21 1934)   mirtazapine (REMERON) tablet 30 mg (30 mg Oral Given 3/17/21 2121)   acetaminophen (TYLENOL) tablet 650 mg (650 mg Oral Given 3/17/21 2122)   nicotine (NICODERM CQ) 14 mg/24hr TD 24 hr patch 1 patch (has no administration in time range)   enoxaparin (LOVENOX) subcutaneous injection 40 mg (has no administration in time range)   ceFAZolin (ANCEF) IVPB (premix in dextrose) 1,000 mg 50 mL (1,000 mg Intravenous New Bag 3/17/21 1935)   HYDROmorphone (DILAUDID) injection 0 5 mg (0 5 mg Intravenous Given 3/17/21 1935)   oxyCODONE (ROXICODONE) IR tablet 5 mg (5 mg Oral Given 3/17/21 2121)   morphine injection 2 mg (2 mg Intravenous Given 3/17/21 1452)   morphine (PF) 4 mg/mL injection 4 mg (4 mg Intravenous Given 3/17/21 1601)   morphine injection 2 mg (2 mg Intravenous Given 3/17/21 1741)       Diagnostic Studies  Results Reviewed     Procedure Component Value Units Date/Time    Blood culture [[id number]] Collected: 03/17/21 2007    Lab Status: In process Specimen: Blood from Arm, Right Updated: 03/17/21 2012    Blood culture [[id number]] Collected: 03/17/21 2000    Lab Status: In process Specimen: Blood from Arm, Left Updated: 03/17/21 2010    Procalcitonin with AM Reflex [[id number]] Collected: 03/17/21 2007    Lab Status: In process Specimen: Blood from Arm, Right Updated: 03/17/21 2010                 XR knee 4+ views Right injury   ED Interpretation by Lillie Serrano PA-C (03/17 1614)   Medial cortical defect present on previous x-ray  Final Result by Deedee Li DO (03/17 1653)      No acute osseous abnormality  Workstation performed: [id number]                    Procedures  Procedures         ED Course  ED Course as of Mar 17 2302   Wed Mar 17, 2021   1404 Patient seen and examined  Subsequent fall after 3/9 encounter, no head strike, no neck or back pain  Will obtain x-ray and pain control  Likely need PT eval        1452 Patient reassessed, pain improved, will follow-up x-ray  36 Reached out to PT to assess the patient for disposition recommendations  Bhavesh 1923 PT recommending not stable for discharge secondary to not tolerating limited weight bearing status, multiple falls, and challenging home environment   [facility] contacted for admission  5850 Patient admitted to [facility]            MDM  Number of Diagnoses or Management Options  Acute pain of right knee: new and requires workup  Diagnosis management comments: Patient was seen and examined by me and Dr Judge Mcclain in the emergency department  The patient presented with right knee pain after a fall this afternoon  The patient was previously seen emergency department after a fall resulting in right knee pain and low back pain resulting in an x-ray of the right knee as well as CT of the lumbar spine revealing no acute osseous abnormalities  The patient states her pain continued and she had an episode where her right knee gave out causing her to fall  Patient denies any history, blood thinners, head neck or back pain  Patient is neuro intact, regular rate rhythm, no rigidity cardiac auscultation bilaterally, no adventitious breath sounds, abdominal exam soft, nontender, nondistended, bowel sounds present  Right lower extremity neurovascularly intact, sensation and strength intact, full range of motion of the right knee without any obvious deformities, contusions, ecchymoses  Patient states she has pain with any range of motion as well as pain on the medial aspect of the right knee  Laxity in the medial collateral ligament on exam     Initial differential includes but is not limited to:  Fracture, sprain, strain, ligamentous injury, joint effusion    Workup: Will obtain x-ray of the right knee and control pain with morphine  PT to evaluate and assess for ability to discharge home- not recommended, will admit    Disposition:Admit to [facility] for pain control and assessment of rehab services    PT-eval and treat         Amount and/or Complexity of Data Reviewed  Tests in the radiology section of CPT®: ordered and reviewed  Tests in the medicine section of CPT®: ordered and reviewed  Review and summarize past medical records: yes  Independent visualization of images, tracings, or specimens: yes    Risk of Complications, Morbidity, and/or Mortality  Presenting problems: low  Diagnostic procedures: low  Management options: low    Patient Progress  Patient progress: stable      Disposition  Final diagnoses:   Acute pain of right knee     Time reflects when diagnosis was documented in both MDM as applicable and the Disposition within this note     Time User Action Codes Description Comment    3/17/2021  5:36 PM Colletta Sierra Add [[id number]] Acute pain of right knee       ED Disposition     ED Disposition Condition Date/Time Comment    Admit Stable Wed Mar 17, 2021  5:36 PM Case was discussed with Dr Sanaz Sadler and the patient's admission status was agreed to be Admission Status: observation status to the service of Dr Sanaz Sadler   Follow-up Information    None         Current Discharge Medication List      CONTINUE these medications which have NOT CHANGED    Details   albuterol (2 5 mg/3 mL) 0 083 % nebulizer solution inhale contents of 1 vial in nebulizer every 6 hours if needed for wheezing shortness of breath  Qty: 150 mL, Refills: 0    Associated Diagnoses: Chronic obstructive pulmonary disease, unspecified COPD type ([facility] );  Rheumatoid arthritis involving multiple sites, unspecified rheumatoid factor presence      atorvastatin (LIPITOR) 40 mg tablet take 1 tablet by mouth once daily  Qty: 90 tablet, Refills: 0    Associated Diagnoses: Essential hypertension      citalopram (CELEXA) 40 mg tablet Take 40 mg by mouth daily        clonazePAM (KlonoPIN) 1 mg tablet 1 mg 2 (two) times a day as needed for anxiety    Refills: 0      !! famotidine (PEPCID) 20 mg tablet take 2 tablets by mouth once daily  Qty: 90 tablet, Refills: 2    Associated Diagnoses: Esophagitis      !! famotidine (PEPCID) 40 MG tablet Take 1 tablet (40 mg total) by mouth daily  Qty: 90 tablet, Refills: 3    Associated Diagnoses: Chronic GERD      gabapentin (NEURONTIN) 800 mg tablet Take 800 mg by mouth 3 (three) times a day ibuprofen (MOTRIN) 800 mg tablet Take 1 tablet (800 mg total) by mouth 3 (three) times a day  Qty: 21 tablet, Refills: 0    Associated Diagnoses: Colitis      metoprolol tartrate (LOPRESSOR) 50 mg tablet Take 1 tablet (50 mg total) by mouth every 12 (twelve) hours  Qty: 60 tablet, Refills: 11    Associated Diagnoses: Essential hypertension      mirtazapine (REMERON) 30 mg tablet Take 30 mg by mouth daily at bedtime        !! cyclobenzaprine (FLEXERIL) 10 mg tablet Take 1 tablet (10 mg total) by mouth 2 (two) times a day as needed for muscle spasms  Qty: 20 tablet, Refills: 0    Associated Diagnoses: Low back pain      !! cyclobenzaprine (FLEXERIL) 5 mg tablet TAKE 1-2 TABLETS (5-10 MG TOTAL) BY MOUTH 3 (THREE) TIMES A DAY AS NEEDED FOR MUSCLE SPASMS  Refills: 0      dicyclomine (BENTYL) 10 mg capsule Take 1 capsule (10 mg total) by mouth 3 (three) times a day before meals  Qty: 90 capsule, Refills: 0    Associated Diagnoses: Epigastric pain      dicyclomine (BENTYL) 20 mg tablet Take 1 tablet (20 mg total) by mouth every 8 (eight) hours as needed (abdominal pain)  Qty: 12 tablet, Refills: 0    Associated Diagnoses: Acute bilateral lower abdominal pain      naproxen (NAPROSYN) 500 mg tablet TAKE 1 TABLET EVERY 12 HOURS AS NEEDED FOR MILD PAIN (PAIN SCORE 1-3)  TAKE WITH MEALS  Refills: 0      nicotine polacrilex (NICORETTE) 2 mg gum Chew 1 each (2 mg total) as needed for smoking cessation  Qty: 100 each, Refills: 0    Associated Diagnoses: Tobacco abuse      nitroglycerin (NITROSTAT) 0 4 mg SL tablet Place under the tongue      nystatin (MYCOSTATIN) 500,000 units/5 mL suspension Apply 5 mL (500,000 Units total) to the mouth or throat 4 (four) times a day  Qty: 480 mL, Refills: 1    Associated Diagnoses:  Thrush      ondansetron (ZOFRAN) 4 mg tablet Take 4 mg by mouth every 8 (eight) hours as needed       !! oxyCODONE-acetaminophen (PERCOCET) 5-325 mg per tablet Refills: 0      !! oxyCODONE-acetaminophen (PERCOCET) 5-325 mg per tablet Take 1 tablet by mouth every 8 (eight) hours as needed for moderate pain or severe pain for up to 10 daysMax Daily Amount: 3 tablets  Qty: 5 tablet, Refills: 0    Associated Diagnoses: Acute exacerbation of chronic low back pain      pantoprazole (PROTONIX) 40 mg tablet Take 1 tablet (40 mg total) by mouth every morning  Qty: 90 tablet, Refills: 3    Associated Diagnoses: Epigastric pain      promethazine-codeine (PHENERGAN WITH CODEINE) 6 25-10 mg/5 mL syrup Take 5 mL by mouth every 4 (four) hours as needed for cough  Qty: 120 mL, Refills: 0    Associated Diagnoses: Cough      triamcinolone (KENALOG) 0 5 % cream Triamcinolone Acetonide 0 5 % External Cream  APPLY 2-3 TIMES DAILY TO AFFECTED AREA(S)  Quantity: 1;  Refills: 3      Norris Knight ;  Start 5-Oct-2017  Active  15 GM Tube       !! - Potential duplicate medications found  Please discuss with provider  No discharge procedures on file      PDMP Review       Value Time User    PDMP Reviewed  Yes 3/17/2021  6:14 PM Michelle Parekh MD          ED Provider  Electronically Signed by           Lillie Serrano PA-C  03/17/21 5940

## 2021-03-18 ENCOUNTER — APPOINTMENT (OUTPATIENT)
Dept: CT IMAGING | Facility: HOSPITAL | Age: 61
DRG: 342 | End: 2021-03-18
Payer: COMMERCIAL

## 2021-03-18 LAB
ANION GAP SERPL CALCULATED.3IONS-SCNC: 8 MMOL/L (ref 4–13)
BASOPHILS # BLD AUTO: 0.03 THOUSANDS/ΜL (ref 0–0.1)
BASOPHILS NFR BLD AUTO: 1 % (ref 0–1)
BUN SERPL-MCNC: 13 MG/DL (ref 5–25)
CALCIUM SERPL-MCNC: 8.4 MG/DL (ref 8.3–10.1)
CHLORIDE SERPL-SCNC: 108 MMOL/L (ref 100–108)
CO2 SERPL-SCNC: 25 MMOL/L (ref 21–32)
CREAT SERPL-MCNC: 0.82 MG/DL (ref 0.6–1.3)
EOSINOPHIL # BLD AUTO: 0.12 THOUSAND/ΜL (ref 0–0.61)
EOSINOPHIL NFR BLD AUTO: 2 % (ref 0–6)
ERYTHROCYTE [DISTWIDTH] IN BLOOD BY AUTOMATED COUNT: 13.5 % (ref 11.6–15.1)
GFR SERPL CREATININE-BSD FRML MDRD: 77 ML/MIN/1.73SQ M
GLUCOSE P FAST SERPL-MCNC: 88 MG/DL (ref 65–99)
GLUCOSE SERPL-MCNC: 88 MG/DL (ref 65–140)
HCT VFR BLD AUTO: 42.4 % (ref 34.8–46.1)
HGB BLD-MCNC: 14.3 G/DL (ref 11.5–15.4)
IMM GRANULOCYTES # BLD AUTO: 0.02 THOUSAND/UL (ref 0–0.2)
IMM GRANULOCYTES NFR BLD AUTO: 0 % (ref 0–2)
LYMPHOCYTES # BLD AUTO: 1.83 THOUSANDS/ΜL (ref 0.6–4.47)
LYMPHOCYTES NFR BLD AUTO: 30 % (ref 14–44)
MCH RBC QN AUTO: 31.6 PG (ref 26.8–34.3)
MCHC RBC AUTO-ENTMCNC: 33.7 G/DL (ref 31.4–37.4)
MCV RBC AUTO: 94 FL (ref 82–98)
MONOCYTES # BLD AUTO: 0.39 THOUSAND/ΜL (ref 0.17–1.22)
MONOCYTES NFR BLD AUTO: 6 % (ref 4–12)
NEUTROPHILS # BLD AUTO: 3.82 THOUSANDS/ΜL (ref 1.85–7.62)
NEUTS SEG NFR BLD AUTO: 61 % (ref 43–75)
NRBC BLD AUTO-RTO: 0 /100 WBCS
PLATELET # BLD AUTO: 156 THOUSANDS/UL (ref 149–390)
PMV BLD AUTO: 9.3 FL (ref 8.9–12.7)
POTASSIUM SERPL-SCNC: 3.7 MMOL/L (ref 3.5–5.3)
PROCALCITONIN SERPL-MCNC: <0.05 NG/ML
PROCALCITONIN SERPL-MCNC: <0.05 NG/ML
RBC # BLD AUTO: 4.53 MILLION/UL (ref 3.81–5.12)
SODIUM SERPL-SCNC: 141 MMOL/L (ref 136–145)
WBC # BLD AUTO: 6.21 THOUSAND/UL (ref 4.31–10.16)

## 2021-03-18 PROCEDURE — 99244 OFF/OP CNSLTJ NEW/EST MOD 40: CPT | Performed by: ORTHOPAEDIC SURGERY

## 2021-03-18 PROCEDURE — 84145 PROCALCITONIN (PCT): CPT | Performed by: INTERNAL MEDICINE

## 2021-03-18 PROCEDURE — 85025 COMPLETE CBC W/AUTO DIFF WBC: CPT | Performed by: INTERNAL MEDICINE

## 2021-03-18 PROCEDURE — 80048 BASIC METABOLIC PNL TOTAL CA: CPT | Performed by: INTERNAL MEDICINE

## 2021-03-18 PROCEDURE — 73700 CT LOWER EXTREMITY W/O DYE: CPT

## 2021-03-18 PROCEDURE — 99225 PR SBSQ OBSERVATION CARE/DAY 25 MINUTES: CPT | Performed by: NURSE PRACTITIONER

## 2021-03-18 RX ORDER — HYDROMORPHONE HCL/PF 1 MG/ML
0.5 SYRINGE (ML) INJECTION EVERY 4 HOURS PRN
Status: DISCONTINUED | OUTPATIENT
Start: 2021-03-18 | End: 2021-03-19

## 2021-03-18 RX ORDER — OXYCODONE HYDROCHLORIDE 5 MG/1
7.5 TABLET ORAL EVERY 4 HOURS PRN
Status: DISCONTINUED | OUTPATIENT
Start: 2021-03-18 | End: 2021-03-19

## 2021-03-18 RX ORDER — ACETAMINOPHEN 325 MG/1
975 TABLET ORAL EVERY 8 HOURS SCHEDULED
Status: DISCONTINUED | OUTPATIENT
Start: 2021-03-18 | End: 2021-03-20 | Stop reason: HOSPADM

## 2021-03-18 RX ADMIN — CYCLOBENZAPRINE HYDROCHLORIDE 10 MG: 10 TABLET, FILM COATED ORAL at 11:48

## 2021-03-18 RX ADMIN — HYDROMORPHONE HYDROCHLORIDE 0.5 MG: 1 INJECTION, SOLUTION INTRAMUSCULAR; INTRAVENOUS; SUBCUTANEOUS at 13:15

## 2021-03-18 RX ADMIN — FAMOTIDINE 20 MG: 20 TABLET ORAL at 09:08

## 2021-03-18 RX ADMIN — OXYCODONE HYDROCHLORIDE 5 MG: 5 TABLET ORAL at 07:51

## 2021-03-18 RX ADMIN — MIRTAZAPINE 30 MG: 15 TABLET, FILM COATED ORAL at 21:47

## 2021-03-18 RX ADMIN — CEFAZOLIN SODIUM 1000 MG: 1 SOLUTION INTRAVENOUS at 09:16

## 2021-03-18 RX ADMIN — OXYCODONE HYDROCHLORIDE 7.5 MG: 5 TABLET ORAL at 21:47

## 2021-03-18 RX ADMIN — ENOXAPARIN SODIUM 40 MG: 40 INJECTION SUBCUTANEOUS at 09:10

## 2021-03-18 RX ADMIN — METOPROLOL TARTRATE 50 MG: 50 TABLET, FILM COATED ORAL at 17:43

## 2021-03-18 RX ADMIN — ACETAMINOPHEN 975 MG: 325 TABLET ORAL at 16:33

## 2021-03-18 RX ADMIN — CEFAZOLIN SODIUM 1000 MG: 1 SOLUTION INTRAVENOUS at 17:43

## 2021-03-18 RX ADMIN — METOPROLOL TARTRATE 50 MG: 50 TABLET, FILM COATED ORAL at 06:01

## 2021-03-18 RX ADMIN — CITALOPRAM HYDROBROMIDE 40 MG: 20 TABLET ORAL at 09:09

## 2021-03-18 RX ADMIN — HYDROMORPHONE HYDROCHLORIDE 0.5 MG: 1 INJECTION, SOLUTION INTRAMUSCULAR; INTRAVENOUS; SUBCUTANEOUS at 17:42

## 2021-03-18 RX ADMIN — ATORVASTATIN CALCIUM 40 MG: 40 TABLET, FILM COATED ORAL at 21:48

## 2021-03-18 RX ADMIN — CEFAZOLIN SODIUM 1000 MG: 1 SOLUTION INTRAVENOUS at 02:12

## 2021-03-18 RX ADMIN — HYDROMORPHONE HYDROCHLORIDE 0.5 MG: 1 INJECTION, SOLUTION INTRAMUSCULAR; INTRAVENOUS; SUBCUTANEOUS at 03:47

## 2021-03-18 RX ADMIN — HYDROMORPHONE HYDROCHLORIDE 0.5 MG: 1 INJECTION, SOLUTION INTRAMUSCULAR; INTRAVENOUS; SUBCUTANEOUS at 09:08

## 2021-03-18 RX ADMIN — ACETAMINOPHEN 975 MG: 325 TABLET ORAL at 21:48

## 2021-03-18 RX ADMIN — HYDROMORPHONE HYDROCHLORIDE 0.5 MG: 1 INJECTION, SOLUTION INTRAMUSCULAR; INTRAVENOUS; SUBCUTANEOUS at 22:34

## 2021-03-18 NOTE — ASSESSMENT & PLAN NOTE
Patient having right knee worsening pain over last few months  Her knee gives out off and on  Unable to walk    In the emergency room x-ray did not show any bony abnormality  On exam warm to touch and mildly swollen  Immobilizer placed by ED  Ortho evaluated-Will order MRI of the right knee  to rule out occult fracture in the knee vs soft tissue injury  Continue with pain control as per primary team   Continue with knee immobilizer at all times except for shower, sleep and while in bed  Will consider Aspiration pending patient's symptoms  Patient declined when offered today  Ortho will follow     Blood cultures pending  Continue Ancef  PT/OT

## 2021-03-18 NOTE — CASE MANAGEMENT
CM spoke to pt and  Anthony Tobin [phone] at bedside  Both decline therapy recommendations of inpatient rehab  Pt wants REGI  Referral in Allscripts  Await intake determination  A post acute care recommendation was made by your care team for [facility]  Discussed Freedom of Choice with both patient and caregiver  List of agencies given to both patient and caregiver via in person  both patient and caregiver aware the list is custom filtered for them by preference  and that [facility] post acute providers are designated

## 2021-03-18 NOTE — UTILIZATION REVIEW
Initial Clinical Review    Admission: Date/Time/Statement:   Admission Orders (From admission, onward)     Ordered        03/17/21 1736  Place in Observation  Once                   Orders Placed This Encounter   Procedures    Place in Observation     Standing Status:   Standing     Number of Occurrences:   1     Order Specific Question:   Level of Care     Answer:   Med Surg [16]     ED Arrival Information     Expected Arrival Acuity Means of Arrival Escorted By Service Admission Type    - 3/17/2021 13:08 Urgent Walk-In Family Member Hospitalist Urgent    Arrival Complaint    Knee injury (fall)        Chief Complaint   Patient presents with   [facility] Fall     pt c/o fall after her right knee "giving out"  denies any head injury or LOC  pt denies any thinners     Assessment/Plan:  56 y o  female who presents with right knee pain  Pain is 10/10 intensity  Her pain started over a month now  No trauma  knee is warm to touch, also mild swollen  Immobilizer placed, unable to walk   Admitted OBS status suspicious for septic joint , f/u BC , consult ortho for aspiration , pain control   Cont meds for HTN and COPD      3/18 Ortho Consult   probable meniscal tear, cannot rule out occult fracture at this time, pain control , PT OT , DVT prophy   3/18 IM Note  order MRI of the right knee  to rule out occult fracture in the knee vs soft tissue injury   BC pending , cont ancef , PT OT     ED Triage Vitals   Temperature Pulse Respirations Blood Pressure SpO2   03/17/21 1329 03/17/21 1329 03/17/21 1329 03/17/21 1329 03/17/21 1329   98 °F (36 7 °C) 87 18 132/73 94 %      Temp Source Heart Rate Source Patient Position - Orthostatic VS BP Location FiO2 (%)   03/17/21 1329 03/17/21 1329 03/17/21 1329 03/17/21 1329 --   Oral Monitor Sitting Left arm       Pain Score       03/17/21 1452       Worst Possible Pain          Wt Readings from Last 1 Encounters:   03/17/21 68 kg (150 lb)     Additional Vital Signs:   03/18/21 06:00:22  -- 77  --  115/71  86  92 %  --  --   03/17/21 23:21:11  98 2 °F (36 8 °C)  85  18  143/82  102  93 %  --  --   03/17/21 1934  --  --  --  --  --  --  None (Room air)  --   03/17/21 18:32:07  98 2 °F (36 8 °C)  89  16  148/80  103  92 %  --  --   03/17/21 1547  --  72  18  134/73  --  99 %  None (Room air         Pertinent Labs/Diagnostic Test Results:   3/17 R knee xray No acute osseous abnormality  3/18 CT RLE     Results from last 7 days   Lab Units 03/18/21  0450 03/17/21 2007   WBC Thousand/uL 6 21  --    HEMOGLOBIN g/dL 14 3  --    HEMATOCRIT % 42 4  --    PLATELETS Thousands/uL 156 146*   NEUTROS ABS Thousands/µL 3 82  --      Results from last 7 days   Lab Units 03/18/21  0450   SODIUM mmol/L 141   POTASSIUM mmol/L 3 7   CHLORIDE mmol/L 108   CO2 mmol/L 25   ANION GAP mmol/L 8   BUN mg/dL 13   CREATININE mg/dL 0 82   EGFR ml/min/1 73sq m 77   CALCIUM mg/dL 8 4     Results from last 7 days   Lab Units 03/18/21  0450   GLUCOSE RANDOM mg/dL 88     Results from last 7 days   Lab Units 03/17/21 2007   PROCALCITONIN ng/ml <0 05       Results from last 7 days   Lab Units 03/17/21 2007 03/17/21 2000   BLOOD CULTURE  Received in Microbiology Lab  Culture in Progress  Received in Microbiology Lab  Culture in Progress       ED Treatment:   Medication Administration from 03/17/2021 1308 to 03/17/2021 1825       Date/Time Order Dose Route Action Action by Comments     03/17/2021 1452 morphine injection 2 mg 2 mg Intravenous Given Manuel Newton RN      03/17/2021 1601 morphine (PF) 4 mg/mL injection 4 mg 4 mg Intravenous Given Ahsan Romeo RN      03/17/2021 1741 morphine injection 2 mg 2 mg Intravenous Given Manuel Newton RN         Past Medical History:   Diagnosis Date    Alcohol abuse 12/31/2018    Anxiety     Bipolar 1 disorder ([address] )     Mental Health problems listed as Denied in Allscripts, cant't entire under pertinent negatives due to this diagnosis    Chronic back pain     COPD [facility]chronic obstructive pulmonary disease) ([facility] )     Frequent headaches     Glaucoma     Hyperlipidemia     Irregular heart beat     AFib    Ovarian cancer ([place] [address] )     last assessed - [id number]    Psychiatric disorder     bipolar    Shortness of breath     Uterine carcinoma ([place] [address] )     last assessed - [id number]     Present on Admission:   Anxiety and depression   Tobacco abuse disorder   COPD (chronic obstructive pulmonary disease) ([place] [address] )   Essential hypertension   Acute pain of right knee   Rheumatoid arthritis involving multiple sites [facility])      Admitting Diagnosis: Knee injury [S89 90XA]  Acute pain of right knee [M25 561]  Age/Sex: 56 y o  female  Admission Orders:  Scheduled Medications:  atorvastatin, 40 mg, Oral, Daily  cefazolin, 1,000 mg, Intravenous, Q8H  citalopram, 40 mg, Oral, Daily  enoxaparin, 40 mg, Subcutaneous, Daily  famotidine, 20 mg, Oral, Daily  metoprolol tartrate, 50 mg, Oral, Q12H  mirtazapine, 30 mg, Oral, HS  nicotine, 1 patch, Transdermal, Daily      Continuous IV Infusions:     PRN Meds:  acetaminophen, 650 mg, Oral, Q6H PRN  albuterol, 2 5 mg, Nebulization, Q6H PRN  clonazePAM, 0 5 mg, Oral, BID PRN  cyclobenzaprine, 10 mg, Oral, BID PRN  HYDROmorphone, 0 5 mg, Intravenous, Q4H PRN  3/18  x1  oxyCODONE, 5 mg, Oral, Q6H PRN        IP CONSULT TO ORTHOPEDIC SURGERY    Network Utilization Review Department  ATTENTION: Please call with any questions or concerns to [phone] and carefully listen to the prompts so that you are directed to the right person  All voicemails are confidential   Tahira Gali all requests for admission clinical reviews, approved or denied determinations and any other requests to dedicated fax number below belonging to the campus where the patient is receiving treatment   List of dedicated fax numbers for the Facilities:  [address] DENIALS (Administrative/Medical Necessity) [phone]   [address]  (Maternity/NICU/Pediatrics) [phone]     [address]  [facility] [address] [facility] [address]) [address] [place] [address] [facility] [phone]   [address] [phone]

## 2021-03-18 NOTE — PLAN OF CARE
Problem: Potential for Falls  Goal: Patient will remain free of falls  Description: INTERVENTIONS:  - Assess patient frequently for physical needs  -  Identify cognitive and physical deficits and behaviors that affect risk of falls    -  [place] fall precautions as indicated by assessment   - Educate patient/family on patient safety including physical limitations  - Instruct patient to call for assistance with activity based on assessment  - Modify environment to reduce risk of injury  - Consider OT/PT consult to assist with strengthening/mobility  Outcome: Progressing     Problem: PAIN - ADULT  Goal: Verbalizes/displays adequate comfort level or baseline comfort level  Description: Interventions:  - Encourage patient to monitor pain and request assistance  - Assess pain using appropriate pain scale  - Administer analgesics based on type and severity of pain and evaluate response  - Implement non-pharmacological measures as appropriate and evaluate response  - Consider cultural and social influences on pain and pain management  - Notify physician/advanced practitioner if interventions unsuccessful or patient reports new pain  Outcome: Progressing     Problem: INFECTION - ADULT  Goal: Absence or prevention of progression during hospitalization  Description: INTERVENTIONS:  - Assess and monitor for signs and symptoms of infection  - Monitor lab/diagnostic results  - Monitor all insertion sites, i e  indwelling lines, tubes, and drains  - Monitor endotracheal if appropriate and nasal secretions for changes in amount and color  - [place] appropriate cooling/warming therapies per order  - Administer medications as ordered  - Instruct and encourage patient and family to use good hand hygiene technique  - Identify and instruct in appropriate isolation precautions for identified infection/condition  Outcome: Progressing     Problem: SAFETY ADULT  Goal: Patient will remain free of falls  Description: INTERVENTIONS:  - Assess patient frequently for physical needs  -  Identify cognitive and physical deficits and behaviors that affect risk of falls    -  [place] fall precautions as indicated by assessment   - Educate patient/family on patient safety including physical limitations  - Instruct patient to call for assistance with activity based on assessment  - Modify environment to reduce risk of injury  - Consider OT/PT consult to assist with strengthening/mobility  Outcome: Progressing  Goal: Maintain or return to baseline ADL function  Description: INTERVENTIONS:  -  Assess patient's ability to carry out ADLs; assess patient's baseline for ADL function and identify physical deficits which impact ability to perform ADLs (bathing, care of mouth/teeth, toileting, grooming, dressing, etc )  - Assess/evaluate cause of self-care deficits   - Assess range of motion  - Assess patient's mobility; develop plan if impaired  - Assess patient's need for assistive devices and provide as appropriate  - Encourage maximum independence but intervene and supervise when necessary  - Involve family in performance of ADLs  - Assess for home care needs following discharge   - Consider OT consult to assist with ADL evaluation and planning for discharge  - Provide patient education as appropriate  Outcome: Progressing  Goal: Maintain or return mobility status to optimal level  Description: INTERVENTIONS:  - Assess patient's baseline mobility status (ambulation, transfers, stairs, etc )    - Identify cognitive and physical deficits and behaviors that affect mobility  - Identify mobility aids required to assist with transfers and/or ambulation (gait belt, sit-to-stand, lift, walker, cane, etc )  - [place] fall precautions as indicated by assessment  - Record patient progress and toleration of activity level on Mobility SBAR; progress patient to next Phase/Stage  - Instruct patient to call for assistance with activity based on assessment  - Consider rehabilitation consult to assist with strengthening/weightbearing, etc   Outcome: Progressing     Problem: DISCHARGE PLANNING  Goal: Discharge to home or other facility with appropriate resources  Description: INTERVENTIONS:  - Identify barriers to discharge w/patient and caregiver  - Arrange for needed discharge resources and transportation as appropriate  - Identify discharge learning needs (meds, wound care, etc )  - Arrange for interpretive services to assist at discharge as needed  - Refer to Case Management Department for coordinating discharge planning if the patient needs post-hospital services based on physician/advanced practitioner order or complex needs related to functional status, cognitive ability, or social support system  Outcome: Progressing     Problem: Knowledge Deficit  Goal: Patient/family/caregiver demonstrates understanding of disease process, treatment plan, medications, and discharge instructions  Description: Complete learning assessment and assess knowledge base    Interventions:  - Provide teaching at level of understanding  - Provide teaching via preferred learning methods  Outcome: Progressing     Problem: MUSCULOSKELETAL - ADULT  Goal: Maintain or return mobility to safest level of function  Description: INTERVENTIONS:  - Assess patient's ability to carry out ADLs; assess patient's baseline for ADL function and identify physical deficits which impact ability to perform ADLs (bathing, care of mouth/teeth, toileting, grooming, dressing, etc )  - Assess/evaluate cause of self-care deficits   - Assess range of motion  - Assess patient's mobility  - Assess patient's need for assistive devices and provide as appropriate  - Encourage maximum independence but intervene and supervise when necessary  - Involve family in performance of ADLs  - Assess for home care needs following discharge   - Consider OT consult to assist with ADL evaluation and planning for discharge  - Provide patient education as appropriate  Outcome: Progressing  Goal: Maintain proper alignment of affected body part  Description: INTERVENTIONS:  - Support, maintain and protect limb and body alignment  - Provide patient/ family with appropriate education  Outcome: Progressing

## 2021-03-18 NOTE — PHYSICAL THERAPY NOTE
Physical Therapy Cancellation Note    Chart review performed  At this time, PT treatment cancelled due to pending CT right lower extremity  PT will follow and provide interventions as appropriate pending results of study         03/18/21 1431   PT Last Visit   PT Visit Date 03/18/21   Note Type   Note Type Treatment   Cancel Reasons Medical status     Judge Lombard, PT, DPT

## 2021-03-18 NOTE — PROGRESS NOTES
[facility]  Progress Note - Kirk Junior 1960, 56 y o  female MRN: [id number]  Unit/Bed#: -01 Encounter: [id number]  Primary Care Provider: Elsa Lomeli MD   Date and time admitted to hospital: 3/17/2021  2:02 PM    * Acute pain of right knee  Assessment & Plan  Patient having right knee worsening pain over last few months  Her knee gives out off and on  Unable to walk    In the emergency room x-ray did not show any bony abnormality  On exam warm to touch and mildly swollen  Immobilizer placed by ED  Ortho evaluated-Will order MRI of the right knee  to rule out occult fracture in the knee vs soft tissue injury  Continue with pain control as per primary team   Continue with knee immobilizer at all times except for shower, sleep and while in bed  Will consider Aspiration pending patient's symptoms  Patient declined when offered today  Ortho will follow  Blood cultures pending  Continue Ancef  PT/OT      Rheumatoid arthritis involving multiple sites [facility])  Assessment & Plan  Not on any medication  Involving multiple joints  Essential hypertension  Assessment & Plan  Continue metoprolol  Monitor blood pressure    COPD (chronic obstructive pulmonary disease) (HCC)  Assessment & Plan  Currently stable  Continue albuterol p r n  Tobacco abuse disorder  Assessment & Plan  Smoking cessation counseling given  Nicotine patch    Anxiety and depression  Assessment & Plan  Continue home medication including Celexa  Remeron         VTE Pharmacologic Prophylaxis:   Pharmacologic: Enoxaparin (Lovenox)  Mechanical VTE Prophylaxis in Place: Yes    Patient Centered Rounds: I have performed bedside rounds with nursing staff today      Discussions with Specialists or Other Care Team Provider:  Reviewed H&P reviewed ortho notes discussed with case management primary RN    Education and Discussions with Family / Patient:  Discussed plan of care with patient denies any additional questions or concerns at this time   at bedside    Time Spent for Care: 30 minutes  More than 50% of total time spent on counseling and coordination of care as described above  Current Length of Stay: 0 day(s)    Current Patient Status: Observation   Certification Statement: The patient will continue to require additional inpatient hospital stay due to PT OT evaluation, pain control MRI    Discharge Plan / Estimated Discharge Date:  24 hours      Code Status: Level 1 - Full Code      Subjective:   Denies any chest pain chest tightness shortness of breath or difficulty breathing  She is still complaining of significant right knee pain Oxy helps but does not make it go away entirely  She still has significant discomfort with rest and ambulation she does have a right eye stent from 2009 and is unsure of anything else related to this stent regarding its MRI safety she is agreeable to the CT scan in lieu of this and we will move forward with that together additional information for plan of care    Objective:     Vitals:   Temp (24hrs), Av °F (36 7 °C), Min:97 6 °F (36 4 °C), Max:98 2 °F (36 8 °C)    Temp:  [97 6 °F (36 4 °C)-98 2 °F (36 8 °C)] 97 6 °F (36 4 °C)  HR:  [72-89] 79  Resp:  [16-18] 18  BP: (115-148)/(71-97) 131/97  SpO2:  [92 %-99 %] 92 %  Body mass index is 26 57 kg/m²  Input and Output Summary (last 24 hours): Intake/Output Summary (Last 24 hours) at 3/18/2021 1140  Last data filed at 3/18/2021 0829  Gross per 24 hour   Intake 120 ml   Output --   Net 120 ml       Physical Exam:     Physical Exam  Vitals signs and nursing note reviewed  Constitutional:       General: She is not in acute distress  Appearance: Normal appearance  She is ill-appearing  HENT:      Head: Normocephalic  Cardiovascular:      Rate and Rhythm: Normal rate  Pulses: Normal pulses  Pulmonary:      Effort: Pulmonary effort is normal    Abdominal:      Palpations: Abdomen is soft  Musculoskeletal:         General: Swelling and tenderness present  Skin:     General: Skin is warm and dry  Neurological:      General: No focal deficit present  Mental Status: She is alert  Mental status is at baseline  Psychiatric:         Mood and Affect: Mood normal          Thought Content: Thought content normal          Judgment: Judgment normal        Additional Data:     Labs:    Results from last 7 days   Lab Units 03/18/21  0450   WBC Thousand/uL 6 21   HEMOGLOBIN g/dL 14 3   HEMATOCRIT % 42 4   PLATELETS Thousands/uL 156   NEUTROS PCT % 61   LYMPHS PCT % 30   MONOS PCT % 6   EOS PCT % 2     Results from last 7 days   Lab Units 03/18/21  0450   POTASSIUM mmol/L 3 7   CHLORIDE mmol/L 108   CO2 mmol/L 25   BUN mg/dL 13   CREATININE mg/dL 0 82   CALCIUM mg/dL 8 4           * I Have Reviewed All Lab Data Listed Above  * Additional Pertinent Lab Tests Reviewed: [address] Admission Reviewed      Recent Cultures (last 7 days):     Results from last 7 days   Lab Units 03/17/21 2007 03/17/21 2000   BLOOD CULTURE  Received in Microbiology Lab  Culture in Progress  Received in Microbiology Lab  Culture in Progress         Last 24 Hours Medication List:   Current Facility-Administered Medications   Medication Dose Route Frequency Provider Last Rate    acetaminophen  650 mg Oral Q6H PRN Merlyn Fuentes MD      albuterol  2 5 mg Nebulization Q6H PRN Merlyn Fuentes MD      atorvastatin  40 mg Oral Daily Merlyn Fuentes MD      cefazolin  1,000 mg Intravenous Q8H Merlyn Fuentes MD 1,000 mg (03/18/21 0916)    citalopram  40 mg Oral Daily Merlyn Fuentes MD      clonazePAM  0 5 mg Oral BID PRN Merlyn Fuentes MD      cyclobenzaprine  10 mg Oral BID PRN Merlyn Fuentes MD      enoxaparin  40 mg Subcutaneous Daily Merlyn Fuentes MD      famotidine  20 mg Oral Daily Merlyn Fuentes MD      HYDROmorphone  0 5 mg Intravenous Q4H PRN MD Teja Singletarych metoprolol tartrate  50 mg Oral Q12H Earl Soto MD      mirtazapine  30 mg Oral HS Earl Soto MD      nicotine  1 patch Transdermal Daily Earl Soto MD      oxyCODONE  5 mg Oral Q6H PRN Earl Soto MD          Today, Patient Was Seen By: ROMEO Vasquez    ** Please Note: Dragon 360 Dictation voice to text software may have been used in the creation of this document   **

## 2021-03-18 NOTE — CASE MANAGEMENT
CM noted [facility] accept pt for services upon d/c via Allscripts  AVS updated  Pt and  misti aware of same

## 2021-03-18 NOTE — CONSULTS
Orthopedics   Pepe Biswas 56 y o  female MRN: [id number]  Unit/Bed#: -01      Chief Complaint:   Right knee pain    HPI:  56 y o  female complaining of right knee pain  Patient states that her symptoms have been ongoing for the past 24 hours in duration  Patient states she was walking and states that the knee gave out  Patient states that she fell straight for landing on her face  Patient states that she did not strike her knee  Patient states she is taking the ER were x-rays taken which were negative for any type of acute osseous abnormality present  Patient states that her pain is anterior medial and becomes worse with direct contact  Patient states she has been unable to put any weight on the leg since the injury  Patient was placed in a knee immobilizer which she has been maintaining regularly  Patient has been receiving morphine as needed for pain  Patient does admit to having a history of right knee pain dating back approximately 1 year ago  Patient states that she was jumping over a wall and landed on the right knee which resulted in the knee giving out  Patient states she never saw anyone for the pain in knee  Patient denies any calf pain  Patient denies any numbness tingling in the leg  Patient denies any dizziness lightheadedness  Patient offers no other complaints at this time      Review Of Systems:   · Skin: Normal  · Neuro: See HPI  · Musculoskeletal: See HPI  · 14 point review of systems negative except as stated above     Past Medical History:   Past Medical History:   Diagnosis Date    Alcohol abuse 12/31/2018    Anxiety     Bipolar 1 disorder ([facility] )     Mental Health problems listed as Denied in Allscripts, cant't entire under pertinent negatives due to this diagnosis    Chronic back pain     COPD (chronic obstructive pulmonary disease) [facility]     Frequent headaches     Glaucoma     Hyperlipidemia     Irregular heart beat     AFib    Ovarian cancer ([facility] ) last assessed - 21Sep2016    Psychiatric disorder     bipolar    Shortness of breath     Uterine carcinoma ([address] )     last assessed - 21Sep2016       Past Surgical History:   Past Surgical History:   Procedure Laterality Date    ADENOIDECTOMY      Tonsillectomy with Adenoidectomy - last assessed - 21Sep2016    APPENDECTOMY      last assessed - 21Sep2016    CATARACT EXTRACTION Left     Remove cataract, corneo-scleral section of left eye; last assessed - 21Sep2016    CHOLECYSTECTOMY      last assessed - 29Sep2016   [facility] EYE SURGERY      Anterior sclera fistulization for glaucoma; last assessed - 21Sep2016    HYSTERECTOMY      KRYSTA-BSO; last assessed - 21Sep2016    INTRAOCULAR LENS INSERTION      [place] TEMPORAL ARTERY LIGATN OR BX Right 12/16/2016    Procedure: BIOPSY ARTERY TEMPORAL;  Surgeon: Aba Lowery MD;  Location: [facility];  Service: General    TONSILLECTOMY      Tonsillectomy with Adenoidectomy - last assessed - 21Sep2016       Family History:  Family history reviewed and non-contributory  Family History   Problem Relation Age of Onset    Heart disease Mother     Coronary artery disease Mother     Other Mother         1) Gangrene; 2) Peripheral arterial disease    Hyperlipidemia Mother         Hypercholesterolemia    Stomach cancer Mother     Heart attack Mother         Myocardial Infarction    Other Father         1) Gangrene; 2) Peripheral arterial disease    Hyperlipidemia Father         Hypercholesterolemia    Stomach cancer Father     Heart attack Father         Myocardial Infarction    Stomach cancer Brother     Heart attack Brother         Myocardial Infarction    Stomach cancer Maternal Uncle        Social History:  Social History     Socioeconomic History    Marital status: /Civil Union     Spouse name: None    Number of children: None    Years of education: None    Highest education level: None   Occupational History    Occupation: Disabled   Social Needs    Financial resource strain: None    Food insecurity     Worry: None     Inability: None    Transportation needs     Medical: None     Non-medical: None   Tobacco Use    Smoking status: Current Every Day Smoker     Packs/day: 0 25     Years: 46 00     Pack years: 11 50     Types: Cigarettes    Smokeless tobacco: Never Used   Substance and Sexual Activity    Alcohol use: Not Currently     Comment: occasional; (No alcohol use per Allscripts)    Drug use: No    Sexual activity: Not Currently   Lifestyle    Physical activity     Days per week: 5 days     Minutes per session: 60 min    Stress: Rather much   Relationships    Social connections     Talks on phone: None     Gets together: None     Attends Orthodox service: None     Active member of club or organization: None     Attends meetings of clubs or organizations: None     Relationship status: None    Intimate partner violence     Fear of current or ex partner: None     Emotionally abused: None     Physically abused: None     Forced sexual activity: None   Other Topics Concern    None   Social History Narrative    None       Allergies:    Allergies   Allergen Reactions    Aspirin Anaphylaxis and Other (See Comments)    Bee Venom Anaphylaxis    Robaxin [Methocarbamol] Anaphylaxis and Seizures     Seizures per pt       Tramadol Hives, Shortness Of Breath and Other (See Comments)     Other reaction(s): Nausea and/or vomiting  Pt received morphine IV 7-6-18    Ketorolac     Nitroglycerin Other (See Comments)     Patient states"she gets headaches from nitro"    Omeprazole GI Intolerance     Other reaction(s): Nausea and/or vomiting           Labs:  0   Lab Value Date/Time    HCT 42 4 03/18/2021 0450    HCT 44 0 10/13/2020 1036    HCT 44 2 02/15/2020 2156    HGB 14 3 03/18/2021 0450    HGB 14 7 10/13/2020 1036    HGB 15 0 02/15/2020 2156    INR 1 01 01/30/2019 1408    WBC 6 21 03/18/2021 0450    WBC 6 14 10/13/2020 1036    WBC 7 14 02/15/2020 2156    ESR 29 (H) 08/05/2019 1243    CRP 9 0 (H) 07/07/2018 1205       Meds:    Current Facility-Administered Medications:     acetaminophen (TYLENOL) tablet 650 mg, 650 mg, Oral, Q6H PRN, Noni Davis MD, 650 mg at 03/17/21 2122    albuterol inhalation solution 2 5 mg, 2 5 mg, Nebulization, Q6H PRN, Noni Davis MD    atorvastatin (LIPITOR) tablet 40 mg, 40 mg, Oral, Daily, Noni Davis MD    ceFAZolin (ANCEF) IVPB (premix in dextrose) 1,000 mg 50 mL, 1,000 mg, Intravenous, Q8H, Noni Davis MD, Last Rate: 100 mL/hr at 03/18/21 0212, 1,000 mg at 03/18/21 0212    citalopram (CeleXA) tablet 40 mg, 40 mg, Oral, Daily, Noni Davis MD    clonazePAM (KlonoPIN) tablet 0 5 mg, 0 5 mg, Oral, BID PRN, Noni Davis MD    cyclobenzaprine (FLEXERIL) tablet 10 mg, 10 mg, Oral, BID PRN, Noni Davis MD, 10 mg at 03/17/21 2121    enoxaparin (LOVENOX) subcutaneous injection 40 mg, 40 mg, Subcutaneous, Daily, Noni Davis MD    famotidine (PEPCID) tablet 20 mg, 20 mg, Oral, Daily, Noni Davis MD    HYDROmorphone (DILAUDID) injection 0 5 mg, 0 5 mg, Intravenous, Q4H PRN, Noni Davis MD, 0 5 mg at 03/18/21 0347    metoprolol tartrate (LOPRESSOR) tablet 50 mg, 50 mg, Oral, Q12H, Noni Davis MD, 50 mg at 03/18/21 0601    mirtazapine (REMERON) tablet 30 mg, 30 mg, Oral, HS, Noni Davis MD, 30 mg at 03/17/21 2121    nicotine (NICODERM CQ) 14 mg/24hr TD 24 hr patch 1 patch, 1 patch, Transdermal, Daily, Noni Davis MD    oxyCODONE (ROXICODONE) IR tablet 5 mg, 5 mg, Oral, Q6H PRN, Noni Davis MD, 5 mg at 03/18/21 0751    Blood Culture:   Lab Results   Component Value Date    BLOODCX Received in Microbiology Lab  Culture in Progress  03/17/2021       Wound Culture:   No results found for: WOUNDCULT    Ins and Outs:  No intake/output data recorded            Physical Exam:   /71   Pulse 77   Temp 98 2 °F (36 8 °C)   Resp 18   Ht 5' 3" (1 6 m)   Wt 68 kg (150 lb)   LMP  (LMP Unknown)   SpO2 92%   BMI 26 57 kg/m²   Gen: Alert and oriented to person, place, time  HEENT: EOMI, eyes clear, moist mucus membranes, hearing intact  Respiratory: Bilateral chest rise  No audible wheezing found  Cardiovascular: Regular Rate and Rhythm  Abdomen: soft nontender/nondistended    Musculoskeletal: right lower extremity  · Patient is resting comfortably in bed  · Skin  :  No erythema or ecchymosis present in the right knee  Trace effusion present with palpation  Extremity appears to be well perfused overall  · TTP :  Tenderness to palpation noted over the patella and medial joint line  No other bony or soft tissue tenderness palpation noted at this time  · SILT s/s/sp/dp/t  · Motor intact 5/5 strength with hip flexion/extension, ankle dorsi/plantar flexion, EHL/FHL  · Ligamentously intact for MCL and LCL  ACL and PCL unable to be obtained secondary to patient's extreme pain  Able to perform straight leg raise without pain  · 2+ DP pulse    Tertiary: no tenderness over all other joints/long bones as except already stated  Radiology:   I personally reviewed the films  Right knee x-ray three views:  No acute osseous abnormality present     _*_*_*_*_*_*_*_*_*_*_*_*_*_*_*_*_*_*_*_*_*_*_*_*_*_*_*_*_*_*_*_*_*_*_*_*_*_*_*_*_*    Assessment:  56 y  o female with right knee pain, probable meniscal tear, cannot rule out occult fracture at this time    Plan:   · NWB right lower extremity  · PT/OT for gait training, ambulation assistance  · Pain control per primary team  · DVT ppx per primary team  · Body mass index is 26 57 kg/m²  moderately obese  Recommend behavior modifications, nutrition and physical activity  · Dispo :  Ortho follow-up  Will order MRI of the right knee  to rule out occult fracture in the knee vs soft tissue injury  Continue with pain control as per primary team   Continue with knee immobilizer at all times except for shower, sleep and while in bed   Will consider Aspiration pending patient's symptoms  Patient declined when offered today  Ortho will follow       Chester Da Silva PA-C

## 2021-03-18 NOTE — CASE MANAGEMENT
CM saw pt for d/c planning  assessment  CM introduced self and reviewed CM role   Pt reported that she lives at home with her   Pt reported that she uses a rolator PRN  No dme at baseline  She stated that she is ADL independent and self sufficient  Pt reported no VNA  She said that she has a  through [employer]  She stated that she uses [facility] in [facility]  She said that she takes all her medications as prescribed  She stated that her PCP is Dr Mandy Mathew  She stated that she uses [facility]  She said that she may need assistance with transportation upon discharge       CM reviewed discharge planning process including the following: identifying help at home, patient preference for discharge planning needs, pharmacy preference, and availability of treatment team to discuss questions or concerns patient and/or family may have regarding understanding medications and recognizing signs and symptoms once discharged  CM also encouraged patient to follow up with all recommended appointments after discharge  Patient advised of importance for patient and family to participate in managing patients medical well being

## 2021-03-19 LAB
ANION GAP SERPL CALCULATED.3IONS-SCNC: 12 MMOL/L (ref 4–13)
BUN SERPL-MCNC: 15 MG/DL (ref 5–25)
CALCIUM SERPL-MCNC: 8.6 MG/DL (ref 8.3–10.1)
CHLORIDE SERPL-SCNC: 106 MMOL/L (ref 100–108)
CO2 SERPL-SCNC: 23 MMOL/L (ref 21–32)
CREAT SERPL-MCNC: 0.66 MG/DL (ref 0.6–1.3)
ERYTHROCYTE [DISTWIDTH] IN BLOOD BY AUTOMATED COUNT: 13.4 % (ref 11.6–15.1)
GFR SERPL CREATININE-BSD FRML MDRD: 95 ML/MIN/1.73SQ M
GLUCOSE SERPL-MCNC: 97 MG/DL (ref 65–140)
HCT VFR BLD AUTO: 41.3 % (ref 34.8–46.1)
HGB BLD-MCNC: 13.9 G/DL (ref 11.5–15.4)
MCH RBC QN AUTO: 32 PG (ref 26.8–34.3)
MCHC RBC AUTO-ENTMCNC: 33.7 G/DL (ref 31.4–37.4)
MCV RBC AUTO: 95 FL (ref 82–98)
PLATELET # BLD AUTO: 121 THOUSANDS/UL (ref 149–390)
PMV BLD AUTO: 9.7 FL (ref 8.9–12.7)
POTASSIUM SERPL-SCNC: 3.5 MMOL/L (ref 3.5–5.3)
RBC # BLD AUTO: 4.34 MILLION/UL (ref 3.81–5.12)
SODIUM SERPL-SCNC: 141 MMOL/L (ref 136–145)
WBC # BLD AUTO: 4.59 THOUSAND/UL (ref 4.31–10.16)

## 2021-03-19 PROCEDURE — 85027 COMPLETE CBC AUTOMATED: CPT | Performed by: NURSE PRACTITIONER

## 2021-03-19 PROCEDURE — 99231 SBSQ HOSP IP/OBS SF/LOW 25: CPT | Performed by: PHYSICIAN ASSISTANT

## 2021-03-19 PROCEDURE — 97760 ORTHOTIC MGMT&TRAING 1ST ENC: CPT

## 2021-03-19 PROCEDURE — 99232 SBSQ HOSP IP/OBS MODERATE 35: CPT | Performed by: NURSE PRACTITIONER

## 2021-03-19 PROCEDURE — 80048 BASIC METABOLIC PNL TOTAL CA: CPT | Performed by: NURSE PRACTITIONER

## 2021-03-19 RX ORDER — LIDOCAINE 50 MG/G
1 PATCH TOPICAL DAILY
Status: DISCONTINUED | OUTPATIENT
Start: 2021-03-19 | End: 2021-03-20 | Stop reason: HOSPADM

## 2021-03-19 RX ORDER — OXYCODONE HYDROCHLORIDE 10 MG/1
10 TABLET ORAL EVERY 4 HOURS PRN
Status: DISCONTINUED | OUTPATIENT
Start: 2021-03-19 | End: 2021-03-20 | Stop reason: HOSPADM

## 2021-03-19 RX ORDER — HYDROMORPHONE HCL/PF 1 MG/ML
0.5 SYRINGE (ML) INJECTION EVERY 6 HOURS PRN
Status: DISCONTINUED | OUTPATIENT
Start: 2021-03-19 | End: 2021-03-20 | Stop reason: HOSPADM

## 2021-03-19 RX ADMIN — HYDROMORPHONE HYDROCHLORIDE 0.5 MG: 1 INJECTION, SOLUTION INTRAMUSCULAR; INTRAVENOUS; SUBCUTANEOUS at 07:31

## 2021-03-19 RX ADMIN — HYDROMORPHONE HYDROCHLORIDE 0.5 MG: 1 INJECTION, SOLUTION INTRAMUSCULAR; INTRAVENOUS; SUBCUTANEOUS at 17:56

## 2021-03-19 RX ADMIN — ACETAMINOPHEN 975 MG: 325 TABLET ORAL at 22:38

## 2021-03-19 RX ADMIN — CEFAZOLIN SODIUM 1000 MG: 1 SOLUTION INTRAVENOUS at 17:56

## 2021-03-19 RX ADMIN — HYDROMORPHONE HYDROCHLORIDE 0.5 MG: 1 INJECTION, SOLUTION INTRAMUSCULAR; INTRAVENOUS; SUBCUTANEOUS at 12:15

## 2021-03-19 RX ADMIN — ACETAMINOPHEN 975 MG: 325 TABLET ORAL at 14:09

## 2021-03-19 RX ADMIN — FAMOTIDINE 20 MG: 20 TABLET ORAL at 10:16

## 2021-03-19 RX ADMIN — METOPROLOL TARTRATE 50 MG: 50 TABLET, FILM COATED ORAL at 06:20

## 2021-03-19 RX ADMIN — HYDROMORPHONE HYDROCHLORIDE 0.5 MG: 1 INJECTION, SOLUTION INTRAMUSCULAR; INTRAVENOUS; SUBCUTANEOUS at 02:41

## 2021-03-19 RX ADMIN — CITALOPRAM HYDROBROMIDE 40 MG: 20 TABLET ORAL at 10:16

## 2021-03-19 RX ADMIN — CEFAZOLIN SODIUM 1000 MG: 1 SOLUTION INTRAVENOUS at 01:26

## 2021-03-19 RX ADMIN — OXYCODONE HYDROCHLORIDE 5 MG: 5 TABLET ORAL at 14:51

## 2021-03-19 RX ADMIN — MIRTAZAPINE 30 MG: 15 TABLET, FILM COATED ORAL at 22:38

## 2021-03-19 RX ADMIN — ACETAMINOPHEN 975 MG: 325 TABLET ORAL at 06:18

## 2021-03-19 RX ADMIN — ATORVASTATIN CALCIUM 40 MG: 40 TABLET, FILM COATED ORAL at 22:39

## 2021-03-19 RX ADMIN — OXYCODONE HYDROCHLORIDE 10 MG: 10 TABLET ORAL at 20:51

## 2021-03-19 RX ADMIN — OXYCODONE HYDROCHLORIDE 5 MG: 5 TABLET ORAL at 22:53

## 2021-03-19 RX ADMIN — CEFAZOLIN SODIUM 1000 MG: 1 SOLUTION INTRAVENOUS at 10:17

## 2021-03-19 RX ADMIN — OXYCODONE HYDROCHLORIDE 5 MG: 5 TABLET ORAL at 06:19

## 2021-03-19 RX ADMIN — ENOXAPARIN SODIUM 40 MG: 40 INJECTION SUBCUTANEOUS at 10:16

## 2021-03-19 RX ADMIN — METOPROLOL TARTRATE 50 MG: 50 TABLET, FILM COATED ORAL at 17:55

## 2021-03-19 NOTE — UTILIZATION REVIEW
Notification of Inpatient Admission/Inpatient Authorization Request   This is a Notification of Inpatient Admission for [address]  Be advised that this patient was admitted to our facility under Inpatient Status  Contact Chloé Loera at [phone] for additional admission information  [facility] DEPT DEDICATED Rohit Jerry [phone]  Patient Name:   Kirk Junior   YOB: 1960       [address]   [address]:   [address]   Tax ID: [id number]  NPI: [id number] Attending Provider/NPI:  Phone:  Address: Lionel Vargas [[id number]]  [phone]  Same as MAGI/Lauro Platt 1106 of Service Code: 24     Place of Service Name:  [address] [place] [address]   Start Date: 3/19/21 1445 Discharge Date & Time: No discharge date for patient encounter  Type of Admission: Inpatient Status Discharge Disposition   (if discharged): Home/Self Care   Patient Diagnoses: Knee injury [S89 90XA]  Acute pain of right knee [M25 561]     Orders: Admission Orders (From admission, onward)     Ordered        03/19/21 1445  Inpatient Admission  Once         03/17/21 1736  Place in Observation  Once                    Assigned Utilization Review Contact: Chloé Loera  Utilization   Network Utilization Review Department  Phone: [phone]; Fax [phone]  Email: [email or web address]   ATTENTION PAYERS: Please call the assigned Utilization  directly with any questions or concerns ALL voicemails in the department are confidential  Send all requests for admission clinical reviews, approved or denied determinations and any other requests to dedicated fax number belonging to the campus where the patient is receiving treatment

## 2021-03-19 NOTE — ORTHOTIC NOTE
Orthotic Note            Date: 3/19/2021      Patient Name: Myesha Damian            Reason for Consult:  Order placed for Hinged Knee Brace per Orthopedics on 3/19/2021  WBAT RLE in Hinged Knee Brace per ortho consult  PT Metta Million present for brace fitting  "Discontinue knee immobilizer in favor of hinged knee brace  Continue weight-bearing as tolerated with walker " per orthopedics 3/19/2021  Spoke with Samy Armijo to confirm wearing schedule  Hinged Knee Brace confirmed via TT  Recommendations:  Pt sized w/ Medium Hinged Knee Brace in supine position  Pt educated on brace components, wearing schedule per ortho recommendation, and skin inspection  Educated that brace may need to be repositioned throughout the day  Pt able to verbalize understanding w/ all components  Education to Steve Anguiano on same, including wearing schedule, skin inspection as well as assessing circulation for appropriate fit of brace  Patient reporting 9-10/10 R knee pain  RN Jennifer Cueva 38  notified  Patient declining participation in PT treatment session at this time secondary to pain       Time: 14:23-14:28  15 minutes    Jazlyn Keenan, PT, DPT

## 2021-03-19 NOTE — PROGRESS NOTES
Progress Note - Orthopedics   Kirk Junior 56 y o  female MRN: [id number]  Unit/Bed#: -01      Subjective:    56 y  o female complaining of right knee pain  Patient states that her knee pain is about the same today  Patient states that she continues to have diffuse knee pain that becomes worse with ROM of the knee  Patient states that she has been compliant with non-weight bearing activities and anticoagulation  No acute events, no complaints  Pain controlled  Denies fevers chills, CP, SOB  Patient offers no other complaints at this time       Labs:  0   Lab Value Date/Time    HCT 41 3 03/19/2021 0453    HCT 42 4 03/18/2021 0450    HCT 44 0 10/13/2020 1036    HGB 13 9 03/19/2021 0453    HGB 14 3 03/18/2021 0450    HGB 14 7 10/13/2020 1036    INR 1 01 01/30/2019 1408    WBC 4 59 03/19/2021 0453    WBC 6 21 03/18/2021 0450    WBC 6 14 10/13/2020 1036    ESR 29 (H) 08/05/2019 1243    CRP 9 0 (H) 07/07/2018 1205       Meds:    Current Facility-Administered Medications:     acetaminophen (TYLENOL) tablet 975 mg, 975 mg, Oral, Q8H [facility], ROMEO Anderson, 975 mg at 03/19/21 0618    albuterol inhalation solution 2 5 mg, 2 5 mg, Nebulization, Q6H PRN, Michelle Parekh MD    atorvastatin (LIPITOR) tablet 40 mg, 40 mg, Oral, Daily, Michelle Parekh MD, 40 mg at 03/18/21 2148    ceFAZolin (ANCEF) IVPB (premix in dextrose) 1,000 mg 50 mL, 1,000 mg, Intravenous, Q8H, Michelle Parekh MD, Last Rate: 100 mL/hr at 03/19/21 0126, 1,000 mg at 03/19/21 0126    citalopram (CeleXA) tablet 40 mg, 40 mg, Oral, Daily, Michelle Parekh MD, 40 mg at 03/18/21 0909    clonazePAM (KlonoPIN) tablet 0 5 mg, 0 5 mg, Oral, BID PRN, Michelle Parekh MD    cyclobenzaprine (FLEXERIL) tablet 10 mg, 10 mg, Oral, BID PRN, Michelle Parekh MD, 10 mg at 03/18/21 1148    enoxaparin (LOVENOX) subcutaneous injection 40 mg, 40 mg, Subcutaneous, Daily, Michelle Parekh MD, 40 mg at 03/18/21 0910    famotidine (PEPCID) tablet 20 mg, 20 mg, Oral, Daily, Dorethea Dakins, MD, 20 mg at 03/18/21 0908    HYDROmorphone (DILAUDID) injection 0 5 mg, 0 5 mg, Intravenous, Q4H PRN, ROMEO Lee, 0 5 mg at 03/19/21 0731    metoprolol tartrate (LOPRESSOR) tablet 50 mg, 50 mg, Oral, Q12H, Dorethea Dakins, MD, 50 mg at 03/19/21 0620    mirtazapine (REMERON) tablet 30 mg, 30 mg, Oral, HS, Dorethea Dakins, MD, 30 mg at 03/18/21 2147    nicotine (NICODERM CQ) 14 mg/24hr TD 24 hr patch 1 patch, 1 patch, Transdermal, Daily, Dorethea Dakins, MD    oxyCODONE (ROXICODONE) IR tablet 5 mg, 5 mg, Oral, Q6H PRN, Dorethea Dakins, MD, 5 mg at 03/19/21 9273    oxyCODONE (ROXICODONE) IR tablet 7 5 mg, 7 5 mg, Oral, Q4H PRN, ROMEO Lee, 7 5 mg at 03/18/21 2147    Blood Culture:   Lab Results   Component Value Date    BLOODCX No Growth at 24 hrs  03/17/2021       Wound Culture:   No results found for: WOUNDCULT    Ins and Outs:  I/O last 24 hours: In: 240 [P O :240]  Out: 275 [Urine:275]          Physical:  Vitals:    03/19/21 0618   BP: 153/80   Pulse: 79   Resp:    Temp:    SpO2: 94%       Musculoskeletal: right Lower Extremity  · Patient is resting comfortably in bed in no acute distress   · Skin  :  No erythema or ecchymosis present in the right knee  Trace effusion present with palpation  Extremity appears to be well perfused overall  · TTP :  Tenderness to palpation noted over the patella and medial joint line  No other bony or soft tissue tenderness palpation noted at this time  · SILT s/s/sp/dp/t  · Motor intact 5/5 strength with hip flexion/extension, ankle dorsi/plantar flexion, EHL/FHL  · Ligamentously intact for MCL and LCL  ACL and PCL unable to be obtained secondary to patient's extreme pain  Able to perform straight leg raise without pain  · 2+ DP pulse      Assessment:    56 y  o female with right knee pain, probable meniscal tear, probable ligamentous injuries (ACL / MCL)       Plan:  · weight-bearing as tolerated right lower extremity  · Discontinue knee immobilizer in favor of hinged knee brace  Continue weight-bearing as tolerated with walker  · PT/OT for ambulation assistance, gait training  · Pain control per primary   · DVT ppx per primary   · Dispo:  Ortho stable  X-rays ordered for orbits to evaluate for metal foreign body in the eye  Patient ultimately will need MRI to evaluate ligamentous structures of the knee  CT shows chronic fractures of the lateral tibial plateau  Weight-bearing as tolerated in hinged knee brace  stressed importance to patient to find out what implant is in her eye to potentially receive MRI in future  Patient may follow up in the outpatient setting         Anselmo Hernandez PA-C

## 2021-03-19 NOTE — PROGRESS NOTES
[address] [facility]  Progress Note - Johnny Salcedo 1960, 56 y o  female MRN: [id number]  Unit/Bed#: -Lucho Encounter: [id number]  Primary Care Provider: Stanford Parish MD   Date and time admitted to hospital: 3/17/2021  2:02 PM    * Acute pain of right knee  Assessment & Plan  Patient having right knee worsening pain over last few months  Her knee gives out off and on  Unable to walk    In the emergency room x-ray did not show any bony abnormality  On exam warm to touch and mildly swollen  Immobilizer placed by ED  Ortho evaluated-  Initially planned for MRI however patient has some type of a stent in her eye, she is unsure if the stent is MRI safe  I have completed an extensive review of her records and I cannot find any indication of what type of stent this is states the stent was placed at [facility] in 2009  CT of the right knee ordered revealed There are several small, well-corticated, chronic fracture fragments adjacent to the posterior aspect of the lateral tibial plateau no acute fracture   Continue with knee immobilizer at all times except for shower, sleep and while in bed  Blood cultures negative for 24 hours  Continue Ancef  PT/OT recommending short-term rehab patient has been refusing      Rheumatoid arthritis involving multiple sites [facility])  Assessment & Plan  Not on any medication  Involving multiple joints  Essential hypertension  Assessment & Plan  Continue metoprolol  Monitor blood pressure    COPD (chronic obstructive pulmonary disease) (HCC)  Assessment & Plan  Currently stable  Continue albuterol p r n  Tobacco abuse disorder  Assessment & Plan  Smoking cessation counseling given  Nicotine patch    Anxiety and depression  Assessment & Plan  Continue home medication including Celexa    Remeron       VTE Pharmacologic Prophylaxis:   Pharmacologic: Enoxaparin (Lovenox)  Mechanical VTE Prophylaxis in Place: Yes    Patient Centered Rounds: I have performed bedside rounds with nursing staff today  Discussions with Specialists or Other Care Team Provider:  Discussed with Ortho case management physical therapy and primary RN    Education and Discussions with Family / Patient:  Discussed plan of care with patient  denies any additional questions or concerns at this time    Time Spent for Care: 20 minutes  More than 50% of total time spent on counseling and coordination of care as described above  Current Length of Stay: 0 day(s)    Current Patient Status: Inpatient   Certification Statement: The patient will continue to require additional inpatient hospital stay due to Pain control and attempting MRI  Orbital x-ray    Discharge Plan / Estimated Discharge Date:  Hopeful within the next 24 hours pending pain and ability to perform MRI      Code Status: Level 1 - Full Code      Subjective:   Denies any chest pain chest tightness shortness of breath or difficulty breathing  She is still complaining of significant right knee pain mildly improved from yesterday but very uncomfortable still again discussed realistic pain goals she does confirm that she has an anaphylactic allergy to aspirin    Objective:     Vitals:   Temp (24hrs), Av 1 °F (36 7 °C), Min:98 1 °F (36 7 °C), Max:98 1 °F (36 7 °C)    Temp:  [98 1 °F (36 7 °C)] 98 1 °F (36 7 °C)  HR:  [73-95] 79  BP: (120-153)/(68-92) 153/80  SpO2:  [92 %-94 %] 94 %  Body mass index is 26 57 kg/m²  Input and Output Summary (last 24 hours):     No intake or output data in the 24 hours ending 21 1600    Physical Exam:     Physical Exam  Vitals signs and nursing note reviewed  HENT:      Head: Normocephalic  Neck:      Musculoskeletal: Normal range of motion  Cardiovascular:      Rate and Rhythm: Normal rate  Pulses: Normal pulses  Heart sounds: Normal heart sounds  Pulmonary:      Breath sounds: Normal breath sounds  Musculoskeletal: Normal range of motion           General: Swelling, tenderness and signs of injury present  Skin:     General: Skin is warm  Coloration: Skin is pale  Neurological:      General: No focal deficit present  Mental Status: She is alert  Mental status is at baseline  Psychiatric:         Mood and Affect: Mood normal          Thought Content: Thought content normal          Judgment: Judgment normal        Additional Data:     Labs:    Results from last 7 days   Lab Units 03/19/21 0453 03/18/21  0450   WBC Thousand/uL 4 59 6 21   HEMOGLOBIN g/dL 13 9 14 3   HEMATOCRIT % 41 3 42 4   PLATELETS Thousands/uL 121* 156   NEUTROS PCT %  --  61   LYMPHS PCT %  --  30   MONOS PCT %  --  6   EOS PCT %  --  2     Results from last 7 days   Lab Units 03/19/21  0453   POTASSIUM mmol/L 3 5   CHLORIDE mmol/L 106   CO2 mmol/L 23   BUN mg/dL 15   CREATININE mg/dL 0 66   CALCIUM mg/dL 8 6           * I Have Reviewed All Lab Data Listed Above  * Additional Pertinent [facility] Tests [facility]ed: [facility]    Recent Cultures (last 7 days):     Results from last 7 days   Lab Units 03/17/21 2007 03/17/21 2000   BLOOD CULTURE  No Growth at 24 hrs  No Growth at 24 hrs         Last 24 Hours Medication List:   Current Facility-Administered Medications   Medication Dose Route Frequency Provider Last Rate    acetaminophen  975 mg Oral Q8H [facility] ROMEO Zapata      albuterol  2 5 mg Nebulization Q6H PRN Philippe Pettit MD      atorvastatin  40 mg Oral Daily Philippe Pettit MD      cefazolin  1,000 mg Intravenous Q8H Philippe Pettit MD 1,000 mg (03/19/21 1017)    citalopram  40 mg Oral Daily Philippe Pettit MD      clonazePAM  0 5 mg Oral BID PRN Philippe Pettit MD      cyclobenzaprine  10 mg Oral BID PRN Philippe Pettit MD      enoxaparin  40 mg Subcutaneous Daily Philippe Pettit MD      famotidine  20 mg Oral Daily Philippe Pettit MD      HYDROmorphone  0 5 mg Intravenous Q6H PRN ROMEO Reyes      lidocaine  1 patch Topical Daily ROMEO Lee      metoprolol tartrate  50 mg Oral Q12H Dorethea Dakins, MD      mirtazapine  30 mg Oral HS Dorethea Dakins, MD      nicotine  1 patch Transdermal Daily Dorethea Dakins, MD      oxyCODONE  10 mg Oral Q4H PRN ROMEO Lee      oxyCODONE  5 mg Oral Q6H PRN Dorethea Dakins, MD          Today, Patient Was Seen By: ROMEO Lee    ** Please Note: Dragon 360 Dictation voice to text software may have been used in the creation of this document   **

## 2021-03-19 NOTE — ASSESSMENT & PLAN NOTE
Patient having right knee worsening pain over last few months  Her knee gives out off and on  Unable to walk    In the emergency room x-ray did not show any bony abnormality  On exam warm to touch and mildly swollen  Immobilizer placed by ED  Ortho evaluated-  Initially planned for MRI however patient has some type of a stent in her eye, she is unsure if the stent is MRI safe  I have completed an extensive review of her records and I cannot find any indication of what type of stent this is states the stent was placed at [facility] in 2009  CT of the right knee ordered revealed There are several small, well-corticated, chronic fracture fragments adjacent to the posterior aspect of the lateral tibial plateau no acute fracture   Continue with knee immobilizer at all times except for shower, sleep and while in bed    Blood cultures negative for 24 hours  Continue Ancef  PT/OT recommending short-term rehab patient has been refusing

## 2021-03-19 NOTE — UTILIZATION REVIEW
Continued Stay Review    Date: 3/19        OBS order 3/17 @ 1736 converted to IP on 3/19 @ 1449 for continued workup of knee pain requiring IV pain meds                Level of Care Med Surg    Estimated length of stay More than 2 Midnights    Certification I certify that inpatient services are medically necessary for this patient for a duration of greater than two midnights  See H&P and MD Progress Notes for additional information about the patient's course of treatment  Current Patient Class: OBS Current Level of Care: MS    HPI:60 y o  female initially admitted on 3/17 for R knee pain , no trauma    Assessment/Plan:  Cont to require IV pain meds for control of knee pain   Plan for MRI   Xray of orbits required to MRI   3/19 Ortho Note   RLE  ttp   Cont WBAT w/ walker  PT OT , pain control ,     Pertinent Labs/Diagnostic Results:   3/19 Orbit xray - pending   3/18 CT R LE - There are several small, well-corticated, chronic fracture fragments adjacent to the posterior aspect of the lateral tibial plateau (series 238 images 42-44 )    Results from last 7 days   Lab Units 03/19/21 0453 03/18/21 0450 03/17/21 2007   WBC Thousand/uL 4 59 6 21  --    HEMOGLOBIN g/dL 13 9 14 3  --    HEMATOCRIT % 41 3 42 4  --    PLATELETS Thousands/uL 121* 156 146*   NEUTROS ABS Thousands/µL  --  3 82  --          Results from last 7 days   Lab Units 03/19/21 0453 03/18/21 0450   SODIUM mmol/L 141 141   POTASSIUM mmol/L 3 5 3 7   CHLORIDE mmol/L 106 108   CO2 mmol/L 23 25   ANION GAP mmol/L 12 8   BUN mg/dL 15 13   CREATININE mg/dL 0 66 0 82   EGFR ml/min/1 73sq m 95 77   CALCIUM mg/dL 8 6 8 4     Results from last 7 days   Lab Units 03/19/21 0453 03/18/21 0450   GLUCOSE RANDOM mg/dL 97 88     Results from last 7 days   Lab Units 03/18/21 0450 03/17/21 2007   PROCALCITONIN ng/ml <0 05 <0 05     Results from last 7 days   Lab Units 03/17/21 2007 03/17/21 2000   BLOOD CULTURE  No Growth at 24 hrs   No Growth at 24 hrs        Vital Signs:   03/19/21 06:18:27  --  79  --  153/80  104  94 %         Medications:   Scheduled Medications:  acetaminophen, 975 mg, Oral, Q8H OSCAR  atorvastatin, 40 mg, Oral, Daily  cefazolin, 1,000 mg, Intravenous, Q8H  citalopram, 40 mg, Oral, Daily  enoxaparin, 40 mg, Subcutaneous, Daily  famotidine, 20 mg, Oral, Daily  metoprolol tartrate, 50 mg, Oral, Q12H  mirtazapine, 30 mg, Oral, HS  nicotine, 1 patch, Transdermal, Daily      Continuous IV Infusions:     PRN Meds:  albuterol, 2 5 mg, Nebulization, Q6H PRN  clonazePAM, 0 5 mg, Oral, BID PRN  cyclobenzaprine, 10 mg, Oral, BID PRN  HYDROmorphone, 0 5 mg, Intravenous, Q4H PRN  3/19  x3  oxyCODONE, 5 mg, Oral, Q6H PRN  oxyCODONE, 7 5 mg, Oral, Q4H PRN        Discharge Plan: D     Network Utilization Review Department  ATTENTION: Please call with any questions or concerns to [phone] and carefully listen to the prompts so that you are directed to the right person  All voicemails are confidential   Kevon Han all requests for admission clinical reviews, approved or denied determinations and any other requests to dedicated fax number below belonging to the campus where the patient is receiving treatment   List of dedicated fax numbers for the Facilities:  [address] DENIALS (Administrative/Medical Necessity) [phone]   [address] (Maternity/NICU/Pediatrics) [phone]   [address] [address] [facility] [address] [facility] [address]) [address] [place] [address] [place] [place] [address] [phone]   [place] [address] [phone]

## 2021-03-20 VITALS
DIASTOLIC BLOOD PRESSURE: 81 MMHG | BODY MASS INDEX: 26.58 KG/M2 | HEART RATE: 84 BPM | WEIGHT: 150 LBS | HEIGHT: 63 IN | RESPIRATION RATE: 13 BRPM | SYSTOLIC BLOOD PRESSURE: 154 MMHG | OXYGEN SATURATION: 96 % | TEMPERATURE: 98.1 F

## 2021-03-20 LAB
ANION GAP SERPL CALCULATED.3IONS-SCNC: 9 MMOL/L (ref 4–13)
BUN SERPL-MCNC: 10 MG/DL (ref 5–25)
CALCIUM SERPL-MCNC: 8.4 MG/DL (ref 8.3–10.1)
CHLORIDE SERPL-SCNC: 105 MMOL/L (ref 100–108)
CO2 SERPL-SCNC: 26 MMOL/L (ref 21–32)
CREAT SERPL-MCNC: 0.5 MG/DL (ref 0.6–1.3)
ERYTHROCYTE [DISTWIDTH] IN BLOOD BY AUTOMATED COUNT: 13.2 % (ref 11.6–15.1)
GFR SERPL CREATININE-BSD FRML MDRD: 104 ML/MIN/1.73SQ M
GLUCOSE SERPL-MCNC: 91 MG/DL (ref 65–140)
HCT VFR BLD AUTO: 43.8 % (ref 34.8–46.1)
HGB BLD-MCNC: 14.6 G/DL (ref 11.5–15.4)
MCH RBC QN AUTO: 31.7 PG (ref 26.8–34.3)
MCHC RBC AUTO-ENTMCNC: 33.3 G/DL (ref 31.4–37.4)
MCV RBC AUTO: 95 FL (ref 82–98)
PLATELET # BLD AUTO: 113 THOUSANDS/UL (ref 149–390)
PMV BLD AUTO: 9.9 FL (ref 8.9–12.7)
POTASSIUM SERPL-SCNC: 3.6 MMOL/L (ref 3.5–5.3)
RBC # BLD AUTO: 4.61 MILLION/UL (ref 3.81–5.12)
SODIUM SERPL-SCNC: 140 MMOL/L (ref 136–145)
WBC # BLD AUTO: 3.22 THOUSAND/UL (ref 4.31–10.16)

## 2021-03-20 PROCEDURE — 80048 BASIC METABOLIC PNL TOTAL CA: CPT | Performed by: NURSE PRACTITIONER

## 2021-03-20 PROCEDURE — 97164 PT RE-EVAL EST PLAN CARE: CPT

## 2021-03-20 PROCEDURE — 85027 COMPLETE CBC AUTOMATED: CPT | Performed by: NURSE PRACTITIONER

## 2021-03-20 PROCEDURE — 99239 HOSP IP/OBS DSCHRG MGMT >30: CPT | Performed by: NURSE PRACTITIONER

## 2021-03-20 PROCEDURE — 97166 OT EVAL MOD COMPLEX 45 MIN: CPT

## 2021-03-20 RX ORDER — ACETAMINOPHEN 325 MG/1
975 TABLET ORAL EVERY 8 HOURS SCHEDULED
Refills: 0
Start: 2021-03-20

## 2021-03-20 RX ORDER — LIDOCAINE 50 MG/G
1 PATCH TOPICAL DAILY
Qty: 10 PATCH | Refills: 0 | Status: SHIPPED | OUTPATIENT
Start: 2021-03-21 | End: 2022-05-25

## 2021-03-20 RX ORDER — OXYCODONE HYDROCHLORIDE 10 MG/1
10 TABLET ORAL EVERY 4 HOURS PRN
Qty: 30 TABLET | Refills: 0 | Status: SHIPPED | OUTPATIENT
Start: 2021-03-20 | End: 2021-03-30

## 2021-03-20 RX ADMIN — CEFAZOLIN SODIUM 1000 MG: 1 SOLUTION INTRAVENOUS at 03:00

## 2021-03-20 RX ADMIN — HYDROMORPHONE HYDROCHLORIDE 0.5 MG: 1 INJECTION, SOLUTION INTRAMUSCULAR; INTRAVENOUS; SUBCUTANEOUS at 01:10

## 2021-03-20 RX ADMIN — OXYCODONE HYDROCHLORIDE 10 MG: 10 TABLET ORAL at 10:46

## 2021-03-20 RX ADMIN — OXYCODONE HYDROCHLORIDE 5 MG: 5 TABLET ORAL at 13:00

## 2021-03-20 RX ADMIN — ACETAMINOPHEN 975 MG: 325 TABLET ORAL at 05:58

## 2021-03-20 RX ADMIN — FAMOTIDINE 20 MG: 20 TABLET ORAL at 08:24

## 2021-03-20 RX ADMIN — CITALOPRAM HYDROBROMIDE 40 MG: 20 TABLET ORAL at 08:21

## 2021-03-20 RX ADMIN — METOPROLOL TARTRATE 50 MG: 50 TABLET, FILM COATED ORAL at 05:59

## 2021-03-20 RX ADMIN — HYDROMORPHONE HYDROCHLORIDE 0.5 MG: 1 INJECTION, SOLUTION INTRAMUSCULAR; INTRAVENOUS; SUBCUTANEOUS at 08:17

## 2021-03-20 RX ADMIN — ENOXAPARIN SODIUM 40 MG: 40 INJECTION SUBCUTANEOUS at 08:21

## 2021-03-20 NOTE — DISCHARGE SUMMARY
[address]  Discharge- Emmanuel Smith 1960, 56 y o  female MRN: [id number]  Unit/Bed#: -Lucho Encounter: [id number]  Primary Care Provider: Roman Avila MD   Date and time admitted to hospital: 3/17/2021  2:02 PM    * Acute pain of right knee  Assessment & Plan  Patient having right knee worsening pain over last few months  She seems to have had some frequent ER visits but no official follow-up with Ortho? PT evaluated the patient initially recommended rehab however patient and  are not willing to do that but are agreeable to home PT and visiting nursing   x-ray did not show any bony abnormality  Immobilizer placed by ED  Continue on discharge  Ortho evaluated  Initially planned for MRI however patient has some type of a stent in her eye, she is unsure if the stent is MRI safe  I have completed an extensive review of her records and I cannot find any indication of what type of stent this is states the stent was placed at [facility] in 2009 and after discussion with Radiology MRI does not appear to be safe at this time  CT of the right knee ordered revealed There are several small, well-corticated, chronic fracture fragments adjacent to the posterior aspect of the lateral tibial plateau no acute fracture  Continue with knee immobilizer at all times except for shower, sleep and while in bed  Blood cultures negative for 48 hours  Patient has to follow up outpatient with Ortho for further management    Rheumatoid arthritis involving multiple sites [facility])  Assessment & Plan  Not on any medication  Involving multiple joints  Essential hypertension  Assessment & Plan  Continue metoprolol  Monitor blood pressure    COPD (chronic obstructive pulmonary disease) (HCC)  Assessment & Plan  Currently stable  Continue albuterol p r n  Tobacco abuse disorder  Assessment & Plan  Smoking cessation counseling given    Nicotine patch    Anxiety and depression  Assessment & Plan  Continue home medication including Celexa  Remeron       Discharging Physician / Practitioner: ROMEO Barker  PCP: Dora Bustos MD  Admission Date:   Admission Orders (From admission, onward)     Ordered        03/19/21 1445  Inpatient Admission  Once         03/17/21 1736  Place in Observation  Once                   Discharge Date: 03/20/21    Resolved Problems  Date Reviewed: 6/27/2019    None          Consultations During Hospital Stay:  ·  IP CONSULT TO ORTHOPEDIC SURGERY    Procedures Performed:   · Xray  · Ct    Significant Findings / Test Results:      CT lower extremity wo contrast right   Final Result by Juliana Benitez MD (03/18 1640)      There are several small, well-corticated, chronic fracture fragments adjacent to the posterior aspect of the lateral tibial plateau (series 967 images 42-44 )      There are no acute fractures  Workstation performed: [id number]         XR knee 4+ views Right injury   ED Interpretation by Temo Dale PA-C (03/17 1614)   Medial cortical defect present on previous x-ray  Final Result by King Spring DO (03/17 1653)      No acute osseous abnormality  Workstation performed: [id number]         ·     Incidental Findings:   · none     Test Results Pending at Discharge (will require follow up):   · none     Outpatient Tests Requested:  · none    Complications:  none    Reason for Admission:    Chief Complaint   Patient presents with    Fall     pt c/o fall after her right knee "giving out"  denies any head injury or LOC  pt denies any thinners         Hospital Course:     Vera Kan is a 56 y o  female patient who originally presented to the hospital on 3/17/2021 due to fall associated with knee pain and weakness    Patient clearly has some type of an injury that she sustained to her knee approximately a year ago that has been getting progressively worse however she has not had is appropriately worked up in the last year she has a few ER visits secondary to knee pain but no visual ortho follow-up for planned that I can ascertain from her chart or her  She is unable to get an MRI secondary to a stent in her RI she did have a CT findings are above she will need to follow up outpatient with Orthopedics  She understands the importance of following up and verbalizes understanding  Please see above list of diagnoses and related plan for additional information  Condition at Discharge: stable     Discharge Day Visit / Exam:     Subjective:  Denies any chest pain chest tightness shortness of breath or difficulty breathing  She is still complaining of knee pain she does report that the being medication helps but obviously does not make the pain go away we did discuss realistic pain goals with her she verbalizes understanding I discussed extensively how important it is for her to follow up Ortho in order to get this matter address she verbalizes understanding  Vitals: Blood Pressure: 154/81 (03/20/21 0734)  Pulse: 72 (03/20/21 0734)  Temperature: 98 1 °F (36 7 °C) (03/20/21 0734)  Temp Source: Oral (03/17/21 1329)  Respirations: 13 (03/20/21 0734)  Height: 5' 3" (160 cm) (03/18/21 0837)  Weight - Scale: 68 kg (150 lb) (03/17/21 1329)  SpO2: 95 % (03/20/21 0734)  Exam:   Physical Exam  Vitals signs and nursing note reviewed  HENT:      Head: Normocephalic  Nose: Nose normal    Cardiovascular:      Rate and Rhythm: Normal rate  Pulmonary:      Effort: Pulmonary effort is normal    Abdominal:      Palpations: Abdomen is soft  Musculoskeletal:         General: Swelling, tenderness and signs of injury present  Skin:     General: Skin is warm and dry  Neurological:      General: No focal deficit present  Mental Status: She is alert and oriented to person, place, and time  Psychiatric:         Mood and Affect: Mood normal          Thought Content:  Thought content normal  Judgment: Judgment normal        Discussion with Family:  Not available at this time    Discharge instructions/Information to patient and family:   See after visit summary for information provided to patient and family  Provisions for Follow-Up Care:  See after visit summary for information related to follow-up care and any pertinent home health orders  Disposition:     Home with VNA Services (Reminder: Complete face to face encounter)    For Discharges to [facility] SNF:   · Not Applicable to this Patient - Not Applicable to this Patient    Planned Readmission:  No     Discharge Statement:  I spent 30 minutes discharging the patient  This time was spent on the day of discharge  I had direct contact with the patient on the day of discharge  Greater than 50% of the total time was spent examining patient, answering all patient questions, arranging and discussing plan of care with patient as well as directly providing post-discharge instructions  Additional time then spent on discharge activities  Discharge Medications:  See after visit summary for reconciled discharge medications provided to patient and family        ** Please Note: This note has been constructed using a voice recognition system **

## 2021-03-20 NOTE — DISCHARGE INSTRUCTIONS
Anxiety   WHAT YOU SHOULD KNOW:   Anxiety is a condition that causes you to feel excessive worry, uneasiness, or fear  Family or work stress, smoking, caffeine, and alcohol can increase your risk for anxiety  Certain medicines or health conditions can also increase your risk  Anxiety may begin gradually, and can become a long-term condition if it is not managed or treated  AFTER YOU LEAVE:   Medicines:   · Medicines  can help you feel more calm and relaxed, and decrease your symptoms  · Take your medicine as directed  Contact your healthcare provider if you think your medicine is not helping or if you have side effects  Tell him if you are allergic to any medicine  Keep a list of the medicines, vitamins, and herbs you take  Include the amounts, and when and why you take them  Bring the list or the pill bottles to follow-up visits  Carry your medicine list with you in case of an emergency  Follow up with your healthcare provider within 2 weeks or as directed:  Write down your questions so you remember to ask them during your visits  Manage anxiety:   · Go to counseling as directed  Cognitive behavioral therapy can help you understand and change how you react to events that trigger your symptoms  · Find ways to manage your symptoms  Activities such as exercise, meditation, or listening to music can help you relax  · Practice deep breathing  Breathing can change how your body reacts to stress  Focus on taking slow, deep breaths several times a day, or during an anxiety attack  Breathe in through your nose, and out through your mouth  · Avoid caffeine  Caffeine can make your symptoms worse  Avoid foods or drinks that are meant to increase your energy level  · Limit or avoid alcohol  Ask your healthcare provider if alcohol is safe for you  You may not be able to drink alcohol if you take certain anxiety or depression medicines  Limit alcohol to 1 drink per day if you are a woman   Limit alcohol to 2 drinks per day if you are a man  A drink of alcohol is 12 ounces of beer, 5 ounces of wine, or 1½ ounces of liquor  Contact your healthcare provider if:   · Your symptoms get worse or do not get better with treatment  · You think your medicine may be causing side effects  · Your anxiety keeps you from doing your regular daily activities  · You have new symptoms since your last visit  · You have questions or concerns about your condition or care  Seek care immediately or call 911 if:   · You have chest pain, tightness, or heaviness that may spread to your shoulders, arms, jaw, neck, or back  · You feel like hurting yourself or someone else  · You feel dizzy, lightheaded, or faint  © 2014 [address] is for End User's use only and may not be sold, redistributed or otherwise used for commercial purposes  All illustrations and images included in CareNotes® are the copyrighted property of A D A M , Inc  or [employer]  The above information is an  only  It is not intended as medical advice for individual conditions or treatments  Talk to your doctor, nurse or pharmacist before following any medical regimen to see if it is safe and effective for you  COPD: Breathing Exercises   Divina CC; Cori AS; Ramiro Osler MH et al: Pharmacologic and nonpharmacologic therapies in adult patients with exacerbation of COPD: a systematic review  Comparative Effectiveness Review no  221  Agency for [place] and Quality ([facility])  Mahnaz Gallagher MD  2019  Available from URL: https://effectivehealthcare  [facility] gov/sites/default/files/pdf/cer-221-copd-final-report pdf  As accessed 2019-11-06  Global Initiative for Chronic Obstructive Lung Disease (GOLD): Global strategy for the diagnosis, management and prevention of chronic obstructive pulmonary disease: 2018 report  Global Initiative for Chronic Obstructive Lung Disease (GOLD)  MD Best  2018   Available from URL: http://goldcopd org/wp-content/uploads/2017/11/GOLD-2018-v6 0-FINAL-revised-20-Nov_WMS pdf  As accessed 2018-02-06  The Joint Commission: 2018 Standards for [address] E-dition(R), [employer] [facility], [place], [place], 2018  The Joint [facility]: Comprehensive Accreditation Manual for [facility] E-dition(R), [employer], [facility], [id number] [place] [address] [address]  Global Initiative for Chronic Obstructive Lung Disease (GOLD): Global strategy for the diagnosis, management and prevention of COPD 2017 report  Global Initiative for Chronic Obstructive Lung Disease (GOLD)  Bagley, MD  2017  Available from URL: http://goldcopd org/download/326/  As accessed 2017-01-03  [address]: COPD: lifestyle management: breathing retraining  [address]  [address] [facility],   [address]  2016  Available from URL: [employer] be  As accessed 2016-12-30  Terell BOTELLO, et al: Chronic Obstructive Pulmonary Disease  Crit Care Nurs Q 2016; 39(2):124-130  Katelyn Johns, et al: COPD: the patient perspective  Int J Chron Obstruct Pulmon Dis 2016; 11 Spec Iss:13-20  © Copyright [address] [place] [address] Information is for End User's use only and may not be sold, redistributed or otherwise used for commercial purposes  All illustrations and images included in CareNotes® are the copyrighted property of A D A M , Inc  or [address] [place] [address]  The above information is an  only  It is not intended as medical advice for individual conditions or treatments  Talk to your doctor, nurse or pharmacist before following any medical regimen to see if it is safe and effective for you  COPD: Prevent Exacerbations   Rubia L, Madiha MJ, Estefani E, et al: Pharmacologic management of chronic obstructive pulmonary disease   An Official American Thoracic Society Clinical Practice Guideline  [address] 2020; 201(9):e56-e69  Divina CC; Darryle Sailors AS; Kenton ODOM et al: Pharmacologic and nonpharmacologic therapies in adult patients with exacerbation of COPD: a systematic review  Comparative Effectiveness Review no  221  Agency for [place] and Quality ([facility])  Mago Florez MD  2019  Available from URL: https://effectivehealthcare  [facility] gov/sites/default/files/pdf/cer-221-copd-final-report pdf  As accessed 2019-11-06  Global Initiative for Chronic Obstructive Lung Disease (GOLD): Global strategy for the diagnosis, management and prevention of chronic obstructive pulmonary disease: 2018 report  Global Initiative for Chronic Obstructive Lung Disease (GOLD)  MD Best  2018  Available from URL: http://goldcopd org/wp-content/uploads/2017/11/GOLD-2018-v6 0-FINAL-revised-20-Nov_WMS pdf  As accessed 2018-02-06  The Joint [facility]: 2018 Standards for [address] [place] [address] E-dition(R), [employer] [facility], [place], [place], [facility]  The Joint [facility]: Comprehensive Accreditation Manual for [facility] E-dition(R), Deadeye Marksmanship [facility], [address] [place] [address] [address]  Boris Abrams et al: Prevention of COPD exacerbations: a  Respiratory Society/American Thoracic Society guideline  Eur Respir J 2017; 50(3):9884932  Jayden Cruz et al: Management of COPD exacerbations: a  Respiratory Society/American Thoracic Society guideline  Eur Respir J 2017; 20(5):4577143  Global Initiative for Chronic Obstructive Lung Disease (GOLD): Global strategy for the diagnosis, management and prevention of COPD 2017 report  Global Initiative for Chronic Obstructive Lung Disease (GOLD)  MD Best  2017  Available from URL: http://goldcopd org/download/326/  As accessed 2017-01-03  Giovana Watson: COPD: lifestyle management: avoiding infections  [address] [place], [address]  2016  Available from URL: rosori com  As accessed 2016-12-30  Tez R & Apoorva S: The lung microbiome and exacerbations of COPD  [address] [facility] 2016; 22(3):196-202  Janette MODI, Carlos MCKEON, et al: Treatment of patients with COPD and recurrent exacerbations: the role of infection and inflammation  Int J Chron Obstruct Pulmon Dis 2016; [id number]  Su ID, Rocco Henderson PW, Dana ND, et al: Exacerbations of COPD  Int J Chron Obstruct Pulmon Dis 2016; 11 Spec Iss:21-30  © Copyright [address] [place] [address] Information is for End User's use only and may not be sold, redistributed or otherwise used for commercial purposes  All illustrations and images included in CareNotes® are the copyrighted property of A D A M , Inc  or [address] [place], [place] [address]earn[place]   The above information is an  only  It is not intended as medical advice for individual conditions or treatments  Talk to your doctor, nurse or pharmacist before following any medical regimen to see if it is safe and effective for you

## 2021-03-20 NOTE — OCCUPATIONAL THERAPY NOTE
Occupational Therapy Evaluation Note        Patient Name: Mansi Henderson  Today's Date: 3/20/2021       03/20/21 1022   OT Last Visit   OT Visit Date 03/20/21   Note Type   Note type Evaluation   Restrictions/Precautions   Weight Bearing Precautions Per Order Yes   RLE Weight Bearing Per Order WBAT  (per orthopedics)   Braces or Orthoses Other (Comment);LE Braces  (Hinged Knee Brace per orthopedics RLE)   Other Precautions WBS; Fall Risk;Pain   Pain Assessment   Pain Assessment Tool 0-10   Pain Score Worst Possible Pain   Pain Location/Orientation Orientation: Right;Location: Knee   Pain Onset/Description Onset: Ongoing   Hospital Pain Intervention(s) Repositioned; Ambulation/increased activity  (RN made aware)   Multiple Pain Sites No   Home Living   Type of [address] One level;Performs ADLs on one level; Other (Comment)  (3 PILLO)   Bathroom Shower/Tub Tub/shower unit   Bathroom Toilet Standard   Bathroom Equipment Shower chair   [facility]  (Rollator)   Prior Function   Level of [place] Independent with ADLs and functional mobility   Lives With Spouse   Receives Help From Family   ADL Assistance Independent   IADLs Needs assistance   Falls in the last 6 months 5 to 10  ("about 10")   Vocational Retired   Lifestyle   Autonomy Per patient report, she lives with her spouse in a one-story apartment with 3 PILLO  At baseline, patient reports that she is independent in ADL and receives assistance from her spouse for IADLs  Patient is primarily ambulatory at baseline with rollator     Reciprocal Relationships Supportive spouse   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44 Patient enjoys watching TV and taking care of her cat   Psychosocial   Psychosocial (WDL) WDL   ADL   Eating Assistance 6  Modified independent   Grooming Assistance 6  Modified Independent   UB Bathing Assistance 5  [address] [place] [address] Dr Schuler Minimal Assistance   UB Dressing Assistance 5  Supervision/Setup   LB Dressing Assistance 4  Minimal Assistance   Toileting Assistance  5  Supervision/Setup   Functional Assistance 5  Supervision/Setup   Additional Comments ADL assist levels based on pt's functional performance during OT evaluation   Bed Mobility   Supine to Sit 5  Supervision   Additional items Assist x 1;HOB elevated; Increased time required;Verbal cues   Additional Comments Patient received lying supine in bed upon OT arrival; at end of session: pt seated OOB to recliner chair w/ all needs within reach  Patient with verbal understanding to utilize call bell prior to getting up   Transfers   Sit to Stand 5  Supervision   Additional items Assist x 1;Verbal cues; Impulsive   Stand to Sit 5  Supervision   Additional items Assist x 1;Verbal cues   Toilet transfer 5  Supervision   Additional items Assist x 1;Verbal cues  (BSC fram over standard toilet)   Additional Comments Performed functional transfers with no AD  Patient with noted slight impulsivity with transitional movements  Patient educated on updated WB status, WBAT RLE per orthopedics  Patient with verbal understanding     Functional Mobility   Functional Mobility 4  Minimal assistance  (CGA)   Additional Comments x1; ambulation to and from bathroom with no noted LOB   Additional items   (no AD)   Balance   Static Sitting Good   Dynamic Sitting Fair +   Static Standing Fair   Dynamic Standing Fair -   Ambulatory Fair -   Activity Tolerance   Activity Tolerance Patient limited by pain   Medical Staff Made Aware PT Emaline Screws; [address] [place] [address] [address]   Nurse Made Aware RN Jorje Sands confirmed pt appropriate for therapy and made aware of session outcomes   RUE Assessment   RUE Assessment WFL  (Strength and AROM grossly WFL based on functional assessment)   RUE Overall AROM   R Mass Grasp Patient with noted ulnar drift of digits II through V   LUE Assessment   LUE Assessment WFL  (Strength and AROM grossly [facility] based on functional assessment)   LUE Overall AROM   L Mass Grasp Patient with noted ulnar drift of digits II through V   Hand Function   Gross Motor Coordination Functional   Fine Motor Coordination Impaired  (Degenerative arthritic changes)   Sensation   Light Touch No apparent deficits  (BUEs)   Vision - Complex Assessment   Ocular Range of Motion WFL   Cognition   Overall Cognitive Status WFL   Arousal/Participation Alert; Responsive   Attention Within functional limits   Orientation Level Oriented X4   Memory Within functional limits   Following Commands Follows all commands and directions without difficulty   Comments Patient agreeable to OT evaluation   Assessment   Limitation Decreased ADL status; Decreased endurance;Decreased fine motor control;Decreased self-care trans;Decreased high-level ADLs   Prognosis Good   Assessment Patient is a 56 y o  female seen for OT evaluation s/p admit to [address] [facility] on 3/17/2021 w/Acute pain of right knee  Commorbidities affecting patient's functional performance at time of assessment include: rheumatoid arthritis involving multiple sites, essential HTN, COPD, tobacco abuse disorder, and anxiety and depression  Patient  has a past medical history of Alcohol abuse (12/31/2018), Anxiety, Bipolar 1 disorder ([address] ), Chronic back pain, COPD (chronic obstructive pulmonary disease) ([address] ), Frequent headaches, Glaucoma, Hyperlipidemia, Irregular heart beat, Ovarian cancer ([address] ), Psychiatric disorder, Shortness of breath, and Uterine carcinoma ([address] )  Orders placed for OT evaluation and treatment WBAT RLE per orthopedics  Performed at least two patient identifiers during session including name and wristband  Prior to admission, patient was living with her spouse in a one-story apartment with 3 PILLO  At baseline, patient reports that she is independent in ADLs and receives assistance from her spouse for performing IADLs   Personal factors affecting patient at time of initial evaluation include: steps to enter, difficulty performing ADLs and difficulty performing IADLs  Upon evaluation, patient requires supervision and set up assist for UB ADLs, minimal  assist for LB ADLs, transfers and functional ambulation in room and bathroom with supervision and contact guard assist, with no AD  Patient is alert and oriented x 4  Occupational performance is affected by the following deficits: degenerative arthritic joint changes, dynamic sit/ stand balance deficit with poor standing tolerance time for self care and functional mobility, decreased activity tolerance, increased pain and orthopedic restrictions, and decreased functional mobility  Therapist completed expanded review of medical records and additional review of physical, cognitive or psychosocial history, clinical examination identifying 3-5 performance deficits, clinical decision making of a moderate complexity, consistent with moderate complexity level evaluation  Patient to benefit from continued Occupational Therapy treatment while in the hospital to address deficits as defined above and maximize level of functional independence with ADLs and functional mobility  Occupational Performance areas to address include: grooming , bathing/ shower, dressing, toilet hygiene, transfer to all surfaces, functional mobility, community mobility, health maintenance, IADLs: safety procedures, Leisure Participation and Social participation  From OT standpoint, recommendation at time of d/c would be Home with family support and Home OT  Goals   Patient Goals to return home   Plan   Treatment Interventions ADL retraining;Functional transfer training; Endurance training;UE strengthening/ROM; Patient/family training;Equipment evaluation/education; Compensatory technique education; Activityengagement; Energy conservation; Fine motor coordination activities   Goal Expiration Date 03/27/21   OT Treatment Day 0   OT Frequency 2-3x/wk   Recommendation   OT Discharge Recommendation Home with skilled therapy  (Skilled home OT and home with increased family support)   Additional Comments  The patient's raw score on the AM-PAC Daily Activity inpatient short form is 21, standardized score is 44 27, greater than 39 4  Patients at this level are likely to benefit from discharge to home  Please refer to the recommendation of the Occupational Therapist for safe discharge planning  AM-PAC Daily Activity Inpatient   Lower Body Dressing 3   Bathing 3   Toileting 3   Upper Body Dressing 4   Grooming 4   Eating 4   Daily Activity Raw Score 21   Daily Activity Standardized Score (Calc for Raw Score >=11) 44 27   AM-PAC Applied Cognition Inpatient   Following a Speech/Presentation 4   Understanding Ordinary Conversation 4   Taking Medications 4   Remembering Where Things Are Placed or Put Away 4   Remembering List of 4-5 Errands 4   Taking Care of Complicated Tasks 4   Applied Cognition Raw Score 24   Applied Cognition Standardized Score 62 21   Barthel Index   Feeding 10   Bathing 0   Grooming Score 5   Dressing Score 5   Bladder Score 10   Bowels Score 10   Toilet Use Score 5   Transfers (Bed/Chair) Score 10   Mobility (Level Surface) Score 0   Stairs Score 0   Barthel Index Score 55   Modified [place] Scale   Modified Muriel Scale 3     Occupational Therapy Goals to be completed in 5-7 Days:    1 - Patient will verbalize and demonstrate use of energy conservation/ deep breathing technique and work simplification skills during functional activity with no verbal cues  2 - Patient will verbalize and demonstrate good body mechanics and joint protection techniques during  ADLs/ IADLs with no verbal cues  3 - Patient will increase OOB/ sitting tolerance to 2-4 hours per day for increased participation in self care and leisure tasks with no s/s of exertion      4 - Patient will increase standing tolerance time to 5 minutes with unilateral UE support to complete sink level ADLs@ mod I level  5 - Patient will increase sitting tolerance at edge of bed to 20 minutes to complete UB ADLs @ set up assist level  6 - Patient will transfer bed to Chair / toilet at Mod I assist level with AD as indicated  7 - Patient will complete UB ADLs with Mod I assist      8 - Patient will complete LB ADLs with min assist with the use of adaptive equipment as indicated  9 - Patient will complete toileting hygiene with Mod I assist/ supervision for thoroughness  8 - Patient/ Family will demonstrate competency with UE Home Exercise Program       11- Patient will demonstrate good safety awareness during functional transfers, mobility, and ADLs 100% of the time with no VCs      Marine White, OTR/L

## 2021-03-20 NOTE — PHYSICAL THERAPY NOTE
PT Re-Evaluation (15min)  (10:20-10:35)    Past Medical History:   Diagnosis Date    Alcohol abuse 12/31/2018    Anxiety     Bipolar 1 disorder ([place] [address] )     Mental Health problems listed as Denied in Allscripts, cant't entire under pertinent negatives due to this diagnosis    Chronic back pain     COPD (chronic obstructive pulmonary disease) ([facility] )     Frequent headaches     Glaucoma     Hyperlipidemia     Irregular heart beat     AFib    Ovarian cancer ([place] [address] )     last assessed - [id number]    Psychiatric disorder     bipolar    Shortness of breath     Uterine carcinoma ([place] [address] )     last assessed - [id number]        03/20/21 1035   PT Last Visit   PT Visit Date 03/20/21   Note Type   Note type Re-Evaluation   Pain Assessment   Pain Assessment Tool 0-10   Pain Score Worst Possible Pain   Pain Location/Orientation Orientation: Right;Location: Leg   Hospital Pain Intervention(s) Repositioned; Ambulation/increased activity   Home Living   Additional Comments refer to initial eval 3/17/21   Restrictions/Precautions   Weight Bearing Precautions Per Order Yes   RLE Weight Bearing Per Order WBAT   Braces or Orthoses LE Braces; Other (Comment)  (R hinged knee brace)   Other Precautions Telemetry; Fall Risk;Pain;WBS  (WBAT R LE c hinged knee brace)   General   Additional Pertinent History pt WBAT R LE c hinged knee brace per ortho  Family/Caregiver Present No   Cognition   Orientation Level Oriented X4   RUE Assessment   RUE Assessment WFL   LUE Assessment   LUE Assessment WFL   RLE Assessment   RLE Assessment X  (grossly 3/5)   LLE Assessment   LLE Assessment WFL  (4/5)   Coordination   Sensation WFL   Bed Mobility   Supine to Sit 5  Supervision   Additional items HOB elevated; Increased time required;Verbal cues   Transfers   Sit to Stand 5  Supervision   Additional items Verbal cues; Increased time required   Stand to Sit 5  Supervision   Additional items Verbal cues; Increased time required;Armrests Ambulation/Elevation   Gait pattern Antalgic;Decreased foot clearance; Short stride; Inconsistent peter   Gait Assistance 5  Supervision   Additional items Assist x 1;Verbal cues  (CG (A)x1)   Assistive Device None   Distance 15'x2    Stair Management Assistance 5  Supervision   Additional items Verbal cues; Assist x 1  (CG (A)x1)   Stair Management Technique Foreward; With walker   Number of Stairs 2  (6in step)   Balance   Static Sitting Good   Dynamic Sitting Fair +   Static Standing Fair   Dynamic Standing [address], & [address] -   Activity Tolerance   Activity Tolerance Patient limited by pain   Nurse Made Aware OLGA Benitez aware pt ambulates (A)x1  seated in chair at end of session c all needs in reach  Assessment   Prognosis Good   Problem List Decreased strength;Decreased endurance; Impaired balance;Decreased mobility; Decreased range of motion;Orthopedic restrictions;Pain   Assessment pt seen for PT re-eval  ordered WBAT R LE c hinged knee brace per ortho  demonstrates significant progress compared to PT eval 3/17/21  performed OOB mobility tasks c (S)/CG (A)x1  ambulated 15'x2 s AD + able to negotiate 2 stairs on 6in step c min verbal cues for sequencing technique + support of RW  tolerated sitting OOB in chair at end of session  cont to demonstrate deficits in strength, ROM, balance, gait quality, pain, + activity tolerance  will cont skilled PT to further maximize functional mobility + return home safely  AM-PAC raw score 18 indicating pt safe for d/c home c services, however please refer to PT d/c recommendation for safe d/c planning  Barriers to Discharge None   Goals   Patient Goals "to be able to cook"  STG Expiration Date 03/27/21   Short Term Goal #1 1   increase strength 1/2 grade to improve overall functional mobility, 2  perform bed mobility mod (I) to decrease caregiver burden, 3  perform transfers mod (I) to safely perform ADLs, 4  ambulate >150' (I) to safely navigate home environment, 5  negotiate 2 stairs mod (I) to safely enter home   Plan   Treatment/Interventions Functional transfer training;LE strengthening/ROM; Elevations; Therapeutic exercise; Endurance training;Patient/family training;Equipment eval/education; Bed mobility;Gait training;Spoke to nursing;OT   PT Frequency 2-3x/wk   Recommendation   PT Discharge Recommendation Home with skilled therapy; Return to previous environment with social support   PT - OK to Discharge Yes   AM-PAC Basic Mobility Inpatient   Turning in Bed Without Bedrails 3   Lying on Back to Sitting on Edge of Flat Bed 3   Moving Bed to Chair 3   Standing Up From Chair 3   Walk in Room 3   Climb 3-5 Stairs 3   Basic Mobility Inpatient Raw Score 18   Basic Mobility Standardized Score 41 05   Modified [place] Scale   Modified Muriel Scale 3   Barthel Index   Feeding 10   Bathing 0   Grooming Score 5   Dressing Score 5   Bladder Score 10   Bowels Score 10   Toilet Use Score 5   Transfers (Bed/Chair) Score 10   Mobility (Level Surface) Score 0   Stairs Score 5   Barthel Index Score 60     Melodie Sainz, PT, DPT

## 2021-03-20 NOTE — PLAN OF CARE
Problem: PHYSICAL THERAPY ADULT  Goal: Performs mobility at highest level of function for planned discharge setting  See evaluation for individualized goals  Description: Treatment/Interventions: Functional transfer training, LE strengthening/ROM, Elevations, Therapeutic exercise, Endurance training, Patient/family training, Equipment eval/education, Bed mobility, Gait training, Spoke to MD, Spoke to nursing, Spoke to advanced practitioner, Family  Equipment Recommended: Manny Clayton       See flowsheet documentation for full assessment, interventions and recommendations  Outcome: Progressing  Note: Prognosis: Good  Problem List: Decreased strength, Decreased endurance, Impaired balance, Decreased mobility, Decreased range of motion, Orthopedic restrictions, Pain  Assessment: pt seen for PT re-eval  ordered WBAT R LE c hinged knee brace per ortho  demonstrates significant progress compared to PT eval 3/17/21  performed OOB mobility tasks c (S)/CG (A)x1  ambulated 15'x2 s AD + able to negotiate 2 stairs on 6in step c min verbal cues for sequencing technique + support of RW  tolerated sitting OOB in chair at end of session  cont to demonstrate deficits in strength, ROM, balance, gait quality, pain, + activity tolerance  will cont skilled PT to further maximize functional mobility + return home safely  AM-PAC raw score 18 indicating pt safe for d/c home c services, however please refer to PT d/c recommendation for safe d/c planning  Barriers to Discharge: None     PT Discharge Recommendation: Home with skilled therapy, Return to previous environment with social support     PT - OK to Discharge: Yes    See flowsheet documentation for full assessment

## 2021-03-20 NOTE — PLAN OF CARE
Problem: OCCUPATIONAL THERAPY ADULT  Goal: Performs self-care activities at highest level of function for planned discharge setting  See evaluation for individualized goals  Description: Treatment Interventions: ADL retraining, Functional transfer training, Endurance training, UE strengthening/ROM, Patient/family training, Equipment evaluation/education, Compensatory technique education, Activityengagement, Energy conservation, Fine motor coordination activities          See flowsheet documentation for full assessment, interventions and recommendations  Note: Limitation: Decreased ADL status, Decreased endurance, Decreased fine motor control, Decreased self-care trans, Decreased high-level ADLs  Prognosis: Good  Assessment: Patient is a 56 y o  female seen for OT evaluation s/p admit to [address] [facility] on 3/17/2021 w/Acute pain of right knee  Commorbidities affecting patient's functional performance at time of assessment include: rheumatoid arthritis involving multiple sites, essential HTN, COPD, tobacco abuse disorder, and anxiety and depression  Patient  has a past medical history of Alcohol abuse (12/31/2018), Anxiety, Bipolar 1 disorder ([facility] ), Chronic back pain, COPD (chronic obstructive pulmonary disease) ([facility] ), Frequent headaches, Glaucoma, Hyperlipidemia, Irregular heart beat, Ovarian cancer ([facility] ), Psychiatric disorder, Shortness of breath, and Uterine carcinoma ([facility] )  Orders placed for OT evaluation and treatment WBAT RLE per orthopedics  Performed at least two patient identifiers during session including name and wristband  Prior to admission, patient was living with her spouse in a one-story apartment with 3 PILLO  At baseline, patient reports that she is independent in ADLs and receives assistance from her spouse for performing IADLs  Personal factors affecting patient at time of initial evaluation include: steps to enter, difficulty performing ADLs and difficulty performing IADLs   Upon evaluation, patient requires supervision and set up assist for UB ADLs, minimal  assist for LB ADLs, transfers and functional ambulation in room and bathroom with supervision and contact guard assist, with no AD  Patient is alert and oriented x 4  Occupational performance is affected by the following deficits: degenerative arthritic joint changes, dynamic sit/ stand balance deficit with poor standing tolerance time for self care and functional mobility, decreased activity tolerance, increased pain and orthopedic restrictions, and decreased functional mobility  Therapist completed expanded review of medical records and additional review of physical, cognitive or psychosocial history, clinical examination identifying 3-5 performance deficits, clinical decision making of a moderate complexity, consistent with moderate complexity level evaluation  Patient to benefit from continued Occupational Therapy treatment while in the hospital to address deficits as defined above and maximize level of functional independence with ADLs and functional mobility  Occupational Performance areas to address include: grooming , bathing/ shower, dressing, toilet hygiene, transfer to all surfaces, functional mobility, community mobility, health maintenance, IADLs: safety procedures, Leisure Participation and Social participation  From OT standpoint, recommendation at time of d/c would be Home with family support and Home OT         OT Discharge Recommendation: Home with skilled therapy(Skilled home OT and home with increased family support)

## 2021-03-20 NOTE — ASSESSMENT & PLAN NOTE
Patient having right knee worsening pain over last few months  She seems to have had some frequent ER visits but no official follow-up with Ortho? PT evaluated the patient initially recommended rehab however patient and  are not willing to do that but are agreeable to home PT and visiting nursing   x-ray did not show any bony abnormality  Immobilizer placed by ED  Continue on discharge  Ortho evaluated  Initially planned for MRI however patient has some type of a stent in her eye, she is unsure if the stent is MRI safe  I have completed an extensive review of her records and I cannot find any indication of what type of stent this is states the stent was placed at [facility] in 2009 and after discussion with Radiology MRI does not appear to be safe at this time  CT of the right knee ordered revealed There are several small, well-corticated, chronic fracture fragments adjacent to the posterior aspect of the lateral tibial plateau no acute fracture  Continue with knee immobilizer at all times except for shower, sleep and while in bed    Blood cultures negative for 48 hours  Patient has to follow up outpatient with Ortho for further management

## 2021-03-22 ENCOUNTER — TRANSITIONAL CARE MANAGEMENT (OUTPATIENT)
Dept: INTERNAL MEDICINE CLINIC | Facility: CLINIC | Age: 61
End: 2021-03-22

## 2021-03-22 LAB
BACTERIA BLD CULT: NORMAL
BACTERIA BLD CULT: NORMAL

## 2021-03-22 NOTE — UTILIZATION REVIEW
Notification of Discharge  This is a Notification of Discharge from our facility [address]  Please be advised that this patient has been discharge from our facility  Below you will find the admission and discharge date and time including the patients disposition  PRESENTATION DATE: 3/17/2021  2:02 PM  OBS ADMISSION DATE: 03/17/2021  IP ADMISSION DATE: 3/19/21 1445   DISCHARGE DATE: 3/20/2021  2:30 PM  DISPOSITION: Home with [facility] with [facility]   Admission Orders listed below:  Admission Orders (From admission, onward)     Ordered        03/19/21 1445  Inpatient Admission  Once         03/17/21 1736  Place in Observation  Once                   Please contact the UR Department if additional information is required to close this patient's authorization/case  [facility] Utilization Review Department  Main: [phone] x carefully listen to the prompts  All voicemails are confidential   [email or web address]  Send all requests for admission clinical reviews, approved or denied determinations and any other requests to dedicated fax number below belonging to the campus where the patient is receiving treatment   List of dedicated fax numbers:  [address] DENIALS (Administrative/Medical Necessity) [phone]   [address]  (Maternity/NICU/Pediatrics) [phone]   Davy العراقي [phone]   [facility] [phone]   [facility] [phone]   Shonda Rodriguez [facility] [address] [facility] [phone]   [facility]  [phone]   [address] [facility] [address] [facility]  [address] [facility] 1000 [facility] [phone]

## 2021-03-22 NOTE — PROGRESS NOTES
1st attempt-LVM asking pt to return call for TCM documentation and TCM appointment        By Velvet Meadows

## 2021-03-24 ENCOUNTER — TELEPHONE (OUTPATIENT)
Dept: OBGYN CLINIC | Facility: HOSPITAL | Age: 61
End: 2021-03-24

## 2021-03-24 NOTE — TELEPHONE ENCOUNTER
LM on  for patient to return call for scheduling with ortho provider in [facility] for R knee pain follow up in 1 week  Please schedule if call returned

## 2021-03-25 ENCOUNTER — OFFICE VISIT (OUTPATIENT)
Dept: INTERNAL MEDICINE CLINIC | Facility: CLINIC | Age: 61
End: 2021-03-25
Payer: COMMERCIAL

## 2021-03-25 VITALS
BODY MASS INDEX: 24.56 KG/M2 | OXYGEN SATURATION: 94 % | HEART RATE: 87 BPM | RESPIRATION RATE: 15 BRPM | DIASTOLIC BLOOD PRESSURE: 80 MMHG | TEMPERATURE: 97.5 F | WEIGHT: 138.6 LBS | HEIGHT: 63 IN | SYSTOLIC BLOOD PRESSURE: 132 MMHG

## 2021-03-25 DIAGNOSIS — K20.90 ESOPHAGITIS: ICD-10-CM

## 2021-03-25 DIAGNOSIS — Z11.4 SCREENING FOR HIV (HUMAN IMMUNODEFICIENCY VIRUS): Primary | ICD-10-CM

## 2021-03-25 DIAGNOSIS — M06.9 RHEUMATOID ARTHRITIS INVOLVING MULTIPLE SITES, UNSPECIFIED WHETHER RHEUMATOID FACTOR PRESENT (HCC): ICD-10-CM

## 2021-03-25 DIAGNOSIS — Z12.31 BREAST CANCER SCREENING BY MAMMOGRAM: ICD-10-CM

## 2021-03-25 DIAGNOSIS — F41.9 ANXIETY AND DEPRESSION: ICD-10-CM

## 2021-03-25 DIAGNOSIS — J44.9 CHRONIC OBSTRUCTIVE PULMONARY DISEASE, UNSPECIFIED COPD TYPE (HCC): ICD-10-CM

## 2021-03-25 DIAGNOSIS — I10 ESSENTIAL HYPERTENSION: ICD-10-CM

## 2021-03-25 DIAGNOSIS — F32.A ANXIETY AND DEPRESSION: ICD-10-CM

## 2021-03-25 DIAGNOSIS — M25.561 ACUTE PAIN OF RIGHT KNEE: ICD-10-CM

## 2021-03-25 PROCEDURE — 1111F DSCHRG MED/CURRENT MED MERGE: CPT | Performed by: PHYSICIAN ASSISTANT

## 2021-03-25 PROCEDURE — 99496 TRANSJ CARE MGMT HIGH F2F 7D: CPT | Performed by: PHYSICIAN ASSISTANT

## 2021-03-25 RX ORDER — CYCLOBENZAPRINE HCL 5 MG
10 TABLET ORAL 3 TIMES DAILY PRN
Qty: 60 TABLET | Refills: 2 | Status: SHIPPED | OUTPATIENT
Start: 2021-03-25 | End: 2021-04-24

## 2021-03-25 RX ORDER — FAMOTIDINE 20 MG/1
40 TABLET, FILM COATED ORAL DAILY
Qty: 90 TABLET | Refills: 2 | Status: SHIPPED | OUTPATIENT
Start: 2021-03-25 | End: 2021-11-16 | Stop reason: SDUPTHER

## 2021-03-25 NOTE — PROGRESS NOTES
Assessment/Plan:   I reviewed her hospital records  Comfortable at rest followed by Orthopedics next week  Continue same regimen    No problem-specific Assessment & Plan notes found for this encounter  Diagnoses and all orders for this visit:    Screening for HIV (human immunodeficiency virus)  -     HIV 1/2 Antigen/Antibody (4th Generation) w Reflex [facility]; Future    Breast cancer screening by mammogram  -     Mammo screening bilateral w 3d & cad; Future    Esophagitis  -     famotidine (PEPCID) 20 mg tablet; Take 2 tablets (40 mg total) by mouth daily    Acute pain of right knee  -     cyclobenzaprine (FLEXERIL) 5 mg tablet; Take 2 tablets (10 mg total) by mouth 3 (three) times a day as needed for muscle spasms    Chronic obstructive pulmonary disease, unspecified COPD type ([facility] )    Anxiety and depression    Essential hypertension    Rheumatoid arthritis involving multiple sites, unspecified whether rheumatoid factor present ([facility] )        Subjective:     Patient ID: Alfonzo Vargas is a 56 y o  female  Hospital follow-up  Hospitalized for 3 days last week because of worsening pain in her right knee  Had an injury about 4 months ago  Progressive pain in the knee since then occasional swelling a lot of pain with weight-bearing no redness or heat workup revealed tibial  Plateau fracture  Workup otherwise unremarkable no sign of infection  She was given nonsteroidals narcotics and Flexeril  Her knee pain is tolerable she is walking on   It  History of rheumatoid arthritis for many years    Anxiety depression well controlled with meds      The following portions of the patient's history were reviewed and updated as appropriate:  She  has a past medical history of Alcohol abuse (12/31/2018), Anxiety, Bipolar 1 disorder ([facility] ), Chronic back pain, COPD (chronic obstructive [facility] disease) ([facility] ), Frequent headaches, Glaucoma, Hyperlipidemia, Irregular heart beat, Ovarian cancer [facility]), Psychiatric disorder, Shortness of breath, and Uterine carcinoma ([facility] )  She   Patient Active Problem List    Diagnosis Date Noted    Contusion of left hip 11/13/2020    Low back pain 11/13/2020    Acute pain of right knee 05/06/2020    Right leg swelling 04/28/2020    Epigastric pain 06/26/2019    Candidal stomatitis 06/24/2019    Esophagitis 01/30/2019    [facility] 12/31/2018    Hypokalemia 12/31/2018    Alcohol abuse 12/31/2018    Rheumatoid arthritis involving multiple sites ([facility] ) 07/09/2018    Lung nodules 07/07/2018    Angina, class III ([place] [address] ) 06/14/2018    Mixed hyperlipidemia 06/14/2018    Lumbosacral radiculopathy at L4 06/05/2018    Essential hypertension 10/27/2017    COPD (chronic obstructive pulmonary disease) (H[facility]) 09/30/2017    Transaminitis 09/30/2017    Abdominal pain 09/29/2017    Positive cardiac stress test 03/07/2017    Chest pain 03/05/2017    Anxiety and depression 03/05/2017    Chronic back pain 03/05/2017    Tobacco abuse disorder 03/05/2017    Neuropathy 03/05/2017    Increased severity of headaches 12/16/2016     She  has a past surgical history that includes Cholecystectomy; Hysterectomy; Appendectomy; Tonsillectomy; Intraocular lens insertion; Cataract extraction (Left); pr temporal artery ligatn or bx (Right, 12/16/2016); Eye surgery; and Adenoidectomy  Her family history includes Coronary artery disease in her mother; Heart attack in her brother, father, and mother; Heart disease in her mother; Hyperlipidemia in her father and mother; Other in her father and mother; Stomach cancer in her brother, father, maternal uncle, and mother  She  reports that she has been smoking cigarettes  She has a 11 50 pack-year smoking history  She has never used smokeless tobacco  She reports previous alcohol use  She reports that she does not use drugs    Current Outpatient Medications   Medication Sig Dispense Refill    albuterol (2 5 mg/3 mL) 0 083 % nebulizer solution inhale contents of 1 vial in nebulizer every 6 hours if needed for wheezing shortness of breath 150 mL 0    atorvastatin (LIPITOR) 40 mg tablet take 1 tablet by mouth once daily 90 tablet 0    citalopram (CELEXA) 40 mg tablet Take 40 mg by mouth daily        clonazePAM (KlonoPIN) 1 mg tablet 1 mg 2 (two) times a day as needed for anxiety    0    cyclobenzaprine (FLEXERIL) 5 mg tablet Take 2 tablets (10 mg total) by mouth 3 (three) times a day as needed for muscle spasms 60 tablet 2    famotidine (PEPCID) 20 mg tablet Take 2 tablets (40 mg total) by mouth daily 90 tablet 2    gabapentin (NEURONTIN) 800 mg tablet Take 800 mg by mouth 3 (three) times a day      ibuprofen (MOTRIN) 800 mg tablet Take 1 tablet (800 mg total) by mouth 3 (three) times a day (Patient taking differently: Take 800 mg by mouth as needed ) 21 tablet 0    metoprolol tartrate (LOPRESSOR) 50 mg tablet Take 1 tablet (50 mg total) by mouth every 12 (twelve) hours 60 tablet 11    mirtazapine (REMERON) 30 mg tablet Take 30 mg by mouth daily at bedtime        nitroglycerin (NITROSTAT) 0 4 mg SL tablet Place under the tongue      oxyCODONE (ROXICODONE) 10 MG TABS Take 1 tablet (10 mg total) by mouth every 4 (four) hours as needed for severe pain for up to 10 daysMax Daily Amount: 60 mg 30 tablet 0    acetaminophen (TYLENOL) 325 mg tablet Take 3 tablets (975 mg total) by mouth every 8 (eight) hours (Patient not taking: Reported on 3/25/2021)  0    lidocaine (LIDODERM) 5 % Apply 1 patch topically daily Remove & Discard patch within 12 hours or as directed by MD (Patient not taking: Reported on 3/25/2021) 10 patch 0    naproxen (NAPROSYN) 500 mg tablet TAKE 1 TABLET EVERY 12 HOURS AS NEEDED FOR MILD PAIN (PAIN SCORE 1-3)  TAKE WITH MEALS   0    triamcinolone (KENALOG) 0 5 % cream Triamcinolone Acetonide 0 5 % External Cream  APPLY 2-3 TIMES DAILY TO AFFECTED AREA(S)     Quantity: 1;  Refills: 3      Norris Knight ;  Start 5-Oct-2017  Active  15 GM Tube       No current facility-administered medications for this visit  She is allergic to aspirin; bee venom; robaxin [methocarbamol]; tramadol; ketorolac; nitroglycerin; and omeprazole       Review of Systems   Constitutional: Negative for chills and fever  HENT: Negative for ear pain and sore throat  Eyes: Negative for pain and visual disturbance  Respiratory: Negative for cough and shortness of breath  Cardiovascular: Negative for chest pain and palpitations  Gastrointestinal: Negative for abdominal pain and vomiting  Genitourinary: Negative for dysuria and hematuria  Musculoskeletal: Positive for arthralgias  Negative for back pain  Skin: Negative for color change and rash  Neurological: Negative for seizures and syncope  All other systems reviewed and are negative  Objective:     Physical Exam  Vitals signs reviewed  Constitutional:       Appearance: She is well-developed  HENT:      Head: Normocephalic  Right Ear: Tympanic membrane and external ear normal       Left Ear: Tympanic membrane and external ear normal       Nose: Nose normal       Mouth/Throat:      Mouth: Mucous membranes are moist    Eyes:      Extraocular Movements: Extraocular movements intact  Conjunctiva/sclera: Conjunctivae normal       Pupils: Pupils are equal, round, and reactive to light  Neck:      Musculoskeletal: Normal range of motion and neck supple  Thyroid: No thyromegaly  Cardiovascular:      Rate and Rhythm: Normal rate and regular rhythm  Pulses: Normal pulses  Heart sounds: Normal heart sounds  No murmur  Pulmonary:      Effort: Pulmonary effort is normal  No respiratory distress  Breath sounds: Normal breath sounds  No wheezing or rales  Abdominal:      General: Abdomen is flat  Bowel sounds are normal       Palpations: Abdomen is soft  Musculoskeletal: Normal range of motion           General: Tenderness (  Right knee walks with a limp) and signs of injury present  No swelling or deformity  Skin:     General: Skin is warm and dry  Neurological:      General: No focal deficit present  Mental Status: She is alert and oriented to person, place, and time  Deep Tendon Reflexes: Reflexes are normal and symmetric  Psychiatric:         Mood and Affect: Mood normal          Thought Content: Thought content normal          Judgment: Judgment normal            Vitals:    03/25/21 1203   BP: 132/80   BP Location: Left arm   Patient Position: Sitting   Cuff Size: Standard   Pulse: 87   Resp: 15   Temp: 97 5 °F (36 4 °C)   TempSrc: Temporal   SpO2: 94%   Weight: 62 9 kg (138 lb 9 6 oz)   Height: 5' 3" (1 6 m)       Transitional Care Management Review:  Alfonzo Vargas is a 56 y o  female here for TCM follow up  During the TCM phone call patient stated:    TCM Call (since 2/22/2021)     Date and time call was made  3/23/2021  9:29 AM    Hospital care reviewed  Records reviewed    Patient was hospitial[facility] at  [facility]        Date of Admission  03/17/21    Date of discharge  03/20/21    Diagnosis  Acute pain of right knee    Disposition  Home; Home health services    Current Symptoms  -- (Comment)  right knee pain-no improvement      TCM Call (since 2/22/2021)     Post hospital issues  None    Scheduled for follow up?   Yes    Patients specialists  Other (comment)    [facility]    Did you obtain your prescribed medications  Yes (Comment)  lidocaine 5 % patch, oxycodone    Do you need help managing your prescriptions or medications  No    Is transportation to your appointment needed  No    I have advised the patient to call PCP with any new or worsening symptoms  Jenniffer Anna LPN    Are you recieving any outpatient services  No    Are you recieving home care services  No    Interperter language line needed  No    Counseling  Patient    Counseling topics  Importance of RX compliance              Arian Butler SYLVESTER

## 2021-03-26 DIAGNOSIS — I10 ESSENTIAL HYPERTENSION: ICD-10-CM

## 2021-03-26 RX ORDER — ATORVASTATIN CALCIUM 40 MG/1
40 TABLET, FILM COATED ORAL DAILY
Qty: 90 TABLET | Refills: 3 | Status: SHIPPED | OUTPATIENT
Start: 2021-03-26 | End: 2021-12-19

## 2021-03-31 ENCOUNTER — APPOINTMENT (OUTPATIENT)
Dept: RADIOLOGY | Facility: CLINIC | Age: 61
End: 2021-03-31
Payer: COMMERCIAL

## 2021-03-31 ENCOUNTER — OFFICE VISIT (OUTPATIENT)
Dept: OBGYN CLINIC | Facility: CLINIC | Age: 61
End: 2021-03-31
Payer: COMMERCIAL

## 2021-03-31 VITALS
HEART RATE: 87 BPM | HEIGHT: 63 IN | DIASTOLIC BLOOD PRESSURE: 96 MMHG | WEIGHT: 144.2 LBS | BODY MASS INDEX: 25.55 KG/M2 | RESPIRATION RATE: 22 BRPM | SYSTOLIC BLOOD PRESSURE: 179 MMHG

## 2021-03-31 DIAGNOSIS — M23.91 INTERNAL DERANGEMENT OF RIGHT KNEE: ICD-10-CM

## 2021-03-31 DIAGNOSIS — M25.561 CHRONIC PAIN OF RIGHT KNEE: ICD-10-CM

## 2021-03-31 DIAGNOSIS — M76.899 QUADRICEPS TENDONITIS: ICD-10-CM

## 2021-03-31 DIAGNOSIS — G89.29 CHRONIC PAIN OF RIGHT KNEE: ICD-10-CM

## 2021-03-31 DIAGNOSIS — M25.561 CHRONIC PAIN OF RIGHT KNEE: Primary | ICD-10-CM

## 2021-03-31 DIAGNOSIS — G89.29 CHRONIC PAIN OF RIGHT KNEE: Primary | ICD-10-CM

## 2021-03-31 PROCEDURE — 3008F BODY MASS INDEX DOCD: CPT | Performed by: ORTHOPAEDIC SURGERY

## 2021-03-31 PROCEDURE — 1111F DSCHRG MED/CURRENT MED MERGE: CPT | Performed by: ORTHOPAEDIC SURGERY

## 2021-03-31 PROCEDURE — 4004F PT TOBACCO SCREEN RCVD TLK: CPT | Performed by: ORTHOPAEDIC SURGERY

## 2021-03-31 PROCEDURE — 73560 X-RAY EXAM OF KNEE 1 OR 2: CPT

## 2021-03-31 PROCEDURE — 99213 OFFICE O/P EST LOW 20 MIN: CPT | Performed by: ORTHOPAEDIC SURGERY

## 2021-03-31 RX ORDER — IBUPROFEN 800 MG/1
800 TABLET ORAL EVERY 8 HOURS PRN
Qty: 30 TABLET | Refills: 0 | Status: SHIPPED | OUTPATIENT
Start: 2021-03-31

## 2021-03-31 NOTE — PROGRESS NOTES
Patient Name:  Yolis Aranda  MRN:  [id number]    Assessment & Plan     1  Chronic pain of right knee  -     XR knee 1 or 2 vw right; Future; Expected date: 03/31/2021    2  Internal derangement of right knee  -     Ambulatory referral to Physical Therapy; Future  -     CT knee right arthrogram; Future; Expected date: 03/31/2021  -     ibuprofen (MOTRIN) 800 mg tablet; Take 1 tablet (800 mg total) by mouth every 8 (eight) hours as needed for mild pain  -     FL injection right knee (arthrogram); Future; Expected date: 03/31/2021    3  Quadriceps tendonitis  -     Ambulatory referral to Physical Therapy; Future  -     ibuprofen (MOTRIN) 800 mg tablet; Take 1 tablet (800 mg total) by mouth every 8 (eight) hours as needed for mild pain        56year old female with Right knee quadriceps tendoitis  Discussed CT and new xrays obtained in the office  In light of moderate pain with special testing, laxity with Lachma's, will move forward with CT arthrogram of Right knee to evaluate for internal derangement  Patient verbalized understanding of the above and would like to proceed  Prescribed ibuprofen 800mg to take 3 times daily for 3-4 days, then take as needed for pain  Patient reports allergy to aspirin, but has been administering ibupfroen at home without reaction or difficulty  Also placed order for physical therapy, but patient already having home therapy  We will see patient back after imaging for reevaluation  Chief Complaint     Right knee pain    History of the Present Illness     Yolis Aranda is a 56 y o  female with Right knee pain  Patient reports mechanical fall about 4 months ago as she was outside looking for her cat  Patient fell on sidewalk and landed directly onto her Right knee  Patient did report to [facility] for xrays at that time demonstrating no acute fracture or dislocation  She experienced giving away of knee and continued pain   Patient sustained additional mechanical fall on 03/17/2021 due to giving away of Right knee  Patient was admitted to the hospital where xrays and CT scan were performed  Today, patient reports she still is experiencing pain and must use walker for safe ambulation  She has been taking oxycodone for pain as prescribed from [facility] when admitted to hospital  Denies additional falls since hospital admission  Review of Systems     Review of Systems   Constitutional: Negative for chills and fever  HENT: Negative for congestion  Respiratory: Negative for cough, chest tightness and shortness of breath  Cardiovascular: Negative for chest pain and palpitations  Gastrointestinal: Negative for abdominal pain  Endocrine: Negative for cold intolerance and heat intolerance  Musculoskeletal: Negative for arthralgias, back pain and myalgias  Neurological: Negative for syncope  Psychiatric/Behavioral: Negative for confusion  Physical Exam     BP (!) 179/96   Pulse 87   Resp 22   Ht 5' 3" (1 6 m)   Wt 65 4 kg (144 lb 3 2 oz)   LMP  (LMP Unknown)   BMI 25 54 kg/m²     Right knee: Range of motion from 0 to 115 with pain at superior pole of patella  There is no crepitus with range of motion  There is no effusion  There is tenderness over the medial joint line, superior pole of patella at quadriceps insertion  No palpable defect at quad tendon insertion  There is intact quadriceps strength and tone; pain noted with testing of extensor mechanism  The patient can perform a straight leg raise  Mild increase anterior tibial translation with anterior drawer and Lachman's as compared to contralateral side without firm endpoint  Negative posterior drawer  There is no varus or valgus instability at 0 or 30 degrees of knee flexion  Sonny's testing  Elicits medial pain  The patient is neurovascular intact distally  Eyes:  Anicteric sclerae  Neck:  Supple  Lungs:  Normal respiratory effort  Cardiovascular:  Capillary refill is less than 2 seconds    Skin: Intact without erythema  Neurologic:  Sensation grossly intact to light touch  Psychiatric:  Mood and affect are appropriate  Data Review     I have personally reviewed pertinent films in PACS, and my interpretation follows:    Xrays taken of Right knee demonstrates no acute fracture, dislocation or significant degenerative changes  CT scan performed while admitted to [facility] demonstrates possible chronic fracture fragments of posterior aspect at lateral tibial plateau       Past Medical History:   Diagnosis Date    Alcohol abuse 12/31/2018    Anxiety     Bipolar 1 disorder ([facility] [address] )     Mental Health problems listed as Denied in [employer], cant't entire under pertinent negatives due to this diagnosis    Chronic back pain     COPD (chronic obstructive pulmonary disease) ([facility] [address] )     Frequent headaches     Glaucoma     Hyperlipidemia     Irregular heart beat     AFib    Ovarian cancer ([facility] [address] )     last assessed - 21Sep2016    Psychiatric disorder     bipolar    Shortness of breath     Uterine carcinoma ([facility] [address] )     last assessed - 21Sep2016       Past Surgical History:   Procedure Laterality Date    ADENOIDECTOMY      Tonsillectomy with Adenoidectomy - last assessed - 21Sep2016    APPENDECTOMY      last assessed - 21Sep2016    CATARACT EXTRACTION Left     Remove cataract, corneo-scleral section of left eye; last assessed - 21Sep2016    CHOLECYSTECTOMY      last assessed - 29Sep2016   [facility] EYE SURGERY      Anterior sclera fistulization for glaucoma; last assessed - 21Sep2016    HYSTERECTOMY      KRYSTA-BSO; last assessed - 21Sep2016    INTRAOCULAR LENS INSERTION      IL TEMPORAL ARTERY LIGATN OR BX Right 12/16/2016    Procedure: BIOPSY ARTERY TEMPORAL;  Surgeon: Ike Goodrich MD;  Location: [facility];  Service: General    TONSILLECTOMY      Tonsillectomy with Adenoidectomy - last assessed - 21Sep2016       Allergies   Allergen Reactions    Aspirin Anaphylaxis and Other (See Comments)    Bee Venom Anaphylaxis    Robaxin [Methocarbamol] Anaphylaxis and Seizures     Seizures per pt       Tramadol Hives, Shortness Of Breath and Other (See Comments)     Other reaction(s): Nausea and/or vomiting  Pt received morphine IV 7-6-18    Ketorolac     Nitroglycerin Other (See Comments)     Patient states"she gets headaches from nitro"    Omeprazole GI Intolerance     Other reaction(s): Nausea and/or vomiting       Current Outpatient Medications on File Prior to Visit   Medication Sig Dispense Refill    acetaminophen (TYLENOL) 325 mg tablet Take 3 tablets (975 mg total) by mouth every 8 (eight) hours  0    albuterol (2 5 mg/3 mL) 0 083 % nebulizer solution inhale contents of 1 vial in nebulizer every 6 hours if needed for wheezing shortness of breath 150 mL 0    atorvastatin (LIPITOR) 40 mg tablet Take 1 tablet (40 mg total) by mouth daily 90 tablet 3    citalopram (CELEXA) 40 mg tablet Take 40 mg by mouth daily        clonazePAM (KlonoPIN) 1 mg tablet 1 mg 2 (two) times a day as needed for anxiety    0    cyclobenzaprine (FLEXERIL) 5 mg tablet Take 2 tablets (10 mg total) by mouth 3 (three) times a day as needed for muscle spasms 60 tablet 2    famotidine (PEPCID) 20 mg tablet Take 2 tablets (40 mg total) by mouth daily 90 tablet 2    gabapentin (NEURONTIN) 800 mg tablet Take 800 mg by mouth 3 (three) times a day      ibuprofen (MOTRIN) 800 mg tablet Take 1 tablet (800 mg total) by mouth 3 (three) times a day (Patient taking differently: Take 800 mg by mouth as needed ) 21 tablet 0    metoprolol tartrate (LOPRESSOR) 50 mg tablet Take 1 tablet (50 mg total) by mouth every 12 (twelve) hours 60 tablet 11    mirtazapine (REMERON) 30 mg tablet Take 30 mg by mouth daily at bedtime        nitroglycerin (NITROSTAT) 0 4 mg SL tablet Place under the tongue      lidocaine (LIDODERM) 5 % Apply 1 patch topically daily Remove & Discard patch within 12 hours or as directed by MD (Patient not taking: Reported on 3/31/2021) 10 patch 0    naproxen (NAPROSYN) 500 mg tablet TAKE 1 TABLET EVERY 12 HOURS AS NEEDED FOR MILD PAIN (PAIN SCORE 1-3)  TAKE WITH MEALS   0    [] oxyCODONE (ROXICODONE) 10 MG TABS Take 1 tablet (10 mg total) by mouth every 4 (four) hours as needed for severe pain for up to 10 daysMax Daily Amount: 60 mg 30 tablet 0    triamcinolone (KENALOG) 0 5 % cream Triamcinolone Acetonide 0 5 % External Cream  APPLY 2-3 TIMES DAILY TO AFFECTED AREA(S)  Quantity: 1;  Refills: 3      Norris Knight ;  Start 5-Oct-2017  Active  15 GM Tube       No current facility-administered medications on file prior to visit          Social History     Tobacco Use    Smoking status: Current Every Day Smoker     Packs/day: 0 25     Years: 46 00     Pack years: 11 50     Types: Cigarettes    Smokeless tobacco: Never Used   Substance Use Topics    Alcohol use: Not Currently     Comment: occasional; (No alcohol use per [employer])    Drug use: No       Family History   Problem Relation Age of Onset    Heart disease Mother     Coronary artery disease Mother     Other Mother         1) Gangrene; 2) Peripheral arterial disease    Hyperlipidemia Mother         Hypercholesterolemia    Stomach cancer Mother     Heart attack Mother         Myocardial Infarction    Other Father         1) Gangrene; 2) Peripheral arterial disease    Hyperlipidemia Father         Hypercholesterolemia    Stomach cancer Father     Heart attack Father         Myocardial Infarction    Stomach cancer Brother     Heart attack Brother         Myocardial Infarction    Stomach cancer Maternal Uncle              Procedures Performed     Procedures  None      Nancyann Carrel, PA-C

## 2021-04-27 ENCOUNTER — IMMUNIZATIONS (OUTPATIENT)
Dept: FAMILY MEDICINE CLINIC | Facility: HOSPITAL | Age: 61
End: 2021-04-27

## 2021-04-27 DIAGNOSIS — Z23 ENCOUNTER FOR IMMUNIZATION: Primary | ICD-10-CM

## 2021-04-27 PROCEDURE — 0001A SARS-COV-2 / COVID-19 MRNA VACCINE (PFIZER-BIONTECH) 30 MCG: CPT

## 2021-04-27 PROCEDURE — 91300 SARS-COV-2 / COVID-19 MRNA VACCINE (PFIZER-BIONTECH) 30 MCG: CPT

## 2021-05-18 ENCOUNTER — IMMUNIZATIONS (OUTPATIENT)
Dept: FAMILY MEDICINE CLINIC | Facility: HOSPITAL | Age: 61
End: 2021-05-18

## 2021-05-18 DIAGNOSIS — Z23 ENCOUNTER FOR IMMUNIZATION: Primary | ICD-10-CM

## 2021-05-18 PROCEDURE — 91300 SARS-COV-2 / COVID-19 MRNA VACCINE (PFIZER-BIONTECH) 30 MCG: CPT

## 2021-05-18 PROCEDURE — 0002A SARS-COV-2 / COVID-19 MRNA VACCINE (PFIZER-BIONTECH) 30 MCG: CPT

## 2021-06-09 ENCOUNTER — APPOINTMENT (EMERGENCY)
Dept: RADIOLOGY | Facility: HOSPITAL | Age: 61
End: 2021-06-09
Payer: COMMERCIAL

## 2021-06-09 ENCOUNTER — HOSPITAL ENCOUNTER (EMERGENCY)
Facility: HOSPITAL | Age: 61
Discharge: HOME | End: 2021-06-09
Attending: EMERGENCY MEDICINE
Payer: COMMERCIAL

## 2021-06-09 ENCOUNTER — APPOINTMENT (EMERGENCY)
Dept: CT IMAGING | Facility: HOSPITAL | Age: 61
End: 2021-06-09
Payer: COMMERCIAL

## 2021-06-09 VITALS
OXYGEN SATURATION: 96 % | TEMPERATURE: 98 F | RESPIRATION RATE: 18 BRPM | DIASTOLIC BLOOD PRESSURE: 78 MMHG | SYSTOLIC BLOOD PRESSURE: 117 MMHG | HEART RATE: 97 BPM
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
## 2021-06-09 DIAGNOSIS — S39.012A LOW BACK STRAIN: ICD-10-CM

## 2021-06-09 DIAGNOSIS — S70.01XA CONTUSION OF RIGHT HIP: ICD-10-CM

## 2021-06-09 PROCEDURE — 99284 EMERGENCY DEPT VISIT MOD MDM: CPT

## 2021-06-09 PROCEDURE — 72131 CT LUMBAR SPINE W/O DYE: CPT

## 2021-06-09 PROCEDURE — 73502 X-RAY EXAM HIP UNI 2-3 VIEWS: CPT

## 2021-06-09 PROCEDURE — 99284 EMERGENCY DEPT VISIT MOD MDM: CPT | Performed by: PHYSICIAN ASSISTANT

## 2021-06-09 RX ORDER — OXYCODONE HYDROCHLORIDE AND ACETAMINOPHEN 5; 325 MG/1; MG/1
1 TABLET ORAL ONCE
Status: COMPLETED | OUTPATIENT
Start: 2021-06-09 | End: 2021-06-09

## 2021-06-09 RX ADMIN — OXYCODONE HYDROCHLORIDE AND ACETAMINOPHEN 1 TABLET: 5; 325 TABLET ORAL at 18:31

## 2021-06-09 NOTE — ED PROVIDER NOTES
History  Chief Complaint   Patient presents with   Migue Ferrell     pt reports slipping in the shower today landing on R hip/lower back, denies head strike, denies loc, denies thinners     57 yo here for eval after fall from standing while getting out of shower/tub  Landed on low back and right hip  Constant pain since then  No head injury  No LOC  No numbness, tingling, weakness in her extremities  Denies bruising  History provided by:  Patient   used: No    Fall  Mechanism of injury: fall    Injury location: right hip and low back  Incident location:  Home  Time since incident:  2 hours  Arrived directly from scene: yes    Fall:     Fall occurred:  Tripped    Height of fall:  Standing    Impact surface:  Hard floor    Entrapped after fall: no    Suspicion of alcohol use: no    Suspicion of drug use: no    Prior to arrival data:     Bystander interventions:  None  Associated symptoms: back pain    Associated symptoms: no abdominal pain, no blindness, no chest pain, no difficulty breathing, no headaches, no hearing loss, no loss of consciousness, no nausea, no neck pain, no seizures and no vomiting        Prior to Admission Medications   Prescriptions Last Dose Informant Patient Reported?  Taking?   acetaminophen (TYLENOL) 325 mg tablet  Self No No   Sig: Take 3 tablets (975 mg total) by mouth every 8 (eight) hours   albuterol (2 5 mg/3 mL) 0 083 % nebulizer solution  Self No No   Sig: inhale contents of 1 vial in nebulizer every 6 hours if needed for wheezing shortness of breath   atorvastatin (LIPITOR) 40 mg tablet  Self No No   Sig: Take 1 tablet (40 mg total) by mouth daily   citalopram (CELEXA) 40 mg tablet  Self Yes No   Sig: Take 40 mg by mouth daily     clonazePAM (KlonoPIN) 1 mg tablet  Self Yes No   Si mg 2 (two) times a day as needed for anxiety     cyclobenzaprine (FLEXERIL) 5 mg tablet  Self No No   Sig: Take 2 tablets (10 mg total) by mouth 3 (three) times a day as needed for muscle spasms   famotidine (PEPCID) 20 mg tablet  Self No No   Sig: Take 2 tablets (40 mg total) by mouth daily   gabapentin (NEURONTIN) 800 mg tablet  Self Yes No   Sig: Take 800 mg by mouth 3 (three) times a day   ibuprofen (MOTRIN) 800 mg tablet  Self No No   Sig: Take 1 tablet (800 mg total) by mouth 3 (three) times a day   Patient taking differently: Take 800 mg by mouth as needed    ibuprofen (MOTRIN) 800 mg tablet   No No   Sig: Take 1 tablet (800 mg total) by mouth every 8 (eight) hours as needed for mild pain   lidocaine (LIDODERM) 5 %  Self No No   Sig: Apply 1 patch topically daily Remove & Discard patch within 12 hours or as directed by MD   Patient not taking: Reported on 3/31/2021   metoprolol tartrate (LOPRESSOR) 50 mg tablet  Self No No   Sig: Take 1 tablet (50 mg total) by mouth every 12 (twelve) hours   mirtazapine (REMERON) 30 mg tablet  Self Yes No   Sig: Take 30 mg by mouth daily at bedtime     naproxen (NAPROSYN) 500 mg tablet  Self Yes No   Sig: TAKE 1 TABLET EVERY 12 HOURS AS NEEDED FOR MILD PAIN (PAIN SCORE 1-3)  TAKE WITH MEALS  nitroglycerin (NITROSTAT) 0 4 mg SL tablet  Self Yes No   Sig: Place under the tongue   triamcinolone (KENALOG) 0 5 % cream  Self Yes No   Sig: Triamcinolone Acetonide 0 5 % External Cream  APPLY 2-3 TIMES DAILY TO AFFECTED AREA(S)     Quantity: 1;  Refills: 3      Norris Knight ;  Start 5-Oct-2017  Active  15 GM Tube      Facility-Administered Medications: None       Past Medical History:   Diagnosis Date    Alcohol abuse 12/31/2018    Anxiety     Bipolar 1 disorder ([facility] [address] [facility]     Mental Health problems listed as Denied in Allscripts, cant't entire under pertinent negatives due to this diagnosis    Chronic back pain     COPD (chronic obstructive pulmonary disease) ([facility] [address] [facility]     Frequent headaches     Glaucoma     Hyperlipidemia     Irregular heart beat     AFib    Ovarian cancer ([facility] [address] )     last assessed - [id number]    Psychiatric disorder     bipolar    Shortness of breath     [facility]ine carcinoma ([facility] [address] )     last assessed - 21Sep2016       Past Surgical History:   Procedure Laterality Date    ADENOIDECTOMY      Tonsillectomy with Adenoidectomy - last assessed - 21Sep2016    APPENDECTOMY      last assessed - 21Sep2016    CATARACT EXTRACTION Left     Remove cataract, corneo-scleral section of left eye; last assessed - 21Sep2016    CHOLECYSTECTOMY      last assessed - 29Sep2016   Aleida Riis EYE SURGERY      Anterior sclera fistulization for glaucoma; last assessed - 21Sep2016    HYSTERECTOMY      KRYSTA-BSO; last assessed - 21Sep2016    INTRAOCULAR LENS INSERTION      RI TEMPORAL ARTERY LIGATN OR BX Right 12/16/2016    Procedure: BIOPSY ARTERY TEMPORAL;  Surgeon: Jeronimo Velez MD;  Location: [facility];  Service: General    TONSILLECTOMY      Tonsillectomy with Adenoidectomy - last assessed - 21Sep2016       Family History   Problem Relation Age of Onset    Heart disease Mother     Coronary artery disease Mother     Other Mother         1) Gangrene; 2) Peripheral arterial disease    Hyperlipidemia Mother         Hypercholesterolemia    Stomach cancer Mother     Heart attack Mother         Myocardial Infarction    Other Father         1) Gangrene; 2) Peripheral arterial disease    Hyperlipidemia Father         Hypercholesterolemia    Stomach cancer Father     Heart attack Father         Myocardial Infarction    Stomach cancer Brother     Heart attack Brother         Myocardial Infarction    Stomach cancer Maternal Uncle      I have reviewed and agree with the history as documented      E-Cigarette/Vaping    E-Cigarette Use Never User      E-Cigarette/Vaping Substances    Nicotine No     THC No     CBD No     Flavoring No     Other No     Unknown No      Social History     Tobacco Use    Smoking status: Current Every Day Smoker     Packs/day: 0 25     Years: 46 00     Pack years: 11 50     Types: Cigarettes    Smokeless tobacco: Never Used   Substance Use Topics    Alcohol use: Not Currently     Comment: occasional; (No alcohol use per Allscripts)    Drug use: No       Review of Systems   Constitutional: Negative  HENT: Negative for dental problem, ear pain, hearing loss, nosebleeds, tinnitus and trouble swallowing  Eyes: Negative for blindness, photophobia, pain and visual disturbance  Respiratory: Negative for apnea, cough, choking, chest tightness, shortness of breath, wheezing and stridor  Cardiovascular: Negative for chest pain  Gastrointestinal: Negative for abdominal pain, nausea and vomiting  Genitourinary: Negative for decreased urine volume and enuresis  Musculoskeletal: Positive for back pain  Negative for myalgias, neck pain and neck stiffness  Skin: Negative for pallor, rash and wound  Allergic/Immunologic: Negative  Neurological: Negative for dizziness, seizures, loss of consciousness, syncope, speech difficulty, weakness, light-headedness, numbness and headaches  Physical Exam  Physical Exam  Vitals signs and nursing note reviewed  Constitutional:       General: She is not in acute distress  Appearance: She is well-developed  She is not ill-appearing, toxic-appearing or diaphoretic  HENT:      Head: Normocephalic and atraumatic  Eyes:      Conjunctiva/sclera: Conjunctivae normal    Neck:      Musculoskeletal: Neck supple  Cardiovascular:      Rate and Rhythm: Normal rate and regular rhythm  Pulses:           Dorsalis pedis pulses are 2+ on the right side  Heart sounds: No murmur  Pulmonary:      Effort: Pulmonary effort is normal  No respiratory distress  Breath sounds: Normal breath sounds  Abdominal:      Palpations: Abdomen is soft  Tenderness: There is no abdominal tenderness  Musculoskeletal:      Right hip: She exhibits tenderness and bony tenderness  She exhibits normal range of motion, normal strength and no swelling        Right knee: Normal       Lumbar back: She exhibits tenderness, bony tenderness and pain  She exhibits normal range of motion, no swelling, no edema, no deformity and no laceration  Skin:     General: Skin is warm and dry  Neurological:      Mental Status: She is alert  Vital Signs  ED Triage Vitals   Temperature Pulse Respirations Blood Pressure SpO2   06/09/21 1747 06/09/21 1747 06/09/21 1747 06/09/21 1747 06/09/21 1747   98 °F (36 7 °C) 97 18 117/78 96 %      Temp Source Heart Rate Source Patient Position - Orthostatic VS BP Location FiO2 (%)   06/09/21 1747 06/09/21 1747 06/09/21 1747 06/09/21 1747 --   Oral Monitor Sitting Left arm       Pain Score       06/09/21 1831       Worst Possible Pain           Vitals:    06/09/21 1747   BP: 117/78   Pulse: 97   Patient Position - Orthostatic VS: Sitting         Visual Acuity  Visual Acuity      Most Recent Value   L Pupil Size (mm)  3   R Pupil Size (mm)  3          ED Medications  Medications   oxyCODONE-acetaminophen (PERCOCET) 5-325 mg per tablet 1 tablet (1 tablet Oral Given 6/9/21 1831)       Diagnostic Studies  Results Reviewed     None                 XR hip/pelv 2-3 vws right if performed   ED Interpretation by Ludmila Spence PA-C (06/09 1907)   No acute osseous abnormalities  Final Result by Tino Gaitan MD (06/10 8231)      No acute osseous abnormality  Workstation performed: [id number]         CT spine lumbar without contrast   Final Result by Quinn Velásquez MD (06/09 1913)    IMPRESSION:      No acute fracture  Stable degenerative spondylosis most pronounced at L4-5 with left central/subarticular disc herniation  Again correlate for left L5 radiculopathy  Workstation performed: [id number]                    Procedures  Procedures         ED Course                             SBIRT 20yo+      Most Recent Value   SBIRT (23 yo +)   In order to provide better care to our patients, we are screening all of our patients for alcohol and drug use   Would it be okay to ask you these screening questions? No Filed at: 06/09/2021 1819                    [facility]  Number of Diagnoses or Management Options  Contusion of right hip: new and requires workup  Fall: new and requires workup  Low back strain: new and requires workup  Diagnosis management comments: Differential diagnosis including but not limited to: sprain, strain, fracture, dislocation, contusion; doubt compartment syndrome  Plan: XR hip pelvis  CT lumbar spine  Amount and/or Complexity of Data Reviewed  Tests in the radiology section of CPT®: ordered and reviewed  Independent visualization of images, tracings, or specimens: yes    Risk of Complications, Morbidity, and/or Mortality  Presenting problems: low  Management options: low  General comments: 57 yo with back pain and hip pain after a fall  XR and CT unremarkable  Likely contusion and sprain injuries  Recommended OTC meds for pain  F/u with PCP and ortho as needed  Return parameters provided  Pt understands and agrees with plan  Patient Progress  Patient progress: stable      Disposition  Final diagnoses:   Fall   Contusion of right hip   Low back strain     Time reflects when diagnosis was documented in both MDM as applicable and the Disposition within this note     Time User Action Codes Description Comment    6/9/2021  7:36 PM Alysa Gouty Add [F99  XXXA] Fall     6/9/2021  7:36 PM Malden Gouty Add [S70 01XA] Contusion of right hip     6/9/2021  7:37 PM Alysa Gouty Add [I83 448D] Low back strain       ED Disposition     ED Disposition Condition Date/Time Comment    Discharge Stable Wed Jun 9, 2021  7:36 PM Karol Hernández discharge to home/self care              Follow-up Information     Follow up With Specialties Details Why Contact Info Additional 430 Muriel Loya MD Internal Medicine   [address] [place] [address] [facility]   [address] [facility]  [address] [id number]  [address] [facility], [address] [address] [place], [place], [address] [facility]          Discharge Medication List as of 6/9/2021  7:37 PM      CONTINUE these medications which have NOT CHANGED    Details   acetaminophen (TYLENOL) 325 mg tablet Take 3 tablets (975 mg total) by mouth every 8 (eight) hours, Starting Sat 3/20/2021, No Print      albuterol (2 5 mg/3 mL) 0 083 % nebulizer solution inhale contents of 1 vial in nebulizer every 6 hours if needed for wheezing shortness of breath, Normal      atorvastatin (LIPITOR) 40 mg tablet Take 1 tablet (40 mg total) by mouth daily, Starting Fri 3/26/2021, Normal      citalopram (CELEXA) 40 mg tablet Take 40 mg by mouth daily  , Until Discontinued, Historical Med      clonazePAM (KlonoPIN) 1 mg tablet 1 mg 2 (two) times a day as needed for anxiety  , Starting Fri 4/20/2018, Historical Med      cyclobenzaprine (FLEXERIL) 5 mg tablet Take 2 tablets (10 mg total) by mouth 3 (three) times a day as needed for muscle spasms, Starting u 3/25/2021, Until Sat 4/24/2021, Normal      famotidine (PEPCID) 20 mg tablet Take 2 tablets (40 mg total) by mouth daily, Starting u 3/25/2021, Normal      gabapentin (NEURONTIN) 800 mg tablet Take 800 mg by mouth 3 (three) times a day, Historical Med      !! ibuprofen (MOTRIN) 800 mg tablet Take 1 tablet (800 mg total) by mouth 3 (three) times a day, Starting Fri 11/22/2019, Print      !! ibuprofen (MOTRIN) 800 mg tablet Take 1 tablet (800 mg total) by mouth every 8 (eight) hours as needed for mild pain, Starting Wed 3/31/2021, Normal      lidocaine (LIDODERM) 5 % Apply 1 patch topically daily Remove & Discard patch within 12 hours or as directed by MD, Starting Sun 3/21/2021, Normal      metoprolol tartrate (LOPRESSOR) 50 mg tablet Take 1 tablet (50 mg total) by mouth every 12 (twelve) hours, Starting Mon 8/10/2020, Normal      mirtazapine (REMERON) 30 mg tablet Take 30 mg by mouth daily at bedtime  , Historical Med      naproxen (NAPROSYN) 500 mg tablet TAKE 1 TABLET EVERY 12 HOURS AS NEEDED FOR MILD PAIN (PAIN SCORE 1-3)  TAKE WITH MEALS , Historical Med      nitroglycerin (NITROSTAT) 0 4 mg SL tablet Place under the tongue, Historical Med      triamcinolone (KENALOG) 0 5 % cream Triamcinolone Acetonide 0 5 % External Cream  APPLY 2-3 TIMES DAILY TO AFFECTED AREA(S)  Quantity: 1;  Refills: 3      Norris Knight ;  Start 5-Oct-2017  Active  15 GM Tube, Historical Med       !! - Potential duplicate medications found  Please discuss with provider  No discharge procedures on file      PDMP Review       Value Time User    PDMP Reviewed  Yes 3/17/2021  6:14 PM Sarah Marie MD          ED Provider  Electronically Signed by           Aniyah Lynch PA-C  06/11/21 5985

## 2021-08-06 DIAGNOSIS — I10 ESSENTIAL HYPERTENSION: ICD-10-CM

## 2021-08-10 RX ORDER — METOPROLOL TARTRATE 50 MG/1
TABLET, FILM COATED ORAL
Qty: 60 TABLET | Refills: 11 | Status: SHIPPED | OUTPATIENT
Start: 2021-08-10 | End: 2022-06-27

## 2021-08-30 ENCOUNTER — APPOINTMENT (EMERGENCY)
Dept: CT IMAGING | Facility: HOSPITAL | Age: 61
End: 2021-08-30
Payer: COMMERCIAL

## 2021-08-30 ENCOUNTER — HOSPITAL ENCOUNTER (EMERGENCY)
Facility: HOSPITAL | Age: 61
Discharge: HOME | End: 2021-08-30
Attending: EMERGENCY MEDICINE | Admitting: EMERGENCY MEDICINE
Payer: COMMERCIAL

## 2021-08-30 VITALS
RESPIRATION RATE: 16 BRPM | DIASTOLIC BLOOD PRESSURE: 97 MMHG | SYSTOLIC BLOOD PRESSURE: 195 MMHG | TEMPERATURE: 98.6 F | OXYGEN SATURATION: 97 % | HEART RATE: 62 BPM

## 2021-08-30 DIAGNOSIS — M54.50 ACUTE EXACERBATION OF CHRONIC LOW BACK PAIN: Primary | ICD-10-CM

## 2021-08-30 DIAGNOSIS — G89.29 ACUTE EXACERBATION OF CHRONIC LOW BACK PAIN: Primary | ICD-10-CM

## 2021-08-30 LAB
ALBUMIN SERPL BCP-MCNC: 3.9 G/DL (ref 3.5–5)
ALP SERPL-CCNC: 109 U/L (ref 46–116)
ALT SERPL W P-5'-P-CCNC: 32 U/L (ref 12–78)
ANION GAP SERPL CALCULATED.3IONS-SCNC: 12 MMOL/L (ref 4–13)
AST SERPL W P-5'-P-CCNC: 21 U/L (ref 5–45)
BACTERIA UR QL AUTO: ABNORMAL /HPF
BASOPHILS # BLD AUTO: 0.05 THOUSANDS/ΜL (ref 0–0.1)
BASOPHILS NFR BLD AUTO: 1 % (ref 0–1)
BILIRUB SERPL-MCNC: 0.31 MG/DL (ref 0.2–1)
BILIRUB UR QL STRIP: NEGATIVE
BUN SERPL-MCNC: 13 MG/DL (ref 5–25)
CALCIUM SERPL-MCNC: 9 MG/DL (ref 8.3–10.1)
CHLORIDE SERPL-SCNC: 107 MMOL/L (ref 100–108)
CLARITY UR: CLEAR
CO2 SERPL-SCNC: 25 MMOL/L (ref 21–32)
COLOR UR: ABNORMAL
CREAT SERPL-MCNC: 0.75 MG/DL (ref 0.6–1.3)
EOSINOPHIL # BLD AUTO: 0.18 THOUSAND/ΜL (ref 0–0.61)
EOSINOPHIL NFR BLD AUTO: 3 % (ref 0–6)
ERYTHROCYTE [DISTWIDTH] IN BLOOD BY AUTOMATED COUNT: 12.7 % (ref 11.6–15.1)
GFR SERPL CREATININE-BSD FRML MDRD: 85 ML/MIN/1.73SQ M
GLUCOSE SERPL-MCNC: 95 MG/DL (ref 65–140)
GLUCOSE UR STRIP-MCNC: NEGATIVE MG/DL
HCT VFR BLD AUTO: 46.1 % (ref 34.8–46.1)
HGB BLD-MCNC: 15.4 G/DL (ref 11.5–15.4)
HGB UR QL STRIP.AUTO: ABNORMAL
IMM GRANULOCYTES # BLD AUTO: 0.02 THOUSAND/UL (ref 0–0.2)
IMM GRANULOCYTES NFR BLD AUTO: 0 % (ref 0–2)
KETONES UR STRIP-MCNC: NEGATIVE MG/DL
LEUKOCYTE ESTERASE UR QL STRIP: NEGATIVE
LIPASE SERPL-CCNC: 61 U/L (ref 73–393)
LYMPHOCYTES # BLD AUTO: 2.2 THOUSANDS/ΜL (ref 0.6–4.47)
LYMPHOCYTES NFR BLD AUTO: 36 % (ref 14–44)
MCH RBC QN AUTO: 31.9 PG (ref 26.8–34.3)
MCHC RBC AUTO-ENTMCNC: 33.4 G/DL (ref 31.4–37.4)
MCV RBC AUTO: 95 FL (ref 82–98)
MONOCYTES # BLD AUTO: 0.31 THOUSAND/ΜL (ref 0.17–1.22)
MONOCYTES NFR BLD AUTO: 5 % (ref 4–12)
MUCOUS THREADS UR QL AUTO: ABNORMAL
NEUTROPHILS # BLD AUTO: 3.29 THOUSANDS/ΜL (ref 1.85–7.62)
NEUTS SEG NFR BLD AUTO: 55 % (ref 43–75)
NITRITE UR QL STRIP: NEGATIVE
NON-SQ EPI CELLS URNS QL MICRO: ABNORMAL /HPF
NRBC BLD AUTO-RTO: 0 /100 WBCS
PH UR STRIP.AUTO: 6 [PH]
PLATELET # BLD AUTO: 165 THOUSANDS/UL (ref 149–390)
PMV BLD AUTO: 9.3 FL (ref 8.9–12.7)
POTASSIUM SERPL-SCNC: 4 MMOL/L (ref 3.5–5.3)
PROT SERPL-MCNC: 7.6 G/DL (ref 6.4–8.2)
PROT UR STRIP-MCNC: NEGATIVE MG/DL
RBC # BLD AUTO: 4.83 MILLION/UL (ref 3.81–5.12)
RBC #/AREA URNS AUTO: ABNORMAL /HPF
SODIUM SERPL-SCNC: 144 MMOL/L (ref 136–145)
SP GR UR STRIP.AUTO: 1.02 (ref 1–1.03)
UROBILINOGEN UR QL STRIP.AUTO: 0.2 E.U./DL
WBC # BLD AUTO: 6.05 THOUSAND/UL (ref 4.31–10.16)
WBC #/AREA URNS AUTO: ABNORMAL /HPF

## 2021-08-30 PROCEDURE — 85025 COMPLETE CBC W/AUTO DIFF WBC: CPT | Performed by: EMERGENCY MEDICINE

## 2021-08-30 PROCEDURE — 99285 EMERGENCY DEPT VISIT HI MDM: CPT | Performed by: EMERGENCY MEDICINE

## 2021-08-30 PROCEDURE — 80053 COMPREHEN METABOLIC PANEL: CPT | Performed by: EMERGENCY MEDICINE

## 2021-08-30 PROCEDURE — 83690 ASSAY OF LIPASE: CPT | Performed by: EMERGENCY MEDICINE

## 2021-08-30 PROCEDURE — 96361 HYDRATE IV INFUSION ADD-ON: CPT

## 2021-08-30 PROCEDURE — 36415 COLL VENOUS BLD VENIPUNCTURE: CPT | Performed by: EMERGENCY MEDICINE

## 2021-08-30 PROCEDURE — 81001 URINALYSIS AUTO W/SCOPE: CPT | Performed by: EMERGENCY MEDICINE

## 2021-08-30 PROCEDURE — 74176 CT ABD & PELVIS W/O CONTRAST: CPT

## 2021-08-30 PROCEDURE — 96376 TX/PRO/DX INJ SAME DRUG ADON: CPT

## 2021-08-30 PROCEDURE — 96374 THER/PROPH/DIAG INJ IV PUSH: CPT

## 2021-08-30 PROCEDURE — 99284 EMERGENCY DEPT VISIT MOD MDM: CPT

## 2021-08-30 RX ORDER — CYCLOBENZAPRINE HCL 10 MG
5 TABLET ORAL 3 TIMES DAILY
Qty: 20 TABLET | Refills: 0 | Status: SHIPPED | OUTPATIENT
Start: 2021-08-30

## 2021-08-30 RX ORDER — MORPHINE SULFATE 4 MG/ML
4 INJECTION, SOLUTION INTRAMUSCULAR; INTRAVENOUS ONCE
Status: COMPLETED | OUTPATIENT
Start: 2021-08-30 | End: 2021-08-30

## 2021-08-30 RX ORDER — IBUPROFEN 800 MG/1
800 TABLET ORAL EVERY 6 HOURS PRN
Qty: 30 TABLET | Refills: 0 | Status: SHIPPED | OUTPATIENT
Start: 2021-08-30

## 2021-08-30 RX ADMIN — MORPHINE SULFATE 4 MG: 4 INJECTION INTRAVENOUS at 13:55

## 2021-08-30 RX ADMIN — SODIUM CHLORIDE 1000 ML: 0.9 INJECTION, SOLUTION INTRAVENOUS at 13:56

## 2021-08-30 RX ADMIN — MORPHINE SULFATE 4 MG: 4 INJECTION INTRAVENOUS at 14:49

## 2021-08-30 NOTE — ED PROVIDER NOTES
History  Chief Complaint   Patient presents with    Back Pain     right sided back pain/ flank pain that started last night, has worsened throughout the day  Patient is a 59year old female  She does have history of chronic back pain  She presents to the emergency room complaining of right flank pain that started yesterday  It is progressively got worse  It does not radiate  She denies any injury  No incontinence  No associated motor sensory complaints  No history cancer or IV drug use  She denies any urinary complaints  No hematuria  No nausea or vomiting  No fever or chills  No history of nephrolithiasis  The pain is currently severe  It is worse with movement  No relieving factors  Prior to Admission Medications   Prescriptions Last Dose Informant Patient Reported?  Taking?   acetaminophen (TYLENOL) 325 mg tablet  Self No No   Sig: Take 3 tablets (975 mg total) by mouth every 8 (eight) hours   albuterol (2 5 mg/3 mL) 0 083 % nebulizer solution  Self No No   Sig: inhale contents of 1 vial in nebulizer every 6 hours if needed for wheezing shortness of breath   atorvastatin (LIPITOR) 40 mg tablet  Self No No   Sig: Take 1 tablet (40 mg total) by mouth daily   citalopram (CELEXA) 40 mg tablet  Self Yes No   Sig: Take 40 mg by mouth daily     clonazePAM (KlonoPIN) 1 mg tablet  Self Yes No   Si mg 2 (two) times a day as needed for anxiety     cyclobenzaprine (FLEXERIL) 5 mg tablet  Self No No   Sig: Take 2 tablets (10 mg total) by mouth 3 (three) times a day as needed for muscle spasms   famotidine (PEPCID) 20 mg tablet  Self No No   Sig: Take 2 tablets (40 mg total) by mouth daily   gabapentin (NEURONTIN) 800 mg tablet  Self Yes No   Sig: Take 800 mg by mouth 3 (three) times a day   ibuprofen (MOTRIN) 800 mg tablet  Self No No   Sig: Take 1 tablet (800 mg total) by mouth 3 (three) times a day   Patient taking differently: Take 800 mg by mouth as needed    ibuprofen (MOTRIN) 800 mg tablet   No No   Sig: Take 1 tablet (800 mg total) by mouth every 8 (eight) hours as needed for mild pain   lidocaine (LIDODERM) 5 %  Self No No   Sig: Apply 1 patch topically daily Remove & Discard patch within 12 hours or as directed by MD   Patient not taking: Reported on 3/31/2021   metoprolol tartrate (LOPRESSOR) 50 mg tablet   No No   Sig: take 1 tablet by mouth every 12 hours   mirtazapine (REMERON) 30 mg tablet  Self Yes No   Sig: Take 30 mg by mouth daily at bedtime     naproxen (NAPROSYN) 500 mg tablet  Self Yes No   Sig: TAKE 1 TABLET EVERY 12 HOURS AS NEEDED FOR MILD PAIN (PAIN SCORE 1-3)  TAKE WITH MEALS  nitroglycerin (NITROSTAT) 0 4 mg SL tablet  Self Yes No   Sig: Place under the tongue   triamcinolone (KENALOG) 0 5 % cream  Self Yes No   Sig: Triamcinolone Acetonide 0 5 % External Cream  APPLY 2-3 TIMES DAILY TO AFFECTED AREA(S)     Quantity: 1;  Refills: 3      Norris Knight ;  Start 5-Oct-2017  Active  15 GM Tube      Facility-Administered Medications: None       Past Medical History:   Diagnosis Date    Alcohol abuse 12/31/2018    Anxiety     [facility]     Mental Health problems listed as Denied in Allscripts, cant't entire under pertinent negatives due to this diagnosis    Chronic back pain     COPD [facility]chronic obstructive pulmonary disease) [facility]     Frequent headaches     Glaucoma     Hyperlipidemia     Irregular heart beat     AFib    Ovarian cancer ([facility]     last assessed - 21Sep2016    Psychiatric disorder     bipolar    Shortness of breath     Uterine carcinoma ([facility]     last assessed - 21Sep2016       Past Surgical History:   Procedure Laterality Date    ADENOIDECTOMY      Tonsillectomy with Adenoidectomy - last assessed - 21Sep2016    APPENDECTOMY      last assessed - 21Sep2016    CATARACT EXTRACTION Left     Remove cataract, corneo-scleral section of left eye; last assessed - 21Sep2016    CHOLECYSTECTOMY      last assessed - 29Sep2016   [facility] EYE SURGERY Anterior sclera fistulization for glaucoma; last assessed - 21Sep2016    HYSTERECTOMY      KRYSTA-BSO; last assessed - 21Sep2016    INTRAOCULAR LENS INSERTION      [place] TEMPORAL ARTERY LIGATN OR BX Right 12/16/2016    Procedure: BIOPSY ARTERY TEMPORAL;  Surgeon: Obed Nichols MD;  Location: [facility];  Service: General    TONSILLECTOMY      Tonsillectomy with Adenoidectomy - last assessed - 21Sep2016       Family History   Problem Relation Age of Onset    Heart disease Mother     Coronary artery disease Mother     Other Mother         1) Gangrene; 2) Peripheral arterial disease    Hyperlipidemia Mother         Hypercholesterolemia    Stomach cancer Mother     Heart attack Mother         Myocardial Infarction    Other Father         1) Gangrene; 2) Peripheral arterial disease    Hyperlipidemia Father         Hypercholesterolemia    Stomach cancer Father     Heart attack Father         Myocardial Infarction    Stomach cancer Brother     Heart attack Brother         Myocardial Infarction    Stomach cancer Maternal Uncle      I have reviewed and agree with the history as documented  E-Cigarette/Vaping    E-Cigarette Use Never User      E-Cigarette/Vaping Substances    Nicotine No     THC No     CBD No     Flavoring No     Other No     Unknown No      Social History     Tobacco Use    Smoking status: Current Every Day Smoker     Packs/day: 0 25     Years: 46 00     Pack years: 11 50     Types: Cigarettes    Smokeless tobacco: Never Used   Vaping Use    Vaping Use: Never used   Substance Use Topics    Alcohol use: Not Currently     Comment: occasional; (No alcohol use per Allscripts)    Drug use: No       Review of Systems   Constitutional: Negative for chills and fever  HENT: Negative for rhinorrhea and sore throat  Eyes: Negative for pain, redness and visual disturbance  Respiratory: Negative for cough and shortness of breath      Cardiovascular: Negative for chest pain and leg swelling  Gastrointestinal: Negative for abdominal pain, diarrhea and vomiting  Endocrine: Negative for polydipsia and polyuria  Genitourinary: Positive for flank pain  Negative for dysuria, frequency, hematuria, vaginal bleeding and vaginal discharge  Musculoskeletal: Positive for back pain  Negative for neck pain  Skin: Negative for rash and wound  Allergic/Immunologic: Negative for immunocompromised state  Neurological: Negative for weakness, numbness and headaches  Hematological: Does not bruise/bleed easily  Psychiatric/Behavioral: Negative for hallucinations and suicidal ideas  All other systems reviewed and are negative  Physical Exam  Physical Exam  Vitals reviewed  Constitutional:       General: She is in acute distress  HENT:      Head: Normocephalic and atraumatic  Nose: Nose normal       Mouth/Throat:      Mouth: Mucous membranes are moist    Eyes:      General:         Right eye: No discharge  Left eye: No discharge  Conjunctiva/sclera: Conjunctivae normal    Cardiovascular:      Rate and Rhythm: Normal rate and regular rhythm  Pulses: Normal pulses  Heart sounds: Normal heart sounds  No murmur heard  No friction rub  No gallop  Pulmonary:      Effort: Pulmonary effort is normal  No respiratory distress  Breath sounds: Normal breath sounds  No stridor  No wheezing, rhonchi or rales  Abdominal:      General: Bowel sounds are normal  There is no distension  Palpations: Abdomen is soft  Tenderness: There is no abdominal tenderness  There is right CVA tenderness  There is no left CVA tenderness, guarding or rebound  Comments: No pulsatile mass  Musculoskeletal:         General: No swelling, tenderness, deformity or signs of injury  Normal range of motion  Cervical back: Normal range of motion and neck supple  No rigidity  Right lower leg: No edema  Left lower leg: No edema        Comments: No calf tenderness or unilateral leg swelling  Skin:     General: Skin is warm and dry  Coloration: Skin is not jaundiced  Findings: No rash  Neurological:      General: No focal deficit present  Mental Status: She is alert and oriented to person, place, and time  Sensory: No sensory deficit  Motor: Motor function is intact  Comments: No saddle anesthesia     Psychiatric:         Mood and Affect: Mood normal          Behavior: Behavior normal          Vital Signs  ED Triage Vitals   Temperature Pulse Respirations Blood Pressure SpO2   08/30/21 1023 08/30/21 1023 08/30/21 1023 08/30/21 1023 08/30/21 1023   98 6 °F (37 °C) 62 16 (!) 195/97 97 %      Temp Source Heart Rate Source Patient Position - Orthostatic VS BP Location FiO2 (%)   08/30/21 1023 08/30/21 1023 08/30/21 1023 08/30/21 1023 --   Oral Monitor Lying Right arm       Pain Score       08/30/21 1355       Worst Possible Pain           Vitals:    08/30/21 1023   BP: (!) 195/97   Pulse: 62   Patient Position - Orthostatic VS: Lying         Visual Acuity      ED Medications  Medications   sodium chloride 0 9 % bolus 1,000 mL (1,000 mL Intravenous New Bag 8/30/21 1356)   morphine (PF) 4 mg/mL injection 4 mg (4 mg Intravenous Given 8/30/21 1355)   morphine (PF) 4 mg/mL injection 4 mg (4 mg Intravenous Given 8/30/21 1449)       Diagnostic Studies  Results Reviewed     Procedure Component Value Units Date/Time    Comprehensive metabolic panel [[id number]] Collected: 08/30/21 1356    Lab Status: Final result Specimen: Blood from Arm, Right Updated: 08/30/21 1423     Sodium 144 mmol/L      Potassium 4 0 mmol/L      Chloride 107 mmol/L      CO2 25 mmol/L      ANION GAP 12 mmol/L      BUN 13 mg/dL      Creatinine 0 75 mg/dL      Glucose 95 mg/dL      Calcium 9 0 mg/dL      AST 21 U/L      ALT 32 U/L      Alkaline Phosphatase 109 U/L      Total Protein 7 6 g/dL      Albumin 3 9 g/dL      Total Bilirubin 0 31 mg/dL      eGFR 85 ml/min/1 73sq m     Narrative: National Kidney Disease Foundation guidelines for Chronic Kidney Disease (CKD):     Stage 1 with normal or high GFR (GFR > 90 mL/min/1 73 square meters)    Stage 2 Mild CKD (GFR = 60-89 mL/min/1 73 square meters)    Stage 3A Moderate CKD (GFR = 45-59 mL/min/1 73 square meters)    Stage 3B Moderate CKD (GFR = 30-44 mL/min/1 73 square meters)    Stage 4 Severe CKD (GFR = 15-29 mL/min/1 73 square meters)    Stage 5 End Stage CKD (GFR <15 mL/min/1 73 square meters)  Note: GFR calculation is accurate only with a steady state creatinine    Lipase [[id number]]  (Abnormal) Collected: 08/30/21 1356    Lab Status: Final result Specimen: Blood from Arm, Right Updated: 08/30/21 1423     Lipase 61 u/L     Urine Microscopic [[id number]]  (Abnormal) Collected: 08/30/21 1403    Lab Status: Final result Specimen: Urine, Clean Catch Updated: 08/30/21 1423     RBC, UA 0-1 /hpf      WBC, UA 0-1 /hpf      Epithelial Cells Occasional /hpf      Bacteria, UA None Seen /hpf      MUCUS THREADS Occasional    UA w Reflex to Microscopic w Reflex to Culture [[id number]]  (Abnormal) Collected: 08/30/21 1403    Lab Status: Final result Specimen: Urine, Clean Catch Updated: 08/30/21 1410     Color, UA Light Yellow     Clarity, UA Clear     Specific Gravity, UA 1 025     pH, UA 6 0     Leukocytes, UA Negative     Nitrite, UA Negative     Protein, UA Negative mg/dl      Glucose, UA Negative mg/dl      Ketones, UA Negative mg/dl      Urobilinogen, UA 0 2 E U /dl      Bilirubin, UA Negative     Blood, UA Small    CBC and differential [[id number]] Collected: 08/30/21 1356    Lab Status: Final result Specimen: Blood from Arm, Right Updated: 08/30/21 1401     WBC 6 05 Thousand/uL      RBC 4 83 Million/uL      Hemoglobin 15 4 g/dL      Hematocrit 46 1 %      MCV 95 fL      MCH 31 9 pg      MCHC 33 4 g/dL      RDW 12 7 %      MPV 9 3 fL      Platelets 470 Thousands/uL      nRBC 0 /100 WBCs      Neutrophils Relative 55 %      Immat GRANS % 0 % Lymphocytes Relative 36 %      Monocytes Relative 5 %      Eosinophils Relative 3 %      Basophils Relative 1 %      Neutrophils Absolute 3 29 Thousands/µL      Immature Grans Absolute 0 02 Thousand/uL      Lymphocytes Absolute 2 20 Thousands/µL      Monocytes Absolute 0 31 Thousand/µL      Eosinophils Absolute 0 18 Thousand/µL      Basophils Absolute 0 05 Thousands/µL                  CT renal stone study abdomen pelvis without contrast   Final Result by Nikolay Hernandez MD (08/30 1407)      No acute findings to explain right flank pain  No nephrolithiasis or hydronephrosis  Nonemergent findings above  Workstation performed: [id number]         CT recon only lumbar spine (no charge)   Final Result by Nikolay Hernandez MD (08/30 1416)      No acute findings  Similar degenerative changes and stenoses to prior study from 6/9/2021  Description above  Workstation performed: [id number]                    Procedures  Procedures         ED Course                             SBIRT 20yo+      Most Recent Value   SBIRT (22 yo +)   In order to provide better care to our patients, we are screening all of our patients for alcohol and drug use  Would it be okay to ask you these screening questions? Yes Filed at: 08/30/2021 1402   Initial Alcohol Screen: US AUDIT-C    1  How often do you have a drink containing alcohol?  0 Filed at: 08/30/2021 1402   2  How many drinks containing alcohol do you have on a typical day you are drinking? 0 Filed at: 08/30/2021 1402   3b  FEMALE Any Age, or MALE 65+: How often do you have 4 or more drinks on one occassion? 0 Filed at: 08/30/2021 1402   Audit-C Score  0 Filed at: 08/30/2021 1402   MAURIZIO: How many times in the past year have you    Used an illegal drug or used a prescription medication for non-medical reasons?   Never Filed at: 08/30/2021 1402                    MDM  Number of Diagnoses or Management Options  Diagnosis management comments: CT scan was negative for AAA  There is no kidney stone  No pyelonephritis or urinary tract infection suggested by urinalysis  This is likely musculoskeletal   Patient does have a history of chronic back pain  No signs or symptoms of cord compression or cauda equina  Appropriate for discharge and outpatient management  Amount and/or Complexity of Data Reviewed  Clinical lab tests: ordered and reviewed  Tests in the radiology section of CPT®: ordered and reviewed        Disposition  Final diagnoses:   Acute exacerbation of chronic low back pain     Time reflects when diagnosis was documented in both MDM as applicable and the Disposition within this note     Time User Action Codes Description Comment    8/30/2021  2:51 PM Shannon Clark Add [M54 5,  G89 29] Acute exacerbation of chronic low back pain       ED Disposition     ED Disposition Condition Date/Time Comment    Discharge Stable Mon Aug 30, 2021  2:51 PM [address] discharge to home/self care  Follow-up Information     Follow up With Specialties Details Why Contact Info Additional Information    [facility] Physical Therapy In 1 week  [phone]          Patient's Medications   Discharge Prescriptions    CYCLOBENZAPRINE (FLEXERIL) 10 MG TABLET    Take 0 5 tablets (5 mg total) by mouth 3 (three) times a day P r n   Muscle spasm/pain       Start Date: 8/30/2021 End Date: --       Order Dose: 5 mg       Quantity: 20 tablet    Refills: 0    IBUPROFEN (MOTRIN) 800 MG TABLET    Take 1 tablet (800 mg total) by mouth every 6 (six) hours as needed (Pain)       Start Date: 8/30/2021 End Date: --       Order Dose: 800 mg       Quantity: 30 tablet    Refills: 0         PDMP Review       Value Time User    PDMP Reviewed  Yes 3/17/2021  6:14 PM Judy Oleary MD          ED Provider  Electronically Signed by           Mami Martel MD  08/30/21 [id number]

## 2021-09-01 ENCOUNTER — TELEPHONE (OUTPATIENT)
Dept: PHYSICAL THERAPY | Facility: OTHER | Age: 61
End: 2021-09-01

## 2021-09-01 NOTE — TELEPHONE ENCOUNTER
Call placed to the patient per [facility] referral     Patient identified herself on the VM  Detailed message left for the patient  Patient triaged by comp spine 3/2021 and has a PT referral pending  Patient was provided with the [facility] PT office ph#  Patient also has a 2nd PT referral pending  Referral Closed

## 2021-10-25 ENCOUNTER — APPOINTMENT (EMERGENCY)
Dept: CT IMAGING | Facility: HOSPITAL | Age: 61
End: 2021-10-25
Payer: COMMERCIAL

## 2021-10-25 ENCOUNTER — HOSPITAL ENCOUNTER (EMERGENCY)
Facility: HOSPITAL | Age: 61
Discharge: HOME | End: 2021-10-25
Attending: EMERGENCY MEDICINE | Admitting: EMERGENCY MEDICINE
Payer: COMMERCIAL

## 2021-10-25 VITALS
SYSTOLIC BLOOD PRESSURE: 155 MMHG | OXYGEN SATURATION: 99 % | HEART RATE: 62 BPM | HEIGHT: 61 IN | BODY MASS INDEX: 26.43 KG/M2 | RESPIRATION RATE: 18 BRPM | DIASTOLIC BLOOD PRESSURE: 74 MMHG | WEIGHT: 140 LBS | TEMPERATURE: 97.7 F

## 2021-10-25 DIAGNOSIS — G89.29 ACUTE EXACERBATION OF CHRONIC LOW BACK PAIN: Primary | ICD-10-CM

## 2021-10-25 DIAGNOSIS — M54.50 ACUTE EXACERBATION OF CHRONIC LOW BACK PAIN: Primary | ICD-10-CM

## 2021-10-25 PROCEDURE — 99284 EMERGENCY DEPT VISIT MOD MDM: CPT | Performed by: EMERGENCY MEDICINE

## 2021-10-25 PROCEDURE — 72131 CT LUMBAR SPINE W/O DYE: CPT

## 2021-10-25 PROCEDURE — 99283 EMERGENCY DEPT VISIT LOW MDM: CPT

## 2021-10-25 RX ORDER — OXYCODONE HYDROCHLORIDE AND ACETAMINOPHEN 5; 325 MG/1; MG/1
2 TABLET ORAL ONCE
Status: COMPLETED | OUTPATIENT
Start: 2021-10-25 | End: 2021-10-25

## 2021-10-25 RX ORDER — IBUPROFEN 600 MG/1
600 TABLET ORAL EVERY 6 HOURS PRN
Qty: 30 TABLET | Refills: 0 | Status: SHIPPED | OUTPATIENT
Start: 2021-10-25

## 2021-10-25 RX ORDER — CYCLOBENZAPRINE HCL 10 MG
10 TABLET ORAL
Qty: 20 TABLET | Refills: 0 | Status: SHIPPED | OUTPATIENT
Start: 2021-10-25

## 2021-10-25 RX ADMIN — OXYCODONE HYDROCHLORIDE AND ACETAMINOPHEN 2 TABLET: 5; 325 TABLET ORAL at 22:27

## 2021-10-25 RX ADMIN — OXYCODONE HYDROCHLORIDE AND ACETAMINOPHEN 2 TABLET: 5; 325 TABLET ORAL at 21:24

## 2021-11-01 ENCOUNTER — TELEPHONE (OUTPATIENT)
Dept: PHYSICAL THERAPY | Facility: OTHER | Age: 61
End: 2021-11-01

## 2021-11-16 DIAGNOSIS — K20.90 ESOPHAGITIS: ICD-10-CM

## 2021-11-16 RX ORDER — FAMOTIDINE 20 MG/1
40 TABLET, FILM COATED ORAL DAILY
Qty: 90 TABLET | Refills: 0 | Status: SHIPPED | OUTPATIENT
Start: 2021-11-16 | End: 2022-01-28

## 2021-12-06 ENCOUNTER — TELEPHONE (OUTPATIENT)
Dept: GASTROENTEROLOGY | Facility: CLINIC | Age: 61
End: 2021-12-06

## 2022-01-28 DIAGNOSIS — K20.90 ESOPHAGITIS: ICD-10-CM

## 2022-01-28 RX ORDER — FAMOTIDINE 20 MG/1
TABLET, FILM COATED ORAL
Qty: 90 TABLET | Refills: 0 | Status: SHIPPED | OUTPATIENT
Start: 2022-01-28 | End: 2022-05-25 | Stop reason: SDUPTHER

## 2022-02-20 ENCOUNTER — APPOINTMENT (EMERGENCY)
Dept: CT IMAGING | Facility: HOSPITAL | Age: 62
End: 2022-02-20
Payer: COMMERCIAL

## 2022-02-20 ENCOUNTER — HOSPITAL ENCOUNTER (EMERGENCY)
Facility: HOSPITAL | Age: 62
Discharge: HOME | End: 2022-02-20
Attending: EMERGENCY MEDICINE | Admitting: EMERGENCY MEDICINE
Payer: COMMERCIAL

## 2022-02-20 ENCOUNTER — APPOINTMENT (EMERGENCY)
Dept: RADIOLOGY | Facility: HOSPITAL | Age: 62
End: 2022-02-20
Payer: COMMERCIAL

## 2022-02-20 VITALS
DIASTOLIC BLOOD PRESSURE: 104 MMHG | OXYGEN SATURATION: 99 % | HEART RATE: 77 BPM | TEMPERATURE: 98.5 F | RESPIRATION RATE: 20 BRPM | SYSTOLIC BLOOD PRESSURE: 176 MMHG

## 2022-02-20 DIAGNOSIS — M54.50 ACUTE LOW BACK PAIN: Primary | ICD-10-CM

## 2022-02-20 LAB
ANION GAP SERPL CALCULATED.3IONS-SCNC: 9 MMOL/L (ref 4–13)
BASOPHILS # BLD AUTO: 0.03 THOUSANDS/ΜL (ref 0–0.1)
BASOPHILS NFR BLD AUTO: 1 % (ref 0–1)
BUN SERPL-MCNC: 11 MG/DL (ref 5–25)
CALCIUM SERPL-MCNC: 9.6 MG/DL (ref 8.3–10.1)
CHLORIDE SERPL-SCNC: 103 MMOL/L (ref 100–108)
CO2 SERPL-SCNC: 29 MMOL/L (ref 21–32)
CREAT SERPL-MCNC: 0.75 MG/DL (ref 0.6–1.3)
EOSINOPHIL # BLD AUTO: 0.17 THOUSAND/ΜL (ref 0–0.61)
EOSINOPHIL NFR BLD AUTO: 4 % (ref 0–6)
ERYTHROCYTE [DISTWIDTH] IN BLOOD BY AUTOMATED COUNT: 13 % (ref 11.6–15.1)
GFR SERPL CREATININE-BSD FRML MDRD: 85 ML/MIN/1.73SQ M
GLUCOSE SERPL-MCNC: 97 MG/DL (ref 65–140)
HCT VFR BLD AUTO: 49.4 % (ref 34.8–46.1)
HGB BLD-MCNC: 16 G/DL (ref 11.5–15.4)
IMM GRANULOCYTES # BLD AUTO: 0 THOUSAND/UL (ref 0–0.2)
IMM GRANULOCYTES NFR BLD AUTO: 0 % (ref 0–2)
LYMPHOCYTES # BLD AUTO: 1.53 THOUSANDS/ΜL (ref 0.6–4.47)
LYMPHOCYTES NFR BLD AUTO: 37 % (ref 14–44)
MCH RBC QN AUTO: 30.6 PG (ref 26.8–34.3)
MCHC RBC AUTO-ENTMCNC: 32.4 G/DL (ref 31.4–37.4)
MCV RBC AUTO: 95 FL (ref 82–98)
MONOCYTES # BLD AUTO: 0.27 THOUSAND/ΜL (ref 0.17–1.22)
MONOCYTES NFR BLD AUTO: 7 % (ref 4–12)
NEUTROPHILS # BLD AUTO: 2.13 THOUSANDS/ΜL (ref 1.85–7.62)
NEUTS SEG NFR BLD AUTO: 51 % (ref 43–75)
NRBC BLD AUTO-RTO: 0 /100 WBCS
PLATELET # BLD AUTO: 174 THOUSANDS/UL (ref 149–390)
PMV BLD AUTO: 9.5 FL (ref 8.9–12.7)
POTASSIUM SERPL-SCNC: 4 MMOL/L (ref 3.5–5.3)
RBC # BLD AUTO: 5.23 MILLION/UL (ref 3.81–5.12)
SODIUM SERPL-SCNC: 141 MMOL/L (ref 136–145)
WBC # BLD AUTO: 4.13 THOUSAND/UL (ref 4.31–10.16)

## 2022-02-20 PROCEDURE — 85025 COMPLETE CBC W/AUTO DIFF WBC: CPT | Performed by: EMERGENCY MEDICINE

## 2022-02-20 PROCEDURE — 74176 CT ABD & PELVIS W/O CONTRAST: CPT

## 2022-02-20 PROCEDURE — 80048 BASIC METABOLIC PNL TOTAL CA: CPT | Performed by: EMERGENCY MEDICINE

## 2022-02-20 PROCEDURE — 72100 X-RAY EXAM L-S SPINE 2/3 VWS: CPT

## 2022-02-20 PROCEDURE — 99285 EMERGENCY DEPT VISIT HI MDM: CPT | Performed by: EMERGENCY MEDICINE

## 2022-02-20 PROCEDURE — G1004 CDSM NDSC: HCPCS

## 2022-02-20 PROCEDURE — 36415 COLL VENOUS BLD VENIPUNCTURE: CPT | Performed by: EMERGENCY MEDICINE

## 2022-02-20 PROCEDURE — 96374 THER/PROPH/DIAG INJ IV PUSH: CPT

## 2022-02-20 PROCEDURE — 99284 EMERGENCY DEPT VISIT MOD MDM: CPT

## 2022-02-20 RX ORDER — OXYCODONE HYDROCHLORIDE AND ACETAMINOPHEN 5; 325 MG/1; MG/1
2 TABLET ORAL ONCE
Status: COMPLETED | OUTPATIENT
Start: 2022-02-20 | End: 2022-02-20

## 2022-02-20 RX ORDER — LIDOCAINE 50 MG/G
1 PATCH TOPICAL ONCE
Status: DISCONTINUED | OUTPATIENT
Start: 2022-02-20 | End: 2022-02-20 | Stop reason: HOSPADM

## 2022-02-20 RX ORDER — LIDOCAINE 50 MG/G
1 PATCH TOPICAL DAILY
Qty: 6 PATCH | Refills: 0 | Status: SHIPPED | OUTPATIENT
Start: 2022-02-20 | End: 2022-05-25

## 2022-02-20 RX ORDER — HYDROCODONE BITARTRATE AND ACETAMINOPHEN 5; 325 MG/1; MG/1
1 TABLET ORAL EVERY 6 HOURS PRN
Qty: 12 TABLET | Refills: 0 | Status: SHIPPED | OUTPATIENT
Start: 2022-02-20 | End: 2022-03-02

## 2022-02-20 RX ORDER — HYDROMORPHONE HCL/PF 1 MG/ML
1 SYRINGE (ML) INJECTION ONCE
Status: COMPLETED | OUTPATIENT
Start: 2022-02-20 | End: 2022-02-20

## 2022-02-20 RX ORDER — DIAZEPAM 5 MG/1
5 TABLET ORAL ONCE
Status: COMPLETED | OUTPATIENT
Start: 2022-02-20 | End: 2022-02-20

## 2022-02-20 RX ADMIN — LIDOCAINE 5% 1 PATCH: 700 PATCH TOPICAL at 16:39

## 2022-02-20 RX ADMIN — HYDROMORPHONE HYDROCHLORIDE 1 MG: 1 INJECTION, SOLUTION INTRAMUSCULAR; INTRAVENOUS; SUBCUTANEOUS at 18:33

## 2022-02-20 RX ADMIN — DIAZEPAM 5 MG: 5 TABLET ORAL at 16:38

## 2022-02-20 RX ADMIN — OXYCODONE HYDROCHLORIDE AND ACETAMINOPHEN 2 TABLET: 5; 325 TABLET ORAL at 16:38

## 2022-02-22 ENCOUNTER — TELEPHONE (OUTPATIENT)
Dept: PHYSICAL THERAPY | Facility: OTHER | Age: 62
End: 2022-02-22

## 2022-02-23 ENCOUNTER — TELEPHONE (OUTPATIENT)
Dept: PHYSICAL THERAPY | Facility: OTHER | Age: 62
End: 2022-02-23

## 2022-02-23 NOTE — TELEPHONE ENCOUNTER
Call placed to the patient per [facility] referral and VM she left today @ 11:30 am     Explained the program to the patient and the fact that she was triaged by us in 3/2020  Patient had not followed up with the evaluation  Patient informed that the referral is still pending in the computer and that she just needs to call the office and schedule her appointment  Patient was provided with the ph# to the PT office in [facility]  Patient states understand and states she will call them today  Referral Closed

## 2022-02-24 ENCOUNTER — HOSPITAL ENCOUNTER (EMERGENCY)
Facility: HOSPITAL | Age: 62
Discharge: HOME | End: 2022-02-24
Attending: EMERGENCY MEDICINE
Payer: COMMERCIAL

## 2022-02-24 VITALS
OXYGEN SATURATION: 97 % | SYSTOLIC BLOOD PRESSURE: 130 MMHG | RESPIRATION RATE: 18 BRPM | HEART RATE: 70 BPM | TEMPERATURE: 97.9 F | DIASTOLIC BLOOD PRESSURE: 78 MMHG

## 2022-02-24 DIAGNOSIS — M54.50 CHRONIC LOW BACK PAIN: Primary | ICD-10-CM

## 2022-02-24 DIAGNOSIS — G89.29 CHRONIC LOW BACK PAIN: Primary | ICD-10-CM

## 2022-02-24 PROCEDURE — 96375 TX/PRO/DX INJ NEW DRUG ADDON: CPT

## 2022-02-24 PROCEDURE — 96374 THER/PROPH/DIAG INJ IV PUSH: CPT

## 2022-02-24 PROCEDURE — 99283 EMERGENCY DEPT VISIT LOW MDM: CPT

## 2022-02-24 PROCEDURE — 99284 EMERGENCY DEPT VISIT MOD MDM: CPT | Performed by: EMERGENCY MEDICINE

## 2022-02-24 RX ORDER — PREDNISONE 20 MG/1
40 TABLET ORAL ONCE
Status: COMPLETED | OUTPATIENT
Start: 2022-02-24 | End: 2022-02-24

## 2022-02-24 RX ORDER — DIAZEPAM 5 MG/1
10 TABLET ORAL ONCE
Status: COMPLETED | OUTPATIENT
Start: 2022-02-24 | End: 2022-02-24

## 2022-02-24 RX ORDER — KETOROLAC TROMETHAMINE 30 MG/ML
15 INJECTION, SOLUTION INTRAMUSCULAR; INTRAVENOUS ONCE
Status: COMPLETED | OUTPATIENT
Start: 2022-02-24 | End: 2022-02-24

## 2022-02-24 RX ORDER — METHOCARBAMOL 500 MG/1
500 TABLET, FILM COATED ORAL ONCE
Status: DISCONTINUED | OUTPATIENT
Start: 2022-02-24 | End: 2022-02-24

## 2022-02-24 RX ORDER — MORPHINE SULFATE 4 MG/ML
4 INJECTION, SOLUTION INTRAMUSCULAR; INTRAVENOUS ONCE
Status: COMPLETED | OUTPATIENT
Start: 2022-02-24 | End: 2022-02-24

## 2022-02-24 RX ADMIN — KETOROLAC TROMETHAMINE 15 MG: 30 INJECTION, SOLUTION INTRAMUSCULAR at 19:54

## 2022-02-24 RX ADMIN — DIAZEPAM 10 MG: 5 TABLET ORAL at 19:55

## 2022-02-24 RX ADMIN — MORPHINE SULFATE 4 MG: 4 INJECTION INTRAVENOUS at 20:24

## 2022-02-24 RX ADMIN — PREDNISONE 40 MG: 20 TABLET ORAL at 19:55

## 2022-02-25 NOTE — ED PROVIDER NOTES
n  History  Chief Complaint   Patient presents with    Back Pain     PT C/O STABBING PAIN IN THE LOWER BACK radiating to the right leg     56-year-old female presenting to the emergency department for evaluation of back pain  Patient describes sharp low back pain radiating down the right leg, no numbness weakness or tingling  No bowel or bladder incontinence, no saddle anesthesia  This is chronic pain  She was recently seen and evaluated for this  Had CT imaging which was normal   Patient reports no new trauma  She reports no fevers or chills  Patient has not yet followed up with physical therapy  Prior to Admission Medications   Prescriptions Last Dose Informant Patient Reported? Taking? HYDROcodone-acetaminophen (Norco) 5-325 mg per tablet   No No   Sig: Take 1 tablet by mouth every 6 (six) hours as needed for pain for up to 10 days Max Daily Amount: 4 tablets   acetaminophen (TYLENOL) 325 mg tablet  Self No No   Sig: Take 3 tablets (975 mg total) by mouth every 8 (eight) hours   albuterol (2 5 mg/3 mL) 0 083 % nebulizer solution  Self No No   Sig: inhale contents of 1 vial in nebulizer every 6 hours if needed for wheezing shortness of breath   atorvastatin (LIPITOR) 40 mg tablet   No No   Sig: Take 1 tablet (40 mg total) by mouth daily   citalopram (CELEXA) 40 mg tablet  Self Yes No   Sig: Take 40 mg by mouth daily     clonazePAM (KlonoPIN) 1 mg tablet  Self Yes No   Si mg 2 (two) times a day as needed for anxiety     cyclobenzaprine (FLEXERIL) 10 mg tablet   No No   Sig: Take 0 5 tablets (5 mg total) by mouth 3 (three) times a day P r n   Muscle spasm/pain   cyclobenzaprine (FLEXERIL) 10 mg tablet   No No   Sig: Take 1 tablet (10 mg total) by mouth daily at bedtime   cyclobenzaprine (FLEXERIL) 5 mg tablet  Self No No   Sig: Take 2 tablets (10 mg total) by mouth 3 (three) times a day as needed for muscle spasms   famotidine (PEPCID) 20 mg tablet   No No   Sig: take 2 tablets by mouth once daily   gabapentin (NEURONTIN) 800 mg tablet  Self Yes No   Sig: Take 800 mg by mouth 3 (three) times a day   ibuprofen (MOTRIN) 600 mg tablet   No No   Sig: Take 1 tablet (600 mg total) by mouth every 6 (six) hours as needed (Pain)   ibuprofen (MOTRIN) 800 mg tablet  Self No No   Sig: Take 1 tablet (800 mg total) by mouth 3 (three) times a day   Patient taking differently: Take 800 mg by mouth as needed    ibuprofen (MOTRIN) 800 mg tablet   No No   Sig: Take 1 tablet (800 mg total) by mouth every 8 (eight) hours as needed for mild pain   ibuprofen (MOTRIN) 800 mg tablet   No No   Sig: Take 1 tablet (800 mg total) by mouth every 6 (six) hours as needed (Pain)   lidocaine (LIDODERM) 5 %  Self No No   Sig: Apply 1 patch topically daily Remove & Discard patch within 12 hours or as directed by MD   Patient not taking: Reported on 3/31/2021   lidocaine (Lidoderm) 5 %   No No   Sig: Apply 1 patch topically daily Remove & Discard patch within 12 hours or as directed by MD   metoprolol tartrate (LOPRESSOR) 50 mg tablet   No No   Sig: take 1 tablet by mouth every 12 hours   mirtazapine (REMERON) 30 mg tablet  Self Yes No   Sig: Take 30 mg by mouth daily at bedtime     naproxen (NAPROSYN) 500 mg tablet  Self Yes No   Sig: TAKE 1 TABLET EVERY 12 HOURS AS NEEDED FOR MILD PAIN (PAIN SCORE 1-3)  TAKE WITH MEALS  nitroglycerin (NITROSTAT) 0 4 mg SL tablet  Self Yes No   Sig: Place under the tongue   triamcinolone (KENALOG) 0 5 % cream  Self Yes No   Sig: Triamcinolone Acetonide 0 5 % External Cream  APPLY 2-3 TIMES DAILY TO AFFECTED AREA(S)     Quantity: 1;  Refills: 3      Norris Knight ;  Start 5-Oct-2017  Active  15 GM Tube      Facility-Administered Medications: None       Past Medical History:   Diagnosis Date    Alcohol abuse 12/31/2018    Anxiety     Bipolar 1 disorder ([facility] [address] )     Mental Health problems listed as Denied in Allscripts, cant't entire under pertinent negatives due to this diagnosis    Chronic back pain  COPD (chronic obstructive pulmonary disease) (HCC)     Frequent headaches     Glaucoma     Hyperlipidemia     Irregular heart beat     AFib    Ovarian cancer ([facility] [address] )     last assessed - 21Sep2016    Psychiatric disorder     bipolar    Shortness of breath     Uterine carcinoma ([facility] [address] )     last assessed - 21Sep2016       Past Surgical History:   Procedure Laterality Date    ADENOIDECTOMY      Tonsillectomy with Adenoidectomy - last assessed - 21Sep2016    APPENDECTOMY      last assessed - 21Sep2016    CATARACT EXTRACTION Left     Remove cataract, corneo-scleral section of left eye; last assessed - 21Sep2016    CHOLECYSTECTOMY      last assessed - 29Sep2016   [facility] EYE SURGERY      Anterior sclera fistulization for glaucoma; last assessed - 21Sep2016    HYSTERECTOMY      KRYSTA-BSO; last assessed - 21Sep2016    INTRAOCULAR LENS INSERTION      [place] TEMPORAL ARTERY LIGATN OR BX Right 12/16/2016    Procedure: BIOPSY ARTERY TEMPORAL;  Surgeon: Cele Olson MD;  Location: [facility];  Service: General    TONSILLECTOMY      Tonsillectomy with Adenoidectomy - last assessed - 21Sep2016       Family History   Problem Relation Age of Onset    Heart disease Mother     Coronary artery disease Mother     Other Mother         1) Gangrene; 2) Peripheral arterial disease    Hyperlipidemia Mother         Hypercholesterolemia    Stomach cancer Mother     Heart attack Mother         Myocardial Infarction    Other Father         1) Gangrene; 2) Peripheral arterial disease    Hyperlipidemia Father         Hypercholesterolemia    Stomach cancer Father     Heart attack Father         Myocardial Infarction    Stomach cancer Brother     Heart attack Brother         Myocardial Infarction    Stomach cancer Maternal Uncle      I have reviewed and agree with the history as documented      E-Cigarette/Vaping    E-Cigarette Use Never User      E-Cigarette/Vaping Substances    Nicotine No     THC No     CBD No     Flavoring No     Other No     Unknown No      Social History     Tobacco Use    Smoking status: Current Every Day Smoker     Packs/day: 0 50     Years: 46 00     Pack years: 23 00     Types: Cigarettes    Smokeless tobacco: Never Used   Vaping Use    Vaping Use: Never used   Substance Use Topics    Alcohol use: Not Currently     Comment: occasional; (No alcohol use per Allscripts)    Drug use: No       Review of Systems   Constitutional: Negative for appetite change, chills, fatigue and fever  HENT: Negative for sneezing and sore throat  Eyes: Negative for visual disturbance  Respiratory: Negative for cough, choking, chest tightness, shortness of breath and wheezing  Cardiovascular: Negative for chest pain and palpitations  Gastrointestinal: Negative for abdominal pain, constipation, diarrhea, nausea and vomiting  Genitourinary: Negative for difficulty urinating and dysuria  Musculoskeletal: Positive for back pain  Neurological: Negative for dizziness, weakness, light-headedness, numbness and headaches  All other systems reviewed and are negative  Physical Exam  Physical Exam  Vitals and nursing note reviewed  Constitutional:       General: She is not in acute distress  Appearance: She is well-developed  She is not diaphoretic  HENT:      Head: Normocephalic and atraumatic  Eyes:      Pupils: Pupils are equal, round, and reactive to light  Neck:      Vascular: No JVD  Trachea: No tracheal deviation  Cardiovascular:      Rate and Rhythm: Normal rate and regular rhythm  Heart sounds: Normal heart sounds  No murmur heard  No friction rub  No gallop  Pulmonary:      Effort: Pulmonary effort is normal  No respiratory distress  Breath sounds: Normal breath sounds  No wheezing or rales  Abdominal:      General: Bowel sounds are normal  There is no distension  Palpations: Abdomen is soft  Tenderness: There is no abdominal tenderness   There is no guarding or rebound  Skin:     General: Skin is warm and dry  Coloration: Skin is not pale  Neurological:      Mental Status: She is alert and oriented to person, place, and time  Cranial Nerves: No cranial nerve deficit  Motor: No abnormal muscle tone  Psychiatric:         Behavior: Behavior normal          Vital Signs  ED Triage Vitals   Temperature Pulse Respirations Blood Pressure SpO2   02/24/22 1853 02/24/22 1853 02/24/22 1853 02/24/22 1853 02/24/22 1853   98 8 °F (37 1 °C) 72 20 98/57 96 %      Temp Source Heart Rate Source Patient Position - Orthostatic VS BP Location FiO2 (%)   02/24/22 1853 02/24/22 1853 02/24/22 1853 02/24/22 1853 --   Oral Monitor Sitting Left arm       Pain Score       02/24/22 2115       10 - Worst Possible Pain           Vitals:    02/24/22 1853 02/24/22 2134   BP: 98/57 130/78   Pulse: 72 70   Patient Position - Orthostatic VS: Sitting Lying         Visual Acuity      ED Medications  Medications   ketorolac (TORADOL) injection 15 mg (15 mg Intravenous Given 2/24/22 1954)   diazepam (VALIUM) tablet 10 mg (10 mg Oral Given 2/24/22 1955)   predniSONE tablet 40 mg (40 mg Oral Given 2/24/22 1955)   morphine (PF) 4 mg/mL injection 4 mg (4 mg Intravenous Given 2/24/22 2024)       Diagnostic Studies  Results Reviewed     None                 No orders to display              Procedures  Procedures         ED Course                               SBIRT 20yo+      Most Recent Value   SBIRT (25 yo +)    In order to provide better care to our patients, we are screening all of our patients for alcohol and drug use  Would it be okay to ask you these screening questions? Yes Filed at: 02/24/2022 0207   Initial Alcohol Screen: US AUDIT-C     1  How often do you have a drink containing alcohol? 0 Filed at: 02/24/2022 1915   2  How many drinks containing alcohol do you have on a typical day you are drinking? 0 Filed at: 02/24/2022 1915   3b  FEMALE Any Age, or MALE 65+:  How often do you have 4 or more drinks on one occassion? 0 Filed at: 02/24/2022 1915   Audit-C Score 0 Filed at: 02/24/2022 4345   MAURIZIO: How many times in the past year have you    Used an illegal drug or used a prescription medication for non-medical reasons? Never Filed at: 02/24/2022 4364                    [facility]  Number of Diagnoses or Management Options  Chronic low back pain  Diagnosis management comments: 69-year-old female with acute exacerbation chronic pain, recently seen for the same, imaging reviewed is  Patient to continue to follow-up with physical therapy  Expressed the importance of this reviewed the pathophysiology of sciatica/back pain give muscle relaxer NSAIDs, steroid here  Disposition  Final diagnoses:   Chronic low back pain     Time reflects when diagnosis was documented in both MDM as applicable and the Disposition within this note     Time User Action Codes Description Comment    2/24/2022  9:29 PM Brenda Bradford Add [M54 50,  G89 29] Chronic low back pain       ED Disposition     ED Disposition Condition Date/Time Comment    Discharge Stable Thu Feb 24, 2022  9:27 PM Ashanti Gutierrez discharge to home/self care              Follow-up Information     Follow up With Specialties Details Why Will Laws MD Internal Medicine   [address] [address]  [phone]            Discharge Medication List as of 2/24/2022  9:29 PM      CONTINUE these medications which have NOT CHANGED    Details   acetaminophen (TYLENOL) 325 mg tablet Take 3 tablets (975 mg total) by mouth every 8 (eight) hours, Starting Sat 3/20/2021, No Print      albuterol (2 5 mg/3 mL) 0 083 % nebulizer solution inhale contents of 1 vial in nebulizer every 6 hours if needed for wheezing shortness of breath, Normal      atorvastatin (LIPITOR) 40 mg tablet Take 1 tablet (40 mg total) by mouth daily, Starting Sun 12/19/2021, Until Tue 1/18/2022, Normal      citalopram (CELEXA) 40 mg tablet Take 40 mg by mouth daily  , Until Discontinued, Historical Med      clonazePAM (KlonoPIN) 1 mg tablet 1 mg 2 (two) times a day as needed for anxiety  , Starting Fri 4/20/2018, Historical Med      !! cyclobenzaprine (FLEXERIL) 10 mg tablet Take 0 5 tablets (5 mg total) by mouth 3 (three) times a day P r n   Muscle spasm/pain, Starting Mon 8/30/2021, Normal      !! cyclobenzaprine (FLEXERIL) 10 mg tablet Take 1 tablet (10 mg total) by mouth daily at bedtime, Starting Mon 10/25/2021, Normal      famotidine (PEPCID) 20 mg tablet take 2 tablets by mouth once daily, Normal      gabapentin (NEURONTIN) 800 mg tablet Take 800 mg by mouth 3 (three) times a day, Historical Med      HYDROcodone-acetaminophen (Norco) 5-325 mg per tablet Take 1 tablet by mouth every 6 (six) hours as needed for pain for up to 10 days Max Daily Amount: 4 tablets, Starting Sun 2/20/2022, Until Wed 3/2/2022 at 2359, Print      !! ibuprofen (MOTRIN) 600 mg tablet Take 1 tablet (600 mg total) by mouth every 6 (six) hours as needed (Pain), Starting Mon 10/25/2021, Normal      !! ibuprofen (MOTRIN) 800 mg tablet Take 1 tablet (800 mg total) by mouth 3 (three) times a day, Starting Fri 11/22/2019, Print      !! ibuprofen (MOTRIN) 800 mg tablet Take 1 tablet (800 mg total) by mouth every 8 (eight) hours as needed for mild pain, Starting Wed 3/31/2021, Normal      !! ibuprofen (MOTRIN) 800 mg tablet Take 1 tablet (800 mg total) by mouth every 6 (six) hours as needed (Pain), Starting Mon 8/30/2021, Normal      !! lidocaine (LIDODERM) 5 % Apply 1 patch topically daily Remove & Discard patch within 12 hours or as directed by MD, Starting Sun 3/21/2021, Normal      !! lidocaine (Lidoderm) 5 % Apply 1 patch topically daily Remove & Discard patch within 12 hours or as directed by MD, Starting Sun 2/20/2022, Print      metoprolol tartrate (LOPRESSOR) 50 mg tablet take 1 tablet by mouth every 12 hours, Normal      mirtazapine (REMERON) 30 mg tablet Take 30 mg by mouth daily at bedtime  , Historical Med      naproxen (NAPROSYN) 500 mg tablet TAKE 1 TABLET EVERY 12 HOURS AS NEEDED FOR MILD PAIN (PAIN SCORE 1-3)  TAKE WITH MEALS , Historical Med      nitroglycerin (NITROSTAT) 0 4 mg SL tablet Place under the tongue, Historical Med      triamcinolone (KENALOG) 0 5 % cream Triamcinolone Acetonide 0 5 % External Cream  APPLY 2-3 TIMES DAILY TO AFFECTED AREA(S)  Quantity: 1;  Refills: 3      Norris Knight ;  Start 5-Oct-2017  Active  15 GM Tube, Historical Med       !! - Potential duplicate medications found  Please discuss with provider  No discharge procedures on file      PDMP Review       Value Time User    PDMP Reviewed  Yes 3/17/2021  6:14 PM Polly Dash MD          ED Provider  Electronically Signed by           Farnaz Gonzalez MD  02/25/22 7965

## 2022-03-24 NOTE — ED PROVIDER NOTES
History  Chief Complaint   Patient presents with    Back Pain     Pt states that her middle back started hurting bad after getting up out of bed this morning  History provided by:  Patient  Back Pain  Location:  Lumbar spine  Quality:  Aching  Radiates to:  Does not radiate  Pain severity:  Moderate  Onset quality:  Sudden  Timing:  Constant  Progression:  Unchanged  Chronicity:  New  Relieved by:  Nothing  Worsened by: Movement  Ineffective treatments:  None tried  Associated symptoms: no abdominal pain, no chest pain, no dysuria, no fever, no numbness and no weakness        Prior to Admission Medications   Prescriptions Last Dose Informant Patient Reported? Taking?   acetaminophen (TYLENOL) 325 mg tablet  Self No No   Sig: Take 3 tablets (975 mg total) by mouth every 8 (eight) hours   albuterol (2 5 mg/3 mL) 0 083 % nebulizer solution  Self No No   Sig: inhale contents of 1 vial in nebulizer every 6 hours if needed for wheezing shortness of breath   atorvastatin (LIPITOR) 40 mg tablet   No No   Sig: Take 1 tablet (40 mg total) by mouth daily   citalopram (CELEXA) 40 mg tablet  Self Yes No   Sig: Take 40 mg by mouth daily     clonazePAM (KlonoPIN) 1 mg tablet  Self Yes No   Si mg 2 (two) times a day as needed for anxiety     cyclobenzaprine (FLEXERIL) 10 mg tablet   No No   Sig: Take 0 5 tablets (5 mg total) by mouth 3 (three) times a day P r n   Muscle spasm/pain   cyclobenzaprine (FLEXERIL) 10 mg tablet   No No   Sig: Take 1 tablet (10 mg total) by mouth daily at bedtime   cyclobenzaprine (FLEXERIL) 5 mg tablet  Self No No   Sig: Take 2 tablets (10 mg total) by mouth 3 (three) times a day as needed for muscle spasms   famotidine (PEPCID) 20 mg tablet   No No   Sig: take 2 tablets by mouth once daily   gabapentin (NEURONTIN) 800 mg tablet  Self Yes No   Sig: Take 800 mg by mouth 3 (three) times a day   ibuprofen (MOTRIN) 600 mg tablet   No No   Sig: Take 1 tablet (600 mg total) by mouth every 6 (six) hours as needed (Pain)   ibuprofen (MOTRIN) 800 mg tablet  Self No No   Sig: Take 1 tablet (800 mg total) by mouth 3 (three) times a day   Patient taking differently: Take 800 mg by mouth as needed    ibuprofen (MOTRIN) 800 mg tablet   No No   Sig: Take 1 tablet (800 mg total) by mouth every 8 (eight) hours as needed for mild pain   ibuprofen (MOTRIN) 800 mg tablet   No No   Sig: Take 1 tablet (800 mg total) by mouth every 6 (six) hours as needed (Pain)   lidocaine (LIDODERM) 5 %  Self No No   Sig: Apply 1 patch topically daily Remove & Discard patch within 12 hours or as directed by MD   Patient not taking: Reported on 3/31/2021   metoprolol tartrate (LOPRESSOR) 50 mg tablet   No No   Sig: take 1 tablet by mouth every 12 hours   mirtazapine (REMERON) 30 mg tablet  Self Yes No   Sig: Take 30 mg by mouth daily at bedtime     naproxen (NAPROSYN) 500 mg tablet  Self Yes No   Sig: TAKE 1 TABLET EVERY 12 HOURS AS NEEDED FOR MILD PAIN (PAIN SCORE 1-3)  TAKE WITH MEALS  nitroglycerin (NITROSTAT) 0 4 mg SL tablet  Self Yes No   Sig: Place under the tongue   triamcinolone (KENALOG) 0 5 % cream  Self Yes No   Sig: Triamcinolone Acetonide 0 5 % External Cream  APPLY 2-3 TIMES DAILY TO AFFECTED AREA(S)     Quantity: 1;  Refills: 3      Norris Knight ;  Start 5-Oct-2017  Active  15 GM Tube      Facility-Administered Medications: None       Past Medical History:   Diagnosis Date    Alcohol abuse 12/31/2018    Anxiety     Bipolar 1 disorder ([facility] )     Mental Health problems listed as Denied in Allscripts, cant't entire under pertinent negatives due to this diagnosis    Chronic back pain     COPD [facility]chronic obstructive [facility] disease) ([facility] )     Frequent headaches     Glaucoma     Hyperlipidemia     Irregular heart beat     AFib    Ovarian cancer ([facility] )     last assessed - [id number]    Psychiatric disorder     bipolar    Shortness of breath     Uterine carcinoma ([facility] )     last assessed - [id number]       Past Surgical History:   Procedure Laterality Date    ADENOIDECTOMY      Tonsillectomy with Adenoidectomy - last assessed - 21Sep2016    APPENDECTOMY      last assessed - 21Sep2016    CATARACT EXTRACTION Left     Remove cataract, corneo-scleral section of left eye; last assessed - 21Sep2016    CHOLECYSTECTOMY      last assessed - 29Sep2016   [facility] Abt EYE SURGERY      Anterior sclera fistulization for glaucoma; last assessed - 21Sep2016    HYSTERECTOMY      KRYSTA-BSO; last assessed - 21Sep2016    INTRAOCULAR LENS INSERTION      NM TEMPORAL ARTERY LIGATN OR BX Right 12/16/2016    Procedure: BIOPSY ARTERY TEMPORAL;  Surgeon: Brenda Galan MD;  Location: [facility];  Service: General    TONSILLECTOMY      Tonsillectomy with Adenoidectomy - last assessed - 21Sep2016       Family History   Problem Relation Age of Onset    Heart disease Mother     Coronary artery disease Mother     Other Mother         1) Gangrene; 2) Peripheral arterial disease    Hyperlipidemia Mother         Hypercholesterolemia    Stomach cancer Mother     Heart attack Mother         Myocardial Infarction    Other Father         1) Gangrene; 2) Peripheral arterial disease    Hyperlipidemia Father         Hypercholesterolemia    Stomach cancer Father     Heart attack Father         Myocardial Infarction    Stomach cancer Brother     Heart attack Brother         Myocardial Infarction    Stomach cancer Maternal Uncle      I have reviewed and agree with the history as documented      E-Cigarette/Vaping    E-Cigarette Use Never User      E-Cigarette/Vaping Substances    Nicotine No     THC No     CBD No     Flavoring No     Other No     Unknown No      Social History     Tobacco Use    Smoking status: Current Every Day Smoker     Packs/day: 0 50     Years: 46 00     Pack years: 23 00     Types: Cigarettes    Smokeless tobacco: Never Used   Vaping Use    Vaping Use: Never used   Substance Use Topics    Alcohol use: Not Currently     Comment: occasional; (No alcohol use per Allscripts)    Drug use: No       Review of Systems   Constitutional: Negative for chills and fever  HENT: Negative for ear pain and sore throat  Eyes: Negative for pain and visual disturbance  Respiratory: Negative for cough and shortness of breath  Cardiovascular: Negative for chest pain and palpitations  Gastrointestinal: Negative for abdominal pain and vomiting  Genitourinary: Negative for dysuria and hematuria  Musculoskeletal: Positive for back pain  Negative for arthralgias  Skin: Negative for color change and rash  Neurological: Negative for seizures, syncope, weakness and numbness  All other systems reviewed and are negative  Physical Exam  Physical Exam  Vitals and nursing note reviewed  Constitutional:       General: She is not in acute distress  Appearance: She is well-developed  HENT:      Head: Normocephalic and atraumatic  Eyes:      Conjunctiva/sclera: Conjunctivae normal    Cardiovascular:      Rate and Rhythm: Normal rate and regular rhythm  Heart sounds: No murmur heard  Pulmonary:      Effort: Pulmonary effort is normal  No respiratory distress  Breath sounds: Normal breath sounds  Abdominal:      Palpations: Abdomen is soft  Tenderness: There is no abdominal tenderness  Musculoskeletal:      Cervical back: Neck supple  Comments: Generalized tenderness lumbar paraspinal areas, no deformities, step offs or skin changes  Skin:     General: Skin is warm and dry  Capillary Refill: Capillary refill takes less than 2 seconds  Neurological:      General: No focal deficit present  Mental Status: She is alert and oriented to person, place, and time  Sensory: No sensory deficit  Motor: No weakness           Vital Signs  ED Triage Vitals [02/20/22 1439]   Temperature Pulse Respirations Blood Pressure SpO2   98 5 °F (36 9 °C) 77 20 (!) 176/104 99 %      Temp Source Heart Rate Source Patient Position - Orthostatic VS BP Location FiO2 (%)   Oral Monitor Sitting Left arm --      Pain Score       --           Vitals:    02/20/22 1439   BP: (!) 176/104   Pulse: 77   Patient Position - Orthostatic VS: Sitting         Visual Acuity      ED Medications  Medications   oxyCODONE-acetaminophen (PERCOCET) 5-325 mg per tablet 2 tablet (2 tablets Oral Given 2/20/22 1638)   diazepam (VALIUM) tablet 5 mg (5 mg Oral Given 2/20/22 1638)   HYDROmorphone (DILAUDID) injection 1 mg (1 mg Intravenous Given 2/20/22 1833)       Diagnostic Studies  Results Reviewed     Procedure Component Value Units Date/Time    CBC and differential [[id number]]  (Abnormal) Collected: 02/20/22 1832    Lab Status: Final result Specimen: Blood from Hand, Left Updated: 02/20/22 2008     WBC 4 13 Thousand/uL      RBC 5 23 Million/uL      Hemoglobin 16 0 g/dL      Hematocrit 49 4 %      MCV 95 fL      MCH 30 6 pg      MCHC 32 4 g/dL      RDW 13 0 %      MPV 9 5 fL      Platelets 871 Thousands/uL      nRBC 0 /100 WBCs      Neutrophils Relative 51 %      Immat GRANS % 0 %      Lymphocytes Relative 37 %      Monocytes Relative 7 %      Eosinophils Relative 4 %      Basophils Relative 1 %      Neutrophils Absolute 2 13 Thousands/µL      Immature Grans Absolute 0 00 Thousand/uL      Lymphocytes Absolute 1 53 Thousands/µL      Monocytes Absolute 0 27 Thousand/µL      Eosinophils Absolute 0 17 Thousand/µL      Basophils Absolute 0 03 Thousands/µL     Basic metabolic panel [[id number]] Collected: 02/20/22 1832    Lab Status: Final result Specimen: Blood from Hand, Left Updated: 02/20/22 1855     Sodium 141 mmol/L      Potassium 4 0 mmol/L      Chloride 103 mmol/L      CO2 29 mmol/L      ANION GAP 9 mmol/L      BUN 11 mg/dL      Creatinine 0 75 mg/dL      Glucose 97 mg/dL      Calcium 9 6 mg/dL      eGFR 85 ml/min/1 73sq m     Narrative:      Ricardo guidelines for Chronic Kidney Disease (CKD):     Stage 1 with normal or high GFR (GFR > 90 mL/min/1 73 square meters)    Stage 2 Mild CKD (GFR = 60-89 mL/min/1 73 square meters)    Stage 3A Moderate CKD (GFR = 45-59 mL/min/1 73 square meters)    Stage 3B Moderate CKD (GFR = 30-44 mL/min/1 73 square meters)    Stage 4 Severe CKD (GFR = 15-29 mL/min/1 73 square meters)    Stage 5 End Stage CKD (GFR <15 mL/min/1 73 square meters)  Note: GFR calculation is accurate only with a steady state creatinine                 CT abdomen pelvis wo contrast   Final Result by Sarah Will MD (02/20 1920)         1  No kidney stones are seen  No hydronephrosis  2   Moderate-sized hiatal hernia  3   Subchondral sclerosis of the left femoral head  Rule out avascular necrosis  Workstation performed: [id number]         XR lumbar spine 2 or 3 views   Final Result by Aquilino Villeda MD (02/21 3501)   Mild degenerative disc disease L4-5, L5-S1      No acute lumbar spinal abnormalities      Consistent with prior study      Workstation performed: [id number]                    Procedures  Procedures         ED Course                               SBIRT 20yo+      Most Recent Value   SBIRT (23 yo +)    In order to provide better care to our patients, we are screening all of our patients for alcohol and drug use  Would it be okay to ask you these screening questions?  No Filed at: 02/20/2022 1718                    [facility]  Number of Diagnoses or Management Options  Acute low back pain: new and requires workup     Amount and/or Complexity of Data Reviewed  Tests in the radiology section of CPT®: ordered and reviewed  Review and summarize past medical records: yes  Independent visualization of images, tracings, or specimens: yes        Disposition  Final diagnoses:   Acute low back pain     Time reflects when diagnosis was documented in both MDM as applicable and the Disposition within this note     Time User Action Codes Description Comment    2/20/2022  7:23 PM Ki Oar Add [M54 50] Acute low back pain       ED Disposition     ED Disposition Condition Date/Time Comment    Discharge Stable Sun Feb 20, 2022  7:23 PM Caleb Allison discharge to home/self care  Follow-up Information     Follow up With Specialties Details Why Contact Info Additional Information    Ilene Jarvis MD Internal Medicine   [address] [place] [id number]  [facility]   [address] [address] [address] [facility] [place], [address], [place], [place], [address] [phone]          Discharge Medication List as of 2/20/2022  7:29 PM      START taking these medications    Details   HYDROcodone-acetaminophen (Norco) 5-325 mg per tablet Take 1 tablet by mouth every 6 (six) hours as needed for pain for up to 10 days Max Daily Amount: 4 tablets, Starting Sun 2/20/2022, Until Wed 3/2/2022 at 2359, Print      !! lidocaine (Lidoderm) 5 % Apply 1 patch topically daily Remove & Discard patch within 12 hours or as directed by MD, Starting Sun 2/20/2022, Print       !! - Potential duplicate medications found  Please discuss with provider        CONTINUE these medications which have NOT CHANGED    Details   acetaminophen (TYLENOL) 325 mg tablet Take 3 tablets (975 mg total) by mouth every 8 (eight) hours, Starting Sat 3/20/2021, No Print      albuterol (2 5 mg/3 mL) 0 083 % nebulizer solution inhale contents of 1 vial in nebulizer every 6 hours if needed for wheezing shortness of breath, Normal      atorvastatin (LIPITOR) 40 mg tablet Take 1 tablet (40 mg total) by mouth daily, Starting Sun 12/19/2021, Until Tue 1/18/2022, Normal      citalopram (CELEXA) 40 mg tablet Take 40 mg by mouth daily  , Until Discontinued, Historical Med      clonazePAM (KlonoPIN) 1 mg tablet 1 mg 2 (two) times a day as needed for anxiety  , Starting Fri 4/20/2018, Historical Med !! cyclobenzaprine (FLEXERIL) 10 mg tablet Take 0 5 tablets (5 mg total) by mouth 3 (three) times a day P r n  Muscle spasm/pain, Starting Mon 8/30/2021, Normal      !! cyclobenzaprine (FLEXERIL) 10 mg tablet Take 1 tablet (10 mg total) by mouth daily at bedtime, Starting Mon 10/25/2021, Normal      famotidine (PEPCID) 20 mg tablet take 2 tablets by mouth once daily, Normal      gabapentin (NEURONTIN) 800 mg tablet Take 800 mg by mouth 3 (three) times a day, Historical Med      !! ibuprofen (MOTRIN) 600 mg tablet Take 1 tablet (600 mg total) by mouth every 6 (six) hours as needed (Pain), Starting Mon 10/25/2021, Normal      !! ibuprofen (MOTRIN) 800 mg tablet Take 1 tablet (800 mg total) by mouth 3 (three) times a day, Starting Fri 11/22/2019, Print      !! ibuprofen (MOTRIN) 800 mg tablet Take 1 tablet (800 mg total) by mouth every 8 (eight) hours as needed for mild pain, Starting Wed 3/31/2021, Normal      !! ibuprofen (MOTRIN) 800 mg tablet Take 1 tablet (800 mg total) by mouth every 6 (six) hours as needed (Pain), Starting Mon 8/30/2021, Normal      !! lidocaine (LIDODERM) 5 % Apply 1 patch topically daily Remove & Discard patch within 12 hours or as directed by MD, Starting Sun 3/21/2021, Normal      metoprolol tartrate (LOPRESSOR) 50 mg tablet take 1 tablet by mouth every 12 hours, Normal      mirtazapine (REMERON) 30 mg tablet Take 30 mg by mouth daily at bedtime  , Historical Med      naproxen (NAPROSYN) 500 mg tablet TAKE 1 TABLET EVERY 12 HOURS AS NEEDED FOR MILD PAIN (PAIN SCORE 1-3)  TAKE WITH MEALS , Historical Med      nitroglycerin (NITROSTAT) 0 4 mg SL tablet Place under the tongue, Historical Med      triamcinolone (KENALOG) 0 5 % cream Triamcinolone Acetonide 0 5 % External Cream  APPLY 2-3 TIMES DAILY TO AFFECTED AREA(S)  Quantity: 1;  Refills: 3      Norris Knight ;  Start 5-Oct-2017  Active  15 GM Tube, Historical Med       !! - Potential duplicate medications found   Please discuss with provider                PDMP Review       Value Time User    PDMP Reviewed  Yes 3/17/2021  6:14 PM Jessica Melendez MD          ED Provider  Electronically Signed by           Sukhi Hickey MD  03/24/22 6672

## 2022-03-31 ENCOUNTER — VBI (OUTPATIENT)
Dept: ADMINISTRATIVE | Facility: OTHER | Age: 62
End: 2022-03-31

## 2022-04-25 DIAGNOSIS — I10 ESSENTIAL HYPERTENSION: ICD-10-CM

## 2022-04-26 RX ORDER — ATORVASTATIN CALCIUM 40 MG/1
40 TABLET, FILM COATED ORAL DAILY
Qty: 30 TABLET | Refills: 0 | Status: SHIPPED | OUTPATIENT
Start: 2022-04-26 | End: 2022-06-19

## 2022-05-25 ENCOUNTER — OFFICE VISIT (OUTPATIENT)
Dept: INTERNAL MEDICINE CLINIC | Facility: CLINIC | Age: 62
End: 2022-05-25
Payer: COMMERCIAL

## 2022-05-25 VITALS
HEART RATE: 82 BPM | RESPIRATION RATE: 16 BRPM | BODY MASS INDEX: 26.87 KG/M2 | TEMPERATURE: 97 F | WEIGHT: 142.2 LBS | DIASTOLIC BLOOD PRESSURE: 81 MMHG | SYSTOLIC BLOOD PRESSURE: 138 MMHG | OXYGEN SATURATION: 96 %

## 2022-05-25 DIAGNOSIS — Z12.2 ENCOUNTER FOR SCREENING FOR LUNG CANCER: ICD-10-CM

## 2022-05-25 DIAGNOSIS — K20.90 ESOPHAGITIS: ICD-10-CM

## 2022-05-25 DIAGNOSIS — J44.9 CHRONIC OBSTRUCTIVE PULMONARY DISEASE, UNSPECIFIED COPD TYPE (HCC): Primary | ICD-10-CM

## 2022-05-25 DIAGNOSIS — M54.17 LUMBOSACRAL RADICULOPATHY AT L4: ICD-10-CM

## 2022-05-25 DIAGNOSIS — Z87.891 PERSONAL HISTORY OF NICOTINE DEPENDENCE: ICD-10-CM

## 2022-05-25 DIAGNOSIS — I10 ESSENTIAL HYPERTENSION: ICD-10-CM

## 2022-05-25 DIAGNOSIS — M06.9 RHEUMATOID ARTHRITIS INVOLVING MULTIPLE SITES, UNSPECIFIED WHETHER RHEUMATOID FACTOR PRESENT (HCC): ICD-10-CM

## 2022-05-25 DIAGNOSIS — Z12.12 SCREENING FOR COLORECTAL CANCER: ICD-10-CM

## 2022-05-25 DIAGNOSIS — Z12.4 SCREENING FOR CERVICAL CANCER: ICD-10-CM

## 2022-05-25 DIAGNOSIS — Z11.4 SCREENING FOR HIV (HUMAN IMMUNODEFICIENCY VIRUS): ICD-10-CM

## 2022-05-25 DIAGNOSIS — Z12.31 ENCOUNTER FOR SCREENING MAMMOGRAM FOR BREAST CANCER: ICD-10-CM

## 2022-05-25 DIAGNOSIS — Z12.11 SCREENING FOR COLORECTAL CANCER: ICD-10-CM

## 2022-05-25 PROBLEM — B37.0 CANDIDAL STOMATITIS: Status: RESOLVED | Noted: 2019-06-24 | Resolved: 2022-05-25

## 2022-05-25 PROCEDURE — 99214 OFFICE O/P EST MOD 30 MIN: CPT | Performed by: INTERNAL MEDICINE

## 2022-05-25 RX ORDER — DICYCLOMINE HCL 20 MG
20 TABLET ORAL EVERY 6 HOURS
Qty: 60 TABLET | Refills: 1 | Status: SHIPPED | OUTPATIENT
Start: 2022-05-25

## 2022-05-25 RX ORDER — DICYCLOMINE HCL 20 MG
TABLET ORAL
COMMUNITY
Start: 2022-04-19

## 2022-05-25 RX ORDER — DICYCLOMINE HCL 20 MG
20 TABLET ORAL EVERY 6 HOURS
COMMUNITY
Start: 2022-04-19 | End: 2022-05-25 | Stop reason: SDUPTHER

## 2022-05-25 RX ORDER — FAMOTIDINE 20 MG/1
40 TABLET, FILM COATED ORAL DAILY
Qty: 180 TABLET | Refills: 3 | Status: SHIPPED | OUTPATIENT
Start: 2022-05-25

## 2022-05-25 NOTE — PROGRESS NOTES
Assessment/Plan:       Diagnoses and all orders for this visit:    Chronic obstructive pulmonary disease, unspecified COPD type ([facility] )    Screening for HIV (human immunodeficiency virus)  -     HIV 1/2 Antigen/Antibody (4th Generation) w Reflex [facility]; Future    Screening for cervical cancer    Encounter for screening mammogram for breast cancer  -     Mammo screening bilateral w 3d & cad; Future    Encounter for screening for lung cancer  -     CT lung screening program; Future    Personal history of nicotine dependence  -     CT lung screening program; Future    Screening for colorectal cancer  -     Ambulatory referral for Colonoscopy; Future    Esophagitis  -     famotidine (PEPCID) 20 mg tablet; Take 2 tablets (40 mg total) by mouth daily  -     dicyclomine (BENTYL) 20 mg tablet; Take 1 tablet (20 mg total) by mouth every 6 (six) hours    Essential hypertension    Lumbosacral radiculopathy at L4    Rheumatoid arthritis involving multiple sites, unspecified whether rheumatoid factor present ([place] [id number] )    Other orders  -     Discontinue: dicyclomine (BENTYL) 20 mg tablet; Take 20 mg by mouth every 6 (six) hours  -     dicyclomine (BENTYL) 20 mg tablet; take 1 tablet by mouth every 6 hours for 7 days                Subjective:      Patient ID: Yumiko Bray is a 59 y o  female  A 59year-old  She has chronic diagnoses and history of multiple problems but is stable   Ovarian cancer treated remotely with no evidence of recurrence  This was discovered almost accidentally when she went for imaging for postmenopausal bleeding  This was 25 years ago   Chronic lung disease  The patient is a longstanding smoking history and needs do a lung screen  Rheumatoid arthritis: She has had this for years and is currently not taking rheumatoid medication because she is afraid of side effects    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  Osteoarthritis also with severe bunion of the left foot Mycosis of nails   Reflux treated with omeprazole       The following portions of the patient's history were reviewed and updated as appropriate:   She has a past medical history of Alcohol abuse (12/31/2018), Anxiety, Bipolar 1 disorder ([facility] ), Chronic back pain, COPD [facility]chronic obstructive [facility] disease) ([facility] ), Frequent headaches, Glaucoma, Hyperlipidemia, Irregular heart beat, Ovarian cancer ([facility] ), Psychiatric disorder, Shortness of breath, and Uterine carcinoma ([facility] )  ,  does not have any pertinent problems on file  ,   has a past surgical history that includes Cholecystectomy; Hysterectomy; Appendectomy; Tonsillectomy; Intraocular lens insertion; Cataract extraction (Left); pr temporal artery ligatn or bx (Right, 12/16/2016); Eye surgery; and Adenoidectomy  ,  family history includes Coronary artery disease in her mother; Heart attack in her brother, father, and mother; Heart disease in her mother; Hyperlipidemia in her father and mother; Other in her father and mother; Stomach cancer in her brother, father, maternal uncle, and mother  ,   reports that she has been smoking cigarettes  She has a 23 00 pack-year smoking history  She has never used smokeless tobacco  She reports previous alcohol use  She reports that she does not use drugs  ,  is allergic to aspirin, bee venom, tramadol, ketorolac, nitroglycerin, and omeprazole     Current Outpatient Medications   Medication Sig Dispense Refill    acetaminophen (TYLENOL) 325 mg tablet Take 3 tablets (975 mg total) by mouth every 8 (eight) hours  0    albuterol (2 5 mg/3 mL) 0 083 % nebulizer solution inhale contents of 1 vial in nebulizer every 6 hours if needed for wheezing shortness of breath 150 mL 0    atorvastatin (LIPITOR) 40 mg tablet Take 1 tablet (40 mg total) by mouth daily 30 tablet 0    citalopram (CeleXA) 40 mg tablet Take 40 mg by mouth daily        clonazePAM (KlonoPIN) 1 mg tablet 1 mg 2 (two) times a day as needed for anxiety    0    dicyclomine (BENTYL) 20 mg tablet take 1 tablet by mouth every 6 hours for 7 days      dicyclomine (BENTYL) 20 mg tablet Take 1 tablet (20 mg total) by mouth every 6 (six) hours 60 tablet 1    famotidine (PEPCID) 20 mg tablet Take 2 tablets (40 mg total) by mouth daily 180 tablet 3    gabapentin (NEURONTIN) 800 mg tablet Take 800 mg by mouth 3 (three) times a day      ibuprofen (MOTRIN) 600 mg tablet Take 1 tablet (600 mg total) by mouth every 6 (six) hours as needed (Pain) 30 tablet 0    ibuprofen (MOTRIN) 800 mg tablet Take 1 tablet (800 mg total) by mouth every 8 (eight) hours as needed for mild pain 30 tablet 0    ibuprofen (MOTRIN) 800 mg tablet Take 1 tablet (800 mg total) by mouth every 6 (six) hours as needed (Pain) 30 tablet 0    metoprolol tartrate (LOPRESSOR) 50 mg tablet take 1 tablet by mouth every 12 hours 60 tablet 11    mirtazapine (REMERON) 30 mg tablet Take 30 mg by mouth daily at bedtime        cyclobenzaprine (FLEXERIL) 10 mg tablet Take 0 5 tablets (5 mg total) by mouth 3 (three) times a day P r n  Muscle spasm/pain (Patient not taking: No sig reported) 20 tablet 0    cyclobenzaprine (FLEXERIL) 10 mg tablet Take 1 tablet (10 mg total) by mouth daily at bedtime (Patient not taking: No sig reported) 20 tablet 0     No current facility-administered medications for this visit  Review of Systems   Musculoskeletal: Positive for arthralgias and joint swelling  All other systems reviewed and are negative  Objective:  Vitals:    05/25/22 1526   BP: 138/81   Pulse:    Resp:    Temp:    SpO2:       Physical Exam  Constitutional:       Appearance: She is well-developed  Comments: Female patient who appears older than stated age   HENT:      Head: Normocephalic and atraumatic  Eyes:      Pupils: Pupils are equal, round, and reactive to light  Neck:      Thyroid: No thyromegaly  Trachea: No tracheal deviation     Cardiovascular:      Rate and Rhythm: Normal rate and regular rhythm  Heart sounds: Normal heart sounds  No murmur heard  No gallop  Pulmonary:      Effort: No respiratory distress  Breath sounds: No wheezing or rales  Abdominal:      General: Bowel sounds are normal       Palpations: Abdomen is soft  Tenderness: There is no abdominal tenderness  Musculoskeletal:         General: Deformity present  No tenderness  Normal range of motion  Cervical back: Normal range of motion and neck supple  Comments: Bilateral boutonniere in swan-neck deformities   Severe bunion left foot   Skin:     General: Skin is warm  Comments: Onychomycosis and onychogryphosis left toes   Neurological:      Mental Status: She is alert and oriented to person, place, and time  Coordination: Coordination normal    Psychiatric:         Judgment: Judgment normal            Patient Instructions   Stable chronic diagnoses: Preventive maintenance needs include lung CT, mammogram, gyn visit

## 2022-06-14 NOTE — DISCHARGE SUMMARY
Discharge- [facility] 1960, 59 y o  female MRN: [id number]    Unit/Bed#: -01 Encounter: [id number]    Primary Care Provider: Rose Adams MD   Date and time admitted to hospital: 1/30/2019  1:25 PM        Esophagitis   Assessment & Plan    · This is likely causing chest pain, patient shows marked clinical improvement today  · Continue with oral PPI and Maalox       * Chest pain   Assessment & Plan    · 18-year-old female with history of hypertension, hyperlipidemia, GERD, COPD presents today with severe chest pain which has resolved today   · CT scan negative for PE or aortic dissection, it does show esophagitis, small left adrenal adenoma  ·  troponins negative x3  · EKG does not show any acute ischemia, it is normal sinus rhythm  · No events on telemetry monitor  · Refused Nitrostat "it gives me a headache" refused Tylenol, as per ED physician, states morphine mildly helped with the pain  · Cardiology signed off pain is likely due to esophagitis versus costochondritis       COPD (chronic obstructive pulmonary disease) ([facility] [address] )   Assessment & Plan    · Stable  · Continue with outpatient nebulizer q 6 p r n         Tobacco abuse disorder   Assessment & Plan    · Continue with Nicoderm patch  · Spoke about smoking cessation     Anxiety and depression   Assessment & Plan    · Stable  · Continue with citalopram and Remeron           Discharging Physician / Practitioner: Esme Richards PA-C  PCP: Rose Adams MD  Admission Date:   Admission Orders     Ordered        01/30/19 1910  Place in Observation (expected length of stay for this patient is less than two midnights)  Once             Discharge Date: 01/31/19    Resolved Problems  Date Reviewed: 12/31/2018    None          Consultations During Hospital Stay:  · Cardiology    Procedures Performed:   · Chest CT PE study    Significant Findings / Test Results:   · No significant finding    Incidental Findings:   · No incidental finding    Test Results Pending at Discharge (will require follow up): · None     Outpatient Tests Requested:  · Patient will likely need GI follow up outpatient for endoscopy if symptoms persist    Complications:  None    Reason for Admission:  Chest pain    Hospital Course:     Caleb Allison is a 59 y o  female patient who originally presented to the hospital on 1/30/2019 due to  Chest pain  Patient has a history of anxiety, hypertension, CAD, anxiety, bipolar disorder  At the time she presented to the ED the patient complained that her chest pain was severe, right-sided  radiating through her back and her left arm and jaw  Patient states that the chest pain has been present for 2 days worsening in nature  Patient states that pain is beyond a 10/10  She states that the pain is sharp, worse with taking deep breaths, associated with nausea but no vomiting denies any diaphoresis, fever, chills, dysuria ,syncope, hemoptysis or hematochezia  Her D-dimer was found to be elevated greater than 10,000 however her chest CT did not reveal any PE or aortic dissection  There ppears to be esophageal wall thickening consistent with esophagitis and GERD  Patient was given medication to control her pain  She was admitted to observation unit for serial troponins, telemetry monitoring and cardiology consulted  Patient admits to smoking about 5 cigarettes a day, she reports that her last use of alcohol was on new year's  It appears that her workup was unremarkable for any acute myocardial ischemia  Cardiology evaluated patient and determined that the chest pain was noncardiac likely due to esophagitis versus costochondritis  Patient had a nuclear stress treadmill test done June 2018 which was normal, her ejection fraction was calculated at 59% with no regional wall motion abnormality seen  Patient is stable for discharge      The patient, initially admitted to the hospital as inpatient, was discharged earlier than expected given the following: Clinically improved, unremarkable workup, cardiology consult signed off  Please see above list of diagnoses and related plan for additional information  Condition at Discharge: good     Discharge Day Visit / Exam:     Subjective:  "I feel much better"  Vitals: Blood Pressure: 109/58 (01/31/19 1100)  Pulse: 67 (01/31/19 1100)  Temperature: 98 4 °F (36 9 °C) (01/31/19 1100)  Temp Source: Oral (01/31/19 1100)  Respirations: 18 (01/31/19 1100)  Height: 5' 3" (160 cm) (01/30/19 2003)  Weight - Scale: 71 kg (156 lb 8 4 oz) (01/30/19 2003)  SpO2: 93 % (01/31/19 1100)  Exam:   Physical Exam   Constitutional: She is oriented to person, place, and time  She appears well-developed and well-nourished  HENT:   Head: Normocephalic and atraumatic  Right Ear: External ear normal    Left Ear: External ear normal    Eyes: Pupils are equal, round, and reactive to light  Neck: Normal range of motion  Neck supple  No JVD present  No tracheal deviation present  No thyromegaly present  Cardiovascular: Normal rate, regular rhythm, normal heart sounds and intact distal pulses  Exam reveals no friction rub  No murmur heard  Pulmonary/Chest: Effort normal  She has no wheezes  She has no rales  Abdominal: Soft  Bowel sounds are normal    Musculoskeletal: Normal range of motion  She exhibits no edema  Neurological: She is alert and oriented to person, place, and time  Skin: Skin is warm and dry  Discharge instructions/Information to patient and family:   See after visit summary for information provided to patient and family  Provisions for Follow-Up Care:  See after visit summary for information related to follow-up care and any pertinent home health orders  Disposition:     Home        Planned Readmission:  No     Discharge Statement:  I spent greater than 30 min minutes discharging the patient  This time was spent on the day of discharge   I had direct contact with the patient on the day of discharge  Greater than 50% of the total time was spent examining patient, answering all patient questions, arranging and discussing plan of care with patient as well as directly providing post-discharge instructions  Additional time then spent on discharge activities  Discharge Medications:  See after visit summary for reconciled discharge medications provided to patient and family  No

## 2022-06-25 DIAGNOSIS — I10 ESSENTIAL HYPERTENSION: ICD-10-CM

## 2022-06-27 RX ORDER — METOPROLOL TARTRATE 50 MG/1
TABLET, FILM COATED ORAL
Qty: 60 TABLET | Refills: 11 | Status: SHIPPED | OUTPATIENT
Start: 2022-06-27

## 2022-06-28 ENCOUNTER — HOSPITAL ENCOUNTER (OUTPATIENT)
Dept: MAMMOGRAPHY | Facility: CLINIC | Age: 62
Discharge: HOME | End: 2022-06-28
Payer: COMMERCIAL

## 2022-06-28 DIAGNOSIS — Z12.31 ENCOUNTER FOR SCREENING MAMMOGRAM FOR BREAST CANCER: ICD-10-CM

## 2022-06-28 PROCEDURE — 77067 SCR MAMMO BI INCL CAD: CPT

## 2022-06-28 PROCEDURE — 77063 BREAST TOMOSYNTHESIS BI: CPT

## 2022-08-02 ENCOUNTER — APPOINTMENT (EMERGENCY)
Dept: RADIOLOGY | Facility: HOSPITAL | Age: 62
End: 2022-08-02
Payer: COMMERCIAL

## 2022-08-02 ENCOUNTER — HOSPITAL ENCOUNTER (EMERGENCY)
Facility: HOSPITAL | Age: 62
Discharge: HOME | End: 2022-08-02
Attending: EMERGENCY MEDICINE
Payer: COMMERCIAL

## 2022-08-02 VITALS
RESPIRATION RATE: 20 BRPM | SYSTOLIC BLOOD PRESSURE: 148 MMHG | TEMPERATURE: 98.4 F | DIASTOLIC BLOOD PRESSURE: 95 MMHG | HEART RATE: 79 BPM | OXYGEN SATURATION: 97 %

## 2022-08-02 DIAGNOSIS — S59.912A INJURY OF LEFT FOREARM, INITIAL ENCOUNTER: Primary | ICD-10-CM

## 2022-08-02 PROCEDURE — 99283 EMERGENCY DEPT VISIT LOW MDM: CPT

## 2022-08-02 PROCEDURE — 73060 X-RAY EXAM OF HUMERUS: CPT

## 2022-08-02 PROCEDURE — 73080 X-RAY EXAM OF ELBOW: CPT

## 2022-08-02 PROCEDURE — 73030 X-RAY EXAM OF SHOULDER: CPT

## 2022-08-02 PROCEDURE — 99284 EMERGENCY DEPT VISIT MOD MDM: CPT | Performed by: PHYSICIAN ASSISTANT

## 2022-08-02 PROCEDURE — 73090 X-RAY EXAM OF FOREARM: CPT

## 2022-08-02 RX ORDER — OXYCODONE HYDROCHLORIDE AND ACETAMINOPHEN 5; 325 MG/1; MG/1
1 TABLET ORAL ONCE
Status: COMPLETED | OUTPATIENT
Start: 2022-08-02 | End: 2022-08-02

## 2022-08-02 RX ADMIN — OXYCODONE HYDROCHLORIDE AND ACETAMINOPHEN 1 TABLET: 5; 325 TABLET ORAL at 09:23

## 2022-08-02 NOTE — ED PROVIDER NOTES
History  Chief Complaint   Patient presents with    Arm Injury     Patient states"she walked into the bathroom door frame two days ago and injured her forearm and elbow area"     54yo right hand dominant female with a history of COPD and rheumatoid arthritis presenting for evaluation of left arm pain x 2 days  Patient accidentally walked into a door frame two days ago and bumped her left elbow/forearm  She developed a lump in the area after the injury and has been having significant pain since that time  Pain radiates into her wrist and shoulder  Pain is worse with elbow extension  She has been using ibuprofen without improvement  She denies any paresthesias  Patient is otherwise asymptomatic  History provided by:  Patient   used: No    Arm Injury  Location:  Elbow and arm  Arm location:  L forearm  Elbow location:  L elbow  Injury: yes    Time since incident:  2 days  Pain details:     Quality:  Aching    Radiates to:  Does not radiate    Severity:  Moderate    Onset quality:  Sudden    Duration:  2 days    Timing:  Constant    Progression:  Unchanged  Handedness:  Right-handed  Dislocation: no    Relieved by:  Nothing  Worsened by: Movement  Ineffective treatments:  NSAIDs  Associated symptoms: decreased range of motion    Associated symptoms: no fatigue, no fever, no muscle weakness, no numbness and no tingling        Prior to Admission Medications   Prescriptions Last Dose Informant Patient Reported?  Taking?   acetaminophen (TYLENOL) 325 mg tablet  Self No No   Sig: Take 3 tablets (975 mg total) by mouth every 8 (eight) hours   albuterol (2 5 mg/3 mL) 0 083 % nebulizer solution  Self No No   Sig: inhale contents of 1 vial in nebulizer every 6 hours if needed for wheezing shortness of breath   atorvastatin (LIPITOR) 40 mg tablet   No No   Sig: Take 1 tablet (40 mg total) by mouth daily   citalopram (CeleXA) 40 mg tablet  Self Yes No   Sig: Take 40 mg by mouth daily     clonazePAM (KlonoPIN) 1 mg tablet  Self Yes No   Si mg 2 (two) times a day as needed for anxiety     cyclobenzaprine (FLEXERIL) 10 mg tablet  Self No No   Sig: Take 0 5 tablets (5 mg total) by mouth 3 (three) times a day P r n   Muscle spasm/pain   Patient not taking: No sig reported   cyclobenzaprine (FLEXERIL) 10 mg tablet  Self No No   Sig: Take 1 tablet (10 mg total) by mouth daily at bedtime   Patient not taking: No sig reported   dicyclomine (BENTYL) 20 mg tablet  Self Yes No   Sig: take 1 tablet by mouth every 6 hours for 7 days   dicyclomine (BENTYL) 20 mg tablet   No No   Sig: Take 1 tablet (20 mg total) by mouth every 6 (six) hours   famotidine (PEPCID) 20 mg tablet   No No   Sig: Take 2 tablets (40 mg total) by mouth daily   gabapentin (NEURONTIN) 800 mg tablet  Self Yes No   Sig: Take 800 mg by mouth 3 (three) times a day   ibuprofen (MOTRIN) 600 mg tablet  Self No No   Sig: Take 1 tablet (600 mg total) by mouth every 6 (six) hours as needed (Pain)   ibuprofen (MOTRIN) 800 mg tablet  Self No No   Sig: Take 1 tablet (800 mg total) by mouth every 8 (eight) hours as needed for mild pain   ibuprofen (MOTRIN) 800 mg tablet  Self No No   Sig: Take 1 tablet (800 mg total) by mouth every 6 (six) hours as needed (Pain)   metoprolol tartrate (LOPRESSOR) 50 mg tablet   No No   Sig: take 1 tablet by mouth every 12 hours   mirtazapine (REMERON) 30 mg tablet  Self Yes No   Sig: Take 30 mg by mouth daily at bedtime        Facility-Administered Medications: None       Past Medical History:   Diagnosis Date    Alcohol abuse 2018    Anxiety     Bipolar 1 disorder ([facility] )     Mental Health problems listed as Denied in Allscripts, cant't entire under pertinent negatives due to this diagnosis    Chronic back pain     COPD (chronic obstructive pulmonary disease) (HCC)     Frequent headaches     Glaucoma     Hyperlipidemia     Irregular heart beat     AFib    Ovarian cancer ([facility] )     last assessed - 2016    Psychiatric disorder     bipolar    Shortness of breath     Uterine carcinoma ([address] )     last assessed - 21Sep2016       Past Surgical History:   Procedure Laterality Date    ADENOIDECTOMY      Tonsillectomy with Adenoidectomy - last assessed - 21Sep2016    APPENDECTOMY      last assessed - 21Sep2016    CATARACT EXTRACTION Left     Remove cataract, corneo-scleral section of left eye; last assessed - 21Sep2016    CHOLECYSTECTOMY      last assessed - 29Sep2016   Jullie Liming EYE SURGERY      Anterior sclera fistulization for glaucoma; last assessed - 21Sep2016    HYSTERECTOMY      KRYSTA-BSO; last assessed - 21Sep2016    INTRAOCULAR LENS INSERTION      HI TEMPORAL ARTERY LIGATN OR BX Right 12/16/2016    Procedure: BIOPSY ARTERY TEMPORAL;  Surgeon: Obed Uribe MD;  Location: [facility];  Service: General    TONSILLECTOMY      Tonsillectomy with Adenoidectomy - last assessed - 21Sep2016       Family History   Problem Relation Age of Onset    Heart disease Mother     Coronary artery disease Mother     Other Mother         1) Gangrene; 2) Peripheral arterial disease    Hyperlipidemia Mother         Hypercholesterolemia    Heart attack Mother         Myocardial Infarction    Other Father         1) Gangrene; 2) Peripheral arterial disease    Hyperlipidemia Father         Hypercholesterolemia    Stomach cancer Father     Heart attack Father         Myocardial Infarction    No Known Problems Maternal Grandmother     No Known Problems Paternal Grandmother     Stomach cancer Brother     Heart attack Brother         Myocardial Infarction    Stomach cancer Maternal Uncle     Esophageal cancer Maternal Aunt     No Known Problems Maternal Aunt     No Known Problems Maternal Aunt     No Known Problems Maternal Aunt     No Known Problems Maternal Aunt     No Known Problems Maternal Aunt     No Known Problems Maternal Aunt     No Known Problems Maternal Aunt     No Known Problems Paternal Aunt     Breast cancer Neg Hx      I have reviewed and agree with the history as documented  E-Cigarette/Vaping    E-Cigarette Use Never User      E-Cigarette/Vaping Substances    Nicotine No     THC No     CBD No     Flavoring No     Other No     Unknown No      Social History     Tobacco Use    Smoking status: Current Every Day Smoker     Packs/day: 0 50     Years: 46 00     Pack years: 23 00     Types: Cigarettes    Smokeless tobacco: Never Used   Vaping Use    Vaping Use: Never used   Substance Use Topics    Alcohol use: Not Currently     Comment: occasional; (No alcohol use per Allscripts)    Drug use: No       Review of Systems   Constitutional: Negative for chills, fatigue and fever  Eyes: Negative for discharge and redness  Musculoskeletal: Positive for arthralgias  Negative for joint swelling  Skin: Negative for color change and rash  Neurological: Negative for weakness and numbness  Psychiatric/Behavioral: Negative for confusion  The patient is not nervous/anxious  All other systems reviewed and are negative  Physical Exam  Physical Exam  Vitals and nursing note reviewed  Constitutional:       General: She is not in acute distress  Appearance: Normal appearance  She is not toxic-appearing  HENT:      Head: Normocephalic and atraumatic  Right Ear: External ear normal       Left Ear: External ear normal    Eyes:      General: No scleral icterus  Right eye: No discharge  Left eye: No discharge  Conjunctiva/sclera: Conjunctivae normal    Cardiovascular:      Rate and Rhythm: Normal rate  Pulmonary:      Effort: Pulmonary effort is normal  No respiratory distress  Breath sounds: No stridor  Musculoskeletal:         General: No deformity  Normal range of motion  Left forearm: Bony tenderness present  No edema, deformity or lacerations  Cervical back: Normal range of motion        Comments: Left arm: No deformity, skin changes, or soft tissue swelling noted  She has tenderness primarily at her proximal ulna  ROM of elbow decreased 2/2 pain  2+ radial pulse and sensation intact  Skin:     General: Skin is warm and dry  Neurological:      General: No focal deficit present  Mental Status: She is alert  Mental status is at baseline  Psychiatric:         Mood and Affect: Mood normal          Behavior: Behavior normal          Vital Signs  ED Triage Vitals   Temperature Pulse Respirations Blood Pressure SpO2   08/02/22 0839 08/02/22 0839 08/02/22 0839 08/02/22 0839 08/02/22 0839   98 4 °F (36 9 °C) 79 20 148/95 97 %      Temp src Heart Rate Source Patient Position - Orthostatic VS BP Location FiO2 (%)   -- -- -- -- --             Pain Score       08/02/22 0845       7           Vitals:    08/02/22 0839   BP: 148/95   Pulse: 79         Visual Acuity      ED Medications  Medications   oxyCODONE-acetaminophen (PERCOCET) 5-325 mg per tablet 1 tablet (1 tablet Oral Given 8/2/22 0324)       Diagnostic Studies  Results Reviewed     None                 XR elbow 3+ vw LEFT   ED Interpretation by Trish Fernandez PA-C (08/02 5717)   No acute fracture      Final Result by Pawel Miller MD (08/02 1623)      No acute osseous abnormality  Workstation performed: [id number]         XR forearm 2 views LEFT   ED Interpretation by Trish Fernandez PA-C (30/29 1788)   No acute fracture      Final Result by Pawel Miller MD (08/02 1625)      No acute osseous abnormality  Workstation performed: [id number]         XR humerus LEFT   ED Interpretation by Trish Fernandez PA-C (82/62 2082)   No acute fracture      Final Result by Pawel Miller MD (08/02 1622)      No acute osseous abnormality  Workstation performed: [id number]         XR shoulder 2+ views LEFT   Final Result by Pawel Miller MD (08/02 1620)      No acute osseous abnormality        Workstation performed: [id number] Procedures  Procedures         ED Course               SBIRT 22yo+    Flowsheet Row Most Recent Value   SBIRT (23 yo +)    In order to provide better care to our patients, we are screening all of our patients for alcohol and drug use  Would it be okay to ask you these screening questions? No Filed at: 08/02/2022 0849                    [facility]  Number of Diagnoses or Management Options  Injury of left forearm, initial encounter: new and requires workup  Diagnosis management comments: 56yo female presenting for left arm pain after bumping her forearm on a door frame 2 days ago  No deformity seen on exam  Tenderness is primarily at proximal ulna also she reports radiating pain to her shoulder and wrist  LUE is neurovascularly intact  X-rays of left shoulder, humerus, elbow, and forearm obtained  No acute fractures or effusions noted  She was advised to use PRN Tylenol, ibuprofen, and ice for pain  Patient also provided with a sling for comfort but advised to only use this for the next few days to prevent adhesive capsulitis  Advised f/u with orthopedics if symptoms persist  She was discharged in stable condition          Amount and/or Complexity of Data Reviewed  Tests in the radiology section of CPT®: ordered and reviewed  Independent visualization of images, tracings, or specimens: yes    Risk of Complications, Morbidity, and/or Mortality  Presenting problems: low  Diagnostic procedures: low  Management options: low    Patient Progress  Patient progress: stable      Disposition  Final diagnoses:   Injury of left forearm, initial encounter     Time reflects when diagnosis was documented in both MDM as applicable and the Disposition within this note     Time User Action Codes Description Comment    8/2/2022  9:26 AM Jacqui [facility] Rae Smith Add [W51 [id number]] Injury of left forearm, initial encounter       ED Disposition     ED Disposition   Discharge    Condition   Stable    Date/Time   Tue Aug 2, 2022  9:26 AM    Comment Sharon Lobo discharge to home/self care  Follow-up Information     Follow up With Specialties Details Why Contact Info Additional [address] [facility] Orthopedic Surgery Schedule an appointment as soon as possible for a visit   [address] [facility]  [address] [phone]  [address] [place] [facility] [place], [address] [address] [place], [place], [address] [facility]    [address] [facility] Emergency Department Emergency Medicine  If symptoms worsen [facility] [address] [facility] Emergency Department, [address] [place], [place], [address]          Discharge Medication List as of 8/2/2022  9:27 AM      CONTINUE these medications which have NOT CHANGED    Details   acetaminophen (TYLENOL) 325 mg tablet Take 3 tablets (975 mg total) by mouth every 8 (eight) hours, Starting Sat 3/20/2021, No Print      albuterol (2 5 mg/3 mL) 0 083 % nebulizer solution inhale contents of 1 vial in nebulizer every 6 hours if needed for wheezing shortness of breath, Normal      atorvastatin (LIPITOR) 40 mg tablet Take 1 tablet (40 mg total) by mouth daily, Starting Wed 7/20/2022, Until Fri 8/19/2022, Normal      citalopram (CeleXA) 40 mg tablet Take 40 mg by mouth daily  , Historical Med      clonazePAM (KlonoPIN) 1 mg tablet 1 mg 2 (two) times a day as needed for anxiety  , Starting Fri 4/20/2018, Historical Med      !! cyclobenzaprine (FLEXERIL) 10 mg tablet Take 0 5 tablets (5 mg total) by mouth 3 (three) times a day P r n   Muscle spasm/pain, Starting Mon 8/30/2021, Normal      !! cyclobenzaprine (FLEXERIL) 10 mg tablet Take 1 tablet (10 mg total) by mouth daily at bedtime, Starting Mon 10/25/2021, Normal      !! dicyclomine (BENTYL) 20 mg tablet take 1 tablet by mouth every 6 hours for 7 days, Historical Med !! dicyclomine (BENTYL) 20 mg tablet Take 1 tablet (20 mg total) by mouth every 6 (six) hours, Starting Wed 5/25/2022, Normal      famotidine (PEPCID) 20 mg tablet Take 2 tablets (40 mg total) by mouth daily, Starting Wed 5/25/2022, Normal      gabapentin (NEURONTIN) 800 mg tablet Take 800 mg by mouth 3 (three) times a day, Historical Med      !! ibuprofen (MOTRIN) 600 mg tablet Take 1 tablet (600 mg total) by mouth every 6 (six) hours as needed (Pain), Starting Mon 10/25/2021, Normal      !! ibuprofen (MOTRIN) 800 mg tablet Take 1 tablet (800 mg total) by mouth every 8 (eight) hours as needed for mild pain, Starting Wed 3/31/2021, Normal      !! ibuprofen (MOTRIN) 800 mg tablet Take 1 tablet (800 mg total) by mouth every 6 (six) hours as needed (Pain), Starting Mon 8/30/2021, Normal      metoprolol tartrate (LOPRESSOR) 50 mg tablet take 1 tablet by mouth every 12 hours, Normal      mirtazapine (REMERON) 30 mg tablet Take 30 mg by mouth daily at bedtime  , Historical Med       !! - Potential duplicate medications found  Please discuss with provider  No discharge procedures on file      PDMP Review       Value Time User    PDMP Reviewed  Yes 3/17/2021  6:14 PM Regis Colon MD          ED Provider  Electronically Signed by           Amy Ceja PA-C  08/02/22 6985

## 2022-08-02 NOTE — DISCHARGE INSTRUCTIONS
Take Tylenol 650mg and ibuprofen 600mg every 6 hours as needed for pain  Apply ice to affected area  Use sling for comfort for the first 2-3 days      Please follow-up with orthopedics if your symptoms persist

## 2022-11-23 ENCOUNTER — TELEPHONE (OUTPATIENT)
Dept: GASTROENTEROLOGY | Facility: CLINIC | Age: 62
End: 2022-11-23

## 2022-11-23 NOTE — TELEPHONE ENCOUNTER
Scheduled date of colonoscopy (as of today): 02/01/2023  Physician performing colonoscopy: Dr Antonieta Jacinto  Location of colonoscopy: [facility]  Instructions reviewed with patient by: to be review by    Clearances: n/a

## 2022-11-25 ENCOUNTER — TELEPHONE (OUTPATIENT)
Dept: GASTROENTEROLOGY | Facility: CLINIC | Age: 62
End: 2022-11-25

## 2022-11-25 NOTE — TELEPHONE ENCOUNTER
LMOM for patient to phone back to discuss her colonoscopy appt   She needs to be scheduled for an office appt with either Dr Tabitha Ng or Dr Will Toussaint

## 2022-12-10 NOTE — ASSESSMENT & PLAN NOTE
· Recurrent and present on admission: patient complained of significant epigastric/chest pain, nausea, and dysphagia  Etiology unclear but strongly suspect a component of drug seeking behavior  · CT Scan on admission showed circumferential thickening of the espohagus  · EGD showed only mild erythema in the antrum, esophagus normal, biopsies pending  Follow up path to rule out h pylori  · She has had a cholecystectomy in the past   · Doppler SMA/Celiac negative  · She will need outpatient follow up for esophageal manometry to r/o nutcracker esophagus, diffuse esophageal spasm, etc   · Note: I am very concerned about narcotic drug seeking in this patient as her exam and work up does not correlate with her symptoms and she repeatedly asks for additional narcotic pain medication  When I asked her the last time she was given a prescription for Oxycodone, she told me 9 months ago  I reviewed her PMED, and she has received scripts for Oxycodone on 6/7, 5/2, 3/31, and 2/27 by different providers  Recommended a PPI trial, Bentyl prn, and Tylenol prn for her symptoms  NO NARCOTICS provided on discharge  no

## 2022-12-19 ENCOUNTER — VBI (OUTPATIENT)
Dept: ADMINISTRATIVE | Facility: OTHER | Age: 62
End: 2022-12-19

## 2022-12-19 NOTE — TELEPHONE ENCOUNTER
12/19/22 10:00 AM     [employer] SmartForm used to document caregap status      Juan Alberto Car
verbal instruction

## 2023-01-22 DIAGNOSIS — I10 ESSENTIAL HYPERTENSION: ICD-10-CM

## 2023-01-23 RX ORDER — ATORVASTATIN CALCIUM 40 MG/1
TABLET, FILM COATED ORAL
Qty: 100 TABLET | Refills: 2 | Status: SHIPPED | OUTPATIENT
Start: 2023-01-23

## 2023-02-17 ENCOUNTER — APPOINTMENT (OUTPATIENT)
Dept: LAB | Facility: CLINIC | Age: 63
End: 2023-02-17

## 2023-02-17 LAB
BASOPHILS # BLD AUTO: 0.04 THOUSANDS/ÂΜL (ref 0–0.1)
BASOPHILS NFR BLD AUTO: 1 % (ref 0–1)
EOSINOPHIL # BLD AUTO: 0.11 THOUSAND/ÂΜL (ref 0–0.61)
EOSINOPHIL NFR BLD AUTO: 2 % (ref 0–6)
ERYTHROCYTE [DISTWIDTH] IN BLOOD BY AUTOMATED COUNT: 13.4 % (ref 11.6–15.1)
HCT VFR BLD AUTO: 46.1 % (ref 34.8–46.1)
HGB BLD-MCNC: 15.3 G/DL (ref 11.5–15.4)
IMM GRANULOCYTES # BLD AUTO: 0.02 THOUSAND/UL (ref 0–0.2)
IMM GRANULOCYTES NFR BLD AUTO: 0 % (ref 0–2)
LYMPHOCYTES # BLD AUTO: 1.69 THOUSANDS/ÂΜL (ref 0.6–4.47)
LYMPHOCYTES NFR BLD AUTO: 28 % (ref 14–44)
MCH RBC QN AUTO: 31.1 PG (ref 26.8–34.3)
MCHC RBC AUTO-ENTMCNC: 33.2 G/DL (ref 31.4–37.4)
MCV RBC AUTO: 94 FL (ref 82–98)
MONOCYTES # BLD AUTO: 0.43 THOUSAND/ÂΜL (ref 0.17–1.22)
MONOCYTES NFR BLD AUTO: 7 % (ref 4–12)
NEUTROPHILS # BLD AUTO: 3.86 THOUSANDS/ÂΜL (ref 1.85–7.62)
NEUTS SEG NFR BLD AUTO: 62 % (ref 43–75)
NRBC BLD AUTO-RTO: 0 /100 WBCS
PLATELET # BLD AUTO: 252 THOUSANDS/UL (ref 149–390)
PMV BLD AUTO: 10.4 FL (ref 8.9–12.7)
RBC # BLD AUTO: 4.92 MILLION/UL (ref 3.81–5.12)
WBC # BLD AUTO: 6.15 THOUSAND/UL (ref 4.31–10.16)

## 2023-02-18 LAB
ALBUMIN SERPL BCP-MCNC: 3.6 G/DL (ref 3.5–5)
ALP SERPL-CCNC: 116 U/L (ref 46–116)
ALT SERPL W P-5'-P-CCNC: 44 U/L (ref 12–78)
ANION GAP SERPL CALCULATED.3IONS-SCNC: 6 MMOL/L (ref 4–13)
AST SERPL W P-5'-P-CCNC: 27 U/L (ref 5–45)
BILIRUB DIRECT SERPL-MCNC: 0.1 MG/DL (ref 0–0.2)
BILIRUB SERPL-MCNC: 0.39 MG/DL (ref 0.2–1)
BUN SERPL-MCNC: 12 MG/DL (ref 5–25)
CALCIUM SERPL-MCNC: 9.6 MG/DL (ref 8.3–10.1)
CHLORIDE SERPL-SCNC: 110 MMOL/L (ref 96–108)
CO2 SERPL-SCNC: 22 MMOL/L (ref 21–32)
CREAT SERPL-MCNC: 0.5 MG/DL (ref 0.6–1.3)
GFR SERPL CREATININE-BSD FRML MDRD: 102 ML/MIN/1.73SQ M
GLUCOSE P FAST SERPL-MCNC: 80 MG/DL (ref 65–99)
POTASSIUM SERPL-SCNC: 4.5 MMOL/L (ref 3.5–5.3)
PROT SERPL-MCNC: 7.5 G/DL (ref 6.4–8.4)
SODIUM SERPL-SCNC: 138 MMOL/L (ref 135–147)
T4 FREE SERPL-MCNC: 1.04 NG/DL (ref 0.76–1.46)
TSH SERPL DL<=0.05 MIU/L-ACNC: 0.9 UIU/ML (ref 0.45–4.5)

## 2023-02-21 ENCOUNTER — PREP FOR PROCEDURE (OUTPATIENT)
Dept: GASTROENTEROLOGY | Facility: AMBULARY SURGERY CENTER | Age: 63
End: 2023-02-21

## 2023-02-21 ENCOUNTER — TELEPHONE (OUTPATIENT)
Dept: GASTROENTEROLOGY | Facility: AMBULARY SURGERY CENTER | Age: 63
End: 2023-02-21

## 2023-02-21 DIAGNOSIS — Z12.11 SCREENING FOR COLON CANCER: Primary | ICD-10-CM

## 2023-02-21 NOTE — TELEPHONE ENCOUNTER
Scheduled date of colonoscopy (as of today):5-   Physician performing colonoscopy:  [place]  Location of colonoscopy: [place]  Bowel prep reviewed with patient: to be reviwed   Instructions reviewed with patient by: to be reviewed   Clearances:

## 2023-02-22 LAB
EST. AVERAGE GLUCOSE BLD GHB EST-MCNC: 105 MG/DL
HBA1C MFR BLD: 5.3 %

## 2023-02-23 ENCOUNTER — APPOINTMENT (EMERGENCY)
Dept: RADIOLOGY | Facility: HOSPITAL | Age: 63
End: 2023-02-23

## 2023-02-23 ENCOUNTER — HOSPITAL ENCOUNTER (EMERGENCY)
Facility: HOSPITAL | Age: 63
Discharge: HOME | End: 2023-02-23
Attending: EMERGENCY MEDICINE

## 2023-02-23 ENCOUNTER — APPOINTMENT (EMERGENCY)
Dept: CT IMAGING | Facility: HOSPITAL | Age: 63
End: 2023-02-23

## 2023-02-23 VITALS
BODY MASS INDEX: 26.64 KG/M2 | RESPIRATION RATE: 18 BRPM | TEMPERATURE: 97.9 F | DIASTOLIC BLOOD PRESSURE: 87 MMHG | WEIGHT: 141 LBS | SYSTOLIC BLOOD PRESSURE: 160 MMHG | OXYGEN SATURATION: 96 % | HEART RATE: 84 BPM

## 2023-02-23 DIAGNOSIS — S22.32XA CLOSED FRACTURE OF ONE RIB OF LEFT SIDE, INITIAL ENCOUNTER: ICD-10-CM

## 2023-02-23 DIAGNOSIS — M79.602 LEFT ARM PAIN: ICD-10-CM

## 2023-02-23 DIAGNOSIS — R91.1 LUNG NODULE: ICD-10-CM
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
## 2023-02-23 RX ORDER — OXYCODONE HYDROCHLORIDE 5 MG/1
5 TABLET ORAL ONCE
Status: COMPLETED | OUTPATIENT
Start: 2023-02-23 | End: 2023-02-23

## 2023-02-23 RX ORDER — OXYCODONE HYDROCHLORIDE 5 MG/1
5 TABLET ORAL EVERY 6 HOURS PRN
Qty: 15 TABLET | Refills: 0 | Status: SHIPPED | OUTPATIENT
Start: 2023-02-23

## 2023-02-23 RX ORDER — LIDOCAINE 50 MG/G
2 PATCH TOPICAL ONCE
Status: DISCONTINUED | OUTPATIENT
Start: 2023-02-23 | End: 2023-02-23 | Stop reason: HOSPADM

## 2023-02-23 RX ADMIN — OXYCODONE HYDROCHLORIDE 5 MG: 5 TABLET ORAL at 17:00

## 2023-02-23 RX ADMIN — LIDOCAINE 5% 2 PATCH: 700 PATCH TOPICAL at 15:37

## 2023-02-23 RX ADMIN — OXYCODONE HYDROCHLORIDE 5 MG: 5 TABLET ORAL at 15:26

## 2023-02-23 NOTE — ED PROVIDER NOTES
Pt Name: Kaylah Tamayo  MRN: [id number]  [place] 1960  Age/Sex: 57 y o  female  Date of evaluation: 2/23/2023  PCP: Junaid Arrington MD    [address] [place] [address]    Chief Complaint   Patient presents with   • Fall     Pt reports she was cleaning the ceiling fan and fell off of the bed onto the floor  Pt reports L sided back and arm pain  Denies head strike  HPI and MDM    57 y o  female presenting with fall  Patient states she was cleaning a ceiling fan, she was standing on her bed, she slipped and fell onto the floor, it is hardwood floor  Denies hitting her head  No loss of consciousness  Complains of left lower back pain, left elbow and forearm pain  She is right-hand dominant  Differential diagnosis considered includes but not limited to Rib fracture, pneumothorax, hemothorax, vertebral fracture, thoracic elbow/forearm fracture, dislocation, contusion, muscular strain  Per my independent interpretation of left elbow x-ray, no acute fractures or dislocations  Per my independent interpretation of left forearm x-ray, no acute fractures or dislocations  Per my independent interpretation of CT head, no intracranial bleed  CT scan results as below  Patient updated with all results, concern for rib fractures as below, also has vitreous hemorrhage in the left eye  Updated patient results, she states she is blind in her left eye, this is baseline and not a new finding since her recent trauma  Patient provided prescription for oxycodone, standard narcotic precautions discussed, provided sling of left upper extremity for comfort, advised to range her left upper extremity periodically  Updated with lung nodule finding on CT scan, advised PCP follow-up for this  Return precautions discussed              Medications   oxyCODONE (ROXICODONE) IR tablet 5 mg (5 mg Oral Given 2/23/23 1526)   oxyCODONE (ROXICODONE) IR tablet 5 mg (5 mg Oral Given 2/23/23 1700)         Past Medical and Surgical History    Past Medical History:   Diagnosis Date   • Alcohol abuse 12/31/2018   • Anxiety    • Bipolar 1 disorder ([facility] )     Mental Health problems listed as Denied in Allscripts, cant't entire under pertinent negatives due to this diagnosis   • Chronic back pain    • COPD (chronic obstructive pulmonary disease) (HCC)    • Frequent headaches    • Glaucoma    • Hyperlipidemia    • Irregular heart beat     AFib   • Ovarian cancer ([facility] )     last assessed - 21Sep2016   • Psychiatric disorder     bipolar   • Shortness of breath    • Uterine carcinoma ([facility] )     last assessed - 21Sep2016       Past Surgical History:   Procedure Laterality Date   • ADENOIDECTOMY      Tonsillectomy with Adenoidectomy - last assessed - 21Sep2016   • APPENDECTOMY      last assessed - 21Sep2016   • CATARACT EXTRACTION Left     Remove cataract, corneo-scleral section of left eye; last assessed - 21Sep2016   • CHOLECYSTECTOMY      last assessed - 29Sep2016   • EYE SURGERY      Anterior sclera fistulization for glaucoma; last assessed - 21Sep2016   • HYSTERECTOMY      KRYSTA-BSO; last assessed - 21Sep2016   • INTRAOCULAR LENS INSERTION     • DE LIGATION/BIOPSY TEMPORAL ARTERY Right 12/16/2016    Procedure: BIOPSY ARTERY TEMPORAL;  Surgeon: Jeronimo Velez MD;  Location: [facility] OR;  Service: General   • TONSILLECTOMY      Tonsillectomy with Adenoidectomy - last assessed - 21Sep2016       Family History   Problem Relation Age of Onset   • Heart disease Mother    • Coronary artery disease Mother    • Other Mother         1) Gangrene; 2) Peripheral arterial disease   • Hyperlipidemia Mother         Hypercholesterolemia   • Heart attack Mother         Myocardial Infarction   • Other Father         1) Gangrene; 2) Peripheral arterial disease   • Hyperlipidemia Father         Hypercholesterolemia   • Stomach cancer Father    • Heart attack Father         Myocardial Infarction   • No Known Problems Maternal Grandmother    • No Known Problems Paternal Grandmother    • Stomach cancer Brother    • Heart attack Brother         Myocardial Infarction   • Stomach cancer Maternal Uncle    • Esophageal cancer Maternal Aunt    • No Known Problems Maternal Aunt    • No Known Problems Maternal Aunt    • No Known Problems Maternal Aunt    • No Known Problems Maternal Aunt    • No Known Problems Maternal Aunt    • No Known Problems Maternal Aunt    • No Known Problems Maternal Aunt    • No Known Problems Paternal Aunt    • Breast cancer Neg Hx        Social History     Tobacco Use   • Smoking status: Every Day     Packs/day: 0 50     Years: 46 00     Pack years: 23 00     Types: Cigarettes   • Smokeless tobacco: Never   Vaping Use   • Vaping Use: Never used   Substance Use Topics   • Alcohol use: Not Currently     Comment: occasional; (No alcohol use per Allscripts)   • Drug use: No           Allergies    Allergies   Allergen Reactions   • Aspirin Anaphylaxis and Other (See Comments)   • Bee Venom Anaphylaxis   • Tramadol Hives, Shortness Of Breath and Other (See Comments)     Other reaction(s): Nausea and/or vomiting  Pt received morphine IV 7-6-18   • Ketorolac    • Nitroglycerin Other (See Comments)     Patient states"she gets headaches from nitro"   • Omeprazole GI Intolerance     Other reaction(s): Nausea and/or vomiting       Home Medications    Prior to Admission medications    Medication Sig Start Date End Date Taking?  Authorizing Provider   acetaminophen (TYLENOL) 325 mg tablet Take 3 tablets (975 mg total) by mouth every 8 (eight) hours 3/20/21   ROMEO Ballesteros   albuterol (2 5 mg/3 mL) 0 083 % nebulizer solution inhale contents of 1 vial in nebulizer every 6 hours if needed for wheezing shortness of breath 3/18/20   Mayra [place] DO   atorvastatin (LIPITOR) 40 mg tablet take 1 tablet once daily 1/23/23   Lorraine Helm MD   citalopram (CeleXA) 40 mg tablet Take 40 mg by mouth daily      Historical Provider, MD   clonazePAM (KlonoPIN) 1 mg tablet 1 mg 2 (two) times a day as needed for anxiety   4/20/18   Historical Provider, MD   cyclobenzaprine (FLEXERIL) 10 mg tablet Take 0 5 tablets (5 mg total) by mouth 3 (three) times a day P r n   Muscle spasm/pain  Patient not taking: No sig reported 8/30/21   Martha Braun MD   cyclobenzaprine (FLEXERIL) 10 mg tablet Take 1 tablet (10 mg total) by mouth daily at bedtime  Patient not taking: No sig reported 10/25/21   Martha Braun MD   dicyclomine (BENTYL) 20 mg tablet take 1 tablet by mouth every 6 hours for 7 days 4/19/22   Historical Provider, MD   dicyclomine (BENTYL) 20 mg tablet Take 1 tablet (20 mg total) by mouth every 6 (six) hours 5/25/22   Becky Partida MD   famotidine (PEPCID) 20 mg tablet Take 2 tablets (40 mg total) by mouth daily 5/25/22   Becky Partida MD   gabapentin (NEURONTIN) 800 mg tablet Take 800 mg by mouth 3 (three) times a day    Historical Provider, MD   ibuprofen (MOTRIN) 600 mg tablet Take 1 tablet (600 mg total) by mouth every 6 (six) hours as needed (Pain) 10/25/21   Martha Braun MD   ibuprofen (MOTRIN) 800 mg tablet Take 1 tablet (800 mg total) by mouth every 8 (eight) hours as needed for mild pain 3/31/21   Anurag Villaseñor PA-C   ibuprofen (MOTRIN) 800 mg tablet Take 1 tablet (800 mg total) by mouth every 6 (six) hours as needed (Pain) 8/30/21   Martha Braun MD   metoprolol tartrate (LOPRESSOR) 50 mg tablet take 1 tablet by mouth every 12 hours 6/27/22   ROMEO Glynn   mirtazapine (REMERON) 30 mg tablet Take 30 mg by mouth daily at bedtime      Historical Provider, MD           Physical Exam      ED Triage Vitals   Temperature Pulse Respirations Blood Pressure SpO2   02/23/23 1326 02/23/23 1326 02/23/23 1326 02/23/23 1328 02/23/23 1326   97 9 °F (36 6 °C) 84 18 160/87 96 %      Temp Source Heart Rate Source Patient Position - Orthostatic VS BP Location FiO2 (%)   02/23/23 1326 02/23/23 1326 02/23/23 1328 02/23/23 1328 --   Tympanic Monitor Sitting Right arm Pain Score       02/23/23 1326       10 - Worst Possible Pain               Physical Exam  Constitutional:       General: She is not in acute distress  Appearance: She is not ill-appearing  HENT:      Head: Normocephalic and atraumatic  Nose: Nose normal       Mouth/Throat:      Mouth: Mucous membranes are moist    Eyes:      Conjunctiva/sclera: Conjunctivae normal    Cardiovascular:      Rate and Rhythm: Normal rate and regular rhythm  Pulmonary:      Effort: No respiratory distress  Breath sounds: Normal breath sounds  No wheezing  Abdominal:      General: There is no distension  Palpations: Abdomen is soft  Tenderness: There is no abdominal tenderness  Musculoskeletal:         General: No swelling or deformity  Cervical back: Normal range of motion and neck supple  Comments: Left elbow tenderness palpation, left lateral chest wall tenderness to palpation, left paralumbar muscular tenderness to palpation  Skin:     General: Skin is warm  Findings: No erythema  Neurological:      Mental Status: She is alert and oriented to person, place, and time  Mental status is at baseline  Cranial Nerves: No cranial nerve deficit  Sensory: No sensory deficit  Motor: No weakness  Diagnostic Results      Labs:    Results Reviewed     None          All labs reviewed and utilized in the medical decision making process    Radiology:    CT head without contrast   Final Result      No acute intracranial pathology  In particular, no intracranial hemorrhage or calvarial fracture  Small amount of increased density along the dependent portion of the left globe suggestive of vitreous hemorrhage of unknown chronicity  The left globe is intact  Recommend correlation with ophthalmic exam       The study was marked in EPIC for immediate notification                  Workstation performed: [id number]         CT cervical spine without contrast   Final Result No cervical spine fracture or traumatic malalignment  Workstation performed: [id number]         CT lumbar spine without contrast   Final Result      No lumbar spine fracture or traumatic subluxation  Mild degenerative changes similar to October 2021  Workstation performed: [id number]         CT chest without contrast   Final Result      Possible acute, nondisplaced fracture of left 6th rib anterolaterally  No other acute thoracic trauma  Mild diffuse bronchial wall thickening, which may be seen in setting of reactive airways disease versus bronchitis  Mild to moderate centrilobular and paraseptal emphysematous changes  Scattered subpleural reticulation  No new airspace opacities  Nonspecific 3 mm right upper lobe nodule new from 2020  Based on current Fleischner Society 2017 Guidelines on incidental pulmonary nodules, no routine follow-up is needed if the patient is low risk  If the patient is high risk, optional follow-up chest    CT in 12 months can be considered  Workstation performed: [id number]         XR elbow 3+ views LEFT    (Results Pending)   XR forearm 2 views LEFT    (Results Pending)       All radiology studies independently viewed by me and interpreted by the radiologist     Procedure    Procedures        FINAL IMPRESSION    Final diagnoses:   Fall, initial encounter   Closed fracture of one rib of left side, initial encounter   Left arm pain   Lung nodule - RUL         DISPOSITION    Time reflects when diagnosis was documented in both MDM as applicable and the Disposition within this note     Time User Action Codes Description Comment    2/23/2023  5:00 PM Mingo Ped [J69  JEJI] Fall, initial encounter     2/23/2023  5:00 PM Constantino Galvan [S22 32XA] Closed fracture of one rib of left side, initial encounter     2/23/2023  5:00 PM Constantino Galvan [K62 [id number]] Left arm pain     2/23/2023  5:12 PM Constantino Ness Add [R91 1] Lung nodule     2/23/2023  5:12 PM Sydni Chowdhury Modify [R91 1] Lung nodule RUL      ED Disposition     ED Disposition   Discharge    Condition   Stable    Date/Time   Thu Feb 23, 2023  5:00 PM    Leona [place] 40 discharge to home/self care  Follow-up Information     Follow up With Specialties Details Why Contact Info    Prabhu Carpenter MD Internal Medicine Call in 1 day  [address] [place] [address]  [phone]              PATIENT REFERRED TO:    Prabhu Carpenter MD  [address] [address]  [phone]    Call in 1 day        DISCHARGE MEDICATIONS:    Discharge Medication List as of 2/23/2023  5:15 PM      START taking these medications    Details   oxyCODONE (Roxicodone) 5 immediate release tablet Take 1 tablet (5 mg total) by mouth every 6 (six) hours as needed for moderate pain for up to 15 doses Max Daily Amount: 20 mg, Starting Thu 2/23/2023, Normal         CONTINUE these medications which have NOT CHANGED    Details   acetaminophen (TYLENOL) 325 mg tablet Take 3 tablets (975 mg total) by mouth every 8 (eight) hours, Starting Sat 3/20/2021, No Print      albuterol (2 5 mg/3 mL) 0 083 % nebulizer solution inhale contents of 1 vial in nebulizer every 6 hours if needed for wheezing shortness of breath, Normal      atorvastatin (LIPITOR) 40 mg tablet take 1 tablet once daily, Normal      citalopram (CeleXA) 40 mg tablet Take 40 mg by mouth daily  , Historical Med      clonazePAM (KlonoPIN) 1 mg tablet 1 mg 2 (two) times a day as needed for anxiety  , Starting Fri 4/20/2018, Historical Med      !! cyclobenzaprine (FLEXERIL) 10 mg tablet Take 0 5 tablets (5 mg total) by mouth 3 (three) times a day P r n   Muscle spasm/pain, Starting Mon 8/30/2021, Normal      !! cyclobenzaprine (FLEXERIL) 10 mg tablet Take 1 tablet (10 mg total) by mouth daily at bedtime, Starting Mon 10/25/2021, Normal      !! dicyclomine (BENTYL) 20 mg tablet take 1 tablet by mouth every 6 hours for 7 days, Historical Med      !! dicyclomine (BENTYL) 20 mg tablet Take 1 tablet (20 mg total) by mouth every 6 (six) hours, Starting Wed 5/25/2022, Normal      famotidine (PEPCID) 20 mg tablet Take 2 tablets (40 mg total) by mouth daily, Starting Wed 5/25/2022, Normal      gabapentin (NEURONTIN) 800 mg tablet Take 800 mg by mouth 3 (three) times a day, Historical Med      !! ibuprofen (MOTRIN) 600 mg tablet Take 1 tablet (600 mg total) by mouth every 6 (six) hours as needed (Pain), Starting Mon 10/25/2021, Normal      !! ibuprofen (MOTRIN) 800 mg tablet Take 1 tablet (800 mg total) by mouth every 8 (eight) hours as needed for mild pain, Starting Wed 3/31/2021, Normal      !! ibuprofen (MOTRIN) 800 mg tablet Take 1 tablet (800 mg total) by mouth every 6 (six) hours as needed (Pain), Starting Mon 8/30/2021, Normal      metoprolol tartrate (LOPRESSOR) 50 mg tablet take 1 tablet by mouth every 12 hours, Normal      mirtazapine (REMERON) 30 mg tablet Take 30 mg by mouth daily at bedtime  , Historical Med       !! - Potential duplicate medications found  Please discuss with provider  No discharge procedures on file  Alex Reynoso DO        This note was partially completed using voice recognition technology, and was scanned for gross errors; however some errors may still exist  Please contact the author with any questions or requests for clarification        Alex Reynoso DO  02/23/23 0120

## 2023-02-23 NOTE — DISCHARGE INSTRUCTIONS
Do NOT drive or operate heavy machinery while taking narcotic pain medications  Follow up with your family doctor  You were found to have a lung nodule in your right upper lung, please follow this up with your family doctor  Return to the emergency department for any new or worsening symptoms

## 2023-03-04 ENCOUNTER — APPOINTMENT (EMERGENCY)
Dept: CT IMAGING | Facility: HOSPITAL | Age: 63
End: 2023-03-04

## 2023-03-04 ENCOUNTER — HOSPITAL ENCOUNTER (EMERGENCY)
Facility: HOSPITAL | Age: 63
Discharge: HOME | End: 2023-03-04
Attending: EMERGENCY MEDICINE

## 2023-03-04 ENCOUNTER — APPOINTMENT (EMERGENCY)
Dept: RADIOLOGY | Facility: HOSPITAL | Age: 63
End: 2023-03-04

## 2023-03-04 VITALS
RESPIRATION RATE: 16 BRPM | SYSTOLIC BLOOD PRESSURE: 164 MMHG | TEMPERATURE: 98.4 F | WEIGHT: 141 LBS | HEIGHT: 61 IN | HEART RATE: 85 BPM | OXYGEN SATURATION: 98 % | BODY MASS INDEX: 26.62 KG/M2 | DIASTOLIC BLOOD PRESSURE: 84 MMHG

## 2023-03-04 DIAGNOSIS — S22.39XA RIB FRACTURE: Primary | ICD-10-CM

## 2023-03-04 LAB
2HR DELTA HS TROPONIN: 0 NG/L
ALBUMIN SERPL BCP-MCNC: 3.9 G/DL (ref 3.5–5)
ALP SERPL-CCNC: 105 U/L (ref 34–104)
ALT SERPL W P-5'-P-CCNC: 22 U/L (ref 7–52)
ANION GAP SERPL CALCULATED.3IONS-SCNC: 6 MMOL/L (ref 4–13)
AST SERPL W P-5'-P-CCNC: 21 U/L (ref 13–39)
ATRIAL RATE: 70 BPM
BASOPHILS # BLD AUTO: 0.03 THOUSANDS/ÂΜL (ref 0–0.1)
BASOPHILS NFR BLD AUTO: 1 % (ref 0–1)
BILIRUB SERPL-MCNC: 0.43 MG/DL (ref 0.2–1)
BUN SERPL-MCNC: 13 MG/DL (ref 5–25)
CALCIUM SERPL-MCNC: 9.6 MG/DL (ref 8.4–10.2)
CARDIAC TROPONIN I PNL SERPL HS: 6 NG/L
CARDIAC TROPONIN I PNL SERPL HS: 6 NG/L
CHLORIDE SERPL-SCNC: 109 MMOL/L (ref 96–108)
CO2 SERPL-SCNC: 27 MMOL/L (ref 21–32)
CREAT SERPL-MCNC: 0.62 MG/DL (ref 0.6–1.3)
EOSINOPHIL # BLD AUTO: 0.18 THOUSAND/ÂΜL (ref 0–0.61)
EOSINOPHIL NFR BLD AUTO: 4 % (ref 0–6)
ERYTHROCYTE [DISTWIDTH] IN BLOOD BY AUTOMATED COUNT: 13.3 % (ref 11.6–15.1)
GFR SERPL CREATININE-BSD FRML MDRD: 95 ML/MIN/1.73SQ M
GLUCOSE SERPL-MCNC: 85 MG/DL (ref 65–140)
HCT VFR BLD AUTO: 44.7 % (ref 34.8–46.1)
HGB BLD-MCNC: 15 G/DL (ref 11.5–15.4)
IMM GRANULOCYTES # BLD AUTO: 0.01 THOUSAND/UL (ref 0–0.2)
IMM GRANULOCYTES NFR BLD AUTO: 0 % (ref 0–2)
LYMPHOCYTES # BLD AUTO: 1.62 THOUSANDS/ÂΜL (ref 0.6–4.47)
LYMPHOCYTES NFR BLD AUTO: 32 % (ref 14–44)
MCH RBC QN AUTO: 31.6 PG (ref 26.8–34.3)
MCHC RBC AUTO-ENTMCNC: 33.6 G/DL (ref 31.4–37.4)
MCV RBC AUTO: 94 FL (ref 82–98)
MONOCYTES # BLD AUTO: 0.37 THOUSAND/ÂΜL (ref 0.17–1.22)
MONOCYTES NFR BLD AUTO: 7 % (ref 4–12)
NEUTROPHILS # BLD AUTO: 2.85 THOUSANDS/ÂΜL (ref 1.85–7.62)
NEUTS SEG NFR BLD AUTO: 56 % (ref 43–75)
NRBC BLD AUTO-RTO: 0 /100 WBCS
P AXIS: 62 DEGREES
PLATELET # BLD AUTO: 231 THOUSANDS/UL (ref 149–390)
PMV BLD AUTO: 9.8 FL (ref 8.9–12.7)
POTASSIUM SERPL-SCNC: 4.3 MMOL/L (ref 3.5–5.3)
PR INTERVAL: 98 MS
PROT SERPL-MCNC: 7.1 G/DL (ref 6.4–8.4)
QRS AXIS: 37 DEGREES
QRSD INTERVAL: 128 MS
QT INTERVAL: 456 MS
QTC INTERVAL: 492 MS
RBC # BLD AUTO: 4.75 MILLION/UL (ref 3.81–5.12)
SODIUM SERPL-SCNC: 142 MMOL/L (ref 135–147)
T WAVE AXIS: 87 DEGREES
VENTRICULAR RATE: 70 BPM
WBC # BLD AUTO: 5.06 THOUSAND/UL (ref 4.31–10.16)

## 2023-03-04 RX ORDER — MORPHINE SULFATE 10 MG/ML
6 INJECTION, SOLUTION INTRAMUSCULAR; INTRAVENOUS ONCE
Status: COMPLETED | OUTPATIENT
Start: 2023-03-04 | End: 2023-03-04

## 2023-03-04 RX ORDER — SODIUM CHLORIDE FOR INHALATION 0.9 %
3 VIAL, NEBULIZER (ML) INHALATION ONCE
Status: COMPLETED | OUTPATIENT
Start: 2023-03-04 | End: 2023-03-04

## 2023-03-04 RX ORDER — MORPHINE SULFATE 15 MG/1
15 TABLET ORAL EVERY 4 HOURS PRN
Qty: 12 TABLET | Refills: 0 | Status: SHIPPED | OUTPATIENT
Start: 2023-03-04 | End: 2023-03-06 | Stop reason: SDUPTHER

## 2023-03-04 RX ORDER — MORPHINE SULFATE 4 MG/ML
4 INJECTION, SOLUTION INTRAMUSCULAR; INTRAVENOUS ONCE
Status: COMPLETED | OUTPATIENT
Start: 2023-03-04 | End: 2023-03-04

## 2023-03-04 RX ADMIN — MORPHINE SULFATE 4 MG: 4 INJECTION INTRAVENOUS at 10:25

## 2023-03-04 RX ADMIN — ISODIUM CHLORIDE 3 ML: 0.03 SOLUTION RESPIRATORY (INHALATION) at 08:44

## 2023-03-04 RX ADMIN — MORPHINE SULFATE 6 MG: 10 INJECTION INTRAVENOUS at 08:39

## 2023-03-04 RX ADMIN — IPRATROPIUM BROMIDE 1 MG: 0.5 SOLUTION RESPIRATORY (INHALATION) at 08:44

## 2023-03-04 RX ADMIN — MORPHINE SULFATE 6 MG: 10 INJECTION INTRAVENOUS at 12:39

## 2023-03-04 RX ADMIN — ALBUTEROL SULFATE 10 MG: 2.5 SOLUTION RESPIRATORY (INHALATION) at 08:42

## 2023-03-04 RX ADMIN — IOHEXOL 85 ML: 350 INJECTION, SOLUTION INTRAVENOUS at 10:45

## 2023-03-04 NOTE — ED NOTES
Wasted 4mg Morphine x2 with Anuradha Sanchez witnessed  Pt removed from [facility]       Alice Adler RN  03/04/23 4405

## 2023-03-04 NOTE — ED PROVIDER NOTES
History  Chief Complaint   Patient presents with   • Medical Problem     Pt states she was diagnosed with a broken rib last week and "I still have pain on it"      Patient is a 55-year-old female past medical history of ovarian cancer, COPD, hypertension, hyperlipidemia, rheumatoid arthritis, anxiety and depression presenting for chest pain  Patient states that she was diagnosed with rib fracture last week and has run out of oxycodone but states that yesterday she reinjured herself and hit her chest on the door  She states that since that time she has had constant sternal pain which is worse with pressure, movement, deep breathing, exertion and radiates to the left  No shortness of breath but denies dizziness, nausea or vomiting, cough or fevers, leg pain or swelling, rashes, vision changes, dysuria  Did not take any medication for pain today  Prior to Admission Medications   Prescriptions Last Dose Informant Patient Reported? Taking?   acetaminophen (TYLENOL) 325 mg tablet   No No   Sig: Take 3 tablets (975 mg total) by mouth every 8 (eight) hours   albuterol (2 5 mg/3 mL) 0 083 % nebulizer solution   No No   Sig: inhale contents of 1 vial in nebulizer every 6 hours if needed for wheezing shortness of breath   atorvastatin (LIPITOR) 40 mg tablet   No No   Sig: take 1 tablet once daily   citalopram (CeleXA) 40 mg tablet   Yes No   Sig: Take 40 mg by mouth daily     clonazePAM (KlonoPIN) 1 mg tablet   Yes No   Si mg 2 (two) times a day as needed for anxiety     cyclobenzaprine (FLEXERIL) 10 mg tablet   No No   Sig: Take 0 5 tablets (5 mg total) by mouth 3 (three) times a day P r n   Muscle spasm/pain   Patient not taking: No sig reported   cyclobenzaprine (FLEXERIL) 10 mg tablet   No No   Sig: Take 1 tablet (10 mg total) by mouth daily at bedtime   Patient not taking: No sig reported   dicyclomine (BENTYL) 20 mg tablet   Yes No   Sig: take 1 tablet by mouth every 6 hours for 7 days   dicyclomine (BENTYL) 20 mg tablet   No No   Sig: Take 1 tablet (20 mg total) by mouth every 6 (six) hours   famotidine (PEPCID) 20 mg tablet   No No   Sig: Take 2 tablets (40 mg total) by mouth daily   gabapentin (NEURONTIN) 800 mg tablet   Yes No   Sig: Take 800 mg by mouth 3 (three) times a day   ibuprofen (MOTRIN) 600 mg tablet   No No   Sig: Take 1 tablet (600 mg total) by mouth every 6 (six) hours as needed (Pain)   ibuprofen (MOTRIN) 800 mg tablet   No No   Sig: Take 1 tablet (800 mg total) by mouth every 8 (eight) hours as needed for mild pain   ibuprofen (MOTRIN) 800 mg tablet   No No   Sig: Take 1 tablet (800 mg total) by mouth every 6 (six) hours as needed (Pain)   metoprolol tartrate (LOPRESSOR) 50 mg tablet   No No   Sig: take 1 tablet by mouth every 12 hours   mirtazapine (REMERON) 30 mg tablet   Yes No   Sig: Take 30 mg by mouth daily at bedtime     oxyCODONE (Roxicodone) 5 immediate release tablet   No No   Sig: Take 1 tablet (5 mg total) by mouth every 6 (six) hours as needed for moderate pain for up to 15 doses Max Daily Amount: 20 mg      Facility-Administered Medications: None       Past Medical History:   Diagnosis Date   • Alcohol abuse 12/31/2018   • Anxiety    • Bipolar 1 disorder ([facility] )     Mental Health problems listed as Denied in Allscripts, cant't entire under pertinent negatives due to this diagnosis   • Chronic back pain    • COPD (chronic obstructive pulmonary disease) (HCC)    • Frequent headaches    • Glaucoma    • Hyperlipidemia    • Irregular heart beat     AFib   • Ovarian cancer ([facility] )     last assessed - 21Sep2016   • Psychiatric disorder     bipolar   • Shortness of breath    • Uterine carcinoma ([facility] )     last assessed - 21Sep2016       Past Surgical History:   Procedure Laterality Date   • ADENOIDECTOMY      Tonsillectomy with Adenoidectomy - last assessed - 21Sep2016   • APPENDECTOMY      last assessed - 21Sep2016   • CATARACT EXTRACTION Left     Remove cataract, corneo-scleral section of left eye; last assessed - 21Sep2016   • CHOLECYSTECTOMY      last assessed - 29Sep2016   • EYE SURGERY      Anterior sclera fistulization for glaucoma; last assessed - 21Sep2016   • HYSTERECTOMY      KRYSTA-BSO; last assessed - 21Sep2016   • INTRAOCULAR LENS INSERTION     • [place] LIGATION/BIOPSY TEMPORAL ARTERY Right 12/16/2016    Procedure: BIOPSY ARTERY TEMPORAL;  Surgeon: Bette Barba MD;  Location: [facility];  Service: General   • TONSILLECTOMY      Tonsillectomy with Adenoidectomy - last assessed - 21Sep2016       Family History   Problem Relation Age of Onset   • Heart disease Mother    • Coronary artery disease Mother    • Other Mother         1) Gangrene; 2) Peripheral arterial disease   • Hyperlipidemia Mother         Hypercholesterolemia   • Heart attack Mother         Myocardial Infarction   • Other Father         1) Gangrene; 2) Peripheral arterial disease   • Hyperlipidemia Father         Hypercholesterolemia   • Stomach cancer Father    • Heart attack Father         Myocardial Infarction   • No Known Problems Maternal Grandmother    • No Known Problems Paternal Grandmother    • Stomach cancer Brother    • Heart attack Brother         Myocardial Infarction   • Stomach cancer Maternal Uncle    • Esophageal cancer Maternal Aunt    • No Known Problems Maternal Aunt    • No Known Problems Maternal Aunt    • No Known Problems Maternal Aunt    • No Known Problems Maternal Aunt    • No Known Problems Maternal Aunt    • No Known Problems Maternal Aunt    • No Known Problems Maternal Aunt    • No Known Problems Paternal Aunt    • Breast cancer Neg Hx      I have reviewed and agree with the history as documented      E-Cigarette/Vaping   • E-Cigarette Use Never User      E-Cigarette/Vaping Substances   • Nicotine No    • THC No    • CBD No    • Flavoring No    • Other No    • Unknown No      Social History     Tobacco Use   • Smoking status: Every Day     Packs/day: 0 50     Years: 46 00     Pack years: 23 00 Types: Cigarettes   • Smokeless tobacco: Never   Vaping Use   • Vaping Use: Never used   Substance Use Topics   • Alcohol use: Not Currently     Comment: occasional; (No alcohol use per Allscripts)   • Drug use: No       Review of Systems   All other systems reviewed and are negative  Physical Exam  Physical Exam  Vitals reviewed  Constitutional:       General: She is not in acute distress  Appearance: Normal appearance  She is not ill-appearing  HENT:      Mouth/Throat:      Mouth: Mucous membranes are moist    Eyes:      Conjunctiva/sclera: Conjunctivae normal    Cardiovascular:      Rate and Rhythm: Normal rate and regular rhythm  Pulses: Normal pulses  Heart sounds: Normal heart sounds  Pulmonary:      Effort: Pulmonary effort is normal       Comments: Central and left-sided chest tenderness, diminished lung sounds bilaterally with faint expiratory wheezes in all lung fields, no increased respiratory effort  Chest:      Chest wall: Tenderness present  Abdominal:      General: Abdomen is flat  Palpations: Abdomen is soft  Tenderness: There is no abdominal tenderness  Musculoskeletal:         General: No swelling  Normal range of motion  Cervical back: Neck supple  Right lower leg: No edema  Left lower leg: No edema  Skin:     General: Skin is warm and dry  Neurological:      General: No focal deficit present  Mental Status: She is alert     Psychiatric:         Mood and Affect: Mood normal          Vital Signs  ED Triage Vitals [03/04/23 0808]   Temperature Pulse Respirations Blood Pressure SpO2   98 4 °F (36 9 °C) 77 16 163/84 98 %      Temp Source Heart Rate Source Patient Position - Orthostatic VS BP Location FiO2 (%)   Temporal Monitor Sitting Left arm --      Pain Score       --           Vitals:    03/04/23 0808   BP: 163/84   Pulse: 77   Patient Position - Orthostatic VS: Sitting         Visual Acuity      ED Medications  Medications   morphine injection 6 mg (has no administration in time range)       Diagnostic Studies  Results Reviewed     None                 No orders to display              Procedures  ECG 12 Lead Documentation Only    Date/Time: 3/4/2023 9:08 AM  Performed by: Bennett Fletcher DO  Authorized by: Bennett Fletcher DO     Patient location:  ED  Previous ECG:     Previous ECG:  Compared to current    Comparison ECG info:  QRS widening, now with left bundle branch block  Interpretation:     Interpretation: abnormal    Rate:     ECG rate assessment: normal    Rhythm:     Rhythm: sinus rhythm    Ectopy:     Ectopy: none    QRS:     QRS axis:  Normal    QRS intervals: Wide  Conduction:     Conduction: abnormal      Abnormal conduction: complete LBBB    ST segments:     ST segments:  Normal  T waves:     T waves: non-specific               ED Course  ED Course as of 03/04/23 1642   Sat Mar 04, 2023   1215 Patient with new rib fracture, will discharge with incentive spirometry and new course of pain control  [phone] Have discussed incidental findings and return precautions and patient states she understands  Medical Decision Making  Patient is a 49-year-old female past medical history of hypertension, hyperlipidemia, COPD, rheumatoid arthritis, ovarian cancer, anxiety and depression presenting with chest pain  Patient is well-appearing at bedside with stable vitals and in no acute distress  She has reproducible central and left-sided chest tenderness, equal pulses, diminished lung sounds bilaterally with faint expiratory wheezing in all lung fields,, no lower extreme edema no other significant physical exam findings  Will give breathing treatment as patient does appear to have poor air movement and wheezing however is saturating appropriately and does not show any signs of increased respiratory distress    Will obtain CT chest to assess for other intrathoracic injuries, pneumonia, cardiac work-up to rule out ACS, give pain control and continue to monitor  Amount and/or Complexity of Data Reviewed  Labs: ordered  Radiology: ordered  Risk  Prescription drug management  Disposition  Final diagnoses:   None     ED Disposition     None      Follow-up Information    None         Patient's Medications   Discharge Prescriptions    No medications on file       No discharge procedures on file      PDMP Review       Value Time User    PDMP Reviewed  Yes 3/17/2021  6:14 PM Pal Garcia MD          ED Provider  Electronically Signed by           Kelly Briseno DO  03/04/23 3105

## 2023-03-06 ENCOUNTER — NURSE TRIAGE (OUTPATIENT)
Dept: OTHER | Facility: OTHER | Age: 63
End: 2023-03-06

## 2023-03-06 ENCOUNTER — OFFICE VISIT (OUTPATIENT)
Dept: INTERNAL MEDICINE CLINIC | Facility: CLINIC | Age: 63
End: 2023-03-06

## 2023-03-06 VITALS
DIASTOLIC BLOOD PRESSURE: 80 MMHG | TEMPERATURE: 97.5 F | OXYGEN SATURATION: 98 % | SYSTOLIC BLOOD PRESSURE: 138 MMHG | BODY MASS INDEX: 27.28 KG/M2 | HEART RATE: 98 BPM | WEIGHT: 144.4 LBS | RESPIRATION RATE: 18 BRPM

## 2023-03-06 DIAGNOSIS — S22.39XA RIB FRACTURE: Primary | ICD-10-CM

## 2023-03-06 DIAGNOSIS — G89.29 CHRONIC LOW BACK PAIN, UNSPECIFIED BACK PAIN LATERALITY, UNSPECIFIED WHETHER SCIATICA PRESENT: ICD-10-CM

## 2023-03-06 DIAGNOSIS — M54.50 CHRONIC LOW BACK PAIN, UNSPECIFIED BACK PAIN LATERALITY, UNSPECIFIED WHETHER SCIATICA PRESENT: ICD-10-CM

## 2023-03-06 RX ORDER — MORPHINE SULFATE 15 MG/1
15 TABLET ORAL EVERY 6 HOURS PRN
Qty: 42 TABLET | Refills: 0 | Status: SHIPPED | OUTPATIENT
Start: 2023-03-06 | End: 2023-03-20

## 2023-03-06 NOTE — PROGRESS NOTES
Name: Kourtney Redman      : 1960      MRN: [id number]  Encounter Provider: Mitali Marr DO  Encounter Date: 3/6/2023   [facility]   [address]     1  Rib fracture  -     morphine (MSIR) 15 mg tablet; Take 1 tablet (15 mg total) by mouth every 6 (six) hours as needed for severe pain for up to 14 days Max Daily Amount: 60 mg    2  Chronic low back pain, unspecified back pain laterality, unspecified whether sciatica present  -     Ambulatory Referral to Pain Management; Future    will refill morphine for acute rib fractures  X 14 days  Pt understands there will be no additional refills  Pt visist ER often for chronic pain  Most often for back  Would like to seen pain specialist  Referral placed  Subjective      Patient presents complaining of rib pain  Occurred after a fall on   Has been seen in the ER since due to persistent pain  initially given oxycodone but that didn't help  Was seen in ER a few days ago and given morphine IR  This has provided better releif  Ran out  Needs refill       Review of Systems    Current Outpatient Medications on File Prior to Visit   Medication Sig   • acetaminophen (TYLENOL) 325 mg tablet Take 3 tablets (975 mg total) by mouth every 8 (eight) hours   • albuterol (2 5 mg/3 mL) 0 083 % nebulizer solution inhale contents of 1 vial in nebulizer every 6 hours if needed for wheezing shortness of breath   • atorvastatin (LIPITOR) 40 mg tablet take 1 tablet once daily   • citalopram (CeleXA) 40 mg tablet Take 40 mg by mouth daily     • clonazePAM (KlonoPIN) 1 mg tablet 1 mg 2 (two) times a day as needed for anxiety     • dicyclomine (BENTYL) 20 mg tablet Take 1 tablet (20 mg total) by mouth every 6 (six) hours   • famotidine (PEPCID) 20 mg tablet Take 2 tablets (40 mg total) by mouth daily   • gabapentin (NEURONTIN) 800 mg tablet Take 800 mg by mouth 3 (three) times a day   • ibuprofen (MOTRIN) 600 mg tablet Take 1 tablet (600 mg total) by mouth every 6 (six) hours as needed (Pain)   • metoprolol tartrate (LOPRESSOR) 50 mg tablet take 1 tablet by mouth every 12 hours   • mirtazapine (REMERON) 30 mg tablet Take 30 mg by mouth daily at bedtime     • cyclobenzaprine (FLEXERIL) 10 mg tablet Take 0 5 tablets (5 mg total) by mouth 3 (three) times a day P r n  Muscle spasm/pain (Patient not taking: Reported on 5/25/2022)   • cyclobenzaprine (FLEXERIL) 10 mg tablet Take 1 tablet (10 mg total) by mouth daily at bedtime (Patient not taking: Reported on 5/25/2022)   • dicyclomine (BENTYL) 20 mg tablet take 1 tablet by mouth every 6 hours for 7 days   • ibuprofen (MOTRIN) 800 mg tablet Take 1 tablet (800 mg total) by mouth every 8 (eight) hours as needed for mild pain (Patient not taking: Reported on 3/6/2023)   • ibuprofen (MOTRIN) 800 mg tablet Take 1 tablet (800 mg total) by mouth every 6 (six) hours as needed (Pain)   • [DISCONTINUED] morphine (MSIR) 15 mg tablet Take 1 tablet (15 mg total) by mouth every 4 (four) hours as needed for severe pain for up to 12 doses Max Daily Amount: 90 mg (Patient not taking: Reported on 3/6/2023)   • [DISCONTINUED] oxyCODONE (Roxicodone) 5 immediate release tablet Take 1 tablet (5 mg total) by mouth every 6 (six) hours as needed for moderate pain for up to 15 doses Max Daily Amount: 20 mg (Patient not taking: Reported on 3/6/2023)       Objective     /80 (BP Location: Left arm, Patient Position: Sitting, Cuff Size: Standard)   Pulse 98   Temp 97 5 °F (36 4 °C) (Temporal)   Resp 18   Wt 65 5 kg (144 lb 6 4 oz)   LMP  (LMP Unknown)   SpO2 98%   BMI 27 28 kg/m²     Physical Exam  HENT:      Head: Normocephalic  Cardiovascular:      Rate and Rhythm: Normal rate  Pulmonary:      Effort: Pulmonary effort is normal    Musculoskeletal:         General: Tenderness (left thorax ) present     Neurological:      Mental Status: She is alert and oriented to person, place, and time        Gibran Dc, DO

## 2023-03-06 NOTE — TELEPHONE ENCOUNTER
Patient confirmed an appointment for today 3/6/23 at 1640 
Reason for Disposition  • [1] SEVERE pain (e g , excruciating, pain scale 8-10) AND [2] not improved after pain medications    Additional Information  • More than 24 hours since medical visit    Answer Assessment - Initial Assessment Questions  1  MAIN CONCERN OR SYMPTOM:  "What is your main concern right now?" "What question do you have?" "What's the main symptom you're worried about?" (e g , fever, pain, redness, swelling)     Pain after broken ribs in left side   2  ONSET: "When did the  start?"      3/4/23   3  BETTER-SAME-WORSE: "Are you getting better, staying the same, or getting worse compared to how you felt at your last visit to the doctor (most recent medical visit)? "      Same   4  VISIT DATE: "When were you seen?" (Date)      3/4/23   5  VISIT DOCTOR: "What is the name of the doctor taking care of you now?"      ER   6  VISIT DIAGNOSIS:  "What was the main symptom or problem that you were seen for?" "Were you given a diagnosis?"       Left ribs 5-6 are fractured   7  VISIT TREATMENT: "What treatment did you receive?" (e g , staples, sutures, splint, cast)      None   8  VISIT MEDICATIONS: "Did the physician order any new medicines for you to use?" If Yes, ask: "Have you filled the prescription and started taking the medicine?"       Morphine   9  NEXT APPOINTMENT: "Have you scheduled a follow-up appointment with your doctor?"      To schedule a f/u after ER visit   10  PAIN: "Is there any pain?" If Yes, ask: "How bad is it?"  (Scale 1-10; or mild, moderate, severe)        8-9 out of 10   11  FEVER: "Do you have a fever?" If Yes, ask: "What is it, how was it measured  and when did it start?"        No   12  OTHER SYMPTOMS: "Do you have any other symptoms?"        Sweating because of the pain      Protocols used: RECENT MEDICAL VISIT FOR INJURY FOLLOW-UP CALL-[facility]
Regarding: rib fracture / pain  ----- Message from Roberta Turner RN sent at 3/6/2023  7:52 AM EST -----  "I need an appointment  I have 2 fractured ribs   I am in a lot of pain "
Patient requests all Lab and Radiology Results on their Discharge Instructions

## 2023-05-01 ENCOUNTER — NURSE TRIAGE (OUTPATIENT)
Dept: OTHER | Facility: OTHER | Age: 63
End: 2023-05-01

## 2023-05-01 NOTE — TELEPHONE ENCOUNTER
Regarding: Colonoscopy 5/15/23 prep instructions needed  ----- Message from Maylin García sent at 5/1/2023  9:17 AM EDT -----  I am scheduled for a Colonoscopy on 5/15/23 and need prep instruction and any medication I will need to be sent to pharmacy

## 2023-05-01 NOTE — TELEPHONE ENCOUNTER
"  Reason for Disposition   Nursing judgment    Answer Assessment - Initial Assessment Questions  1  REASON FOR CALL or QUESTION: \"What is your reason for calling today? \" or \"How can I best help you? \" or \"What question do you have that I can help answer? \"      Patient calling for prep and instructions  Advised office will contact her with prep information  Unable to locate in chart  Pt requesting a call once office confirms with provider      Protocols used: INFORMATION ONLY CALL - NO TRIAGE-ADULT-[place]    "

## 2023-05-09 DIAGNOSIS — Z12.11 COLON CANCER SCREENING: Primary | ICD-10-CM

## 2023-05-09 RX ORDER — POLYETHYLENE GLYCOL 3350 17 G/17G
238 POWDER, FOR SOLUTION ORAL ONCE
OUTPATIENT
Start: 2023-05-14 | End: 2023-05-14

## 2023-05-09 RX ORDER — BISACODYL 5 MG/1
5 TABLET, DELAYED RELEASE ORAL ONCE
Qty: 4 TABLET | Refills: 0 | Status: SHIPPED | OUTPATIENT
Start: 2023-05-09 | End: 2023-05-09

## 2023-05-09 NOTE — TELEPHONE ENCOUNTER
Patients GI provider:  Dr Goldstein Console    Number to return call: [phone]    Reason for call: Pt calling requesting that a script for her miralax/dulcolax prep be sent over to her [facility] [place]       Scheduled procedure/appointment date if applicable: Procedure: 4/64/0397

## 2023-05-12 ENCOUNTER — TELEPHONE (OUTPATIENT)
Dept: GASTROENTEROLOGY | Facility: CLINIC | Age: 63
End: 2023-05-12

## 2023-05-12 NOTE — TELEPHONE ENCOUNTER
Patients GI provider:  Dr Coby Soulier    Number to return call: [phone]     Reason for call: Pt called in regards to her prep  She states that the dulcolax prep was sent to her pharmacy but not the miralax  I explained to Pt that it is OTC but Pt states she is on medicaid and can't afford it  She was told it would be sent to her pharmacy and is requesting this be done       Scheduled procedure/appointment date if applicable:  3/83/4136

## 2023-05-15 ENCOUNTER — ANESTHESIA (OUTPATIENT)
Dept: GASTROENTEROLOGY | Facility: HOSPITAL | Age: 63
End: 2023-05-15

## 2023-05-15 ENCOUNTER — HOSPITAL ENCOUNTER (OUTPATIENT)
Dept: GASTROENTEROLOGY | Facility: HOSPITAL | Age: 63
Setting detail: OUTPATIENT SURGERY
Discharge: HOME | End: 2023-05-15
Attending: INTERNAL MEDICINE

## 2023-05-15 ENCOUNTER — ANESTHESIA EVENT (OUTPATIENT)
Dept: GASTROENTEROLOGY | Facility: HOSPITAL | Age: 63
End: 2023-05-15

## 2023-05-15 VITALS
TEMPERATURE: 97.8 F | RESPIRATION RATE: 16 BRPM | WEIGHT: 137.79 LBS | DIASTOLIC BLOOD PRESSURE: 75 MMHG | OXYGEN SATURATION: 98 % | BODY MASS INDEX: 24.41 KG/M2 | HEIGHT: 63 IN | HEART RATE: 75 BPM | SYSTOLIC BLOOD PRESSURE: 120 MMHG

## 2023-05-15 DIAGNOSIS — Z12.11 ENCOUNTER FOR SCREENING COLONOSCOPY: Primary | ICD-10-CM

## 2023-05-15 DIAGNOSIS — Z12.11 SCREENING FOR COLON CANCER: ICD-10-CM

## 2023-05-15 RX ORDER — POLYETHYLENE GLYCOL 3350 17 G/17G
POWDER, FOR SOLUTION ORAL
Qty: 238 G | Refills: 0 | Status: SHIPPED | OUTPATIENT
Start: 2023-05-15 | End: 2023-09-20 | Stop reason: ALTCHOICE

## 2023-05-15 RX ORDER — SODIUM CHLORIDE, SODIUM LACTATE, POTASSIUM CHLORIDE, CALCIUM CHLORIDE 600; 310; 30; 20 MG/100ML; MG/100ML; MG/100ML; MG/100ML
50 INJECTION, SOLUTION INTRAVENOUS CONTINUOUS
Status: DISCONTINUED | OUTPATIENT
Start: 2023-05-15 | End: 2023-05-19 | Stop reason: HOSPADM

## 2023-05-15 RX ORDER — SODIUM CHLORIDE, SODIUM LACTATE, POTASSIUM CHLORIDE, CALCIUM CHLORIDE 600; 310; 30; 20 MG/100ML; MG/100ML; MG/100ML; MG/100ML
INJECTION, SOLUTION INTRAVENOUS CONTINUOUS PRN
Status: DISCONTINUED | OUTPATIENT
Start: 2023-05-15 | End: 2023-05-15

## 2023-05-15 RX ORDER — LIDOCAINE HYDROCHLORIDE 10 MG/ML
0.5 INJECTION, SOLUTION EPIDURAL; INFILTRATION; INTRACAUDAL; PERINEURAL ONCE AS NEEDED
Status: CANCELLED | OUTPATIENT
Start: 2023-05-15

## 2023-05-15 RX ORDER — LIDOCAINE HYDROCHLORIDE 10 MG/ML
0.5 INJECTION, SOLUTION EPIDURAL; INFILTRATION; INTRACAUDAL; PERINEURAL ONCE AS NEEDED
Status: DISCONTINUED | OUTPATIENT
Start: 2023-05-15 | End: 2023-05-19 | Stop reason: HOSPADM

## 2023-05-15 RX ORDER — PROPOFOL 10 MG/ML
INJECTION, EMULSION INTRAVENOUS AS NEEDED
Status: DISCONTINUED | OUTPATIENT
Start: 2023-05-15 | End: 2023-05-15

## 2023-05-15 RX ORDER — SODIUM CHLORIDE, SODIUM LACTATE, POTASSIUM CHLORIDE, CALCIUM CHLORIDE 600; 310; 30; 20 MG/100ML; MG/100ML; MG/100ML; MG/100ML
50 INJECTION, SOLUTION INTRAVENOUS CONTINUOUS
Status: CANCELLED | OUTPATIENT
Start: 2023-05-15

## 2023-05-15 RX ORDER — LIDOCAINE HYDROCHLORIDE 20 MG/ML
INJECTION, SOLUTION EPIDURAL; INFILTRATION; INTRACAUDAL; PERINEURAL AS NEEDED
Status: DISCONTINUED | OUTPATIENT
Start: 2023-05-15 | End: 2023-05-15

## 2023-05-15 RX ADMIN — SODIUM CHLORIDE, SODIUM LACTATE, POTASSIUM CHLORIDE, AND CALCIUM CHLORIDE: .6; .31; .03; .02 INJECTION, SOLUTION INTRAVENOUS at 10:14

## 2023-05-15 RX ADMIN — PROPOFOL 65 MG: 10 INJECTION, EMULSION INTRAVENOUS at 11:53

## 2023-05-15 RX ADMIN — PROPOFOL 40 MG: 10 INJECTION, EMULSION INTRAVENOUS at 11:55

## 2023-05-15 RX ADMIN — PROPOFOL 35 MG: 10 INJECTION, EMULSION INTRAVENOUS at 11:58

## 2023-05-15 RX ADMIN — LIDOCAINE HYDROCHLORIDE 100 MG: 20 INJECTION, SOLUTION EPIDURAL; INFILTRATION; INTRACAUDAL at 11:51

## 2023-05-15 RX ADMIN — PROPOFOL 35 MG: 10 INJECTION, EMULSION INTRAVENOUS at 12:01

## 2023-05-15 RX ADMIN — PROPOFOL 65 MG: 10 INJECTION, EMULSION INTRAVENOUS at 11:52

## 2023-05-15 NOTE — H&P
History and Physical -  [facility]  Antonio Zimmerman 57 y o  female MRN: [id number]                  HPI: Antonio Zimmerman is a 57y o  year old female who presents for colonoscopy for abdominal pain and history of colon polyps      REVIEW OF SYSTEMS: Per the HPI, and otherwise unremarkable      Historical Information   Past Medical History:   Diagnosis Date   • Alcohol abuse 12/31/2018   • Anxiety    • Bipolar 1 disorder ([place] [address] )     Mental Health problems listed as Denied in Allscripts, cant't entire under pertinent negatives due to this diagnosis   • Chronic back pain    • COPD (chronic obstructive pulmonary disease) ([place] [address] )    • Frequent headaches    • Glaucoma    • Hyperlipidemia    • Irregular heart beat     AFib   • Ovarian cancer ([place] [address] )     last assessed - 21Sep2016   • Psychiatric disorder     bipolar   • Shortness of breath    • Uterine carcinoma ([place] [address] )     last assessed - 21Sep2016     Past Surgical History:   Procedure Laterality Date   • ADENOIDECTOMY      Tonsillectomy with Adenoidectomy - last assessed - 21Sep2016   • APPENDECTOMY      last assessed - 21Sep2016   • CATARACT EXTRACTION Left     Remove cataract, corneo-scleral section of left eye; last assessed - 21Sep2016   • CHOLECYSTECTOMY      last assessed - 29Sep2016   • EYE SURGERY      Anterior sclera fistulization for glaucoma; last assessed - 21Sep2016   • HYSTERECTOMY      KRYSTA-BSO; last assessed - 21Sep2016   • INTRAOCULAR LENS INSERTION     • [place] LIGATION/BIOPSY TEMPORAL ARTERY Right 12/16/2016    Procedure: BIOPSY ARTERY TEMPORAL;  Surgeon: Steve Nixon MD;  Location: [facility];  Service: General   • TONSILLECTOMY      Tonsillectomy with Adenoidectomy - last assessed - 21Sep2016     Social History   Social History     Substance and Sexual Activity   Alcohol Use Not Currently    Comment: occasional; (No alcohol use per Allscripts)     Social History     Substance and Sexual Activity   Drug Use No     Social History "    Tobacco Use   Smoking Status Every Day   • Packs/day: 0 50   • Years: 46 00   • Pack years: 23 00   • Types: Cigarettes   Smokeless Tobacco Never     Family History   Problem Relation Age of Onset   • Heart disease Mother    • Coronary artery disease Mother    • Other Mother         1) Gangrene; 2) Peripheral arterial disease   • Hyperlipidemia Mother         Hypercholesterolemia   • Heart attack Mother         Myocardial Infarction   • Other Father         1) Gangrene; 2) Peripheral arterial disease   • Hyperlipidemia Father         Hypercholesterolemia   • Stomach cancer Father    • Heart attack Father         Myocardial Infarction   • No Known Problems Maternal Grandmother    • No Known Problems Paternal Grandmother    • Stomach cancer Brother    • Heart attack Brother         Myocardial Infarction   • Stomach cancer Maternal Uncle    • Esophageal cancer Maternal Aunt    • No Known Problems Maternal Aunt    • No Known Problems Maternal Aunt    • No Known Problems Maternal Aunt    • No Known Problems Maternal Aunt    • No Known Problems Maternal Aunt    • No Known Problems Maternal Aunt    • No Known Problems Maternal Aunt    • No Known Problems Paternal Aunt    • Breast cancer Neg Hx        Meds/Allergies     (Not in a hospital admission)      Allergies   Allergen Reactions   • Aspirin Anaphylaxis and Other (See Comments)   • Bee Venom Anaphylaxis   • Tramadol Hives, Shortness Of Breath and Other (See Comments)     Other reaction(s): Nausea and/or vomiting  Pt received morphine IV 7-6-18   • Ketorolac    • Nitroglycerin Other (See Comments)     Patient states\"she gets headaches from nitro\"   • Omeprazole GI Intolerance     Other reaction(s): Nausea and/or vomiting       Objective     Blood pressure (!) 182/98, pulse 75, temperature 98 °F (36 7 °C), temperature source Temporal, resp  rate 16, height 5' 3\" (1 6 m), weight 62 5 kg (137 lb 12 6 oz), SpO2 97 %        PHYSICAL EXAM    BP (!) 182/98   Pulse 75   " "Temp 98 °F (36 7 °C) (Temporal)   Resp 16   Ht 5' 3\" (1 6 m)   Wt 62 5 kg (137 lb 12 6 oz)   LMP  (LMP Unknown)   SpO2 97%   BMI 24 41 kg/m²       Gen: NAD  CV: RRR  CHEST: Clear  ABD: soft, NT/ND  EXT: no edema      ASSESSMENT/PLAN:  This is a 57y o  year old female here for colonoscopy, and she is stable and optimized for her procedure        "

## 2023-05-15 NOTE — ANESTHESIA POSTPROCEDURE EVALUATION
Post-Op Assessment Note    CV Status:  Stable  Pain Score: 0    Pain management: adequate     Mental Status:  Alert and awake   Hydration Status:  Euvolemic   PONV Controlled:  Controlled   Airway Patency:  Patent      Post Op Vitals Reviewed: Yes      Staff: CRNA         No notable events documented      BP     Temp      Pulse     Resp      SpO2

## 2023-05-24 ENCOUNTER — TELEPHONE (OUTPATIENT)
Dept: GASTROENTEROLOGY | Facility: CLINIC | Age: 63
End: 2023-05-24

## 2023-05-24 NOTE — TELEPHONE ENCOUNTER
----- Message from Odin Ardon MD sent at 5/24/2023  7:27 AM EDT -----  Please review with the patient  Chucho Burrell-     The polyp was precancerous   You will be due for colonoscopy in 3 years  Richelle aRmírez a good day

## 2023-05-24 NOTE — TELEPHONE ENCOUNTER
Called and LMOM with biopsy results as per Dr Gurrola, The polyp was precancerous   You will be due for colonoscopy in 3 years  To call the office with any questions

## 2023-06-04 DIAGNOSIS — K20.90 ESOPHAGITIS: ICD-10-CM

## 2023-06-06 RX ORDER — FAMOTIDINE 20 MG/1
TABLET, FILM COATED ORAL
Qty: 180 TABLET | Refills: 3 | Status: SHIPPED | OUTPATIENT
Start: 2023-06-06

## 2023-07-24 ENCOUNTER — TELEPHONE (OUTPATIENT)
Age: 63
End: 2023-07-24

## 2023-07-24 NOTE — TELEPHONE ENCOUNTER
PT   NEEDS   A  NOTE    THAT HER  CAT  IS  EMOTIONAL  SUPPORT   CAT  NEEDS  IT  FOR   HER  HOUSING    CALL  WHEN  READY

## 2023-07-31 NOTE — TELEPHONE ENCOUNTER
Pt called back again about a note for her emotional support cat.  It needs to go to [facility] Fax # [phone] Patient would also like a copy of the letter mail to her

## 2023-08-19 DIAGNOSIS — I10 ESSENTIAL HYPERTENSION: ICD-10-CM

## 2023-08-21 RX ORDER — METOPROLOL TARTRATE 50 MG/1
TABLET, FILM COATED ORAL
Qty: 60 TABLET | Refills: 1 | OUTPATIENT
Start: 2023-08-21

## 2023-09-20 ENCOUNTER — OFFICE VISIT (OUTPATIENT)
Age: 63
End: 2023-09-20
Payer: COMMERCIAL

## 2023-09-20 VITALS
WEIGHT: 140.8 LBS | HEART RATE: 71 BPM | DIASTOLIC BLOOD PRESSURE: 80 MMHG | TEMPERATURE: 98 F | BODY MASS INDEX: 24.95 KG/M2 | OXYGEN SATURATION: 95 % | SYSTOLIC BLOOD PRESSURE: 130 MMHG | RESPIRATION RATE: 18 BRPM | HEIGHT: 63 IN

## 2023-09-20 DIAGNOSIS — Z23 ENCOUNTER FOR ADMINISTRATION OF VACCINE: ICD-10-CM

## 2023-09-20 DIAGNOSIS — Z23 ENCOUNTER FOR IMMUNIZATION: ICD-10-CM

## 2023-09-20 DIAGNOSIS — M06.9 RHEUMATOID ARTHRITIS INVOLVING MULTIPLE SITES, UNSPECIFIED WHETHER RHEUMATOID FACTOR PRESENT (HCC): ICD-10-CM

## 2023-09-20 DIAGNOSIS — Z00.00 ANNUAL PHYSICAL EXAM: Primary | ICD-10-CM

## 2023-09-20 DIAGNOSIS — J44.9 CHRONIC OBSTRUCTIVE PULMONARY DISEASE, UNSPECIFIED COPD TYPE (HCC): ICD-10-CM

## 2023-09-20 DIAGNOSIS — Z72.0 TOBACCO ABUSE DISORDER: ICD-10-CM

## 2023-09-20 DIAGNOSIS — Z78.0 ASYMPTOMATIC MENOPAUSE: ICD-10-CM

## 2023-09-20 DIAGNOSIS — R91.8 LUNG NODULES: ICD-10-CM

## 2023-09-20 DIAGNOSIS — F32.A ANXIETY AND DEPRESSION: ICD-10-CM

## 2023-09-20 DIAGNOSIS — I20.9 ANGINA, CLASS III (HCC): ICD-10-CM

## 2023-09-20 DIAGNOSIS — I10 ESSENTIAL HYPERTENSION: ICD-10-CM

## 2023-09-20 DIAGNOSIS — F41.9 ANXIETY AND DEPRESSION: ICD-10-CM

## 2023-09-20 DIAGNOSIS — Z12.31 ENCOUNTER FOR SCREENING MAMMOGRAM FOR BREAST CANCER: ICD-10-CM

## 2023-09-20 PROCEDURE — 99214 OFFICE O/P EST MOD 30 MIN: CPT

## 2023-09-20 PROCEDURE — G0009 ADMIN PNEUMOCOCCAL VACCINE: HCPCS

## 2023-09-20 PROCEDURE — 90662 IIV NO PRSV INCREASED AG IM: CPT

## 2023-09-20 PROCEDURE — 90677 PCV20 VACCINE IM: CPT

## 2023-09-20 PROCEDURE — G0008 ADMIN INFLUENZA VIRUS VAC: HCPCS

## 2023-09-20 RX ORDER — ALBUTEROL SULFATE 90 UG/1
2 AEROSOL, METERED RESPIRATORY (INHALATION) EVERY 6 HOURS PRN
Qty: 18 G | Refills: 2 | Status: SHIPPED | OUTPATIENT
Start: 2023-09-20

## 2023-09-20 RX ORDER — CLONAZEPAM 0.5 MG/1
TABLET ORAL
COMMUNITY
Start: 2023-08-23

## 2023-09-20 RX ORDER — METOPROLOL TARTRATE 50 MG/1
50 TABLET, FILM COATED ORAL EVERY 12 HOURS
Qty: 60 TABLET | Refills: 1 | Status: SHIPPED | OUTPATIENT
Start: 2023-09-20

## 2023-09-20 RX ORDER — METOPROLOL TARTRATE 50 MG/1
50 TABLET, FILM COATED ORAL EVERY 12 HOURS
Qty: 60 TABLET | Refills: 1 | Status: CANCELLED | OUTPATIENT
Start: 2023-09-20

## 2023-09-20 NOTE — PROGRESS NOTES
[address] [facility] [place]    NAME: Mariano Duarte  AGE: 70 y.o. SEX: female  : 1960     DATE: 2023     Assessment and Plan:     1. Annual physical exam  Patient here for annual physical exam, she denies acute complaints. She is up-to-date with colonoscopy. Due for mammogram and DEXA. Declined cervical cancer screening secondary to total hysterectomy. Denies alcohol use, smokes 1 pack of cigarettes per day. 2. Encounter for screening mammogram for breast cancer    - Mammo screening bilateral w 3d & cad; Future    3. Encounter for immunization    - influenza vaccine, high-dose, PF 0.7 mL (FLUZONE HIGH-DOSE)    4. Essential hypertension  Blood pressure is stable, continue low-salt diet with current therapy. - metoprolol tartrate (LOPRESSOR) 50 mg tablet; Take 1 tablet (50 mg total) by mouth every 12 (twelve) hours  Dispense: 60 tablet; Refill: 1  - CBC and differential; Future    5. Chronic obstructive pulmonary disease, unspecified COPD type ([address])  Wheezing heard on exam, she uses nebulized albuterol as needed. We will prescribe ProAir inhaler   albuterol (ProAir HFA) 90 mcg/act inhaler; Inhale 2 puffs every 6 (six) hours as needed for wheezing  Dispense: 18 g; Refill: 2    6. Lung nodules  Stable on last CT done 2023 -due for CT screen .    7.  Angina, class III ([address])  Denies chest pain or shortness of breath. - Lipid Panel with Direct LDL reflex; Future  - TSH, 3rd generation with Free T4 reflex; Future    8. Anxiety and depression  Symptoms are stable on Celexa    9. Tobacco abuse disorder  Smokes 1 pack/day. 10. Asymptomatic menopause  - DXA bone density spine hip and pelvis; Future    11.  Rheumatoid arthritis involving multiple sites, unspecified whether rheumatoid factor present [facility])  Does not follow with a rheumatologist.  Per patient , she has learned to adapt and adjust to  this chronic condition- takes ibuprofen as needed. 12. Encounter for administration of vaccine*  - Pneumococcal Conjugate Vaccine 20-valent (Pcv20)      Immunizations and preventive care screenings were discussed with patient today. Appropriate education was printed on patient's after visit summary. Counseling:  Alcohol/drug use: discussed moderation in alcohol intake, the recommendations for healthy alcohol use, and avoidance of illicit drug use. Dental Health: discussed importance of regular tooth brushing, flossing, and dental visits. Injury prevention: discussed safety/seat belts, safety helmets, smoke detectors, carbon dioxide detectors, and smoking near bedding or upholstery. Sexual health: discussed sexually transmitted diseases, partner selection, use of condoms, avoidance of unintended pregnancy, and contraceptive alternatives. Exercise: the importance of regular exercise/physical activity was discussed. Recommend exercise 3-5 times per week for at least 30 minutes. Return in about 6 months (around 3/20/2024). Chief Complaint:     Chief Complaint   Patient presents with   • Annual Exam      History of Present Illness:     Adult Annual Physical   Patient here for a comprehensive physical exam. The patient reports no problems. Diet and Physical Activity  Diet/Nutrition: well balanced diet. Exercise: walking and 3-4 times a week on average.       Depression Screening  PHQ-2/9 Depression Screening    Little interest or pleasure in doing things: 0 - not at all  Feeling down, depressed, or hopeless: 0 - not at all  Trouble falling or staying asleep, or sleeping too much: 0 - not at all  Feeling tired or having little energy: 0 - not at all  Poor appetite or overeatin - not at all  Feeling bad about yourself - or that you are a failure or have let yourself or your family down: 0 - not at all  Trouble concentrating on things, such as reading the newspaper or watching television: 0 - not at all  Moving or speaking so slowly that other people could have noticed. Or the opposite - being so fidgety or restless that you have been moving around a lot more than usual: 0 - not at all  Thoughts that you would be better off dead, or of hurting yourself in some way: 0 - not at all  PHQ-9 Score: 0   PHQ-9 Interpretation: No or Minimal depression        General Health  Sleep: sleeps well. Hearing: normal - bilateral.  Vision: no vision problems. Dental: regular dental visits and dentures. /GYN Health  Patient is: postmenopausal  Last menstrual period: Postmenopausal  Contraceptive method: .     Review of Systems:     Review of Systems   Constitutional: Negative for activity change, chills and fever. HENT: Negative for congestion, ear pain and sore throat. Eyes: Negative for pain and visual disturbance. Respiratory: Positive for cough and wheezing. Negative for shortness of breath. Cardiovascular: Negative for chest pain and palpitations. Gastrointestinal: Negative for abdominal pain and vomiting. Genitourinary: Negative for dysuria and hematuria. Musculoskeletal: Negative for arthralgias and back pain. Skin: Negative for color change and rash. Neurological: Negative for seizures and syncope. Psychiatric/Behavioral: Negative. All other systems reviewed and are negative.      Past Medical History:     Past Medical History:   Diagnosis Date   • Alcohol abuse 12/31/2018   • Anxiety    • Bipolar 1 disorder [facility])     Mental Health problems listed as Denied in [employer], cant't entire under pertinent negatives due to this diagnosis   • Chronic back pain    • COPD (chronic obstructive pulmonary disease) ([address])    • Frequent headaches    • Glaucoma    • Hyperlipidemia    • Irregular heart beat     AFib   • Ovarian cancer ([address])     last assessed - 21Sep2016   • Psychiatric disorder     bipolar   • Shortness of breath    • Uterine carcinoma ([address])     last assessed - 21Sep2016      Past Surgical History:     Past Surgical History:   Procedure Laterality Date   • ADENOIDECTOMY      Tonsillectomy with Adenoidectomy - last assessed - 21Sep2016   • APPENDECTOMY      last assessed - 21Sep2016   • CATARACT EXTRACTION Left     Remove cataract, corneo-scleral section of left eye; last assessed - 21Sep2016   • CHOLECYSTECTOMY      last assessed - 29Sep2016   • EYE SURGERY      Anterior sclera fistulization for glaucoma; last assessed - 21Sep2016   • HYSTERECTOMY      KRYSTA-BSO; last assessed - 21Sep2016   • INTRAOCULAR LENS INSERTION     • MO LIGATION/BIOPSY TEMPORAL ARTERY Right 12/16/2016    Procedure: BIOPSY ARTERY TEMPORAL;  Surgeon: Maria Guadalupe Berg MD;  Location: [facility];  Service: General   • TONSILLECTOMY      Tonsillectomy with Adenoidectomy - last assessed - 21Sep2016      Social History:     Social History     Socioeconomic History   • Marital status: /Civil Union     Spouse name: None   • Number of children: None   • Years of education: None   • Highest education level: None   Occupational History   • Occupation: Disabled   Tobacco Use   • Smoking status: Every Day     Packs/day: 1.00     Years: 46.00     Total pack years: 46.00     Types: Cigarettes   • Smokeless tobacco: Never   Vaping Use   • Vaping Use: Never used   Substance and Sexual Activity   • Alcohol use: Not Currently     Comment: occasional; (No alcohol use per [employer])   • Drug use: No   • Sexual activity: Not Currently   Other Topics Concern   • None   Social History Narrative   • None     Social Determinants of Health     Financial Resource Strain: Not on file   Food Insecurity: Not on file   Transportation Needs: Not on file   Physical Activity: Sufficiently Active (3/25/2021)    Exercise Vital Sign    • Days of Exercise per Week: 5 days    • Minutes of Exercise per Session: 60 min   Stress: Stress Concern Present (3/25/2021)    [place] [address]    • Feeling of Stress : Rather much   Social Connections: Not on file   Intimate Partner Violence: Not on file   Housing Stability: Not on file      Family History:     Family History   Problem Relation Age of Onset   • Heart disease Mother    • Coronary artery disease Mother    • Other Mother         1) Gangrene; 2) Peripheral arterial disease   • Hyperlipidemia Mother         Hypercholesterolemia   • Heart attack Mother         Myocardial Infarction   • Other Father         1) Gangrene; 2) Peripheral arterial disease   • Hyperlipidemia Father         Hypercholesterolemia   • Stomach cancer Father    • Heart attack Father         Myocardial Infarction   • No Known Problems Maternal Grandmother    • No Known Problems Paternal Grandmother    • Stomach cancer Brother    • Heart attack Brother         Myocardial Infarction   • Stomach cancer Maternal Uncle    • Esophageal cancer Maternal Aunt    • No Known Problems Maternal Aunt    • No Known Problems Maternal Aunt    • No Known Problems Maternal Aunt    • No Known Problems Maternal Aunt    • No Known Problems Maternal Aunt    • No Known Problems Maternal Aunt    • No Known Problems Maternal Aunt    • No Known Problems Paternal Aunt    • Breast cancer Neg Hx       Current Medications:     Current Outpatient Medications   Medication Sig Dispense Refill   • acetaminophen (TYLENOL) 325 mg tablet Take 3 tablets (975 mg total) by mouth every 8 (eight) hours  0   • albuterol (ProAir HFA) 90 mcg/act inhaler Inhale 2 puffs every 6 (six) hours as needed for wheezing 18 g 2   • atorvastatin (LIPITOR) 40 mg tablet take 1 tablet once daily 100 tablet 2   • citalopram (CeleXA) 40 mg tablet Take 40 mg by mouth daily       • clonazePAM (KlonoPIN) 1 mg tablet 1 mg 2 (two) times a day as needed for anxiety    0   • dicyclomine (BENTYL) 20 mg tablet Take 1 tablet (20 mg total) by mouth every 6 (six) hours 60 tablet 1   • famotidine (PEPCID) 20 mg tablet take 2 tablets by mouth once daily 180 tablet 3   • gabapentin (NEURONTIN) 800 mg tablet Take 800 mg by mouth 3 (three) times a day     • ibuprofen (MOTRIN) 600 mg tablet Take 1 tablet (600 mg total) by mouth every 6 (six) hours as needed (Pain) 30 tablet 0   • metoprolol tartrate (LOPRESSOR) 50 mg tablet Take 1 tablet (50 mg total) by mouth every 12 (twelve) hours 60 tablet 1   • mirtazapine (REMERON) 30 mg tablet Take 30 mg by mouth daily at bedtime       • albuterol (2.5 mg/3 mL) 0.083 % nebulizer solution inhale contents of 1 vial in nebulizer every 6 hours if needed for wheezing shortness of breath (Patient not taking: Reported on 9/20/2023) 150 mL 0   • clonazePAM (KlonoPIN) 0.5 mg tablet        No current facility-administered medications for this visit. Allergies: Allergies   Allergen Reactions   • Aspirin Anaphylaxis and Other (See Comments)   • Bee Venom Anaphylaxis   • Tramadol Hives, Shortness Of Breath and Other (See Comments)     Other reaction(s): Nausea and/or vomiting  Pt received morphine IV 7-6-18   • Ketorolac    • Nitroglycerin Other (See Comments)     Patient states"she gets headaches from nitro"   • Omeprazole GI Intolerance     Other reaction(s): Nausea and/or vomiting      Physical Exam:     /80 (BP Location: Right arm, Patient Position: Sitting, Cuff Size: Standard)   Pulse 71   Temp 98 °F (36.7 °C) (Tympanic)   Resp 18   Ht 5' 3" (1.6 m)   Wt 63.9 kg (140 lb 12.8 oz)   LMP  (LMP Unknown)   SpO2 95%   BMI 24.94 kg/m²     Physical Exam  Vitals and nursing note reviewed. Constitutional:       General: She is not in acute distress. Appearance: She is well-developed. HENT:      Head: Normocephalic and atraumatic. Right Ear: Tympanic membrane normal.      Left Ear: Tympanic membrane normal.      Mouth/Throat:      Pharynx: No oropharyngeal exudate or posterior oropharyngeal erythema.    Eyes:      Conjunctiva/sclera: Conjunctivae normal.   Cardiovascular:      Rate and Rhythm: Normal rate and regular rhythm. Heart sounds: No murmur heard. Pulmonary:      Effort: Pulmonary effort is normal. No respiratory distress. Breath sounds: Wheezing present. Abdominal:      Palpations: Abdomen is soft. Tenderness: There is no abdominal tenderness. Musculoskeletal:         General: No swelling. Cervical back: Neck supple. Skin:     General: Skin is warm and dry. Capillary Refill: Capillary refill takes less than 2 seconds. Neurological:      General: No focal deficit present. Mental Status: She is alert and oriented to person, place, and time.    Psychiatric:         Mood and Affect: Mood normal.          [place], [address]

## 2023-10-14 DIAGNOSIS — I10 ESSENTIAL HYPERTENSION: ICD-10-CM

## 2023-10-14 RX ORDER — METOPROLOL TARTRATE 50 MG/1
50 TABLET, FILM COATED ORAL EVERY 12 HOURS
Qty: 60 TABLET | Refills: 1 | Status: SHIPPED | OUTPATIENT
Start: 2023-10-14

## 2023-11-01 ENCOUNTER — HOSPITAL ENCOUNTER (EMERGENCY)
Facility: HOSPITAL | Age: 63
Discharge: HOME | End: 2023-11-01
Attending: EMERGENCY MEDICINE
Payer: COMMERCIAL

## 2023-11-01 ENCOUNTER — APPOINTMENT (EMERGENCY)
Dept: CT IMAGING | Facility: HOSPITAL | Age: 63
End: 2023-11-01
Payer: COMMERCIAL

## 2023-11-01 ENCOUNTER — OFFICE VISIT (OUTPATIENT)
Age: 63
End: 2023-11-01
Payer: COMMERCIAL

## 2023-11-01 VITALS
WEIGHT: 140.4 LBS | BODY MASS INDEX: 24.88 KG/M2 | OXYGEN SATURATION: 91 % | HEART RATE: 92 BPM | RESPIRATION RATE: 20 BRPM | SYSTOLIC BLOOD PRESSURE: 128 MMHG | TEMPERATURE: 98.1 F | HEIGHT: 63 IN | DIASTOLIC BLOOD PRESSURE: 67 MMHG

## 2023-11-01 VITALS
TEMPERATURE: 97 F | OXYGEN SATURATION: 95 % | RESPIRATION RATE: 19 BRPM | WEIGHT: 140.4 LBS | HEIGHT: 63 IN | SYSTOLIC BLOOD PRESSURE: 140 MMHG | DIASTOLIC BLOOD PRESSURE: 82 MMHG | BODY MASS INDEX: 24.88 KG/M2 | HEART RATE: 78 BPM

## 2023-11-01 DIAGNOSIS — S22.42XA CLOSED FRACTURE OF TWO RIBS OF LEFT SIDE, INITIAL ENCOUNTER: Primary | ICD-10-CM

## 2023-11-01 DIAGNOSIS — R07.81 RIB PAIN ON LEFT SIDE: Primary | ICD-10-CM

## 2023-11-01 PROBLEM — I48.91 NEW ONSET A-FIB (HCC): Status: RESOLVED | Noted: 2018-12-31 | Resolved: 2023-11-01

## 2023-11-01 LAB
ALBUMIN SERPL BCP-MCNC: 3.8 G/DL (ref 3.5–5)
ALP SERPL-CCNC: 111 U/L (ref 34–104)
ALT SERPL W P-5'-P-CCNC: 19 U/L (ref 7–52)
ANION GAP SERPL CALCULATED.3IONS-SCNC: 7 MMOL/L
AST SERPL W P-5'-P-CCNC: 24 U/L (ref 13–39)
BASOPHILS # BLD AUTO: 0.03 THOUSANDS/ÂΜL (ref 0–0.1)
BASOPHILS NFR BLD AUTO: 1 % (ref 0–1)
BILIRUB SERPL-MCNC: 0.31 MG/DL (ref 0.2–1)
BUN SERPL-MCNC: 15 MG/DL (ref 5–25)
CALCIUM SERPL-MCNC: 9.5 MG/DL (ref 8.4–10.2)
CHLORIDE SERPL-SCNC: 105 MMOL/L (ref 96–108)
CO2 SERPL-SCNC: 28 MMOL/L (ref 21–32)
CREAT SERPL-MCNC: 0.72 MG/DL (ref 0.6–1.3)
EOSINOPHIL # BLD AUTO: 0.23 THOUSAND/ÂΜL (ref 0–0.61)
EOSINOPHIL NFR BLD AUTO: 4 % (ref 0–6)
ERYTHROCYTE [DISTWIDTH] IN BLOOD BY AUTOMATED COUNT: 13.9 % (ref 11.6–15.1)
GFR SERPL CREATININE-BSD FRML MDRD: 88 ML/MIN/1.73SQ M
GLUCOSE SERPL-MCNC: 119 MG/DL (ref 65–140)
HCT VFR BLD AUTO: 43.3 % (ref 34.8–46.1)
HGB BLD-MCNC: 14.1 G/DL (ref 11.5–15.4)
IMM GRANULOCYTES # BLD AUTO: 0.1 THOUSAND/UL (ref 0–0.2)
IMM GRANULOCYTES NFR BLD AUTO: 2 % (ref 0–2)
LYMPHOCYTES # BLD AUTO: 1.77 THOUSANDS/ÂΜL (ref 0.6–4.47)
LYMPHOCYTES NFR BLD AUTO: 27 % (ref 14–44)
MCH RBC QN AUTO: 30.1 PG (ref 26.8–34.3)
MCHC RBC AUTO-ENTMCNC: 32.6 G/DL (ref 31.4–37.4)
MCV RBC AUTO: 93 FL (ref 82–98)
MONOCYTES # BLD AUTO: 0.37 THOUSAND/ÂΜL (ref 0.17–1.22)
MONOCYTES NFR BLD AUTO: 6 % (ref 4–12)
NEUTROPHILS # BLD AUTO: 4.09 THOUSANDS/ÂΜL (ref 1.85–7.62)
NEUTS SEG NFR BLD AUTO: 60 % (ref 43–75)
NRBC BLD AUTO-RTO: 0 /100 WBCS
PLATELET # BLD AUTO: 229 THOUSANDS/UL (ref 149–390)
PMV BLD AUTO: 9.2 FL (ref 8.9–12.7)
POTASSIUM SERPL-SCNC: 4.9 MMOL/L (ref 3.5–5.3)
PROT SERPL-MCNC: 7.2 G/DL (ref 6.4–8.4)
RBC # BLD AUTO: 4.68 MILLION/UL (ref 3.81–5.12)
SODIUM SERPL-SCNC: 140 MMOL/L (ref 135–147)
WBC # BLD AUTO: 6.59 THOUSAND/UL (ref 4.31–10.16)

## 2023-11-01 PROCEDURE — 85025 COMPLETE CBC W/AUTO DIFF WBC: CPT | Performed by: PHYSICIAN ASSISTANT

## 2023-11-01 PROCEDURE — 96374 THER/PROPH/DIAG INJ IV PUSH: CPT

## 2023-11-01 PROCEDURE — 99285 EMERGENCY DEPT VISIT HI MDM: CPT | Performed by: PHYSICIAN ASSISTANT

## 2023-11-01 PROCEDURE — G1004 CDSM NDSC: HCPCS

## 2023-11-01 PROCEDURE — 96375 TX/PRO/DX INJ NEW DRUG ADDON: CPT

## 2023-11-01 PROCEDURE — 99213 OFFICE O/P EST LOW 20 MIN: CPT | Performed by: FAMILY MEDICINE

## 2023-11-01 PROCEDURE — 94644 CONT INHLJ TX 1ST HOUR: CPT

## 2023-11-01 PROCEDURE — 93005 ELECTROCARDIOGRAM TRACING: CPT

## 2023-11-01 PROCEDURE — 74177 CT ABD & PELVIS W/CONTRAST: CPT

## 2023-11-01 PROCEDURE — 71260 CT THORAX DX C+: CPT

## 2023-11-01 PROCEDURE — 94760 N-INVAS EAR/PLS OXIMETRY 1: CPT

## 2023-11-01 PROCEDURE — 99284 EMERGENCY DEPT VISIT MOD MDM: CPT

## 2023-11-01 PROCEDURE — 36415 COLL VENOUS BLD VENIPUNCTURE: CPT | Performed by: PHYSICIAN ASSISTANT

## 2023-11-01 PROCEDURE — 80053 COMPREHEN METABOLIC PANEL: CPT | Performed by: PHYSICIAN ASSISTANT

## 2023-11-01 RX ORDER — OXYCODONE HYDROCHLORIDE AND ACETAMINOPHEN 5; 325 MG/1; MG/1
1 TABLET ORAL EVERY 8 HOURS PRN
Qty: 15 TABLET | Refills: 0 | Status: SHIPPED | OUTPATIENT
Start: 2023-11-01 | End: 2023-11-11

## 2023-11-01 RX ORDER — SODIUM CHLORIDE FOR INHALATION 0.9 %
12 VIAL, NEBULIZER (ML) INHALATION ONCE
Status: COMPLETED | OUTPATIENT
Start: 2023-11-01 | End: 2023-11-01

## 2023-11-01 RX ORDER — CYCLOBENZAPRINE HCL 5 MG
5 TABLET ORAL 3 TIMES DAILY PRN
Qty: 30 TABLET | Refills: 0 | Status: SHIPPED | OUTPATIENT
Start: 2023-11-01

## 2023-11-01 RX ORDER — ONDANSETRON 2 MG/ML
4 INJECTION INTRAMUSCULAR; INTRAVENOUS ONCE
Status: COMPLETED | OUTPATIENT
Start: 2023-11-01 | End: 2023-11-01

## 2023-11-01 RX ORDER — MORPHINE SULFATE 4 MG/ML
4 INJECTION, SOLUTION INTRAMUSCULAR; INTRAVENOUS ONCE
Status: COMPLETED | OUTPATIENT
Start: 2023-11-01 | End: 2023-11-01

## 2023-11-01 RX ORDER — LIDOCAINE 50 MG/G
1 PATCH TOPICAL ONCE
Status: DISCONTINUED | OUTPATIENT
Start: 2023-11-01 | End: 2023-11-01 | Stop reason: HOSPADM

## 2023-11-01 RX ORDER — ACETAMINOPHEN 325 MG/1
975 TABLET ORAL EVERY 8 HOURS SCHEDULED
Qty: 30 TABLET | Refills: 0 | Status: SHIPPED | OUTPATIENT
Start: 2023-11-01 | End: 2023-12-01

## 2023-11-01 RX ORDER — KETOROLAC TROMETHAMINE 30 MG/ML
15 INJECTION, SOLUTION INTRAMUSCULAR; INTRAVENOUS ONCE
Status: COMPLETED | OUTPATIENT
Start: 2023-11-01 | End: 2023-11-01

## 2023-11-01 RX ORDER — HYDROMORPHONE HCL/PF 1 MG/ML
0.5 SYRINGE (ML) INJECTION ONCE
Status: COMPLETED | OUTPATIENT
Start: 2023-11-01 | End: 2023-11-01

## 2023-11-01 RX ADMIN — IOHEXOL 100 ML: 350 INJECTION, SOLUTION INTRAVENOUS at 14:36

## 2023-11-01 RX ADMIN — LIDOCAINE 1 PATCH: 50 PATCH CUTANEOUS at 13:04

## 2023-11-01 RX ADMIN — ALBUTEROL SULFATE 10 MG: 2.5 SOLUTION RESPIRATORY (INHALATION) at 13:04

## 2023-11-01 RX ADMIN — Medication 12 ML: at 13:04

## 2023-11-01 RX ADMIN — MORPHINE SULFATE 4 MG: 4 INJECTION INTRAVENOUS at 13:03

## 2023-11-01 RX ADMIN — HYDROMORPHONE HYDROCHLORIDE 0.5 MG: 1 INJECTION, SOLUTION INTRAMUSCULAR; INTRAVENOUS; SUBCUTANEOUS at 15:03

## 2023-11-01 RX ADMIN — IPRATROPIUM BROMIDE 1 MG: 0.5 SOLUTION RESPIRATORY (INHALATION) at 13:04

## 2023-11-01 RX ADMIN — KETOROLAC TROMETHAMINE 15 MG: 30 INJECTION INTRAMUSCULAR; INTRAVENOUS at 14:29

## 2023-11-01 RX ADMIN — ONDANSETRON 4 MG: 2 INJECTION INTRAMUSCULAR; INTRAVENOUS at 14:29

## 2023-11-01 NOTE — DISCHARGE INSTRUCTIONS
Ice, topical Lidoderm patches, Tylenol for mild to moderate pain control, Percocet as needed for episodes of breakthrough pain. Take Flexeril as prescribed. Encouraged taking deep breaths as tolerated. Please return immediately to the emergency department if you experience any new or worsening symptoms as discussed.

## 2023-11-01 NOTE — ED PROVIDER NOTES
History  Chief Complaint   Patient presents with    Fall     Witnessed fall at [address], trip and fall onto bedpost, hit L side of ribs, pain with deep breathing, no head strike, no thinners      Wayne Houston is a 40-year-old female with past medical history including ovarian cancer, chronic back pain, bipolar disorder, anxiety, tobacco use, alcohol abuse arriving ambulatory to the emergency department today for evaluation with complaint of acute left sided chest wall pain occurring in context of injury. Patient reports to me that she was making her bed this morning and she struck the left side of her chest against a bedpost.  There was no head strike or loss of consciousness and patient denies use of anticoagulant medication. On review of the patient's electronic health record, she was evaluated at [facility] on 10/28, also for a left-sided chest wall injury. CT c/a/p wo contrast obtained at that time which reported no evidence of acute injury. She had followed up with her PCP this morning for this as well but I do not see documentation of reinjury by the patient's primary care provider. The patient reports that her pain is localized to the left lateral chest wall, worsened by deep breathing and certain position changes. She denies pain or injury elsewhere and offers no other complaints or concerns at this time. Prior to Admission Medications   Prescriptions Last Dose Informant Patient Reported? Taking?    Diclofenac Sodium (VOLTAREN) 1 %   No No   Sig: Apply 2 g topically 4 (four) times a day   acetaminophen (TYLENOL) 325 mg tablet   No No   Sig: Take 3 tablets (975 mg total) by mouth every 8 (eight) hours   albuterol (2.5 mg/3 mL) 0.083 % nebulizer solution  Self No No   Sig: inhale contents of 1 vial in nebulizer every 6 hours if needed for wheezing shortness of breath   Patient not taking: Reported on 9/20/2023   albuterol (ProAir HFA) 90 mcg/act inhaler  Self No No   Sig: Inhale 2 puffs every 6 (six) hours as needed for wheezing   atorvastatin (LIPITOR) 40 mg tablet  Self No No   Sig: take 1 tablet once daily   citalopram (CeleXA) 40 mg tablet  Self Yes No   Sig: Take 40 mg by mouth daily     clonazePAM (KlonoPIN) 0.5 mg tablet  Self Yes No   clonazePAM (KlonoPIN) 1 mg tablet  Self Yes No   Si mg 2 (two) times a day as needed for anxiety     cyclobenzaprine (FLEXERIL) 5 mg tablet   No No   Sig: Take 1 tablet (5 mg total) by mouth 3 (three) times a day as needed for muscle spasms   dicyclomine (BENTYL) 20 mg tablet  Self No No   Sig: Take 1 tablet (20 mg total) by mouth every 6 (six) hours   Patient not taking: Reported on 2023   famotidine (PEPCID) 20 mg tablet  Self No No   Sig: take 2 tablets by mouth once daily   Patient not taking: Reported on 2023   gabapentin (NEURONTIN) 800 mg tablet  Self Yes No   Sig: Take 800 mg by mouth 3 (three) times a day   ibuprofen (MOTRIN) 600 mg tablet  Self No No   Sig: Take 1 tablet (600 mg total) by mouth every 6 (six) hours as needed (Pain)   metoprolol tartrate (LOPRESSOR) 50 mg tablet  Self No No   Sig: TAKE 1 TABLET (50 MG TOTAL) BY MOUTH EVERY 12 (TWELVE) HOURS   mirtazapine (REMERON) 30 mg tablet  Self Yes No   Sig: Take 30 mg by mouth daily at bedtime        Facility-Administered Medications: None       Past Medical History:   Diagnosis Date    Alcohol abuse 2018    Anxiety     Bipolar 1 disorder ([address])     Mental Health problems listed as Denied in [employer], cant't entire under pertinent negatives due to this diagnosis    Chronic back pain     COPD (chronic obstructive pulmonary disease) (HCC)     Frequent headaches     Glaucoma     Hyperlipidemia     Irregular heart beat     AFib    Ovarian cancer ([address])     last assessed - [id number]    Psychiatric disorder     bipolar    Shortness of breath     Uterine carcinoma ([address])     last assessed - [id number]       Past Surgical History:   Procedure Laterality Date    ADENOIDECTOMY      Tonsillectomy with Adenoidectomy - last assessed - 21Sep2016    APPENDECTOMY      last assessed - 21Sep2016    CATARACT EXTRACTION Left     Remove cataract, corneo-scleral section of left eye; last assessed - 21Sep2016    CHOLECYSTECTOMY      last assessed - 29Sep2016    EYE SURGERY      Anterior sclera fistulization for glaucoma; last assessed - 21Sep2016    HYSTERECTOMY      KRYSTA-BSO; last assessed - 21Sep2016    INTRAOCULAR LENS INSERTION      CO LIGATION/BIOPSY TEMPORAL ARTERY Right 12/16/2016    Procedure: BIOPSY ARTERY TEMPORAL;  Surgeon: Noemi Nina MD;  Location: [facility];  Service: General    TONSILLECTOMY      Tonsillectomy with Adenoidectomy - last assessed - 21Sep2016       Family History   Problem Relation Age of Onset    Heart disease Mother     Coronary artery disease Mother     Other Mother         1) Gangrene; 2) Peripheral arterial disease    Hyperlipidemia Mother         Hypercholesterolemia    Heart attack Mother         Myocardial Infarction    Other Father         1) Gangrene; 2) Peripheral arterial disease    Hyperlipidemia Father         Hypercholesterolemia    Stomach cancer Father     Heart attack Father         Myocardial Infarction    No Known Problems Maternal Grandmother     No Known Problems Paternal Grandmother     Stomach cancer Brother     Heart attack Brother         Myocardial Infarction    Stomach cancer Maternal Uncle     Esophageal cancer Maternal Aunt     No Known Problems Maternal Aunt     No Known Problems Maternal Aunt     No Known Problems Maternal Aunt     No Known Problems Maternal Aunt     No Known Problems Maternal Aunt     No Known Problems Maternal Aunt     No Known Problems Maternal Aunt     No Known Problems Paternal Aunt     Breast cancer Neg Hx      I have reviewed and agree with the history as documented.     E-Cigarette/Vaping    E-Cigarette Use Never User      E-Cigarette/Vaping Substances    Nicotine No     THC No     CBD No     Flavoring No     Other No     Unknown No Social History     Tobacco Use    Smoking status: Every Day     Packs/day: 1.00     Years: 46.00     Total pack years: 46.00     Types: Cigarettes    Smokeless tobacco: Never   Vaping Use    Vaping Use: Never used   Substance Use Topics    Alcohol use: Not Currently     Comment: occasional; (No alcohol use per [employer])    Drug use: No       Review of Systems   Constitutional:  Negative for chills, diaphoresis and fever. Respiratory:  Positive for shortness of breath. Cardiovascular:  Positive for chest pain. All other systems reviewed and are negative. Physical Exam  Physical Exam  Vitals and nursing note reviewed. Constitutional:       General: She is not in acute distress. Appearance: Normal appearance. She is well-developed. She is not ill-appearing, toxic-appearing or diaphoretic. HENT:      Head: Normocephalic and atraumatic. Right Ear: External ear normal.      Left Ear: External ear normal.   Eyes:      Conjunctiva/sclera: Conjunctivae normal.   Cardiovascular:      Rate and Rhythm: Normal rate and regular rhythm. Pulses: Normal pulses. Pulmonary:      Effort: Pulmonary effort is normal. No respiratory distress. Breath sounds: Wheezing present. No rhonchi or rales. Comments: Normal respiratory effort. Patient speaking in sentences without conversational dyspnea. Diffuse expiratory wheeze on auscultation in setting of h/o emphysema. O2 sats stable on room air. Chest:      Chest wall: Tenderness (left lateral chest wall) present. Abdominal:      General: There is no distension. Palpations: Abdomen is soft. Tenderness: There is no abdominal tenderness. Musculoskeletal:         General: Normal range of motion. Cervical back: Normal range of motion and neck supple. Skin:     General: Skin is warm and dry. Capillary Refill: Capillary refill takes less than 2 seconds. Neurological:      Mental Status: She is alert.       Motor: Motor function is intact. No weakness. Gait: Gait is intact.    Psychiatric:         Mood and Affect: Mood normal.         Vital Signs  ED Triage Vitals [11/01/23 1244]   Temperature Pulse Respirations Blood Pressure SpO2   98.1 °F (36.7 °C) 85 18 132/66 93 %      Temp Source Heart Rate Source Patient Position - Orthostatic VS BP Location FiO2 (%)   Temporal Monitor Sitting Left arm --      Pain Score       9           Vitals:    11/01/23 1244 11/01/23 1300 11/01/23 1400 11/01/23 1445   BP: 132/66 127/72 120/93 128/67   Pulse: 85 80 84 92   Patient Position - Orthostatic VS: Sitting Sitting Sitting Sitting         Visual Acuity  Visual Acuity      Flowsheet Row Most Recent Value   L Pupil Size (mm) 3   R Pupil Size (mm) 3            ED Medications  Medications   lidocaine (LIDODERM) 5 % patch 1 patch (1 patch Topical Medication Applied 11/1/23 1304)   albuterol inhalation solution 10 mg (10 mg Nebulization Given 11/1/23 1304)   ipratropium (ATROVENT) 0.02 % inhalation solution 1 mg (1 mg Nebulization Given 11/1/23 1304)   sodium chloride 0.9 % inhalation solution 12 mL (12 mL Nebulization Given 11/1/23 1304)   morphine injection 4 mg (4 mg Intravenous Given 11/1/23 1303)   ketorolac (TORADOL) injection 15 mg (15 mg Intravenous Given 11/1/23 1429)   ondansetron (ZOFRAN) injection 4 mg (4 mg Intravenous Given 11/1/23 1429)   iohexol (OMNIPAQUE) 350 MG/ML injection (MULTI-DOSE) 100 mL (100 mL Intravenous Given 11/1/23 1436)   HYDROmorphone (DILAUDID) injection 0.5 mg (0.5 mg Intravenous Given 11/1/23 1503)       Diagnostic Studies  Results Reviewed       Procedure Component Value Units Date/Time    Comprehensive metabolic panel [[id number]]  (Abnormal) Collected: 11/01/23 1257    Lab Status: Final result Specimen: Blood from Arm, Left Updated: 11/01/23 1337     Sodium 140 mmol/L      Potassium 4.9 mmol/L      Chloride 105 mmol/L      CO2 28 mmol/L      ANION GAP 7 mmol/L      BUN 15 mg/dL      Creatinine 0.72 mg/dL Glucose 119 mg/dL      Calcium 9.5 mg/dL      AST 24 U/L      ALT 19 U/L      Alkaline Phosphatase 111 U/L      Total Protein 7.2 g/dL      Albumin 3.8 g/dL      Total Bilirubin 0.31 mg/dL      eGFR 88 ml/min/1.73sq m     Narrative:      [place] guidelines for Chronic Kidney Disease (CKD):     Stage 1 with normal or high GFR (GFR > 90 mL/min/1.73 square meters)    Stage 2 Mild CKD (GFR = 60-89 mL/min/1.73 square meters)    Stage 3A Moderate CKD (GFR = 45-59 mL/min/1.73 square meters)    Stage 3B Moderate CKD (GFR = 30-44 mL/min/1.73 square meters)    Stage 4 Severe CKD (GFR = 15-29 mL/min/1.73 square meters)    Stage 5 End Stage CKD (GFR <15 mL/min/1.73 square meters)  Note: GFR calculation is accurate only with a steady state creatinine    CBC and differential [[id number]] Collected: 11/01/23 1257    Lab Status: Final result Specimen: Blood from Arm, Left Updated: 11/01/23 1303     WBC 6.59 Thousand/uL      RBC 4.68 Million/uL      Hemoglobin 14.1 g/dL      Hematocrit 43.3 %      MCV 93 fL      MCH 30.1 pg      MCHC 32.6 g/dL      RDW 13.9 %      MPV 9.2 fL      Platelets 599 Thousands/uL      nRBC 0 /100 WBCs      Neutrophils Relative 60 %      Immat GRANS % 2 %      Lymphocytes Relative 27 %      Monocytes Relative 6 %      Eosinophils Relative 4 %      Basophils Relative 1 %      Neutrophils Absolute 4.09 Thousands/µL      Immature Grans Absolute 0.10 Thousand/uL      Lymphocytes Absolute 1.77 Thousands/µL      Monocytes Absolute 0.37 Thousand/µL      Eosinophils Absolute 0.23 Thousand/µL      Basophils Absolute 0.03 Thousands/µL                    CT chest abdomen pelvis w contrast   Final Result by Vincenza Romberg, MD (11/01 1516)      Acute nondisplaced/mildly displaced displaced left lateral/posterolateral eighth and ninth rib fractures. Trace right pleural effusion. No acute findings in the abdomen or the pelvis.       The study was marked in [facility] for immediate notification. Workstation performed: [id number]                    Procedures  ECG 12 Lead Documentation Only    Date/Time: 11/1/2023 12:52 PM    Performed by: Marques Calvo PA-C  Authorized by: Marques Calvo PA-C    Indications / Diagnosis:  Sob  ECG reviewed by me, the ED Provider: yes    Patient location:  ED  Previous ECG:     Previous ECG:  Compared to current    Comparison ECG info:  3/4/23    Similarity:  No change  Rate:     ECG rate:  84    ECG rate assessment: normal    Rhythm:     Rhythm: sinus rhythm    Ectopy:     Ectopy: none    QRS:     QRS axis:  Normal    QRS intervals:  Normal  ST segments:     ST segments:  Non-specific  T waves:     T waves: non-specific    Comments:      No significant change when compared to prior ECG           ED Course                               SBIRT 20yo+      Flowsheet Row Most Recent Value   Initial Alcohol Screen: US AUDIT-C     1. How often do you have a drink containing alcohol? 0 Filed at: 11/01/2023 1244   2. How many drinks containing alcohol do you have on a typical day you are drinking? 0 Filed at: 11/01/2023 1244   3a. Male UNDER 65: How often do you have five or more drinks on one occasion? 0 Filed at: 11/01/2023 1244   3b. FEMALE Any Age, or MALE 65+: How often do you have 4 or more drinks on one occassion? 0 Filed at: 11/01/2023 1244   Audit-C Score 0 Filed at: 11/01/2023 1244   MAURIZIO: How many times in the past year have you. .. Used an illegal drug or used a prescription medication for non-medical reasons? Never Filed at: 11/01/2023 1244                      Medical Decision Making  This is a 70yo female presenting with left-sided chest wall pain occurring in context of injury. Patient fell on 10/28 and was evaluated at [facility] for left-sided chest wall pain, apparently reinjured herself today.     Differential diagnosis includes but is not limited to: rib fx/dislocation, ptx, sharonda, pulm contusion, costochondritis, hematoma, chest wall strain    Initial ED plan: labs, imaging    Final ED Assessment: Vital signs reviewed on ED presentation, examination as above. All labs and imaging independently reviewed with imaging interpreted by the Radiologist.  ECG is without ischemic change. There are no significant lab findings. CT chest/abdomen/pelvis today reports "Nondisplaced left lateral eighth rib fracture. Mildly displaced left posterolateral ninth rib fracture displaced by half a rib width."  Test results reviewed with the patient and spouse at bedside and the patient was given a physical copy of her CT report. Patient reporting improvement in symptoms with treatments given here in the emergency department. Patient states that she feels comfortable managing her symptoms at home. We reviewed multimodal pain control regimen to include topical Lidoderm patches, oral Tylenol for mild to moderate pain control, and Percocet as needed for episodes of breakthrough pain. Standard narcotic precautions given. Patient states that she has an active prescription for Flexeril and will also utilize this as needed for episodes of muscle spasms. Encouraged the patient to take deep breaths as tolerated. Close outpatient PCP for continued care and further management. Parameters for ED return discussed at length. Patient comfortable with plan as above and she was discharged in stable condition, ambulating independently from the emergency department today without issue. Amount and/or Complexity of Data Reviewed  External Data Reviewed: notes. Labs: ordered. Radiology: ordered. Risk  Prescription drug management.              Disposition  Final diagnoses:   Closed fracture of two ribs of left side, initial encounter     Time reflects when diagnosis was documented in both MDM as applicable and the Disposition within this note       Time User Action Codes Description Comment    11/1/2023  3:33 PM Demetris Solo Add [S22.42XA] Closed fracture of two ribs of left side, initial encounter           ED Disposition       ED Disposition   Discharge    Condition   Stable    Date/Time   Wed Nov 1, 2023 [address] discharge to home/self care.                    Follow-up Information       Follow up With Specialties Details Why Contact Info Additional Information    Diallo Lofton MD Family Medicine   [facility]  [id number]  [address] [facility] Emergency Department Emergency Medicine  If symptoms worsen [address] [facility], [place], [place], [address]            Discharge Medication List as of 11/1/2023  3:38 PM        START taking these medications    Details   oxyCODONE-acetaminophen (Percocet) 5-325 mg per tablet Take 1 tablet by mouth every 8 (eight) hours as needed for moderate pain or severe pain for up to 10 days Max Daily Amount: 3 tablets, Starting Wed 11/1/2023, Until Sat 11/11/2023 at 2359, Normal           CONTINUE these medications which have NOT CHANGED    Details   acetaminophen (TYLENOL) 325 mg tablet Take 3 tablets (975 mg total) by mouth every 8 (eight) hours, Starting Wed 11/1/2023, Until Fri 12/1/2023, Normal      albuterol (2.5 mg/3 mL) 0.083 % nebulizer solution inhale contents of 1 vial in nebulizer every 6 hours if needed for wheezing shortness of breath, Normal      albuterol (ProAir HFA) 90 mcg/act inhaler Inhale 2 puffs every 6 (six) hours as needed for wheezing, Starting Wed 9/20/2023, Normal      atorvastatin (LIPITOR) 40 mg tablet take 1 tablet once daily, Normal      citalopram (CeleXA) 40 mg tablet Take 40 mg by mouth daily  , Historical Med      !! clonazePAM (KlonoPIN) 0.5 mg tablet Starting Wed 8/23/2023, Historical Med      !! clonazePAM (KlonoPIN) 1 mg tablet 1 mg 2 (two) times a day as needed for anxiety  , Starting Fri 4/20/2018, Historical Med      cyclobenzaprine (FLEXERIL) 5 mg tablet Take 1 tablet (5 mg total) by mouth 3 (three) times a day as needed for muscle spasms, Starting Wed 11/1/2023, Normal      Diclofenac Sodium (VOLTAREN) 1 % Apply 2 g topically 4 (four) times a day, Starting Wed 11/1/2023, Until Fri 12/1/2023, Normal      dicyclomine (BENTYL) 20 mg tablet Take 1 tablet (20 mg total) by mouth every 6 (six) hours, Starting Wed 5/25/2022, Normal      famotidine (PEPCID) 20 mg tablet take 2 tablets by mouth once daily, Normal      gabapentin (NEURONTIN) 800 mg tablet Take 800 mg by mouth 3 (three) times a day, Historical Med      ibuprofen (MOTRIN) 600 mg tablet Take 1 tablet (600 mg total) by mouth every 6 (six) hours as needed (Pain), Starting Mon 10/25/2021, Normal      metoprolol tartrate (LOPRESSOR) 50 mg tablet TAKE 1 TABLET (50 MG TOTAL) BY MOUTH EVERY 12 (TWELVE) HOURS, Starting Sat 10/14/2023, Normal      mirtazapine (REMERON) 30 mg tablet Take 30 mg by mouth daily at bedtime  , Historical Med       !! - Potential duplicate medications found. Please discuss with provider. No discharge procedures on file.     PDMP Review         Value Time User    PDMP Reviewed  Yes 11/1/2023  2:25 PM Guanako Mansfield PA-C            ED Provider  Electronically Signed by             Guanako Mansfield PA-C  11/01/23 0296

## 2023-11-01 NOTE — PROGRESS NOTES
[address]    Name: Juan C De Oliveira      YOB: 1960      MRN: [id number]  Encounter Provider: Gomez Sherwood MD      Encounter Date: 11/01/23    Encounter Dept: [address]     1. Rib pain on left side  Assessment & Plan:  Recent fall, worked up in the ED, no fractures or bleeds. Persistent left-sided rib pain, no bruising obvious on exam.    Pain management discussed, additive  Scheduled Tylenol 500-1000 milligrams every 8 hours for the next 5 days  Voltaren gel topical scheduled as discussed for the next 5 days  Flexeril 5 mg 3 times daily as needed  Discussed parameters of return to ED    Orders:  -     acetaminophen (TYLENOL) 325 mg tablet; Take 3 tablets (975 mg total) by mouth every 8 (eight) hours  -     Diclofenac Sodium (VOLTAREN) 1 %; Apply 2 g topically 4 (four) times a day  -     cyclobenzaprine (FLEXERIL) 5 mg tablet; Take 1 tablet (5 mg total) by mouth 3 (three) times a day as needed for muscle spasms             Pertinent labs were evaluated and discussed with patient today. Follow-Up Plans   Return if symptoms worsen or fail to improve. Chief Complaint    Follow-up (Had a fall on 10/27/23. Bruised ribs on left side. Went to ER and had CAT scan)      Subjective   Juan C DeO liveira is a 70 y.o. with  has a past medical history of Alcohol abuse, Anxiety, Bipolar 1 disorder ([address]), Chronic back pain, COPD (chronic obstructive pulmonary disease) ([address]), Frequent headaches, Glaucoma, Hyperlipidemia, Irregular heart beat, Ovarian cancer [facility]), Psychiatric disorder, Shortness of breath, and Uterine carcinoma ([address]). Today patient reports about fall recently causing left rib pain. Review of Systems   Constitutional:  Negative for chills and fever. Respiratory:  Negative for chest tightness.     Cardiovascular: Negative for chest pain. Current Medication List   No current facility-administered medications for this visit.     Current Outpatient Medications:     acetaminophen (TYLENOL) 325 mg tablet, Take 3 tablets (975 mg total) by mouth every 8 (eight) hours, Disp: 30 tablet, Rfl: 0    albuterol (ProAir HFA) 90 mcg/act inhaler, Inhale 2 puffs every 6 (six) hours as needed for wheezing, Disp: 18 g, Rfl: 2    atorvastatin (LIPITOR) 40 mg tablet, take 1 tablet once daily, Disp: 100 tablet, Rfl: 2    citalopram (CeleXA) 40 mg tablet, Take 40 mg by mouth daily  , Disp: , Rfl:     clonazePAM (KlonoPIN) 0.5 mg tablet, , Disp: , Rfl:     clonazePAM (KlonoPIN) 1 mg tablet, 1 mg 2 (two) times a day as needed for anxiety  , Disp: , Rfl: 0    cyclobenzaprine (FLEXERIL) 5 mg tablet, Take 1 tablet (5 mg total) by mouth 3 (three) times a day as needed for muscle spasms, Disp: 30 tablet, Rfl: 0    Diclofenac Sodium (VOLTAREN) 1 %, Apply 2 g topically 4 (four) times a day, Disp: 240 g, Rfl: 0    gabapentin (NEURONTIN) 800 mg tablet, Take 800 mg by mouth 3 (three) times a day, Disp: , Rfl:     ibuprofen (MOTRIN) 600 mg tablet, Take 1 tablet (600 mg total) by mouth every 6 (six) hours as needed (Pain), Disp: 30 tablet, Rfl: 0    metoprolol tartrate (LOPRESSOR) 50 mg tablet, TAKE 1 TABLET (50 MG TOTAL) BY MOUTH EVERY 12 (TWELVE) HOURS, Disp: 60 tablet, Rfl: 1    mirtazapine (REMERON) 30 mg tablet, Take 30 mg by mouth daily at bedtime  , Disp: , Rfl:     albuterol (2.5 mg/3 mL) 0.083 % nebulizer solution, inhale contents of 1 vial in nebulizer every 6 hours if needed for wheezing shortness of breath (Patient not taking: Reported on 9/20/2023), Disp: 150 mL, Rfl: 0    dicyclomine (BENTYL) 20 mg tablet, Take 1 tablet (20 mg total) by mouth every 6 (six) hours (Patient not taking: Reported on 11/1/2023), Disp: 60 tablet, Rfl: 1    famotidine (PEPCID) 20 mg tablet, take 2 tablets by mouth once daily (Patient not taking: Reported on 11/1/2023), Disp: 180 tablet, Rfl: 3    Facility-Administered Medications Ordered in Other Visits:     lidocaine (LIDODERM) 5 % patch 1 patch, 1 patch, Topical, Once, Ozzie Seaman PA-C, 1 patch at 11/01/23 1304      Objective     /82 (BP Location: Left arm, Patient Position: Sitting, Cuff Size: Standard)   Pulse 78   Temp (!) 97 °F (36.1 °C) (Tympanic)   Resp 19   Ht 5' 3" (1.6 m)   Wt 63.7 kg (140 lb 6.4 oz)   LMP  (LMP Unknown)   SpO2 95%   BMI 24.87 kg/m²      Physical Exam  Vitals reviewed. Constitutional:       General: She is not in acute distress. Appearance: Normal appearance. She is not ill-appearing, toxic-appearing or diaphoretic. HENT:      Head: Normocephalic and atraumatic. Right Ear: External ear normal.      Left Ear: External ear normal.      Nose: Nose normal.      Mouth/Throat:      Mouth: Mucous membranes are moist.   Eyes:      General: No scleral icterus. Right eye: No discharge. Left eye: No discharge. Extraocular Movements: Extraocular movements intact. Conjunctiva/sclera: Conjunctivae normal.   Cardiovascular:      Rate and Rhythm: Normal rate and regular rhythm. Pulses: Normal pulses. Heart sounds: Normal heart sounds. Pulmonary:      Effort: Pulmonary effort is normal. No respiratory distress. Breath sounds: Normal breath sounds. Abdominal:      Palpations: Abdomen is soft. Tenderness: There is no abdominal tenderness. Musculoskeletal:         General: Tenderness (left middle chest wall) present. No swelling or deformity. Normal range of motion. Cervical back: Normal range of motion. Skin:     General: Skin is warm and dry. Neurological:      General: No focal deficit present. Mental Status: She is alert and oriented to person, place, and time. Psychiatric:         Mood and Affect: Mood normal.         Behavior: Behavior normal.         Thought Content:  Thought content normal. Florence Montano MD  Family Medicine Physician   [facility]

## 2023-11-01 NOTE — ASSESSMENT & PLAN NOTE
Recent fall, worked up in the ED, no fractures or bleeds.   Persistent left-sided rib pain, no bruising obvious on exam.    Pain management discussed, additive  Scheduled Tylenol 500-1000 milligrams every 8 hours for the next 5 days  Voltaren gel topical scheduled as discussed for the next 5 days  Flexeril 5 mg 3 times daily as needed  Discussed parameters of return to ED

## 2023-11-02 LAB
ATRIAL RATE: 84 BPM
P AXIS: 71 DEGREES
PR INTERVAL: 100 MS
QRS AXIS: 68 DEGREES
QRSD INTERVAL: 130 MS
QT INTERVAL: 426 MS
QTC INTERVAL: 503 MS
T WAVE AXIS: -48 DEGREES
VENTRICULAR RATE: 84 BPM

## 2023-11-02 PROCEDURE — 93010 ELECTROCARDIOGRAM REPORT: CPT | Performed by: INTERNAL MEDICINE

## 2023-11-06 ENCOUNTER — TELEPHONE (OUTPATIENT)
Age: 63
End: 2023-11-06

## 2023-11-06 NOTE — TELEPHONE ENCOUNTER
Patient LVM that she went to the ED 11/1-fell against her bathroom sink and has 2 broken ribs-requesting call back Improved

## 2023-11-07 ENCOUNTER — OFFICE VISIT (OUTPATIENT)
Age: 63
End: 2023-11-07
Payer: COMMERCIAL

## 2023-11-07 VITALS
HEIGHT: 63 IN | TEMPERATURE: 97.4 F | OXYGEN SATURATION: 97 % | DIASTOLIC BLOOD PRESSURE: 82 MMHG | SYSTOLIC BLOOD PRESSURE: 128 MMHG | HEART RATE: 95 BPM | BODY MASS INDEX: 24.66 KG/M2 | WEIGHT: 139.2 LBS | RESPIRATION RATE: 18 BRPM

## 2023-11-07 DIAGNOSIS — R07.81 RIB PAIN ON LEFT SIDE: Primary | ICD-10-CM

## 2023-11-07 DIAGNOSIS — M25.561 CHRONIC PAIN OF RIGHT KNEE: ICD-10-CM

## 2023-11-07 DIAGNOSIS — G89.29 CHRONIC PAIN OF RIGHT KNEE: ICD-10-CM

## 2023-11-07 PROBLEM — F10.10 ALCOHOL ABUSE: Status: RESOLVED | Noted: 2018-12-31 | Resolved: 2023-11-07

## 2023-11-07 PROCEDURE — 99213 OFFICE O/P EST LOW 20 MIN: CPT | Performed by: INTERNAL MEDICINE

## 2023-11-07 RX ORDER — CYCLOBENZAPRINE HCL 10 MG
10 TABLET ORAL 3 TIMES DAILY PRN
Qty: 30 TABLET | Refills: 0 | Status: SHIPPED | OUTPATIENT
Start: 2023-11-07

## 2023-11-07 NOTE — PROGRESS NOTES
INTERNAL MEDICINE FOLLOW-UP OFFICE VISIT  [facility]    NAME: Pierre Hughes  AGE: 70 y.o. SEX: female  : 1960   MRN: [id number]    DATE: 2023  TIME: 8:29 AM    Assessment and Plan     1. Rib pain on left side  Patient fell down about a week ago and fractured 2 of her ribs on the left side. She was given Percocet and Flexeril by the urgent care. She has been taking the medication but still has a lot of pain. I increase the dose of the Flexeril to 10 mg 3 times a day as needed and told her to take it. She was also told to continue taking the ibuprofen which she takes for her rheumatoid arthritis as well. I told her that she does not need more Percocet. She was told to use the heating pad and the diclofenac gel. I stressed the need to use the incentive spirometer every 2 hours. - cyclobenzaprine (FLEXERIL) 10 mg tablet; Take 1 tablet (10 mg total) by mouth 3 (three) times a day as needed for muscle spasms  Dispense: 30 tablet; Refill: 0    2. Chronic pain of right knee  - Ambulatory Referral to Orthopedic Surgery; Future    - Counseling Documentation: patient was counseled regarding: diagnostic results, instructions for management, risk factor reductions, prognosis, patient and family education, risks and benefits of treatment options, and importance of compliance with treatment  - Medication Side Effects: Adverse side effects of medications were reviewed with the patient/guardian today. Return to office in: As needed    Chief Complaint     Chief Complaint   Patient presents with    Follow-up     Had Fall and broke rib. History of Present Illness     HPI  Patient fell a week ago at home and went to urgent care. She was found to have 2 left-sided rib fractures and was given Percocet and Flexeril for it. She has been taking the medications but says that the pain is still there. She has been doing the incentive spirometer about 3 times a day.   As her right knee had a meniscal tear, it gave way and she fell because of that. She was told to see orthopedics for the knee    The following portions of the patient's history were reviewed and updated as appropriate: allergies, current medications, past family history, past medical history, past social history, past surgical history and problem list.    Review of Systems     Review of Systems   Constitutional:  Negative for chills, diaphoresis, fatigue and fever. HENT:  Negative for congestion, ear discharge, ear pain, hearing loss, postnasal drip, rhinorrhea, sinus pressure, sinus pain, sneezing, sore throat and voice change. Eyes:  Negative for pain, discharge, redness and visual disturbance. Respiratory:  Negative for cough, chest tightness, shortness of breath and wheezing. Cardiovascular:  Negative for chest pain, palpitations and leg swelling. Gastrointestinal:  Negative for abdominal distention, abdominal pain, blood in stool, constipation, diarrhea, nausea and vomiting. Endocrine: Negative for cold intolerance, heat intolerance, polydipsia, polyphagia and polyuria. Genitourinary:  Negative for dysuria, flank pain, frequency, hematuria and urgency. Musculoskeletal:  Negative for arthralgias, back pain, gait problem, joint swelling, myalgias, neck pain and neck stiffness. Complains of pain on the left chest   Skin:  Negative for rash. Neurological:  Negative for dizziness, tremors, syncope, facial asymmetry, speech difficulty, weakness, light-headedness, numbness and headaches. Hematological:  Does not bruise/bleed easily. Psychiatric/Behavioral:  Negative for behavioral problems, confusion and sleep disturbance. The patient is not nervous/anxious.         Active Problem List     Patient Active Problem List   Diagnosis    Increased severity of headaches    Chest pain    Anxiety and depression    Chronic back pain    Tobacco abuse disorder    Neuropathy    Positive cardiac stress test    Abdominal pain    COPD (chronic obstructive pulmonary disease) (HCC)    Transaminitis    Essential hypertension    Lumbosacral radiculopathy at L4    Angina, class III ([facility])    Mixed hyperlipidemia    Lung nodules    Rheumatoid arthritis involving multiple sites ([facility])    Hypokalemia    Alcohol abuse    Esophagitis    Epigastric pain    Right leg swelling    Acute pain of right knee    Contusion of left hip    Low back pain    Rib pain on left side       Objective     /82 (BP Location: Left arm, Patient Position: Sitting, Cuff Size: Standard)   Pulse 95   Temp (!) 97.4 °F (36.3 °C) (Tympanic)   Resp 18   Ht 5' 3" (1.6 m)   Wt 63.1 kg (139 lb 3.2 oz)   LMP  (LMP Unknown)   SpO2 97%   BMI 24.66 kg/m²     Physical Exam  Constitutional:       General: She is not in acute distress. Appearance: She is well-developed. She is not diaphoretic. HENT:      Head: Normocephalic and atraumatic. Right Ear: External ear normal.      Left Ear: External ear normal.      Nose: Nose normal.   Eyes:      General: No scleral icterus. Right eye: No discharge. Left eye: No discharge. Conjunctiva/sclera: Conjunctivae normal.   Neck:      Thyroid: No thyromegaly. Vascular: No JVD. Trachea: No tracheal deviation. Cardiovascular:      Rate and Rhythm: Normal rate and regular rhythm. Heart sounds: Normal heart sounds. No murmur heard. No friction rub. No gallop. Pulmonary:      Effort: Pulmonary effort is normal. No respiratory distress. Breath sounds: Normal breath sounds. No wheezing or rales. Chest:      Chest wall: No tenderness. Abdominal:      General: Bowel sounds are normal. There is no distension. Palpations: Abdomen is soft. Tenderness: There is no abdominal tenderness. There is no guarding or rebound. Musculoskeletal:         General: No tenderness. Normal range of motion. Cervical back: Normal range of motion and neck supple. Comments: Has tenderness in the left ribs in the lower part below the breast   Lymphadenopathy:      Cervical: No cervical adenopathy. Skin:     General: Skin is warm and dry. Findings: No erythema or rash. Neurological:      Mental Status: She is alert and oriented to person, place, and time. Cranial Nerves: No cranial nerve deficit. Motor: No abnormal muscle tone.       Coordination: Coordination normal.   Psychiatric:         Judgment: Judgment normal.         Pertinent Laboratory/Diagnostic Studies:        Current Medications       Current Outpatient Medications:     acetaminophen (TYLENOL) 325 mg tablet, Take 3 tablets (975 mg total) by mouth every 8 (eight) hours, Disp: 30 tablet, Rfl: 0    albuterol (ProAir HFA) 90 mcg/act inhaler, Inhale 2 puffs every 6 (six) hours as needed for wheezing, Disp: 18 g, Rfl: 2    atorvastatin (LIPITOR) 40 mg tablet, take 1 tablet once daily, Disp: 100 tablet, Rfl: 2    citalopram (CeleXA) 40 mg tablet, Take 40 mg by mouth daily  , Disp: , Rfl:     cyclobenzaprine (FLEXERIL) 10 mg tablet, Take 1 tablet (10 mg total) by mouth 3 (three) times a day as needed for muscle spasms, Disp: 30 tablet, Rfl: 0    Diclofenac Sodium (VOLTAREN) 1 %, Apply 2 g topically 4 (four) times a day, Disp: 240 g, Rfl: 0    gabapentin (NEURONTIN) 800 mg tablet, Take 800 mg by mouth 3 (three) times a day, Disp: , Rfl:     ibuprofen (MOTRIN) 600 mg tablet, Take 1 tablet (600 mg total) by mouth every 6 (six) hours as needed (Pain), Disp: 30 tablet, Rfl: 0    metoprolol tartrate (LOPRESSOR) 50 mg tablet, TAKE 1 TABLET (50 MG TOTAL) BY MOUTH EVERY 12 (TWELVE) HOURS, Disp: 60 tablet, Rfl: 1    mirtazapine (REMERON) 30 mg tablet, Take 30 mg by mouth daily at bedtime  , Disp: , Rfl:     albuterol (2.5 mg/3 mL) 0.083 % nebulizer solution, inhale contents of 1 vial in nebulizer every 6 hours if needed for wheezing shortness of breath (Patient not taking: Reported on 9/20/2023), Disp: 150 mL, Rfl: 0    clonazePAM (KlonoPIN) 0.5 mg tablet, , Disp: , Rfl:     clonazePAM (KlonoPIN) 1 mg tablet, 1 mg 2 (two) times a day as needed for anxiety   (Patient not taking: Reported on 11/7/2023), Disp: , Rfl: 0    dicyclomine (BENTYL) 20 mg tablet, Take 1 tablet (20 mg total) by mouth every 6 (six) hours (Patient not taking: Reported on 11/1/2023), Disp: 60 tablet, Rfl: 1    famotidine (PEPCID) 20 mg tablet, take 2 tablets by mouth once daily (Patient not taking: Reported on 11/1/2023), Disp: 180 tablet, Rfl: 3    Health Maintenance     Health Maintenance   Topic Date Due    Medicare Annual Wellness Visit (AWV)  Never done    HIV Screening  Never done    Hepatitis A Vaccine (1 of 2 - Risk 2-dose series) Never done    Cervical Cancer Screening  Never done    Osteoporosis Screening  Never done    Hepatitis B Vaccine (1 of 3 - Risk 3-dose series) Never done    COVID-19 Vaccine (6 - Pfizer series) 11/21/2022    Breast Cancer Screening: Mammogram  06/28/2023    Falls: Plan of Care  Never done    Lung Cancer Screening  11/01/2024    Fall Risk  11/01/2024    Urinary Incontinence Screening  11/01/2024    BMI: Adult  11/01/2024    Colorectal Cancer Screening  05/14/2026    Hepatitis C Screening  Completed    Pneumococcal Vaccine: 65+ Years  Completed    Influenza Vaccine  Completed    HIB Vaccine  Aged Out    IPV Vaccine  Aged Out    Meningococcal ACWY Vaccine  Aged Out    HPV Vaccine  Aged Out     Immunization History   Administered Date(s) Administered    COVID-19 PFIZER VACCINE 0.3 ML IM 04/27/2021, 05/18/2021, 11/20/2021    COVID-19 Pfizer vac (Ignacio-sucrose, gray cap) 12 yr+ IM 04/06/2022, 09/26/2022    INFLUENZA 08/19/2014, 11/23/2015, 08/27/2016, 09/27/2017, 08/12/2018, 08/30/2020, 09/09/2021, 09/26/2022, 09/20/2023    Influenza, high dose seasonal 0.7 mL 09/20/2023    Influenza, injectable, quadrivalent, preservative free 0.5 mL 08/12/2018, 12/20/2019    Influenza, seasonal, injectable 08/01/2016    Pneumococcal Conjugate 13-Valent 08/30/2020    Pneumococcal Conjugate Vaccine 20-valent (Pcv20), Polysace 09/20/2023, 09/20/2023    Pneumococcal Polysaccharide PPV23 08/12/2018    Tdap 07/25/2017, 03/23/2022, 05/03/2023         Brooke Holland MD  [address] [place] [address] BEHAVIORAL MEDICINE [facility]

## 2023-11-13 DIAGNOSIS — R07.81 RIB PAIN ON LEFT SIDE: ICD-10-CM

## 2023-11-26 DIAGNOSIS — I10 ESSENTIAL HYPERTENSION: ICD-10-CM

## 2023-11-27 RX ORDER — METOPROLOL TARTRATE 50 MG/1
50 TABLET, FILM COATED ORAL EVERY 12 HOURS
Qty: 60 TABLET | Refills: 1 | Status: SHIPPED | OUTPATIENT
Start: 2023-11-27

## 2024-01-02 ENCOUNTER — TELEPHONE (OUTPATIENT)
Age: 64
End: 2024-01-02

## 2024-01-15 ENCOUNTER — APPOINTMENT (OUTPATIENT)
Age: 64
End: 2024-01-15
Payer: COMMERCIAL

## 2024-01-15 ENCOUNTER — OFFICE VISIT (OUTPATIENT)
Age: 64
End: 2024-01-15
Payer: COMMERCIAL

## 2024-01-15 ENCOUNTER — TELEPHONE (OUTPATIENT)
Age: 64
End: 2024-01-15

## 2024-01-15 VITALS
RESPIRATION RATE: 16 BRPM | OXYGEN SATURATION: 97 % | SYSTOLIC BLOOD PRESSURE: 136 MMHG | HEIGHT: 63 IN | DIASTOLIC BLOOD PRESSURE: 83 MMHG | BODY MASS INDEX: 24.98 KG/M2 | HEART RATE: 79 BPM | WEIGHT: 141 LBS | TEMPERATURE: 97.8 F

## 2024-01-15 DIAGNOSIS — E78.2 MIXED HYPERLIPIDEMIA: ICD-10-CM

## 2024-01-15 DIAGNOSIS — M06.9 RHEUMATOID ARTHRITIS INVOLVING MULTIPLE SITES, UNSPECIFIED WHETHER RHEUMATOID FACTOR PRESENT (HCC): ICD-10-CM

## 2024-01-15 DIAGNOSIS — Z00.00 MEDICARE ANNUAL WELLNESS VISIT, SUBSEQUENT: ICD-10-CM

## 2024-01-15 DIAGNOSIS — Z91.89 AT RISK FOR PROLONGED QT INTERVAL SYNDROME: ICD-10-CM

## 2024-01-15 DIAGNOSIS — M54.50 CHRONIC BILATERAL LOW BACK PAIN WITHOUT SCIATICA: ICD-10-CM

## 2024-01-15 DIAGNOSIS — I20.9 ANGINA, CLASS III (HCC): ICD-10-CM

## 2024-01-15 DIAGNOSIS — G89.29 CHRONIC BILATERAL LOW BACK PAIN WITHOUT SCIATICA: ICD-10-CM

## 2024-01-15 DIAGNOSIS — M79.641 PAIN OF RIGHT HAND: ICD-10-CM

## 2024-01-15 DIAGNOSIS — R94.31 PROLONGED Q-T INTERVAL ON ECG: ICD-10-CM

## 2024-01-15 DIAGNOSIS — I10 ESSENTIAL HYPERTENSION: Primary | ICD-10-CM

## 2024-01-15 DIAGNOSIS — J44.9 CHRONIC OBSTRUCTIVE PULMONARY DISEASE, UNSPECIFIED COPD TYPE (HCC): ICD-10-CM

## 2024-01-15 PROCEDURE — 99214 OFFICE O/P EST MOD 30 MIN: CPT | Performed by: INTERNAL MEDICINE

## 2024-01-15 PROCEDURE — G0439 PPPS, SUBSEQ VISIT: HCPCS | Performed by: INTERNAL MEDICINE

## 2024-01-15 PROCEDURE — 93000 ELECTROCARDIOGRAM COMPLETE: CPT | Performed by: INTERNAL MEDICINE

## 2024-01-15 PROCEDURE — 73130 X-RAY EXAM OF HAND: CPT

## 2024-01-15 RX ORDER — ATORVASTATIN CALCIUM 40 MG/1
40 TABLET, FILM COATED ORAL DAILY
Qty: 100 TABLET | Refills: 2 | Status: SHIPPED | OUTPATIENT
Start: 2024-01-15

## 2024-01-15 RX ORDER — CLONAZEPAM 0.5 MG/1
TABLET ORAL
COMMUNITY
Start: 2024-01-08

## 2024-01-15 RX ORDER — CYCLOBENZAPRINE HCL 10 MG
10 TABLET ORAL 3 TIMES DAILY PRN
Qty: 30 TABLET | Refills: 3 | Status: SHIPPED | OUTPATIENT
Start: 2024-01-15

## 2024-01-15 NOTE — PROGRESS NOTES
Assessment and Plan:   Blood pressure is stable, continue metoprolol.  She was sent by the psychiatrist to get an EKG done for prolonged QT interval.  The EKG did show prolonged QT and the patient was told to discontinue the citalopram.  She is also on the Remeron which also causes prolongation of the QT but I want her to discuss it with her psychiatrist before stopping both the medications.  Rheumatoid arthritis is stable but she has pain in her hands.  She recently bumped her right hand and now has pain on the dorsum of the hand.  Will get an x-ray of the hand done and get back to her with the results      Problem List Items Addressed This Visit          Cardiovascular and Mediastinum    Essential hypertension     Other Visit Diagnoses       Encounter for screening mammogram for breast cancer    -  Primary            Depression Screening and Follow-up Plan: Patient was screened for depression during today's encounter. They screened negative with a PHQ-9 score of 0.    Falls Plan of Care: balance, strength, and gait training instructions were provided.     Tobacco Cessation Counseling: Tobacco cessation counseling was provided. The patient is sincerely urged to quit consumption of tobacco. She is not ready to quit tobacco.       Preventive health issues were discussed with patient, and age appropriate screening tests were ordered as noted in patient's After Visit Summary.  Personalized health advice and appropriate referrals for health education or preventive services given if needed, as noted in patient's After Visit Summary.     History of Present Illness:     Patient presents for a Medicare Wellness Visit  Patient is here on the recommendation of her psychiatrist to get an EKG done to rule out QT prolongation.  She is otherwise asymptomatic and denies any chest pain, palpitations or shortness of breath.  She also complains of pain in her right hand.  She says she bumped it about a week ago and it is still  hurting and there is a swelling on the dorsum of the hand.    Medication Refill  Associated symptoms include arthralgias. Pertinent negatives include no abdominal pain, chest pain, chills, congestion, coughing, diaphoresis, fatigue, fever, headaches, joint swelling, myalgias, nausea, neck pain, numbness, rash, sore throat, vomiting or weakness.      Patient Care Team:  Jaya Noyola MD as PCP - General (Family Medicine)  Christofer De Anda MD as PCP - PCP-[facility] ([facility])  Christofer De Anda MD as PCP - PCP-[employer] ([facility])  Katherine Falk MD     Review of Systems:     Review of Systems   Constitutional:  Negative for chills, diaphoresis, fatigue and fever.   HENT:  Negative for congestion, ear discharge, ear pain, hearing loss, postnasal drip, rhinorrhea, sinus pressure, sinus pain, sneezing, sore throat and voice change.    Eyes:  Negative for pain, discharge, redness and visual disturbance.   Respiratory:  Negative for cough, chest tightness, shortness of breath and wheezing.    Cardiovascular:  Negative for chest pain, palpitations and leg swelling.   Gastrointestinal:  Negative for abdominal distention, abdominal pain, blood in stool, constipation, diarrhea, nausea and vomiting.   Endocrine: Negative for cold intolerance, heat intolerance, polydipsia, polyphagia and polyuria.   Genitourinary:  Negative for dysuria, flank pain, frequency, hematuria and urgency.   Musculoskeletal:  Positive for arthralgias. Negative for back pain, gait problem, joint swelling, myalgias, neck pain and neck stiffness.   Skin:  Negative for rash.   Neurological:  Negative for dizziness, tremors, syncope, facial asymmetry, speech difficulty, weakness, light-headedness, numbness and headaches.   Hematological:  Does not bruise/bleed easily.   Psychiatric/Behavioral:  Negative for behavioral problems, confusion and sleep disturbance. The patient is not nervous/anxious.         Problem List:     Patient Active Problem  List   Diagnosis    Increased severity of headaches    Chest pain    Anxiety and depression    Chronic back pain    Tobacco abuse disorder    Neuropathy    Positive cardiac stress test    Abdominal pain    COPD (chronic obstructive pulmonary disease) (HCC)    Transaminitis    Essential hypertension    Lumbosacral radiculopathy at L4    Angina, class III (HCC)    Mixed hyperlipidemia    Lung nodules    Rheumatoid arthritis involving multiple sites (HCC)    Hypokalemia    Esophagitis    Epigastric pain    Right leg swelling    Chronic pain of right knee    Contusion of left hip    Low back pain    Rib pain on left side      Past Medical and Surgical History:     Past Medical History:   Diagnosis Date    Alcohol abuse 12/31/2018    Anxiety     Bipolar 1 disorder (HCC)     Mental Health problems listed as Denied in [employer], cant't entire under pertinent negatives due to this diagnosis    Chronic back pain     COPD (chronic obstructive pulmonary disease) (HCC)     Fall 11/01/2023    broke 2 ribs on left side    Frequent headaches     Glaucoma     Hyperlipidemia     Irregular heart beat     AFib    Ovarian cancer (HCC)     last assessed - 21Sep2016    Psychiatric disorder     bipolar    Shortness of breath     Uterine carcinoma (HCC)     last assessed - 21Sep2016     Past Surgical History:   Procedure Laterality Date    ADENOIDECTOMY      Tonsillectomy with Adenoidectomy - last assessed - 21Sep2016    APPENDECTOMY      last assessed - [id number]    CATARACT EXTRACTION Left     Remove cataract, corneo-scleral section of left eye; last assessed - 21Sep2016    CHOLECYSTECTOMY      last assessed - 29Sep2016    EYE SURGERY      Anterior sclera fistulization for glaucoma; last assessed - [id number]    HYSTERECTOMY      KRYSTA-BSO; last assessed - [id number]    INTRAOCULAR LENS INSERTION      MN LIGATION/BIOPSY TEMPORAL ARTERY Right 12/16/2016    Procedure: BIOPSY ARTERY TEMPORAL;  Surgeon: Antwan Jay MD;  Location: [facility]  OR;  Service: General    TONSILLECTOMY      Tonsillectomy with Adenoidectomy - last assessed - [id number]      Family History:     Family History   Problem Relation Age of Onset    Heart disease Mother     Coronary artery disease Mother     Other Mother         1) Gangrene; 2) Peripheral arterial disease    Hyperlipidemia Mother         Hypercholesterolemia    Heart attack Mother         Myocardial Infarction    Other Father         1) Gangrene; 2) Peripheral arterial disease    Hyperlipidemia Father         Hypercholesterolemia    Stomach cancer Father     Heart attack Father         Myocardial Infarction    No Known Problems Maternal Grandmother     No Known Problems Paternal Grandmother     Stomach cancer Brother     Heart attack Brother         Myocardial Infarction    Stomach cancer Maternal Uncle     Esophageal cancer Maternal Aunt     No Known Problems Maternal Aunt     No Known Problems Maternal Aunt     No Known Problems Maternal Aunt     No Known Problems Maternal Aunt     No Known Problems Maternal Aunt     No Known Problems Maternal Aunt     No Known Problems Maternal Aunt     No Known Problems Paternal Aunt     Breast cancer Neg Hx       Social History:     Social History     Socioeconomic History    Marital status: /Civil Union     Spouse name: None    Number of children: None    Years of education: None    Highest education level: None   Occupational History    Occupation: Disabled   Tobacco Use    Smoking status: Every Day     Current packs/day: 1.00     Average packs/day: 1 pack/day for 46.0 years (46.0 ttl pk-yrs)     Types: Cigarettes    Smokeless tobacco: Never   Vaping Use    Vaping status: Never Used   Substance and Sexual Activity    Alcohol use: Not Currently     Comment: occasional; (No alcohol use per [employer])    Drug use: No    Sexual activity: Not Currently   Other Topics Concern    None   Social History Narrative    None     Social Determinants of Health     Financial Resource  Strain: Not on file   Food Insecurity: Not on file   Transportation Needs: Not on file   Physical Activity: Sufficiently Active (3/25/2021)    Exercise Vital Sign     Days of Exercise per Week: 5 days     Minutes of Exercise per Session: 60 min   Stress: Stress Concern Present (11/7/2023)    [employer] - Occupational Stress Questionnaire     Feeling of Stress : To some extent   Social Connections: Not on file   Intimate Partner Violence: Not on file   Housing Stability: Not on file      Medications and Allergies:     Current Outpatient Medications   Medication Sig Dispense Refill    atorvastatin (LIPITOR) 40 mg tablet take 1 tablet once daily 100 tablet 2    citalopram (CeleXA) 40 mg tablet Take 40 mg by mouth daily        clonazePAM (KlonoPIN) 0.5 mg tablet TAKE 1 TABLET (0.5 MG) BY MOUTH EVERY DAY AS NEEDED FOR ANXIETY      Diclofenac Sodium (VOLTAREN) 1 % Apply 2 g topically 4 (four) times a day 240 g 0    gabapentin (NEURONTIN) 800 mg tablet Take 800 mg by mouth 3 (three) times a day      ibuprofen (MOTRIN) 600 mg tablet Take 1 tablet (600 mg total) by mouth every 6 (six) hours as needed (Pain) 30 tablet 0    metoprolol tartrate (LOPRESSOR) 50 mg tablet TAKE 1 TABLET BY MOUTH EVERY 12 HOURS 60 tablet 1    mirtazapine (REMERON) 30 mg tablet Take 30 mg by mouth daily at bedtime        albuterol (2.5 mg/3 mL) 0.083 % nebulizer solution inhale contents of 1 vial in nebulizer every 6 hours if needed for wheezing shortness of breath (Patient not taking: Reported on 1/15/2024) 150 mL 0    albuterol (ProAir HFA) 90 mcg/act inhaler Inhale 2 puffs every 6 (six) hours as needed for wheezing (Patient not taking: Reported on 1/15/2024) 18 g 2    cyclobenzaprine (FLEXERIL) 10 mg tablet Take 1 tablet (10 mg total) by mouth 3 (three) times a day as needed for muscle spasms (Patient not taking: Reported on 1/15/2024) 30 tablet 0     No current facility-administered medications for this visit.  "    Allergies   Allergen Reactions    Aspirin Anaphylaxis and Other (See Comments)    Bee Venom Anaphylaxis    Tramadol Hives, Shortness Of Breath and Other (See Comments)     Other reaction(s): Nausea and/or vomiting  Pt received morphine IV 7-6-18    Ketorolac     Nitroglycerin Other (See Comments)     Patient states\"she gets headaches from nitro\"    Other reaction(s): Other (See Comments)   Patient states\"she gets headaches from nitro\"    Omeprazole GI Intolerance     Other reaction(s): Nausea and/or vomiting      Immunizations:     Immunization History   Administered Date(s) Administered    COVID-19 PFIZER VACCINE 0.3 ML IM 04/27/2021, 05/18/2021, 11/20/2021    COVID-19 Pfizer vac (Ignacio-sucrose, gray cap) 12 yr+ IM 04/06/2022, 09/26/2022    INFLUENZA 08/19/2014, 11/23/2015, 08/27/2016, 09/27/2017, 08/12/2018, 08/30/2020, 09/09/2021, 09/26/2022, 09/20/2023    Influenza, high dose seasonal 0.7 mL 09/20/2023    Influenza, injectable, quadrivalent, preservative free 0.5 mL 08/12/2018, 12/20/2019    Influenza, seasonal, injectable 08/01/2016    Pneumococcal Conjugate 13-Valent 08/30/2020    Pneumococcal Conjugate Vaccine 20-valent (Pcv20), Polysace 09/20/2023, 09/20/2023    Pneumococcal Polysaccharide PPV23 08/12/2018    Tdap 07/25/2017, 03/23/2022, 05/03/2023      Health Maintenance:         Topic Date Due    HIV Screening  Never done    Breast Cancer Screening: Mammogram  06/28/2023    Colorectal Cancer Screening  05/14/2026    Hepatitis C Screening  Completed    Lung Cancer Screening  Discontinued         Topic Date Due    COVID-19 Vaccine (6 - 2023-24 season) 09/01/2023      Medicare Screening Tests and Risk Assessments:     Ashanti is here for her Subsequent Wellness visit.     Health Risk Assessment:   Patient rates overall health as very good. Patient feels that their physical health rating is same. Patient is satisfied with their life. Eyesight was rated as same. Hearing was rated as same. Patient feels that " their emotional and mental health rating is same. Patients states they are never, rarely angry. Patient states they are never, rarely unusually tired/fatigued. Pain experienced in the last 7 days has been a lot. Patient's pain rating has been 8/10. Patient states that she has experienced no weight loss or gain in last 6 months.     Depression Screening:   PHQ-9 Score: 0      Fall Risk Screening:   In the past year, patient has experienced: no history of falling in past year      Urinary Incontinence Screening:   Patient has not leaked urine accidently in the last six months.     Home Safety:  Patient does not have trouble with stairs inside or outside of their home. Patient has working smoke alarms and has working carbon monoxide detector. Home safety hazards include: none.     Nutrition:   Current diet is Regular.     Medications:   Patient is not currently taking any over-the-counter supplements. Patient is able to manage medications.     Activities of Daily Living (ADLs)/Instrumental Activities of Daily Living (IADLs):   Walk and transfer into and out of bed and chair?: Yes  Dress and groom yourself?: Yes    Bathe or shower yourself?: Yes    Feed yourself? Yes  Do your laundry/housekeeping?: Yes  Manage your money, pay your bills and track your expenses?: Yes  Make your own meals?: Yes    Do your own shopping?: Yes    Previous Hospitalizations:   Any hospitalizations or ED visits within the last 12 months?: No      Advance Care Planning:   Living will: No      Cognitive Screening:   Provider or family/friend/caregiver concerned regarding cognition?: No    PREVENTIVE SCREENINGS      Cardiovascular Screening:    General: Screening Not Indicated and History Lipid Disorder      Diabetes Screening:     General: Screening Current      Colorectal Cancer Screening:     General: Screening Current      Breast Cancer Screening:     General: Screening Current      Cervical Cancer Screening:    General: Screening Not  "Indicated      Lung Cancer Screening:     General: Screening Current      Hepatitis C Screening:    General: Screening Current    Screening, Brief Intervention, and Referral to Treatment (SBIRT)    Screening  Typical number of drinks in a day: 0  Typical number of drinks in a week: 0  Interpretation: Low risk drinking behavior.    Other Counseling Topics:   Car/seat belt/driving safety, skin self-exam, sunscreen and calcium and vitamin D intake and regular weightbearing exercise.     No results found.     Physical Exam:     Ht 5' 3\" (1.6 m)   Wt 64 kg (141 lb)   LMP  (LMP Unknown)   BMI 24.98 kg/m²     Physical Exam  Constitutional:       General: She is not in acute distress.     Appearance: She is well-developed. She is not diaphoretic.   HENT:      Head: Normocephalic and atraumatic.      Right Ear: External ear normal.      Left Ear: External ear normal.      Nose: Nose normal.   Eyes:      General: No scleral icterus.        Right eye: No discharge.         Left eye: No discharge.      Conjunctiva/sclera: Conjunctivae normal.   Cardiovascular:      Rate and Rhythm: Normal rate and regular rhythm.      Heart sounds: Normal heart sounds. No murmur heard.     No friction rub. No gallop.   Pulmonary:      Effort: Pulmonary effort is normal. No respiratory distress.      Breath sounds: Normal breath sounds. No wheezing or rales.   Abdominal:      General: Bowel sounds are normal. There is no distension.      Palpations: Abdomen is soft.      Tenderness: There is no abdominal tenderness. There is no guarding or rebound.   Musculoskeletal:         General: No tenderness.   Skin:     General: Skin is warm and dry.      Findings: No erythema or rash.   Neurological:      Mental Status: She is alert and oriented to person, place, and time.      Cranial Nerves: No cranial nerve deficit.      Sensory: No sensory deficit.      Motor: No abnormal muscle tone.   Psychiatric:         Behavior: Behavior normal.      "     Bettina Guerin MD

## 2024-01-15 NOTE — TELEPHONE ENCOUNTER
----- Message from Bettina Guerin MD sent at 1/15/2024  4:23 PM EST -----  Please tell the patient to discontinue the citalopram.  Also please let her know that the mirtazapine also causes QT prolongation which was seen on her EKG.  Please tell her to discuss it with her psychiatrist if she could stop both the medications and the psychiatrist can start her on another medication for the depression

## 2024-01-16 ENCOUNTER — TELEPHONE (OUTPATIENT)
Age: 64
End: 2024-01-16

## 2024-01-16 NOTE — TELEPHONE ENCOUNTER
Sent message through my chart unable to reach by phone goes to  mailbox is full not allowing to leave a message.Phone number provided to call office to discuss inquiry about x-ray

## 2024-01-22 ENCOUNTER — APPOINTMENT (OUTPATIENT)
Age: 64
End: 2024-01-22
Payer: COMMERCIAL

## 2024-01-22 DIAGNOSIS — I20.9 ANGINA, CLASS III (HCC): ICD-10-CM

## 2024-01-22 DIAGNOSIS — I10 ESSENTIAL HYPERTENSION: ICD-10-CM

## 2024-01-22 LAB
BASOPHILS # BLD AUTO: 0.04 THOUSANDS/ÂΜL (ref 0–0.1)
BASOPHILS NFR BLD AUTO: 1 % (ref 0–1)
CHOLEST SERPL-MCNC: 177 MG/DL
EOSINOPHIL # BLD AUTO: 0.22 THOUSAND/ÂΜL (ref 0–0.61)
EOSINOPHIL NFR BLD AUTO: 3 % (ref 0–6)
ERYTHROCYTE [DISTWIDTH] IN BLOOD BY AUTOMATED COUNT: 13.7 % (ref 11.6–15.1)
HCT VFR BLD AUTO: 47.9 % (ref 34.8–46.1)
HDLC SERPL-MCNC: 59 MG/DL
HGB BLD-MCNC: 15.1 G/DL (ref 11.5–15.4)
IMM GRANULOCYTES # BLD AUTO: 0.02 THOUSAND/UL (ref 0–0.2)
IMM GRANULOCYTES NFR BLD AUTO: 0 % (ref 0–2)
LDLC SERPL CALC-MCNC: 95 MG/DL (ref 0–100)
LYMPHOCYTES # BLD AUTO: 1.72 THOUSANDS/ÂΜL (ref 0.6–4.47)
LYMPHOCYTES NFR BLD AUTO: 25 % (ref 14–44)
MCH RBC QN AUTO: 28.9 PG (ref 26.8–34.3)
MCHC RBC AUTO-ENTMCNC: 31.5 G/DL (ref 31.4–37.4)
MCV RBC AUTO: 92 FL (ref 82–98)
MONOCYTES # BLD AUTO: 0.36 THOUSAND/ÂΜL (ref 0.17–1.22)
MONOCYTES NFR BLD AUTO: 5 % (ref 4–12)
NEUTROPHILS # BLD AUTO: 4.52 THOUSANDS/ÂΜL (ref 1.85–7.62)
NEUTS SEG NFR BLD AUTO: 66 % (ref 43–75)
NRBC BLD AUTO-RTO: 0 /100 WBCS
PLATELET # BLD AUTO: 227 THOUSANDS/UL (ref 149–390)
PMV BLD AUTO: 10 FL (ref 8.9–12.7)
RBC # BLD AUTO: 5.22 MILLION/UL (ref 3.81–5.12)
TRIGL SERPL-MCNC: 114 MG/DL
TSH SERPL DL<=0.05 MIU/L-ACNC: 1.06 UIU/ML (ref 0.45–4.5)
WBC # BLD AUTO: 6.88 THOUSAND/UL (ref 4.31–10.16)

## 2024-01-22 PROCEDURE — 84443 ASSAY THYROID STIM HORMONE: CPT

## 2024-01-22 PROCEDURE — 80061 LIPID PANEL: CPT

## 2024-01-22 PROCEDURE — 85025 COMPLETE CBC W/AUTO DIFF WBC: CPT

## 2024-01-22 PROCEDURE — 36415 COLL VENOUS BLD VENIPUNCTURE: CPT

## 2024-01-28 DIAGNOSIS — I10 ESSENTIAL HYPERTENSION: ICD-10-CM

## 2024-01-29 RX ORDER — METOPROLOL TARTRATE 50 MG/1
50 TABLET, FILM COATED ORAL EVERY 12 HOURS
Qty: 60 TABLET | Refills: 1 | Status: SHIPPED | OUTPATIENT
Start: 2024-01-29

## 2024-03-28 DIAGNOSIS — I10 ESSENTIAL HYPERTENSION: ICD-10-CM

## 2024-03-28 RX ORDER — METOPROLOL TARTRATE 50 MG/1
50 TABLET, FILM COATED ORAL EVERY 12 HOURS
Qty: 60 TABLET | Refills: 1 | Status: SHIPPED | OUTPATIENT
Start: 2024-03-28

## 2024-05-07 DIAGNOSIS — I10 ESSENTIAL HYPERTENSION: ICD-10-CM

## 2024-05-07 RX ORDER — METOPROLOL TARTRATE 50 MG/1
50 TABLET, FILM COATED ORAL EVERY 12 HOURS
Qty: 60 TABLET | Refills: 1 | Status: SHIPPED | OUTPATIENT
Start: 2024-05-07

## 2024-05-09 ENCOUNTER — TELEPHONE (OUTPATIENT)
Age: 64
End: 2024-05-09

## 2024-05-09 DIAGNOSIS — J44.9 CHRONIC OBSTRUCTIVE PULMONARY DISEASE, UNSPECIFIED COPD TYPE (HCC): ICD-10-CM

## 2024-05-09 DIAGNOSIS — J44.9 CHRONIC OBSTRUCTIVE PULMONARY DISEASE, UNSPECIFIED COPD TYPE (HCC): Primary | ICD-10-CM

## 2024-05-09 RX ORDER — ALBUTEROL SULFATE 90 UG/1
2 AEROSOL, METERED RESPIRATORY (INHALATION) EVERY 6 HOURS PRN
Qty: 25.5 G | Refills: 1 | Status: SHIPPED | OUTPATIENT
Start: 2024-05-09

## 2024-05-09 RX ORDER — ALBUTEROL SULFATE 90 UG/1
2 AEROSOL, METERED RESPIRATORY (INHALATION) EVERY 6 HOURS PRN
Qty: 18 G | Refills: 2 | Status: SHIPPED | OUTPATIENT
Start: 2024-05-09

## 2024-05-09 RX ORDER — ALBUTEROL SULFATE 90 UG/1
2 AEROSOL, METERED RESPIRATORY (INHALATION) EVERY 6 HOURS PRN
Qty: 18 G | Refills: 2 | Status: SHIPPED | OUTPATIENT
Start: 2024-05-09 | End: 2024-05-09 | Stop reason: SDUPTHER

## 2024-05-09 NOTE — TELEPHONE ENCOUNTER
Request for refill from [facility] albuterol inhaler  ALERT-Med not on ACTIVE med list.  Retrieved from medication HISTORY list

## 2024-05-09 NOTE — TELEPHONE ENCOUNTER
Albuterol HFA was sent 4 times and still failing. Please manually fax or call into pharmacy thank you.    Transmission to pharmacy failed (5/9/2024  1:51 PM EDT)

## 2024-05-13 RX ORDER — ALBUTEROL SULFATE 90 UG/1
2 AEROSOL, METERED RESPIRATORY (INHALATION) EVERY 6 HOURS PRN
Qty: 18 G | Refills: 2 | Status: SHIPPED | OUTPATIENT
Start: 2024-05-13

## 2024-07-22 DIAGNOSIS — I10 ESSENTIAL HYPERTENSION: ICD-10-CM

## 2024-07-22 RX ORDER — METOPROLOL TARTRATE 50 MG/1
50 TABLET, FILM COATED ORAL EVERY 12 HOURS
Qty: 180 TABLET | Refills: 1 | Status: SHIPPED | OUTPATIENT
Start: 2024-07-22

## 2024-08-07 ENCOUNTER — OFFICE VISIT (OUTPATIENT)
Age: 64
End: 2024-08-07
Payer: COMMERCIAL

## 2024-08-07 ENCOUNTER — TELEPHONE (OUTPATIENT)
Age: 64
End: 2024-08-07

## 2024-08-07 VITALS
HEIGHT: 63 IN | SYSTOLIC BLOOD PRESSURE: 140 MMHG | WEIGHT: 137.6 LBS | HEART RATE: 82 BPM | RESPIRATION RATE: 14 BRPM | TEMPERATURE: 96.8 F | DIASTOLIC BLOOD PRESSURE: 90 MMHG | OXYGEN SATURATION: 98 % | BODY MASS INDEX: 24.38 KG/M2

## 2024-08-07 DIAGNOSIS — J43.2 CENTRILOBULAR EMPHYSEMA (HCC): ICD-10-CM

## 2024-08-07 DIAGNOSIS — Z13.220 ENCOUNTER FOR LIPID SCREENING FOR CARDIOVASCULAR DISEASE: ICD-10-CM

## 2024-08-07 DIAGNOSIS — F17.210 SMOKING GREATER THAN 20 PACK YEARS: ICD-10-CM

## 2024-08-07 DIAGNOSIS — M06.9 RHEUMATOID ARTHRITIS INVOLVING MULTIPLE SITES, UNSPECIFIED WHETHER RHEUMATOID FACTOR PRESENT (HCC): ICD-10-CM

## 2024-08-07 DIAGNOSIS — F32.A ANXIETY AND DEPRESSION: ICD-10-CM

## 2024-08-07 DIAGNOSIS — F17.210 CIGARETTE NICOTINE DEPENDENCE WITHOUT COMPLICATION: ICD-10-CM

## 2024-08-07 DIAGNOSIS — M67.432 GANGLION CYST OF DORSUM OF LEFT WRIST: ICD-10-CM

## 2024-08-07 DIAGNOSIS — M54.17 LUMBOSACRAL RADICULOPATHY AT L4: ICD-10-CM

## 2024-08-07 DIAGNOSIS — G62.9 NEUROPATHY: Chronic | ICD-10-CM

## 2024-08-07 DIAGNOSIS — E78.2 MIXED HYPERLIPIDEMIA: ICD-10-CM

## 2024-08-07 DIAGNOSIS — F41.9 ANXIETY AND DEPRESSION: ICD-10-CM

## 2024-08-07 DIAGNOSIS — E55.9 VITAMIN D INSUFFICIENCY: ICD-10-CM

## 2024-08-07 DIAGNOSIS — Z13.6 ENCOUNTER FOR LIPID SCREENING FOR CARDIOVASCULAR DISEASE: ICD-10-CM

## 2024-08-07 DIAGNOSIS — I10 ESSENTIAL HYPERTENSION: Primary | ICD-10-CM

## 2024-08-07 PROBLEM — R07.9 CHEST PAIN: Status: RESOLVED | Noted: 2017-03-05 | Resolved: 2024-08-07

## 2024-08-07 PROBLEM — R07.81 RIB PAIN ON LEFT SIDE: Status: RESOLVED | Noted: 2023-11-01 | Resolved: 2024-08-07

## 2024-08-07 PROBLEM — R10.9 ABDOMINAL PAIN: Status: RESOLVED | Noted: 2017-09-29 | Resolved: 2024-08-07

## 2024-08-07 PROBLEM — E78.5 DYSLIPIDEMIA: Status: ACTIVE | Noted: 2024-08-07

## 2024-08-07 PROBLEM — R10.13 EPIGASTRIC PAIN: Status: RESOLVED | Noted: 2019-06-26 | Resolved: 2024-08-07

## 2024-08-07 PROBLEM — M79.641 PAIN OF RIGHT HAND: Status: RESOLVED | Noted: 2024-01-15 | Resolved: 2024-08-07

## 2024-08-07 PROBLEM — M79.89 RIGHT LEG SWELLING: Status: RESOLVED | Noted: 2020-04-28 | Resolved: 2024-08-07

## 2024-08-07 PROCEDURE — 99214 OFFICE O/P EST MOD 30 MIN: CPT | Performed by: FAMILY MEDICINE

## 2024-08-07 PROCEDURE — G2211 COMPLEX E/M VISIT ADD ON: HCPCS | Performed by: FAMILY MEDICINE

## 2024-08-07 RX ORDER — FLUOXETINE HYDROCHLORIDE 20 MG/1
20 CAPSULE ORAL DAILY
COMMUNITY

## 2024-08-07 RX ORDER — ZOLPIDEM TARTRATE 10 MG/1
5-10 TABLET ORAL
COMMUNITY
Start: 2024-07-27

## 2024-08-07 RX ORDER — EPINEPHRINE 0.15 MG/.3ML
0.15 INJECTION INTRAMUSCULAR ONCE
COMMUNITY

## 2024-08-07 RX ORDER — ATORVASTATIN CALCIUM 40 MG/1
40 TABLET, FILM COATED ORAL DAILY
Qty: 100 TABLET | Refills: 2 | Status: SHIPPED | OUTPATIENT
Start: 2024-08-07

## 2024-08-07 NOTE — PROGRESS NOTES
[facility] [place] PRIMARY CARE  Ambulatory visit     Name: Ashanti Ward   YOB: 1960   MRN: [id number]  Encounter Provider: Jaya Noyola MD    Encounter Date: 8/7/2024    ASSESSMENT & PLAN      Assessment & Plan     Essential hypertension  Blood Pressure: 140/90    Currently on metoprolol tartrate 50 mg twice daily  Lifestyle modifications recommended, including reduced sodium intake, regular exercise, maintaining a healthy weight, limiting alcohol consumption, and quitting smoking.     Anxiety and depression  Currently on Remeron 30 mg at bedtime, Prozac 20 mg daily, Klonopin 0.5 mg daily.  Also on Ambien 5-10 mg at bedtime as needed  Follows psych, continue care with specialist.    Chronic low back pain/lumbosacral radiculopathy at L4/neuropathy  On gabapentin 800 mg 3 times daily, ibuprofen  Discussed avoiding ibuprofen chronic use due to significant risks.    Rheumatoid arthritis involving multiple sites  Not on DMARDs or methotrexate.  Reviewed with patient today.  Patient was diagnosed in her 40s, has never been on medication due to concerns of risk of cancer, currently using ibuprofen for pain.  Discussed importance of treatment of rheumatoid arthritis, patient already has contractures of the bilateral hands, discussed complication including pulmonary that comes with uncontrolled rheumatoid arthritis.  Would recommend starting on medication to avoid further progression of disease.  Would not recommend continuous use of ibuprofen due to its side effects.  Patient would like to hold off on starting medication now and discuss further with rheumatology.  Provided referral for specialist with phone number for patient to contact and schedule an appointment.    Dyslipidemia  Most recent blood work improving from previous lab.  Reviewed blood work from January 2024.  Follow-up with blood work prior to next appointment 6 months.    Cigarette nicotine dependence without complication/central  lobar emphysema  1/2 pack a day.  Has been smoking for over 40 years.  Due for CT lung coming up, order in place.  Currently on albuterol as needed only  Discussed importance of smoking cessation extensively.                Depression Screening and Follow-up Plan: Patient was screened for depression during today's encounter. They screened negative with a PHQ-9 score of 0.         DIAGNOSIS & ORDERS   1. Essential hypertension  -     atorvastatin (LIPITOR) 40 mg tablet; Take 1 tablet (40 mg total) by mouth daily  -     Comprehensive metabolic panel; Future; Expected date: 02/07/2025  -     CBC and differential; Future; Expected date: 02/07/2025  2. Anxiety and depression  -     Comprehensive metabolic panel; Future; Expected date: 02/07/2025  -     CBC and differential; Future; Expected date: 02/07/2025  3. Mixed hyperlipidemia  -     TSH + Free T4; Future; Expected date: 02/07/2025  -     Lipid Panel with Direct LDL reflex; Future; Expected date: 02/07/2025  4. Rheumatoid arthritis involving multiple sites, unspecified whether rheumatoid factor present (HCC)  -     Ambulatory Referral to Rheumatology; Future  5. Cigarette nicotine dependence without complication  -     CT lung screening program; Future; Expected date: 08/07/2024  6. Lumbosacral radiculopathy at L4  7. Vitamin D insufficiency  -     Vitamin D 25 hydroxy; Future; Expected date: 02/07/2025  8. Encounter for lipid screening for cardiovascular disease  -     Lipid Panel with Direct LDL reflex; Future; Expected date: 02/07/2025  9. Ganglion cyst of dorsum of left wrist  -     Ambulatory Referral to Orthopedic Surgery; Future  10. Smoking greater than 20 pack years  -     CT lung screening program; Future; Expected date: 08/07/2024  11. Centrilobular emphysema (HCC)  12. Neuropathy    FOLLOW-UP PLANS   Return in about 6 months (around 2/7/2025) for Routine follow up, after completion of labs.      Current Medication List:     Current Outpatient  Medications:   •  albuterol (ProAir HFA) 90 mcg/act inhaler, Inhale 2 puffs every 6 (six) hours as needed for wheezing, Disp: 18 g, Rfl: 2  •  atorvastatin (LIPITOR) 40 mg tablet, Take 1 tablet (40 mg total) by mouth daily, Disp: 100 tablet, Rfl: 2  •  clonazePAM (KlonoPIN) 0.5 mg tablet, TAKE 1 TABLET (0.5 MG) BY MOUTH EVERY DAY AS NEEDED FOR ANXIETY, Disp: , Rfl:   •  Diclofenac Sodium (VOLTAREN) 1 %, Apply 2 g topically 4 (four) times a day, Disp: 240 g, Rfl: 0  •  EPINEPHrine (EPIPEN JR) 0.15 mg/0.3 mL SOAJ, Inject 0.15 mg into a muscle once, Disp: , Rfl:   •  gabapentin (NEURONTIN) 800 mg tablet, Take 800 mg by mouth 3 (three) times a day, Disp: , Rfl:   •  ibuprofen (MOTRIN) 600 mg tablet, Take 1 tablet (600 mg total) by mouth every 6 (six) hours as needed (Pain), Disp: 30 tablet, Rfl: 0  •  metoprolol tartrate (LOPRESSOR) 50 mg tablet, TAKE 1 TABLET BY MOUTH EVERY 12 HOURS, Disp: 180 tablet, Rfl: 1  •  mirtazapine (REMERON) 30 mg tablet, Take 30 mg by mouth daily at bedtime  , Disp: , Rfl:   •  zolpidem (AMBIEN) 10 mg tablet, Take 5-10 mg by mouth daily at bedtime as needed, Disp: , Rfl:   •  FLUoxetine (PROzac) 20 mg capsule, Take 20 mg by mouth daily, Disp: , Rfl:       Subjective   History of Present Illness       Ashanti Ward is here for an office visit.   HPI    Review of Systems    Past Medical History:   Diagnosis Date   • Alcohol abuse 12/31/2018   • Anxiety    • Bipolar 1 disorder (HCC)     Mental Health problems listed as Denied in [employer], cant't entire under pertinent negatives due to this diagnosis   • Chronic back pain    • COPD (chronic obstructive pulmonary disease) (HCC)    • Fall 11/01/2023    broke 2 ribs on left side   • Frequent headaches    • Glaucoma    • Hyperlipidemia    • Irregular heart beat     AFib   • Ovarian cancer (HCC)     last assessed - 21Sep2016   • Psychiatric disorder     bipolar   • Shortness of breath    • Uterine carcinoma (HCC)     last assessed - 21Sep2016      Past Surgical History:   Procedure Laterality Date   • ADENOIDECTOMY      Tonsillectomy with Adenoidectomy - last assessed - 21Sep2016   • APPENDECTOMY      last assessed - 21Sep2016   • CATARACT EXTRACTION Left     Remove cataract, corneo-scleral section of left eye; last assessed - 21Sep2016   • CHOLECYSTECTOMY      last assessed - 29Sep2016   • EYE SURGERY      Anterior sclera fistulization for glaucoma; last assessed - 21Sep2016   • HYSTERECTOMY      KRYSTA-BSO; last assessed - 21Sep2016   • INTRAOCULAR LENS INSERTION     • RI LIGATION/BIOPSY TEMPORAL ARTERY Right 12/16/2016    Procedure: BIOPSY ARTERY TEMPORAL;  Surgeon: Antwan Jay MD;  Location: [facility];  Service: General   • TONSILLECTOMY      Tonsillectomy with Adenoidectomy - last assessed - 21Sep2016     Family History   Problem Relation Age of Onset   • Heart disease Mother    • Coronary artery disease Mother    • Other Mother         1) Gangrene; 2) Peripheral arterial disease   • Hyperlipidemia Mother         Hypercholesterolemia   • Heart attack Mother         Myocardial Infarction   • Other Father         1) Gangrene; 2) Peripheral arterial disease   • Hyperlipidemia Father         Hypercholesterolemia   • Stomach cancer Father    • Heart attack Father         Myocardial Infarction   • No Known Problems Maternal Grandmother    • No Known Problems Paternal Grandmother    • Stomach cancer Brother    • Heart attack Brother         Myocardial Infarction   • Stomach cancer Maternal Uncle    • Esophageal cancer Maternal Aunt    • No Known Problems Maternal Aunt    • No Known Problems Maternal Aunt    • No Known Problems Maternal Aunt    • No Known Problems Maternal Aunt    • No Known Problems Maternal Aunt    • No Known Problems Maternal Aunt    • No Known Problems Maternal Aunt    • No Known Problems Paternal Aunt    • Breast cancer Neg Hx      Social History     Tobacco Use   • Smoking status: Every Day     Current packs/day: 1.00     Average  "packs/day: 1 pack/day for 46.0 years (46.0 ttl pk-yrs)     Types: Cigarettes     Passive exposure: Current   • Smokeless tobacco: Never   Vaping Use   • Vaping status: Never Used   Substance and Sexual Activity   • Alcohol use: Not Currently     Comment: occasional; (No alcohol use per [employer])   • Drug use: No   • Sexual activity: Not Currently      Allergies   Allergen Reactions   • Aspirin Anaphylaxis and Other (See Comments)   • Bee Venom Anaphylaxis   • Tramadol Hives, Shortness Of Breath and Other (See Comments)     Other reaction(s): Nausea and/or vomiting  Pt received morphine IV 7-6-18   • Ketorolac    • Nitroglycerin Other (See Comments)     Patient states\"she gets headaches from nitro\"    Other reaction(s): Other (See Comments)   Patient states\"she gets headaches from nitro\"   • Omeprazole GI Intolerance     Other reaction(s): Nausea and/or vomiting     Immunization History   Administered Date(s) Administered   • COVID-19 PFIZER VACCINE 0.3 ML IM 04/27/2021, 05/18/2021, 11/20/2021   • COVID-19 Pfizer mRNA vacc PF ignacio-sucrose 12 yr and older (Comirnaty) 11/01/2023   • COVID-19 Pfizer vac (Ginacio-sucrose, gray cap) 12 yr+ IM 04/06/2022, 09/26/2022   • INFLUENZA 08/19/2014, 11/23/2015, 08/27/2016, 09/27/2017, 08/12/2018, 08/30/2020, 09/09/2021, 09/26/2022, 09/20/2023   • Influenza, high dose seasonal 0.7 mL 09/20/2023   • Influenza, injectable, quadrivalent, preservative free 0.5 mL 08/12/2018, 12/20/2019   • Influenza, seasonal, injectable 08/01/2016   • Pneumococcal Conjugate 13-Valent 08/30/2020   • Pneumococcal Conjugate Vaccine 20-valent (Pcv20), Polysace 09/20/2023, 09/20/2023   • Pneumococcal Polysaccharide PPV23 08/12/2018   • Respiratory Syncytial Virus Vaccine (Recombinant, Adjuvanted) 12/05/2023   • Tdap 07/25/2017, 03/23/2022, 05/03/2023         Objective:     /90 (BP Location: Left arm, Patient Position: Sitting, Cuff Size: Standard)   Pulse 82   Temp (!) 96.8 °F (36 °C) (Tympanic)   " "Resp 14   Ht 5' 3\" (1.6 m)   Wt 62.4 kg (137 lb 9.6 oz)   LMP  (LMP Unknown)   SpO2 98%   BMI 24.37 kg/m²      Physical Exam  Vitals reviewed.   Constitutional:       General: She is not in acute distress.     Appearance: Normal appearance. She is not ill-appearing, toxic-appearing or diaphoretic.   HENT:      Head: Normocephalic and atraumatic.      Right Ear: External ear normal.      Left Ear: External ear normal.      Nose: Nose normal.      Mouth/Throat:      Mouth: Mucous membranes are moist.   Eyes:      General: No scleral icterus.        Right eye: No discharge.         Left eye: No discharge.      Extraocular Movements: Extraocular movements intact.      Conjunctiva/sclera: Conjunctivae normal.   Cardiovascular:      Rate and Rhythm: Normal rate and regular rhythm.      Pulses: Normal pulses.   Pulmonary:      Effort: Pulmonary effort is normal. No respiratory distress.   Abdominal:      Palpations: Abdomen is soft.      Tenderness: There is no abdominal tenderness.   Musculoskeletal:         General: Swelling (Ganglion cyst of the right wrist) and deformity (Bilateral hands) present. Normal range of motion.      Cervical back: Normal range of motion.   Skin:     General: Skin is warm and dry.   Neurological:      General: No focal deficit present.      Mental Status: She is alert and oriented to person, place, and time.   Psychiatric:         Mood and Affect: Mood normal.         Behavior: Behavior normal.         Thought Content: Thought content normal.           Jaya Noyola MD  Family Medicine Physician   [facility] PRIMARY C[facility] [place]        Administrative Statements       "

## 2024-08-07 NOTE — TELEPHONE ENCOUNTER
Referrals for orthopedic surgery and rheumatology were mailed to home address- per pt's request today

## 2024-08-07 NOTE — PATIENT INSTRUCTIONS
Together as a team our goal is to practice preventative care, avoid chronic diseases, and/or avoid further progression of current chronic diseases.   Here are my recommendations as we discussed during our office visit:    Exercise:  150 minutes of moderate intensity workout for overall general health  200-300 minutes of moderate intensity workout for weight loss.   The first 30 minutes of exercising, the body will take energy from what we ate that day. Then after that 30-minute cristina, the body will take energy from the stored fats.  Therefore, it will be more beneficial to do longer sessions and less frequently in a week to help with our weight loss journey.  I would recommend 60-minute sessions 4 times a week   Strength training 2 times a week for good bone health    Nutritional intake (diet):  Remember, it is not about finding a diet to lose weight quickly, rather adjusting your lifestyle for healthy living long-term.   When we build good habits, it does not become difficult to maintain it.  Focus on a low-carb diet.  The best diet is Mediterranean diet, which is a low-carb diet.  There are good carbs and bad carbs, minimize the bad carbs.    Bad carbs: Refined carbohydrates or simple carbohydrates that have been processed and stripped of their natural fiber and nutrients.          These carbohydrates can lead to rapid spikes in blood sugar levels and are often associated with low nutritional value. The big 4: Bread, Rice, Pasta, Potatoes  Refined grains-white bread, white rice, pasta made from refined flour.  Sugary foods and beverages: Candy, pastries, sugary cereals, soda, and other sugary drinks.  Processed snacks: Chips, cookies, sugary granola bars  Sweetened breakfast cereals: Cereals with added sugars.  Sweets and desserts: Cakes, ice cream, pies  Good carbs: These are referred to complex carbohydrates that are unprocessed or minimally processed, providing more nutrients.  Here examples of good  carbs:  Whole grains: Brown rice, quinoa, oats, whole-wheat products   Legumes: Lentils, chickpeas, black beans, kidney beans  Starchy vegetables: Sweet potatoes, butternut squash  Whole fruits: Berries, apples, oranges, bananas  Vegetables: Broccoli, spinach, kale, [place] sprouts  Nuts and seeds: Almonds, walnuts, [place] seeds, flaxseeds.    Focus on eating whole foods.  What are whole foods?  Whole Foods refer to natural, unprocessed, or minimally processed foods that are as close to the original form as possible.  These foods are in their natural state and have undergone little to no refined or alteration.  Whole Foods are valued for their nutrient density, providing essential vitamins, minerals, fiber, and other beneficial compounds.  Examples of whole foods include:  Fruits and vegetables without added preservatives or processing.    Whole grains-unrefined grains like brown rice, quinoa, and whole-wheat, which retain their brain, germ, and endosperm.  Legumes-beans, lentils, and peas in their national unprocessed date.  Nuts and seeds-on roasted, unsalted nuts and seeds without added oils or seasonings.  Meat and fish-fresh, unprocessed meats and fish without additives or preservatives.  Dairy-unprocessed dairy products such as milk, yogurt, and cheese without added sugars or artificial ingredients.  Eggs-eggs in their natural form without additives.    Protein requirement  Calculate your protein requirement in grams by multiplying your weight in kilograms by the recommended protein intake per kilogram.  This gives you an estimate of your daily protein requirement.  Age 15-18: 0.9 grams of protein per kilogram of body weight from male gender, 0.9 g of protein per kilogram of body weight for female gender.  Age 19+: 0.8 g of protein per kilogram of body weight for both male and female gender.  If you are physically active or trying to build muscle: 1.2-2.2 g/kg  Example: if you weigh 70 kg, to calculate your  protein intake per day multiply 70 by 0.8 = 56 grams per day  To convert your weight to kilograms from pounds: Take your weight in pounds and divided by 2.2046 to get your weight to kilograms.    Sodium/salt  Compare sodium in foods like soup, bread, frozen and packaged meals, then choose the one with lower numbers.  Most Americans should limit their sodium intake to less than 2,300 mg/day.  All -Americans, people with diabetes, high blood pressure, kidney disease, should limit their sodium intake to 1,500 mg/day.    Cooking oil  Avoid the following oil for cooking: Canola, corn, vegetable, sunflower, peanut, sesame, grapeseed, flaxseed.  Even though it states it is heart healthy, however, they are significantly processed and refined.   Would recommend instead the following cooking oil: Olive oil, coconut oil, avocado oil, ghee, butter.    Intermittent fasting:   There are several benefits of intermittent fasting including weight loss, improving insulin sensitivity, improving cardiovascular health by reducing risk factors like blood pressure, cholesterol levels, and triglycerides. It also promotes brain health, longevity, reduction in inflammatory markers, improves metabolic health, and some studies even suggest intermittent fasting to be protective against certain types of cancers.     The goal of intermittent fasting is to shutdown the insulin hormone, as we discussed, which is a hormone that negatively affects our health when it is around in high levels chronically.     Start intermittent fasting for 14 hours initially, then increase to 16 hours, with a goal to reach 18 hours.  During fasting - may drink water without any additives such as sweeteners, black coffee, tea without any additives including milk.   Eat nutritious meals 2 times a day  Avoid snacking in between meals.  If you end up snacking, snack on fruits/vegetables.  Initially it might be difficult, however, over time you will notice a positive  change in your energy, mood, and weight.

## 2024-11-26 NOTE — TELEPHONE ENCOUNTER
PT called in requesting to please print out the following referrals Dr. Noyola placed:    Orthopedic Surgery Referral  Rheumatology Referral    Please mail referrals to home address:  [address]  [facility] [address]    Detail Level: Generalized Detail Level: Detailed Patient Specific Counseling (Will Not Stick From Patient To Patient): Ilene Has Been notified to schedule patient.

## 2025-01-15 DIAGNOSIS — I10 ESSENTIAL HYPERTENSION: ICD-10-CM

## 2025-01-15 RX ORDER — METOPROLOL TARTRATE 50 MG
50 TABLET ORAL EVERY 12 HOURS
Qty: 180 TABLET | Refills: 1 | Status: SHIPPED | OUTPATIENT
Start: 2025-01-15

## 2025-01-19 ENCOUNTER — VBI (OUTPATIENT)
Dept: ADMINISTRATIVE | Facility: OTHER | Age: 65
End: 2025-01-19

## 2025-01-19 NOTE — TELEPHONE ENCOUNTER
01/19/25 9:56 AM     Chart reviewed for Blood Pressure was/were not submitted to the patient's insurance.     Aubrie Araiza MA   [facility]

## 2025-02-11 ENCOUNTER — TELEPHONE (OUTPATIENT)
Age: 65
End: 2025-02-11

## 2025-02-11 NOTE — TELEPHONE ENCOUNTER
Patient asking if Dr. Noyola would be able/willing to fill out application for a Handicap Placard due to her Arthritis. Patient requests call back to advise.

## 2025-03-03 ENCOUNTER — VBI (OUTPATIENT)
Dept: ADMINISTRATIVE | Facility: OTHER | Age: 65
End: 2025-03-03

## 2025-03-03 NOTE — TELEPHONE ENCOUNTER
03/03/25 12:50 PM     Chart reviewed for Blood Pressure ; nothing is submitted to the patient's insurance at this time.     Herminio Prado MA   [facility]

## 2025-04-23 ENCOUNTER — OFFICE VISIT (OUTPATIENT)
Age: 65
End: 2025-04-23
Payer: MEDICARE

## 2025-04-23 VITALS
TEMPERATURE: 97.3 F | HEART RATE: 90 BPM | OXYGEN SATURATION: 92 % | WEIGHT: 139.6 LBS | SYSTOLIC BLOOD PRESSURE: 104 MMHG | BODY MASS INDEX: 24.73 KG/M2 | HEIGHT: 63 IN | DIASTOLIC BLOOD PRESSURE: 64 MMHG | RESPIRATION RATE: 24 BRPM

## 2025-04-23 DIAGNOSIS — Z12.31 ENCOUNTER FOR SCREENING MAMMOGRAM FOR BREAST CANCER: ICD-10-CM

## 2025-04-23 DIAGNOSIS — Z78.0 ASYMPTOMATIC POSTMENOPAUSAL STATE: ICD-10-CM

## 2025-04-23 DIAGNOSIS — I10 ESSENTIAL HYPERTENSION: Chronic | ICD-10-CM

## 2025-04-23 DIAGNOSIS — J43.2 CENTRILOBULAR EMPHYSEMA (HCC): ICD-10-CM

## 2025-04-23 DIAGNOSIS — Z00.00 MEDICARE ANNUAL WELLNESS VISIT, SUBSEQUENT: ICD-10-CM

## 2025-04-23 DIAGNOSIS — M06.9 RHEUMATOID ARTHRITIS INVOLVING MULTIPLE SITES, UNSPECIFIED WHETHER RHEUMATOID FACTOR PRESENT (HCC): Primary | ICD-10-CM

## 2025-04-23 PROBLEM — M67.432 GANGLION CYST OF DORSUM OF LEFT WRIST: Chronic | Status: ACTIVE | Noted: 2024-08-07

## 2025-04-23 PROCEDURE — G0439 PPPS, SUBSEQ VISIT: HCPCS | Performed by: FAMILY MEDICINE

## 2025-04-23 PROCEDURE — G2211 COMPLEX E/M VISIT ADD ON: HCPCS | Performed by: FAMILY MEDICINE

## 2025-04-23 PROCEDURE — 99214 OFFICE O/P EST MOD 30 MIN: CPT | Performed by: FAMILY MEDICINE

## 2025-04-23 RX ORDER — CELECOXIB 100 MG/1
100 CAPSULE ORAL DAILY
Qty: 90 CAPSULE | Refills: 0 | Status: SHIPPED | OUTPATIENT
Start: 2025-04-23

## 2025-04-23 RX ORDER — HYDROXYCHLOROQUINE SULFATE 200 MG/1
200 TABLET, FILM COATED ORAL 2 TIMES DAILY WITH MEALS
Qty: 180 TABLET | Refills: 1 | Status: SHIPPED | OUTPATIENT
Start: 2025-04-23 | End: 2025-10-20

## 2025-04-23 NOTE — PROGRESS NOTES
Name: Ashanti Ward      : 1960      MRN: [id number]  Encounter Provider: Jaya Noyola MD  Encounter Date: 2025   Encounter department: [facility]  :  Assessment & Plan  Rheumatoid arthritis involving multiple sites, unspecified whether rheumatoid factor present (HCC)  Has upcoming appointment with rheumatology in July-initial visit.  Previously discussed  Not on DMARDs or methotrexate.  Reviewed with patient today.  Patient was diagnosed in her 40s, has never been on medication due to concerns of risk of cancer, currently using ibuprofen for pain.  Discussed importance of treatment of rheumatoid arthritis, patient already has contractures of the bilateral hands, discussed complication including pulmonary that comes with uncontrolled rheumatoid arthritis.  Would recommend starting on medication to avoid further progression of disease.  Would not recommend continuous use of ibuprofen due to its side effects.  Patient would like to hold off on starting medication now and discuss further with rheumatology.  Provided referral for specialist with phone number for patient to contact and schedule an appointment.   Currently not prescribed any meds.   Today patient reports  Significant pain of the hand radiating upwards on the right side  Assessment&Plan: Uncontrolled RA not on DMARDs.  Discussed disease process progressively getting worse causing patient's symptoms.  Recommended starting treatment for rheumatoid arthritis, today patient agreeable.  Med changes: Yes, start the following:  Hydroxychloroquine 200 mg twice daily  Celebrex 100 mg daily as needed additionally provided for pain management  Continue management per recommendation of rheumatology  Orders:  •  hydroxychloroquine (PLAQUENIL) 200 mg tablet; Take 1 tablet (200 mg total) by mouth 2 (two) times a day with meals  •  celecoxib (CeleBREX) 100 mg capsule; Take 1 capsule (100 mg total) by mouth daily    Essential  hypertension  BP Readings from Last 3 Encounters:   04/23/25 104/64   08/07/24 140/90   01/15/24 136/83      Currently prescribed the following:  Metoprolol titrate twice daily  Today patient reports  Reports taking as prescribed.  Assessment&Plan: Controlled  Med changes: None. Continue current regimen and working on lifestyle improvement.   Lifestyle modifications advised  Including reduced sodium intake, regular exercise, maintaining a healthy weight, limiting alcohol consumption, and/or avoiding cig smoking.  Return in 3 months for close monitoring.         Medicare annual wellness visit, subsequent  Today we discussed patient's overall health including the following:  Previous blood work results, necessary blood work recommended to be done  BMI, nutritional intake, exercise, lifestyle changes  Screenings  Supplements  Medical conditions as listed        Encounter for screening mammogram for breast cancer    Orders:  •  Mammo screening bilateral w 3d and cad; Future    Asymptomatic postmenopausal state    Orders:  •  DXA bone density spine hip and pelvis; Future    Centrilobular emphysema (HCC)  Reviewed but it was not discussed with patient today.          Preventive health issues were discussed with patient, and age appropriate screening tests were ordered as noted in patient's After Visit Summary. Personalized health advice and appropriate referrals for health education or preventive services given if needed, as noted in patient's After Visit Summary.    History of Present Illness     Neck Pain        Patient Care Team:  Jaya Noyola MD as PCP - General (Family Medicine)  Christofer De Anda MD as PCP - PCP-[facility] ([facility])  Christofer De Anda MD as PCP - PCP-[employer] ([facility])  Katherine Falk MD    Review of Systems   Musculoskeletal:  Positive for neck pain.     Medical History Reviewed by provider this encounter:  [facility]       Annual Wellness Visit Questionnaire   Ashanti is here for her  Subsequent Wellness visit.     Health Risk Assessment:   Patient rates overall health as good. Patient feels that their physical health rating is same. Patient is satisfied with their life. Eyesight was rated as same. Hearing was rated as same. Patient feels that their emotional and mental health rating is same. Patients states they are never, rarely angry. Patient states they are often unusually tired/fatigued. Pain experienced in the last 7 days has been some. Patient's pain rating has been 8/10. Patient states that she has experienced no weight loss or gain in last 6 months.     Depression Screening:   PHQ-9 Score: 0      Fall Risk Screening:   In the past year, patient has experienced: no history of falling in past year      Urinary Incontinence Screening:   Patient has not leaked urine accidently in the last six months.     Home Safety:  Patient does not have trouble with stairs inside or outside of their home. Patient has working smoke alarms and has working carbon monoxide detector. Home safety hazards include: none.     Nutrition:   Current diet is Regular.     Medications:   Patient is currently taking over-the-counter supplements. OTC medications include: see medication list. Patient is able to manage medications.     Activities of Daily Living (ADLs)/Instrumental Activities of Daily Living (IADLs):   Walk and transfer into and out of bed and chair?: Yes  Dress and groom yourself?: Yes    Bathe or shower yourself?: Yes    Feed yourself? Yes  Do your laundry/housekeeping?: Yes  Manage your money, pay your bills and track your expenses?: Yes  Make your own meals?: Yes    Do your own shopping?: Yes    Previous Hospitalizations:   Any hospitalizations or ED visits within the last 12 months?: No      Advance Care Planning:   Living will: No      Preventive Screenings      Cardiovascular Screening:    General: Screening Not Indicated and History Lipid Disorder      Diabetes Screening:     General: Screening  "Current      Colorectal Cancer Screening:     General: Screening Current      Cervical Cancer Screening:    General: Screening Not Indicated      Lung Cancer Screening:     General: Screening Current      Hepatitis C Screening:    General: Screening Current    Immunizations:  - Immunizations due: Influenza and Zoster (Shingrix)    Screening, Brief Intervention, and Referral to Treatment (SBIRT)     Screening      Single Item Drug Screening:  How often have you used an illegal drug (including marijuana) or a prescription medication for non-medical reasons in the past year? never    Single Item Drug Screen Score: 0  Interpretation: Negative screen for possible drug use disorder    Social Drivers of Health     Food Insecurity: No Food Insecurity (4/23/2025)    Hunger Vital Sign    • Worried About Running Out of Food in the Last Year: Never true    • Ran Out of Food in the Last Year: Never true   Transportation Needs: No Transportation Needs (4/23/2025)    PRAPARE - Transportation    • Lack of Transportation (Medical): No    • Lack of Transportation (Non-Medical): No   Housing Stability: Unknown (4/23/2025)    Housing Stability Vital Sign    • Unable to Pay for Housing in the Last Year: No    • Homeless in the Last Year: No   Utilities: Not At Risk (4/23/2025)    [facility] Utilities    • Threatened with loss of utilities: No     No results found.    Objective   /64 (BP Location: Left arm, Patient Position: Sitting, Cuff Size: Standard)   Pulse 90   Temp (!) 97.3 °F (36.3 °C) (Tympanic)   Resp (!) 24   Ht 5' 3\" (1.6 m)   Wt 63.3 kg (139 lb 9.6 oz)   LMP  (LMP Unknown)   SpO2 92%   BMI 24.73 kg/m²     Physical Exam  Vitals reviewed.   Constitutional:       General: She is not in acute distress.     Appearance: Normal appearance. She is not ill-appearing, toxic-appearing or diaphoretic.   HENT:      Head: Normocephalic and atraumatic.      Right Ear: External ear normal.      Left Ear: External ear normal.      " Nose: Nose normal.      Mouth/Throat:      Mouth: Mucous membranes are moist.   Eyes:      General: No scleral icterus.        Right eye: No discharge.         Left eye: No discharge.      Extraocular Movements: Extraocular movements intact.      Conjunctiva/sclera: Conjunctivae normal.   Cardiovascular:      Rate and Rhythm: Normal rate and regular rhythm.      Pulses: Normal pulses.   Pulmonary:      Effort: Pulmonary effort is normal. No respiratory distress.   Abdominal:      Palpations: Abdomen is soft.      Tenderness: There is no abdominal tenderness.   Musculoskeletal:         General: Deformity present.      Cervical back: Normal range of motion.   Skin:     General: Skin is warm and dry.   Neurological:      General: No focal deficit present.      Mental Status: She is alert and oriented to person, place, and time.   Psychiatric:         Mood and Affect: Mood normal.         Behavior: Behavior normal.         Thought Content: Thought content normal.

## 2025-04-23 NOTE — ASSESSMENT & PLAN NOTE
Has upcoming appointment with rheumatology in July-initial visit.  Previously discussed  Not on DMARDs or methotrexate.  Reviewed with patient today.  Patient was diagnosed in her 40s, has never been on medication due to concerns of risk of cancer, currently using ibuprofen for pain.  Discussed importance of treatment of rheumatoid arthritis, patient already has contractures of the bilateral hands, discussed complication including pulmonary that comes with uncontrolled rheumatoid arthritis.  Would recommend starting on medication to avoid further progression of disease.  Would not recommend continuous use of ibuprofen due to its side effects.  Patient would like to hold off on starting medication now and discuss further with rheumatology.  Provided referral for specialist with phone number for patient to contact and schedule an appointment.   Currently not prescribed any meds.   Today patient reports  Significant pain of the hand radiating upwards on the right side  Assessment&Plan: Uncontrolled RA not on DMARDs.  Discussed disease process progressively getting worse causing patient's symptoms.  Recommended starting treatment for rheumatoid arthritis, today patient agreeable.  Med changes: Yes, start the following:  Hydroxychloroquine 200 mg twice daily  Celebrex 100 mg daily as needed additionally provided for pain management  Continue management per recommendation of rheumatology  Orders:  •  hydroxychloroquine (PLAQUENIL) 200 mg tablet; Take 1 tablet (200 mg total) by mouth 2 (two) times a day with meals  •  celecoxib (CeleBREX) 100 mg capsule; Take 1 capsule (100 mg total) by mouth daily

## 2025-04-23 NOTE — PATIENT INSTRUCTIONS
Medicare Preventive Visit Patient Instructions  Thank you for completing your Welcome to Medicare Visit or Medicare Annual Wellness Visit today. Your next wellness visit will be due in one year (4/24/2026).  The screening/preventive services that you may require over the next 5-10 years are detailed below. Some tests may not apply to you based off risk factors and/or age. Screening tests ordered at today's visit but not completed yet may show as past due. Also, please note that scanned in results may not display below.  Preventive Screenings:  Service Recommendations Previous Testing/Comments   Colorectal Cancer Screening  * Colonoscopy    * Fecal Occult Blood Test (FOBT)/Fecal Immunochemical Test (FIT)  * Fecal DNA/Cologuard Test  * Flexible Sigmoidoscopy Age: 45-75 years old   Colonoscopy: every 10 years (may be performed more frequently if at higher risk)  OR  FOBT/FIT: every 1 year  OR  Cologuard: every 3 years  OR  Sigmoidoscopy: every 5 years  Screening may be recommended earlier than age 45 if at higher risk for colorectal cancer. Also, an individualized decision between you and your healthcare provider will decide whether screening between the ages of 76-85 would be appropriate. Colonoscopy: 05/15/2023  FOBT/FIT: Not on file  Cologuard: Not on file  Sigmoidoscopy: Not on file    Screening Current     Breast Cancer Screening Age: 40+ years old  Frequency: every 1-2 years  Not required if history of left and right mastectomy Mammogram: 06/28/2022        Cervical Cancer Screening Between the ages of 21-29, pap smear recommended once every 3 years.   Between the ages of 30-65, can perform pap smear with HPV co-testing every 5 years.   Recommendations may differ for women with a history of total hysterectomy, cervical cancer, or abnormal pap smears in past. Pap Smear: Not on file    Screening Not Indicated   Hepatitis C Screening Once for adults born between 1945 and 1965  More frequently in patients at high risk  for Hepatitis C Hep C Antibody: 09/30/2017    Screening Current   Diabetes Screening 1-2 times per year if you're at risk for diabetes or have pre-diabetes Fasting glucose: 80 mg/dL (2/17/2023)  A1C: 5.3 % (2/17/2023)  Screening Current   Cholesterol Screening Once every 5 years if you don't have a lipid disorder. May order more often based on risk factors. Lipid panel: 01/22/2024    Screening Not Indicated  History Lipid Disorder     Other Preventive Screenings Covered by Medicare:  Abdominal Aortic Aneurysm (AAA) Screening: covered once if your at risk. You're considered to be at risk if you have a family history of AAA.  Lung Cancer Screening: covers low dose CT scan once per year if you meet all of the following conditions: (1) Age 55-77; (2) No signs or symptoms of lung cancer; (3) Current smoker or have quit smoking within the last 15 years; (4) You have a tobacco smoking history of at least 20 pack years (packs per day multiplied by number of years you smoked); (5) You get a written order from a healthcare provider.  Glaucoma Screening: covered annually if you're considered high risk: (1) You have diabetes OR (2) Family history of glaucoma OR (3)  aged 50 and older OR (4)  American aged 65 and older  Osteoporosis Screening: covered every 2 years if you meet one of the following conditions: (1) You're estrogen deficient and at risk for osteoporosis based off medical history and other findings; (2) Have a vertebral abnormality; (3) On glucocorticoid therapy for more than 3 months; (4) Have primary hyperparathyroidism; (5) On osteoporosis medications and need to assess response to drug therapy.   Last bone density test (DXA Scan): Not on file.  HIV Screening: covered annually if you're between the age of 15-65. Also covered annually if you are younger than 15 and older than 65 with risk factors for HIV infection. For pregnant patients, it is covered up to 3 times per  pregnancy.    Immunizations:  Immunization Recommendations   Influenza Vaccine Annual influenza vaccination during flu season is recommended for all persons aged >= 6 months who do not have contraindications   Pneumococcal Vaccine   * Pneumococcal conjugate vaccine = PCV13 (Prevnar 13), PCV15 (Vaxneuvance), PCV20 (Prevnar 20)  * Pneumococcal polysaccharide vaccine = PPSV23 (Pneumovax) Adults 19-65 yo with certain risk factors or if 65+ yo  If never received any pneumonia vaccine: recommend Prevnar 20 (PCV20)  Give PCV20 if previously received 1 dose of PCV13 or PPSV23   Hepatitis B Vaccine 3 dose series if at intermediate or high risk (ex: diabetes, end stage renal disease, liver disease)   Respiratory syncytial virus (RSV) Vaccine - COVERED BY MEDICARE PART D  * RSVPreF3 (Arexvy) CDC recommends that adults 60 years of age and older may receive a single dose of RSV vaccine using shared clinical decision-making (SCDM)   Tetanus (Td) Vaccine - COST NOT COVERED BY MEDICARE PART B Following completion of primary series, a booster dose should be given every 10 years to maintain immunity against tetanus. Td may also be given as tetanus wound prophylaxis.   Tdap Vaccine - COST NOT COVERED BY MEDICARE PART B Recommended at least once for all adults. For pregnant patients, recommended with each pregnancy.   Shingles Vaccine (Shingrix) - COST NOT COVERED BY MEDICARE PART B  2 shot series recommended in those 19 years and older who have or will have weakened immune systems or those 50 years and older     Health Maintenance Due:      Topic Date Due   • Breast Cancer Screening: Mammogram  06/28/2023   • Lung Cancer Screening  10/23/2025   • Colorectal Cancer Screening  05/14/2026   • Hepatitis C Screening  Completed     Immunizations Due:      Topic Date Due   • Influenza Vaccine (1) 09/01/2024   • COVID-19 Vaccine (7 - 2024-25 season) 09/01/2024     Advance Directives   What are advance directives?  Advance directives are legal  documents that state your wishes and plans for medical care. These plans are made ahead of time in case you lose your ability to make decisions for yourself. Advance directives can apply to any medical decision, such as the treatments you want, and if you want to donate organs.   What are the types of advance directives?  There are many types of advance directives, and each state has rules about how to use them. You may choose a combination of any of the following:  Living will:  This is a written record of the treatment you want. You can also choose which treatments you do not want, which to limit, and which to stop at a certain time. This includes surgery, medicine, IV fluid, and tube feedings.   Durable power of  for healthcare (DPAHC):  This is a written record that states who you want to make healthcare choices for you when you are unable to make them for yourself. This person, called a proxy, is usually a family member or a friend. You may choose more than 1 proxy.  Do not resuscitate (DNR) order:  A DNR order is used in case your heart stops beating or you stop breathing. It is a request not to have certain forms of treatment, such as CPR. A DNR order may be included in other types of advance directives.  Medical directive:  This covers the care that you want if you are in a coma, near death, or unable to make decisions for yourself. You can list the treatments you want for each condition. Treatment may include pain medicine, surgery, blood transfusions, dialysis, IV or tube feedings, and a ventilator (breathing machine).  Values history:  This document has questions about your views, beliefs, and how you feel and think about life. This information can help others choose the care that you would choose.  Why are advance directives important?  An advance directive helps you control your care. Although spoken wishes may be used, it is better to have your wishes written down. Spoken wishes can be  misunderstood, or not followed. Treatments may be given even if you do not want them. An advance directive may make it easier for your family to make difficult choices about your care.   Cigarette Smoking and Your Health   Risks to your health if you smoke:  Nicotine and other chemicals found in tobacco damage every cell in your body. Even if you are a light smoker, you have an increased risk for cancer, heart disease, and lung disease. If you are pregnant or have diabetes, smoking increases your risk for complications.   Benefits to your health if you stop smoking:   You decrease respiratory symptoms such as coughing, wheezing, and shortness of breath.   You reduce your risk for cancers of the lung, mouth, throat, kidney, bladder, pancreas, stomach, and cervix. If you already have cancer, you increase the benefits of chemotherapy. You also reduce your risk for cancer returning or a second cancer from developing.   You reduce your risk for heart disease, blood clots, heart attack, and stroke.   You reduce your risk for lung infections, and diseases such as pneumonia, asthma, chronic bronchitis, and emphysema.  Your circulation improves. More oxygen can be delivered to your body. If you have diabetes, you lower your risk for complications, such as kidney, artery, and eye diseases. You also lower your risk for nerve damage. Nerve damage can lead to amputations, poor vision, and blindness.  You improve your body's ability to heal and to fight infections.  For more information and support to stop smoking:   Smokefree.gov  Phone: 3- 004 - 704-1743  Web Address: www.[employer]     © Copyright [employer] 2018 Information is for End User's use only and may not be sold, redistributed or otherwise used for commercial purposes. All illustrations and images included in CareNotes® are the copyrighted property of [employer] Inc. or [employer]

## 2025-04-23 NOTE — ASSESSMENT & PLAN NOTE
BP Readings from Last 3 Encounters:   04/23/25 104/64   08/07/24 140/90   01/15/24 136/83      Currently prescribed the following:  Metoprolol titrate twice daily  Today patient reports  Reports taking as prescribed.  Assessment&Plan: Controlled  Med changes: None. Continue current regimen and working on lifestyle improvement.   Lifestyle modifications advised  Including reduced sodium intake, regular exercise, maintaining a healthy weight, limiting alcohol consumption, and/or avoiding cig smoking.  Return in 3 months for close monitoring.

## 2025-04-29 ENCOUNTER — APPOINTMENT (EMERGENCY)
Dept: RADIOLOGY | Facility: HOSPITAL | Age: 65
End: 2025-04-29
Payer: MEDICARE

## 2025-04-29 ENCOUNTER — HOSPITAL ENCOUNTER (EMERGENCY)
Facility: HOSPITAL | Age: 65
Discharge: HOME | End: 2025-04-29
Attending: EMERGENCY MEDICINE
Payer: MEDICARE

## 2025-04-29 VITALS
RESPIRATION RATE: 18 BRPM | HEIGHT: 61 IN | BODY MASS INDEX: 26.06 KG/M2 | TEMPERATURE: 98 F | OXYGEN SATURATION: 94 % | WEIGHT: 138.01 LBS | SYSTOLIC BLOOD PRESSURE: 152 MMHG | DIASTOLIC BLOOD PRESSURE: 85 MMHG | HEART RATE: 63 BPM

## 2025-04-29 DIAGNOSIS — M25.511 ACUTE PAIN OF RIGHT SHOULDER: Primary | ICD-10-CM

## 2025-04-29 LAB
ATRIAL RATE: 65 BPM
P AXIS: 74 DEGREES
PR INTERVAL: 102 MS
QRS AXIS: 62 DEGREES
QRSD INTERVAL: 130 MS
QT INTERVAL: 478 MS
QTC INTERVAL: 497 MS
T WAVE AXIS: 90 DEGREES
VENTRICULAR RATE: 65 BPM

## 2025-04-29 PROCEDURE — 93005 ELECTROCARDIOGRAM TRACING: CPT

## 2025-04-29 PROCEDURE — 99284 EMERGENCY DEPT VISIT MOD MDM: CPT

## 2025-04-29 PROCEDURE — 93010 ELECTROCARDIOGRAM REPORT: CPT | Performed by: INTERNAL MEDICINE

## 2025-04-29 PROCEDURE — 99283 EMERGENCY DEPT VISIT LOW MDM: CPT

## 2025-04-29 PROCEDURE — 73030 X-RAY EXAM OF SHOULDER: CPT

## 2025-04-29 RX ORDER — OXYCODONE AND ACETAMINOPHEN 5; 325 MG/1; MG/1
1 TABLET ORAL EVERY 6 HOURS PRN
Qty: 10 TABLET | Refills: 0 | Status: SHIPPED | OUTPATIENT
Start: 2025-04-29 | End: 2025-05-09

## 2025-04-29 RX ORDER — OXYCODONE AND ACETAMINOPHEN 5; 325 MG/1; MG/1
1 TABLET ORAL ONCE
Refills: 0 | Status: COMPLETED | OUTPATIENT
Start: 2025-04-29 | End: 2025-04-29

## 2025-04-29 RX ADMIN — OXYCODONE HYDROCHLORIDE AND ACETAMINOPHEN 1 TABLET: 5; 325 TABLET ORAL at 09:08

## 2025-04-29 NOTE — DISCHARGE INSTRUCTIONS
Continue using your Celebrex, hydroxychloroquine for pain.  I provided prescription for Percocet.  Take Percocet only for severe pain.  Do not drive after taking Percocet.  Percocet is a addictive substance.  Take cautiously.   I provided referral to orthopedics.  Follow-up with orthopedics for further evaluation of severe right shoulder pain.  Wear the provided shoulder sling for comfort.  Try to perform shoulder range of motion exercises to reduce risk of developing shoulder stiffness.  See the attached informational packet discussing range of motion exercises that you can perform.  Please monitor your symptoms in the coming days.  Return to ED for chest pain, shortness of breath, fever, chills, or any other concerns.

## 2025-04-29 NOTE — ED PROVIDER NOTES
Time reflects when diagnosis was documented in both MDM as applicable and the Disposition within this note       Time User Action Codes Description Comment    4/29/2025  9:50 AM Yuriy Salcedo Add [M25.511] Acute pain of right shoulder           ED Disposition       ED Disposition   Discharge    Condition   Stable    Date/Time   Tue Apr 29, 2025  9:50 AM    Comment   [facility] discharge to home/self care.                   Assessment & Plan       Medical Decision Making  64-year-old female presents to ED for evaluation of right shoulder pain as seen in HPI.  On physical examination patient vital signs stable.  Does have some hypertension at 173/94.  This resolved while in the ED.  Has history of hypertension.  Suspect elevated blood pressure due to acute pain.  Afebrile.  Nontachycardic.  Nonhypoxic.  No murmur.  Normal breath sounds.  Appears uncomfortable secondary to right shoulder pain.  Examination of right shoulder shows tenderness to palpation of AC joint.  Significantly reduced active and passive range of motion of right shoulder with forward flexion, abduction, extension.  Patient in large amounts of pain with range of motion assessment.  2+ radial pulse.  Brisk capillary refill.  Obtained x-ray of right shoulder.  No fracture, dislocation seen on personal interpretation.  No calcific tendinosis seen.  In the ED provided acute pain control with Percocet.  This provided some relief.  Suspect musculoskeletal cause of pain.  Patient without chest pain, shortness of breath.  Discussed obtaining cardiac workup to rule out referred pain from acute cardiac event.  Patient declined and states that she would prefer discharge.  Feel this is reasonable.  Most likely musculoskeletal in origin.  Provided very strict return precautions to patient.  Will provide small prescription for Percocet for acute pain given her unique limitations with pain control options.  PDMP checked and no recent narcotic pain  "medication prescriptions seen.  Referral to orthopedics provided.  Provided patient with shoulder sling to wear for comfort.  Advised to perform passive shoulder range of motion movements in the coming days to reduce risk of developing shoulder stiffness.  Patient agreeable to plan.  Patient discharged.     Prior to discharge, discharge instructions were discussed with patient at bedside. Patient was provided both verbal and written instructions. Patient is understanding of the discharge instructions and is agreeable to plan of care. Return precautions were discussed with patient bedside, patient verbalized understanding of signs and symptoms that would necessitate return to the ED. All questions were answered. Patient was comfortable with the plan of care and discharged to home.    Portions of this chart may have been written with voice recognition software.  Occasional grammatical errors, wrong word or \"sound a like\" substitutions may have occurred due to software limitations.  Please read carefully and use context to recognize where substitutions have occurred.     Amount and/or Complexity of Data Reviewed  Radiology: ordered and independent interpretation performed.    Risk  Prescription drug management.             Medications   oxyCODONE-acetaminophen (PERCOCET) 5-325 mg per tablet 1 tablet (1 tablet Oral Given 4/29/25 0908)       ED Risk Strat Scores                    No data recorded                            History of Present Illness       Chief Complaint   Patient presents with    Shoulder Pain     C/o R shoulder pain that began last night, denies SOB / CP        Past Medical History:   Diagnosis Date    Alcohol abuse 12/31/2018    Anxiety     Bipolar 1 disorder ([facility])     Mental Health problems listed as Denied in [employer], cant't entire under pertinent negatives due to this diagnosis    Chronic back pain     COPD (chronic obstructive pulmonary disease) ([facility])     Fall 11/01/2023    broke 2 ribs on " left side    Frequent headaches     Glaucoma     Hyperlipidemia     Irregular heart beat     AFib    Ovarian cancer (HCC)     last assessed - 21Sep2016    Psychiatric disorder     bipolar    Shortness of breath     Uterine carcinoma (HCC)     last assessed - 21Sep2016      Past Surgical History:   Procedure Laterality Date    ADENOIDECTOMY      Tonsillectomy with Adenoidectomy - last assessed - 21Sep2016    APPENDECTOMY      last assessed - 21Sep2016    CATARACT EXTRACTION Left     Remove cataract, corneo-scleral section of left eye; last assessed - 21Sep2016    CHOLECYSTECTOMY      last assessed - 29Sep2016    EYE SURGERY      Anterior sclera fistulization for glaucoma; last assessed - 21Sep2016    HYSTERECTOMY      KRYSTA-BSO; last assessed - 21Sep2016    INTRAOCULAR LENS INSERTION      AL LIGATION/BIOPSY TEMPORAL ARTERY Right 12/16/2016    Procedure: BIOPSY ARTERY TEMPORAL;  Surgeon: Antwan Jay MD;  Location: [facility];  Service: General    TONSILLECTOMY      Tonsillectomy with Adenoidectomy - last assessed - 21Sep2016      Family History   Problem Relation Age of Onset    Heart disease Mother     Coronary artery disease Mother     Other Mother         1) Gangrene; 2) Peripheral arterial disease    Hyperlipidemia Mother         Hypercholesterolemia    Heart attack Mother         Myocardial Infarction    Other Father         1) Gangrene; 2) Peripheral arterial disease    Hyperlipidemia Father         Hypercholesterolemia    Stomach cancer Father     Heart attack Father         Myocardial Infarction    No Known Problems Maternal Grandmother     No Known Problems Paternal Grandmother     Stomach cancer Brother     Heart attack Brother         Myocardial Infarction    Stomach cancer Maternal Uncle     Esophageal cancer Maternal Aunt     No Known Problems Maternal Aunt     No Known Problems Maternal Aunt     No Known Problems Maternal Aunt     No Known Problems Maternal Aunt     No Known Problems Maternal Aunt      No Known Problems Maternal Aunt     No Known Problems Maternal Aunt     No Known Problems Paternal Aunt     Breast cancer Neg Hx       Social History     Tobacco Use    Smoking status: Every Day     Current packs/day: 1.00     Average packs/day: 1 pack/day for 46.0 years (46.0 ttl pk-yrs)     Types: Cigarettes     Passive exposure: Current    Smokeless tobacco: Never   Vaping Use    Vaping status: Never Used   Substance Use Topics    Alcohol use: Not Currently     Comment: occasional; (No alcohol use per [employer])    Drug use: No      E-Cigarette/Vaping    E-Cigarette Use Never User       E-Cigarette/Vaping Substances    Nicotine No     THC No     CBD No     Flavoring No     Other No     Unknown No       I have reviewed and agree with the history as documented.     64-year-old female with history of rheumatoid arthritis, anxiety, COPD, migraine disorder, ovarian cancer presents to ED for evaluation of right shoulder pain.  Patient states that last night she started to have a severe shoulder pain.  Pain most focal at the AC joint.  Denies any recent falls, trauma.  She was cleaning all day yesterday.  Denies any significant lifting movements while cleaning.  Patient accompanied by .   attests that she did not have any falls recently.  Patient denies any other symptoms at this time.  Denies chest pain, shortness of breath, blurred vision, speech difficulty, facial droop, ambulatory dysfunction, abdominal pain, diarrhea, nausea, vomiting, urinary symptoms.  Denies any history of similar shoulder pain.         Review of Systems   Musculoskeletal:         Positive right shoulder pain   All other systems reviewed and are negative.          Objective       ED Triage Vitals   Temperature Pulse Blood Pressure Respirations SpO2 Patient Position - Orthostatic VS   04/29/25 0849 04/29/25 0849 04/29/25 0849 04/29/25 0849 04/29/25 0849 04/29/25 0849   98 °F (36.7 °C) 72 (!) 173/94 20 97 % Sitting      Temp Source  Heart Rate Source BP Location FiO2 (%) Pain Score    04/29/25 0849 04/29/25 0849 04/29/25 0849 -- 04/29/25 0908    Temporal Monitor Left arm  10 - Worst Possible Pain      Vitals      Date and Time Temp Pulse SpO2 Resp BP Pain Score FACES Pain Rating User   04/29/25 1000 -- 63 94 % 18 152/85 7 -- MB   04/29/25 0915 -- 65 96 % 20 182/91 -- -- MB   04/29/25 0908 -- -- -- -- -- 10 - Worst Possible Pain -- MB   04/29/25 0849 98 °F (36.7 °C) 72 97 % 20 173/94 -- -- CT            Physical Exam  Vitals and nursing note reviewed.   Constitutional:       General: She is in acute distress.      Appearance: She is well-developed. She is not ill-appearing, toxic-appearing or diaphoretic.   HENT:      Head: Normocephalic and atraumatic.   Eyes:      Extraocular Movements: Extraocular movements intact.      Conjunctiva/sclera: Conjunctivae normal.      Pupils: Pupils are equal, round, and reactive to light.   Cardiovascular:      Rate and Rhythm: Normal rate and regular rhythm.      Pulses: Normal pulses.      Heart sounds: Normal heart sounds. No murmur heard.     No friction rub. No gallop.   Pulmonary:      Effort: Pulmonary effort is normal. No respiratory distress.      Breath sounds: Normal breath sounds. No stridor. No wheezing, rhonchi or rales.   Abdominal:      Palpations: Abdomen is soft.      Tenderness: There is no abdominal tenderness.   Musculoskeletal:      Right shoulder: Tenderness and bony tenderness present. No deformity, laceration or crepitus. Decreased range of motion.      Cervical back: Neck supple.      Comments: Tender to palpation of right AC joint.  No tenderness elsewhere of the right shoulder.  Significantly reduced active and passive range of motion of right shoulder with forward flexion, abduction, extension.  Patient in large amounts of distress with range of motion assessment.  2+ radial pulse.  Brisk capillary refill.  No open wounds of right upper extremity seen.  No obvious deformities of  right upper extremity.    Skin:     General: Skin is warm and dry.      Capillary Refill: Capillary refill takes less than 2 seconds.   Neurological:      Mental Status: She is alert.   Psychiatric:         Mood and Affect: Mood normal.         Results Reviewed       None            XR shoulder 2+ views RIGHT   ED Interpretation by Yuriy Salcedo PA-C (04/29 0936)   No fracture, dislocation on personal interpretation.  Do not see any radiopaque calcific densities in the rotator cuff musculature to suggest calcific tendinosis.       Final Interpretation by Concha Jones MD (04/29 0949)      No acute osseous abnormality.         Computerized Assisted Algorithm (CAA) may have been used to analyze all applicable images.         Workstation performed: [id number]             ECG 12 Lead Documentation Only    Date/Time: 4/29/2025 9:10 AM    Performed by: Yuriy Salcedo PA-C  Authorized by: Yuriy Salcedo PA-C    Indications / Diagnosis:  Right shoulder pain  Patient location:  ED  Previous ECG:     Previous ECG:  Compared to current    Similarity:  No change  Interpretation:     Interpretation: non-specific    Rate:     ECG rate:  65    ECG rate assessment: normal    Rhythm:     Rhythm: sinus rhythm    Ectopy:     Ectopy: none    QRS:     QRS axis:  Normal    QRS intervals:  Normal  Conduction:     Conduction: abnormal      Abnormal conduction: complete LBBB    ST segments:     ST segments:  Normal  T waves:     T waves: normal        ED Medication and Procedure Management   Prior to Admission Medications   Prescriptions Last Dose Informant Patient Reported? Taking?   Diclofenac Sodium (VOLTAREN) 1 %   No No   Sig: Apply 2 g topically 4 (four) times a day   EPINEPHrine (EPIPEN JR) 0.15 mg/0.3 mL SOAJ   Yes No   Sig: Inject 0.15 mg into a muscle once   Patient not taking: Reported on 4/23/2025   FLUoxetine (PROzac) 20 mg capsule   Yes No   Sig: Take 20 mg by mouth daily   albuterol (ProAir HFA) 90  mcg/act inhaler   No No   Sig: Inhale 2 puffs every 6 (six) hours as needed for wheezing   atorvastatin (LIPITOR) 40 mg tablet   No No   Sig: Take 1 tablet (40 mg total) by mouth daily   celecoxib (CeleBREX) 100 mg capsule   No No   Sig: Take 1 capsule (100 mg total) by mouth daily   clonazePAM (KlonoPIN) 0.5 mg tablet   Yes No   Sig: TAKE 1 TABLET (0.5 MG) BY MOUTH EVERY DAY AS NEEDED FOR ANXIETY   gabapentin (NEURONTIN) 800 mg tablet  Self Yes No   Sig: Take 800 mg by mouth 3 (three) times a day   hydroxychloroquine (PLAQUENIL) 200 mg tablet   No No   Sig: Take 1 tablet (200 mg total) by mouth 2 (two) times a day with meals   metoprolol tartrate (LOPRESSOR) 50 mg tablet   No No   Sig: TAKE 1 TABLET BY MOUTH EVERY 12 HOURS   mirtazapine (REMERON) 30 mg tablet  Self Yes No   Sig: Take 30 mg by mouth daily at bedtime     zolpidem (AMBIEN) 10 mg tablet   Yes No   Sig: Take 5-10 mg by mouth daily at bedtime as needed      Facility-Administered Medications: None     Discharge Medication List as of 4/29/2025  9:54 AM        START taking these medications    Details   oxyCODONE-acetaminophen (Percocet) 5-325 mg per tablet Take 1 tablet by mouth every 6 (six) hours as needed for severe pain for up to 10 days Max Daily Amount: 4 tablets, Starting Tue 4/29/2025, Until Fri 5/9/2025 at 2359, Normal           CONTINUE these medications which have NOT CHANGED    Details   Diclofenac Sodium (VOLTAREN) 1 % Apply 2 g topically 4 (four) times a day, Starting Mon 11/13/2023, Until Wed 12/13/2023, Normal      albuterol (ProAir HFA) 90 mcg/act inhaler Inhale 2 puffs every 6 (six) hours as needed for wheezing, Starting Mon 5/13/2024, Normal      atorvastatin (LIPITOR) 40 mg tablet Take 1 tablet (40 mg total) by mouth daily, Starting Wed 8/7/2024, Normal      celecoxib (CeleBREX) 100 mg capsule Take 1 capsule (100 mg total) by mouth daily, Starting Wed 4/23/2025, Normal      clonazePAM (KlonoPIN) 0.5 mg tablet TAKE 1 TABLET (0.5 MG) BY  MOUTH EVERY DAY AS NEEDED FOR ANXIETY, Historical Med      EPINEPHrine (EPIPEN JR) 0.15 mg/0.3 mL SOAJ Inject 0.15 mg into a muscle once, Historical Med      FLUoxetine (PROzac) 20 mg capsule Take 20 mg by mouth daily, Historical Med      gabapentin (NEURONTIN) 800 mg tablet Take 800 mg by mouth 3 (three) times a day, Historical Med      hydroxychloroquine (PLAQUENIL) 200 mg tablet Take 1 tablet (200 mg total) by mouth 2 (two) times a day with meals, Starting Wed 4/23/2025, Until Mon 10/20/2025, Normal      metoprolol tartrate (LOPRESSOR) 50 mg tablet TAKE 1 TABLET BY MOUTH EVERY 12 HOURS, Starting Wed 1/15/2025, Normal      mirtazapine (REMERON) 30 mg tablet Take 30 mg by mouth daily at bedtime  , Historical Med      zolpidem (AMBIEN) 10 mg tablet Take 5-10 mg by mouth daily at bedtime as needed, Starting Sat 7/27/2024, Historical Med             ED SEPSIS DOCUMENTATION   Time reflects when diagnosis was documented in both MDM as applicable and the Disposition within this note       Time User Action Codes Description Comment    4/29/2025  9:50 AM Yuriy Salcedo Add [M25.511] Acute pain of right shoulder                  Yuriy Salcedo PA-C  04/29/25 6970

## 2025-04-30 ENCOUNTER — VBI (OUTPATIENT)
Age: 65
End: 2025-04-30

## 2025-04-30 NOTE — TELEPHONE ENCOUNTER
04/30/25 9:46 AM    Patient contacted post ED visit, first outreach attempt made. Message was left for patient to return a call to the [facility] Department at [facility]: Phone [phone].    Thank you.  [facility] Paz  [facility]    04/30/25 9:46 AM    Patient contacted post ED visit, [facility] department spoke with patient/caregiver and outreach was successful.    Thank you.  [facility]  [facility]

## 2025-05-05 ENCOUNTER — OFFICE VISIT (OUTPATIENT)
Dept: OBGYN CLINIC | Facility: CLINIC | Age: 65
End: 2025-05-05
Payer: MEDICARE

## 2025-05-05 VITALS — WEIGHT: 138 LBS | RESPIRATION RATE: 18 BRPM | BODY MASS INDEX: 26.06 KG/M2 | HEIGHT: 61 IN

## 2025-05-05 DIAGNOSIS — M75.41 IMPINGEMENT SYNDROME OF RIGHT SHOULDER: ICD-10-CM

## 2025-05-05 PROCEDURE — 99203 OFFICE O/P NEW LOW 30 MIN: CPT | Performed by: STUDENT IN AN ORGANIZED HEALTH CARE EDUCATION/TRAINING PROGRAM

## 2025-05-05 PROCEDURE — 20610 DRAIN/INJ JOINT/BURSA W/O US: CPT

## 2025-05-05 RX ADMIN — BETAMETHASONE SODIUM PHOSPHATE AND BETAMETHASONE ACETATE 6 MG: 3; 3 INJECTION, SUSPENSION INTRA-ARTICULAR; INTRALESIONAL; INTRAMUSCULAR; SOFT TISSUE at 15:00

## 2025-05-05 RX ADMIN — BUPIVACAINE HYDROCHLORIDE 2 ML: 2.5 INJECTION, SOLUTION INFILTRATION; PERINEURAL at 15:00

## 2025-05-05 RX ADMIN — LIDOCAINE HYDROCHLORIDE 2 ML: 10 INJECTION, SOLUTION INFILTRATION; PERINEURAL at 15:00

## 2025-05-05 NOTE — PROGRESS NOTES
ASSESSMENT/PLAN:    Assessment & Plan  Impingement syndrome of right shoulder  Discussed history, exam, and imaging with patient. Presentation most consistent with right shoulder impingement and we will plan for nonoperative management at this time.  Discussed oral/topical medication regimen. Will plan for continued use of Celebrex and Tylenol as prescribed by primary care provider.  Discussed injections as a pain adjunct.  Discussed corticosteroid injection to the subacromial space.  Discussed risks and benefits of injection.  Patient elects to proceed with injection at today's visit.  Injection tolerated well, procedure documentation below.  Discussed rehabilitation efforts. Will plan for physical therapy for range of motion and periscapular strengthening.  Order placed at today's visit.  Discussed advanced imaging in the form of MRI/CT. Discussed that advanced imaging is not currently warranted, but could be considered if she has no improvement with physical therapy and injection.  Patient notes that she has a stent in her left eye, and cannot have MRIs.  Discussed that should she need advanced imaging, we would order CT arthrogram of the right shoulder.    Return in about 6 weeks (around 6/16/2025).   _____________________________________________________  CHIEF COMPLAINT:  Chief Complaint   Patient presents with    Right Shoulder - Pain       SUBJECTIVE:  Ashanti Ward is a 64 y.o. year old female, RHD, who presents for evaluation of right shoulder pain. The issue began 5 days ago after doing a lot of lifting while moving.  She notes pain is to the anterior aspect of the shoulder without radiation.  She notes difficulty with range of motion of the shoulder.  She notes mild intermittent numbness in all 5 digits.  She denies previous injury to the right shoulder, history of physical therapy, injections, or surgical invention.        PAST MEDICAL HISTORY:  Past Medical History:   Diagnosis Date    Alcohol abuse  12/31/2018    Anxiety     Bipolar 1 disorder (HCC)     Mental Health problems listed as Denied in [employer], cant't entire under pertinent negatives due to this diagnosis    Chronic back pain     COPD (chronic obstructive pulmonary disease) (HCC)     Fall 11/01/2023    broke 2 ribs on left side    Frequent headaches     Glaucoma     Hyperlipidemia     Irregular heart beat     AFib    Ovarian cancer (HCC)     last assessed - 21Sep2016    Psychiatric disorder     bipolar    Shortness of breath     Uterine carcinoma (HCC)     last assessed - 21Sep2016       PAST SURGICAL HISTORY:  Past Surgical History:   Procedure Laterality Date    ADENOIDECTOMY      Tonsillectomy with Adenoidectomy - last assessed - 21Sep2016    APPENDECTOMY      last assessed - 21Sep2016    CATARACT EXTRACTION Left     Remove cataract, corneo-scleral section of left eye; last assessed - 21Sep2016    CHOLECYSTECTOMY      last assessed - 29Sep2016    EYE SURGERY      Anterior sclera fistulization for glaucoma; last assessed - 21Sep2016    HYSTERECTOMY      KRYSTA-BSO; last assessed - 21Sep2016    INTRAOCULAR LENS INSERTION      IL LIGATION/BIOPSY TEMPORAL ARTERY Right 12/16/2016    Procedure: BIOPSY ARTERY TEMPORAL;  Surgeon: Antwan Jay MD;  Location: [facility];  Service: General    TONSILLECTOMY      Tonsillectomy with Adenoidectomy - last assessed - 21Sep2016       FAMILY HISTORY:  Family History   Problem Relation Age of Onset    Heart disease Mother     Coronary artery disease Mother     Other Mother         1) Gangrene; 2) Peripheral arterial disease    Hyperlipidemia Mother         Hypercholesterolemia    Heart attack Mother         Myocardial Infarction    Other Father         1) Gangrene; 2) Peripheral arterial disease    Hyperlipidemia Father         Hypercholesterolemia    Stomach cancer Father     Heart attack Father         Myocardial Infarction    No Known Problems Maternal Grandmother     No Known Problems Paternal Grandmother      Stomach cancer Brother     Heart attack Brother         Myocardial Infarction    Stomach cancer Maternal Uncle     Esophageal cancer Maternal Aunt     No Known Problems Maternal Aunt     No Known Problems Maternal Aunt     No Known Problems Maternal Aunt     No Known Problems Maternal Aunt     No Known Problems Maternal Aunt     No Known Problems Maternal Aunt     No Known Problems Maternal Aunt     No Known Problems Paternal Aunt     Breast cancer Neg Hx        SOCIAL HISTORY:  Social History     Tobacco Use    Smoking status: Every Day     Current packs/day: 1.00     Average packs/day: 1 pack/day for 46.0 years (46.0 ttl pk-yrs)     Types: Cigarettes     Passive exposure: Current    Smokeless tobacco: Never   Vaping Use    Vaping status: Never Used   Substance Use Topics    Alcohol use: Not Currently     Comment: occasional; (No alcohol use per [employer])    Drug use: No       MEDICATIONS:    Current Outpatient Medications:     albuterol (ProAir HFA) 90 mcg/act inhaler, Inhale 2 puffs every 6 (six) hours as needed for wheezing, Disp: 18 g, Rfl: 2    atorvastatin (LIPITOR) 40 mg tablet, Take 1 tablet (40 mg total) by mouth daily, Disp: 100 tablet, Rfl: 2    celecoxib (CeleBREX) 100 mg capsule, Take 1 capsule (100 mg total) by mouth daily, Disp: 90 capsule, Rfl: 0    clonazePAM (KlonoPIN) 0.5 mg tablet, TAKE 1 TABLET (0.5 MG) BY MOUTH EVERY DAY AS NEEDED FOR ANXIETY, Disp: , Rfl:     Diclofenac Sodium (VOLTAREN) 1 %, Apply 2 g topically 4 (four) times a day, Disp: 240 g, Rfl: 0    FLUoxetine (PROzac) 20 mg capsule, Take 20 mg by mouth daily, Disp: , Rfl:     gabapentin (NEURONTIN) 800 mg tablet, Take 800 mg by mouth 3 (three) times a day, Disp: , Rfl:     hydroxychloroquine (PLAQUENIL) 200 mg tablet, Take 1 tablet (200 mg total) by mouth 2 (two) times a day with meals, Disp: 180 tablet, Rfl: 1    metoprolol tartrate (LOPRESSOR) 50 mg tablet, TAKE 1 TABLET BY MOUTH EVERY 12 HOURS, Disp: 180 tablet, Rfl: 1     "mirtazapine (REMERON) 30 mg tablet, Take 30 mg by mouth daily at bedtime  , Disp: , Rfl:     zolpidem (AMBIEN) 10 mg tablet, Take 5-10 mg by mouth daily at bedtime as needed, Disp: , Rfl:     EPINEPHrine (EPIPEN JR) 0.15 mg/0.3 mL SOAJ, Inject 0.15 mg into a muscle once (Patient not taking: Reported on 4/23/2025), Disp: , Rfl:     oxyCODONE-acetaminophen (Percocet) 5-325 mg per tablet, Take 1 tablet by mouth every 6 (six) hours as needed for severe pain for up to 10 days Max Daily Amount: 4 tablets (Patient not taking: Reported on 5/5/2025), Disp: 10 tablet, Rfl: 0    ALLERGIES:  Allergies   Allergen Reactions    Aspirin Anaphylaxis and Other (See Comments)    Bee Venom Anaphylaxis    Tramadol Hives, Shortness Of Breath and Other (See Comments)     Other reaction(s): Nausea and/or vomiting  Pt received morphine IV 7-6-18    Ketorolac     Omeprazole GI Intolerance     Other reaction(s): Nausea and/or vomiting       Review of systems:   Constitutional: Negative for fatigue, fever or loss of apetite.   HENT: Negative.    Respiratory: Negative for shortness of breath, dyspnea.    Cardiovascular: Negative for chest pain/tightness.   Gastrointestinal: Negative for abdominal pain, N/V.   Endocrine: Negative for cold/heat intolerance, unexplained weight loss/gain.   Genitourinary: Negative for flank pain, dysuria, hematuria.   Musculoskeletal: As in HPI  Skin: Negative for rash.    Neurological: As in HPI  Psychiatric/Behavioral: Negative for agitation.  _____________________________________________________  PHYSICAL EXAMINATION:    Resp. rate 18, height 5' 1\" (1.549 m), weight 62.6 kg (138 lb).    General: well developed, well nourished\", alert and oriented times 3, no acute distress  HEENT: Benign, normocephalic, atraumatic  Cardiovascular: regular rate    Pulmonary: No wheezing or stridor  Abdomen: Soft, Nontender  Skin: No open wounds, erythema, rash  Neurovascular: per examination below    MUSCULOSKELETAL " EXAMINATION:    Right Shoulder exam  No bruising, swelling or deformity  NonTTP cervical spine, clavicle, AC joint, acromion, scapular spine, posterior joint line, deltoid, anterior joint line, bicipital groove  TTP: Anterior joint line  Active ROM  Forward flexion: 30 degrees  External rotation in adduction: 30 degrees  Internal rotation: Side pocket  Passive ROM  Forward flexion: 40 degrees  Exam significantly limited by pain  Unable to assess strength and provocative maneuvers secondary to pain  SILT C5-T1  2+ Radial pulse, symmetric with contralateral      Large joint arthrocentesis: R subacromial bursa  Universal Protocol:  Consent: Verbal consent obtained.  Risks and benefits: risks, benefits and alternatives were discussed  Consent given by: patient  Timeout called at: 5/5/2025 3:32 PM.  Patient understanding: patient states understanding of the procedure being performed  Site marked: the operative site was marked  Radiology Images displayed and confirmed. If images not available, report reviewed: imaging studies available  Patient identity confirmed: verbally with patient  Supporting Documentation  Indications: pain     Is this a Visco injection? NoProcedure Details  Location: shoulder - R subacromial bursa  Needle size: 22 G  Ultrasound guidance: no  Approach: posterolateral  Medications administered: 2 mL bupivacaine 0.25 %; 2 mL lidocaine 1 %; 6 mg betamethasone acetate-betamethasone sodium phosphate 6 (3-3) mg/mL    Patient tolerance: patient tolerated the procedure well with no immediate complications  Dressing:  Sterile dressing applied          _____________________________________________________  STUDIES REVIEWED:  I have personally reviewed pertinent films in PACS and my interpretation is:     Xrays of the right shoulder taken on 4/29/25 demonstrate no evidence of acute fracture or dislocation. No significant joint space narrowing or osteophyte formation.  Well-preserved acromiohumeral  interval.      Scribe Attestation      I,:  Demario Rivera PA-C am acting as a scribe while in the presence of the attending physician.:       I,:  Charlie Hidalgo MD personally performed the services described in this documentation    as scribed in my presence.:

## 2025-05-07 ENCOUNTER — TELEPHONE (OUTPATIENT)
Age: 65
End: 2025-05-07

## 2025-05-11 RX ORDER — BUPIVACAINE HYDROCHLORIDE 2.5 MG/ML
2 INJECTION, SOLUTION INFILTRATION; PERINEURAL
Status: COMPLETED | OUTPATIENT
Start: 2025-05-05 | End: 2025-05-05

## 2025-05-11 RX ORDER — LIDOCAINE HYDROCHLORIDE 10 MG/ML
2 INJECTION, SOLUTION INFILTRATION; PERINEURAL
Status: COMPLETED | OUTPATIENT
Start: 2025-05-05 | End: 2025-05-05

## 2025-05-11 RX ORDER — BETAMETHASONE SODIUM PHOSPHATE AND BETAMETHASONE ACETATE 3; 3 MG/ML; MG/ML
6 INJECTION, SUSPENSION INTRA-ARTICULAR; INTRALESIONAL; INTRAMUSCULAR; SOFT TISSUE
Status: COMPLETED | OUTPATIENT
Start: 2025-05-05 | End: 2025-05-05

## 2025-05-16 DIAGNOSIS — I10 ESSENTIAL HYPERTENSION: ICD-10-CM

## 2025-05-16 RX ORDER — ATORVASTATIN CALCIUM 40 MG/1
40 TABLET, FILM COATED ORAL DAILY
Qty: 30 TABLET | Refills: 0 | Status: SHIPPED | OUTPATIENT
Start: 2025-05-16

## 2025-05-21 ENCOUNTER — EVALUATION (OUTPATIENT)
Dept: PHYSICAL THERAPY | Facility: CLINIC | Age: 65
End: 2025-05-21
Payer: MEDICARE

## 2025-05-21 DIAGNOSIS — M25.511 ACUTE PAIN OF RIGHT SHOULDER: Primary | ICD-10-CM

## 2025-05-21 PROCEDURE — 97161 PT EVAL LOW COMPLEX 20 MIN: CPT | Performed by: PHYSICAL THERAPIST

## 2025-05-21 PROCEDURE — 97110 THERAPEUTIC EXERCISES: CPT | Performed by: PHYSICAL THERAPIST

## 2025-05-21 NOTE — PROGRESS NOTES
PT Evaluation     Today's date: 2025  Patient name: Ashanti Ward  : 1960  MRN: [id number]  Referring provider: Demario Rivera PA-C  Dx:   Encounter Diagnosis     ICD-10-CM    1. Acute pain of right shoulder  M25.511                      Assessment  Impairments: abnormal or restricted ROM, activity intolerance, impaired physical strength, lacks appropriate home exercise program, pain with function and poor posture   Other impairment: RA  Symptom irritability: low    Assessment details: Patient was provided a home exercise program and demonstrated an understanding of exercises.  Patient was advised to stop performing home exercise program if symptoms increase or new complaints developed.  Verbal understanding demonstrated regarding home exercise program instructions.  Patient would benefit from skilled physical therapy services for prescribed exercises, manual interventions, neuromuscular re-education, education, and modalities as deemed appropriate to assist patient in achieving their maximum level of function.    Patient presents approximately 8 weeks following onset of right shoulder pain and approximately 2 weeks s/p injection    she states that she now feels 80% better and only difficulty is OH activities.  Assessment reveals: end range movement loss only both A/AA, decreased postural awareness/self correction, mild weakness bilateral ues, scapular weakness - no formal HEP   Understanding of Dx/Px/POC: good    Goals  STG   1.  Patient will demonstrate independence and competence with HEP 2 -4 weeks  2.  Patient will report abolished pain 2-4 weeks    LTG   1.  Patient will report improvements with both functional and recreational abilities  4-6 weeks  2.  Patient will demonstrate improved motor function  4-6 weeks.   3. Improved postural awareness/ self correction  4-8 weeks.       Plan  Patient would benefit from: skilled physical therapy  Planned modality interventions: cryotherapy and  "thermotherapy: hydrocollator packs    Planned therapy interventions: IADL retraining, joint mobilization, manual therapy, patient education, postural training, strengthening, stretching, therapeutic exercise, flexibility, home exercise program and neuromuscular re-education    Frequency: 1-2x week  Duration in weeks: 4  Treatment plan discussed with: patient  Plan details: Patient response to treatment will be monitored each session and progressed accordingly    Thank you for this referral.     Subjective Evaluation    History of Present Illness  Mechanism of injury: Approximately 8 weeks ago she started cleaning out her brothers home ( he passed ) and she was overworking her right shoulder.   Felt \"pulling in her right shoulder\"     To MD 2 weeks after it happened  ( hoping it would go away )   Injected ( about \"80\" percent better )    Now -0/10  Nighttime - occasional  OH (+)     5/10 @ worst   \"burning/ hot pain \"     C/o numbness in right hand ( mainly when driving )  reports that this has been going on prior to shoulder pain .     No prior shoulder problems, denies cervical pain       Patient Goals  Patient goals for therapy: decreased pain and independence with ADLs/IADLs  Patient goal: \" be able to put my hand over my head \"  Pain  Relieving factors: ice (tylenol prn)  Aggravating factors: overhead activity  Progression: improved    Social Support  Lives with: spouse    Hand dominance: right    Treatments  Current treatment: injection treatment      Objective     Postural Observations  Seated posture: fair  Standing posture: fair    Additional Postural Observation Details  Fhp, thoracic rounding, shoulders protracted/ ir bilaterally     Observations     Additional Observation Details  RA bilateral hands    Palpation     Right   No palpable tenderness to the posterior deltoid and upper trapezius.   Tenderness of the anterior deltoid.     Tenderness     Right Shoulder  Tenderness in the AC joint, coracoid " process and supraspinatus tendon. No tenderness in the lateral scapula and medial scapula.     Cervical/Thoracic Screen   Cervical range of motion within normal limits with the following exceptions: WFL all planes  Spurlings (-)   compression (-)    Neurological Testing     Sensation     Shoulder   Left Shoulder   Intact: light touch    Right Shoulder   Intact: Light touch    Active Range of Motion   Left Shoulder   Normal active range of motion    Right Shoulder   Flexion: 170 degrees   Abduction: 170 degrees   External rotation BTH: C3   Internal rotation BTB: L5     Passive Range of Motion     Right Shoulder   Flexion: 175 degrees   Abduction: 180 degrees   External rotation 90°: 80 degrees   Internal rotation 90°: WFL    Additional Passive Range of Motion Details  Mild end range discomfort     Strength/Myotome Testing     Left Shoulder   Normal muscle strength    Right Shoulder     Planes of Motion   Flexion: 4+   Extension: 5   Abduction: 4+   Adduction: 5   External rotation at 0°: 4+   Internal rotation at 0°: 5     Tests     Right Shoulder   Positive crossover and Hawkin's.   Negative Speed's and Yergason's.            Precautions: Right shoulder pain     POC expires Unit limit Auth Expiration date PT/OT/ST + Visit Limit?   7.21.25 bomn pending pending                           Visit/Unit Tracking  AUTH Status:  Date 5.21              pending Used 1               Remaining                          Manuals 5.21                         Gentle right shoulder AARom prn db                                      Neuro Re-Ed                          Back rolls /scap retract 20x ea                                                                             Ther Ex             Ube              Wall slides flex/ scap 10s x 5            pulleys 3 min            Tb rows, ext Grn x 20 ea            TB er                          Supine cane cp, flexion             Supine serratus/ triceps                          BOR,  bicep curls                                                                                                                                   Ther Activity                                       Gait Training                                       Modalities

## 2025-05-21 NOTE — HOME EXERCISE EDUCATION
Program_ID:[id number]   Access Code: [email or web address]  URL: [email or web address]  Date: 05-  Prepared By: Cecilia Rebollar    Program Notes      Exercises      - Seated Scapular Retraction - 1 x daily - 7 x weekly - 3 sets - 10 reps      - Standing Backward Shoulder Rolls - 1 x daily - 7 x weekly - 3 sets - 10 reps      - Standing Shoulder Abduction Slides at Wall - 1 x daily - 7 x weekly - 3 sets - 10 reps      - Scaption Wall Slide with Towel - 1 x daily - 7 x weekly - 3 sets - 10 reps      - Standing Shoulder Row with Anchored Resistance - 1 x daily - 7 x weekly - 3 sets - 10 reps      - Shoulder extension with resistance - Neutral - 1 x daily - 7 x weekly - 3 sets - 10 reps

## 2025-05-27 ENCOUNTER — APPOINTMENT (OUTPATIENT)
Dept: PHYSICAL THERAPY | Facility: CLINIC | Age: 65
End: 2025-05-27
Payer: MEDICARE

## 2025-06-08 ENCOUNTER — HOSPITAL ENCOUNTER (EMERGENCY)
Facility: HOSPITAL | Age: 65
Discharge: HOME | End: 2025-06-08
Attending: STUDENT IN AN ORGANIZED HEALTH CARE EDUCATION/TRAINING PROGRAM
Payer: MEDICARE

## 2025-06-08 ENCOUNTER — APPOINTMENT (EMERGENCY)
Dept: RADIOLOGY | Facility: HOSPITAL | Age: 65
End: 2025-06-08
Payer: MEDICARE

## 2025-06-08 VITALS
RESPIRATION RATE: 20 BRPM | DIASTOLIC BLOOD PRESSURE: 84 MMHG | HEART RATE: 81 BPM | SYSTOLIC BLOOD PRESSURE: 148 MMHG | OXYGEN SATURATION: 96 % | TEMPERATURE: 97.8 F | BODY MASS INDEX: 26.66 KG/M2 | WEIGHT: 141.09 LBS

## 2025-06-08 DIAGNOSIS — M25.511 RIGHT SHOULDER PAIN: Primary | ICD-10-CM

## 2025-06-08 PROCEDURE — 73030 X-RAY EXAM OF SHOULDER: CPT

## 2025-06-08 PROCEDURE — 99284 EMERGENCY DEPT VISIT MOD MDM: CPT | Performed by: STUDENT IN AN ORGANIZED HEALTH CARE EDUCATION/TRAINING PROGRAM

## 2025-06-08 PROCEDURE — 99283 EMERGENCY DEPT VISIT LOW MDM: CPT

## 2025-06-08 RX ORDER — HYDROCODONE BITARTRATE AND ACETAMINOPHEN 5; 325 MG/1; MG/1
1 TABLET ORAL ONCE
Refills: 0 | Status: COMPLETED | OUTPATIENT
Start: 2025-06-08 | End: 2025-06-08

## 2025-06-08 RX ORDER — HYDROCODONE BITARTRATE AND ACETAMINOPHEN 5; 325 MG/1; MG/1
1 TABLET ORAL EVERY 6 HOURS PRN
Qty: 12 TABLET | Refills: 0 | Status: SHIPPED | OUTPATIENT
Start: 2025-06-08 | End: 2025-06-20

## 2025-06-08 RX ADMIN — HYDROCODONE BITARTRATE AND ACETAMINOPHEN 1 TABLET: 5; 325 TABLET ORAL at 16:57

## 2025-06-08 NOTE — DISCHARGE INSTRUCTIONS
Follow-up with orthopedics.    Continues over-the-counter medications for discomfort.  Return for any new or worsening symptoms.

## 2025-06-09 ENCOUNTER — VBI (OUTPATIENT)
Age: 65
End: 2025-06-09

## 2025-06-09 NOTE — TELEPHONE ENCOUNTER
06/09/25 10:06 AM    Patient contacted post ED visit, [facility] department spoke with patient/caregiver and outreach was successful.    Thank you.  Bridgette Resendez MA  [facility]

## 2025-06-27 NOTE — ED PROVIDER NOTES
Time reflects when diagnosis was documented in both MDM as applicable and the Disposition within this note       Time User Action Codes Description Comment    6/8/2025  4:55 PM Lala Quintanilla Add [M25.511] Right shoulder pain           ED Disposition       ED Disposition   Discharge    Condition   Stable    Date/Time   Sun Jun 8, 2025  4:55 PM    Comment   Ashanti Ward discharge to home/self care.                   Assessment & Plan       Medical Decision Making  Differential rotator cuff injury, arthritis, bicep tendinitis    Patient is a 64-year-old female but emerged permit no acute respiratory distress and vital signs unremarkable.  No signs of any acute injury on the shoulder.  Imaging obtained and shows no acute osseous abnormality.  Discussed symptomatic management and return fall precautions.  Discharge.    Amount and/or Complexity of Data Reviewed  Radiology: ordered.    Risk  Prescription drug management.             Medications   HYDROcodone-acetaminophen (NORCO) 5-325 mg per tablet 1 tablet (1 tablet Oral Given 6/8/25 1657)       ED Risk Strat Scores                    No data recorded        SBIRT 20yo+      Flowsheet Row Most Recent Value   Initial Alcohol Screen: US AUDIT-C     1. How often do you have a drink containing alcohol? 0 Filed at: 06/08/2025 1526   2. How many drinks containing alcohol do you have on a typical day you are drinking?  0 Filed at: 06/08/2025 1526   3a. Male UNDER 65: How often do you have five or more drinks on one occasion? 0 Filed at: 06/08/2025 1526   3b. FEMALE Any Age, or MALE 65+: How often do you have 4 or more drinks on one occassion? 0 Filed at: 06/08/2025 1526   Audit-C Score 0 Filed at: 06/08/2025 1526   MAURIZIO: How many times in the past year have you...    Used an illegal drug or used a prescription medication for non-medical reasons? Never Filed at: 06/08/2025 1526                            History of Present Illness       Chief Complaint   Patient presents  with    Shoulder Pain     Pt c/o svevre R shoulder pain after trying to put up a shower curtain a few days ago.       Past Medical History[1]   Past Surgical History[2]   Family History[3]   Social History[4]   E-Cigarette/Vaping    E-Cigarette Use Never User       E-Cigarette/Vaping Substances    Nicotine No     THC No     CBD No     Flavoring No     Other No     Unknown No       I have reviewed and agree with the history as documented.     HPI    Patient is a 64-year-old female present emerged department for right shoulder pain.  Patient said it happened when trying to pull a shower curtain several days ago.  Nothing seems to make it better or worse.  Has limited range of motion secondary to pain.  No additional falls or trauma.  History includes bipolar, COPD.    Review of Systems   Constitutional:  Negative for chills and fever.   HENT:  Negative for ear pain and sore throat.    Eyes:  Negative for pain and visual disturbance.   Respiratory:  Negative for cough and shortness of breath.    Cardiovascular:  Negative for chest pain and palpitations.   Gastrointestinal:  Negative for abdominal pain and vomiting.   Genitourinary:  Negative for dysuria and hematuria.   Musculoskeletal:  Negative for arthralgias and back pain.        Right shoulder pain   Skin:  Negative for color change and rash.   Neurological:  Negative for seizures and syncope.   All other systems reviewed and are negative.          Objective       ED Triage Vitals   Temperature Pulse Blood Pressure Respirations SpO2 Patient Position - Orthostatic VS   06/08/25 1522 06/08/25 1522 06/08/25 1522 06/08/25 1522 06/08/25 1522 06/08/25 1522   97.8 °F (36.6 °C) 81 148/84 20 96 % Sitting      Temp Source Heart Rate Source BP Location FiO2 (%) Pain Score    06/08/25 1522 06/08/25 1522 06/08/25 1522 -- 06/08/25 1657    Temporal Monitor Left arm  10 - Worst Possible Pain      Vitals      Date and Time Temp Pulse SpO2 Resp BP Pain Score FACES Pain Rating User    06/08/25 1657 -- -- -- -- -- 10 - Worst Possible Pain -- FS   06/08/25 1522 97.8 °F (36.6 °C) 81 96 % 20 148/84 -- -- BS            Physical Exam  Vitals and nursing note reviewed.   Constitutional:       General: She is not in acute distress.     Appearance: She is well-developed.   HENT:      Head: Normocephalic and atraumatic.     Eyes:      Conjunctiva/sclera: Conjunctivae normal.       Cardiovascular:      Rate and Rhythm: Normal rate and regular rhythm.      Heart sounds: No murmur heard.  Pulmonary:      Effort: Pulmonary effort is normal. No respiratory distress.      Breath sounds: Normal breath sounds.   Abdominal:      Palpations: Abdomen is soft.      Tenderness: There is no abdominal tenderness.     Musculoskeletal:         General: No swelling.      Cervical back: Neck supple.      Comments: Arthur upper extremity is neurovascularly intact.     Skin:     General: Skin is warm and dry.      Capillary Refill: Capillary refill takes less than 2 seconds.      Comments: Shoulder without signs of trauma     Neurological:      Mental Status: She is alert.     Psychiatric:         Mood and Affect: Mood normal.         Results Reviewed       None            XR shoulder 2+ views RIGHT   Final Interpretation by Jon Adams MD (06/08 2114)      Moderate degenerative changes without acute fracture or subluxation.         Computerized Assisted Algorithm (CAA) may have been used to analyze all applicable images.         Workstation performed: [id number]             Procedures    ED Medication and Procedure Management   Prior to Admission Medications   Prescriptions Last Dose Informant Patient Reported? Taking?   Diclofenac Sodium (VOLTAREN) 1 %   No No   Sig: Apply 2 g topically 4 (four) times a day   EPINEPHrine (EPIPEN JR) 0.15 mg/0.3 mL SOAJ  Self Yes No   Sig: Inject 0.15 mg into a muscle once   Patient not taking: Reported on 4/23/2025   FLUoxetine (PROzac) 20 mg capsule  Self Yes No   Sig: Take 20 mg by mouth  daily   albuterol (ProAir HFA) 90 mcg/act inhaler  Self No No   Sig: Inhale 2 puffs every 6 (six) hours as needed for wheezing   atorvastatin (LIPITOR) 40 mg tablet   No No   Sig: TAKE 1 TABLET BY MOUTH EVERY DAY   celecoxib (CeleBREX) 100 mg capsule  Self No No   Sig: Take 1 capsule (100 mg total) by mouth daily   clonazePAM (KlonoPIN) 0.5 mg tablet  Self Yes No   Sig: TAKE 1 TABLET (0.5 MG) BY MOUTH EVERY DAY AS NEEDED FOR ANXIETY   gabapentin (NEURONTIN) 800 mg tablet  Self Yes No   Sig: Take 800 mg by mouth 3 (three) times a day   hydroxychloroquine (PLAQUENIL) 200 mg tablet  Self No No   Sig: Take 1 tablet (200 mg total) by mouth 2 (two) times a day with meals   metoprolol tartrate (LOPRESSOR) 50 mg tablet  Self No No   Sig: TAKE 1 TABLET BY MOUTH EVERY 12 HOURS   mirtazapine (REMERON) 30 mg tablet  Self Yes No   Sig: Take 30 mg by mouth daily at bedtime     zolpidem (AMBIEN) 10 mg tablet  Self Yes No   Sig: Take 5-10 mg by mouth daily at bedtime as needed      Facility-Administered Medications: None     Discharge Medication List as of 6/8/2025  5:34 PM        START taking these medications    Details   HYDROcodone-acetaminophen (NORCO) 5-325 mg per tablet Take 1 tablet by mouth every 6 (six) hours as needed for pain for up to 12 days Max Daily Amount: 4 tablets, Starting Sun 6/8/2025, Until Fri 6/20/2025 at 2359, Normal           CONTINUE these medications which have NOT CHANGED    Details   Diclofenac Sodium (VOLTAREN) 1 % Apply 2 g topically 4 (four) times a day, Starting Mon 11/13/2023, Until Wed 12/13/2023, Normal      albuterol (ProAir HFA) 90 mcg/act inhaler Inhale 2 puffs every 6 (six) hours as needed for wheezing, Starting Mon 5/13/2024, Normal      atorvastatin (LIPITOR) 40 mg tablet TAKE 1 TABLET BY MOUTH EVERY DAY, Starting Fri 5/16/2025, Normal      celecoxib (CeleBREX) 100 mg capsule Take 1 capsule (100 mg total) by mouth daily, Starting Wed 4/23/2025, Normal      clonazePAM (KlonoPIN) 0.5 mg  tablet TAKE 1 TABLET (0.5 MG) BY MOUTH EVERY DAY AS NEEDED FOR ANXIETY, Historical Med      EPINEPHrine (EPIPEN JR) 0.15 mg/0.3 mL SOAJ Inject 0.15 mg into a muscle once, Historical Med      FLUoxetine (PROzac) 20 mg capsule Take 20 mg by mouth daily, Historical Med      gabapentin (NEURONTIN) 800 mg tablet Take 800 mg by mouth 3 (three) times a day, Historical Med      hydroxychloroquine (PLAQUENIL) 200 mg tablet Take 1 tablet (200 mg total) by mouth 2 (two) times a day with meals, Starting Wed 4/23/2025, Until Mon 10/20/2025, Normal      metoprolol tartrate (LOPRESSOR) 50 mg tablet TAKE 1 TABLET BY MOUTH EVERY 12 HOURS, Starting Wed 1/15/2025, Normal      mirtazapine (REMERON) 30 mg tablet Take 30 mg by mouth daily at bedtime  , Historical Med      zolpidem (AMBIEN) 10 mg tablet Take 5-10 mg by mouth daily at bedtime as needed, Starting Sat 7/27/2024, Historical Med             ED SEPSIS DOCUMENTATION   Time reflects when diagnosis was documented in both MDM as applicable and the Disposition within this note       Time User Action Codes Description Comment    6/8/2025  4:55 PM Lala Quintanilla Add [M25.511] Right shoulder pain                    [1]   Past Medical History:  Diagnosis Date    Alcohol abuse 12/31/2018    Anxiety     Bipolar 1 disorder (HCC)     Mental Health problems listed as Denied in [employer], cant't entire under pertinent negatives due to this diagnosis    Chronic back pain     COPD (chronic obstructive pulmonary disease) (HCC)     Fall 11/01/2023    broke 2 ribs on left side    Frequent headaches     Glaucoma     Hyperlipidemia     Irregular heart beat     AFib    Ovarian cancer (HCC)     last assessed - 21Sep2016    Psychiatric disorder     bipolar    Shortness of breath     Uterine carcinoma (HCC)     last assessed - 21Sep2016   [2]   Past Surgical History:  Procedure Laterality Date    ADENOIDECTOMY      Tonsillectomy with Adenoidectomy - last assessed - 21Sep2016    APPENDECTOMY      last  assessed - 21Sep2016    CATARACT EXTRACTION Left     Remove cataract, corneo-scleral section of left eye; last assessed - 21Sep2016    CHOLECYSTECTOMY      last assessed - 29Sep2016    EYE SURGERY      Anterior sclera fistulization for glaucoma; last assessed - 21Sep2016    HYSTERECTOMY      KRYSTA-BSO; last assessed - 21Sep2016    INTRAOCULAR LENS INSERTION      [place] LIGATION/BIOPSY TEMPORAL ARTERY Right 12/16/2016    Procedure: BIOPSY ARTERY TEMPORAL;  Surgeon: Antwan Jay MD;  Location: [facility];  Service: General    TONSILLECTOMY      Tonsillectomy with Adenoidectomy - last assessed - 21Sep2016   [3]   Family History  Problem Relation Name Age of Onset    Heart disease Mother      Coronary artery disease Mother      Other Mother          1) Gangrene; 2) Peripheral arterial disease    Hyperlipidemia Mother          Hypercholesterolemia    Heart attack Mother          Myocardial Infarction    Other Father          1) Gangrene; 2) Peripheral arterial disease    Hyperlipidemia Father          Hypercholesterolemia    Stomach cancer Father      Heart attack Father          Myocardial Infarction    No Known Problems Maternal Grandmother      No Known Problems Paternal Grandmother      Stomach cancer Brother      Heart attack Brother          Myocardial Infarction    Stomach cancer Maternal Uncle      Esophageal cancer Maternal Aunt      No Known Problems Maternal Aunt      No Known Problems Maternal Aunt      No Known Problems Maternal Aunt      No Known Problems Maternal Aunt      No Known Problems Maternal Aunt      No Known Problems Maternal Aunt      No Known Problems Maternal Aunt      No Known Problems Paternal Aunt      Breast cancer Neg Hx     [4]   Social History  Tobacco Use    Smoking status: Every Day     Current packs/day: 1.00     Average packs/day: 1 pack/day for 46.0 years (46.0 ttl pk-yrs)     Types: Cigarettes     Passive exposure: Current    Smokeless tobacco: Never   Vaping Use    Vaping status:  Never Used   Substance Use Topics    Alcohol use: Not Currently     Comment: occasional; (No alcohol use per [employer])    Drug use: No        Lala Quintanilla DO  06/26/25 5620

## 2025-06-29 DIAGNOSIS — I10 ESSENTIAL HYPERTENSION: ICD-10-CM

## 2025-07-01 RX ORDER — ATORVASTATIN CALCIUM 40 MG/1
40 TABLET, FILM COATED ORAL DAILY
Qty: 90 TABLET | Refills: 0 | Status: SHIPPED | OUTPATIENT
Start: 2025-07-01

## 2025-07-06 DIAGNOSIS — I10 ESSENTIAL HYPERTENSION: ICD-10-CM

## 2025-07-08 RX ORDER — METOPROLOL TARTRATE 50 MG
50 TABLET ORAL 2 TIMES DAILY
Qty: 180 TABLET | Refills: 1 | Status: SHIPPED | OUTPATIENT
Start: 2025-07-08

## 2025-07-14 NOTE — ED NOTES
Nutrition consulted. Most recent weight and BMI monitored-     Measurements:  Wt Readings from Last 1 Encounters:   07/07/25 59.9 kg (132 lb 0.9 oz)   Body mass index is 16.51 kg/m².    Patient has been screened and assessed by RD.    Malnutrition Type:  Context: chronic illness  Level: severe    Malnutrition Characteristic Summary:  Subcutaneous Fat (Malnutrition): severe depletion  Muscle Mass (Malnutrition): severe depletion    Interventions/Recommendations (treatment strategy):  1. Continue bolus TF recommendation: 5 cans/day of Isosource 1.5 to provide 1250 mL total volume, 1875 kcal, 220 g CHO, 85 protein, 19 fiber, 955 mL free water  - 90 mL flush before and after each (10 flushes total) can to provide additional 900 mL free water (Total 1855 mL)  2. Continue soft and bite sized textured diet as tolerated    3. Continue boost glucose control TID   4. Recommend arnie BID to aid in wound healing  5. Recommend MVI  6. RD to monitor weight, labs, meds, intake, tolerance  -- started on Tube feeds       Per pts request, called [facility] [place] for p/u       Drea Calix, RN  02/13/18 2138

## 2025-08-05 DIAGNOSIS — J44.9 CHRONIC OBSTRUCTIVE PULMONARY DISEASE, UNSPECIFIED COPD TYPE (HCC): ICD-10-CM

## 2025-08-05 RX ORDER — ALBUTEROL SULFATE 90 UG/1
INHALANT RESPIRATORY (INHALATION)
Qty: 25.5 G | Refills: 1 | Status: SHIPPED | OUTPATIENT
Start: 2025-08-05

## 2025-08-06 DIAGNOSIS — M06.9 RHEUMATOID ARTHRITIS INVOLVING MULTIPLE SITES, UNSPECIFIED WHETHER RHEUMATOID FACTOR PRESENT (HCC): ICD-10-CM

## 2025-08-06 RX ORDER — CELECOXIB 100 MG/1
100 CAPSULE ORAL DAILY
Qty: 90 CAPSULE | Refills: 0 | Status: SHIPPED | OUTPATIENT
Start: 2025-08-06

## 2025-08-08 RX ORDER — CELECOXIB 100 MG/1
100 CAPSULE ORAL DAILY
Qty: 90 CAPSULE | Refills: 0 | OUTPATIENT
Start: 2025-08-08